# Patient Record
Sex: MALE | Race: WHITE | NOT HISPANIC OR LATINO | Employment: FULL TIME | URBAN - METROPOLITAN AREA
[De-identification: names, ages, dates, MRNs, and addresses within clinical notes are randomized per-mention and may not be internally consistent; named-entity substitution may affect disease eponyms.]

---

## 2022-11-21 ENCOUNTER — APPOINTMENT (EMERGENCY)
Dept: CT IMAGING | Facility: HOSPITAL | Age: 62
End: 2022-11-21

## 2022-11-21 ENCOUNTER — OFFICE VISIT (OUTPATIENT)
Dept: URGENT CARE | Facility: CLINIC | Age: 62
End: 2022-11-21

## 2022-11-21 ENCOUNTER — HOSPITAL ENCOUNTER (INPATIENT)
Facility: HOSPITAL | Age: 62
LOS: 10 days | Discharge: HOME | End: 2022-12-01
Attending: EMERGENCY MEDICINE | Admitting: INTERNAL MEDICINE

## 2022-11-21 VITALS
SYSTOLIC BLOOD PRESSURE: 122 MMHG | OXYGEN SATURATION: 99 % | RESPIRATION RATE: 20 BRPM | WEIGHT: 194 LBS | BODY MASS INDEX: 30.45 KG/M2 | DIASTOLIC BLOOD PRESSURE: 66 MMHG | HEART RATE: 79 BPM | TEMPERATURE: 97.6 F | HEIGHT: 67 IN

## 2022-11-21 DIAGNOSIS — K74.60 CIRRHOSIS OF LIVER WITH ASCITES, UNSPECIFIED HEPATIC CIRRHOSIS TYPE (HCC): ICD-10-CM

## 2022-11-21 DIAGNOSIS — G25.81 RESTLESS LEG SYNDROME: Chronic | ICD-10-CM

## 2022-11-21 DIAGNOSIS — R18.8 CIRRHOSIS OF LIVER WITH ASCITES, UNSPECIFIED HEPATIC CIRRHOSIS TYPE (HCC): ICD-10-CM

## 2022-11-21 DIAGNOSIS — R10.9 ABDOMINAL PAIN, UNSPECIFIED ABDOMINAL LOCATION: Primary | ICD-10-CM

## 2022-11-21 DIAGNOSIS — M79.605 LEFT LEG PAIN: Primary | ICD-10-CM

## 2022-11-21 DIAGNOSIS — F22 PARANOIA (HCC): ICD-10-CM

## 2022-11-21 DIAGNOSIS — K63.89 COLONIC MASS: ICD-10-CM

## 2022-11-21 DIAGNOSIS — R18.8 ASCITES: ICD-10-CM

## 2022-11-21 DIAGNOSIS — K74.60 LIVER CIRRHOSIS (HCC): ICD-10-CM

## 2022-11-21 DIAGNOSIS — R10.11 RUQ ABDOMINAL PAIN: ICD-10-CM

## 2022-11-21 DIAGNOSIS — K52.9 COLITIS: ICD-10-CM

## 2022-11-21 LAB
2HR DELTA HS TROPONIN: 0 NG/L
ABO GROUP BLD: NORMAL
ALBUMIN SERPL BCP-MCNC: 3 G/DL (ref 3.5–5)
ALP SERPL-CCNC: 163 U/L (ref 34–104)
ALT SERPL W P-5'-P-CCNC: 49 U/L (ref 7–52)
ANION GAP SERPL CALCULATED.3IONS-SCNC: 5 MMOL/L (ref 4–13)
AST SERPL W P-5'-P-CCNC: 46 U/L (ref 13–39)
BASOPHILS # BLD AUTO: 0.03 THOUSANDS/ÂΜL (ref 0–0.1)
BASOPHILS NFR BLD AUTO: 0 % (ref 0–1)
BILIRUB SERPL-MCNC: 2.86 MG/DL (ref 0.2–1)
BLD GP AB SCN SERPL QL: NEGATIVE
BUN SERPL-MCNC: 11 MG/DL (ref 5–25)
CALCIUM ALBUM COR SERPL-MCNC: 9.5 MG/DL (ref 8.3–10.1)
CALCIUM SERPL-MCNC: 8.7 MG/DL (ref 8.4–10.2)
CARDIAC TROPONIN I PNL SERPL HS: 7 NG/L
CARDIAC TROPONIN I PNL SERPL HS: 7 NG/L
CHLORIDE SERPL-SCNC: 105 MMOL/L (ref 96–108)
CO2 SERPL-SCNC: 27 MMOL/L (ref 21–32)
CREAT SERPL-MCNC: 0.97 MG/DL (ref 0.6–1.3)
EOSINOPHIL # BLD AUTO: 0.31 THOUSAND/ÂΜL (ref 0–0.61)
EOSINOPHIL NFR BLD AUTO: 5 % (ref 0–6)
ERYTHROCYTE [DISTWIDTH] IN BLOOD BY AUTOMATED COUNT: 16.1 % (ref 11.6–15.1)
GFR SERPL CREATININE-BSD FRML MDRD: 83 ML/MIN/1.73SQ M
GLUCOSE SERPL-MCNC: 184 MG/DL (ref 65–140)
HCT VFR BLD AUTO: 37.6 % (ref 36.5–49.3)
HGB BLD-MCNC: 12.1 G/DL (ref 12–17)
IMM GRANULOCYTES # BLD AUTO: 0.02 THOUSAND/UL (ref 0–0.2)
IMM GRANULOCYTES NFR BLD AUTO: 0 % (ref 0–2)
LACTATE SERPL-SCNC: 1.8 MMOL/L (ref 0.5–2)
LIPASE SERPL-CCNC: 36 U/L (ref 11–82)
LYMPHOCYTES # BLD AUTO: 0.86 THOUSANDS/ÂΜL (ref 0.6–4.47)
LYMPHOCYTES NFR BLD AUTO: 13 % (ref 14–44)
MCH RBC QN AUTO: 33.8 PG (ref 26.8–34.3)
MCHC RBC AUTO-ENTMCNC: 32.2 G/DL (ref 31.4–37.4)
MCV RBC AUTO: 105 FL (ref 82–98)
MONOCYTES # BLD AUTO: 0.34 THOUSAND/ÂΜL (ref 0.17–1.22)
MONOCYTES NFR BLD AUTO: 5 % (ref 4–12)
NEUTROPHILS # BLD AUTO: 5.17 THOUSANDS/ÂΜL (ref 1.85–7.62)
NEUTS SEG NFR BLD AUTO: 77 % (ref 43–75)
NRBC BLD AUTO-RTO: 0 /100 WBCS
PLATELET # BLD AUTO: 59 THOUSANDS/UL (ref 149–390)
PMV BLD AUTO: 12.2 FL (ref 8.9–12.7)
POTASSIUM SERPL-SCNC: 3.3 MMOL/L (ref 3.5–5.3)
PROT SERPL-MCNC: 7.1 G/DL (ref 6.4–8.4)
RBC # BLD AUTO: 3.58 MILLION/UL (ref 3.88–5.62)
RH BLD: NEGATIVE
SODIUM SERPL-SCNC: 137 MMOL/L (ref 135–147)
SPECIMEN EXPIRATION DATE: NORMAL
WBC # BLD AUTO: 6.73 THOUSAND/UL (ref 4.31–10.16)

## 2022-11-21 RX ORDER — FENTANYL CITRATE 50 UG/ML
50 INJECTION, SOLUTION INTRAMUSCULAR; INTRAVENOUS ONCE
Status: COMPLETED | OUTPATIENT
Start: 2022-11-21 | End: 2022-11-21

## 2022-11-21 RX ORDER — FENTANYL CITRATE 50 UG/ML
25 INJECTION, SOLUTION INTRAMUSCULAR; INTRAVENOUS ONCE
Status: COMPLETED | OUTPATIENT
Start: 2022-11-21 | End: 2022-11-21

## 2022-11-21 RX ORDER — ROPINIROLE 0.25 MG/1
4 TABLET, FILM COATED ORAL 4 TIMES DAILY
COMMUNITY

## 2022-11-21 RX ADMIN — FENTANYL CITRATE 50 MCG: 50 INJECTION, SOLUTION INTRAMUSCULAR; INTRAVENOUS at 21:45

## 2022-11-21 RX ADMIN — FENTANYL CITRATE 25 MCG: 50 INJECTION INTRAMUSCULAR; INTRAVENOUS at 21:28

## 2022-11-21 RX ADMIN — IOHEXOL 100 ML: 350 INJECTION, SOLUTION INTRAVENOUS at 22:25

## 2022-11-21 RX ADMIN — FENTANYL CITRATE 50 MCG: 50 INJECTION INTRAMUSCULAR; INTRAVENOUS at 23:04

## 2022-11-22 ENCOUNTER — APPOINTMENT (INPATIENT)
Dept: NON INVASIVE DIAGNOSTICS | Facility: HOSPITAL | Age: 62
End: 2022-11-22

## 2022-11-22 ENCOUNTER — APPOINTMENT (INPATIENT)
Dept: INTERVENTIONAL RADIOLOGY/VASCULAR | Facility: HOSPITAL | Age: 62
End: 2022-11-22

## 2022-11-22 PROBLEM — K74.60 CIRRHOSIS OF LIVER WITH ASCITES (HCC): Status: ACTIVE | Noted: 2022-11-22

## 2022-11-22 PROBLEM — D69.6 THROMBOCYTOPENIA (HCC): Status: ACTIVE | Noted: 2022-11-22

## 2022-11-22 PROBLEM — E66.9 DIABETES MELLITUS TYPE 2 IN OBESE (HCC): Chronic | Status: ACTIVE | Noted: 2022-11-22

## 2022-11-22 PROBLEM — R93.89 ABNORMAL CT OF THE CHEST: Status: ACTIVE | Noted: 2022-11-22

## 2022-11-22 PROBLEM — E11.69 DIABETES MELLITUS TYPE 2 IN OBESE (HCC): Chronic | Status: ACTIVE | Noted: 2022-11-22

## 2022-11-22 PROBLEM — E87.6 HYPOKALEMIA: Status: ACTIVE | Noted: 2022-11-22

## 2022-11-22 PROBLEM — R18.8 CIRRHOSIS OF LIVER WITH ASCITES (HCC): Status: ACTIVE | Noted: 2022-11-22

## 2022-11-22 PROBLEM — K52.9 COLITIS: Status: ACTIVE | Noted: 2022-11-22

## 2022-11-22 PROBLEM — G25.81 RESTLESS LEG SYNDROME: Chronic | Status: ACTIVE | Noted: 2022-11-22

## 2022-11-22 PROBLEM — Z72.0 TOBACCO ABUSE: Chronic | Status: ACTIVE | Noted: 2022-11-22

## 2022-11-22 LAB
ABO GROUP BLD: NORMAL
ALBUMIN FLD-MCNC: <0.6 G/DL
ALBUMIN SERPL BCP-MCNC: 2.4 G/DL (ref 3.5–5)
ALP SERPL-CCNC: 125 U/L (ref 34–104)
ALT SERPL W P-5'-P-CCNC: 39 U/L (ref 7–52)
ANION GAP SERPL CALCULATED.3IONS-SCNC: 2 MMOL/L (ref 4–13)
AORTIC ROOT: 3.3 CM
AORTIC VALVE MEAN VELOCITY: 12.2 M/S
APICAL FOUR CHAMBER EJECTION FRACTION: 66 %
APPEARANCE FLD: ABNORMAL
ASCENDING AORTA: 3.2 CM
AST SERPL W P-5'-P-CCNC: 35 U/L (ref 13–39)
AV AREA BY CONTINUOUS VTI: 1.9 CM2
AV AREA PEAK VELOCITY: 2 CM2
AV LVOT MEAN GRADIENT: 3 MMHG
AV LVOT PEAK GRADIENT: 4 MMHG
AV MEAN GRADIENT: 7 MMHG
AV PEAK GRADIENT: 11 MMHG
AV VALVE AREA: 1.88 CM2
AV VELOCITY RATIO: 0.63
BILIRUB DIRECT SERPL-MCNC: 0.91 MG/DL (ref 0–0.2)
BILIRUB SERPL-MCNC: 1.77 MG/DL (ref 0.2–1)
BILIRUB UR QL STRIP: ABNORMAL
BNP SERPL-MCNC: 178 PG/ML (ref 0–100)
BUN SERPL-MCNC: 13 MG/DL (ref 5–25)
CALCIUM SERPL-MCNC: 8 MG/DL (ref 8.4–10.2)
CEA SERPL-MCNC: 5.7 NG/ML (ref 0–3)
CHLORIDE SERPL-SCNC: 109 MMOL/L (ref 96–108)
CLARITY UR: CLEAR
CO2 SERPL-SCNC: 25 MMOL/L (ref 21–32)
COLOR FLD: ABNORMAL
COLOR UR: ABNORMAL
CREAT SERPL-MCNC: 0.78 MG/DL (ref 0.6–1.3)
DOP CALC AO PEAK VEL: 1.67 M/S
DOP CALC AO VTI: 36.7 CM
DOP CALC LVOT AREA: 3.14 CM2
DOP CALC LVOT DIAMETER: 2 CM
DOP CALC LVOT PEAK VEL VTI: 21.98 CM
DOP CALC LVOT PEAK VEL: 1.06 M/S
DOP CALC LVOT STROKE INDEX: 34 ML/M2
DOP CALC LVOT STROKE VOLUME: 69.02 CM3
E WAVE DECELERATION TIME: 291 MS
ERYTHROCYTE [DISTWIDTH] IN BLOOD BY AUTOMATED COUNT: 16 % (ref 11.6–15.1)
EST. AVERAGE GLUCOSE BLD GHB EST-MCNC: 123 MG/DL
FRACTIONAL SHORTENING: 35 % (ref 28–44)
GFR SERPL CREATININE-BSD FRML MDRD: 96 ML/MIN/1.73SQ M
GLUCOSE FLD-MCNC: 70 MG/DL
GLUCOSE SERPL-MCNC: 151 MG/DL (ref 65–140)
GLUCOSE UR STRIP-MCNC: NEGATIVE MG/DL
HBA1C MFR BLD: 5.9 %
HCT VFR BLD AUTO: 29.4 % (ref 36.5–49.3)
HGB BLD-MCNC: 9.7 G/DL (ref 12–17)
HGB UR QL STRIP.AUTO: NEGATIVE
HISTIOCYTES NFR FLD: 30 %
INR PPP: 1.52 (ref 0.84–1.19)
INTERVENTRICULAR SEPTUM IN DIASTOLE (PARASTERNAL SHORT AXIS VIEW): 1 CM
INTERVENTRICULAR SEPTUM: 1 CM (ref 0.6–1.1)
KETONES UR STRIP-MCNC: NEGATIVE MG/DL
LAAS-AP2: 21.1 CM2
LAAS-AP4: 18.9 CM2
LDH FLD L TO P-CCNC: 49 U/L
LEFT ATRIUM SIZE: 3.7 CM
LEFT INTERNAL DIMENSION IN SYSTOLE: 3.1 CM (ref 2.1–4)
LEFT VENTRICULAR INTERNAL DIMENSION IN DIASTOLE: 4.8 CM (ref 3.5–6)
LEFT VENTRICULAR POSTERIOR WALL IN END DIASTOLE: 1 CM
LEFT VENTRICULAR STROKE VOLUME: 67 ML
LEUKOCYTE ESTERASE UR QL STRIP: NEGATIVE
LVSV (TEICH): 67 ML
LYMPHOCYTES NFR BLD AUTO: 42 %
MCH RBC QN AUTO: 34.6 PG (ref 26.8–34.3)
MCHC RBC AUTO-ENTMCNC: 33 G/DL (ref 31.4–37.4)
MCV RBC AUTO: 105 FL (ref 82–98)
MONOCYTES NFR BLD AUTO: 9 %
MV E'TISSUE VEL-SEP: 10 CM/S
MV PEAK A VEL: 0.65 M/S
MV PEAK E VEL: 115 CM/S
MV STENOSIS PRESSURE HALF TIME: 84 MS
MV VALVE AREA P 1/2 METHOD: 2.62 CM2
NEUTS SEG NFR BLD AUTO: 19 %
NITRITE UR QL STRIP: NEGATIVE
PH UR STRIP.AUTO: 6 [PH]
PLATELET # BLD AUTO: 46 THOUSANDS/UL (ref 149–390)
PMV BLD AUTO: 11.7 FL (ref 8.9–12.7)
POTASSIUM SERPL-SCNC: 3.7 MMOL/L (ref 3.5–5.3)
PROT FLD-MCNC: <2 G/DL
PROT SERPL-MCNC: 5.7 G/DL (ref 6.4–8.4)
PROT UR STRIP-MCNC: NEGATIVE MG/DL
PROTHROMBIN TIME: 18.3 SECONDS (ref 11.6–14.5)
RBC # BLD AUTO: 2.8 MILLION/UL (ref 3.88–5.62)
RH BLD: NEGATIVE
RIGHT ATRIUM AREA SYSTOLE A4C: 14.9 CM2
RIGHT VENTRICLE ID DIMENSION: 3.7 CM
SITE: ABNORMAL
SL CV LEFT ATRIUM LENGTH A2C: 5.7 CM
SL CV LV EF: 60
SL CV PED ECHO LEFT VENTRICLE DIASTOLIC VOLUME (MOD BIPLANE) 2D: 106 ML
SL CV PED ECHO LEFT VENTRICLE SYSTOLIC VOLUME (MOD BIPLANE) 2D: 39 ML
SODIUM SERPL-SCNC: 136 MMOL/L (ref 135–147)
SP GR UR STRIP.AUTO: 1.01
TOTAL CELLS COUNTED SPEC: 100
TR MAX PG: 22 MMHG
TR PEAK VELOCITY: 2.3 M/S
TRICUSPID VALVE PEAK REGURGITATION VELOCITY: 2.34 M/S
UROBILINOGEN UR QL STRIP.AUTO: 0.2 E.U./DL
WBC # BLD AUTO: 5.77 THOUSAND/UL (ref 4.31–10.16)
WBC # FLD MANUAL: 332 /UL

## 2022-11-22 PROCEDURE — 0W9G3ZX DRAINAGE OF PERITONEAL CAVITY, PERCUTANEOUS APPROACH, DIAGNOSTIC: ICD-10-PCS | Performed by: RADIOLOGY

## 2022-11-22 RX ORDER — POTASSIUM CHLORIDE 20 MEQ/1
40 TABLET, EXTENDED RELEASE ORAL ONCE
Status: COMPLETED | OUTPATIENT
Start: 2022-11-22 | End: 2022-11-22

## 2022-11-22 RX ORDER — LIDOCAINE HYDROCHLORIDE 10 MG/ML
INJECTION, SOLUTION EPIDURAL; INFILTRATION; INTRACAUDAL; PERINEURAL AS NEEDED
Status: COMPLETED | OUTPATIENT
Start: 2022-11-22 | End: 2022-11-22

## 2022-11-22 RX ORDER — SODIUM CHLORIDE 9 MG/ML
50 INJECTION, SOLUTION INTRAVENOUS CONTINUOUS
Status: DISCONTINUED | OUTPATIENT
Start: 2022-11-22 | End: 2022-11-22

## 2022-11-22 RX ORDER — HYDROMORPHONE HCL/PF 1 MG/ML
1 SYRINGE (ML) INJECTION
Status: DISCONTINUED | OUTPATIENT
Start: 2022-11-22 | End: 2022-11-23

## 2022-11-22 RX ORDER — ALBUMIN (HUMAN) 12.5 G/50ML
12.5 SOLUTION INTRAVENOUS ONCE
Status: COMPLETED | OUTPATIENT
Start: 2022-11-22 | End: 2022-11-22

## 2022-11-22 RX ORDER — NICOTINE 21 MG/24HR
1 PATCH, TRANSDERMAL 24 HOURS TRANSDERMAL DAILY
Status: DISCONTINUED | OUTPATIENT
Start: 2022-11-22 | End: 2022-11-22

## 2022-11-22 RX ORDER — ONDANSETRON 2 MG/ML
4 INJECTION INTRAMUSCULAR; INTRAVENOUS EVERY 6 HOURS PRN
Status: DISCONTINUED | OUTPATIENT
Start: 2022-11-22 | End: 2022-12-01 | Stop reason: HOSPADM

## 2022-11-22 RX ORDER — ROPINIROLE 2 MG/1
4 TABLET, FILM COATED ORAL 4 TIMES DAILY
Status: DISCONTINUED | OUTPATIENT
Start: 2022-11-22 | End: 2022-12-01 | Stop reason: HOSPADM

## 2022-11-22 RX ORDER — DIPHENHYDRAMINE HCL 25 MG
25 TABLET ORAL EVERY 6 HOURS PRN
Status: DISCONTINUED | OUTPATIENT
Start: 2022-11-22 | End: 2022-12-01 | Stop reason: HOSPADM

## 2022-11-22 RX ADMIN — ROPINIROLE 4 MG: 2 TABLET, FILM COATED ORAL at 09:00

## 2022-11-22 RX ADMIN — DIPHENHYDRAMINE HYDROCHLORIDE 25 MG: 25 TABLET ORAL at 10:56

## 2022-11-22 RX ADMIN — ALBUMIN (HUMAN) 12.5 G: 0.25 INJECTION, SOLUTION INTRAVENOUS at 08:11

## 2022-11-22 RX ADMIN — ONDANSETRON 4 MG: 2 INJECTION INTRAMUSCULAR; INTRAVENOUS at 01:21

## 2022-11-22 RX ADMIN — ROPINIROLE 4 MG: 2 TABLET, FILM COATED ORAL at 17:33

## 2022-11-22 RX ADMIN — HYDROMORPHONE HYDROCHLORIDE 1 MG: 1 INJECTION, SOLUTION INTRAMUSCULAR; INTRAVENOUS; SUBCUTANEOUS at 01:21

## 2022-11-22 RX ADMIN — ROPINIROLE 4 MG: 2 TABLET, FILM COATED ORAL at 13:03

## 2022-11-22 RX ADMIN — SODIUM CHLORIDE 50 ML/HR: 0.9 INJECTION, SOLUTION INTRAVENOUS at 01:26

## 2022-11-22 RX ADMIN — ROPINIROLE 4 MG: 2 TABLET, FILM COATED ORAL at 21:39

## 2022-11-22 RX ADMIN — LIDOCAINE HYDROCHLORIDE 10 ML: 10 INJECTION, SOLUTION EPIDURAL; INFILTRATION; INTRACAUDAL; PERINEURAL at 15:27

## 2022-11-22 RX ADMIN — POTASSIUM CHLORIDE 40 MEQ: 1500 TABLET, EXTENDED RELEASE ORAL at 01:21

## 2022-11-22 RX ADMIN — HYDROMORPHONE HYDROCHLORIDE 1 MG: 1 INJECTION, SOLUTION INTRAMUSCULAR; INTRAVENOUS; SUBCUTANEOUS at 06:22

## 2022-11-22 RX ADMIN — HYDROMORPHONE HYDROCHLORIDE 1 MG: 1 INJECTION, SOLUTION INTRAMUSCULAR; INTRAVENOUS; SUBCUTANEOUS at 09:44

## 2022-11-22 RX ADMIN — NICOTINE 7 MG: 7 PATCH, EXTENDED RELEASE TRANSDERMAL at 09:00

## 2022-11-22 NOTE — UTILIZATION REVIEW
Initial Clinical Review    Admission: Date/Time/Statement:   Admission Orders (From admission, onward)     Ordered        11/21/22 2345  INPATIENT ADMISSION  Once                      Orders Placed This Encounter   Procedures   • INPATIENT ADMISSION     Standing Status:   Standing     Number of Occurrences:   1     Order Specific Question:   Level of Care     Answer:   Med Surg [16]     Order Specific Question:   Estimated length of stay     Answer:   More than 2 Midnights     Order Specific Question:   Certification     Answer:   I certify that inpatient services are medically necessary for this patient for a duration of greater than two midnights  See H&P and MD Progress Notes for additional information about the patient's course of treatment  ED Arrival Information     Expected   11/21/2022     Arrival   11/21/2022 20:25    Acuity   Emergent            Means of arrival   Walk-In    Escorted by   Spouse    Service   Hospitalist    Admission type   Emergency            Arrival complaint   Legs Hurt            Chief Complaint   Patient presents with   • Abdominal Pain     Onset 2 weeks ago Abd pain diarrhea DRBPR leg pain bilat w/ swelling  Dx colon CA 2017 cleared 4 years ago  Left chest port  No fevers reported  No nv       Initial Presentation: 58 y o  male to the ED from urgent care with complaints of  Abdominal pain for 2 weeks prior to arrival  Admitted to inpatient for decompensated liver cirrhosis, colitis, pericardial effusion  H/O  DM2, tobacco abuse, RLS,  cirrhosis, colon cancer with resection   Started with bright red stool about 5 days prior  Arrives with abdominal pain, blood in stool ,diarrhea, wheezing breath sounds, bilateral lower extremity edema  , guarding  CT ab/ shows: Findings suggesting colitis, as described above   Colonic bowel wall thickening is most pronounced at the site of surgical sutures in the region of the mid transverse colon   Local recurrence should be excluded    GI consult  Date: 11/22    Day 2:   GI consult: Keep NPO  Ascites: present on examination and imaging; primary team has ordered diagnostic and therapeutic paracentesis; if large volume para completed replete with albumin per protocol; will calculate SAAG; check albumin, cell count and diff, total protein etc   Appears to be decompensated  Consider comp hepatitis panel  Has been having abdominal pain for months with 6-7 loose stools a day  Check colonoscopy        ED Triage Vitals   Temperature Pulse Respirations Blood Pressure SpO2   11/21/22 2109 11/21/22 2106 11/21/22 2106 11/21/22 2106 11/21/22 2106   98 8 °F (37 1 °C) 85 15 141/69 99 %      Temp Source Heart Rate Source Patient Position - Orthostatic VS BP Location FiO2 (%)   11/21/22 2109 11/21/22 2345 11/21/22 2345 11/21/22 2345 --   Tympanic Monitor Lying Left arm       Pain Score       11/21/22 2106       10 - Worst Possible Pain          Wt Readings from Last 1 Encounters:   11/22/22 88 5 kg (195 lb)     Additional Vital Signs:   /Time Temp Pulse Resp BP MAP (mmHg) SpO2 O2 Device Patient Position - Orthostatic VS   11/22/22 0930 -- 87 20 118/58 83 95 % None (Room air) --   11/22/22 0835 -- 75 -- 113/58 -- -- -- --   11/22/22 0701 -- 72 15 113/58 78 94 % None (Room air) --   11/22/22 0500 -- 63 22 110/52 62 Abnormal  99 % None (Room air) Lying   11/22/22 0415 -- -- -- -- -- -- None (Room air) --   11/22/22 0400 -- 66 21 115/56 81 98 % None (Room air) Lying   11/22/22 0330 -- 63 16 113/56 79 97 % None (Room air) Lying   11/22/22 0300 -- 66 18 102/51 74 98 % None (Room air) Lying   11/22/22 0200 -- 72 18 114/57 79 100 % None (Room air) Lying   11/22/22 0145 -- 70 -- 106/56 73 99 % None (Room air) Lying   11/22/22 0130 -- 67 24 Abnormal  107/56 77 95 % None (Room air) Lying   11/22/22 0000 -- 75 18 118/66 86 100 % None (Room air) Lying   11/21/22 2345 -- 76 24 Abnormal  117/64 83 100 % None (Room air) Lying   11/21/22 2109 98 8 °F (37 1 °C) -- -- -- -- -- -- --   11/21/22 2108 -- -- -- -- -- 98 % -- --   11/21/22 2106 -- 85 15 141/69 90 99 % -- --       Pertinent Labs/Diagnostic Test Results:   11/22 ECHO: Left Ventricle: Left ventricular cavity size is normal  Wall thickness is mildly increased  The left ventricular ejection fraction is 60%  Systolic function is normal  Wall motion is normal   •  Left Atrium: The atrium is mildly dilated  •  Mitral Valve: There is mild regurgitation  •  Tricuspid Valve: There is mild regurgitation  •  Pericardium: There is a trivial pericardial effusion  There is no echocardiographic evidence of tamponade  The evidence against tamponade includes: no right ventricular diastolic collapse and no right ventricular compression        CT Abdomen pelvis with contrast   Final Result by Renard Fox MD (11/21 2343)      Findings suggesting colitis, as described above  Colonic bowel wall thickening is most pronounced at the site of surgical sutures in the region of the mid transverse colon  Local recurrence should be excluded  Follow-up after treatment is strongly    recommended  Gastroenterology consultation and follow-up is recommended  Small bowel wall thickening may be due to enteritis, however, could be related to ascites and edema  There is no bowel obstruction  There is colonic diverticulosis without definite evidence of acute diverticulitis  The liver demonstrates cirrhotic morphology  There are findings of portal hypertension, including splenomegaly, ascites, varices  There is a small right pericardial effusion  There are several right pericardiophrenic nodes, the largest of which measures 2 x 1 6 cm  Follow-up is recommended  Other nonemergent findings, as described above  Please see discussion               I personally discussed this study with Chon Moreno on 11/21/2022 at 11:35 PM                      Workstation performed: [id number]                  Results from last 7 days   Lab Units 11/22/22  0618 11/21/22  2144   WBC Thousand/uL 5 77 6 73   HEMOGLOBIN g/dL 9 7* 12 1   HEMATOCRIT % 29 4* 37 6   PLATELETS Thousands/uL 46* 59*   NEUTROS ABS Thousands/µL  --  5 17         Results from last 7 days   Lab Units 11/22/22  0453 11/21/22  2144   SODIUM mmol/L 136 137   POTASSIUM mmol/L 3 7 3 3*   CHLORIDE mmol/L 109* 105   CO2 mmol/L 25 27   ANION GAP mmol/L 2* 5   BUN mg/dL 13 11   CREATININE mg/dL 0 78 0 97   EGFR ml/min/1 73sq m 96 83   CALCIUM mg/dL 8 0* 8 7     Results from last 7 days   Lab Units 11/22/22  0453 11/21/22  2144   AST U/L 35 46*   ALT U/L 39 49   ALK PHOS U/L 125* 163*   TOTAL PROTEIN g/dL 5 7* 7 1   ALBUMIN g/dL 2 4* 3 0*   TOTAL BILIRUBIN mg/dL 1 77* 2 86*   BILIRUBIN DIRECT mg/dL 0 91*  --          Results from last 7 days   Lab Units 11/22/22  0453 11/21/22  2144   GLUCOSE RANDOM mg/dL 151* 184*         Results from last 7 days   Lab Units 11/21/22  2144   HEMOGLOBIN A1C % 5 9*   EAG mg/dl 123         Results from last 7 days   Lab Units 11/21/22  2312 11/21/22  2144   HS TNI 0HR ng/L  --  7   HS TNI 2HR ng/L 7  --    HSTNI D2 ng/L 0  --        Results from last 7 days   Lab Units 11/21/22  2143   LACTIC ACID mmol/L 1 8       Results from last 7 days   Lab Units 11/21/22  2325   BNP pg/mL 178*     Results from last 7 days   Lab Units 11/21/22  2144   LIPASE u/L 36         Results from last 7 days   Lab Units 11/22/22  0617   CLARITY UA  Clear   COLOR UA  Viky*   SPEC GRAV UA  1 010   PH UA  6 0   GLUCOSE UA mg/dl Negative   KETONES UA mg/dl Negative   BLOOD UA  Negative   PROTEIN UA mg/dl Negative   NITRITE UA  Negative   BILIRUBIN UA  1+*   UROBILINOGEN UA E U /dl 0 2   LEUKOCYTES UA  Negative       ED Treatment:   Medication Administration from 11/21/2022 2025 to 11/22/2022 3409       Date/Time Order Dose Route Action     11/21/2022 2128 EST fentanyl citrate (PF) 100 MCG/2ML 25 mcg 25 mcg Intravenous Given     11/21/2022 2145 EST fentanyl citrate (PF) 100 MCG/2ML 50 mcg 50 mcg Intravenous Given     11/21/2022 2304 EST fentanyl citrate (PF) 100 MCG/2ML 50 mcg 50 mcg Intravenous Given     11/22/2022 0900 EST rOPINIRole (REQUIP) tablet 4 mg 4 mg Oral Given     11/22/2022 0121 EST potassium chloride (K-DUR,KLOR-CON) CR tablet 40 mEq 40 mEq Oral Given     11/22/2022 0121 EST ondansetron (ZOFRAN) injection 4 mg 4 mg Intravenous Given     11/22/2022 0126 EST sodium chloride 0 9 % infusion 50 mL/hr Intravenous New Bag     11/22/2022 0622 EST HYDROmorphone (DILAUDID) injection 1 mg 1 mg Intravenous Given     11/22/2022 0121 EST HYDROmorphone (DILAUDID) injection 1 mg 1 mg Intravenous Given     11/22/2022 0811 EST albumin human (FLEXBUMIN) 25 % injection 12 5 g 12 5 g Intravenous New Bag     11/22/2022 0900 EST nicotine (NICODERM CQ) 7 mg/24hr TD 24 hr patch 7 mg 7 mg Transdermal Medication Applied        Past Medical History:   Diagnosis Date   • Cirrhosis ([facility] )     CHRISTIE   • Colon cancer ([facility] )    • [facility] )    • Neuropathy    • Restless leg syndrome        Admitting Diagnosis: Left leg pain  Age/Sex: 58 y o  male  Admission Orders:  Scheduled Medications:  nicotine, 7 mg, Transdermal, Daily  rOPINIRole, 4 mg, Oral, 4x Daily      Continuous IV Infusions:     PRN Meds:  HYDROmorphone, 1 mg, Intravenous, Q3H PRN x3 11/22  ondansetron, 4 mg, Intravenous, Q6H PRN        IP CONSULT TO ACUTE CARE SURGERY  IP CONSULT TO GASTROENTEROLOGY    Network Utilization Review Department  ATTENTION: Please call with any questions or concerns to [phone] and carefully listen to the prompts so that you are directed to the right person  All voicemails are confidential   Wero Wilkes all requests for admission clinical reviews, approved or denied determinations and any other requests to dedicated fax number below belonging to the campus where the patient is receiving treatment   List of dedicated fax numbers for the Facilities:  FACILITY NAME UR FAX NUMBER   ADMISSION DENIALS (Administrative/Medical Necessity) [address] [facility] (Maternity/NICU/Pediatrics) Sondra Tanner [address] [facility] [address]  [phone]   [address] [place] [address] [facility] [address] [facility] 310 [facility] [address] [phone]

## 2022-11-22 NOTE — CASE MANAGEMENT
Case Management Progress Note    Patient name Noni Bentley  Location [address] 203/-31 MRN [id number]  : 1960 Date 2022       LOS (days): 1  Geometric Mean LOS (GMLOS) (days): 3 30  Days to GMLOS:2 6        OBJECTIVE:        Current admission status: Inpatient  Preferred Pharmacy:   [facility] [address] DANIELS [facility] [address] [address]  Phone: [phone] Fax: [phone]    Primary Care Provider: No primary care provider on file  Primary Insurance:   Secondary Insurance:     PROGRESS NOTE:    Met with patient at bedside however patient not able to stay awake to answer questions at this time  Patient reports he and wife are staying at a campground in [facility] [place] in their RV  Patient and spouse are from [place] and traveled to [place] to take care of mother-in-law who is ill with cancer  Patient has no health insurance just a VISA flex spending for scripts and appointments  Call to spouse Ac Wilder [phone] and no voice mailbox set up    CM will attempt to meet with patient tomorrow,

## 2022-11-22 NOTE — H&P
[address]  H&P- Noel Kiran 1960, 58 y o  male MRN: [id number]  Unit/Bed#: ED 22 Encounter: [id number]  Primary Care Provider: No primary care provider on file  Date and time admitted to hospital: 11/21/2022  9:10 PM    * Colitis  Assessment & Plan  · Patient with report of abdominal pain and swelling  · Surgery consult  · NPO  · Pain control  · CT: Findings suggesting colitis, as described above  Colonic bowel wall thickening is most pronounced at the site of surgical sutures in the region of the mid transverse colon  Local recurrence should be excluded  Follow-up after treatment is strongly recommended  Diabetes mellitus type 2 in obese [facility])  Assessment & Plan  · Patient reports he was on metformin, but his med got switched and notes he has diarrhea with this new med, he does not take meds like he is supposed to  · SSI coverage    Tobacco abuse  Assessment & Plan  · Nicotine patch 7mg, smokes 4-5 cig/day    Restless leg syndrome  Assessment & Plan  · Continue Requip    Thrombocytopenia ([address] )  Assessment & Plan  · Likely related to cirrohosis  · monitor    Hypokalemia  Assessment & Plan  · Replete and monitor    Abnormal CT of the chest  Assessment & Plan  · CT: There is a small right pericardial effusion  There are several right pericardiophrenic nodes, the largest of which measures 2 x 1 6 cm  Follow-up is recommended  Cirrhosis of liver with ascites ([address] )  Assessment & Plan  · CT: The liver demonstrates cirrhotic morphology  There are findings of portal hypertension, including splenomegaly, ascites, varices  There is a moderate amount of ascites  There is mesenteric edema  There is a 2 5 cm zeyad hepatis node  Multiple mildly prominent mesenteric nodes are present  · IR consult for paracentesis  · Patient reports CHRISTIE    VTE Pharmacologic Prophylaxis: VTE Score: 5 High Risk (Score >/= 5) - Pharmacological DVT Prophylaxis Contraindicated   Sequential Compression Devices Ordered  Code Status: Level 1 - Full Code   Discussion with patient and christina updated on the phone    Anticipated Length of Stay: Patient will be admitted on an inpatient basis with an anticipated length of stay of greater than 2 midnights secondary to specialist input, IR paracentesis, pain control  Chief Complaint: abdominal pain    History of Present Illness:  Lorayne Sandhoff is a 58 y o  male with a PMH of DM2, tobacco abuse, RLS,  cirrhosis, colon cancer with resection who presents with abdominal pain and swelling  He moved here about 1 month ago from [place] and has not established care, just started working 2 weeks ago  Patient reports 5 days ago he started with bright red and dark blood from his rectum  That is when he developed severe pain in his abdomen  10/10 pain, currently 8/10  He noted his belly more distended  He also notes increased swelling in his legs  He normally takes Requip and ran out  He also takes something for his diabetes that he does not know right now  He notes that he wants to go back on metformin that he did not have diarrhea when he was on that  His leg pain reports 8/10 currently  He previously had colon resection 2017 and last saw Oncology in [place] 5 years ago  Review of Systems:  Review of Systems   Constitutional: Negative for chills, fatigue and fever  HENT: Negative for rhinorrhea, sore throat and trouble swallowing  Eyes: Negative for discharge and redness  Respiratory: Positive for wheezing  Negative for cough and shortness of breath  Cardiovascular: Positive for leg swelling  Negative for chest pain  Gastrointestinal: Positive for abdominal distention, abdominal pain, blood in stool, diarrhea and vomiting  Negative for nausea  Genitourinary: Negative for dysuria and hematuria  Musculoskeletal: Negative for back pain and neck pain  Skin: Negative for wound          Positive bruising   Neurological: Negative for dizziness, weakness and headaches  Psychiatric/Behavioral: Negative for agitation and confusion  Past Medical and Surgical History:   Past Medical History:   Diagnosis Date   • Cirrhosis ([facility] )     CHRISTIE   • Colon cancer ([facility] )    • Diabetes mellitus ([facility] )    • Neuropathy    • Restless leg syndrome        Past Surgical History:   Procedure Laterality Date   • EGD AND SIGMOIDOSCOPY FLEXIBLE     • LEFT COLON RESECTION     • LITHOTRIPSY         Meds/Allergies:  Prior to Admission medications    Medication Sig Start Date End Date Taking? Authorizing Provider   rOPINIRole (REQUIP) 0 25 mg tablet Take 4 mg by mouth 4 (four) times a day   Yes Historical Provider, MD   metFORMIN (GLUCOPHAGE) 1000 MG tablet Take 1,000 mg by mouth 2 (two) times a day with meals  Patient not taking: Reported on 11/21/2022 11/22/22  Historical Provider, MD     I have reviewed home medications with patient personally  Allergies:    Allergies   Allergen Reactions   • Morphine Anaphylaxis   • Clarithromycin Other (See Comments)     Dizzy, nausea, ulcers in stomach       Social History:  Marital Status: Single   Occupation: heavy /engine repair  Patient Pre-hospital Living Situation: Home  Patient Pre-hospital Level of Mobility: walks  Patient Pre-hospital Diet Restrictions: none  Substance Use History:   Social History     Substance and Sexual Activity   Alcohol Use Not Currently     Social History     Tobacco Use   Smoking Status Every Day   • Packs/day: 0 25   • Types: Cigarettes   Smokeless Tobacco Never     Social History     Substance and Sexual Activity   Drug Use Never       Family History:  Family History   Problem Relation Age of Onset   • Diabetes Mother    • Diabetes Father        Physical Exam:     Vitals:   Blood Pressure: 114/57 (11/22/22 0200)  Pulse: 72 (11/22/22 0200)  Temperature: 98 8 °F (37 1 °C) (11/21/22 2109)  Temp Source: Tympanic (11/21/22 2109)  Respirations: 18 (11/22/22 0200)  Height: 5' 7" (170 2 cm) (11/21/22 2106)  Weight - Scale: 88 5 kg (195 lb) (11/21/22 2106)  SpO2: 100 % (11/22/22 0200)    Physical Exam  Vitals reviewed  Constitutional:       General: He is not in acute distress  Appearance: Normal appearance  He is obese  He is ill-appearing  HENT:      Head: Normocephalic and atraumatic  Right Ear: External ear normal       Left Ear: External ear normal       Nose: Nose normal    Eyes:      General:         Right eye: No discharge  Left eye: No discharge  Conjunctiva/sclera: Conjunctivae normal    Cardiovascular:      Rate and Rhythm: Normal rate and regular rhythm  Heart sounds: Normal heart sounds  No murmur heard  Pulmonary:      Effort: Pulmonary effort is normal  No respiratory distress  Breath sounds: Wheezing present  No rales  Abdominal:      General: Bowel sounds are normal  There is distension  Palpations: Abdomen is soft  Tenderness: There is abdominal tenderness  There is guarding  Musculoskeletal:         General: Normal range of motion  Cervical back: Normal range of motion  Right lower leg: Edema present  Left lower leg: Edema present  Skin:     General: Skin is warm and dry  Comments: +bruising on extremeties   Neurological:      Mental Status: He is alert and oriented to person, place, and time  Mental status is at baseline  Psychiatric:         Mood and Affect: Mood normal          Behavior: Behavior normal          Thought Content:  Thought content normal          Judgment: Judgment normal           Additional Data:     Lab Results:  Results from last 7 days   Lab Units 11/21/22  2144   WBC Thousand/uL 6 73   HEMOGLOBIN g/dL 12 1   HEMATOCRIT % 37 6   PLATELETS Thousands/uL 59*   NEUTROS PCT % 77*   LYMPHS PCT % 13*   MONOS PCT % 5   EOS PCT % 5     Results from last 7 days   Lab Units 11/21/22  2144   SODIUM mmol/L 137   POTASSIUM mmol/L 3 3*   CHLORIDE mmol/L 105   CO2 mmol/L 27 BUN mg/dL 11   CREATININE mg/dL 0 97   ANION GAP mmol/L 5   CALCIUM mg/dL 8 7   ALBUMIN g/dL 3 0*   TOTAL BILIRUBIN mg/dL 2 86*   ALK PHOS U/L 163*   ALT U/L 49   AST U/L 46*   GLUCOSE RANDOM mg/dL 184*                 Results from last 7 days   Lab Units 11/21/22  2143   LACTIC ACID mmol/L 1 8       Lines/Drains:  Invasive Devices     Peripheral Intravenous Line  Duration           Peripheral IV 11/21/22 Proximal;Right;Ventral (anterior) Forearm <1 day              Imaging: Reviewed radiology reports from this admission including: abdominal/pelvic CT  CT Abdomen pelvis with contrast   Final Result by Sarah Thompson MD (11/21 2343)      Findings suggesting colitis, as described above  Colonic bowel wall thickening is most pronounced at the site of surgical sutures in the region of the mid transverse colon  Local recurrence should be excluded  Follow-up after treatment is strongly    recommended  Gastroenterology consultation and follow-up is recommended  Small bowel wall thickening may be due to enteritis, however, could be related to ascites and edema  There is no bowel obstruction  There is colonic diverticulosis without definite evidence of acute diverticulitis  The liver demonstrates cirrhotic morphology  There are findings of portal hypertension, including splenomegaly, ascites, varices  There is a small right pericardial effusion  There are several right pericardiophrenic nodes, the largest of which measures 2 x 1 6 cm  Follow-up is recommended  Other nonemergent findings, as described above  Please see discussion  I personally discussed this study with Gary Carbajal on 11/21/2022 at 11:35 PM                      Workstation performed: [id number]         IR INPATIENT Paracentesis    (Results Pending)     ** Please Note: This note has been constructed using a voice recognition system   **

## 2022-11-22 NOTE — PLAN OF CARE
Problem: Potential for Falls  Goal: Patient will remain free of falls  Description: INTERVENTIONS:  - Educate patient/family on patient safety including physical limitations  - Instruct patient to call for assistance with activity   - Consult OT/PT to assist with strengthening/mobility   - Keep Call bell within reach  - Keep bed low and locked with side rails adjusted as appropriate  - Keep care items and personal belongings within reach  - Initiate and maintain comfort rounds  - Make Fall Risk Sign visible to staff  - Offer Toileting every 2 Hours, in advance of need  - Initiate/Maintain bed alarm  - Obtain necessary fall risk management equipment: non skid socks  - Apply yellow socks and bracelet for high fall risk patients  - Consider moving patient to room near nurses station  Outcome: Not Progressing

## 2022-11-22 NOTE — ED PROVIDER NOTES
History  Chief Complaint   Patient presents with   • Abdominal Pain     Onset 2 weeks ago Abd pain diarrhea DRBPR leg pain bilat w/ swelling  Dx colon CA 2017 cleared 4 years ago  Left chest port  No fevers reported  No nv     This is a 60-year-old male who complains of abdominal pain and bilateral lower extremity swelling and pain  States symptoms been present for the last 3 weeks in a progressively worsened  Patient states he has a history of colon cancer resected in 2017 for which he has not followed up with Oncology in the past 5 years  Patient also notes he is having vomiting and some diarrhea  Patient is a poor historian and his history is limited  No records in the chart as he [place] this state approximately 1 month ago  Prior to Admission Medications   Prescriptions Last Dose Informant Patient Reported? Taking?   metFORMIN (GLUCOPHAGE) 1000 MG tablet Not Taking  Yes No   Sig: Take 1,000 mg by mouth 2 (two) times a day with meals   Patient not taking: Reported on 11/21/2022   rOPINIRole (REQUIP) 0 25 mg tablet Past Week  Yes Yes   Sig: Take 4 mg by mouth 4 (four) times a day      Facility-Administered Medications: None       Past Medical History:   Diagnosis Date   • Cirrhosis ([facility] [address] )    • Colon cancer ([facility] )    • Diabetes mellitus ([facility] )        Past Surgical History:   Procedure Laterality Date   • LEFT COLON RESECTION         No family history on file  I have reviewed and agree with the history as documented  E-Cigarette/Vaping     E-Cigarette/Vaping Substances     Social History     Tobacco Use   • Smoking status: Every Day     Types: Cigarettes   • Smokeless tobacco: Never   Substance Use Topics   • Alcohol use: Not Currently   • Drug use: Never       Review of Systems   Constitutional: Positive for chills  Negative for fever  Eyes: Negative for visual disturbance  Respiratory: Negative for shortness of breath  Cardiovascular: Positive for leg swelling   Negative for chest pain and palpitations  Gastrointestinal: Positive for abdominal distention, abdominal pain, blood in stool, constipation, diarrhea, nausea and vomiting  Endocrine: Negative for polyuria  Genitourinary: Negative for decreased urine volume and dysuria  Neurological: Negative for dizziness, syncope and weakness  Physical Exam  Physical Exam  Constitutional:       General: He is not in acute distress  Appearance: He is well-developed and well-nourished  He is ill-appearing  He is not toxic-appearing or diaphoretic  HENT:      Head: Normocephalic and atraumatic  Eyes:      Extraocular Movements: EOM normal       Conjunctiva/sclera: Conjunctivae normal       Pupils: Pupils are equal, round, and reactive to light  Cardiovascular:      Rate and Rhythm: Normal rate and regular rhythm  Heart sounds: Normal heart sounds  Pulmonary:      Effort: Pulmonary effort is normal  No respiratory distress  Breath sounds: Normal breath sounds  Abdominal:      General: Bowel sounds are normal       Palpations: Abdomen is soft  Tenderness: There is abdominal tenderness  There is guarding  There is no rebound  Musculoskeletal:         General: Normal range of motion  Cervical back: Normal range of motion and neck supple  Skin:     General: Skin is warm and dry  Comments: Plus two pitting edema bilaterally   Neurological:      Mental Status: He is alert and oriented to person, place, and time  Psychiatric:         Mood and Affect: Mood and affect normal          Behavior: Behavior normal          Thought Content:  Thought content normal          Judgment: Judgment normal          Vital Signs  ED Triage Vitals   Temperature Pulse Respirations Blood Pressure SpO2   11/21/22 2109 11/21/22 2106 11/21/22 2106 11/21/22 2106 11/21/22 2106   98 8 °F (37 1 °C) 85 15 141/69 99 %      Temp Source Heart Rate Source Patient Position - Orthostatic VS BP Location FiO2 (%)   11/21/22 2109 11/21/22 2344 11/21/22 2345 11/21/22 2345 --   Tympanic Monitor Lying Left arm       Pain Score       11/21/22 2106       10 - Worst Possible Pain           Vitals:    11/21/22 2106 11/21/22 2345 11/22/22 0000   BP: 141/69 117/64 118/66   Pulse: 85 76 75   Patient Position - Orthostatic VS:  Lying Lying         Visual Acuity      ED Medications  Medications   fentanyl citrate (PF) 100 MCG/2ML 25 mcg (25 mcg Intravenous Given 11/21/22 2128)   fentanyl citrate (PF) 100 MCG/2ML 50 mcg (50 mcg Intravenous Given 11/21/22 2145)   iohexol (OMNIPAQUE) 350 MG/ML injection (SINGLE-DOSE) 100 mL (100 mL Intravenous Given 11/21/22 2225)   fentanyl citrate (PF) 100 MCG/2ML 50 mcg (50 mcg Intravenous Given 11/21/22 2304)       Diagnostic Studies  Results Reviewed     Procedure Component Value Units Date/Time    B-Type Natriuretic Peptide(BNP), AN, CA, [facility] Only [[id number]]  (Abnormal) Collected: 11/21/22 2325    Lab Status: Final result Specimen: Blood from Arm, Left Updated: 11/22/22 0018      pg/mL     HS Troponin I 2hr [[id number]]  (Normal) Collected: 11/21/22 2312    Lab Status: Final result Specimen: Blood from Arm, Right Updated: 11/21/22 2344     hs TnI 2hr 7 ng/L      Delta 2hr hsTnI 0 ng/L     HS Troponin I 4hr [[id number]]     Lab Status: No result Specimen: Blood     HS Troponin 0hr (reflex protocol) [[id number]]  (Normal) Collected: 11/21/22 2144    Lab Status: Final result Specimen: Blood Updated: 11/21/22 2212     hs TnI 0hr 7 ng/L     CBC and differential [[id number]]  (Abnormal) Collected: 11/21/22 2144    Lab Status: Final result Specimen: Blood Updated: 11/21/22 2211     WBC 6 73 Thousand/uL      RBC 3 58 Million/uL      Hemoglobin 12 1 g/dL      Hematocrit 37 6 %       fL      MCH 33 8 pg      MCHC 32 2 g/dL      RDW 16 1 %      MPV 12 2 fL      Platelets 59 Thousands/uL      nRBC 0 /100 WBCs      Neutrophils Relative 77 %      Immat GRANS % 0 %      Lymphocytes Relative 13 %      Monocytes Relative 5 % Eosinophils Relative 5 %      Basophils Relative 0 %      Neutrophils Absolute 5 17 Thousands/µL      Immature Grans Absolute 0 02 Thousand/uL      Lymphocytes Absolute 0 86 Thousands/µL      Monocytes Absolute 0 34 Thousand/µL      Eosinophils Absolute 0 31 Thousand/µL      Basophils Absolute 0 03 Thousands/µL     Lactic acid, plasma [[id number]]  (Normal) Collected: 11/21/22 2143    Lab Status: Final result Specimen: Blood Updated: 11/21/22 2203     LACTIC ACID 1 8 mmol/L     Narrative:      Result may be elevated if tourniquet was used during collection      Lipase [[id number]]  (Normal) Collected: 11/21/22 2144    Lab Status: Final result Specimen: Blood Updated: 11/21/22 2203     Lipase 36 u/L     CMP [[id number]]  (Abnormal) Collected: 11/21/22 2144    Lab Status: Final result Specimen: Blood Updated: 11/21/22 2203     Sodium 137 mmol/L      Potassium 3 3 mmol/L      Chloride 105 mmol/L      CO2 27 mmol/L      ANION GAP 5 mmol/L      BUN 11 mg/dL      Creatinine 0 97 mg/dL      Glucose 184 mg/dL      Calcium 8 7 mg/dL      Corrected Calcium 9 5 mg/dL      AST 46 U/L      ALT 49 U/L      Alkaline Phosphatase 163 U/L      Total Protein 7 1 g/dL      Albumin 3 0 g/dL      Total Bilirubin 2 86 mg/dL      eGFR 83 ml/min/1 73sq m     Narrative:      [place] guidelines for Chronic Kidney Disease (CKD):   •  Stage 1 with normal or high GFR (GFR > 90 mL/min/1 73 square meters)  •  Stage 2 Mild CKD (GFR = 60-89 mL/min/1 73 square meters)  •  Stage 3A Moderate CKD (GFR = 45-59 mL/min/1 73 square meters)  •  Stage 3B Moderate CKD (GFR = 30-44 mL/min/1 73 square meters)  •  Stage 4 Severe CKD (GFR = 15-29 mL/min/1 73 square meters)  •  Stage 5 End Stage CKD (GFR <15 mL/min/1 73 square meters)  Note: GFR calculation is accurate only with a steady state creatinine    UA w Reflex to Microscopic w Reflex to Culture [[id number]]     Lab Status: No result Specimen: Urine                  CT Abdomen pelvis with contrast   Final Result by Mark Galindo MD (11/21 4535)      Findings suggesting colitis, as described above  Colonic bowel wall thickening is most pronounced at the site of surgical sutures in the region of the mid transverse colon  Local recurrence should be excluded  Follow-up after treatment is strongly    recommended  Gastroenterology consultation and follow-up is recommended  Small bowel wall thickening may be due to enteritis, however, could be related to ascites and edema  There is no bowel obstruction  There is colonic diverticulosis without definite evidence of acute diverticulitis  The liver demonstrates cirrhotic morphology  There are findings of portal hypertension, including splenomegaly, ascites, varices  There is a small right pericardial effusion  There are several right pericardiophrenic nodes, the largest of which measures 2 x 1 6 cm  Follow-up is recommended  Other nonemergent findings, as described above  Please see discussion  I personally discussed this study with Diane Harmon on 11/21/2022 at 11:35 PM                      Workstation performed: [id number]                    Procedures  Procedures         ED Course                                             MDM  Number of Diagnoses or Management Options  Abdominal pain, unspecified abdominal location: new and requires workup  Ascites: new and requires workup  Colitis: new and requires workup  Colonic mass: new and requires workup  Liver cirrhosis [facility]): new and requires workup  Diagnosis management comments: This is a 60-year-old male with abdominal pain likely related to his ascites, liver cirrhosis colonic mass or colitis  Patient requiring frequent doses of pain medications  He does not have any GI stab lotion this area  Patient also has suspicious lymph nodes throughout the abdomen and into the pericardiac region    Admitting the patient for pain control, possible scope for his blood per rectum as well as treatment of his colitis  Patient states understanding agreement with the plan  Stable at the time of admission  Amount and/or Complexity of Data Reviewed  Clinical lab tests: ordered and reviewed  Tests in the radiology section of CPT®: ordered and reviewed  Discuss the patient with other providers: yes  Independent visualization of images, tracings, or specimens: yes        Disposition  Final diagnoses:   Abdominal pain, unspecified abdominal location   Ascites   Liver cirrhosis ([facility] )   Colonic mass   Colitis     Time reflects when diagnosis was documented in both MDM as applicable and the Disposition within this note     Time User Action Codes Description Comment    11/21/2022 11:44 PM Marvene Glaze Add [R10 9] Abdominal pain, unspecified abdominal location     11/21/2022 11:44 PM Rolley Feeler [R18 8] Ascites     11/21/2022 11:44 PM Marvene Glaze Add [K74 60] Liver cirrhosis ([facility] )     11/21/2022 11:44 PM Marvene Glaze Add [K63 89] Colonic mass     11/21/2022 11:45 PM Marvene Glaze Add [K52 9] Colitis       ED Disposition     ED Disposition   Admit    Condition   Stable    Date/Time   Mon Nov 21, 2022 11:44 PM    Comment   Case was discussed with mireille and the patient's admission status was agreed to be Admission Status: inpatient status to the service of Dr Joseph Luna   Follow-up Information    None         Patient's Medications   Discharge Prescriptions    No medications on file       No discharge procedures on file      PDMP Review     None          ED Provider  Electronically Signed by           Tong Schmidt MD  11/22/22 9628

## 2022-11-22 NOTE — BRIEF OP NOTE (RAD/CATH)
INTERVENTIONAL RADIOLOGY PROCEDURE NOTE    Date: 11/22/2022    Procedure:   Procedure Summary     Date:  Room / Location:     Anesthesia Start:  Anesthesia Stop:     Procedure:  Diagnosis:     Scheduled Providers:  Responsible Provider:     Anesthesia Type: Not recorded ASA Status: Not recorded          Preoperative diagnosis:   1  Abdominal pain, unspecified abdominal location    2  Ascites    3  Liver cirrhosis ([address] )    4  Colonic mass    5  Colitis         Postoperative diagnosis: Same  Surgeon: Genie Logan MD     Assistant: None  No qualified resident was available  Blood loss: 0    Specimens:  Multiple including cytology     Findings: 2250 cc right lower quadrant paracentesis    Complications: None immediate      Anesthesia: local

## 2022-11-22 NOTE — PROGRESS NOTES
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   Constant and sharp pain when sitting  Of the left leg    Needs a family doctor  PM - DM2 and colon cancer      Review of Systems   Review of Systems      Current Medications     No current outpatient medications on file  Current Allergies     Allergies as of 2022   • (Not on File)            The following portions of the patient's history were reviewed and updated as appropriate: allergies, current medications, past family history, past medical history, past social history, past surgical history and problem list      No past medical history on file  No past surgical history on file  No family history on file  Medications have been verified  Objective   There were no vitals taken for this visit         Physical Exam     Physical Exam

## 2022-11-22 NOTE — ASSESSMENT & PLAN NOTE
· Patient with report of abdominal pain and swelling  · Surgery consult  · NPO  · Pain control  · CT: Findings suggesting colitis, as described above  Colonic bowel wall thickening is most pronounced at the site of surgical sutures in the region of the mid transverse colon  Local recurrence should be excluded  Follow-up after treatment is strongly recommended

## 2022-11-22 NOTE — CONSULTS
Consultation - [facility]  Emilia Rodriguez 58 y o  male MRN: [id number]  Unit/Bed#: ED 22 Encounter: [id number]    Inpatient consult to gastroenterology  Consult performed by: Barrie Mccall PA-C  Consult ordered by: Mike Nolan DO        Reason for Consult / Principal Problem:     "liver cirrhosis, colitis"     ASSESSMENT AND PLAN:      57 y/o M w/ pmhx sig for decompensated cirrhosis (CHRISTIE per chart), hx of CRC s/p resection and adjuvant chemotherapy, DM2, RLS  Unfortunately pt is a poor historian and records are incomplete [place]  It appears he has been dealing with abdominal pain and loose/urgent stools for many months  He does carry a diagnosis of cirrhosis though knowledge regarding this is incomplete  His admission evaluation was notable for a macrocytosis, thrombocytopenia, hypoalbuminemia, elevated transaminases, and CT findings indicative of sequelae of chronic liver disease  He also has a history of colon cancer which unfortunately sounds he was lost to follow up regarding  There are non-specific findings on CT scan which question non-specific colitis  Decompensated cirrhosis complicated by ascites, likely portal hypertension, thrombocytopenia (suspected secondary to CHRISTIE per chart review)    · Details surrounding liver disease incomplete  Unsure as to whether he underwent serologic evaluation for chronic liver disease  Could consider in least comp hepatitis panel  · Based upon today's evaluation and imaging findings, he does appear to be decompensated  He has evidence of cirrhosis on imaging and synthetic dysfunction based upon lab abnormalities  Management of chronic liver disease outlined below       · MELD-Na: CPS: cannot be calculated need INR  · Ascites: present on examination and imaging; primary team has ordered diagnostic and therapeutic paracentesis; if large volume para completed replete with albumin per protocol; will calculate SAAG; check albumin, cell count and diff, total protein etc  · Some question as to whether pt had been on diuretics in the past, though not presently on medication list; given LE edema and ascites, pending response to low sodium diet and para he may benefit from initiation of diuretics, typically for liver cirrhosis would recommend furosemide 40/aldactone 100 ratio  · Hepatic encephalopathy: no evidence on examination, continue to monitor    · Esophageal varices: pt does not recall previous upper endoscopy; will require for screening purposes; as well as given hx of anemia; given no overt bleeding and no melena or nidhi blood on rectal examination, no clear indication for SBP prophylaxis at this time   · [facility] [address]  screening: will need RUQ US and AFP checked Q6 months; no obvious lesions on initial CT scan though not liver protocol   · Vaccination status: check for susceptibility to Hep A/B; vaccinate if appropriate     Abnormal CT scan A/P  Chronic diarrhea  Intermittent hematochezia   History of CRC     · Pt notes months of diarrhea stools, including intermittent hematochezia  · Ddx includes infectious, inflammatory, ischemic colitis, congestive colopathy, recurrence of malignancy, post-operative malabsorptive etc    · Overdue for surveillance for CRC and it appears he may be lost to follow up as well  · Discussed given recent abx usage per his report, will check stool studies  Consider checking inflammatory markers  · He will need eventual colonoscopy, timing to be discussed with attending  · Surgical team checking CEA  GI will continue to follow at this time  Diet recs pending discussion with attending   ______________________________________________________________________    HPI: Patient is a 58 y o  male w/ pmhx sig for cirrhosis (CHRISTIE per chart), history of colon cancer s/p resection and adjuvant chemotherapy, DM2, RLS  Pt is a poor historian      Pt presented to hospital for progressive leg pain, states he ran out of his Requip, and abdominal pain  Upon evaluation in the ER, serologic evaluation noted Na 137, BUN 11, Cr 0 97, AST 46, ALT 49, , albumin 3 0, t bili 2 86, lipase 36, lactic acid 1 8, Hb 12 1, Hct 37 6, , Plt 59  CT W Contrast suggest colitis, most pronounced at site of surgical sutures in region of mid-transvese colon, small bowel wall thickening, cirrhosis, portal hypertension, ascites, varices, small right pericardial effusion  Jamilah Ramos He shares that he has been dealing with abdominal pain for several months  He shares he is also dealing with difficult to control bowels for at least 6-12 months  Notes 6-7 loose/liquid stools daily  Also notes intermittent hematochezia  Thinks he has a hemorrhoid  Does have some doris-anal itching at times  No melenic stools  Unsure as to whether abdominal distention is new  Thinks he has chronic lower extremity swelling, notes he was on water pills at some point in past  Pt denies any significant nausea, emesis, hematemesis  No dysphagia or odynophagia  No unintentional weight loss over past 6 months  No recent illness  No etoh intake  No herbal supplements  Smokes 4-5 cigarettes daily  No NSAID use  He shares he was on antibiotics approx one month ago for renal stones  Pt believes he was diagnosed with colon cancer approx 5 years ago, had hemicolectomy in 2017  He found out he had cirrhosis around that time  Unsure as to origin per his report  He was on chemotherapy for some time after his colectomy  He shares he had a repeat colonoscopy at some point about 3 years ago, during which time he believes there was a finding of lymphadenopathy  Unfortunately he was lost to f/u due to changes in insurance etc  No documentation available at time of dictation  Pertaining to liver disease, pt denies any yellowing of skin or eyes  No pruritis  No acholic stools  No confusion, frequent falls or syncope  Endoscopic history:   EGD: unsure   Colonoscopy: 3 years ago    Review of Systems   Per HPI    Historical Information   Past Medical History:   Diagnosis Date   • Cirrhosis ([facility] [address] )     CHRISTIE   • [facility]    • Diabetes mellitus ([facility] )    • Neuropathy    • Restless leg syndrome      Past Surgical History:   Procedure Laterality Date   • EGD AND SIGMOIDOSCOPY FLEXIBLE     • LEFT COLON RESECTION     • LITHOTRIPSY       Social History   Social History     Substance and Sexual Activity   Alcohol Use Not Currently     Social History     Substance and Sexual Activity   Drug Use Never     Social History     Tobacco Use   Smoking Status Every Day   • Packs/day: 0 25   • Types: Cigarettes   Smokeless Tobacco Never     Family History   Problem Relation Age of Onset   • Diabetes Mother    • Diabetes Father        Meds/Allergies     (Not in a hospital admission)    Current Facility-Administered Medications   Medication Dose Route Frequency   • albumin human (FLEXBUMIN) 25 % injection 12 5 g  12 5 g Intravenous Once   • HYDROmorphone (DILAUDID) injection 1 mg  1 mg Intravenous Q3H PRN   • nicotine (NICODERM CQ) 7 mg/24hr TD 24 hr patch 7 mg  7 mg Transdermal Daily   • ondansetron (ZOFRAN) injection 4 mg  4 mg Intravenous Q6H PRN   • rOPINIRole (REQUIP) tablet 4 mg  4 mg Oral 4x Daily       Allergies   Allergen Reactions   • Morphine Anaphylaxis   • Clarithromycin Other (See Comments)     Dizzy, nausea, ulcers in stomach           Objective     Blood pressure 113/58, pulse 72, temperature 98 8 °F (37 1 °C), temperature source Tympanic, resp  rate 15, height 5' 7" (1 702 m), weight 88 5 kg (195 lb), SpO2 94 %  Body mass index is 30 54 kg/m²  Intake/Output Summary (Last 24 hours) at 11/22/2022 0802  Last data filed at 11/22/2022 0757  Gross per 24 hour   Intake 325 83 ml   Output --   Net 325 83 ml         Physical Exam  Vitals and nursing note reviewed  Constitutional:       General: He is not in acute distress  Appearance: He is not toxic-appearing  HENT:      Head: Normocephalic and atraumatic  Eyes:      General: No scleral icterus  Cardiovascular:      Rate and Rhythm: Normal rate  Pulses: Normal pulses  Pulmonary:      Effort: Pulmonary effort is normal    Abdominal:      General: Bowel sounds are normal  There is distension  Tenderness: There is abdominal tenderness  There is guarding (active)  There is no rebound  Genitourinary:     Comments: Yellow/light brown stool on finger; non-thrombosed external hemorrhoids  Neurological:      General: No focal deficit present  Mental Status: He is alert and oriented to person, place, and time        Comments: No asterixis   Psychiatric:      Comments: Tearful         Lab Results:   Admission on 11/21/2022   Component Date Value   • WBC 11/21/2022 6 73    • RBC 11/21/2022 3 58 (L)    • Hemoglobin 11/21/2022 12 1    • Hematocrit 11/21/2022 37 6    • MCV 11/21/2022 105 (H)    • MCH 11/21/2022 33 8    • MCHC 11/21/2022 32 2    • RDW 11/21/2022 16 1 (H)    • MPV 11/21/2022 12 2    • Platelets 01/79/2504 59 (L)    • nRBC 11/21/2022 0    • Neutrophils Relative 11/21/2022 77 (H)    • Immat GRANS % 11/21/2022 0    • Lymphocytes Relative 11/21/2022 13 (L)    • Monocytes Relative 11/21/2022 5    • Eosinophils Relative 11/21/2022 5    • Basophils Relative 11/21/2022 0    • Neutrophils Absolute 11/21/2022 5 17    • Immature Grans Absolute 11/21/2022 0 02    • Lymphocytes Absolute 11/21/2022 0 86    • Monocytes Absolute 11/21/2022 0 34    • Eosinophils Absolute 11/21/2022 0 31    • Basophils Absolute 11/21/2022 0 03    • Sodium 11/21/2022 137    • Potassium 11/21/2022 3 3 (L)    • Chloride 11/21/2022 105    • CO2 11/21/2022 27    • ANION GAP 11/21/2022 5    • BUN 11/21/2022 11    • Creatinine 11/21/2022 0 97    • Glucose 11/21/2022 184 (H)    • Calcium 11/21/2022 8 7    • Corrected Calcium 11/21/2022 9 5    • AST 11/21/2022 46 (H)    • ALT 11/21/2022 49    • Alkaline Phosphatase 11/21/2022 163 (H)    • Total Protein 11/21/2022 7 1    • Albumin 11/21/2022 3 0 (L)    • Total Bilirubin 11/21/2022 2 86 (H)    • eGFR 11/21/2022 83    • Lipase 11/21/2022 36    • Color, UA 11/22/2022 Viky (A)    • Clarity, UA 11/22/2022 Clear    • Specific Gravity, UA 11/22/2022 1 010    • pH, UA 11/22/2022 6 0    • Leukocytes, UA 11/22/2022 Negative    • Nitrite, UA 11/22/2022 Negative    • Protein, UA 11/22/2022 Negative    • Glucose, UA 11/22/2022 Negative    • Ketones, UA 11/22/2022 Negative    • Urobilinogen, UA 11/22/2022 0 2    • Bilirubin, UA 11/22/2022 1+ (A)    • Occult Blood, UA 11/22/2022 Negative    • BNP 11/21/2022 178 (H)    • hs TnI 0hr 11/21/2022 7    • LACTIC ACID 11/21/2022 1 8    • ABO Grouping 11/21/2022 A    • Rh Factor 11/21/2022 Negative    • Antibody Screen 11/21/2022 Negative    • Specimen Expiration Date 11/21/2022 [id number]    • hs TnI 2hr 11/21/2022 7    • Delta 2hr hsTnI 11/21/2022 0    • ABO Grouping 11/21/2022 A    • Rh Factor 11/21/2022 Negative    • Sodium 11/22/2022 136    • Potassium 11/22/2022 3 7    • Chloride 11/22/2022 109 (H)    • CO2 11/22/2022 25    • ANION GAP 11/22/2022 2 (L)    • BUN 11/22/2022 13    • Creatinine 11/22/2022 0 78    • Glucose 11/22/2022 151 (H)    • Calcium 11/22/2022 8 0 (L)    • eGFR 11/22/2022 96    • WBC 11/22/2022 5 77    • RBC 11/22/2022 2 80 (L)    • Hemoglobin 11/22/2022 9 7 (L)    • Hematocrit 11/22/2022 29 4 (L)    • MCV 11/22/2022 105 (H)    • MCH 11/22/2022 34 6 (H)    • MCHC 11/22/2022 33 0    • RDW 11/22/2022 16 0 (H)    • Platelets 66/34/7690 46 (LL)    • MPV 11/22/2022 11 7        Imaging Studies: I have personally reviewed pertinent imaging studies  Ernst Zuluaga PA-C    **Please note:  Dictation voice to text software may have been used in the creation of this record  Occasional wrong word or “sound alike” substitutions may have occurred due to the inherent limitations of voice recognition software    Read the chart carefully and recognize, using context, where substitutions have occurred  **

## 2022-11-22 NOTE — PROGRESS NOTES
[facility]        NAME: Arjun Mejia is a 58 y o  male  : 1960    MRN: [id number]  DATE: 2022  TIME: 8:05 PM    Assessment and Plan   Left leg pain [M79 [id number]]  1  Left leg pain          Patient has not yet established a PCP in the area  Patient on chronic Requip for restless leg syndrome  Instructed patient to establish a PCP for management of chronic pain  Patient consistently requesting pain control  Patient refused ibuprofen, tylenol, and Toradol in the clinic  Discussed with the patient to proceed to the ER if pain worsens  Patient is requesting to proceed to the ER for pain management  Patient Instructions     Take Ibuprofen/Tylenol for pain management  Follow up with PCP in 3-5 days  Proceed to  ER if symptoms worsen  Chief Complaint     Chief Complaint   Patient presents with   • Leg Pain     Feels like he has restless leg syndrome         History of Present Illness       Patient is a 70-year-old male with significant PMH of DM 2 and restless legs syndrome presenting in the clinic today requesting refill of his Requip  Patient recently moved to the area from [facility]  Patient has not established a primary care doctor here in [place]  Patient states he ran out of his Requip medication yesterday  Patient states left lower leg is in constant pain  Patient mentions the use of Tylenol and ibuprofen without symptom relief  Patient works in construction and states he has unbearable pain throughout the work shift as he bears weight  Review of Systems   Review of Systems   Constitutional: Negative for fever  Respiratory: Negative for cough and shortness of breath  Cardiovascular: Negative for chest pain  Musculoskeletal: Positive for arthralgias and joint swelling  Skin: Negative for color change           Current Medications       Current Outpatient Medications:   •  metFORMIN (GLUCOPHAGE) 1000 MG tablet, Take 1,000 mg by mouth 2 (two) times a day with meals, Disp: , Rfl:   •  rOPINIRole (REQUIP) 0 25 mg tablet, Take 0 25 mg by mouth 3 (three) times a day, Disp: , Rfl:     Current Allergies     Allergies as of 11/21/2022 - Reviewed 11/21/2022   Allergen Reaction Noted   • Morphine Anaphylaxis 11/21/2022            The following portions of the patient's history were reviewed and updated as appropriate: allergies, current medications, past family history, past medical history, past social history, past surgical history and problem list      No past medical history on file  No past surgical history on file  No family history on file  Medications have been verified  Objective   /66   Pulse 79   Temp 97 6 °F (36 4 °C)   Resp 20   Ht 5' 7" (1 702 m)   Wt 88 kg (194 lb)   SpO2 99%   BMI 30 38 kg/m²        Physical Exam     Physical Exam  Vitals reviewed  Constitutional:       General: He is not in acute distress  Appearance: Normal appearance  He is normal weight  He is not ill-appearing  HENT:      Head: Normocephalic and atraumatic  Nose: Nose normal       Mouth/Throat:      Mouth: Mucous membranes are moist    Eyes:      Conjunctiva/sclera: Conjunctivae normal    Cardiovascular:      Rate and Rhythm: Normal rate and regular rhythm  Pulses: Normal pulses  Heart sounds: Normal heart sounds  No murmur heard  No friction rub  No gallop  Pulmonary:      Effort: Pulmonary effort is normal       Breath sounds: Normal breath sounds  No wheezing, rhonchi or rales  Musculoskeletal:         General: Swelling (left lower leg and ankle) present  Normal range of motion  Skin:     General: Skin is warm  Capillary Refill: Capillary refill takes less than 2 seconds  Neurological:      Mental Status: He is alert     Psychiatric:         Mood and Affect: Mood normal          Behavior: Behavior normal

## 2022-11-22 NOTE — ASSESSMENT & PLAN NOTE
· Patient reports he was on metformin, but his med got switched and notes he has diarrhea with this new med, he does not take meds like he is supposed to  · SSI coverage

## 2022-11-22 NOTE — ASSESSMENT & PLAN NOTE
· CT: The liver demonstrates cirrhotic morphology  There are findings of portal hypertension, including splenomegaly, ascites, varices  There is a moderate amount of ascites  There is mesenteric edema  There is a 2 5 cm zeyad hepatis node  Multiple mildly prominent mesenteric nodes are present    · IR consult for paracentesis  · Patient reports CHRISTIE

## 2022-11-22 NOTE — ASSESSMENT & PLAN NOTE
· CT: There is a small right pericardial effusion  There are several right pericardiophrenic nodes, the largest of which measures 2 x 1 6 cm  Follow-up is recommended

## 2022-11-22 NOTE — PATIENT INSTRUCTIONS
Take Ibuprofen/Tylenol for pain management  Follow up with PCP in 3-5 days  Proceed to  ER if symptoms worsen

## 2022-11-22 NOTE — CONSULTS
Consultation - General Surgery   Noni Bentley 58 y o  male MRN: [id number]  Unit/Bed#: ED 22 Encounter: [id number]    Assessment:  58 y o  male presenting with abdominal pain and bloody stools   - 1-2 week history of bloody stools, history of intermittent bloody stools in the past  - afebrile, some episodes of tachypnea, remainder vital signs stable  - no leukocytosis  - HGB 9 7 (12 1 yesterday)  - thrombocytopenia, 46   - creatinine 0 78, T bili 1 77, INR 1 52, sodium 136; MELD score 14  - CT scan with findings suggestive of colitis; "Colonic bowel wall thickening is most pronounced at the site of surgical sutures in the region of the mid transverse colon;" "colonic diverticulosis without definite evidence of acute diverticulitis;" moderate ascites; "The liver demonstrates cirrhotic morphology"  - on abdominal exam, patient with generalized tenderness to percussion palpation; exam limited by ascites as abdomen relatively tense in setting of ascites    PMH - CHRISTIE/cirrhosis, type 2 diabetes mellitus neuropathy, colon cancer s/p right hemicolectomy and chemotherapy, restless leg syndrome    Plan:  - No plans for acute surgical intervention  - patient to undergo paracentesis with IR later today   - recommend patient be advanced to clears after paracentesis   - CEA ordered  - recommend stool studies  - patient may need surgical oncology follow-up in the future; last seen by his surgeon and oncologist in the [place] approximately 3 years ago  - discussed with Dr Dez Avery  - discussed with GI and SLIM  - remainder of care per primary team       History of Present Illness   Chief Complaint: leg pain; abdominal pain     HPI:  Noni Bentley is a 58 y o  male with past medical history significant for colon cancer s/p right hemicolectomy (May 2017) and chemotherapy, CHRISTIE/cirrhosis, restless leg syndrome, type 2 diabetes mellitus with neuropathy who initially presented to ED with complaints of abdominal pain associated with bloody stools for past 1-2 weeks  Patient reports that he has been having bloody bowel movements for the past 1-2 weeks  Reports that he has had been having diarrhea at that time  States that he has blood mixed into brown stool multiple times a day  Patient reports he has intermittent bloody stools  Patient moved [place] the area from [address]  about 1 month ago  Patient reports that approximately 16 months ago he was admitted to a hospital in the [facility] for bloody stools  That was when he found out about this diagnosis of CHRISTIE  Patient also states recent antibiotic use approximately 1 month ago in setting of a kidney stone  Patient reports he has a history of colon cancer  He was treated with right hemicolectomy (May 2017) and chemotherapy  Patient states that he was a following with his surgeon and oncologist for approximately 2 years and was considered to be in remission but was supposed to continue following up  He did not follow up due to lack of insurance coverage  Patient states around that same time approximately 3 years ago was when he had his last colonoscopy  He states at that time there were findings of a enlarged lymph node on colonoscopy  Patient denied any subsequent follow-up  Patient stating his last bowel movement was yesterday with some bloody output  Patient last ate yesterday afternoon  Denies nausea, vomiting, fevers, chills  Past surgical history significant for right hemicolectomy in 2017  Patient denies any other abdominal surgeries but appendix is noted to be surgically absent on CT scan  Patient noted to have ascites on CT scan  Patient with new diagnosis of CHRISTIE as of 6-8 months ago  Patient reports he has not had a paracentesis in the past     Patient was seen earlier yesterday in Urgent Care with complaint of leg pain  Patient noting that he ran out of his Requip that he takes for his restless legs syndrome and neuropathy    States he is having increased leg pain and swelling  Patient notes he has stable swelling in b/l LEs at baseline  Patient states that at home he takes Requip to control his leg pain and denies use of any other medications for pain  Review of Systems   Constitutional: Negative for chills and fever  HENT: Negative for sore throat  Eyes: Negative for visual disturbance  Respiratory: Negative for shortness of breath  Cardiovascular: Negative for chest pain  Gastrointestinal: Positive for abdominal pain, blood in stool and diarrhea  Negative for nausea and vomiting  Endocrine: Negative for polyuria  Genitourinary: Negative for dysuria and frequency  Musculoskeletal:        Swelling and pain in b/l lower extremities    Neurological: Positive for light-headedness (intermittently over last 1-2 weeks)  Hematological: Bruises/bleeds easily         Historical Information   Past Medical History:   Diagnosis Date   • Cirrhosis ([facility] )     CHRISTIE   • Colon cancer ([facility] )    • Diabetes mellitus ([place] [address] )    • Neuropathy    • Restless leg syndrome      Past Surgical History:   Procedure Laterality Date   • EGD AND SIGMOIDOSCOPY FLEXIBLE     • LEFT COLON RESECTION     • LITHOTRIPSY       Social History   Social History     Substance and Sexual Activity   Alcohol Use Not Currently     Social History     Substance and Sexual Activity   Drug Use Never     E-Cigarette/Vaping     E-Cigarette/Vaping Substances     Social History     Tobacco Use   Smoking Status Every Day   • Packs/day: 0 25   • Types: Cigarettes   Smokeless Tobacco Never     Family History: non-contributory    Meds/Allergies     Allergies   Allergen Reactions   • Morphine Anaphylaxis   • Clarithromycin Other (See Comments)     Dizzy, nausea, ulcers in stomach       Objective   First Vitals:   Blood Pressure: 141/69 (11/21/22 2106)  Pulse: 85 (11/21/22 2106)  Temperature: 98 8 °F (37 1 °C) (11/21/22 2109)  Temp Source: Tympanic (11/21/22 2109)  Respirations: 15 (11/21/22 2106)  Height: 5' 7" (170 2 cm) (11/21/22 2106)  Weight - Scale: 88 5 kg (195 lb) (11/21/22 2106)  SpO2: 99 % (11/21/22 2106)    Current Vitals:   Blood Pressure: 123/58 (11/22/22 1110)  Pulse: 80 (11/22/22 1110)  Temperature: 98 8 °F (37 1 °C) (11/21/22 2109)  Temp Source: Tympanic (11/21/22 2109)  Respirations: 14 (11/22/22 1110)  Height: 5' 7" (170 2 cm) (11/22/22 0835)  Weight - Scale: 88 5 kg (195 lb) (11/22/22 0835)  SpO2: 97 % (11/22/22 1110)      Intake/Output Summary (Last 24 hours) at 11/22/2022 1126  Last data filed at 11/22/2022 0757  Gross per 24 hour   Intake 325 83 ml   Output --   Net 325 83 ml       Invasive Devices     Peripheral Intravenous Line  Duration           Peripheral IV 11/21/22 Proximal;Right;Ventral (anterior) Forearm <1 day                Physical Exam  Constitutional:       General: He is not in acute distress  Comments: Patient became tearful during history taking when asked about his previous colonoscopy results and why he has not and follow up  HENT:      Head: Normocephalic and atraumatic  Right Ear: External ear normal       Left Ear: External ear normal       Nose: Nose normal    Eyes:      Extraocular Movements: Extraocular movements intact  Cardiovascular:      Rate and Rhythm: Normal rate and regular rhythm  Comments: Chest - left sided port formally used for chemotherapy in place  Pulmonary:      Effort: Pulmonary effort is normal       Breath sounds: Normal breath sounds  No stridor  No wheezing or rales  Abdominal:      Tenderness: There is abdominal tenderness (generalized, upon percussion and palpation)  There is no guarding or rebound  Comments: Somewhat soft, abdomen tense given ascites fluid present  Bowel sounds present but somewhat decreased given ascites  Tenderness to percussion in all 4 abdominal quadrants  Generalized abdominal tenderness upon palpation  Midline surgical scar below umbilicus      Genitourinary:     Comments: Rectal exam - I did not personally complete  Performed by Daisy Romero PA-C who reported light yellow brown stool and non thrombosed external hemorrhoid  Musculoskeletal:      Right lower leg: Edema (Nonpitting, mildly tender to palpation ) present  Left lower leg: Edema (Nonpitting, mildly tender to palpation) present  Skin:     General: Skin is warm and dry  Neurological:      General: No focal deficit present  Lab Results: I have personally reviewed pertinent lab results  Imaging: I have personally reviewed pertinent reports  Code Status: Level 1 - Full Code  Advance Directive and Living Will:      Power of :    POLST:      Counseling / Coordination of Care  Total floor / unit time spent today 30 minutes  Greater than 50% of total time was spent with the patient and / or family counseling and / or coordination of care  A description of the counseling / coordination of care:  Chart review, history taking, physical examination, discussion with attending physician       Bradley Hendrix

## 2022-11-22 NOTE — ED NOTES
Pt sleepy but  easy to arouse following dose of dilaudid , v/s charted pt has intermittent temp drop of 02 sat to 90-91 but then improves to [address] [place] [address], RN  11/22/22 1111

## 2022-11-23 PROBLEM — I31.39 PERICARDIAL EFFUSION: Status: ACTIVE | Noted: 2022-11-22

## 2022-11-23 PROBLEM — R10.9 ACUTE ABDOMINAL PAIN: Status: ACTIVE | Noted: 2022-11-23

## 2022-11-23 PROBLEM — D64.9 ANEMIA: Status: ACTIVE | Noted: 2022-11-23

## 2022-11-23 LAB
ALBUMIN SERPL BCP-MCNC: 2.4 G/DL (ref 3.5–5)
ALP SERPL-CCNC: 126 U/L (ref 34–104)
ALT SERPL W P-5'-P-CCNC: 39 U/L (ref 7–52)
ANION GAP SERPL CALCULATED.3IONS-SCNC: 5 MMOL/L (ref 4–13)
AST SERPL W P-5'-P-CCNC: 44 U/L (ref 13–39)
BILIRUB SERPL-MCNC: 2.52 MG/DL (ref 0.2–1)
BUN SERPL-MCNC: 13 MG/DL (ref 5–25)
C DIFF TOX A+B STL QL IA: NEGATIVE
C DIFF TOX GENS STL QL NAA+PROBE: POSITIVE
CALCIUM ALBUM COR SERPL-MCNC: 9.3 MG/DL (ref 8.3–10.1)
CALCIUM SERPL-MCNC: 8 MG/DL (ref 8.4–10.2)
CAMPYLOBACTER DNA SPEC NAA+PROBE: NORMAL
CHLORIDE SERPL-SCNC: 106 MMOL/L (ref 96–108)
CO2 SERPL-SCNC: 25 MMOL/L (ref 21–32)
CREAT SERPL-MCNC: 0.85 MG/DL (ref 0.6–1.3)
ERYTHROCYTE [DISTWIDTH] IN BLOOD BY AUTOMATED COUNT: 16.1 % (ref 11.6–15.1)
GFR SERPL CREATININE-BSD FRML MDRD: 93 ML/MIN/1.73SQ M
GLUCOSE SERPL-MCNC: 72 MG/DL (ref 65–140)
HCT VFR BLD AUTO: 28.9 % (ref 36.5–49.3)
HGB BLD-MCNC: 9.4 G/DL (ref 12–17)
INR PPP: 1.53 (ref 0.84–1.19)
MCH RBC QN AUTO: 34.1 PG (ref 26.8–34.3)
MCHC RBC AUTO-ENTMCNC: 32.5 G/DL (ref 31.4–37.4)
MCV RBC AUTO: 105 FL (ref 82–98)
PLATELET # BLD AUTO: 46 THOUSANDS/UL (ref 149–390)
PMV BLD AUTO: 11 FL (ref 8.9–12.7)
POTASSIUM SERPL-SCNC: 3.7 MMOL/L (ref 3.5–5.3)
PROT SERPL-MCNC: 5.5 G/DL (ref 6.4–8.4)
PROTHROMBIN TIME: 18.4 SECONDS (ref 11.6–14.5)
RBC # BLD AUTO: 2.76 MILLION/UL (ref 3.88–5.62)
SALMONELLA DNA SPEC QL NAA+PROBE: NORMAL
SHIGA TOXIN STX GENE SPEC NAA+PROBE: NORMAL
SHIGELLA DNA SPEC QL NAA+PROBE: NORMAL
SODIUM SERPL-SCNC: 136 MMOL/L (ref 135–147)
WBC # BLD AUTO: 5.13 THOUSAND/UL (ref 4.31–10.16)

## 2022-11-23 RX ORDER — ACETAMINOPHEN 325 MG/1
650 TABLET ORAL EVERY 8 HOURS PRN
Status: DISCONTINUED | OUTPATIENT
Start: 2022-11-23 | End: 2022-12-01 | Stop reason: HOSPADM

## 2022-11-23 RX ORDER — HYDROMORPHONE HCL/PF 1 MG/ML
0.5 SYRINGE (ML) INJECTION
Status: DISCONTINUED | OUTPATIENT
Start: 2022-11-23 | End: 2022-11-26

## 2022-11-23 RX ORDER — PANTOPRAZOLE SODIUM 40 MG/1
40 TABLET, DELAYED RELEASE ORAL
Status: DISCONTINUED | OUTPATIENT
Start: 2022-11-24 | End: 2022-11-29

## 2022-11-23 RX ORDER — VANCOMYCIN HYDROCHLORIDE 125 MG/1
125 CAPSULE ORAL EVERY 6 HOURS SCHEDULED
Status: DISCONTINUED | OUTPATIENT
Start: 2022-11-23 | End: 2022-12-01 | Stop reason: HOSPADM

## 2022-11-23 RX ADMIN — ROPINIROLE 2 MG: 2 TABLET, FILM COATED ORAL at 23:26

## 2022-11-23 RX ADMIN — ROPINIROLE 2 MG: 2 TABLET, FILM COATED ORAL at 13:25

## 2022-11-23 RX ADMIN — HYDROMORPHONE HYDROCHLORIDE 0.5 MG: 1 INJECTION, SOLUTION INTRAMUSCULAR; INTRAVENOUS; SUBCUTANEOUS at 21:47

## 2022-11-23 RX ADMIN — HYDROMORPHONE HYDROCHLORIDE 0.5 MG: 1 INJECTION, SOLUTION INTRAMUSCULAR; INTRAVENOUS; SUBCUTANEOUS at 15:53

## 2022-11-23 RX ADMIN — ONDANSETRON 4 MG: 2 INJECTION INTRAMUSCULAR; INTRAVENOUS at 21:45

## 2022-11-23 RX ADMIN — VANCOMYCIN HYDROCHLORIDE 125 MG: 125 CAPSULE ORAL at 23:30

## 2022-11-23 RX ADMIN — VANCOMYCIN HYDROCHLORIDE 125 MG: 125 CAPSULE ORAL at 17:30

## 2022-11-23 RX ADMIN — HYDROMORPHONE HYDROCHLORIDE 1 MG: 1 INJECTION, SOLUTION INTRAMUSCULAR; INTRAVENOUS; SUBCUTANEOUS at 08:34

## 2022-11-23 NOTE — ASSESSMENT & PLAN NOTE
57 yo M with PMH cirrhosis and stage IV colon CA presenting with acute abdominal pain now HD#3 and POD#1 s/p IR paracentesis  Reports pain improved yesterday post-procedure, returning and severe this morning, no N/V, diarrhea x 4 overnight  Tearful from pain, abdomen soft, round, generalized TTP worst on right side with guarding  T 98 6, HR 70, /53, RR 18, SpO2 95% on RA  WBC 5, Hgb 9 4, Plt 46, INR 1 53, AST 44, ALT 39, Alk Phos 126, T bili 2 86 -> 1 77 -> 2 52  CEA 5 7, C  Diff PCR +, EIA - suggesting colonization, fluid gram stain with 2+ poly, fluid , hazy yellow  Assessment:  Acute abdominal pain, ddx including c diff colitis, SBP  Thickening of colon, cannot rule out recurrent CA  CEA 5 7  Plan:  Continue pain control, currently Dilaudid 1 mg IV Q3 PRN  Despite no fever/WBC count, consider empiric treatment/coverage for c  diff colitis and SBP, defer to GI for recs  Will discuss further with surgeon

## 2022-11-23 NOTE — CASE MANAGEMENT
Case Management Assessment & Discharge Planning Note    Patient name Sydney Lemons  Location [address] 203/-[id number] MRN [id number]  : 1960 Date 2022       Current Admission Date: 2022  Current Admission Diagnosis:Colitis   Patient Active Problem List    Diagnosis Date Noted   • Acute abdominal pain 2022   • Colitis 2022   • Cirrhosis of liver with ascites ([facility] ) 2022   • Abnormal CT of the chest 2022   • Hypokalemia 2022   • Thrombocytopenia ([facility] ) 2022   • Restless leg syndrome 2022   • Tobacco abuse 2022   • Diabetes mellitus type 2 in obese ([place] [address] ) 2022      LOS (days): 2  Geometric Mean LOS (GMLOS) (days): 3 30  Days to GMLOS:1 8     OBJECTIVE:    Risk of Unplanned Readmission Score: 10 16         Current admission status: Inpatient  Referral Reason: Other (Discharge planning)    Preferred Pharmacy:   [address], [address] [place] [address]  Phone: [phone] Fax: [phone]    Primary Care Provider: No primary care provider on file  Primary Insurance:   Secondary Insurance:     ASSESSMENT:  Active Health Care Proxies    There are no active Health Care Proxies on file  Advance Directives  Does patient have a [address] [facility] [address]?: No  Was patient offered paperwork?: Yes  Does patient currently have a Health Care decision maker?: No  Does patient have Advance Directives?: No  Was patient offered paperwork?: Yes  Primary Contact: Александр Torrez - spouse         Readmission Root Cause  30 Day Readmission: No    Patient Information  Admitted from[de-identified] Home  Mental Status: Alert  During Assessment patient was accompanied by: Not accompanied during assessment  Assessment information provided by[de-identified] Patient  Primary Caregiver: Self  Support Systems: Spouse/significant other  What city do you live in?: Currently staying at [facility] in [address] [place] [address] with spouse   Pt from [place]  Type of Current Residence:  (Currently staying at [facility] in [address] [place] [address] with spouse)  In the last 12 months, was there a time when you were not able to pay the mortgage or rent on time?: No  In the last 12 months, how many places have you lived?: 1  In the last 12 months, was there a time when you did not have a steady place to sleep or slept in a shelter (including now)?: No  Homeless/housing insecurity resource given?: N/A  Living Arrangements: Lives w/ Spouse/significant other  Is patient a ?: No    Activities of Daily Living Prior to Admission  Functional Status: Independent  Completes ADLs independently?: Yes  Ambulates independently?: Yes  Does patient use assisted devices?: No  Does patient currently own DME?: No  Does patient have a history of Outpatient Therapy (PT/OT)?: No  Does the patient have a history of Short-Term Rehab?: No  Does patient have a history of HHC?: No  Does patient currently have [facility]?: No         Patient Information Continued  Income Source: Employed  Does patient have prescription coverage?: No  Within the past 12 months, you worried that your food would run out before you got the money to buy more : Never true  Within the past 12 months, the food you bought just didn't last and you didn't have money to get more : Never true  Food insecurity resource given?: N/A  Does patient receive dialysis treatments?: No  Does patient have a history of substance abuse?: No  Does patient have a history of Mental Health Diagnosis?: No         Means of Transportation  Means of Transport to [facility][de-identified] Drives Self  In the past 12 months, has lack of transportation kept you from medical appointments or from getting medications?: No  In the past 12 months, has lack of transportation kept you from meetings, work, or from getting things needed for daily living?: No  Was application for public transport provided?: N/A        DISCHARGE DETAILS:    Discharge planning discussed with[de-identified] Patient  Freedom of Choice: Yes  Comments - Freedom of Choice: Pt is currently staying at [facility] in [address] with spouse  Pt is from [place] but came to [place] to help take care of his mother-in-law who is sick  Pt plans to return to  with spouse when stable  Pt has no insurance and was referred to financial counselors for [employer] evaluation  No other CM discharge needs identified at this time  CM to follow                       Requested [address]         Is the patient interested in [place] [address] at discharge?: No    DME Referral Provided  Referral made for DME?: No         Would you like to participate in our [address] service program?  : No - Declined    Treatment Team Recommendation: Home  Discharge Destination Plan[de-identified] Home  Transport at Discharge : Family (spouse will transport)

## 2022-11-23 NOTE — PROGRESS NOTES
[address]  Progress Note [place] 1960, 58 y o  male MRN: [id number]  Unit/Bed#: -01 Encounter: [id number]  Primary Care Provider: No primary care provider on file  Date and time admitted to hospital: 11/21/2022  9:10 PM    Acute abdominal pain  Assessment & Plan  59 yo M with PMH cirrhosis and stage IV colon CA presenting with acute abdominal pain now HD#3 and POD#1 s/p IR paracentesis  Reports pain improved yesterday post-procedure, returning and severe this morning, no N/V, diarrhea x 4 overnight  Tearful from pain, abdomen soft, round, generalized TTP worst on right side with guarding  T 98 6, HR 70, /53, RR 18, SpO2 95% on RA  WBC 5, Hgb 9 4, Plt 46, INR 1 53, AST 44, ALT 39, Alk Phos 126, T bili 2 86 -> 1 77 -> 2 52  CEA 5 7, C  Diff PCR +, EIA - suggesting colonization, fluid gram stain with 2+ poly, fluid , hazy yellow  Assessment:  Acute abdominal pain, ddx including c diff colitis, SBP  Thickening of colon, cannot rule out recurrent CA  CEA 5 7  Plan:  Continue pain control, currently Dilaudid 1 mg IV Q3 PRN  Despite no fever/WBC count, consider empiric treatment/coverage for c  diff colitis and SBP, defer to GI for recs  Will discuss further with surgeon  Addendum:  After rounding with Dr Pancho Kirk  Remains very TTP with guarding to the RUQ  If symptoms persist can consider HIDA to r/o cholecystitis/cholangitis  Subjective/Objective   Chief Complaint: pain    Subjective: reports he was feeling well yesterday after the procedure, pain had improved, this morning pain has recurred and now severe, stabbing in the right abdomen with radiation around flank, associated diarrhea x 4 since yesterday, tolerating small amounts of clear liquids  No N/V, but poor appetite  No chest pain or dyspnea, urine is dark yellow  Denies blood per rectum or with urination      Objective: paracentesis yesterday    Blood pressure 103/53, pulse 70, temperature 98 6 °F (37 °C), resp  rate 18, height 5' 7" (1 702 m), weight 87 1 kg (192 lb 0 3 oz), SpO2 95 %  ,Body mass index is 30 07 kg/m²  Intake/Output Summary (Last 24 hours) at 11/23/2022 0916  Last data filed at 11/22/2022 1551  Gross per 24 hour   Intake --   Output 2250 ml   Net -2250 ml       Invasive Devices     Peripheral Intravenous Line  Duration           Peripheral IV 11/21/22 Proximal;Right;Ventral (anterior) Forearm 1 day                Physical Exam  Vitals and nursing note reviewed  Constitutional:       General: He is in acute distress  Appearance: He is well-developed and well-nourished  He is not diaphoretic  Comments: Appears in distress 2/2 pain, tearful  HENT:      Head: Normocephalic and atraumatic  Eyes:      Extraocular Movements: EOM normal       Conjunctiva/sclera: Conjunctivae normal       Pupils: Pupils are equal, round, and reactive to light  Cardiovascular:      Rate and Rhythm: Normal rate  Pulses: Normal pulses  Pulmonary:      Effort: Pulmonary effort is normal  No respiratory distress  Breath sounds: Normal breath sounds  Abdominal:      Comments: Round, soft, minimally distended, hyperactive bowel sounds  genearlized TTP, severe TTP in right abdomen with mild guarding  Musculoskeletal:         General: Normal range of motion  Cervical back: Normal range of motion  Skin:     General: Skin is warm and dry  Capillary Refill: Capillary refill takes less than 2 seconds  Neurological:      Mental Status: He is alert and oriented to person, place, and time  Psychiatric:         Mood and Affect: Mood and affect normal          Behavior: Behavior normal            Lab, Imaging and other studies:  I have personally reviewed pertinent lab results    , CBC:   Lab Results   Component Value Date    WBC 5 13 11/23/2022    HGB 9 4 (L) 11/23/2022    HCT 28 9 (L) 11/23/2022     (H) 11/23/2022    PLT 46 (LL) 11/23/2022    MCH 34 1 11/23/2022    MCHC 32 5 11/23/2022    RDW 16 1 (H) 11/23/2022    MPV 11 0 11/23/2022   , CMP:   Lab Results   Component Value Date    SODIUM 136 11/23/2022    K 3 7 11/23/2022     11/23/2022    CO2 25 11/23/2022    BUN 13 11/23/2022    CREATININE 0 85 11/23/2022    CALCIUM 8 0 (L) 11/23/2022    AST 44 (H) 11/23/2022    ALT 39 11/23/2022    ALKPHOS 126 (H) 11/23/2022    EGFR 93 11/23/2022     VTE Pharmacologic Prophylaxis: Reason for no pharmacologic prophylaxis per primary  VTE Mechanical Prophylaxis: sequential compression device

## 2022-11-23 NOTE — ASSESSMENT & PLAN NOTE
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  · Presented with abdominal pain, ascites, anemia, and thrombocytopenia  · CT abd/pelvis (11/21): Findings suggesting colitis  Colonic bowel wall thickening is most pronounced at the site of surgical sutures in the region of the mid transverse colon  Local recurrence should be excluded  Small bowel wall thickening may be due to enteritis, however, could be related to ascites and edema  There is no bowel obstruction  There is colonic diverticulosis without definite evidence of acute diverticulitis  The liver demonstrates cirrhotic morphology  There are findings of portal hypertension, including splenomegaly, ascites, varices     · S/p Paracentesis with 2 2L removed; no evidence of SBP  · Will not begin Lasix/Aldactone at this time due to relatively low blood pressure  · GI following; will need further outpatient follow-up with Hepatology

## 2022-11-23 NOTE — PROGRESS NOTES
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  · Presented with abdominal pain, ascites, anemia, and thrombocytopenia  · CT abd/pelvis (11/21): Findings suggesting colitis  Colonic bowel wall thickening is most pronounced at the site of surgical sutures in the region of the mid transverse colon  Local recurrence should be excluded  Small bowel wall thickening may be due to enteritis, however, could be related to ascites and edema  There is no bowel obstruction  There is colonic diverticulosis without definite evidence of acute diverticulitis  The liver demonstrates cirrhotic morphology  There are findings of portal hypertension, including splenomegaly, ascites, varices  · S/p Paracentesis with 2 2L removed; no evidence of SBP  · Will not begin Lasix/Aldactone at this time due to relatively low blood pressure  · GI following; will need further outpatient follow-up with Hepatology     Colitis  Assessment & Plan  · Notes diarrhea with intermittent hematochezia for several months  · Differential includes inflammatory, ischemic, and possible malignant recurrence  · Laboratory studies demonstrated c diff colonization without active infection  · Will need outpatient EGD and Colonoscopy in the very near future  · Will continue to monitor off abx therapy  · Trial advanced diet for tolerance  · GI following      Pericardial effusion  Assessment & Plan  · Noted on CT abd/pelvis (11/21)  · TTE (11/22): LVEF 60%  Systolic function is normal  Wall motion is normal  There is a trivial pericardial effusion  There is no echocardiographic evidence of tamponade     · Continue to monitor      Anemia  Assessment & Plan  · Hgb was 12 at the time of admission and dropped to 9 7 the following AM  · Hgb remains around 9 4 this AM without any signs of active bleeding  · Begin Protonix 40mg daily  · Continue to monitor   · Outpatient bi-directional endoscopies recommended as above    Thrombocytopenia (HCC)  Assessment & Plan  · Likely related to Cirrhosis as above  · Plts: 46  · Continue to monitor       VTE Pharmacologic Prophylaxis: VTE Score: 5 High Risk (Score >/= 5) - Pharmacological DVT Prophylaxis Contraindicated  Sequential Compression Devices Ordered  Patient Centered Rounds: I performed bedside rounds with nursing staff today  Discussions with Specialists or Other Care Team Provider: GI, Nursing, and CM    Education and Discussions with Family / Patient: Patient declined call to   Time Spent for Care: 45 minutes  More than 50% of total time spent on counseling and coordination of care as described above  Current Length of Stay: 2 day(s)  Current Patient Status: Inpatient   Certification Statement: The patient will continue to require additional inpatient hospital stay due to decompensated liver cirrhosis and abdominal pain  Discharge Plan: Anticipate discharge in 24-48 hrs to home  Code Status: Level 1 - Full Code    Subjective:   Patient resting in bed with continued but improved abdominal pain  No significant over night events reported by nursing  Objective:     Vitals:   Temp (24hrs), Av 2 °F (36 8 °C), Min:97 7 °F (36 5 °C), Max:98 6 °F (37 °C)    Temp:  [97 7 °F (36 5 °C)-98 6 °F (37 °C)] 98 6 °F (37 °C)  HR:  [70-87] 70  Resp:  [18-20] 18  BP: (101-123)/(48-60) 103/53  SpO2:  [95 %-96 %] 95 %  Body mass index is 30 07 kg/m²  Input and Output Summary (last 24 hours):      Intake/Output Summary (Last 24 hours) at 2022 1413  Last data filed at 2022 1551  Gross per 24 hour   Intake --   Output 2250 ml   Net -2250 ml       Physical Exam: Physical Exam  Vitals and nursing note reviewed  Constitutional:       General: He is not in acute distress  Appearance: Normal appearance  He is obese  HENT:      Head: Normocephalic and atraumatic  Mouth/Throat:      Mouth: Mucous membranes are moist       Pharynx: Oropharynx is clear  Eyes:      Extraocular Movements: Extraocular movements intact  Conjunctiva/sclera: Conjunctivae normal    Cardiovascular:      Rate and Rhythm: Normal rate and regular rhythm  Pulses: Normal pulses  Heart sounds: Normal heart sounds  Pulmonary:      Effort: Pulmonary effort is normal  No respiratory distress  Breath sounds: Normal breath sounds  No wheezing  Abdominal:      General: Bowel sounds are normal  There is distension  Palpations: Abdomen is soft  Tenderness: There is abdominal tenderness  Musculoskeletal:         General: Normal range of motion  Cervical back: Normal range of motion and neck supple  Right lower leg: Edema present  Left lower leg: Edema present  Skin:     General: Skin is warm and dry  Neurological:      General: No focal deficit present  Mental Status: He is alert and oriented to person, place, and time  Mental status is at baseline  Psychiatric:         Mood and Affect: Mood normal          Behavior: Behavior normal          Judgment: Judgment normal           Labs:  Results from last 7 days   Lab Units 11/23/22  0446 11/22/22  0618 11/21/22  2144   WBC Thousand/uL 5 13   < > 6 73   HEMOGLOBIN g/dL 9 4*   < > 12 1   HEMATOCRIT % 28 9*   < > 37 6   PLATELETS Thousands/uL 46*   < > 59*   NEUTROS PCT %  --   --  77*   LYMPHS PCT %  --   --  13*   MONOS PCT %  --   --  5   EOS PCT %  --   --  5    < > = values in this interval not displayed       Results from last 7 days   Lab Units 11/23/22  0446   SODIUM mmol/L 136   POTASSIUM mmol/L 3 7   CHLORIDE mmol/L 106   CO2 mmol/L 25   BUN mg/dL 13   CREATININE mg/dL 0 85   ANION GAP mmol/L 5   CALCIUM mg/dL 8 0*   ALBUMIN g/dL 2 4*   TOTAL BILIRUBIN mg/dL 2 52*   ALK PHOS U/L 126*   ALT U/L 39   AST U/L 44*   GLUCOSE RANDOM mg/dL 72     Results from last 7 days   Lab Units 11/23/22  0446   INR  1 53*         Results from last 7 days   Lab Units 11/21/22  2144   HEMOGLOBIN A1C % 5 9*     Results from last 7 days   Lab Units 11/21/22  2143   LACTIC ACID mmol/L 1 8       Lines/Drains:  Invasive Devices     Peripheral Intravenous Line  Duration           Peripheral IV 11/21/22 Proximal;Right;Ventral (anterior) Forearm 1 day              Imaging: Reviewed radiology reports from this admission including: abdominal/pelvic CT and ECHO    Recent Cultures (last 7 days):   Results from last 7 days   Lab Units 11/22/22  1757 11/22/22  1545   GRAM STAIN RESULT   --  2+ Polys  No bacteria seen   BODY FLUID CULTURE, STERILE   --  No growth   C DIFF TOXIN B BY PCR  Positive*  --        Last 24 Hours Medication List:   Current Facility-Administered Medications   Medication Dose Route Frequency Provider Last Rate   • acetaminophen  650 mg Oral Q8H PRN [facility], DO     • diphenhydrAMINE  25 mg Oral Q6H PRN [facility], DO     • HYDROmorphone  0 5 mg Intravenous Q3H PRN [facility], DO     • nicotine  7 mg Transdermal Daily Deonte Jalloh PA-C     • ondansetron  4 mg Intravenous Q6H PRN Deonte Jalloh PA-C     • [START ON 11/24/2022] pantoprazole  40 mg Oral Early Morning [facility], DO     • rOPINIRole  4 mg Oral 4x Daily Deonte Jalloh PA-C          Today, Patient Was Seen By: Caity Medina DO    **Please Note: This note may have been constructed using a voice recognition system  **

## 2022-11-23 NOTE — ASSESSMENT & PLAN NOTE
· Hgb was 12 at the time of admission and dropped to 9 7 the following AM  · Hgb remains around 9 4 this AM without any signs of active bleeding  · Begin Protonix 40mg daily  · Continue to monitor   · Outpatient bi-directional endoscopies recommended as above

## 2022-11-23 NOTE — PROGRESS NOTES
Progress Note- Mitch Mathis 58 y o  male MRN: [id number]    Unit/Bed#: -01 Encounter: [id number]      Assessment and Plan:    57 y/o M w/ pmhx sig for decompensated cirrhosis (CHRISTIE per chart), hx of CRC s/p resection and adjuvant chemotherapy, DM2, RLS  Unfortunately pt is a poor historian and records are incomplete [place] past [place]  It appears he has been dealing with abdominal pain and loose/urgent stools for many months  He does carry a diagnosis of cirrhosis though knowledge regarding this is incomplete  His admission evaluation was notable for a macrocytosis, thrombocytopenia, hypoalbuminemia, elevated transaminases, and CT findings indicative of sequelae of chronic liver disease  He also has a history of colon cancer which unfortunately sounds he was lost to follow up regarding  There are non-specific findings on CT scan which question non-specific colitis  Paracentesis completed on 11/22/22 with approx 2 5L removed  C diff stool testing consistent with colonization  Abnormal CT scan A/P  Chronic diarrhea  Intermittent hematochezia   History of CRC   C diff colonization     · Stool testing was suggestive of C diff colonization  Given duration of his diarrhea, this is not consistent with an acute C diff infection  Do not recommend treatment at this time  · The differential diagnosis for his lower GI symptoms also includes tumor recurrence at the anastomosis in the setting of previous CRC  He was lost to follow up regarding regarding colon cancer in the past unfortunately  He will eventually need a colonoscopy once he is medically optimized  CEA is 5 7  · We discussed that colonoscopy can be arranged in short interval in OP setting      Decompensated cirrhosis complicated by ascites, portal hypertension, thrombocytopenia (suspected secondary to CHRISTIE per chart review)    · Details surrounding liver disease are not completely known; is uncertain as to whether he has had complete serologic workup for chronic liver disease, and recommend serologic evaluation to out competing causes of liver disease, though given his chart review it may be secondary to CHRISTIE, and he does have metabolic risk factors  · MELD-Na: 15 CPS: 9/B  • Ascites: present on examination and imaging; patient underwent paracentesis on 11/22/2022 with approximately 2 5 L removed; negative for SBP; SAAG 1 6 and low total protein suggestive of portal hypertension secondary to cirrhosis  ? Patient's blood pressures are a bit softer following the paracentesis  While we initially recommended diuretic for his ascites and lower extremity edema, we will hold off on this at present and continue to follow his blood pressures  Continue with a low-sodium diet at this time; if he is able to tolerate diuretics it may be reasonable to start with low dose and titrate; the typical ratio recommended is furosemide 40/aldactone 100 ratio  • Hepatic encephalopathy: no evidence on examination, continue to monitor    • Esophageal varices: pt does not recall previous upper endoscopy; will require for screening purposes; as well as given hx of anemia; given no overt bleeding and no melena or nidhi blood on rectal examination, no clear indication for SBP prophylaxis at this time   • [facility] [address]  screening: will need RUQ US and AFP checked Q6 months; no obvious lesions on initial CT scan though not liver protocol   • Vaccination status: check for susceptibility to Hep A/B; vaccinate if appropriate     GI will continue to follow at this time  He will need to be set up for an EGD and a colonoscopy in the outpatient setting in a timely manner   ______________________________________________________________________    Subjective:     Patient is a 58 y o  male with past medical history significant for cirrhosis, history of colon cancer s/p resection with adjuvant chemotherapy, DM2, restless leg syndrome    Initially presented to ER for progressive extremity pain and abdominal pain  Had a contrasted CT scan on admission which was suggestive of colitis  CT scan also commented on cirrhosis, small bowel wall thickening, portal hypertension, ascites, varices, and small right pericardial effusion  Patient underwent a diagnostic and therapeutic paracentesis on 11/22/2022, with 2250 cc of ascites removed  Interval events: In no acute GI events overnight  He continues to complain of loose urgent stool  No BRBPR melena  Stool testing is positive for C difficile, though consistent with colonization and not infection  Blood pressure has been slightly soft this morning  He is afebrile  CEA     Medication Administration - last 24 hours from 11/22/2022 1121 to 11/23/2022 1121       Date/Time Order Dose Route Action Action by     11/23/2022 0839 EST rOPINIRole (REQUIP) tablet 4 mg 4 mg Oral Refused Bettye Hope RN     11/22/2022 2139 EST rOPINIRole (REQUIP) tablet 4 mg 4 mg Oral Given Khushi Gonzalez RN     11/22/2022 1733 EST rOPINIRole (REQUIP) tablet 4 mg 4 mg Oral Given Bettye Hope RN     11/22/2022 1303 EST rOPINIRole (REQUIP) tablet 4 mg 4 mg Oral Given Ana Ayala, KRISTINA     11/23/2022 6900 EST HYDROmorphone (DILAUDID) injection 1 mg 1 mg Intravenous Given Bettye Hope RN     11/23/2022 0834 EST nicotine (NICODERM CQ) 7 mg/24hr TD 24 hr patch 7 mg 7 mg Transdermal Patch Removed Bettye Hope RN     11/23/2022 6562 EST nicotine (NICODERM CQ) 7 mg/24hr TD 24 hr patch 7 mg 7 mg Transdermal Not Given Bettye Hope RN     11/22/2022 1527 EST lidocaine (PF) (XYLOCAINE-MPF) 1 % injection 10 mL Infiltration Given Sai Souza MD        Review of Systems   Per HPI    Objective:     Vitals: Blood pressure 103/53, pulse 70, temperature 98 6 °F (37 °C), resp  rate 18, height 5' 7" (1 702 m), weight 87 1 kg (192 lb 0 3 oz), SpO2 95 %  ,Body mass index is 30 07 kg/m²        Intake/Output Summary (Last 24 hours) at 11/23/2022 1121  Last data filed at 11/22/2022 1551  Gross per 24 hour   Intake --   Output 2250 ml   Net -2250 ml     Physical Exam  Vitals and nursing note reviewed  Constitutional:       Appearance: Normal appearance  HENT:      Head: Normocephalic and atraumatic  Eyes:      General: No scleral icterus  Conjunctiva/sclera: Conjunctivae normal    Cardiovascular:      Rate and Rhythm: Normal rate  Pulses: Normal pulses  Pulmonary:      Effort: Pulmonary effort is normal    Abdominal:      General: Bowel sounds are normal  There is distension  Palpations: There is no mass  Tenderness: There is abdominal tenderness  There is guarding  There is no rebound  Musculoskeletal:      Right lower leg: Edema present  Left lower leg: Edema present  Comments: LE edema improved from 11/22/22   Skin:     General: Skin is warm and dry  Coloration: Skin is not jaundiced  Neurological:      General: No focal deficit present  Mental Status: He is alert and oriented to person, place, and time        Comments: No asterixis   Psychiatric:         Mood and Affect: Mood normal          Behavior: Behavior normal        Invasive Devices     Peripheral Intravenous Line  Duration           Peripheral IV 11/21/22 Proximal;Right;Ventral (anterior) Forearm 1 day              Lab Results:  Admission on 11/21/2022   Component Date Value   • WBC 11/21/2022 6 73    • RBC 11/21/2022 3 58 (L)    • Hemoglobin 11/21/2022 12 1    • Hematocrit 11/21/2022 37 6    • MCV 11/21/2022 105 (H)    • MCH 11/21/2022 33 8    • MCHC 11/21/2022 32 2    • RDW 11/21/2022 16 1 (H)    • MPV 11/21/2022 12 2    • Platelets 62/40/1760 59 (L)    • nRBC 11/21/2022 0    • Neutrophils Relative 11/21/2022 77 (H)    • Immat GRANS % 11/21/2022 0    • Lymphocytes Relative 11/21/2022 13 (L)    • Monocytes Relative 11/21/2022 5    • Eosinophils Relative 11/21/2022 5    • Basophils Relative 11/21/2022 0    • Neutrophils Absolute 11/21/2022 5 17    • Immature Grans Absolute 11/21/2022 0 02    • Lymphocytes Absolute 11/21/2022 0 86    • Monocytes Absolute 11/21/2022 0 34    • Eosinophils Absolute 11/21/2022 0 31    • Basophils Absolute 11/21/2022 0 03    • Sodium 11/21/2022 137    • Potassium 11/21/2022 3 3 (L)    • Chloride 11/21/2022 105    • CO2 11/21/2022 27    • ANION GAP 11/21/2022 5    • BUN 11/21/2022 11    • Creatinine 11/21/2022 0 97    • Glucose 11/21/2022 184 (H)    • Calcium 11/21/2022 8 7    • Corrected Calcium 11/21/2022 9 5    • AST 11/21/2022 46 (H)    • ALT 11/21/2022 49    • Alkaline Phosphatase 11/21/2022 163 (H)    • Total Protein 11/21/2022 7 1    • Albumin 11/21/2022 3 0 (L)    • Total Bilirubin 11/21/2022 2 86 (H)    • eGFR 11/21/2022 83    • Lipase 11/21/2022 36    • Color, UA 11/22/2022 Viky (A)    • Clarity, UA 11/22/2022 Clear    • Specific Gravity, UA 11/22/2022 1 010    • pH, UA 11/22/2022 6 0    • Leukocytes, UA 11/22/2022 Negative    • Nitrite, UA 11/22/2022 Negative    • Protein, UA 11/22/2022 Negative    • Glucose, UA 11/22/2022 Negative    • Ketones, UA 11/22/2022 Negative    • Urobilinogen, UA 11/22/2022 0 2    • Bilirubin, UA 11/22/2022 1+ (A)    • Occult Blood, UA 11/22/2022 Negative    • BNP 11/21/2022 178 (H)    • hs TnI 0hr 11/21/2022 7    • LACTIC ACID 11/21/2022 1 8    • ABO Grouping 11/21/2022 A    • Rh Factor 11/21/2022 Negative    • Antibody Screen 11/21/2022 Negative    • Specimen Expiration Date 11/21/2022 06191702    • hs TnI 2hr 11/21/2022 7    • Delta 2hr hsTnI 11/21/2022 0    • ABO Grouping 11/21/2022 A    • Rh Factor 11/21/2022 Negative    • Hemoglobin A1C 11/21/2022 5 9 (H)    • EAG 11/21/2022 123    • Sodium 11/22/2022 136    • Potassium 11/22/2022 3 7    • Chloride 11/22/2022 109 (H)    • CO2 11/22/2022 25    • ANION GAP 11/22/2022 2 (L)    • BUN 11/22/2022 13    • Creatinine 11/22/2022 0 78    • Glucose 11/22/2022 151 (H)    • Calcium 11/22/2022 8 0 (L)    • eGFR 11/22/2022 96    • WBC 11/22/2022 5 77    • RBC 11/22/2022 2 80 (L)    • Hemoglobin 11/22/2022 9 7 (L)    • Hematocrit 11/22/2022 29 4 (L)    • MCV 11/22/2022 105 (H)    • MCH 11/22/2022 34 6 (H)    • MCHC 11/22/2022 33 0    • RDW 11/22/2022 16 0 (H)    • Platelets 67/87/6408 46 (LL)    • MPV 11/22/2022 11 7    • Site 11/22/2022 paracentesis    • Color, Fluid 11/22/2022 Pale Yellow    • Clarity, Fluid 11/22/2022 Hazy (A)    • WBC, Fluid 11/22/2022 332    • Gram Stain Result 11/22/2022 2+ Polys    • Gram Stain Result 11/22/2022 No bacteria seen    • Protein, Fluid 11/22/2022 <2 0    • Glucose, Fluid 11/22/2022 70    • Albumin, Fluid 11/22/2022 <0 6    • LD, Fluid 11/22/2022 49    • AV area peak griffin 11/22/2022 2 0    • LA size 11/22/2022 3 7    • Aortic valve mean veloci* 11/22/2022 12 20    • Triscuspid Valve Regurgi* 11/22/2022 22 0    • Tricuspid valve peak reg* 11/22/2022 2 34    • LVPWd 11/22/2022 1 00    • Left Atrium Area-systoli* 11/22/2022 21 1    • Left Atrium Area-systoli* 11/22/2022 18 9    • MV E' Tissue Velocity Se* 11/22/2022 10    • TR Peak Griffin 11/22/2022 2 3    • IVSd 11/22/2022 7 67    • LV DIASTOLIC VOLUME (MOD* 29/25/9075 106    • LEFT VENTRICLE SYSTOLIC * 97/04/3427 39    • Left ventricular stroke * 11/22/2022 67 00    • A4C EF 11/22/2022 66    • LA length (A2C) 11/22/2022 5 70    • LVIDd 11/22/2022 4 80    • IVS 11/22/2022 1    • LVIDS 11/22/2022 3 10    • FS 11/22/2022 35    • Asc Ao 11/22/2022 3 2    • Ao root 11/22/2022 3 30    • RVID d 11/22/2022 3 7    • LVOT mn grad 11/22/2022 3 0    • AV area by cont VTI 11/22/2022 1 9    • AV mean gradient 11/22/2022 7    • AV LVOT peak gradient 11/22/2022 4    • MV valve area p 1/2 meth* 11/22/2022 2 62    • E wave deceleration time 11/22/2022 291    • LVOT diameter 11/22/2022 2 0    • LVOT peak griffin 11/22/2022 1 06    • LVOT peak VTI 11/22/2022 21 98    • Aortic valve peak veloci* 11/22/2022 1 67    • Ao VTI 11/22/2022 36 7    • LVOT stroke volume 11/22/2022 69 02    • AV peak gradient 11/22/2022 11    • MV Peak E Griffin 11/22/2022 115    • MV Peak A Griffin 11/22/2022 0 65    • RAA A4C 11/22/2022 14 9    • MV stenosis pressure 1/2* 11/22/2022 84    • LVOT stroke volume index 11/22/2022 34 00    • LVSV, 2D 11/22/2022 67    • LVOT area 11/22/2022 3 14    • Dimensionless velociy in* 11/22/2022 0 63    • AV valve area 11/22/2022 1 88    • LV EF 11/22/2022 60    • Total Bilirubin 11/22/2022 1 77 (H)    • Bilirubin, Direct 11/22/2022 0 91 (H)    • Alkaline Phosphatase 11/22/2022 125 (H)    • AST 11/22/2022 35    • ALT 11/22/2022 39    • Total Protein 11/22/2022 5 7 (L)    • Albumin 11/22/2022 2 4 (L)    • Protime 11/22/2022 18 3 (H)    • INR 11/22/2022 1 52 (H)    • CEA 11/22/2022 5 7 (H)    • C difficile toxin by PCR 11/22/2022 Positive (A)    • C difficile Toxins A+B, * 11/22/2022 Negative    • Total Counted 11/22/2022 100    • Neutrophils % (Fluid) 11/22/2022 19    • Lymphs % (Fluid) 11/22/2022 42    • Histiocyte % (Fluid) 11/22/2022 30    • Monocytes % (Fluid) 11/22/2022 9    • WBC 11/23/2022 5 13    • RBC 11/23/2022 2 76 (L)    • Hemoglobin 11/23/2022 9 4 (L)    • Hematocrit 11/23/2022 28 9 (L)    • MCV 11/23/2022 105 (H)    • MCH 11/23/2022 34 1    • MCHC 11/23/2022 32 5    • RDW 11/23/2022 16 1 (H)    • Platelets 67/80/0173 46 (LL)    • MPV 11/23/2022 11 0    • Sodium 11/23/2022 136    • Potassium 11/23/2022 3 7    • Chloride 11/23/2022 106    • CO2 11/23/2022 25    • ANION GAP 11/23/2022 5    • BUN 11/23/2022 13    • Creatinine 11/23/2022 0 85    • Glucose 11/23/2022 72    • Calcium 11/23/2022 8 0 (L)    • Corrected Calcium 11/23/2022 9 3    • AST 11/23/2022 44 (H)    • ALT 11/23/2022 39    • Alkaline Phosphatase 11/23/2022 126 (H)    • Total Protein 11/23/2022 5 5 (L)    • Albumin 11/23/2022 2 4 (L)    • Total Bilirubin 11/23/2022 2 52 (H)    • eGFR 11/23/2022 93    • Protime 11/23/2022 18 4 (H)    • INR 11/23/2022 1 53 (H)      Imaging Studies: I have personally reviewed pertinent imaging studies  Flo Montano PA-C    **Please note:  Dictation voice to text software may have been used in the creation of this record  Occasional wrong word or “sound alike” substitutions may have occurred due to the inherent limitations of voice recognition software  Read the chart carefully and recognize, using context, where substitutions have occurred  **

## 2022-11-23 NOTE — PLAN OF CARE
Problem: Potential for Falls  Goal: Patient will remain free of falls  Description: INTERVENTIONS:  - Educate patient/family on patient safety including physical limitations  - Instruct patient to call for assistance with activity   - Consult OT/PT to assist with strengthening/mobility   - Keep Call bell within reach  - Keep bed low and locked with side rails adjusted as appropriate  - Keep care items and personal belongings within reach  - Initiate and maintain comfort rounds  - Make Fall Risk Sign visible to staff  - Offer Toileting every 2 Hours, in advance of need  - Initiate/Maintain bed alarm  - Obtain necessary fall risk management equipment: walker   - Apply yellow socks and bracelet for high fall risk patients  - Consider moving patient to room near nurses station  Outcome: Progressing     Problem: PAIN - ADULT  Goal: Verbalizes/displays adequate comfort level or baseline comfort level  Description: Interventions:  - Encourage patient to monitor pain and request assistance  - Assess pain using appropriate pain scale  - Administer analgesics based on type and severity of pain and evaluate response  - Implement non-pharmacological measures as appropriate and evaluate response  - Consider cultural and social influences on pain and pain management  - Notify physician/advanced practitioner if interventions unsuccessful or patient reports new pain  Outcome: Progressing     Problem: INFECTION - ADULT  Goal: Absence or prevention of progression during hospitalization  Description: INTERVENTIONS:  - Assess and monitor for signs and symptoms of infection  - Monitor lab/diagnostic results  - Monitor all insertion sites, i e  indwelling lines, tubes, and drains  - Monitor endotracheal if appropriate and nasal secretions for changes in amount and color  - [place] appropriate cooling/warming therapies per order  - Administer medications as ordered  - Instruct and encourage patient and family to use good hand hygiene technique  - Identify and instruct in appropriate isolation precautions for identified infection/condition  Outcome: Progressing  Goal: Absence of fever/infection during neutropenic period  Description: INTERVENTIONS:  - Monitor WBC    Outcome: Progressing     Problem: DISCHARGE PLANNING  Goal: Discharge to home or other facility with appropriate resources  Description: INTERVENTIONS:  - Identify barriers to discharge w/patient and caregiver  - Arrange for needed discharge resources and transportation as appropriate  - Identify discharge learning needs (meds, wound care, etc )  - Arrange for interpretive services to assist at discharge as needed  - Refer to Case Management Department for coordinating discharge planning if the patient needs post-hospital services based on physician/advanced practitioner order or complex needs related to functional status, cognitive ability, or social support system  Outcome: Progressing     Problem: Knowledge Deficit  Goal: Patient/family/caregiver demonstrates understanding of disease process, treatment plan, medications, and discharge instructions  Description: Complete learning assessment and assess knowledge base    Interventions:  - Provide teaching at level of understanding  - Provide teaching via preferred learning methods  Outcome: Progressing

## 2022-11-23 NOTE — ASSESSMENT & PLAN NOTE
· Notes diarrhea with intermittent hematochezia for several months  · Differential includes inflammatory, ischemic, and possible malignant recurrence  · Laboratory studies demonstrated c diff colonization without active infection  · Will need outpatient EGD and Colonoscopy in the very near future  · Will continue to monitor off abx therapy  · Trial advanced diet for tolerance  · GI following

## 2022-11-23 NOTE — ASSESSMENT & PLAN NOTE
· Noted on CT abd/pelvis (11/21)  · TTE (11/22): LVEF 60%  Systolic function is normal  Wall motion is normal  There is a trivial pericardial effusion  There is no echocardiographic evidence of tamponade     · Continue to monitor

## 2022-11-24 LAB
ALBUMIN SERPL BCP-MCNC: 2.7 G/DL (ref 3.5–5)
ALP SERPL-CCNC: 150 U/L (ref 34–104)
ALT SERPL W P-5'-P-CCNC: 45 U/L (ref 7–52)
ANION GAP SERPL CALCULATED.3IONS-SCNC: 3 MMOL/L (ref 4–13)
AST SERPL W P-5'-P-CCNC: 53 U/L (ref 13–39)
BILIRUB SERPL-MCNC: 2.16 MG/DL (ref 0.2–1)
BUN SERPL-MCNC: 11 MG/DL (ref 5–25)
CALCIUM ALBUM COR SERPL-MCNC: 9.2 MG/DL (ref 8.3–10.1)
CALCIUM SERPL-MCNC: 8.2 MG/DL (ref 8.4–10.2)
CHLORIDE SERPL-SCNC: 104 MMOL/L (ref 96–108)
CO2 SERPL-SCNC: 28 MMOL/L (ref 21–32)
CREAT SERPL-MCNC: 0.84 MG/DL (ref 0.6–1.3)
ERYTHROCYTE [DISTWIDTH] IN BLOOD BY AUTOMATED COUNT: 15.8 % (ref 11.6–15.1)
GFR SERPL CREATININE-BSD FRML MDRD: 93 ML/MIN/1.73SQ M
GLUCOSE SERPL-MCNC: 117 MG/DL (ref 65–140)
HCT VFR BLD AUTO: 30.9 % (ref 36.5–49.3)
HGB BLD-MCNC: 10.1 G/DL (ref 12–17)
MCH RBC QN AUTO: 34.1 PG (ref 26.8–34.3)
MCHC RBC AUTO-ENTMCNC: 32.7 G/DL (ref 31.4–37.4)
MCV RBC AUTO: 104 FL (ref 82–98)
PLATELET # BLD AUTO: 53 THOUSANDS/UL (ref 149–390)
PMV BLD AUTO: 11.9 FL (ref 8.9–12.7)
POTASSIUM SERPL-SCNC: 3.3 MMOL/L (ref 3.5–5.3)
PROT SERPL-MCNC: 6.2 G/DL (ref 6.4–8.4)
RBC # BLD AUTO: 2.96 MILLION/UL (ref 3.88–5.62)
SODIUM SERPL-SCNC: 135 MMOL/L (ref 135–147)
WBC # BLD AUTO: 4.47 THOUSAND/UL (ref 4.31–10.16)

## 2022-11-24 RX ORDER — POTASSIUM CHLORIDE 20 MEQ/1
20 TABLET, EXTENDED RELEASE ORAL ONCE
Status: COMPLETED | OUTPATIENT
Start: 2022-11-24 | End: 2022-11-24

## 2022-11-24 RX ORDER — HYDROMORPHONE HCL/PF 1 MG/ML
0.5 SYRINGE (ML) INJECTION ONCE
Status: COMPLETED | OUTPATIENT
Start: 2022-11-24 | End: 2022-11-24

## 2022-11-24 RX ADMIN — HYDROMORPHONE HYDROCHLORIDE 0.5 MG: 1 INJECTION, SOLUTION INTRAMUSCULAR; INTRAVENOUS; SUBCUTANEOUS at 08:34

## 2022-11-24 RX ADMIN — HYDROMORPHONE HYDROCHLORIDE 0.5 MG: 1 INJECTION, SOLUTION INTRAMUSCULAR; INTRAVENOUS; SUBCUTANEOUS at 17:56

## 2022-11-24 RX ADMIN — ONDANSETRON 4 MG: 2 INJECTION INTRAMUSCULAR; INTRAVENOUS at 12:14

## 2022-11-24 RX ADMIN — VANCOMYCIN HYDROCHLORIDE 125 MG: 125 CAPSULE ORAL at 12:07

## 2022-11-24 RX ADMIN — POTASSIUM CHLORIDE 20 MEQ: 1500 TABLET, EXTENDED RELEASE ORAL at 08:34

## 2022-11-24 RX ADMIN — HYDROMORPHONE HYDROCHLORIDE 0.5 MG: 1 INJECTION, SOLUTION INTRAMUSCULAR; INTRAVENOUS; SUBCUTANEOUS at 21:01

## 2022-11-24 RX ADMIN — ROPINIROLE 4 MG: 2 TABLET, FILM COATED ORAL at 08:23

## 2022-11-24 RX ADMIN — ROPINIROLE 4 MG: 2 TABLET, FILM COATED ORAL at 21:01

## 2022-11-24 RX ADMIN — VANCOMYCIN HYDROCHLORIDE 125 MG: 125 CAPSULE ORAL at 05:01

## 2022-11-24 RX ADMIN — VANCOMYCIN HYDROCHLORIDE 125 MG: 125 CAPSULE ORAL at 23:31

## 2022-11-24 RX ADMIN — HYDROMORPHONE HYDROCHLORIDE 0.5 MG: 1 INJECTION, SOLUTION INTRAMUSCULAR; INTRAVENOUS; SUBCUTANEOUS at 12:28

## 2022-11-24 RX ADMIN — PANTOPRAZOLE SODIUM 40 MG: 40 TABLET, DELAYED RELEASE ORAL at 05:01

## 2022-11-24 RX ADMIN — ROPINIROLE 4 MG: 2 TABLET, FILM COATED ORAL at 12:07

## 2022-11-24 RX ADMIN — ROPINIROLE 4 MG: 2 TABLET, FILM COATED ORAL at 17:37

## 2022-11-24 RX ADMIN — HYDROMORPHONE HYDROCHLORIDE 0.5 MG: 1 INJECTION, SOLUTION INTRAMUSCULAR; INTRAVENOUS; SUBCUTANEOUS at 14:51

## 2022-11-24 RX ADMIN — VANCOMYCIN HYDROCHLORIDE 125 MG: 125 CAPSULE ORAL at 17:37

## 2022-11-24 RX ADMIN — HYDROMORPHONE HYDROCHLORIDE 0.5 MG: 1 INJECTION, SOLUTION INTRAMUSCULAR; INTRAVENOUS; SUBCUTANEOUS at 01:24

## 2022-11-24 NOTE — ASSESSMENT & PLAN NOTE
· Hgb was 12 at the time of admission and dropped to 9 7 the following AM  · Hgb improved to 10 1 this AM   · Begin Protonix 40mg daily  · Continue to monitor   · Outpatient bi-directional endoscopies recommended as above

## 2022-11-24 NOTE — ASSESSMENT & PLAN NOTE
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  · Presented with abdominal pain, ascites, anemia, and thrombocytopenia  · CT abd/pelvis (11/21): Findings suggesting colitis  Colonic bowel wall thickening is most pronounced at the site of surgical sutures in the region of the mid transverse colon  Local recurrence should be excluded  Small bowel wall thickening may be due to enteritis, however, could be related to ascites and edema  There is no bowel obstruction  There is colonic diverticulosis without definite evidence of acute diverticulitis  The liver demonstrates cirrhotic morphology  There are findings of portal hypertension, including splenomegaly, ascites, varices     · S/p Paracentesis with 2 2L removed; no evidence of SBP  · Will not begin Lasix/Aldactone at this time due to relatively low blood pressure  · GI following; will need further outpatient follow-up with Hepatology   · General surgery following- RUQ ultrasound tomorrow 11/25

## 2022-11-24 NOTE — NURSING NOTE
Pt requested pain meds at 1445, not due at this time, al salmeron made aware, orders received and 1 time dose of dilaudid given iv, pt slept on/off, in bed at this time watching tv

## 2022-11-24 NOTE — PLAN OF CARE
Problem: Potential for Falls  Goal: Patient will remain free of falls  Description: INTERVENTIONS:  - Educate patient/family on patient safety including physical limitations  - Instruct patient to call for assistance with activity   - Consult OT/PT to assist with strengthening/mobility   - Keep Call bell within reach  - Keep bed low and locked with side rails adjusted as appropriate  - Keep care items and personal belongings within reach  - Initiate and maintain comfort rounds  - Make Fall Risk Sign visible to staff  - Offer Toileting every 1 Hours, in advance of need  - Initiate/Maintain bed alarm  - Obtain necessary fall risk management equipment: bed   - Apply yellow socks and bracelet for high fall risk patients  - Consider moving patient to room near nurses station  Outcome: Progressing     Problem: PAIN - ADULT  Goal: Verbalizes/displays adequate comfort level or baseline comfort level  Description: Interventions:  - Encourage patient to monitor pain and request assistance  - Assess pain using appropriate pain scale  - Administer analgesics based on type and severity of pain and evaluate response  - Implement non-pharmacological measures as appropriate and evaluate response  - Consider cultural and social influences on pain and pain management  - Notify physician/advanced practitioner if interventions unsuccessful or patient reports new pain  Outcome: Progressing     Problem: INFECTION - ADULT  Goal: Absence or prevention of progression during hospitalization  Description: INTERVENTIONS:  - Assess and monitor for signs and symptoms of infection  - Monitor lab/diagnostic results  - Monitor all insertion sites, i e  indwelling lines, tubes, and drains  - Monitor endotracheal if appropriate and nasal secretions for changes in amount and color  - [place] appropriate cooling/warming therapies per order  - Administer medications as ordered  - Instruct and encourage patient and family to use good hand hygiene technique  - Identify and instruct in appropriate isolation precautions for identified infection/condition  Outcome: Progressing  Goal: Absence of fever/infection during neutropenic period  Description: INTERVENTIONS:  - Monitor WBC    Outcome: Progressing     Problem: DISCHARGE PLANNING  Goal: Discharge to home or other facility with appropriate resources  Description: INTERVENTIONS:  - Identify barriers to discharge w/patient and caregiver  - Arrange for needed discharge resources and transportation as appropriate  - Identify discharge learning needs (meds, wound care, etc )  - Arrange for interpretive services to assist at discharge as needed  - Refer to Case Management Department for coordinating discharge planning if the patient needs post-hospital services based on physician/advanced practitioner order or complex needs related to functional status, cognitive ability, or social support system  Outcome: Progressing     Problem: Knowledge Deficit  Goal: Patient/family/caregiver demonstrates understanding of disease process, treatment plan, medications, and discharge instructions  Description: Complete learning assessment and assess knowledge base    Interventions:  - Provide teaching at level of understanding  - Provide teaching via preferred learning methods  Outcome: Progressing

## 2022-11-24 NOTE — ASSESSMENT & PLAN NOTE
57 yo M with PMH cirrhosis and reported stage IV colon CA presenting with acute abdominal pain now HD#4 and POD#2 s/p IR paracentesis  Pain comes and goes, on right side, ongoing diarrhea  Abdomen TTP with guarding in RUQ  BP 81/60 this AM, repeat pending  CBC/CMP stable  Assessment:  Acute abdominal pain, ddx including c diff colitis, SBP, cholecystitis/cholangitis  Thickening of colon, cannot rule out recurrent CA, will need OP colonoscopy, CEA 5 7  Plan:  Remains very TTP with guarding to the RUQ  Will request RUQ US, tomorrow if otherwise stable, NPO midnight for US  Oral vanco per GI, consider IVF crystalloid vs colloid for hypotension and ongoing diarrhea  Added banatrol flakes TID

## 2022-11-24 NOTE — PROGRESS NOTES
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  · Presented with abdominal pain, ascites, anemia, and thrombocytopenia  · CT abd/pelvis (11/21): Findings suggesting colitis  Colonic bowel wall thickening is most pronounced at the site of surgical sutures in the region of the mid transverse colon  Local recurrence should be excluded  Small bowel wall thickening may be due to enteritis, however, could be related to ascites and edema  There is no bowel obstruction  There is colonic diverticulosis without definite evidence of acute diverticulitis  The liver demonstrates cirrhotic morphology  There are findings of portal hypertension, including splenomegaly, ascites, varices  · S/p Paracentesis with 2 2L removed; no evidence of SBP  · Will not begin Lasix/Aldactone at this time due to relatively low blood pressure  · GI following; will need further outpatient follow-up with Hepatology   · General surgery following- RUQ ultrasound tomorrow 11/25    Colitis  Assessment & Plan  · Notes diarrhea with intermittent hematochezia for several months  · Differential includes inflammatory, ischemic, and possible malignant recurrence  · Laboratory studies demonstrated c diff colonization without active infection however given he is immunocompromised secondary to prior cancer diagnosis and underlying cirrhosis GI recommends treatment  · PO vancomycin started 11/23  · Will need outpatient EGD and Colonoscopy in the very near future  · Trial advanced diet for tolerance  · GI following      Pericardial effusion  Assessment & Plan  · Noted on CT abd/pelvis (11/21)  · TTE (11/22):  LVEF 89%  Systolic function is normal  Wall motion is normal  There is a trivial pericardial effusion  There is no echocardiographic evidence of tamponade  · Continue to monitor      Anemia  Assessment & Plan  · Hgb was 12 at the time of admission and dropped to 9 7 the following AM  · Hgb improved to 10 1 this AM   · Begin Protonix 40mg daily  · Continue to monitor   · Outpatient bi-directional endoscopies recommended as above    Thrombocytopenia (HCC)  Assessment & Plan  · Likely related to Cirrhosis as above  · Plts: 46 -> 53  · Continue to monitor         VTE Pharmacologic Prophylaxis: VTE Score: 5 High Risk (Score >/= 5) - Pharmacological DVT Prophylaxis Contraindicated  Sequential Compression Devices Ordered  Patient Centered Rounds: I performed bedside rounds with nursing staff today  Discussions with Specialists or Other Care Team Provider: nursing    Education and Discussions with Family / Patient: Patient declined call to   Time Spent for Care: 20 minutes  More than 50% of total time spent on counseling and coordination of care as described above  Current Length of Stay: 3 day(s)  Current Patient Status: Inpatient   Certification Statement: The patient will continue to require additional inpatient hospital stay due to continued workup of abdominal pain with RUQ US tomorrow  Discharge Plan: pending clinical course    Code Status: Level 1 - Full Code    Subjective: The patient was seen and examined  The patient states he's having RUQ pain  He is complaining about his food  Objective:     Vitals:   Temp (24hrs), Av 3 °F (36 8 °C), Min:97 9 °F (36 6 °C), Max:98 6 °F (37 °C)    Temp:  [97 9 °F (36 6 °C)-98 6 °F (37 °C)] 98 5 °F (36 9 °C)  HR:  [68-86] 68  Resp:  [0-20] 20  BP: ()/(50-62) 105/54  SpO2:  [94 %-97 %] 96 %  Body mass index is 30 07 kg/m²  Input and Output Summary (last 24 hours):      Intake/Output Summary (Last 24 hours) at 2022 1151  Last data filed at 11/24/2022 0900  Gross per 24 hour   Intake 240 ml   Output 550 ml   Net -310 ml       Physical Exam:   Physical Exam  Vitals and nursing note reviewed  Constitutional:       General: He is awake  Appearance: Normal appearance  Cardiovascular:      Rate and Rhythm: Normal rate and regular rhythm  Pulmonary:      Effort: Pulmonary effort is normal       Breath sounds: Normal breath sounds  Abdominal:      Palpations: Abdomen is soft  Tenderness: There is abdominal tenderness in the right upper quadrant and epigastric area  There is guarding  There is no rebound  Skin:     General: Skin is warm and dry  Neurological:      General: No focal deficit present  Mental Status: He is alert and oriented to person, place, and time  Psychiatric:         Attention and Perception: Attention normal          Mood and Affect: Mood normal          Speech: Speech normal          Behavior: Behavior is cooperative  Additional Data:     Labs:  Results from last 7 days   Lab Units 11/24/22 0445 11/22/22 0618 11/21/22 2144   WBC Thousand/uL 4 47   < > 6 73   HEMOGLOBIN g/dL 10 1*   < > 12 1   HEMATOCRIT % 30 9*   < > 37 6   PLATELETS Thousands/uL 53*   < > 59*   NEUTROS PCT %  --   --  77*   LYMPHS PCT %  --   --  13*   MONOS PCT %  --   --  5   EOS PCT %  --   --  5    < > = values in this interval not displayed       Results from last 7 days   Lab Units 11/24/22 0445   SODIUM mmol/L 135   POTASSIUM mmol/L 3 3*   CHLORIDE mmol/L 104   CO2 mmol/L 28   BUN mg/dL 11   CREATININE mg/dL 0 84   ANION GAP mmol/L 3*   CALCIUM mg/dL 8 2*   ALBUMIN g/dL 2 7*   TOTAL BILIRUBIN mg/dL 2 16*   ALK PHOS U/L 150*   ALT U/L 45   AST U/L 53*   GLUCOSE RANDOM mg/dL 117     Results from last 7 days   Lab Units 11/23/22  0446   INR  1 53*         Results from last 7 days   Lab Units 11/21/22  2144   HEMOGLOBIN A1C % 5 9*     Results from last 7 days   Lab Units 11/21/22  2143   LACTIC ACID mmol/L 1 8 Lines/Drains:  Invasive Devices     Peripheral Intravenous Line  Duration           Peripheral IV 11/21/22 Proximal;Right;Ventral (anterior) Forearm 2 days                      Imaging: No pertinent imaging reviewed  Recent Cultures (last 7 days):   Results from last 7 days   Lab Units 11/22/22  1757 11/22/22  1545   GRAM STAIN RESULT   --  2+ Polys  No bacteria seen   BODY FLUID CULTURE, STERILE   --  No growth   C DIFF TOXIN B BY PCR  Positive*  --        Last 24 Hours Medication List:   Current Facility-Administered Medications   Medication Dose Route Frequency Provider Last Rate   • acetaminophen  650 mg Oral Q8H PRN [facility] Chung, DO     • diphenhydrAMINE  25 mg Oral Q6H PRN [facility] Chung, DO     • HYDROmorphone  0 5 mg Intravenous Q3H PRN [facility], DO     • nicotine  7 mg Transdermal Daily Jenny Dunham PA-C     • ondansetron  4 mg Intravenous Q6H PRN Jenny Dunham PA-C     • pantoprazole  40 mg Oral [facility] Chung, DO     • rOPINIRole  4 mg Oral 4x Daily Jenny Dunham PA-C     • vancomycin  125 mg Oral Q6H [address] Vonda Dorsey PA-C          Today, Patient Was Seen By: Kim Sands PA-C    **Please Note: This note may have been constructed using a voice recognition system  **

## 2022-11-24 NOTE — PROGRESS NOTES
Progress Note- Emilia Rodriguez 58 y o  male MRN: [id number]    Unit/Bed#: -01 Encounter: [id number]      Assessment and Plan:  42-year-old male with decompensated CHRISTIE cirrhosis complicated by ascites, history of colon cancer status post right hemicolectomy and chemotherapy, admitted with ascites and abdominal pain and diarrhea  He was C diff positive by PCR only and CT suggested colitis, therefore he is receiving oral vancomycin  He underwent paracentesis and does not have SBP  Differential for his pain and diarrhea include C diff infection for which he started treatment yesterday, ischemia, other infection (e g  CMV), less likely IBD  1  Continue C diff treatment with oral vancomycin  2  If his abdominal pain and diarrhea do not improve over the next 2 days, he may need colonoscopy with biopsies; he also has mild CEA elevation  3  2 g sodium diet  If blood pressure allowing, would start furosemide 20 mg daily and Aldactone 25 mg daily and follow creatinine, potassium, and blood pressure   ______________________________________________________________________    Subjective:  Complaining of ongoing diarrhea with over 15 watery bowel movements per day, abdominal pain, some nausea      Medication Administration - last 24 hours from 11/23/2022 1356 to 11/24/2022 1356       Date/Time Order Dose Route Action Action by     11/24/2022 1207 EST rOPINIRole (REQUIP) tablet 4 mg 4 mg Oral Given Wayne Narvaez RN     11/24/2022 8860 EST rOPINIRole (REQUIP) tablet 4 mg 4 mg Oral Given Wayne Narvaez, KRISTINA     11/23/2022 2326 EST rOPINIRole (REQUIP) tablet 4 mg 2 mg Oral Given Ciro Medrano, KRISTINA     11/23/2022 1730 EST rOPINIRole (REQUIP) tablet 4 mg 4 mg Oral Refused Daren Lipoma, RN     11/24/2022 1214 EST ondansetron (ZOFRAN) injection 4 mg 4 mg Intravenous Given Wayne Narvaez RN     11/23/2022 2145 EST ondansetron (ZOFRAN) injection 4 mg 4 mg Intravenous Given Ciro Medrano RN     11/24/2022 6525 EST nicotine (NICODERM CQ) 7 mg/24hr TD 24 hr patch 7 mg 7 mg Transdermal Not Given Destiny Grey, KRISTINA     11/24/2022 1228 EST HYDROmorphone (DILAUDID) injection 0 5 mg 0 5 mg Intravenous Given Destiny Grey, KRISTINA     11/24/2022 8806 EST HYDROmorphone (DILAUDID) injection 0 5 mg 0 5 mg Intravenous Given Destiny Grey, KRISTINA     11/24/2022 0124 EST HYDROmorphone (DILAUDID) injection 0 5 mg 0 5 mg Intravenous Given Madelin Obrien RN     11/23/2022 2147 EST HYDROmorphone (DILAUDID) injection 0 5 mg 0 5 mg Intravenous Given Dory Wong, KRISTINA     11/23/2022 1553 EST HYDROmorphone (DILAUDID) injection 0 5 mg 0 5 mg Intravenous Given Artem Scott RN     11/24/2022 0501 EST pantoprazole (PROTONIX) EC tablet 40 mg 40 mg Oral Given Dory Wong RN     11/24/2022 1207 EST vancomycin (VANCOCIN) capsule 125 mg 125 mg Oral Given Destiny Grey RN     11/24/2022 0501 EST vancomycin (VANCOCIN) capsule 125 mg 125 mg Oral Given Dory Wong RN     11/23/2022 2330 EST vancomycin (VANCOCIN) capsule 125 mg 125 mg Oral Given Dory Wong RN     11/23/2022 1730 EST vancomycin (VANCOCIN) capsule 125 mg 125 mg Oral Given Artem Scott RN     11/24/2022 2549 EST potassium chloride (K-DUR,KLOR-CON) CR tablet 20 mEq 20 mEq Oral Given Destiny Grey RN          Objective:     Vitals: Blood pressure 105/54, pulse 68, temperature 98 5 °F (36 9 °C), resp  rate 20, height 5' 7" (1 702 m), weight 87 1 kg (192 lb 0 3 oz), SpO2 96 %  ,Body mass index is 30 07 kg/m²  Intake/Output Summary (Last 24 hours) at 11/24/2022 1356  Last data filed at 11/24/2022 0900  Gross per 24 hour   Intake 240 ml   Output 550 ml   Net -310 ml       Physical Exam:   General Appearance: Awake and alert, in no acute distress  Abdomen: Soft, diffusely tender to palpation mostly in the right upper and lower quadrants; nondistended      Invasive Devices     Peripheral Intravenous Line  Duration           Peripheral IV 11/21/22 Proximal;Right;Ventral (anterior) Forearm 2 days                Lab Results:  No results displayed because visit has over 200 results  Imaging Studies: I have personally reviewed pertinent imaging studies

## 2022-11-24 NOTE — PROGRESS NOTES
Antonia 45  Progress Note Sky Pac 1960, 58 y o  male MRN: [id number]  Unit/Bed#: -01 Encounter: [id number]  Primary Care Provider: No primary care provider on file  Date and time admitted to hospital: 11/21/2022  9:10 PM    Acute abdominal pain  Assessment & Plan  59 yo M with PMH cirrhosis and reported stage IV colon CA presenting with acute abdominal pain now HD#4 and POD#2 s/p IR paracentesis  Pain comes and goes, on right side, ongoing diarrhea  Abdomen TTP with guarding in RUQ  BP 81/60 this AM, repeat pending  CBC/CMP stable  Assessment:  Acute abdominal pain, ddx including c diff colitis, SBP, cholecystitis/cholangitis  Thickening of colon, cannot rule out recurrent CA, will need OP colonoscopy, CEA 5 7  Plan:  Remains very TTP with guarding to the RUQ  Will request RUQ US, tomorrow if otherwise stable, NPO midnight for US  Oral vanco per GI, consider IVF crystalloid vs colloid for hypotension and ongoing diarrhea  Added banatrol flakes TID  Subjective/Objective   Chief Complaint: pain    Subjective: ongoing pain, comes and goes, right side, stabbing, ongoing diarrhea, every 10 min, reports BM x 15 overnight, poor appetite, but no vomiting    Objective: diarrhea overnight    Blood pressure 101/57, pulse 74, temperature 98 5 °F (36 9 °C), resp  rate 20, height 5' 7" (1 702 m), weight 87 1 kg (192 lb 0 3 oz), SpO2 97 %  ,Body mass index is 30 07 kg/m²  Intake/Output Summary (Last 24 hours) at 11/24/2022 8815  Last data filed at 11/23/2022 1700  Gross per 24 hour   Intake 240 ml   Output --   Net 240 ml       Invasive Devices     Peripheral Intravenous Line  Duration           Peripheral IV 11/21/22 Proximal;Right;Ventral (anterior) Forearm 2 days                Physical Exam  Vitals and nursing note reviewed  Constitutional:       General: He is in acute distress  Appearance: He is well-developed and well-nourished   He is not diaphoretic  Comments: Appears uncomfortable 2/2 pain, fatigue from poor sleep and frequent diarrhea  HENT:      Head: Normocephalic and atraumatic  Eyes:      Extraocular Movements: EOM normal       Conjunctiva/sclera: Conjunctivae normal       Pupils: Pupils are equal, round, and reactive to light  Pulmonary:      Effort: No respiratory distress  Abdominal:      Comments: Soft, non-distended, severe TTP worst in RUQ with guarding  Musculoskeletal:         General: Normal range of motion  Cervical back: Normal range of motion  Skin:     General: Skin is warm and dry  Capillary Refill: Capillary refill takes less than 2 seconds  Neurological:      Mental Status: He is alert and oriented to person, place, and time  Psychiatric:         Mood and Affect: Mood and affect normal          Behavior: Behavior normal            Lab, Imaging and other studies:  I have personally reviewed pertinent lab results    , CBC:   Lab Results   Component Value Date    WBC 4 47 11/24/2022    HGB 10 1 (L) 11/24/2022    HCT 30 9 (L) 11/24/2022     (H) 11/24/2022    PLT 53 (L) 11/24/2022    MCH 34 1 11/24/2022    MCHC 32 7 11/24/2022    RDW 15 8 (H) 11/24/2022    MPV 11 9 11/24/2022   , CMP:   Lab Results   Component Value Date    SODIUM 135 11/24/2022    K 3 3 (L) 11/24/2022     11/24/2022    CO2 28 11/24/2022    BUN 11 11/24/2022    CREATININE 0 84 11/24/2022    CALCIUM 8 2 (L) 11/24/2022    AST 53 (H) 11/24/2022    ALT 45 11/24/2022    ALKPHOS 150 (H) 11/24/2022    EGFR 93 11/24/2022     VTE Pharmacologic Prophylaxis: Heparin  VTE Mechanical Prophylaxis: sequential compression device

## 2022-11-24 NOTE — ASSESSMENT & PLAN NOTE
· Notes diarrhea with intermittent hematochezia for several months  · Differential includes inflammatory, ischemic, and possible malignant recurrence  · Laboratory studies demonstrated c diff colonization without active infection however given he is immunocompromised secondary to prior cancer diagnosis and underlying cirrhosis GI recommends treatment     · PO vancomycin started 11/23  · Will need outpatient EGD and Colonoscopy in the very near future  · Trial advanced diet for tolerance  · GI following

## 2022-11-24 NOTE — NURSING NOTE
abd pain controlled with the meds ordered, pt presently in bed in no acute distress sleeping, was oob sitting on the cough earlier

## 2022-11-24 NOTE — PLAN OF CARE
Problem: Potential for Falls  Goal: Patient will remain free of falls  Description: INTERVENTIONS:  - Educate patient/family on patient safety including physical limitations  - Instruct patient to call for assistance with activity   - Consult OT/PT to assist with strengthening/mobility   - Keep Call bell within reach  - Keep bed low and locked with side rails adjusted as appropriate  - Keep care items and personal belongings within reach  - Initiate and maintain comfort rounds  - Make Fall Risk Sign visible to staff  - Offer Toileting every 2 Hours, in advance of need  - Initiate/Maintain bed alarm  - Obtain necessary fall risk management equipment: walker   - Apply yellow socks and bracelet for high fall risk patients  - Consider moving patient to room near nurses station  Outcome: Progressing     Problem: PAIN - ADULT  Goal: Verbalizes/displays adequate comfort level or baseline comfort level  Description: Interventions:  - Encourage patient to monitor pain and request assistance  - Assess pain using appropriate pain scale  - Administer analgesics based on type and severity of pain and evaluate response  - Implement non-pharmacological measures as appropriate and evaluate response  - Consider cultural and social influences on pain and pain management  - Notify physician/advanced practitioner if interventions unsuccessful or patient reports new pain  Outcome: Progressing     Problem: INFECTION - ADULT  Goal: Absence or prevention of progression during hospitalization  Description: INTERVENTIONS:  - Assess and monitor for signs and symptoms of infection  - Monitor lab/diagnostic results  - Monitor all insertion sites, i e  indwelling lines, tubes, and drains  - Monitor endotracheal if appropriate and nasal secretions for changes in amount and color  - [place] appropriate cooling/warming therapies per order  - Administer medications as ordered  - Instruct and encourage patient and family to use good hand hygiene technique  - Identify and instruct in appropriate isolation precautions for identified infection/condition  Outcome: Progressing  Goal: Absence of fever/infection during neutropenic period  Description: INTERVENTIONS:  - Monitor WBC    Outcome: Progressing     Problem: DISCHARGE PLANNING  Goal: Discharge to home or other facility with appropriate resources  Description: INTERVENTIONS:  - Identify barriers to discharge w/patient and caregiver  - Arrange for needed discharge resources and transportation as appropriate  - Identify discharge learning needs (meds, wound care, etc )  - Arrange for interpretive services to assist at discharge as needed  - Refer to Case Management Department for coordinating discharge planning if the patient needs post-hospital services based on physician/advanced practitioner order or complex needs related to functional status, cognitive ability, or social support system  Outcome: Progressing     Problem: Knowledge Deficit  Goal: Patient/family/caregiver demonstrates understanding of disease process, treatment plan, medications, and discharge instructions  Description: Complete learning assessment and assess knowledge base    Interventions:  - Provide teaching at level of understanding  - Provide teaching via preferred learning methods  Outcome: Progressing

## 2022-11-25 ENCOUNTER — APPOINTMENT (INPATIENT)
Dept: ULTRASOUND IMAGING | Facility: HOSPITAL | Age: 62
End: 2022-11-25

## 2022-11-25 LAB
ALBUMIN SERPL BCP-MCNC: 2.7 G/DL (ref 3.5–5)
ALP SERPL-CCNC: 147 U/L (ref 34–104)
ALT SERPL W P-5'-P-CCNC: 39 U/L (ref 7–52)
ANION GAP SERPL CALCULATED.3IONS-SCNC: 5 MMOL/L (ref 4–13)
AST SERPL W P-5'-P-CCNC: 43 U/L (ref 13–39)
ATRIAL RATE: 80 BPM
ATRIAL RATE: 83 BPM
BACTERIA SPEC BFLD CULT: NO GROWTH
BILIRUB SERPL-MCNC: 2.17 MG/DL (ref 0.2–1)
BUN SERPL-MCNC: 10 MG/DL (ref 5–25)
CALCIUM ALBUM COR SERPL-MCNC: 9 MG/DL (ref 8.3–10.1)
CALCIUM SERPL-MCNC: 8 MG/DL (ref 8.4–10.2)
CHLORIDE SERPL-SCNC: 104 MMOL/L (ref 96–108)
CO2 SERPL-SCNC: 27 MMOL/L (ref 21–32)
CREAT SERPL-MCNC: 0.86 MG/DL (ref 0.6–1.3)
ERYTHROCYTE [DISTWIDTH] IN BLOOD BY AUTOMATED COUNT: 15.9 % (ref 11.6–15.1)
GFR SERPL CREATININE-BSD FRML MDRD: 92 ML/MIN/1.73SQ M
GLUCOSE SERPL-MCNC: 110 MG/DL (ref 65–140)
GRAM STN SPEC: NORMAL
GRAM STN SPEC: NORMAL
HCT VFR BLD AUTO: 28.9 % (ref 36.5–49.3)
HGB BLD-MCNC: 9.6 G/DL (ref 12–17)
MCH RBC QN AUTO: 34.5 PG (ref 26.8–34.3)
MCHC RBC AUTO-ENTMCNC: 33.2 G/DL (ref 31.4–37.4)
MCV RBC AUTO: 104 FL (ref 82–98)
P AXIS: 53 DEGREES
P AXIS: 82 DEGREES
PLATELET # BLD AUTO: 52 THOUSANDS/UL (ref 149–390)
PMV BLD AUTO: 13.2 FL (ref 8.9–12.7)
POTASSIUM SERPL-SCNC: 3.6 MMOL/L (ref 3.5–5.3)
PR INTERVAL: 120 MS
PR INTERVAL: 136 MS
PROT SERPL-MCNC: 6.1 G/DL (ref 6.4–8.4)
QRS AXIS: 77 DEGREES
QRS AXIS: 82 DEGREES
QRSD INTERVAL: 74 MS
QRSD INTERVAL: 80 MS
QT INTERVAL: 400 MS
QT INTERVAL: 416 MS
QTC INTERVAL: 470 MS
QTC INTERVAL: 479 MS
RBC # BLD AUTO: 2.78 MILLION/UL (ref 3.88–5.62)
SODIUM SERPL-SCNC: 136 MMOL/L (ref 135–147)
T WAVE AXIS: 41 DEGREES
T WAVE AXIS: 42 DEGREES
VENTRICULAR RATE: 80 BPM
VENTRICULAR RATE: 83 BPM
WBC # BLD AUTO: 4.38 THOUSAND/UL (ref 4.31–10.16)

## 2022-11-25 RX ORDER — OXYCODONE HYDROCHLORIDE 5 MG/1
5 TABLET ORAL EVERY 4 HOURS PRN
Status: DISCONTINUED | OUTPATIENT
Start: 2022-11-25 | End: 2022-11-26

## 2022-11-25 RX ORDER — SIMETHICONE 20 MG/.3ML
40 EMULSION ORAL EVERY 6 HOURS PRN
Status: DISCONTINUED | OUTPATIENT
Start: 2022-11-25 | End: 2022-12-01 | Stop reason: HOSPADM

## 2022-11-25 RX ADMIN — ROPINIROLE 4 MG: 2 TABLET, FILM COATED ORAL at 17:24

## 2022-11-25 RX ADMIN — SIMETHICONE 40 MG: 20 EMULSION ORAL at 12:14

## 2022-11-25 RX ADMIN — OXYCODONE HYDROCHLORIDE 5 MG: 5 TABLET ORAL at 14:07

## 2022-11-25 RX ADMIN — HYDROMORPHONE HYDROCHLORIDE 0.5 MG: 1 INJECTION, SOLUTION INTRAMUSCULAR; INTRAVENOUS; SUBCUTANEOUS at 21:34

## 2022-11-25 RX ADMIN — ROPINIROLE 4 MG: 2 TABLET, FILM COATED ORAL at 08:31

## 2022-11-25 RX ADMIN — VANCOMYCIN HYDROCHLORIDE 125 MG: 125 CAPSULE ORAL at 17:24

## 2022-11-25 RX ADMIN — VANCOMYCIN HYDROCHLORIDE 125 MG: 125 CAPSULE ORAL at 12:13

## 2022-11-25 RX ADMIN — HYDROMORPHONE HYDROCHLORIDE 0.5 MG: 1 INJECTION, SOLUTION INTRAMUSCULAR; INTRAVENOUS; SUBCUTANEOUS at 01:56

## 2022-11-25 RX ADMIN — HYDROMORPHONE HYDROCHLORIDE 0.5 MG: 1 INJECTION, SOLUTION INTRAMUSCULAR; INTRAVENOUS; SUBCUTANEOUS at 10:19

## 2022-11-25 RX ADMIN — ROPINIROLE 4 MG: 2 TABLET, FILM COATED ORAL at 21:34

## 2022-11-25 RX ADMIN — ONDANSETRON 4 MG: 2 INJECTION INTRAMUSCULAR; INTRAVENOUS at 14:15

## 2022-11-25 RX ADMIN — ROPINIROLE 4 MG: 2 TABLET, FILM COATED ORAL at 12:13

## 2022-11-25 RX ADMIN — PANTOPRAZOLE SODIUM 40 MG: 40 TABLET, DELAYED RELEASE ORAL at 05:24

## 2022-11-25 RX ADMIN — HYDROMORPHONE HYDROCHLORIDE 0.5 MG: 1 INJECTION, SOLUTION INTRAMUSCULAR; INTRAVENOUS; SUBCUTANEOUS at 16:46

## 2022-11-25 RX ADMIN — VANCOMYCIN HYDROCHLORIDE 125 MG: 125 CAPSULE ORAL at 23:39

## 2022-11-25 RX ADMIN — VANCOMYCIN HYDROCHLORIDE 125 MG: 125 CAPSULE ORAL at 05:24

## 2022-11-25 NOTE — PROGRESS NOTES
Antonia 45  Progress Note Brian Nice 1960, 58 y o  male MRN: [id number]  Unit/Bed#: -01 Encounter: [id number]  Primary Care Provider: No primary care provider on file  Date and time admitted to hospital: 11/21/2022  9:10 PM    Acute abdominal pain  Assessment & Plan  59 yo M with PMH cirrhosis and reported stage IV colon CA presenting with acute abdominal pain now HD#5  Ongoing intermittent pain/nausea/diarrhea, more formed BM this morning  Abdomen soft, TTP in RUQ with guarding  AFVSS on room air  WBC 4, Hgb 9, Plt 52    Assessment:  Acute abdominal pain  Transverse colitis  Plan:  Remains TTP with guarding in RUQ  Will request US RUQ today  NPO and hold pain medication pre-test   Vitals/labs otherwise stable  Management per SLIM/GI  Subjective/Objective   Chief Complaint: pain    Subjective: ongoing pain, nausea, diarrhea, worst overnight and slightly improved this morning, semi formed BM this morning with small amount of blood, no vomiting, pain sharp/stabbing on right flank    Objective:     Blood pressure 103/59, pulse 79, temperature 98 4 °F (36 9 °C), temperature source Oral, resp  rate 16, height 5' 7" (1 702 m), weight 87 1 kg (192 lb 0 3 oz), SpO2 96 %  ,Body mass index is 30 07 kg/m²  Intake/Output Summary (Last 24 hours) at 11/25/2022 0801  Last data filed at 11/24/2022 1700  Gross per 24 hour   Intake 0 ml   Output 1050 ml   Net -1050 ml       Invasive Devices     Peripheral Intravenous Line  Duration           Peripheral IV 11/25/22 Left;Ventral (anterior) Forearm <1 day                Physical Exam  Vitals and nursing note reviewed  Constitutional:       General: He is not in acute distress  Appearance: He is well-developed and well-nourished  He is not diaphoretic  HENT:      Head: Normocephalic and atraumatic     Eyes:      Extraocular Movements: EOM normal       Conjunctiva/sclera: Conjunctivae normal       Pupils: Pupils are equal, round, and reactive to light  Pulmonary:      Effort: No respiratory distress  Abdominal:      Comments: Round, soft, TTP in right abdomen with guarding/rigidity  Musculoskeletal:         General: Normal range of motion  Cervical back: Normal range of motion  Skin:     General: Skin is warm and dry  Capillary Refill: Capillary refill takes less than 2 seconds  Neurological:      Mental Status: He is alert and oriented to person, place, and time  Psychiatric:         Mood and Affect: Mood and affect normal          Behavior: Behavior normal            Lab, Imaging and other studies:  I have personally reviewed pertinent lab results    , CBC:   Lab Results   Component Value Date    WBC 4 38 11/25/2022    HGB 9 6 (L) 11/25/2022    HCT 28 9 (L) 11/25/2022     (H) 11/25/2022    PLT 52 (L) 11/25/2022    MCH 34 5 (H) 11/25/2022    MCHC 33 2 11/25/2022    RDW 15 9 (H) 11/25/2022    MPV 13 2 (H) 11/25/2022   , CMP:   Lab Results   Component Value Date    SODIUM 136 11/25/2022    K 3 6 11/25/2022     11/25/2022    CO2 27 11/25/2022    BUN 10 11/25/2022    CREATININE 0 86 11/25/2022    CALCIUM 8 0 (L) 11/25/2022    AST 43 (H) 11/25/2022    ALT 39 11/25/2022    ALKPHOS 147 (H) 11/25/2022    EGFR 92 11/25/2022     VTE Pharmacologic Prophylaxis: Reason for no pharmacologic prophylaxis per primary  VTE Mechanical Prophylaxis: sequential compression device

## 2022-11-25 NOTE — ASSESSMENT & PLAN NOTE
57 yo M with PMH cirrhosis and reported stage IV colon CA presenting with acute abdominal pain now HD#5  Ongoing intermittent pain/nausea/diarrhea, more formed BM this morning  Abdomen soft, TTP in RUQ with guarding  AFVSS on room air  WBC 4, Hgb 9, Plt 52    Assessment:  Acute abdominal pain  Transverse colitis  Plan:  Remains TTP with guarding in RUQ  Will request US RUQ today  NPO and hold pain medication pre-test   Vitals/labs otherwise stable  Management per SLIM/GI

## 2022-11-25 NOTE — PLAN OF CARE
Problem: Potential for Falls  Goal: Patient will remain free of falls  Description: INTERVENTIONS:  - Educate patient/family on patient safety including physical limitations  - Instruct patient to call for assistance with activity   - Consult OT/PT to assist with strengthening/mobility   - Keep Call bell within reach  - Keep bed low and locked with side rails adjusted as appropriate  - Keep care items and personal belongings within reach  - Initiate and maintain comfort rounds  - Make Fall Risk Sign visible to staff  - Offer Toileting every 1 Hours, in advance of need  - Initiate/Maintain bed bedalarm  - Obtain necessary fall risk management equipment: bed  - Apply yellow socks and bracelet for high fall risk patients  - Consider moving patient to room near nurses station  Outcome: Progressing     Problem: PAIN - ADULT  Goal: Verbalizes/displays adequate comfort level or baseline comfort level  Description: Interventions:  - Encourage patient to monitor pain and request assistance  - Assess pain using appropriate pain scale  - Administer analgesics based on type and severity of pain and evaluate response  - Implement non-pharmacological measures as appropriate and evaluate response  - Consider cultural and social influences on pain and pain management  - Notify physician/advanced practitioner if interventions unsuccessful or patient reports new pain  Outcome: Progressing     Problem: INFECTION - ADULT  Goal: Absence or prevention of progression during hospitalization  Description: INTERVENTIONS:  - Assess and monitor for signs and symptoms of infection  - Monitor lab/diagnostic results  - Monitor all insertion sites, i e  indwelling lines, tubes, and drains  - Monitor endotracheal if appropriate and nasal secretions for changes in amount and color  - [place] appropriate cooling/warming therapies per order  - Administer medications as ordered  - Instruct and encourage patient and family to use good hand hygiene technique  - Identify and instruct in appropriate isolation precautions for identified infection/condition  Outcome: Progressing  Goal: Absence of fever/infection during neutropenic period  Description: INTERVENTIONS:  - Monitor WBC    Outcome: Progressing     Problem: DISCHARGE PLANNING  Goal: Discharge to home or other facility with appropriate resources  Description: INTERVENTIONS:  - Identify barriers to discharge w/patient and caregiver  - Arrange for needed discharge resources and transportation as appropriate  - Identify discharge learning needs (meds, wound care, etc )  - Arrange for interpretive services to assist at discharge as needed  - Refer to Case Management Department for coordinating discharge planning if the patient needs post-hospital services based on physician/advanced practitioner order or complex needs related to functional status, cognitive ability, or social support system  Outcome: Progressing     Problem: Knowledge Deficit  Goal: Patient/family/caregiver demonstrates understanding of disease process, treatment plan, medications, and discharge instructions  Description: Complete learning assessment and assess knowledge base    Interventions:  - Provide teaching at level of understanding  - Provide teaching via preferred learning methods  Outcome: Progressing

## 2022-11-25 NOTE — PROGRESS NOTES
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  · Presented with abdominal pain, ascites, anemia, and thrombocytopenia  · CT abd/pelvis (11/21): Findings suggesting colitis  Colonic bowel wall thickening is most pronounced at the site of surgical sutures in the region of the mid transverse colon  Local recurrence should be excluded  Small bowel wall thickening may be due to enteritis, however, could be related to ascites and edema  There is no bowel obstruction  There is colonic diverticulosis without definite evidence of acute diverticulitis  The liver demonstrates cirrhotic morphology  There are findings of portal hypertension, including splenomegaly, ascites, varices  · S/p Paracentesis with 2 2L removed; no evidence of SBP  · Will not begin Lasix/Aldactone at this time due to relatively low blood pressure  · GI following; will need further outpatient follow-up with Hepatology   · General surgery following- RUQ ultrasound pending read    Colitis  Assessment & Plan  · Notes diarrhea with intermittent hematochezia for several months  · Differential includes inflammatory, ischemic, and possible malignant recurrence  · Laboratory studies demonstrated c diff colonization without active infection however given he is immunocompromised secondary to prior cancer diagnosis and underlying cirrhosis GI recommends treatment     · PO vancomycin started 11/23  · Will need outpatient EGD and Colonoscopy in the very near future  · Patient tolerating regular diet  · Diarrhea improving  · GI following      Pericardial effusion  Assessment & Plan  · Noted on CT abd/pelvis ()  · TTE (): LVEF 60%  Systolic function is normal  Wall motion is normal  There is a trivial pericardial effusion  There is no echocardiographic evidence of tamponade  · Continue to monitor      Anemia  Assessment & Plan  · Hgb was 12 at the time of admission and dropped to 9 7 the following AM  · Hgb stable at 9 6 this AM   · Continue Protonix 40mg daily  · Continue to monitor   · Outpatient bi-directional endoscopies recommended as above    Thrombocytopenia (HCC)  Assessment & Plan  · Likely related to Cirrhosis as above  · Plts: 52  · Continue to monitor         VTE Pharmacologic Prophylaxis: VTE Score: 5 High Risk (Score >/= 5) - Pharmacological DVT Prophylaxis Contraindicated  Sequential Compression Devices Ordered  Patient Centered Rounds: I performed bedside rounds with nursing staff today  Discussions with Specialists or Other Care Team Provider: nursing, CM, GI    Education and Discussions with Family / Patient: patient  Time Spent for Care: 20 minutes  More than 50% of total time spent on counseling and coordination of care as described above  Current Length of Stay: 4 day(s)  Current Patient Status: Inpatient   Certification Statement: The patient will continue to require additional inpatient hospital stay due to continued workup of abd pain  Discharge Plan: Anticipate discharge in 24-48 hrs to home  Code Status: Level 1 - Full Code    Subjective: The patient was seen and examined  The patient states his diarrhea is improving  He continues to have abdominal pain  Objective:     Vitals:   Temp (24hrs), Av 2 °F (36 8 °C), Min:97 8 °F (36 6 °C), Max:98 4 °F (36 9 °C)    Temp:  [97 8 °F (36 6 °C)-98 4 °F (36 9 °C)] 98 4 °F (36 9 °C)  HR:  [76-84] 79  Resp:  [16-20] 16  BP: (103-120)/(58-69) 103/59  SpO2:  [94 %-96 %] 96 %  Body mass index is 30 07 kg/m²  Input and Output Summary (last 24 hours):      Intake/Output Summary (Last 24 hours) at 2022 1204  Last data filed at 11/25/2022 0706  Gross per 24 hour   Intake 0 ml   Output 800 ml   Net -800 ml       Physical Exam:   Physical Exam  Vitals and nursing note reviewed  Constitutional:       General: He is awake  Appearance: Normal appearance  Cardiovascular:      Rate and Rhythm: Normal rate and regular rhythm  Pulmonary:      Effort: Pulmonary effort is normal       Breath sounds: Normal breath sounds  Abdominal:      Palpations: Abdomen is soft  Tenderness: There is abdominal tenderness in the right upper quadrant  There is guarding  There is no rebound  Skin:     General: Skin is warm and dry  Neurological:      General: No focal deficit present  Mental Status: He is alert and oriented to person, place, and time  Psychiatric:         Attention and Perception: Attention normal          Mood and Affect: Mood normal          Speech: Speech normal          Behavior: Behavior is cooperative  Additional Data:     Labs:  Results from last 7 days   Lab Units 11/25/22 0437 11/22/22 0618 11/21/22 2144   WBC Thousand/uL 4 38   < > 6 73   HEMOGLOBIN g/dL 9 6*   < > 12 1   HEMATOCRIT % 28 9*   < > 37 6   PLATELETS Thousands/uL 52*   < > 59*   NEUTROS PCT %  --   --  77*   LYMPHS PCT %  --   --  13*   MONOS PCT %  --   --  5   EOS PCT %  --   --  5    < > = values in this interval not displayed       Results from last 7 days   Lab Units 11/25/22 0437   SODIUM mmol/L 136   POTASSIUM mmol/L 3 6   CHLORIDE mmol/L 104   CO2 mmol/L 27   BUN mg/dL 10   CREATININE mg/dL 0 86   ANION GAP mmol/L 5   CALCIUM mg/dL 8 0*   ALBUMIN g/dL 2 7*   TOTAL BILIRUBIN mg/dL 2 17*   ALK PHOS U/L 147*   ALT U/L 39   AST U/L 43*   GLUCOSE RANDOM mg/dL 110     Results from last 7 days   Lab Units 11/23/22  0446   INR  1 53*         Results from last 7 days   Lab Units 11/21/22  2144   HEMOGLOBIN A1C % 5 9*     Results from last 7 days   Lab Units 11/21/22  2143   LACTIC ACID mmol/L 1 8       Lines/Drains:  Invasive Devices     Peripheral Intravenous Line  Duration           Peripheral IV 11/25/22 Left;Ventral (anterior) Forearm <1 day                      Imaging: No pertinent imaging reviewed  Recent Cultures (last 7 days):   Results from last 7 days   Lab Units 11/22/22  1757 11/22/22  1545   GRAM STAIN RESULT   --  2+ Polys  No bacteria seen   BODY FLUID CULTURE, STERILE   --  No growth   C DIFF TOXIN B BY PCR  Positive*  --        Last 24 Hours Medication List:   Current Facility-Administered Medications   Medication Dose Route Frequency Provider Last Rate   • acetaminophen  650 mg Oral Q8H PRN [facility] Chung, DO     • diphenhydrAMINE  25 mg Oral Q6H PRN [facility] Chung, DO     • HYDROmorphone  0 5 mg Intravenous Q3H PRN [facility], DO     • nicotine  7 mg Transdermal Daily Jeramy Root PA-C     • ondansetron  4 mg Intravenous Q6H PRN Jeramy Root PA-C     • pantoprazole  40 mg Oral Early Morning [facility] Chung, DO     • rOPINIRole  4 mg Oral 4x Daily Jeramy Root PA-C     • simethicone  40 mg Oral Q6H PRN Chelsy Plascencia PA-C     • vancomycin  125 mg Oral Q6H [address] Kristina Miguel PA-C          Today, Patient Was Seen By: Chelsy Plascencia PA-C    **Please Note: This note may have been constructed using a voice recognition system  **

## 2022-11-25 NOTE — PROGRESS NOTES
Progress Note- Amy Hewitt 58 y o  male MRN: [id number]    Unit/Bed#: -01 Encounter: [id number]      Assessment and Plan:    Patient is a 60-year-old male with PMH significant for cirrhosis secondary to CHRISTIE d/b ascites, CRC s/p right hemicolectomy and adjuvant chemotherapy and DM2 admitted on 11/22 for abdominal pain and chronic diarrhea with CT notable for cirrhotic morphology with pHTN and findings suggestive of a nonspecific colitis with possible enteritis  Patient s/p paracentesis with 2 5 L removed and fluid analysis negative for SBP  C diff stool testing consistent with colonization  1  Abnormal findings on imaging  2  History of CRC s/p right hemicolectomy  3  Chronic diarrhea with C diff colonization  Patient with chronic diarrhea x?months and is currently receiving oral vancomycin given symptomatology, immunocompromised state and colitis on imaging in the setting of C diff colonization  Patient with improved stool consistency and decreased frequency  However, differential also includes ischemic colitis, other infectious colitis, less likely inflammatory colitis, and also tumor recurrence at the anastomosis given history of CRC and mildly elevated CEA (5 7)  - Continue oral vancomycin x10 days total  - Monitor fever curve, WBC count, abdominal exams closely  - Monitor stool output (consistency and frequency)  - Will arrange for close outpatient GI and hepatology follow-up, as well as, expedited EGD/colonoscopy given history of CRC s/p right hemicolectomy and elevated CEA (5 7)  4  Cirrhosis secondary to CHRISTIE d/b ascites  5  Portal hypertension  6  Thrombocytopenia  Patient with cirrhosis likely secondary to CHRISTIE d/b ascites  Patient with poor insight regarding his liver disease  Current MELD-Na (15) and [facility] 9B    Ascites - Patient with ascites s/p LVP with fluid analysis negative for SBP and high SAAG/low protein suggestive of ascite from pHTN; Hold off on oral diuresis given soft pressures and questionable history regarding the necessity of routine paracentesis for ascites, will continue to monitor clinically; Low-sodium diet (less than 2 g daily)  Esophageal varices - No previous EGD available for review; Recommended outpatient EGD for variceal screening  Hepatic encephalopathy - No history or evidence of HE on exam   [facility] [address]  screening - Consider RUQ U/S and AFP prior to discharge  GI will continue to follow  ______________________________________________________________________    Subjective:     Patient found sitting comfortably in his bed  No acute overnight events  Patient expresses frustration regarding dietary restrictions  Reports improvement of abdominal pain and frequency/consistency of stools, although continues to have mild right-sided abdominal discomfort for which RUQ U/S was ordered per surgical team  Denies fever/chills, nausea/vomiting, or noticing blood in his stool/black tarry stools  Denies any additional GI related complaints        Medication Administration - last 24 hours from 11/24/2022 0848 to 11/25/2022 0848       Date/Time Order Dose Route Action Action by     11/25/2022 0831 EST rOPINIRole (REQUIP) tablet 4 mg 4 mg Oral Given Wendelin Rings, RN     11/24/2022 2101 EST rOPINIRole (REQUIP) tablet 4 mg 4 mg Oral Given Sammy Molina, KRISTINA     11/24/2022 1737 EST rOPINIRole (REQUIP) tablet 4 mg 4 mg Oral Given Wendelin Rings, RN     11/24/2022 1207 EST rOPINIRole (REQUIP) tablet 4 mg 4 mg Oral Given Wendelin Rings, RN     11/24/2022 1214 EST ondansetron (ZOFRAN) injection 4 mg 4 mg Intravenous Given Wendelin Cullen, RN     11/25/2022 0831 EST nicotine (NICODERM CQ) 7 mg/24hr TD 24 hr patch 7 mg 7 mg Transdermal Not Given Wendelin Rings, RN     11/25/2022 0156 EST HYDROmorphone (DILAUDID) injection 0 5 mg 0 5 mg Intravenous Given Sammy Molina, RN     11/24/2022 2101 EST HYDROmorphone (DILAUDID) injection 0 5 mg 0 5 mg Intravenous Given Sammy Molina RN 11/24/2022 1756 EST HYDROmorphone (DILAUDID) injection 0 5 mg 0 5 mg Intravenous Given Carolyn Concepcion RN     11/24/2022 1228 EST HYDROmorphone (DILAUDID) injection 0 5 mg 0 5 mg Intravenous Given Carolyn Concepcion RN     11/25/2022 0524 EST pantoprazole (PROTONIX) EC tablet 40 mg 40 mg Oral Given Otoniel Leblanc, RN     11/25/2022 0524 EST vancomycin (VANCOCIN) capsule 125 mg 125 mg Oral Given Otoniel Deana, RN     11/24/2022 2331 EST vancomycin (VANCOCIN) capsule 125 mg 125 mg Oral Given Otoniel Deana, RN     11/24/2022 1737 EST vancomycin (VANCOCIN) capsule 125 mg 125 mg Oral Given Carolyn Jamey, KRISTINA     11/24/2022 1207 EST vancomycin (VANCOCIN) capsule 125 mg 125 mg Oral Given Carolny Concepcion, KRISTINA     11/24/2022 1451 EST HYDROmorphone (DILAUDID) injection 0 5 mg 0 5 mg Intravenous Given Carolyn Concepcion RN          Objective:     Vitals: Blood pressure 103/59, pulse 79, temperature 98 4 °F (36 9 °C), temperature source Oral, resp  rate 16, height 5' 7" (1 702 m), weight 87 1 kg (192 lb 0 3 oz), SpO2 96 %  ,Body mass index is 30 07 kg/m²  Intake/Output Summary (Last 24 hours) at 11/25/2022 0848  Last data filed at 11/24/2022 1700  Gross per 24 hour   Intake 0 ml   Output 1050 ml   Net -1050 ml       Physical Exam:   General Appearance: Awake and alert, in no acute distress  Abdomen: Soft with +mild abd distention and generalized TTP without guarding rebound or rigidity; bowel sounds normal; no masses or no organomegaly    Invasive Devices     Peripheral Intravenous Line  Duration           Peripheral IV 11/25/22 Left;Ventral (anterior) Forearm <1 day                Lab Results:  No results displayed because visit has over 200 results  Imaging Studies: I have personally reviewed pertinent imaging studies

## 2022-11-25 NOTE — ASSESSMENT & PLAN NOTE
· Hgb was 12 at the time of admission and dropped to 9 7 the following AM  · Hgb stable at 9 6 this AM   · Continue Protonix 40mg daily  · Continue to monitor   · Outpatient bi-directional endoscopies recommended as above

## 2022-11-25 NOTE — ASSESSMENT & PLAN NOTE
· Notes diarrhea with intermittent hematochezia for several months  · Differential includes inflammatory, ischemic, and possible malignant recurrence  · Laboratory studies demonstrated c diff colonization without active infection however given he is immunocompromised secondary to prior cancer diagnosis and underlying cirrhosis GI recommends treatment     · PO vancomycin started 11/23  · Will need outpatient EGD and Colonoscopy in the very near future  · Patient tolerating regular diet  · Diarrhea improving  · GI following

## 2022-11-25 NOTE — ASSESSMENT & PLAN NOTE
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  · Presented with abdominal pain, ascites, anemia, and thrombocytopenia  · CT abd/pelvis (11/21): Findings suggesting colitis  Colonic bowel wall thickening is most pronounced at the site of surgical sutures in the region of the mid transverse colon  Local recurrence should be excluded  Small bowel wall thickening may be due to enteritis, however, could be related to ascites and edema  There is no bowel obstruction  There is colonic diverticulosis without definite evidence of acute diverticulitis  The liver demonstrates cirrhotic morphology  There are findings of portal hypertension, including splenomegaly, ascites, varices     · S/p Paracentesis with 2 2L removed; no evidence of SBP  · Will not begin Lasix/Aldactone at this time due to relatively low blood pressure  · GI following; will need further outpatient follow-up with Hepatology   · General surgery following- RUQ ultrasound pending read

## 2022-11-25 NOTE — PLAN OF CARE
Problem: PAIN - ADULT  Goal: Verbalizes/displays adequate comfort level or baseline comfort level  Description: Interventions:  - Encourage patient to monitor pain and request assistance  - Assess pain using appropriate pain scale  - Administer analgesics based on type and severity of pain and evaluate response  - Implement non-pharmacological measures as appropriate and evaluate response  - Consider cultural and social influences on pain and pain management  - Notify physician/advanced practitioner if interventions unsuccessful or patient reports new pain  Outcome: Progressing    Problem: INFECTION - ADULT  Goal: Absence or prevention of progression during hospitalization  Description: INTERVENTIONS:  - Assess and monitor for signs and symptoms of infection  - Monitor lab/diagnostic results  - Monitor all insertion sites, i e  indwelling lines, tubes, and drains  - Monitor endotracheal if appropriate and nasal secretions for changes in amount and color  - Williamson appropriate cooling/warming therapies per order  - Administer medications as ordered  - Instruct and encourage patient and family to use good hand hygiene technique  - Identify and instruct in appropriate isolation precautions for identified infection/condition  Outcome: Progressing     Problem: DISCHARGE PLANNING  Goal: Discharge to home or other facility with appropriate resources  Description: INTERVENTIONS:  - Identify barriers to discharge w/patient and caregiver  - Arrange for needed discharge resources and transportation as appropriate  - Identify discharge learning needs (meds, wound care, etc )  - Arrange for interpretive services to assist at discharge as needed  - Refer to Case Management Department for coordinating discharge planning if the patient needs post-hospital services based on physician/advanced practitioner order or complex needs related to functional status, cognitive ability, or social support system  Outcome: Progressing

## 2022-11-26 LAB
ALBUMIN SERPL BCP-MCNC: 2.7 G/DL (ref 3.5–5)
ALP SERPL-CCNC: 144 U/L (ref 34–104)
ALT SERPL W P-5'-P-CCNC: 38 U/L (ref 7–52)
ANION GAP SERPL CALCULATED.3IONS-SCNC: 4 MMOL/L (ref 4–13)
AST SERPL W P-5'-P-CCNC: 40 U/L (ref 13–39)
BILIRUB SERPL-MCNC: 2.31 MG/DL (ref 0.2–1)
BUN SERPL-MCNC: 10 MG/DL (ref 5–25)
CALCIUM ALBUM COR SERPL-MCNC: 9.2 MG/DL (ref 8.3–10.1)
CALCIUM SERPL-MCNC: 8.2 MG/DL (ref 8.4–10.2)
CHLORIDE SERPL-SCNC: 102 MMOL/L (ref 96–108)
CO2 SERPL-SCNC: 30 MMOL/L (ref 21–32)
CREAT SERPL-MCNC: 0.9 MG/DL (ref 0.6–1.3)
ERYTHROCYTE [DISTWIDTH] IN BLOOD BY AUTOMATED COUNT: 16 % (ref 11.6–15.1)
GFR SERPL CREATININE-BSD FRML MDRD: 91 ML/MIN/1.73SQ M
GLUCOSE SERPL-MCNC: 116 MG/DL (ref 65–140)
HCT VFR BLD AUTO: 30.4 % (ref 36.5–49.3)
HGB BLD-MCNC: 10.1 G/DL (ref 12–17)
MCH RBC QN AUTO: 35.1 PG (ref 26.8–34.3)
MCHC RBC AUTO-ENTMCNC: 33.2 G/DL (ref 31.4–37.4)
MCV RBC AUTO: 106 FL (ref 82–98)
PLATELET # BLD AUTO: 52 THOUSANDS/UL (ref 149–390)
PMV BLD AUTO: 11.9 FL (ref 8.9–12.7)
POTASSIUM SERPL-SCNC: 3.6 MMOL/L (ref 3.5–5.3)
PROT SERPL-MCNC: 6.2 G/DL (ref 6.4–8.4)
RBC # BLD AUTO: 2.88 MILLION/UL (ref 3.88–5.62)
SODIUM SERPL-SCNC: 136 MMOL/L (ref 135–147)
WBC # BLD AUTO: 4.07 THOUSAND/UL (ref 4.31–10.16)

## 2022-11-26 RX ORDER — OXYCODONE HYDROCHLORIDE 10 MG/1
10 TABLET ORAL EVERY 6 HOURS PRN
Status: DISCONTINUED | OUTPATIENT
Start: 2022-11-26 | End: 2022-12-01 | Stop reason: HOSPADM

## 2022-11-26 RX ORDER — HYDROMORPHONE HCL IN WATER/PF 6 MG/30 ML
0.2 PATIENT CONTROLLED ANALGESIA SYRINGE INTRAVENOUS
Status: DISCONTINUED | OUTPATIENT
Start: 2022-11-26 | End: 2022-12-01 | Stop reason: HOSPADM

## 2022-11-26 RX ORDER — SPIRONOLACTONE 25 MG/1
25 TABLET ORAL DAILY
Status: DISCONTINUED | OUTPATIENT
Start: 2022-11-26 | End: 2022-12-01 | Stop reason: HOSPADM

## 2022-11-26 RX ORDER — OXYCODONE HYDROCHLORIDE 5 MG/1
5 TABLET ORAL EVERY 6 HOURS PRN
Status: DISCONTINUED | OUTPATIENT
Start: 2022-11-26 | End: 2022-12-01 | Stop reason: HOSPADM

## 2022-11-26 RX ORDER — FUROSEMIDE 20 MG/1
20 TABLET ORAL DAILY
Status: DISCONTINUED | OUTPATIENT
Start: 2022-11-26 | End: 2022-12-01 | Stop reason: HOSPADM

## 2022-11-26 RX ADMIN — VANCOMYCIN HYDROCHLORIDE 125 MG: 125 CAPSULE ORAL at 12:00

## 2022-11-26 RX ADMIN — DIPHENHYDRAMINE HYDROCHLORIDE 25 MG: 25 TABLET ORAL at 03:23

## 2022-11-26 RX ADMIN — VANCOMYCIN HYDROCHLORIDE 125 MG: 125 CAPSULE ORAL at 05:43

## 2022-11-26 RX ADMIN — HYDROMORPHONE HYDROCHLORIDE 0.2 MG: 0.2 INJECTION, SOLUTION INTRAMUSCULAR; INTRAVENOUS; SUBCUTANEOUS at 14:03

## 2022-11-26 RX ADMIN — FUROSEMIDE 20 MG: 20 TABLET ORAL at 14:03

## 2022-11-26 RX ADMIN — PANTOPRAZOLE SODIUM 40 MG: 40 TABLET, DELAYED RELEASE ORAL at 05:43

## 2022-11-26 RX ADMIN — OXYCODONE HYDROCHLORIDE 5 MG: 5 TABLET ORAL at 09:46

## 2022-11-26 RX ADMIN — VANCOMYCIN HYDROCHLORIDE 125 MG: 125 CAPSULE ORAL at 17:12

## 2022-11-26 RX ADMIN — HYDROMORPHONE HYDROCHLORIDE 0.2 MG: 0.2 INJECTION, SOLUTION INTRAMUSCULAR; INTRAVENOUS; SUBCUTANEOUS at 20:05

## 2022-11-26 RX ADMIN — SPIRONOLACTONE 25 MG: 25 TABLET ORAL at 14:03

## 2022-11-26 RX ADMIN — ROPINIROLE 4 MG: 2 TABLET, FILM COATED ORAL at 09:41

## 2022-11-26 RX ADMIN — ROPINIROLE 4 MG: 2 TABLET, FILM COATED ORAL at 21:36

## 2022-11-26 RX ADMIN — ROPINIROLE 4 MG: 2 TABLET, FILM COATED ORAL at 11:59

## 2022-11-26 RX ADMIN — HYDROMORPHONE HYDROCHLORIDE 0.5 MG: 1 INJECTION, SOLUTION INTRAMUSCULAR; INTRAVENOUS; SUBCUTANEOUS at 01:07

## 2022-11-26 RX ADMIN — VANCOMYCIN HYDROCHLORIDE 125 MG: 125 CAPSULE ORAL at 23:05

## 2022-11-26 RX ADMIN — ROPINIROLE 4 MG: 2 TABLET, FILM COATED ORAL at 17:12

## 2022-11-26 RX ADMIN — HYDROMORPHONE HYDROCHLORIDE 0.5 MG: 1 INJECTION, SOLUTION INTRAMUSCULAR; INTRAVENOUS; SUBCUTANEOUS at 05:43

## 2022-11-26 RX ADMIN — OXYCODONE HYDROCHLORIDE 10 MG: 10 TABLET ORAL at 17:12

## 2022-11-26 RX ADMIN — HYDROMORPHONE HYDROCHLORIDE 0.2 MG: 0.2 INJECTION, SOLUTION INTRAMUSCULAR; INTRAVENOUS; SUBCUTANEOUS at 23:05

## 2022-11-26 NOTE — ASSESSMENT & PLAN NOTE
· Continued complaint of abdominal discomfort despite paracentesis and initiation of PO Vancomycin  · RUQ US (11/25): Heterogeneous, nodular liver compatible with known cirrhosis   Small perihepatic ascites  Circumferential wall thickening of the gallbladder up to 5 mm, however no other sonographic findings to suggest acute cholecystitis   No biliary ductal dilatation  Right-sided intrarenal calculi with no hydronephrosis  Small right pleural effusion     · General surgery recommending HIDA

## 2022-11-26 NOTE — NURSING NOTE
Patient verbalized we are poisoning hid food and therefore he is not going to eat  Also, told another physician that we are withholding "meat and pain medication" from him

## 2022-11-26 NOTE — ASSESSMENT & PLAN NOTE
· Notes diarrhea with intermittent hematochezia for several months  · Differential includes inflammatory, ischemic, and possible malignant recurrence  · Laboratory studies demonstrated c diff colonization without active infection however given he is immunocompromised secondary to prior cancer diagnosis and underlying cirrhosis GI recommends treatment     · PO vancomycin started (End date: 12/3)  · Will need outpatient EGD and Colonoscopy in the very near future

## 2022-11-26 NOTE — NURSING NOTE
Pt tearful at this time stating that he is to emotional to eat his dinner  Patient also states that he can see his fiance in the parking lot selling drugs  Patient also asking to put his coat on and go outside  This RN explained to patient that he could not go outside  Dr Adama Mack made aware

## 2022-11-26 NOTE — PROGRESS NOTES
[address]  Progress Note Andria Anafocus 1960, 58 y o  male MRN: [id number]  Unit/Bed#: -01 Encounter: [id number]  Primary Care Provider: No primary care provider on file  Date and time admitted to hospital: 11/21/2022  9:10 PM    Abdominal pain  Assessment & Plan  59 yo M with PMH cirrhosis and reported stage IV colon CA presenting with acute abdominal pain now HD#6  Persistent pain, reports improved since admission, but still severe at times  Abdomen TTP in RUQ with guarding  AFVSS, labs stable  Assessment:  Acute abdominal pain  Transverse colitis  Plan:  Exam stable, but quite tender which likely 2/2 colitis and liver disease  HIDA requested (Monday) to r/o cholecystitis or need for perc ciara  Continue supportive measures, patient encouraged to remain in hospital for care  Subjective/Objective   Chief Complaint: pain    Subjective: pain intermittent, worse overnight, still severe at times, associated nausea/vomiting, BMs frequent but more formed, no fever, in contrast to admission reports "significantly better", but not yet controlled, inquiring about leaving the hospital to [facility] then return here, unclear rationale/reasoning, but I did not advise    Objective:     Blood pressure 117/70, pulse 77, temperature 97 8 °F (36 6 °C), resp  rate 19, height 5' 7" (1 702 m), weight 87 1 kg (192 lb 0 3 oz), SpO2 96 %  ,Body mass index is 30 07 kg/m²  Intake/Output Summary (Last 24 hours) at 11/26/2022 1455  Last data filed at 11/26/2022 5355  Gross per 24 hour   Intake 120 ml   Output 550 ml   Net -430 ml       Invasive Devices     Peripheral Intravenous Line  Duration           Peripheral IV 11/25/22 Left;Ventral (anterior) Forearm 1 day                Physical Exam  Vitals and nursing note reviewed  Constitutional:       General: He is not in acute distress  Appearance: He is well-developed and well-nourished   He is not diaphoretic  HENT:      Head: Normocephalic and atraumatic  Eyes:      Extraocular Movements: EOM normal       Conjunctiva/sclera: Conjunctivae normal       Pupils: Pupils are equal, round, and reactive to light  Pulmonary:      Effort: No respiratory distress  Abdominal:      Comments: Very TTP with guarding in RUQ  Musculoskeletal:         General: Normal range of motion  Cervical back: Normal range of motion  Skin:     General: Skin is warm and dry  Capillary Refill: Capillary refill takes less than 2 seconds  Neurological:      Mental Status: He is alert and oriented to person, place, and time  Psychiatric:         Mood and Affect: Mood and affect normal          Behavior: Behavior normal            Lab, Imaging and other studies:  I have personally reviewed pertinent lab results    , CBC:   Lab Results   Component Value Date    WBC 4 07 (L) 11/26/2022    HGB 10 1 (L) 11/26/2022    HCT 30 4 (L) 11/26/2022     (H) 11/26/2022    PLT 52 (L) 11/26/2022    MCH 35 1 (H) 11/26/2022    MCHC 33 2 11/26/2022    RDW 16 0 (H) 11/26/2022    MPV 11 9 11/26/2022   , CMP:   Lab Results   Component Value Date    SODIUM 136 11/26/2022    K 3 6 11/26/2022     11/26/2022    CO2 30 11/26/2022    BUN 10 11/26/2022    CREATININE 0 90 11/26/2022    CALCIUM 8 2 (L) 11/26/2022    AST 40 (H) 11/26/2022    ALT 38 11/26/2022    ALKPHOS 144 (H) 11/26/2022    EGFR 91 11/26/2022     VTE Pharmacologic Prophylaxis: Reason for no pharmacologic prophylaxis per SLIM  VTE Mechanical Prophylaxis: sequential compression device

## 2022-11-26 NOTE — ASSESSMENT & PLAN NOTE
· Hgb was 12 at the time of admission and dropped to 9 7 the following AM  · Hgb remains stable at this time  · Continue Protonix 40mg daily   · Outpatient bi-directional endoscopies recommended as above

## 2022-11-26 NOTE — ASSESSMENT & PLAN NOTE
57 yo M with PMH cirrhosis and reported stage IV colon CA presenting with acute abdominal pain now HD#6  Persistent pain, reports improved since admission, but still severe at times  Abdomen TTP in RUQ with guarding  AFVSS, labs stable  Assessment:  Acute abdominal pain  Transverse colitis  Plan:  Exam stable, but quite tender which likely 2/2 colitis and liver disease  HIDA requested (Monday) to r/o cholecystitis or need for perc ciara  Continue supportive measures, patient encouraged to remain in hospital for care

## 2022-11-26 NOTE — PROGRESS NOTES
[address]  Progress Note Lanette Gains 1960, 58 y o  male MRN: [id number]  Unit/Bed#: -01 Encounter: [id number]  Primary Care Provider: No primary care provider on file  Date and time admitted to hospital: 11/21/2022  9:10 PM    * Cirrhosis of liver with ascites ([facility] [address] )  Assessment & Plan  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  · CT abd/pelvis (11/21): Findings suggesting colitis  Colonic bowel wall thickening is most pronounced at the site of surgical sutures in the region of the mid transverse colon  Local recurrence should be excluded  Small bowel wall thickening may be due to enteritis, however, could be related to ascites and edema  There is no bowel obstruction  There is colonic diverticulosis without definite evidence of acute diverticulitis  The liver demonstrates cirrhotic morphology  There are findings of portal hypertension, including splenomegaly, ascites, varices  · S/p Paracentesis with 2 2L removed; no evidence of SBP  · Will not begin Lasix/Aldactone at this time due to relatively low blood pressure  · GI following; will need further outpatient follow-up with Hepatology     Colitis  Assessment & Plan  · Notes diarrhea with intermittent hematochezia for several months  · Differential includes inflammatory, ischemic, and possible malignant recurrence  · Laboratory studies demonstrated c diff colonization without active infection however given he is immunocompromised secondary to prior cancer diagnosis and underlying cirrhosis GI recommends treatment  · PO vancomycin started (End date: 12/3)  · Will need outpatient EGD and Colonoscopy in the very near future      Abdominal pain  Assessment & Plan  · Continued complaint of abdominal discomfort despite paracentesis and initiation of PO Vancomycin  · RUQ US (11/25): Heterogeneous, nodular liver compatible with known cirrhosis   Small perihepatic ascites     Circumferential wall thickening of the gallbladder up to 5 mm, however no other sonographic findings to suggest acute cholecystitis   No biliary ductal dilatation  Right-sided intrarenal calculi with no hydronephrosis  Small right pleural effusion  · General surgery recommending HIDA     Pericardial effusion  Assessment & Plan  · Noted on CT abd/pelvis (11/21)  · TTE (11/22): LVEF 60%  Systolic function is normal  Wall motion is normal  There is a trivial pericardial effusion  There is no echocardiographic evidence of tamponade  · Continue to monitor      Anemia  Assessment & Plan  · Hgb was 12 at the time of admission and dropped to 9 7 the following AM  · Hgb remains stable at this time  · Continue Protonix 40mg daily   · Outpatient bi-directional endoscopies recommended as above    Thrombocytopenia (HCC)  Assessment & Plan  · Likely related to Cirrhosis as above  · Plts: 52  · Continue to monitor       VTE Pharmacologic Prophylaxis: VTE Score: 5 High Risk (Score >/= 5) - Pharmacological DVT Prophylaxis Contraindicated  Sequential Compression Devices Ordered  Patient Centered Rounds: I performed bedside rounds with nursing staff today  Discussions with Specialists or Other Care Team Provider: General surgery, Nursing    Education and Discussions with Family / Patient: Patient declined call to   Patient updated on plan of care  Time Spent for Care: 45 minutes  More than 50% of total time spent on counseling and coordination of care as described above  Current Length of Stay: 5 day(s)  Current Patient Status: Inpatient   Certification Statement: The patient will continue to require additional inpatient hospital stay due to abdominal pain; surgery recommending HIDA  Discharge Plan: Anticipate discharge in 48-72 hrs to home  Code Status: Level 1 - Full Code    Subjective:   Patient appears comfortably but continues to complain of abdominal discomfort  HIDA has been recommended by surgery   This test, unfortunately, cannot be performed until Monday  The patient notes he may sign out AMA before that time  Objective:     Vitals:   Temp (24hrs), Av 4 °F (36 9 °C), Min:97 8 °F (36 6 °C), Max:98 8 °F (37 1 °C)    Temp:  [97 8 °F (36 6 °C)-98 8 °F (37 1 °C)] 97 8 °F (36 6 °C)  HR:  [76-77] 77  Resp:  [16-19] 19  BP: ()/(61-70) 117/70  SpO2:  [96 %-97 %] 96 %  Body mass index is 30 07 kg/m²  Input and Output Summary (last 24 hours): Intake/Output Summary (Last 24 hours) at 2022 1138  Last data filed at 2022 9467  Gross per 24 hour   Intake 120 ml   Output 550 ml   Net -430 ml       Physical Exam:   Physical Exam  Vitals and nursing note reviewed  Constitutional:       Appearance: Normal appearance  He is normal weight  HENT:      Head: Normocephalic and atraumatic  Mouth/Throat:      Mouth: Mucous membranes are moist       Pharynx: Oropharynx is clear  Eyes:      Extraocular Movements: Extraocular movements intact  Conjunctiva/sclera: Conjunctivae normal    Cardiovascular:      Rate and Rhythm: Normal rate and regular rhythm  Pulses: Normal pulses  Heart sounds: Normal heart sounds  Pulmonary:      Effort: Pulmonary effort is normal  No respiratory distress  Breath sounds: Normal breath sounds  No wheezing  Abdominal:      General: Bowel sounds are normal  There is no distension  Palpations: Abdomen is soft  Tenderness: There is abdominal tenderness  Musculoskeletal:         General: Normal range of motion  Cervical back: Normal range of motion and neck supple  Skin:     General: Skin is warm and dry  Neurological:      General: No focal deficit present  Mental Status: He is alert and oriented to person, place, and time  Mental status is at baseline     Psychiatric:         Mood and Affect: Mood normal          Behavior: Behavior normal          Judgment: Judgment normal          Labs:  Results from last 7 days   Lab Units 11/26/22  0534 11/22/22  0618 11/21/22  2144   WBC Thousand/uL 4 07*   < > 6 73   HEMOGLOBIN g/dL 10 1*   < > 12 1   HEMATOCRIT % 30 4*   < > 37 6   PLATELETS Thousands/uL 52*   < > 59*   NEUTROS PCT %  --   --  77*   LYMPHS PCT %  --   --  13*   MONOS PCT %  --   --  5   EOS PCT %  --   --  5    < > = values in this interval not displayed       Results from last 7 days   Lab Units 11/26/22  0534   SODIUM mmol/L 136   POTASSIUM mmol/L 3 6   CHLORIDE mmol/L 102   CO2 mmol/L 30   BUN mg/dL 10   CREATININE mg/dL 0 90   ANION GAP mmol/L 4   CALCIUM mg/dL 8 2*   ALBUMIN g/dL 2 7*   TOTAL BILIRUBIN mg/dL 2 31*   ALK PHOS U/L 144*   ALT U/L 38   AST U/L 40*   GLUCOSE RANDOM mg/dL 116     Results from last 7 days   Lab Units 11/23/22  0446   INR  1 53*         Results from last 7 days   Lab Units 11/21/22  2144   HEMOGLOBIN A1C % 5 9*     Results from last 7 days   Lab Units 11/21/22  2143   LACTIC ACID mmol/L 1 8       Lines/Drains:  Invasive Devices     Peripheral Intravenous Line  Duration           Peripheral IV 11/25/22 Left;Ventral (anterior) Forearm 1 day                Imaging: Reviewed radiology reports from this admission including: ultrasound(s)    Recent Cultures (last 7 days):   Results from last 7 days   Lab Units 11/22/22  1757 11/22/22  1545   GRAM STAIN RESULT   --  2+ Polys  No bacteria seen   BODY FLUID CULTURE, STERILE   --  No growth   C DIFF TOXIN B BY PCR  Positive*  --        Last 24 Hours Medication List:   Current Facility-Administered Medications   Medication Dose Route Frequency Provider Last Rate   • acetaminophen  650 mg Oral Q8H PRN [facility] Chung, DO     • diphenhydrAMINE  25 mg Oral Q6H PRN [facility], DO     • HYDROmorphone  0 2 mg Intravenous Q3H PRN Brianazeb Leger PA-C     • nicotine  7 mg Transdermal Daily Cash JERMAINE Hudson     • ondansetron  4 mg Intravenous Q6H PRN Cash JERMAINE Hudson     • oxyCODONE  10 mg Oral Q6H PRN Brian Leger JERMAINE     • oxyCODONE  5 mg Oral Q6H PRN Brian Leger PA-C     • pantoprazole  40 mg Oral Early Morning [facility] Lissy Chung,      • rOPINIRole  4 mg Oral 4x Daily Andrea Huertas PA-C     • simethicone  40 mg Oral Q6H PRN Maco Salinas PA-C     • vancomycin  125 mg Oral Q6H [address] Flo Montano PA-C          Today, Patient Was Seen By: Bon Gama DO    **Please Note: This note may have been constructed using a voice recognition system  **

## 2022-11-26 NOTE — PROGRESS NOTES
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, history of colon cancer status post right hemicolectomy and chemotherapy, admitted with ascites and abdominal pain and diarrhea  He is being treated for C diff infection with improvement of his diarrhea  However, he is continuing to complain of abdominal pain  He does not have SBP and there was no obstruction or other acute findings on CT scan  LFTs and kidney function are stable  No INR today  No evidence of bleeding  He has macrocytic anemia  1  Continue vancomycin 125 mg q 6 hours  2  I have ordered furosemide 20 mg daily and Aldactone 25 mg daily  Monitor creatinine and electrolytes  3  2 g daily sodium diet  4  Diet as tolerated  5  He will need close outpatient follow-up with EGD and colonoscopy and further management of cirrhosis  6  Check folate and vitamin B12   ______________________________________________________________________    Subjective:  He reports that diarrhea has resolved  However, continuing to complain of abdominal pain and frequent (formed) stools  Asking for pain medications   Perseverating on his diet and asking "why aren't they giving me any meat "     Medication Administration - last 24 hours from 11/25/2022 1317 to 11/26/2022 1317       Date/Time Order Dose Route Action Action by     11/26/2022 1159 EST rOPINIRole (REQUIP) tablet 4 mg 4 mg Oral Given Natasha Wade, KRISTINA     11/26/2022 0941 EST rOPINIRole (REQUIP) tablet 4 mg 4 mg Oral Given Natasha Wade RN     11/25/2022 2134 EST rOPINIRole (REQUIP) tablet 4 mg 4 mg Oral Given Jacqueline Coronado RN     11/25/2022 1724 EST rOPINIRole (REQUIP) tablet 4 mg 4 mg Oral Given Yazmin Elizabeth RN     11/25/2022 1415 EST ondansetron (ZOFRAN) injection 4 mg 4 mg Intravenous Given Yazmin Elizabeth RN     11/26/2022 0934 EST nicotine (NICODERM CQ) 7 mg/24hr TD 24 hr patch 7 mg 7 mg Transdermal Not Given Cali Zamarripa RN     11/26/2022 0323 EST diphenhydrAMINE (BENADRYL) tablet 25 mg 25 mg Oral Given Clive Rosales RN     11/26/2022 0543 EST HYDROmorphone (DILAUDID) injection 0 5 mg 0 5 mg Intravenous Given Clive Rosales RN     11/26/2022 0107 EST HYDROmorphone (DILAUDID) injection 0 5 mg 0 5 mg Intravenous Given Clive Rosales RN     11/25/2022 2134 EST HYDROmorphone (DILAUDID) injection 0 5 mg 0 5 mg Intravenous Given Clive Rosales RN     11/25/2022 1646 EST HYDROmorphone (DILAUDID) injection 0 5 mg 0 5 mg Intravenous Given Ciara Holbrook RN     11/26/2022 0543 EST pantoprazole (PROTONIX) EC tablet 40 mg 40 mg Oral Given Clive Rosales RN     11/26/2022 1200 EST vancomycin (VANCOCIN) capsule 125 mg 125 mg Oral Given Cali Zamarripa RN     11/26/2022 0543 EST vancomycin (VANCOCIN) capsule 125 mg 125 mg Oral Given Clive Rosales RN     11/25/2022 2339 EST vancomycin (VANCOCIN) capsule 125 mg 125 mg Oral Given Clive Rosales RN     11/25/2022 1724 EST vancomycin (VANCOCIN) capsule 125 mg 125 mg Oral Given Caira Holbrook RN     11/26/2022 7272 EST oxyCODONE (ROXICODONE) IR tablet 5 mg 5 mg Oral Given Letty Ohara RN     11/25/2022 1407 EST oxyCODONE (ROXICODONE) IR tablet 5 mg 5 mg Oral Given Ciara Holbrook RN          Objective:     Vitals: Blood pressure 117/70, pulse 77, temperature 97 8 °F (36 6 °C), resp  rate 19, height 5' 7" (1 702 m), weight 87 1 kg (192 lb 0 3 oz), SpO2 96 %  ,Body mass index is 30 07 kg/m²        Intake/Output Summary (Last 24 hours) at 11/26/2022 1317  Last data filed at 11/26/2022 8853  Gross per 24 hour   Intake 120 ml   Output 550 ml   Net -430 ml       Physical Exam:   General Appearance: Awake and alert, in no acute distress  Abdomen: Soft, right upper quadrant and right lower quadrant tenderness to palpation, non-distended; bowel sounds normal; no masses or no organomegaly    Invasive Devices Peripheral Intravenous Line  Duration           Peripheral IV 11/25/22 Left;Ventral (anterior) Forearm 1 day                Lab Results:  No results displayed because visit has over 200 results  Imaging Studies: I have personally reviewed pertinent imaging studies

## 2022-11-27 PROBLEM — F22 PARANOIA (HCC): Status: ACTIVE | Noted: 2022-11-27

## 2022-11-27 LAB
ALBUMIN SERPL BCP-MCNC: 2.9 G/DL (ref 3.5–5)
ALP SERPL-CCNC: 157 U/L (ref 34–104)
ALT SERPL W P-5'-P-CCNC: 41 U/L (ref 7–52)
AMMONIA PLAS-SCNC: 39 UMOL/L (ref 18–72)
ANION GAP SERPL CALCULATED.3IONS-SCNC: 5 MMOL/L (ref 4–13)
AST SERPL W P-5'-P-CCNC: 45 U/L (ref 13–39)
BILIRUB SERPL-MCNC: 2.43 MG/DL (ref 0.2–1)
BUN SERPL-MCNC: 10 MG/DL (ref 5–25)
CALCIUM ALBUM COR SERPL-MCNC: 9.7 MG/DL (ref 8.3–10.1)
CALCIUM SERPL-MCNC: 8.8 MG/DL (ref 8.4–10.2)
CHLORIDE SERPL-SCNC: 98 MMOL/L (ref 96–108)
CO2 SERPL-SCNC: 33 MMOL/L (ref 21–32)
CREAT SERPL-MCNC: 0.96 MG/DL (ref 0.6–1.3)
ERYTHROCYTE [DISTWIDTH] IN BLOOD BY AUTOMATED COUNT: 15.7 % (ref 11.6–15.1)
FOLATE SERPL-MCNC: 8.6 NG/ML (ref 3.1–17.5)
GFR SERPL CREATININE-BSD FRML MDRD: 84 ML/MIN/1.73SQ M
GLUCOSE SERPL-MCNC: 143 MG/DL (ref 65–140)
HCT VFR BLD AUTO: 32.9 % (ref 36.5–49.3)
HGB BLD-MCNC: 11.1 G/DL (ref 12–17)
MCH RBC QN AUTO: 34.9 PG (ref 26.8–34.3)
MCHC RBC AUTO-ENTMCNC: 33.7 G/DL (ref 31.4–37.4)
MCV RBC AUTO: 104 FL (ref 82–98)
PLATELET # BLD AUTO: 57 THOUSANDS/UL (ref 149–390)
PMV BLD AUTO: 12.1 FL (ref 8.9–12.7)
POTASSIUM SERPL-SCNC: 3.6 MMOL/L (ref 3.5–5.3)
PROT SERPL-MCNC: 6.9 G/DL (ref 6.4–8.4)
RBC # BLD AUTO: 3.18 MILLION/UL (ref 3.88–5.62)
SODIUM SERPL-SCNC: 136 MMOL/L (ref 135–147)
VIT B12 SERPL-MCNC: 1063 PG/ML (ref 100–900)
WBC # BLD AUTO: 5.02 THOUSAND/UL (ref 4.31–10.16)

## 2022-11-27 RX ORDER — DICYCLOMINE HCL 20 MG
20 TABLET ORAL
Status: DISCONTINUED | OUTPATIENT
Start: 2022-11-27 | End: 2022-12-01 | Stop reason: HOSPADM

## 2022-11-27 RX ORDER — OLANZAPINE 10 MG/1
2.5 INJECTION, POWDER, LYOPHILIZED, FOR SOLUTION INTRAMUSCULAR ONCE AS NEEDED
Status: DISCONTINUED | OUTPATIENT
Start: 2022-11-27 | End: 2022-12-01 | Stop reason: HOSPADM

## 2022-11-27 RX ORDER — OLANZAPINE 5 MG/1
2.5 TABLET, ORALLY DISINTEGRATING ORAL
Status: DISCONTINUED | OUTPATIENT
Start: 2022-11-27 | End: 2022-12-01 | Stop reason: HOSPADM

## 2022-11-27 RX ADMIN — OXYCODONE HYDROCHLORIDE 10 MG: 10 TABLET ORAL at 19:38

## 2022-11-27 RX ADMIN — PANTOPRAZOLE SODIUM 40 MG: 40 TABLET, DELAYED RELEASE ORAL at 05:45

## 2022-11-27 RX ADMIN — FUROSEMIDE 20 MG: 20 TABLET ORAL at 09:08

## 2022-11-27 RX ADMIN — ROPINIROLE 4 MG: 2 TABLET, FILM COATED ORAL at 11:52

## 2022-11-27 RX ADMIN — OLANZAPINE 2.5 MG: 5 TABLET, ORALLY DISINTEGRATING ORAL at 21:17

## 2022-11-27 RX ADMIN — VANCOMYCIN HYDROCHLORIDE 125 MG: 125 CAPSULE ORAL at 11:52

## 2022-11-27 RX ADMIN — OXYCODONE HYDROCHLORIDE 10 MG: 10 TABLET ORAL at 01:14

## 2022-11-27 RX ADMIN — OXYCODONE HYDROCHLORIDE 10 MG: 10 TABLET ORAL at 09:08

## 2022-11-27 RX ADMIN — VANCOMYCIN HYDROCHLORIDE 125 MG: 125 CAPSULE ORAL at 18:14

## 2022-11-27 RX ADMIN — ROPINIROLE 4 MG: 2 TABLET, FILM COATED ORAL at 18:14

## 2022-11-27 RX ADMIN — NICOTINE 7 MG: 7 PATCH, EXTENDED RELEASE TRANSDERMAL at 09:07

## 2022-11-27 RX ADMIN — VANCOMYCIN HYDROCHLORIDE 125 MG: 125 CAPSULE ORAL at 05:45

## 2022-11-27 RX ADMIN — DICYCLOMINE HYDROCHLORIDE 20 MG: 20 TABLET ORAL at 12:23

## 2022-11-27 RX ADMIN — HYDROMORPHONE HYDROCHLORIDE 0.2 MG: 0.2 INJECTION, SOLUTION INTRAMUSCULAR; INTRAVENOUS; SUBCUTANEOUS at 02:43

## 2022-11-27 RX ADMIN — ROPINIROLE 4 MG: 2 TABLET, FILM COATED ORAL at 21:17

## 2022-11-27 RX ADMIN — SPIRONOLACTONE 25 MG: 25 TABLET ORAL at 09:08

## 2022-11-27 RX ADMIN — ROPINIROLE 4 MG: 2 TABLET, FILM COATED ORAL at 09:08

## 2022-11-27 RX ADMIN — VANCOMYCIN HYDROCHLORIDE 125 MG: 125 CAPSULE ORAL at 23:25

## 2022-11-27 NOTE — PROGRESS NOTES
Progress Note- Tabatha Antoine 58 y o  male MRN: [id number]    Unit/Bed#: -01 Encounter: [id number]      Assessment and Plan:  61-year-old male with CHRISTIE cirrhosis complicated by ascites, history of colon cancer status post right hemicolectomy and chemotherapy, admitted with abdominal pain and diarrhea  He tested positive for C diff by PCR only and his diarrhea has resolved with vancomycin  He also had paracentesis which was negative for SBP  However, his continuing to complain of diffuse abdominal pain, mostly on the right side  I have reviewed his CT scan  Mild colonic thickening noted  He also has a rather distended stomach and proximal duodenum which then transitions normal caliber, but I cannot see mass or lesion there  Also, he is not nauseated and does not show any clinical signs of obstruction  His LFTs have remained stable  1  With drop fluid restrictions and low-fat requirement from his diet  Hopefully, this will allow him to have more normal food  Should remain on 2 g daily sodium restriction  2  He is getting HIDA scan tomorrow  Given his ascites and decompensated cirrhosis, I would not rush to surgery unless there is a clear indication  3  Continue vancomycin treatment for now  4  If he he continues to have abdominal pain, we should do colonoscopy and upper endoscopy to evaluate for colitis, enteritis, ischemia  I discussed this with him and he is willing to prep on Monday for possible procedures on Tuesday  I will change his diet to clear liquids starting Monday morning  5  Continue furosemide and spironolactone  6  Continue daily PPI  7  Trial of dicyclomine for abdominal pain and try to minimize opiates  ______________________________________________________________________    Subjective:  Continuing to complain of abdominal pain  Also complaining about his diet  Wants to eat meat and is tired of having turkey  Also wants to be allowed to drink more fluids    Asking for pain medication  He appeared comfortable and was walking in the hallway when I saw him  No diarrhea      Medication Administration - last 24 hours from 11/26/2022 1144 to 11/27/2022 1144       Date/Time Order Dose Route Action Action by     11/27/2022 0908 EST rOPINIRole (REQUIP) tablet 4 mg 4 mg Oral Given Ellie Kemp, RN     11/26/2022 2136 EST rOPINIRole (REQUIP) tablet 4 mg 4 mg Oral Given Zenia Mini, RN     11/26/2022 1712 EST rOPINIRole (REQUIP) tablet 4 mg 4 mg Oral Given Zenia Mini, RN     11/26/2022 1159 EST rOPINIRole (REQUIP) tablet 4 mg 4 mg Oral Given Letty Ohara, RN     11/27/2022 0907 EST nicotine (NICODERM CQ) 7 mg/24hr TD 24 hr patch 7 mg 7 mg Transdermal Medication Applied Ellie Kemp, RN     11/27/2022 0545 EST pantoprazole (PROTONIX) EC tablet 40 mg 40 mg Oral Given [facility], RN     11/27/2022 0545 EST vancomycin (VANCOCIN) capsule 125 mg 125 mg Oral Given [facility], RN     11/26/2022 2305 EST vancomycin (VANCOCIN) capsule 125 mg 125 mg Oral Given Zenia Mini, RN     11/26/2022 1712 EST vancomycin (VANCOCIN) capsule 125 mg 125 mg Oral Given Zenia Mini, RN     11/26/2022 1200 EST vancomycin (VANCOCIN) capsule 125 mg [facility]     11/27/2022 0908 EST oxyCODONE (ROXICODONE) immediate release tablet 10 mg 10 mg [facility]     11/27/2022 0114 EST oxyCODONE (ROXICODONE) immediate release tablet 10 mg 10 mg Oral Given Zenia Mini, RN     11/26/2022 1712 EST oxyCODONE (ROXICODONE) immediate release tablet 10 mg 10 mg Oral Given Zenia Mini, RN     11/27/2022 0243 EST HYDROmorphone HCl (DILAUDID) injection 0 2 mg 0 2 mg Intravenous Given Zenia Mini, RN     11/26/2022 2305 EST HYDROmorphone HCl (DILAUDID) injection 0 2 mg 0 2 mg Intravenous Given Zenia Whelan RN     11/26/2022 2005 EST HYDROmorphone HCl (DILAUDID) injection 0 2 mg 0 2 mg Intravenous Given Zenia Whelan RN     11/26/2022 1403 EST HYDROmorphone HCl (DILAUDID) injection 0 2 mg 0 2 mg Intravenous Given Carlos Steve RN     11/27/2022 0908 EST furosemide (LASIX) tablet 20 mg 20 mg Oral Given Carlos Steve RN     11/26/2022 1403 EST furosemide (LASIX) tablet 20 mg 20 mg Oral Given Carlos Steve RN     11/27/2022 0908 EST spironolactone (ALDACTONE) tablet 25 mg 25 mg Oral Given Letty Ohara RN     11/26/2022 1403 EST spironolactone (ALDACTONE) tablet 25 mg 25 mg Oral Given Letty Ohara RN          Objective:     Vitals: Blood pressure 100/79, pulse 70, temperature 98 3 °F (36 8 °C), resp  rate 20, height 5' 7" (1 702 m), weight 87 1 kg (192 lb 0 3 oz), SpO2 96 %  ,Body mass index is 30 07 kg/m²  Intake/Output Summary (Last 24 hours) at 11/27/2022 1144  Last data filed at 11/27/2022 0715  Gross per 24 hour   Intake 480 ml   Output --   Net 480 ml       Physical Exam:   General Appearance: Awake and alert, in no acute distress  Abdomen: Soft, diffusely tender to palpation, somewhat more so in the right upper quadrant, +guarding, non-distended    Invasive Devices     Peripheral Intravenous Line  Duration           Peripheral IV 11/25/22 Left;Ventral (anterior) Forearm 2 days                Lab Results:  No results displayed because visit has over 200 results  Imaging Studies: I have personally reviewed pertinent imaging studies

## 2022-11-27 NOTE — ASSESSMENT & PLAN NOTE
59 yo M with PMH cirrhosis and reported stage IV colon CA presenting with acute abdominal pain now HD#7  Persistent pain, frustrated with his prolonged hospitalization  Abdomen soft, very TTP with guarding in RUQ  AFVSS, labs stable  Assessment:  Acute abdominal pain  Transverse colitis  Plan:  Tender yet stable  HIDA for tomorrow  GI considering endoscopy  Surgery will follow-up on HIDA results

## 2022-11-27 NOTE — TELEMEDICINE
TeleConsultation - [address] [place] [address] 58 y o  male MRN: [id number]  Unit/Bed#: -01 Encounter: [id number]        REQUIRED DOCUMENTATION:     1  This service was provided via Telemedicine  2  Provider located at remote  3  TeleMed provider: Jack Guan MD   4  Identify all parties in room with patient during tele consult:  Patient, RN  5  Patient was then informed that this was a Telemedicine visit and that the exam was being conducted confidentially over secure lines  My office door was closed  No one else was in the room  Patient acknowledged consent and understanding of privacy and security of the Telemedicine visit, and gave us permission to have the assistant stay in the room in order to assist with the history and to conduct the exam   I informed the patient that I have reviewed their record in Epic and presented the opportunity for them to ask any questions regarding the visit today  The patient agreed to participate  Assessment/Plan     Principal Problem:    Cirrhosis of liver with ascites (HCC)  Active Problems:    Colitis    Pericardial effusion    Thrombocytopenia (HCC)    Abdominal pain    Anemia    Paranoia (HCC)    Assessment:    Delirium    Treatment Plan:    1  # paranoia likely 2/2 delirium due to medical condition of CHRISTIE cirrhosis complicated by ascities  Psychotropic medication with underlying liver cirrhosis can worsen encephalopathy   - Patient refusing psychotropic medications at this time  He appears calm, not overtly distressed or psychotic  He vehemently denied SI/HI with intent, plan or preparation  No known h/o aggressive assaultive behavior    - Can consider abilify 2 mg or zyprexa zydis 2 5 mg PO daily , PRN severe agitation if Qtc < 450 ms in emergency situation  QTc noted as 470 ms (479, trending down)  - Avoid using zyprexa with benzodiazepines   - Monitor for AE / monitor mood  - C/w benadryl 25 mg PO PRN anxiety    2   Patient is not suicidal, homicidal, or grossly psychotic  Patient does not meet criteria for commitment, at this time  a  No psychiatric 1:1 indicated  b  Patient considered psychiatrically cleared    3  Medical management as per primary care team    4  Please re-consult if needed    Thank you for allowing us to take part in this patient’s care  Ross Shields MD [facility]  Adult and Geriatric Psychiatrist Covering    Planned Medication Changes:    None at this time    Current Medications:     Current Facility-Administered Medications   Medication Dose Route Frequency Provider Last Rate   • acetaminophen  650 mg Oral Q8H PRN [facility], DO     • dicyclomine  20 mg Oral BID before breakfast/lunch Royal Justin MD     • diphenhydrAMINE  25 mg Oral Q6H PRN [facility], DO     • furosemide  20 mg Oral Daily Danielle Boyer MD     • HYDROmorphone  0 2 mg Intravenous Q3H PRN Brian Leger PA-C     • nicotine  7 mg Transdermal Daily Malik Reyes PA-C     • ondansetron  4 mg Intravenous Q6H PRN Malik Reyes PA-C     • oxyCODONE  10 mg Oral Q6H PRN Brian Leger PA-C     • oxyCODONE  5 mg Oral Q6H PRN Brian Leger PA-C     • pantoprazole  40 mg Oral Early Morning [facility], DO     • rOPINIRole  4 mg Oral 4x Daily Rice JERMAINE Reyes     • simethicone  40 mg Oral Q6H PRN Angelique Spurling, PA-C     • spironolactone  25 mg Oral Daily Danielle Boyer MD     • vancomycin  125 mg Oral Q6H [address] Lino Gibbs PA-C         Inpatient consult to Psychiatry  Consult performed by:  Ross Shields MD  Consult ordered by: Jerson Horn DO        Physician Requesting Consult: Jerson Horn DO  Principal Problem:Cirrhosis of liver with ascites [facility])    Reason for Consult: paranoia      History of Present Illness      Patient is a 58 y o  male with a history of Delirium who  was admitted to the medical service on 11/21/2022 due to CHRISTIE cirrhosis complicated by ascites  Psychiatric consultation was requested due to paranoid ideation  Patient was seen and evaluated  He was AAOx4  On initial psychiatric consultation Chantale Colvin reported he is dealing with a woman right now "selling drugs in this state  [place] we come from"  Stated "this woman might be cross the street over here"  Patient determined the "narcotics caught her twice"  Patient stated "I don't want to talk about it'  Patient determined to have filed a report on it "in June or July but they did nothing about it"  Patient denied VH  He stated "I seen the [facility]  It have a GPS tracker on it"  Patient appeared suspicious, asked this writer "has she been talking to you  You know her"  Per nursing, patient has been having paranoia "on and off" and has not shown agitation or severe psychomotor agitation  Patient has been eating all his meals at this time  Psychiatric Review Of Systems:    Patient did not depressed mood and denies other symptoms of depression such as poor sleep, poor appetite, guilt, anhedonia, decrease concentration, decrease in energy level and tiredness, hopelessness, helplessness  Patient  denies symptoms of vamsi including but not limited to persistently elevated and/or euphoric mood, grandiosity, decrease need for sleep and increased energy, flight of ideas, increased goal-directed activities such as hypersexual behavior, spending too much money when don't need to, reckless behavior at this time  Patient denied any previous manic episodes in the lifetime  Psychosis as above  Patient denies symptoms of PTSD, OCD, panic disorders at this time  Patient endorsed sx of anxiety with excessive worry and somatic sx       Historical Information     Past Psychiatric History:   Dx: patient denied  Past psych hospitalizations: patient denied  Outpatient: patient denied  Suicide attempts: patient denied    MEDICATIONS: patient denied    SUBSTANCE USE HISTORY: patient denied     MEDICAL HISTORY:  Patient denies Hx of TBI and seizures    FAMILY PSYCHIATRIC HISTORY:   Patient denied     PSYCHOSOCIAL HISTORY:   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  Lives with Laxmi Rivera  Past Medical History:   Diagnosis Date   • Cirrhosis ([facility] )     CHRISTIE   • [facility] ([facility] )    • Diabetes mellitus ([facility] )    • Neuropathy    • Restless leg syndrome        Medical Review Of Systems:    Review of Systems    Meds/Allergies     all current active meds have been reviewed, current meds:   Current Facility-Administered Medications   Medication Dose Route Frequency   • acetaminophen (TYLENOL) tablet 650 mg  650 mg Oral Q8H PRN   • dicyclomine (BENTYL) tablet 20 mg  20 mg Oral BID before breakfast/lunch   • diphenhydrAMINE (BENADRYL) tablet 25 mg  25 mg Oral Q6H PRN   • furosemide (LASIX) tablet 20 mg  20 mg Oral Daily   • HYDROmorphone HCl (DILAUDID) injection 0 2 mg  0 2 mg Intravenous Q3H PRN   • nicotine (NICODERM CQ) 7 mg/24hr TD 24 hr patch 7 mg  7 mg Transdermal Daily   • ondansetron (ZOFRAN) injection 4 mg  4 mg Intravenous Q6H PRN   • oxyCODONE (ROXICODONE) immediate release tablet 10 mg  10 mg Oral Q6H PRN   • oxyCODONE (ROXICODONE) IR tablet 5 mg  5 mg Oral Q6H PRN   • pantoprazole (PROTONIX) EC tablet 40 mg  40 mg Oral Early Morning   • rOPINIRole (REQUIP) tablet 4 mg  4 mg Oral 4x Daily   • simethicone (MYLICON) oral suspension 40 mg  40 mg Oral Q6H PRN   • spironolactone (ALDACTONE) tablet 25 mg  25 mg Oral Daily   • vancomycin (VANCOCIN) capsule 125 mg  125 mg Oral Q6H [facility] & intermediate    and PTA meds:   Prior to Admission Medications   Prescriptions Last Dose Informant Patient Reported? Taking?    rOPINIRole (REQUIP) 0 25 mg tablet Past Week  Yes Yes   Sig: Take 4 mg by mouth 4 (four) times a day      Facility-Administered Medications: None     Allergies   Allergen Reactions   • Morphine Anaphylaxis   • Clarithromycin Other (See Comments)     Dizzy, nausea, ulcers in stomach       Objective     Vital signs in last 24 hours:  Temp:  [97 8 °F (36 6 °C)-98 3 °F (36 8 °C)] 98 3 °F (36 8 °C)  HR:  [70-74] 70  Resp:  [20] 20  BP: (100-108)/(52-79) 100/79      Intake/Output Summary (Last 24 hours) at 11/27/2022 1402  Last data filed at 11/27/2022 0715  Gross per 24 hour   Intake 480 ml   Output --   Net 480 ml       Mental Status Evaluation:  Appearance, appears stated age  Speech fluent  Behavior cooperative  Mood “ I have a right mind”  Affect congruent, appropriate  Thought Process coherent, illogical  Thought Content denied SI/HI w/ i/p  Perceptions no AVH endorsed or reported, appears paranoia  Orientation/Attention AAOx4  Memory intact  Insight fair  Judgment fair    Lab Results: I have personally reviewed all pertinent laboratory/tests results       Most Recent Labs:   Lab Results   Component Value Date    WBC 5 02 11/27/2022    RBC 3 18 (L) 11/27/2022    HGB 11 1 (L) 11/27/2022    HCT 32 9 (L) 11/27/2022    PLT 57 (L) 11/27/2022    RDW 15 7 (H) 11/27/2022    NEUTROABS 5 17 11/21/2022    SODIUM 136 11/27/2022    K 3 6 11/27/2022    CL 98 11/27/2022    CO2 33 (H) 11/27/2022    BUN 10 11/27/2022    CREATININE 0 96 11/27/2022    GLUC 143 (H) 11/27/2022    CALCIUM 8 8 11/27/2022    AST 45 (H) 11/27/2022    ALT 41 11/27/2022    ALKPHOS 157 (H) 11/27/2022    TP 6 9 11/27/2022    ALB 2 9 (L) 11/27/2022    TBILI 2 43 (H) 11/27/2022    AMMONIA 39 11/27/2022    HGBA1C 5 9 (H) 11/21/2022     11/21/2022     Labs in last 72 hours:   Recent Labs     11/27/22  0434 11/27/22  0813   WBC 5 02  --    RBC 3 18*  --    HGB 11 1*  --    HCT 32 9*  --    PLT 57*  --    RDW 15 7*  --    SODIUM 136  --    K 3 6  --    CL 98  --    CO2 33*  --    BUN 10  --    CREATININE 0 96  --    GLUC 143*  --    CALCIUM 8 8  --    AST 45*  --    ALT 41  --    ALKPHOS 157*  --    TP 6 9  --    ALB 2 9*  --    TBILI 2 43*  --    AMMONIA  --  39       Imaging Studies: IR INPATIENT Paracentesis    Result Date: 11/22/2022  Narrative: Ultrasound-guided paracentesis Clinical History: Abdominal pain, mild ascites, cirrhosis Procedure: After explaining the risks and benefits of the procedure to the patient, informed consent was obtained  Ultrasound used to localize the right lower quadrant ascites  The inferior epigastric artery was identified and avoided  The overlying skin was prepped and draped in usual sterile fashion and local anesthesia was obtained with the 1% lidocaine solution  A 5 [facility] needle was advanced until fluid was aspirated under live ultrasound guidance  Approximately 2250 cc' s of clear, yellow  fluid was aspirated  Specimens: Multiple The patient tolerated the procedure well without immediate complication  Impression: Impression: Ultrasound-guided paracentesis  Workstation performed: [id number]     US right upper quadrant    Result Date: 11/25/2022  Narrative: RIGHT UPPER QUADRANT ULTRASOUND INDICATION:     acute RUQ pain, tenderness  Patient with cirrhosis status post image guided paracentesis on 11/22/2022  Patient also reports nausea  COMPARISON:  CT of the abdomen and pelvis from 11/21/2022  TECHNIQUE:   Real-time ultrasound of the right upper quadrant was performed with a curvilinear transducer with both volumetric sweeps and still imaging techniques  FINDINGS: PANCREAS:  Visualized portions of the pancreas are within normal limits  AORTA AND IVC:  Visualized portions are normal for patient age  LIVER: Size:  Within normal range  The liver measures 14 2 cm in the midclavicular line  Contour:  Surface contour is nodular  Parenchyma: The echotexture of the liver parenchyma is somewhat heterogeneous  No definite liver mass identified  Limited imaging of the main portal vein shows it to be patent and hepatopetal   BILIARY: The gallbladder demonstrates circumferential wall thickening up to 5 mm  The sonographic Hetal Sleet sign was negative  No gallstones are seen within the gallbladder  There is no pericholecystic fluid   No intrahepatic biliary dilatation  CBD measures 4 0 mm  No choledocholithiasis  KIDNEY: Right kidney measures 11 1 x 5 2 x 5 4 cm  Volume 164 7 mL Several echogenic foci are seen in the right kidney with technical artifact most likely representing intrarenal calculi  ASCITES:   Small right upper quadrant ascites  MISCELLANEOUS: Small right pleural effusion  Impression: Heterogeneous, nodular liver compatible with known cirrhosis  Small perihepatic ascites  Circumferential wall thickening of the gallbladder up to 5 mm, however no other sonographic findings to suggest acute cholecystitis  This may be reactive to the underlying liver disease  No biliary ductal dilatation  Right-sided intrarenal calculi with no hydronephrosis  Small right pleural effusion  Workstation performed: [id number]     CT Abdomen pelvis with contrast    Result Date: 11/21/2022  Narrative: CT ABDOMEN AND PELVIS WITH IV CONTRAST INDICATION:   Abdominal pain, diarrhea  Dark red blood per rectum  History of colon carcinoma, cirrhosis, diabetes, tobacco use  COMPARISON:  None available  TECHNIQUE:  CT examination of the abdomen and pelvis was performed  Axial, sagittal, and coronal 2D reformatted images were created from the source data and submitted for interpretation  Radiation dose length product (DLP) for this visit:  859 mGy-cm   This examination, like all CT scans performed in the [facility], was performed utilizing techniques to minimize radiation dose exposure, including the use of iterative reconstruction and automated exposure control  IV Contrast:  100 mL of iohexol (OMNIPAQUE) Enteric Contrast:  Enteric contrast was not administered  FINDINGS: ABDOMEN LOWER CHEST:  There are several right pericardiophrenic nodes, the largest of which measures 2 x 1 6 cm  There is a small right pleural effusion  There is coronary artery disease  Paraesophageal varices are present   LIVER/BILIARY TREE:  The liver demonstrates cirrhotic morphology  GALLBLADDER:  No calcified gallstones are demonstrable  The gallbladder is contracted  There is ascites adjacent to the gallbladder  SPLEEN:  The spleen is enlarged, measuring 19 cm craniocaudal dimension by 15 cm AP dimension  PANCREAS:  Unremarkable  ADRENAL GLANDS:  Unremarkable  KIDNEYS/URETERS:  There are several nonobstructing renal calculi bilaterally, the largest of which is located in the lower pole right kidney, measuring 6 mm  There is a 4 cm cyst in the lower pole left kidney  There is no hydronephrosis bilaterally  STOMACH AND BOWEL:  There is no bowel obstruction  There is small bowel wall thickening  There has been prior right hemicolectomy  Surgical sutures are present in the region of the mid transverse colon  There is bowel wall thickening at the site of surgical sutures  There also appears to be some bowel wall thickening and hyperemia involving the left colon  There is colonic diverticulosis without definite evidence of acute diverticulitis  APPENDIX:  Surgically absent  ABDOMINOPELVIC CAVITY:  There is a moderate amount of ascites  There is mesenteric edema  There is a 2 5 cm zeyad hepatis node  Multiple mildly prominent mesenteric nodes are present  VESSELS:  There is recanalization of the paraumbilical vein  Varices are present  There is atherosclerosis  There is no abdominal aortic aneurysm  PELVIS REPRODUCTIVE ORGANS:  Unremarkable for patient's age  URINARY BLADDER:  Unremarkable  ABDOMINAL WALL/INGUINAL REGIONS:  Small fat-containing right inguinal hernia  OSSEOUS STRUCTURES:  No acute fracture or destructive osseous lesion  Impression: Findings suggesting colitis, as described above  Colonic bowel wall thickening is most pronounced at the site of surgical sutures in the region of the mid transverse colon  Local recurrence should be excluded  Follow-up after treatment is strongly recommended    Gastroenterology consultation and follow-up is recommended  Small bowel wall thickening may be due to enteritis, however, could be related to ascites and edema  There is no bowel obstruction  There is colonic diverticulosis without definite evidence of acute diverticulitis  The liver demonstrates cirrhotic morphology  There are findings of portal hypertension, including splenomegaly, ascites, varices  There is a small right pericardial effusion  There are several right pericardiophrenic nodes, the largest of which measures 2 x 1 6 cm  Follow-up is recommended  Other nonemergent findings, as described above  Please see discussion  I personally discussed this study with Jarek Travis on 11/21/2022 at 11:35 PM  Workstation performed: [id number]     Echo complete w/ contrast if indicated    Result Date: 11/22/2022  Narrative: •  Left Ventricle: Left ventricular cavity size is normal  Wall thickness is mildly increased  The left ventricular ejection fraction is 60%  Systolic function is normal  Wall motion is normal  •  Left Atrium: The atrium is mildly dilated  •  Mitral Valve: There is mild regurgitation  •  Tricuspid Valve: There is mild regurgitation  •  Pericardium: There is a trivial pericardial effusion  There is no echocardiographic evidence of tamponade  The evidence against tamponade includes: no right ventricular diastolic collapse and no right ventricular compression  EKG/Pathology/Other Studies:   Lab Results   Component Value Date    VENTRATE 80 11/21/2022    ATRIALRATE 80 11/21/2022    PRINT 136 11/21/2022    QRSDINT 80 11/21/2022    QTINT 416 11/21/2022    QTCINT 479 11/21/2022    PAXIS 53 11/21/2022    QRSAXIS 82 11/21/2022    TWAVEAXIS 42 11/21/2022        Code Status: Level 1 - Full Code  Advance Directive and Living Will:      Power of :    POLST:      Counseling / Coordination of Care: Total floor / unit time spent today 60 minutes   Greater than 50% of total time was spent with the patient and / or family counseling and / or coordination of care   A description of the counseling / coordination of care:

## 2022-11-27 NOTE — PROGRESS NOTES
Antonia 45  Progress Note Honora Head 1960, 58 y o  male MRN: [id number]  Unit/Bed#: -01 Encounter: [id number]  Primary Care Provider: No primary care provider on file  Date and time admitted to hospital: 11/21/2022  9:10 PM    * Cirrhosis of liver with ascites ([facility] [address] )  Assessment & Plan  · History of non-alcoholic cirrhosis who presented with acute decompensation- appears compensated at this time  · CT abd/pelvis (11/21): Findings suggesting colitis  Colonic bowel wall thickening is most pronounced at the site of surgical sutures in the region of the mid transverse colon  Local recurrence should be excluded  Small bowel wall thickening may be due to enteritis, however, could be related to ascites and edema  There is no bowel obstruction  There is colonic diverticulosis without definite evidence of acute diverticulitis  The liver demonstrates cirrhotic morphology  There are findings of portal hypertension, including splenomegaly, ascites, varices  · S/p Paracentesis with 2 2L removed; no evidence of SBP  · Continue Lasix 20mg daily and Aldactone 25mg daily   · GI following; will need further outpatient follow-up with Hepatology     Colitis  Assessment & Plan  · Notes diarrhea with intermittent hematochezia for several months  · Differential includes inflammatory, ischemic, and possible malignant recurrence  · Laboratory studies demonstrated c diff colonization without active infection however given he is immunocompromised secondary to prior cancer diagnosis and underlying cirrhosis GI recommends treatment  · PO vancomycin started (End date: 12/3)  · Will need outpatient EGD and Colonoscopy in the very near future      Abdominal pain  Assessment & Plan  · Continued complaint of abdominal discomfort despite paracentesis and initiation of PO Vancomycin  · RUQ US (11/25): Heterogeneous, nodular liver compatible with known cirrhosis   Small perihepatic ascites  Circumferential wall thickening of the gallbladder up to 5 mm, however no other sonographic findings to suggest acute cholecystitis   No biliary ductal dilatation  Right-sided intrarenal calculi with no hydronephrosis  Small right pleural effusion  · General surgery recommending [facility])  Assessment & Plan  · Nursing reports the patient his intermittently stated that we are poisoning his food or that he is seeing things outside the window (wife selling drugs or large deer)  · He answered all other questions appropriately and is possibly related to pain medications  · Ammonia within normal limits  · Psych consultation pending    Pericardial effusion  Assessment & Plan  · Noted on CT abd/pelvis (11/21)  · TTE (11/22): LVEF 60%  Systolic function is normal  Wall motion is normal  There is a trivial pericardial effusion  There is no echocardiographic evidence of tamponade  · Continue to monitor      Anemia  Assessment & Plan  · Hgb was 12 at the time of admission and dropped to 9 7 the following AM  · Hgb remains stable at this time  · Continue Protonix 40mg daily   · Outpatient bi-directional endoscopies recommended as above    Thrombocytopenia (HCC)  Assessment & Plan  · Likely related to Cirrhosis as above  · Plts: 57  · Continue to monitor       VTE Pharmacologic Prophylaxis: VTE Score: 5 High Risk (Score >/= 5) - Pharmacological DVT Prophylaxis Contraindicated  Sequential Compression Devices Ordered  Patient Centered Rounds: I performed bedside rounds with nursing staff today  Discussions with Specialists or Other Care Team Provider: Nursing    Education and Discussions with Family / Patient: Patient declined call to   Patient updated on plan of care  Time Spent for Care: 30 minutes  More than 50% of total time spent on counseling and coordination of care as described above      Current Length of Stay: 6 day(s)  Current Patient Status: Inpatient   Certification Statement: The patient will continue to require additional inpatient hospital stay due to abdominal pain; general surgery recommending HIDA  Discharge Plan: Anticipate discharge in 24-48 hrs to home  Code Status: Level 1 - Full Code    Subjective:   Patient appears to be resting comfortably though he continues to report abdominal discomfort particularly in the RUQ  Additionally, he reports dis-satisfaction with his diet as he is on a low-Na, low-fat, and fluid restricted diet  No significant over night events reported by nursing  Objective:     Vitals:   Temp (24hrs), Av °F (36 7 °C), Min:97 8 °F (36 6 °C), Max:98 3 °F (36 8 °C)    Temp:  [97 8 °F (36 6 °C)-98 3 °F (36 8 °C)] 98 3 °F (36 8 °C)  HR:  [70-74] 70  Resp:  [20] 20  BP: (100-108)/(52-79) 100/79  SpO2:  [95 %-96 %] 96 %  Body mass index is 30 07 kg/m²  Input and Output Summary (last 24 hours): Intake/Output Summary (Last 24 hours) at 2022 0926  Last data filed at 2022 0715  Gross per 24 hour   Intake 480 ml   Output --   Net 480 ml       Physical Exam:   Physical Exam  Vitals and nursing note reviewed  Constitutional:       General: He is not in acute distress  Appearance: Normal appearance  He is normal weight  HENT:      Head: Normocephalic and atraumatic  Mouth/Throat:      Mouth: Mucous membranes are moist       Pharynx: Oropharynx is clear  Eyes:      Extraocular Movements: Extraocular movements intact  Conjunctiva/sclera: Conjunctivae normal    Cardiovascular:      Rate and Rhythm: Normal rate and regular rhythm  Pulses: Normal pulses  Heart sounds: Normal heart sounds  Pulmonary:      Effort: Pulmonary effort is normal  No respiratory distress  Breath sounds: Normal breath sounds  No wheezing  Abdominal:      General: Bowel sounds are normal  There is distension  Palpations: Abdomen is soft  Tenderness: There is abdominal tenderness     Musculoskeletal:         General: Normal range of motion  Cervical back: Normal range of motion and neck supple  Skin:     General: Skin is warm and dry  Neurological:      General: No focal deficit present  Mental Status: He is alert and oriented to person, place, and time  Psychiatric:         Attention and Perception: He perceives visual hallucinations  Thought Content: Thought content is paranoid  Labs:  Results from last 7 days   Lab Units 11/27/22  0434 11/22/22  0618 11/21/22  2144   WBC Thousand/uL 5 02   < > 6 73   HEMOGLOBIN g/dL 11 1*   < > 12 1   HEMATOCRIT % 32 9*   < > 37 6   PLATELETS Thousands/uL 57*   < > 59*   NEUTROS PCT %  --   --  77*   LYMPHS PCT %  --   --  13*   MONOS PCT %  --   --  5   EOS PCT %  --   --  5    < > = values in this interval not displayed  Results from last 7 days   Lab Units 11/27/22  0434   SODIUM mmol/L 136   POTASSIUM mmol/L 3 6   CHLORIDE mmol/L 98   CO2 mmol/L 33*   BUN mg/dL 10   CREATININE mg/dL 0 96   ANION GAP mmol/L 5   CALCIUM mg/dL 8 8   ALBUMIN g/dL 2 9*   TOTAL BILIRUBIN mg/dL 2 43*   ALK PHOS U/L 157*   ALT U/L 41   AST U/L 45*   GLUCOSE RANDOM mg/dL 143*     Results from last 7 days   Lab Units 11/23/22  0446   INR  1 53*         Results from last 7 days   Lab Units 11/21/22  2144   HEMOGLOBIN A1C % 5 9*     Results from last 7 days   Lab Units 11/21/22  2143   LACTIC ACID mmol/L 1 8       Lines/Drains:  Invasive Devices     Peripheral Intravenous Line  Duration           Peripheral IV 11/25/22 Left;Ventral (anterior) Forearm 2 days              Imaging: No new imaging       Recent Cultures (last 7 days):   Results from last 7 days   Lab Units 11/22/22  1757 11/22/22  1545   GRAM STAIN RESULT   --  2+ Polys  No bacteria seen   BODY FLUID CULTURE, STERILE   --  No growth   C DIFF TOXIN B BY PCR  Positive*  --        Last 24 Hours Medication List:   Current Facility-Administered Medications   Medication Dose Route Frequency Provider Last Rate   • acetaminophen  650 mg Oral Q8H PRN [facility], DO     • diphenhydrAMINE  25 mg Oral Q6H PRN [facility], DO     • furosemide  20 mg Oral Daily Danielle Boyer MD     • HYDROmorphone  0 2 mg Intravenous Q3H PRN Brian Myrandaiamathieu, PA-C     • nicotine  7 mg Transdermal Daily Irena Balling, PA-C     • ondansetron  4 mg Intravenous Q6H PRN Irena Balling, PA-C     • oxyCODONE  10 mg Oral Q6H PRN Brian Dimitriadis, PA-C     • oxyCODONE  5 mg Oral Q6H PRN Brian Dimitriadis, PA-C     • pantoprazole  40 mg Oral Early Morning [facility], DO     • rOPINIRole  4 mg Oral 4x Daily Irena Balling, PAUmaC     • simethicone  40 mg Oral Q6H PRN Kori Pedraza PA-C     • spironolactone  25 mg Oral Daily Danielle Boyer MD     • vancomycin  125 mg Oral Q6H [address] Asia Anglin PA-C          Today, Patient Was Seen By: Eb Dupont DO    **Please Note: This note may have been constructed using a voice recognition system  **

## 2022-11-27 NOTE — ASSESSMENT & PLAN NOTE
· History of non-alcoholic cirrhosis who presented with acute decompensation- appears compensated at this time  · CT abd/pelvis (11/21): Findings suggesting colitis  Colonic bowel wall thickening is most pronounced at the site of surgical sutures in the region of the mid transverse colon  Local recurrence should be excluded  Small bowel wall thickening may be due to enteritis, however, could be related to ascites and edema  There is no bowel obstruction  There is colonic diverticulosis without definite evidence of acute diverticulitis  The liver demonstrates cirrhotic morphology  There are findings of portal hypertension, including splenomegaly, ascites, varices     · S/p Paracentesis with 2 2L removed; no evidence of SBP  · Continue Lasix 20mg daily and Aldactone 25mg daily   · GI following; will need further outpatient follow-up with Hepatology

## 2022-11-27 NOTE — PLAN OF CARE
Problem: PAIN - ADULT  Goal: Verbalizes/displays adequate comfort level or baseline comfort level  Description: Interventions:  - Encourage patient to monitor pain and request assistance  - Assess pain using appropriate pain scale  - Administer analgesics based on type and severity of pain and evaluate response  - Implement non-pharmacological measures as appropriate and evaluate response  - Consider cultural and social influences on pain and pain management  - Notify physician/advanced practitioner if interventions unsuccessful or patient reports new pain  Outcome: Progressing     Problem: INFECTION - ADULT  Goal: Absence or prevention of progression during hospitalization  Description: INTERVENTIONS:  - Assess and monitor for signs and symptoms of infection  - Monitor lab/diagnostic results  - Monitor all insertion sites, i e  indwelling lines, tubes, and drains  - Monitor endotracheal if appropriate and nasal secretions for changes in amount and color  - [place] appropriate cooling/warming therapies per order  - Administer medications as ordered  - Instruct and encourage patient and family to use good hand hygiene technique  - Identify and instruct in appropriate isolation precautions for identified infection/condition  Outcome: Progressing  Goal: Absence of fever/infection during neutropenic period  Description: INTERVENTIONS:  - Monitor WBC    Outcome: Progressing     Problem: DISCHARGE PLANNING  Goal: Discharge to home or other facility with appropriate resources  Description: INTERVENTIONS:  - Identify barriers to discharge w/patient and caregiver  - Arrange for needed discharge resources and transportation as appropriate  - Identify discharge learning needs (meds, wound care, etc )  - Arrange for interpretive services to assist at discharge as needed  - Refer to Case Management Department for coordinating discharge planning if the patient needs post-hospital services based on physician/advanced practitioner order or complex needs related to functional status, cognitive ability, or social support system  Outcome: Progressing

## 2022-11-27 NOTE — NURSING NOTE
Reached out to july via phone and psych medurg for carbon via TT to set up a psych consult  No answer noted on either end  MD and nsg supervisor aware

## 2022-11-27 NOTE — ASSESSMENT & PLAN NOTE
· Nursing reports the patient his intermittently stated that we are poisoning his food or that he is seeing things outside the window (wife selling drugs or large deer)  · He answered all other questions appropriately and is possibly related to pain medications  · Ammonia within normal limits  · Psych consultation pending

## 2022-11-28 ENCOUNTER — APPOINTMENT (OUTPATIENT)
Dept: NUCLEAR MEDICINE | Facility: HOSPITAL | Age: 62
End: 2022-11-28

## 2022-11-28 LAB
ALBUMIN SERPL BCP-MCNC: 3 G/DL (ref 3.5–5)
ALP SERPL-CCNC: 149 U/L (ref 34–104)
ALT SERPL W P-5'-P-CCNC: 45 U/L (ref 7–52)
ANION GAP SERPL CALCULATED.3IONS-SCNC: 7 MMOL/L (ref 4–13)
AST SERPL W P-5'-P-CCNC: 46 U/L (ref 13–39)
BILIRUB SERPL-MCNC: 2.66 MG/DL (ref 0.2–1)
BUN SERPL-MCNC: 10 MG/DL (ref 5–25)
CALCIUM ALBUM COR SERPL-MCNC: 9.8 MG/DL (ref 8.3–10.1)
CALCIUM SERPL-MCNC: 9 MG/DL (ref 8.4–10.2)
CHLORIDE SERPL-SCNC: 94 MMOL/L (ref 96–108)
CO2 SERPL-SCNC: 34 MMOL/L (ref 21–32)
CREAT SERPL-MCNC: 0.94 MG/DL (ref 0.6–1.3)
ERYTHROCYTE [DISTWIDTH] IN BLOOD BY AUTOMATED COUNT: 16 % (ref 11.6–15.1)
GFR SERPL CREATININE-BSD FRML MDRD: 86 ML/MIN/1.73SQ M
GLUCOSE SERPL-MCNC: 158 MG/DL (ref 65–140)
HCT VFR BLD AUTO: 33.3 % (ref 36.5–49.3)
HGB BLD-MCNC: 10.9 G/DL (ref 12–17)
MCH RBC QN AUTO: 34.8 PG (ref 26.8–34.3)
MCHC RBC AUTO-ENTMCNC: 32.7 G/DL (ref 31.4–37.4)
MCV RBC AUTO: 106 FL (ref 82–98)
PLATELET # BLD AUTO: 53 THOUSANDS/UL (ref 149–390)
PMV BLD AUTO: 11.8 FL (ref 8.9–12.7)
POTASSIUM SERPL-SCNC: 3.4 MMOL/L (ref 3.5–5.3)
PROT SERPL-MCNC: 6.9 G/DL (ref 6.4–8.4)
RBC # BLD AUTO: 3.13 MILLION/UL (ref 3.88–5.62)
SODIUM SERPL-SCNC: 135 MMOL/L (ref 135–147)
WBC # BLD AUTO: 4.77 THOUSAND/UL (ref 4.31–10.16)

## 2022-11-28 RX ORDER — SODIUM CHLORIDE 9 MG/ML
75 INJECTION, SOLUTION INTRAVENOUS CONTINUOUS
Status: DISCONTINUED | OUTPATIENT
Start: 2022-11-28 | End: 2022-11-30

## 2022-11-28 RX ADMIN — NICOTINE 7 MG: 7 PATCH, EXTENDED RELEASE TRANSDERMAL at 10:02

## 2022-11-28 RX ADMIN — OXYCODONE HYDROCHLORIDE 10 MG: 10 TABLET ORAL at 01:56

## 2022-11-28 RX ADMIN — ROPINIROLE 4 MG: 2 TABLET, FILM COATED ORAL at 17:16

## 2022-11-28 RX ADMIN — PANTOPRAZOLE SODIUM 40 MG: 40 TABLET, DELAYED RELEASE ORAL at 05:07

## 2022-11-28 RX ADMIN — ROPINIROLE 4 MG: 2 TABLET, FILM COATED ORAL at 10:02

## 2022-11-28 RX ADMIN — DICYCLOMINE HYDROCHLORIDE 20 MG: 20 TABLET ORAL at 11:46

## 2022-11-28 RX ADMIN — OXYCODONE HYDROCHLORIDE 10 MG: 10 TABLET ORAL at 20:14

## 2022-11-28 RX ADMIN — ROPINIROLE 4 MG: 2 TABLET, FILM COATED ORAL at 20:58

## 2022-11-28 RX ADMIN — VANCOMYCIN HYDROCHLORIDE 125 MG: 125 CAPSULE ORAL at 11:47

## 2022-11-28 RX ADMIN — SODIUM CHLORIDE 75 ML/HR: 0.9 INJECTION, SOLUTION INTRAVENOUS at 18:01

## 2022-11-28 RX ADMIN — VANCOMYCIN HYDROCHLORIDE 125 MG: 125 CAPSULE ORAL at 05:07

## 2022-11-28 RX ADMIN — FUROSEMIDE 20 MG: 20 TABLET ORAL at 10:02

## 2022-11-28 RX ADMIN — OLANZAPINE 2.5 MG: 5 TABLET, ORALLY DISINTEGRATING ORAL at 20:57

## 2022-11-28 RX ADMIN — SPIRONOLACTONE 25 MG: 25 TABLET ORAL at 10:02

## 2022-11-28 RX ADMIN — ROPINIROLE 4 MG: 2 TABLET, FILM COATED ORAL at 11:47

## 2022-11-28 RX ADMIN — SINCALIDE 1.7 MCG: 5 INJECTION, POWDER, LYOPHILIZED, FOR SOLUTION INTRAVENOUS at 08:36

## 2022-11-28 RX ADMIN — VANCOMYCIN HYDROCHLORIDE 125 MG: 125 CAPSULE ORAL at 17:16

## 2022-11-28 RX ADMIN — HYDROMORPHONE HYDROCHLORIDE 0.2 MG: 0.2 INJECTION, SOLUTION INTRAMUSCULAR; INTRAVENOUS; SUBCUTANEOUS at 05:07

## 2022-11-28 NOTE — PROGRESS NOTES
Antonia 45  Progress Note Cesilia Thompson 1960, 58 y o  male MRN: [id number]  Unit/Bed#: -01 Encounter: [id number]  Primary Care Provider: No primary care provider on file  Date and time admitted to hospital: 11/21/2022  9:10 PM    * Abdominal pain  Assessment & Plan  · Improved but persists  · Multifactorial:-  · 1  Cirrhosis of the liver with ascites-see below, status post a paracentesis-see below  · 2  Colitis-see below  · 3  Right upper quadrant ultrasound results reviewed-patient to go for HIDA scan today  · Appreciate GI input  · Appreciate general surgical input  · +/- endoscopy/colonoscopy on this admission  · Follow up on HIDA scan results today, after which will start disposition planning  · Continue Tylenol for mild pain, oxycodone for moderate pain, and intravenous Dilaudid for severe pain    Cirrhosis of liver with ascites ([facility] [address] )  Assessment & Plan  · Patient has a history of nonalcoholic cirrhosis who presented with acute decompensation- appears compensated at this time  · CT abd/pelvis (11/21):  Results reviewed  · S/p Paracentesis on 11/22/2022 with 2 2L removed; no evidence of SBP  · Continue Lasix 20mg daily and Aldactone 25mg daily   · GI following; will need further outpatient follow-up with Hepatology     Colitis  Assessment & Plan  · Management as per GI  · Stool culture negative  · C diff positive as per PCR testing, but negative as per EIA testing   · Continue vancomycin 125 mg p o  q 6 hours - day 6 - (End date: 12/3)  · Defer EGD and colonoscopy testing decision in regard to inpatient versus outpatient to GI    Pericardial effusion  Assessment & Plan  · Noted on CT abd/pelvis (11/21)  · TTE (11/22): LVEF 60%  Systolic function is normal  Wall motion is normal  There is a trivial pericardial effusion  There is no echocardiographic evidence of tamponade     · Continue to monitor      Thrombocytopenia [facility])  Assessment & Plan  · Likely related to Cirrhosis as above  · Plts: 53  · Continue to monitor     Anemia  Assessment & Plan  · Hemoglobin is 10 9 today, stable, no evidence of bleeding  · Continue Protonix 40mg daily       Paranoia ([address] )  Assessment & Plan  · Status post a psych evaluation  · Possibly secondary to his underlying liver disease  · Okay for p r n  Zyprexa as needed        VTE Prophylaxis:  Pharmacologic VTE Prophylaxis contraindicated due to Thrombocytopenia, SCDs only    Patient Centered Rounds: I have performed bedside rounds with nursing staff today  Discussions with Specialists or Other Care Team Provider:  GI, General surgery, case management, nursing, pharmacy  Education and Discussions with Family / Patient:  Patient was brought up to par    Current Length of Stay: 7 day(s)    Current Patient Status: Inpatient   Certification Statement: The patient will continue to require additional inpatient hospital stay due to The need for continued management of abdominal pain    Discharge Plan:  Hopeful discharge planning in 24-48 hours once cleared by GI    Code Status: Level 1 - Full Code    Subjective:   Patient seen and examined, resting in bed appears comfortable, has no new complaints  Patient demonstrates some paranoia    Objective:     Vitals:   Temp (24hrs), Av 7 °F (36 5 °C), Min:97 6 °F (36 4 °C), Max:97 7 °F (36 5 °C)    Temp:  [97 6 °F (36 4 °C)-97 7 °F (36 5 °C)] 97 6 °F (36 4 °C)  HR:  [67-85] 85  Resp:  [20] 20  BP: (108-130)/(42-69) 130/69  SpO2:  [97 %] 97 %  Body mass index is 30 07 kg/m²  Input and Output Summary (last 24 hours): Intake/Output Summary (Last 24 hours) at 2022 0831  Last data filed at 2022 0700  Gross per 24 hour   Intake 360 ml   Output 100 ml   Net 260 ml       Physical Exam:   Physical Exam  Vitals and nursing note reviewed  Constitutional:       General: He is not in acute distress  Appearance: Normal appearance  He is not ill-appearing     HENT:      Head: Normocephalic and atraumatic  Nose: Nose normal    Eyes:      Extraocular Movements: Extraocular movements intact  Pupils: Pupils are equal, round, and reactive to light  Cardiovascular:      Rate and Rhythm: Normal rate and regular rhythm  Pulses: Normal pulses  Heart sounds: Normal heart sounds  No murmur heard  No friction rub  No gallop  Pulmonary:      Effort: Pulmonary effort is normal       Breath sounds: Normal breath sounds  Abdominal:      General: There is no distension  Palpations: Abdomen is soft  There is no mass  Tenderness: There is abdominal tenderness  There is no guarding or rebound  Comments: Tender to palpation right upper quadrant, no rebound, no guarding   Musculoskeletal:         General: No swelling or tenderness  Normal range of motion  Cervical back: Normal range of motion and neck supple  No rigidity  No muscular tenderness  Right lower leg: No edema  Left lower leg: No edema  Skin:     General: Skin is warm  Capillary Refill: Capillary refill takes less than 2 seconds  Findings: No erythema or rash  Neurological:      General: No focal deficit present  Mental Status: He is alert and oriented to person, place, and time  Mental status is at baseline  Psychiatric:         Mood and Affect: Mood normal          Behavior: Behavior normal          Additional Data:     Labs:    Results from last 7 days   Lab Units 11/28/22  0502 11/22/22  0618 11/21/22  2144   WBC Thousand/uL 4 77   < > 6 73   HEMOGLOBIN g/dL 10 9*   < > 12 1   HEMATOCRIT % 33 3*   < > 37 6   PLATELETS Thousands/uL 53*   < > 59*   NEUTROS PCT %  --   --  77*   LYMPHS PCT %  --   --  13*   MONOS PCT %  --   --  5   EOS PCT %  --   --  5    < > = values in this interval not displayed       Results from last 7 days   Lab Units 11/28/22  0502   SODIUM mmol/L 135   POTASSIUM mmol/L 3 4*   CHLORIDE mmol/L 94*   CO2 mmol/L 34*   BUN mg/dL 10   CREATININE mg/dL 0 94 CALCIUM mg/dL 9 0   ALK PHOS U/L 149*   ALT U/L 45   AST U/L 46*     Results from last 7 days   Lab Units 11/23/22  0446   INR  1 53*         Results from last 7 days   Lab Units 11/21/22  2144   HEMOGLOBIN A1C % 5 9*       * I Have Reviewed All Lab Data Listed Above  * Additional Pertinent Lab Tests Reviewed:  [address] Admission  Reviewed    Imaging:  Imaging Reports Reviewed Today Include:  None    Recent Cultures (last 7 days):     Results from last 7 days   Lab Units 11/22/22  1757 11/22/22  1545   GRAM STAIN RESULT   --  2+ Polys  No bacteria seen   BODY FLUID CULTURE, STERILE   --  No growth   C DIFF TOXIN B BY PCR  Positive*  --        Last 24 Hours Medication List:   Current Facility-Administered Medications   Medication Dose Route Frequency Provider Last Rate   • acetaminophen  650 mg Oral Q8H PRN [facility], DO     • dicyclomine  20 mg Oral BID before breakfast/lunch Sunday MD John     • diphenhydrAMINE  25 mg Oral Q6H PRN [facility], DO     • furosemide  20 mg Oral Daily Danielle Boyer MD     • HYDROmorphone  0 2 mg Intravenous Q3H PRN Brian Leger PA-C     • nicotine  7 mg Transdermal Daily Deonte Jalloh PA-C     • OLANZapine  2 5 mg Oral HS Sergio Zuluaga PA-C     • OLANZapine  2 5 mg Intramuscular Once PRN Jessika Corea PA-C     • ondansetron  4 mg Intravenous Q6H PRN Deonte Jalloh PA-C     • oxyCODONE  10 mg Oral Q6H PRN Brian Leger PA-C     • oxyCODONE  5 mg Oral Q6H PRN Brian Leger PA-C     • pantoprazole  40 mg Oral Early Morning [facility], DO     • rOPINIRole  4 mg Oral 4x Daily Deonte Jalloh PA-C     • simethicone  40 mg Oral Q6H PRN Jose Eduardo Nice PA-C     • spironolactone  25 mg Oral Daily Danielle Boyer MD     • vancomycin  125 mg Oral Q6H [address] Donny Gaines PA-C          Today, Patient Was Seen By: Norberto Witt MD    ** Please Note: Dictation voice to text software may have been used in the creation of this document   **

## 2022-11-28 NOTE — ASSESSMENT & PLAN NOTE
· Patient has a history of nonalcoholic cirrhosis who presented with acute decompensation- appears compensated at this time  · CT abd/pelvis (11/21):  Results reviewed     · S/p Paracentesis on 11/22/2022 with 2 2L removed; no evidence of SBP  · Continue Lasix 20mg daily and Aldactone 25mg daily   · GI following; will need further outpatient follow-up with Hepatology

## 2022-11-28 NOTE — ASSESSMENT & PLAN NOTE
· Improved but persists  · Multifactorial:-  · 1  Cirrhosis of the liver with ascites-see below, status post a paracentesis-see below  · 2  Colitis-see below  · 3   Right upper quadrant ultrasound results reviewed-patient to go for HIDA scan today  · Appreciate GI input  · Appreciate general surgical input  · +/- endoscopy/colonoscopy on this admission  · Follow up on HIDA scan results today, after which will start disposition planning  · Continue Tylenol for mild pain, oxycodone for moderate pain, and intravenous Dilaudid for severe pain

## 2022-11-28 NOTE — ASSESSMENT & PLAN NOTE
· Status post a psych evaluation  · Possibly secondary to his underlying liver disease  · Okay for p r n   Zyprexa as needed

## 2022-11-28 NOTE — PROGRESS NOTES
Progress Note- Vincent Lopez 58 y o  male MRN: [id number]    Unit/Bed#: -01 Encounter: [id number]      Assessment and Plan:    Patient is a 41-year-old male with PMH significant for cirrhosis secondary to CHRISTIE d/b ascites, CRC s/p right hemicolectomy and adjuvant chemotherapy and DM2 admitted on 11/22 for abdominal pain and chronic diarrhea with CT notable for cirrhotic morphology with pHTN and findings suggestive of a nonspecific colitis with possible enteritis  Patient s/p paracentesis with 2 5 L removed and fluid analysis negative for SBP  C diff stool testing consistent with colonization       1  Abnormal findings on imaging  2  History of CRC s/p right hemicolectomy  3  Chronic diarrhea with C diff colonization  Patient with chronic diarrhea x?months and is currently receiving oral vancomycin given symptomatology, immunocompromised state and colitis on imaging in the setting of C diff colonization  Patient with improved stool consistency and decreased frequency  However, differential also includes other ischemic, infectious, and less likely inflammatory colitis, as well as, tumor recurrence at the anastomosis given history of CRC and mildly elevated CEA (5 7)  - Continue oral vancomycin x10 days total  - Monitor fever curve, WBC count, abdominal exams closely  - Monitor stool output (consistency and frequency)  - Will arrange for close outpatient GI and hepatology follow-up, as well as, expedited colonoscopy given history of CRC s/p right hemicolectomy and elevated CEA (5 7)    4  RUQ abdominal pain  Patient with persistent RUQ abdominal pain, without clinical evidence of obstruction, and s/p RUQ U/S notable for circumferential wall thickening of the gallbladder without evidence of cholelithiasis, acute cholecystitis or CBD dilatation  Patient underwent HIDA w/ CCK notable for normal hepatobiliary study and contractile response   Given patient ate lunch, will plan for EGD tomorrow afternoon (11/29) for further evaluation of RUQ abd pain  - OK diet; NPO at midnight  - Plan for EGD tomorrow afternoon (11/29)  - Continue PPI once daily  - Continue Bentyl twice daily     5  Cirrhosis secondary to CHRISTIE d/b ascites  6  Portal hypertension  7  Thrombocytopenia  Patient with cirrhosis likely secondary to CHRISTIE d/b ascites  Patient with poor insight regarding his liver disease  Current MELD-Na (15) and [employer] 9B  Ascites - Patient with ascites s/p LVP with fluid analysis negative for SBP and high SAAG/low protein suggestive of ascite from pHTN; Continue oral diuretics (Lasix 20/Aldactone 25); Low-sodium diet (less than 2 g daily)  Esophageal varices - No previous EGD available for review; Patient to undergo EGD tomorrow for further evaluation of RUQ abd pain, can also screen for EV at that time  Hepatic encephalopathy - Patient with paranoid thoughts, although no evidence of asterixis on exam and ammonia WNL; Will continue to monitor, appreciate psych recommendations  [facility] [address]  screening - No liver lesion noted on RUQ U/S  Consider AFP prior to discharge      GI will continue to follow  ______________________________________________________________________    Subjective:     Patient found resting comfortably after returning from HIDA scan  No acute overnight events  Patient reports persistent generalized abdominal pain, more-so of the right upper quadrant  Also reports improved consistency and decreased frequency of stool output  Denies any additional GI related complaints        Medication Administration - last 24 hours from 11/27/2022 0922 to 11/28/2022 1537       Date/Time Order Dose Route Action Action by     11/27/2022 2117 EST rOPINIRole (REQUIP) tablet 4 mg 4 mg Oral Given Celeste Fortune RN     11/27/2022 1814 EST rOPINIRole (REQUIP) tablet 4 mg 4 mg Oral Given Junior Alen RN     11/27/2022 1152 EST rOPINIRole (REQUIP) tablet 4 mg 4 mg Oral Given Junior Alen RN     11/28/2022 0507 EST pantoprazole (PROTONIX) EC tablet 40 mg 40 mg Oral Given Kelly Johnson RN     11/28/2022 0507 EST vancomycin (VANCOCIN) capsule 125 mg 125 mg Oral Given Kelly Johnson RN     11/27/2022 2325 EST vancomycin (VANCOCIN) capsule 125 mg 125 mg Oral Given Kelly Johnson RN     11/27/2022 1814 EST vancomycin (VANCOCIN) capsule 125 mg 125 mg Oral Given Robi Nesbitt RN     11/27/2022 1152 EST vancomycin (VANCOCIN) capsule 125 mg 125 mg Oral Given Robi Nesbitt RN     11/28/2022 0156 EST oxyCODONE (ROXICODONE) immediate release tablet 10 mg 10 mg Oral Given Kelly Johnson RN     11/27/2022 1938 EST oxyCODONE (ROXICODONE) immediate release tablet 10 mg 10 mg Oral Given Kelly Johnson RN     11/28/2022 0507 EST HYDROmorphone HCl (DILAUDID) injection 0 2 mg 0 2 mg Intravenous Given Kelly Johnson RN     11/28/2022 0778 EST dicyclomine (BENTYL) tablet 20 mg 0 mg Oral Hold Letty Ohara RN     11/27/2022 1223 EST dicyclomine (BENTYL) tablet 20 mg 20 mg Oral Given Letty Ohara RN     11/27/2022 2117 EST OLANZapine (ZyPREXA ZYDIS) dispersible tablet 2 5 mg 2 5 mg Oral Given Kelly Johnson RN          Objective:     Vitals: Blood pressure 130/69, pulse 85, temperature 97 6 °F (36 4 °C), temperature source Temporal, resp  rate 20, height 5' 7" (1 702 m), weight 87 1 kg (192 lb 0 3 oz), SpO2 97 %  ,Body mass index is 30 07 kg/m²  Intake/Output Summary (Last 24 hours) at 11/28/2022 8478  Last data filed at 11/28/2022 0700  Gross per 24 hour   Intake 360 ml   Output 100 ml   Net 260 ml       Physical Exam:   General Appearance: Awake and alert, in no acute distress  Abdomen: Soft, non-tender, non-distended; bowel sounds normal; no masses or no organomegaly    Invasive Devices     Peripheral Intravenous Line  Duration           Peripheral IV 11/28/22 Left Antecubital <1 day                Lab Results:  No results displayed because visit has over 200 results  Imaging Studies: I have personally reviewed pertinent imaging studies

## 2022-11-28 NOTE — PROGRESS NOTES
Progress Note - General Surgery   Dori Heredia 58 y o  male MRN: [id number]  Unit/Bed#: -01 Encounter: [id number]    Assessment:  58year old male with past medical history significant for reported stage IV colon cancer s/p right hemicolectomy, CHRISTIE presenting with acute, intractable abdominal pain   -AVSS on room air   - cc recorded  -No leukocytosis, WBC 4 77  -Hgb 10 9  -Cr 0 94   -hypokalemia, 3 4  -LFTs with AST 46, ALT 45, Alk-phos 149   -TBili 2 66  -Stool studies with colonization of cdiff but no active infection  -HIDA 11/28/2022 with "Normal hepatobiliary study  Normal contractile response of the gallbladder to cholecystokinin infusion   (53%) "    Plan:  CLD, NPO @ MN for EGD per GI  IVF @ 76, may be discontinued following procedure  Serial abdominal exams   PO vanc   Medical management per SLIM, appreciate recommendations   GI following, appreciate recommendations   Evaluated at bedside with attending     Subjective/Objective   Subjective: No acute overnight events  Patient with reports of ongoing abdominal pain but would like something more to eat  Objective:   Blood pressure 130/69, pulse 85, temperature 97 6 °F (36 4 °C), temperature source Temporal, resp  rate 20, height 5' 7" (1 702 m), weight 87 1 kg (192 lb 0 3 oz), SpO2 97 %  ,Body mass index is 30 07 kg/m²  Intake/Output Summary (Last 24 hours) at 11/28/2022 1136  Last data filed at 11/28/2022 0700  Gross per 24 hour   Intake 360 ml   Output 100 ml   Net 260 ml       Invasive Devices     Peripheral Intravenous Line  Duration           Peripheral IV 11/28/22 Left Antecubital <1 day              Physical Exam  Vitals and nursing note reviewed  Constitutional:       General: He is not in acute distress  Appearance: Normal appearance  He is obese  He is not ill-appearing or toxic-appearing  HENT:      Head: Normocephalic and atraumatic  Cardiovascular:      Rate and Rhythm: Normal rate and regular rhythm  Pulses: Normal pulses  Heart sounds: Normal heart sounds  No murmur heard  No friction rub  No gallop  Pulmonary:      Effort: Pulmonary effort is normal  No respiratory distress  Breath sounds: Normal breath sounds  No wheezing, rhonchi or rales  Abdominal:      General: Bowel sounds are normal  There is no distension  Palpations: Abdomen is soft  Tenderness: There is abdominal tenderness in the right upper quadrant and epigastric area  There is no guarding or rebound  Musculoskeletal:         General: Normal range of motion  Right lower leg: No edema  Left lower leg: No edema  Skin:     General: Skin is warm and dry  Coloration: Skin is not jaundiced  Neurological:      General: No focal deficit present  Mental Status: He is alert and oriented to person, place, and time  Lab, Imaging and other studies:  I have personally reviewed pertinent lab results      CBC:   Lab Results   Component Value Date    WBC 4 77 11/28/2022    HGB 10 9 (L) 11/28/2022    HCT 33 3 (L) 11/28/2022     (H) 11/28/2022    PLT 53 (L) 11/28/2022    MCH 34 8 (H) 11/28/2022    MCHC 32 7 11/28/2022    RDW 16 0 (H) 11/28/2022    MPV 11 8 11/28/2022     CMP:   Lab Results   Component Value Date    SODIUM 135 11/28/2022    K 3 4 (L) 11/28/2022    CL 94 (L) 11/28/2022    CO2 34 (H) 11/28/2022    BUN 10 11/28/2022    CREATININE 0 94 11/28/2022    CALCIUM 9 0 11/28/2022    AST 46 (H) 11/28/2022    ALT 45 11/28/2022    ALKPHOS 149 (H) 11/28/2022    EGFR 86 11/28/2022     VTE Pharmacologic Prophylaxis: Reason for no pharmacologic prophylaxis thrombocytopenia  VTE Mechanical Prophylaxis: sequential compression device    Alejandra Katz PA-C

## 2022-11-28 NOTE — NURSING NOTE
Pt pacing in the room near the window, claiming he's seeing his wife cheating on him with his boss in the parking lot  Patient reoriented but had refused to accept that what he's seeing is not true and wanting to leave  Hospitalist made aware of situation  Pt was not showing aggressive behavior and was redirectable  Will continue to monitor and reassess

## 2022-11-28 NOTE — ASSESSMENT & PLAN NOTE
· Management as per GI  · Stool culture negative  · C diff positive as per PCR testing, but negative as per EIA testing   · Continue vancomycin 125 mg p o  q 6 hours - day 6 - (End date: 12/3)  · Defer EGD and colonoscopy testing decision in regard to inpatient versus outpatient to GI

## 2022-11-29 ENCOUNTER — APPOINTMENT (INPATIENT)
Dept: PERIOP | Facility: HOSPITAL | Age: 62
End: 2022-11-29

## 2022-11-29 ENCOUNTER — ANESTHESIA EVENT (INPATIENT)
Dept: GASTROENTEROLOGY | Facility: HOSPITAL | Age: 62
End: 2022-11-29

## 2022-11-29 ENCOUNTER — ANESTHESIA (INPATIENT)
Dept: GASTROENTEROLOGY | Facility: HOSPITAL | Age: 62
End: 2022-11-29

## 2022-11-29 PROBLEM — F17.200 SMOKING: Status: ACTIVE | Noted: 2022-11-22

## 2022-11-29 LAB
ALBUMIN SERPL BCP-MCNC: 2.5 G/DL (ref 3.5–5)
ALP SERPL-CCNC: 136 U/L (ref 34–104)
ALT SERPL W P-5'-P-CCNC: 35 U/L (ref 7–52)
ANION GAP SERPL CALCULATED.3IONS-SCNC: 5 MMOL/L (ref 4–13)
AST SERPL W P-5'-P-CCNC: 45 U/L (ref 13–39)
ATRIAL RATE: 53 BPM
BASOPHILS # BLD AUTO: 0.02 THOUSANDS/ÂΜL (ref 0–0.1)
BASOPHILS NFR BLD AUTO: 1 % (ref 0–1)
BILIRUB SERPL-MCNC: 2.25 MG/DL (ref 0.2–1)
BUN SERPL-MCNC: 11 MG/DL (ref 5–25)
CALCIUM ALBUM COR SERPL-MCNC: 9.3 MG/DL (ref 8.3–10.1)
CALCIUM SERPL-MCNC: 8.1 MG/DL (ref 8.4–10.2)
CHLORIDE SERPL-SCNC: 99 MMOL/L (ref 96–108)
CO2 SERPL-SCNC: 34 MMOL/L (ref 21–32)
CREAT SERPL-MCNC: 1 MG/DL (ref 0.6–1.3)
EOSINOPHIL # BLD AUTO: 0.2 THOUSAND/ÂΜL (ref 0–0.61)
EOSINOPHIL NFR BLD AUTO: 5 % (ref 0–6)
ERYTHROCYTE [DISTWIDTH] IN BLOOD BY AUTOMATED COUNT: 16 % (ref 11.6–15.1)
GFR SERPL CREATININE-BSD FRML MDRD: 80 ML/MIN/1.73SQ M
GLUCOSE SERPL-MCNC: 74 MG/DL (ref 65–140)
GLUCOSE SERPL-MCNC: 98 MG/DL (ref 65–140)
HCT VFR BLD AUTO: 31.2 % (ref 36.5–49.3)
HGB BLD-MCNC: 10.1 G/DL (ref 12–17)
IMM GRANULOCYTES # BLD AUTO: 0.01 THOUSAND/UL (ref 0–0.2)
IMM GRANULOCYTES NFR BLD AUTO: 0 % (ref 0–2)
LYMPHOCYTES # BLD AUTO: 0.95 THOUSANDS/ÂΜL (ref 0.6–4.47)
LYMPHOCYTES NFR BLD AUTO: 23 % (ref 14–44)
MCH RBC QN AUTO: 34.5 PG (ref 26.8–34.3)
MCHC RBC AUTO-ENTMCNC: 32.4 G/DL (ref 31.4–37.4)
MCV RBC AUTO: 107 FL (ref 82–98)
MONOCYTES # BLD AUTO: 0.28 THOUSAND/ÂΜL (ref 0.17–1.22)
MONOCYTES NFR BLD AUTO: 7 % (ref 4–12)
NEUTROPHILS # BLD AUTO: 2.67 THOUSANDS/ÂΜL (ref 1.85–7.62)
NEUTS SEG NFR BLD AUTO: 64 % (ref 43–75)
NRBC BLD AUTO-RTO: 0 /100 WBCS
P AXIS: 3 DEGREES
PLATELET # BLD AUTO: 53 THOUSANDS/UL (ref 149–390)
PMV BLD AUTO: 12 FL (ref 8.9–12.7)
POTASSIUM SERPL-SCNC: 3.8 MMOL/L (ref 3.5–5.3)
PR INTERVAL: 122 MS
PROT SERPL-MCNC: 6 G/DL (ref 6.4–8.4)
QRS AXIS: 81 DEGREES
QRSD INTERVAL: 76 MS
QT INTERVAL: 530 MS
QTC INTERVAL: 497 MS
RBC # BLD AUTO: 2.93 MILLION/UL (ref 3.88–5.62)
SODIUM SERPL-SCNC: 138 MMOL/L (ref 135–147)
T WAVE AXIS: 65 DEGREES
VENTRICULAR RATE: 53 BPM
WBC # BLD AUTO: 4.13 THOUSAND/UL (ref 4.31–10.16)

## 2022-11-29 PROCEDURE — 0DB68ZX EXCISION OF STOMACH, VIA NATURAL OR ARTIFICIAL OPENING ENDOSCOPIC, DIAGNOSTIC: ICD-10-PCS | Performed by: STUDENT IN AN ORGANIZED HEALTH CARE EDUCATION/TRAINING PROGRAM

## 2022-11-29 PROCEDURE — 0DB38ZX EXCISION OF LOWER ESOPHAGUS, VIA NATURAL OR ARTIFICIAL OPENING ENDOSCOPIC, DIAGNOSTIC: ICD-10-PCS | Performed by: STUDENT IN AN ORGANIZED HEALTH CARE EDUCATION/TRAINING PROGRAM

## 2022-11-29 RX ORDER — EPHEDRINE SULFATE 50 MG/ML
INJECTION INTRAVENOUS AS NEEDED
Status: DISCONTINUED | OUTPATIENT
Start: 2022-11-29 | End: 2022-11-29

## 2022-11-29 RX ORDER — PANTOPRAZOLE SODIUM 40 MG/1
40 TABLET, DELAYED RELEASE ORAL
Status: DISCONTINUED | OUTPATIENT
Start: 2022-11-30 | End: 2022-12-01 | Stop reason: HOSPADM

## 2022-11-29 RX ORDER — PROPOFOL 10 MG/ML
INJECTION, EMULSION INTRAVENOUS AS NEEDED
Status: DISCONTINUED | OUTPATIENT
Start: 2022-11-29 | End: 2022-11-29

## 2022-11-29 RX ORDER — LIDOCAINE HYDROCHLORIDE 10 MG/ML
INJECTION, SOLUTION EPIDURAL; INFILTRATION; INTRACAUDAL; PERINEURAL AS NEEDED
Status: DISCONTINUED | OUTPATIENT
Start: 2022-11-29 | End: 2022-11-29

## 2022-11-29 RX ORDER — ONDANSETRON 2 MG/ML
4 INJECTION INTRAMUSCULAR; INTRAVENOUS ONCE AS NEEDED
Status: DISCONTINUED | OUTPATIENT
Start: 2022-11-29 | End: 2022-11-30

## 2022-11-29 RX ADMIN — PROPOFOL 20 MG: 10 INJECTION, EMULSION INTRAVENOUS at 14:54

## 2022-11-29 RX ADMIN — PROPOFOL 30 MG: 10 INJECTION, EMULSION INTRAVENOUS at 14:51

## 2022-11-29 RX ADMIN — SODIUM CHLORIDE 75 ML/HR: 0.9 INJECTION, SOLUTION INTRAVENOUS at 07:42

## 2022-11-29 RX ADMIN — VANCOMYCIN HYDROCHLORIDE 125 MG: 125 CAPSULE ORAL at 17:23

## 2022-11-29 RX ADMIN — VANCOMYCIN HYDROCHLORIDE 125 MG: 125 CAPSULE ORAL at 23:45

## 2022-11-29 RX ADMIN — ONDANSETRON 4 MG: 2 INJECTION INTRAMUSCULAR; INTRAVENOUS at 23:45

## 2022-11-29 RX ADMIN — NICOTINE 7 MG: 7 PATCH, EXTENDED RELEASE TRANSDERMAL at 09:18

## 2022-11-29 RX ADMIN — OLANZAPINE 2.5 MG: 5 TABLET, ORALLY DISINTEGRATING ORAL at 21:54

## 2022-11-29 RX ADMIN — HYDROMORPHONE HYDROCHLORIDE 0.2 MG: 0.2 INJECTION, SOLUTION INTRAMUSCULAR; INTRAVENOUS; SUBCUTANEOUS at 16:07

## 2022-11-29 RX ADMIN — VANCOMYCIN HYDROCHLORIDE 125 MG: 125 CAPSULE ORAL at 05:08

## 2022-11-29 RX ADMIN — POLYETHYLENE GLYCOL 3350, SODIUM SULFATE ANHYDROUS, SODIUM BICARBONATE, SODIUM CHLORIDE, POTASSIUM CHLORIDE 4000 ML: 236; 22.74; 6.74; 5.86; 2.97 POWDER, FOR SOLUTION ORAL at 17:21

## 2022-11-29 RX ADMIN — PANTOPRAZOLE SODIUM 40 MG: 40 TABLET, DELAYED RELEASE ORAL at 05:09

## 2022-11-29 RX ADMIN — EPHEDRINE SULFATE 10 MG: 50 INJECTION INTRAVENOUS at 14:57

## 2022-11-29 RX ADMIN — HYDROMORPHONE HYDROCHLORIDE 0.2 MG: 0.2 INJECTION, SOLUTION INTRAMUSCULAR; INTRAVENOUS; SUBCUTANEOUS at 07:51

## 2022-11-29 RX ADMIN — OXYCODONE HYDROCHLORIDE 10 MG: 10 TABLET ORAL at 05:08

## 2022-11-29 RX ADMIN — ONDANSETRON 4 MG: 2 INJECTION INTRAMUSCULAR; INTRAVENOUS at 15:28

## 2022-11-29 RX ADMIN — VANCOMYCIN HYDROCHLORIDE 125 MG: 125 CAPSULE ORAL at 00:31

## 2022-11-29 RX ADMIN — DICYCLOMINE HYDROCHLORIDE 20 MG: 20 TABLET ORAL at 15:25

## 2022-11-29 RX ADMIN — OXYCODONE HYDROCHLORIDE 10 MG: 10 TABLET ORAL at 23:54

## 2022-11-29 RX ADMIN — HYDROMORPHONE HYDROCHLORIDE 0.2 MG: 0.2 INJECTION, SOLUTION INTRAMUSCULAR; INTRAVENOUS; SUBCUTANEOUS at 19:55

## 2022-11-29 RX ADMIN — DICYCLOMINE HYDROCHLORIDE 20 MG: 20 TABLET ORAL at 06:25

## 2022-11-29 RX ADMIN — PROPOFOL 50 MG: 10 INJECTION, EMULSION INTRAVENOUS at 14:50

## 2022-11-29 RX ADMIN — LIDOCAINE HYDROCHLORIDE 50 MG: 10 INJECTION, SOLUTION EPIDURAL; INFILTRATION; INTRACAUDAL at 14:50

## 2022-11-29 RX ADMIN — ROPINIROLE 4 MG: 2 TABLET, FILM COATED ORAL at 21:54

## 2022-11-29 RX ADMIN — ROPINIROLE 4 MG: 2 TABLET, FILM COATED ORAL at 17:23

## 2022-11-29 RX ADMIN — PROPOFOL 20 MG: 10 INJECTION, EMULSION INTRAVENOUS at 14:55

## 2022-11-29 NOTE — ASSESSMENT & PLAN NOTE
· Improved but persists  · Multifactorial:-  · 1  Cirrhosis of the liver with ascites-see below, status post a paracentesis-see below  · 2  Colitis-see below  · 3   Right upper quadrant ultrasound results reviewed-HIDA scan is negative  · Appreciate GI input  · Appreciate general surgical input  · Status post an EGD earlier today-patient found to have severe antral gastritis - Protonix increased to b i d  dosing  · Case reviewed with GI - patient scheduled for colonoscopy for tomorrow

## 2022-11-29 NOTE — PROGRESS NOTES
Pt increasingly became anxious/distressed stating he is having his continued abd pain including chest pain  Pt also adamant about changing IV to right arm, because he needed to bend his left arm (IV in [facility])  Pt increasing became distressed about his IV being in the left arm  Oral prn pain meds given as ordered  IV site was changed to right arm, while changing IV site,  pt increasing became agitated, and SOB  Pt vitals were stable with O2 100%  Explained to patient to breathe slow and through his nose, but was not able to follow direction  Pt started jolting his extremities  On call hospitalist made aware of patient's state, was given okay to give oral scheduled zyprexa early, no further new orders  Pt was able to calm down after med was administered  Pt able to sleep through night with no further distress

## 2022-11-29 NOTE — PROGRESS NOTES
Progress Note - General Surgery   Amy Hewitt 58 y o  male MRN: [id number]  Unit/Bed#: -01 Encounter: [id number]    Assessment:  58year old male with past medical history significant for reported stage IV colon cancer s/p right hemicolectomy, CHRISTIE presenting with acute, intractable abdominal pain  HD#8   -Afebrile, hypotensive with SBP 80s to 100s, other vital signs stable on room air   -UO not recorded  -WBC 4 13  -Hgb 10 1  -Plts 53  -Cr 1 00  -LFTs with AST 45, ALT 35, Alk-Phos 136  -TBili 2 25  -HIDA 11/28/2022 with "Normal hepatobiliary study  Normal contractile response of the gallbladder to cholecystokinin infusion  (53%) "    Plan:  EGD per GI  NPO in preparation for EGD, diet may be advanced following procedure   ECG obtained this morning   IVF  Serial abdominal exams  Medical management per SLIM  GI following, appreciate recommendations   To be evaluated at bedside with attending     Subjective/Objective   Subjective: Patient with acute episode of anxiety/chest pain last evening  Overnight hospitalist contacted and instructed to give oral zyprexa before patient due for medication  Upon evaluation this morning patient with continue complaints of abdominal pain and nonradiating chest pain  Objective:   Blood pressure 96/58, pulse 64, temperature 97 8 °F (36 6 °C), temperature source Axillary, resp  rate 16, height 5' 7" (1 702 m), weight 87 1 kg (192 lb 0 3 oz), SpO2 99 %  ,Body mass index is 30 07 kg/m²  Intake/Output Summary (Last 24 hours) at 11/29/2022 0751  Last data filed at 11/28/2022 2015  Gross per 24 hour   Intake 360 ml   Output --   Net 360 ml       Invasive Devices     Peripheral Intravenous Line  Duration           Peripheral IV 11/28/22 Dorsal (posterior); Right Forearm <1 day              Physical Exam  Vitals and nursing note reviewed  Constitutional:       General: He is not in acute distress  Appearance: Normal appearance  He is obese   He is not ill-appearing or toxic-appearing  HENT:      Head: Normocephalic and atraumatic  Cardiovascular:      Rate and Rhythm: Normal rate and regular rhythm  Pulses: Normal pulses  Heart sounds: Normal heart sounds  No murmur heard  No friction rub  No gallop  Pulmonary:      Effort: Pulmonary effort is normal  No respiratory distress  Breath sounds: Normal breath sounds  No wheezing, rhonchi or rales  Abdominal:      General: Bowel sounds are normal  There is no distension  Palpations: Abdomen is soft  Tenderness: There is abdominal tenderness in the right upper quadrant and epigastric area  There is no guarding or rebound  Musculoskeletal:         General: Normal range of motion  Right lower leg: No edema  Left lower leg: No edema  Skin:     General: Skin is warm and dry  Coloration: Skin is not jaundiced  Neurological:      General: No focal deficit present  Mental Status: He is alert and oriented to person, place, and time  Lab, Imaging and other studies:  I have personally reviewed pertinent lab results      CBC:   Lab Results   Component Value Date    WBC 4 13 (L) 11/29/2022    HGB 10 1 (L) 11/29/2022    HCT 31 2 (L) 11/29/2022     (H) 11/29/2022    PLT 53 (L) 11/29/2022    MCH 34 5 (H) 11/29/2022    MCHC 32 4 11/29/2022    RDW 16 0 (H) 11/29/2022    MPV 12 0 11/29/2022    NRBC 0 11/29/2022     CMP:   Lab Results   Component Value Date    SODIUM 138 11/29/2022    K 3 8 11/29/2022    CL 99 11/29/2022    CO2 34 (H) 11/29/2022    BUN 11 11/29/2022    CREATININE 1 00 11/29/2022    CALCIUM 8 1 (L) 11/29/2022    AST 45 (H) 11/29/2022    ALT 35 11/29/2022    ALKPHOS 136 (H) 11/29/2022    EGFR 80 11/29/2022     VTE Pharmacologic Prophylaxis: Reason for no pharmacologic prophylaxis thrombocytopenia  VTE Mechanical Prophylaxis: sequential compression device    Rene Rao PA-C

## 2022-11-29 NOTE — PROGRESS NOTES
[address]  Progress Note Eugene Fontenot 1960, 58 y o  male MRN: [id number]  Unit/Bed#: -01 Encounter: [id number]  Primary Care Provider: No primary care provider on file  Date and time admitted to hospital: 11/21/2022  9:10 PM    * Abdominal pain  Assessment & Plan  · Improved but persists  · Multifactorial:-  · 1  Cirrhosis of the liver with ascites-see below, status post a paracentesis-see below  · 2  Colitis-see below  · 3  Right upper quadrant ultrasound results reviewed-HIDA scan is negative  · Appreciate GI input  · Appreciate general surgical input  · Status post an EGD earlier today-patient found to have severe antral gastritis - Protonix increased to b i d  dosing  · Case reviewed with GI - patient scheduled for colonoscopy for tomorrow    Cirrhosis of liver with ascites [facility])  Assessment & Plan  · Patient has a history of nonalcoholic cirrhosis who presented with acute decompensation- appears compensated at this time  · CT abd/pelvis (11/21):  Results reviewed  · S/p Paracentesis on 11/22/2022 with 2 2L removed; no evidence of SBP  · Continue Lasix 20mg daily and Aldactone 25mg daily   · GI following; will need further outpatient follow-up with Hepatology     Colitis  Assessment & Plan  · Management as per GI  · Stool culture negative  · C diff positive as per PCR testing, but negative as per EIA testing   · Continue vancomycin 125 mg p o  q 6 hours - day 6 - (End date: 12/3)  · Colonoscopy scheduled for tomorrow 11/30/2022    Pericardial effusion  Assessment & Plan  · Noted on CT abd/pelvis (11/21)  · TTE (11/22): LVEF 60%  Systolic function is normal  Wall motion is normal  There is a trivial pericardial effusion  There is no echocardiographic evidence of tamponade     · Continue to monitor      Thrombocytopenia ([address] )  Assessment & Plan  · Likely related to Cirrhosis as above  · Plts: 53  · Continue to monitor     Anemia  Assessment & Plan  · Hemoglobin is 10 1 today, stable, no evidence of bleeding  · Increased Protonix to 40 mg p o  b i d  Paranoia ([facility] [address] )  Assessment & Plan  · Status post a psych evaluation  · Possibly secondary to his underlying liver disease  · Okay for p r n  Zyprexa as needed    Colon cancer [facility])  Assessment & Plan  · For a colonoscopy in the a m  Smoking  Assessment & Plan  · Nicotine patch 7mg, smokes 4-5 cig/day        VTE Prophylaxis:  Pharmacologic VTE Prophylaxis contraindicated due to Thrombocytopenia    Patient Centered Rounds: I have performed bedside rounds with nursing staff today  Discussions with Specialists or Other Care Team Provider:  GI, case management, nursing, pharmacy  Education and Discussions with Family / Patient:  Patient was brought up to par    Current Length of Stay: 8 day(s)    Current Patient Status: Inpatient   Certification Statement: The patient will continue to require additional inpatient hospital stay due to The need for colonoscopy    Discharge Plan:  Potential discharge planning after the colonoscopy tomorrow    Code Status: Level 1 - Full Code    Subjective:   Patient seen and examined, patient is tearful secondary to continued right-sided abdominal pain  Objective:     Vitals:   Temp (24hrs), Av 8 °F (36 6 °C), Min:97 3 °F (36 3 °C), Max:98 2 °F (36 8 °C)    Temp:  [97 3 °F (36 3 °C)-98 2 °F (36 8 °C)] 97 7 °F (36 5 °C)  HR:  [56-86] 81  Resp:  [16-20] 20  BP: ()/(40-85) 105/85  SpO2:  [94 %-100 %] 98 %  Body mass index is 30 07 kg/m²  Input and Output Summary (last 24 hours): Intake/Output Summary (Last 24 hours) at 2022 1640  Last data filed at 2022 1504  Gross per 24 hour   Intake 860 ml   Output 425 ml   Net 435 ml       Physical Exam:   Physical Exam  Vitals and nursing note reviewed  Constitutional:       General: He is not in acute distress  Appearance: Normal appearance  He is not ill-appearing  HENT:      Head: Normocephalic and atraumatic  Nose: Nose normal    Eyes:      Extraocular Movements: Extraocular movements intact  Pupils: Pupils are equal, round, and reactive to light  Cardiovascular:      Rate and Rhythm: Normal rate and regular rhythm  Pulses: Normal pulses  Heart sounds: Normal heart sounds  No murmur heard  No friction rub  No gallop  Pulmonary:      Effort: Pulmonary effort is normal       Breath sounds: Normal breath sounds  Abdominal:      General: There is no distension  Palpations: Abdomen is soft  There is no mass  Tenderness: There is no abdominal tenderness  There is no guarding or rebound  Comments: Right upper quadrant abdominal tenderness persists   Musculoskeletal:         General: No swelling or tenderness  Normal range of motion  Cervical back: Normal range of motion and neck supple  No rigidity  No muscular tenderness  Right lower leg: No edema  Left lower leg: No edema  Skin:     General: Skin is warm  Capillary Refill: Capillary refill takes less than 2 seconds  Findings: No erythema or rash  Neurological:      General: No focal deficit present  Mental Status: He is alert and oriented to person, place, and time  Mental status is at baseline  Psychiatric:         Mood and Affect: Mood normal          Behavior: Behavior normal          Additional Data:     Labs:    Results from last 7 days   Lab Units 11/29/22  0540   WBC Thousand/uL 4 13*   HEMOGLOBIN g/dL 10 1*   HEMATOCRIT % 31 2*   PLATELETS Thousands/uL 53*   NEUTROS PCT % 64   LYMPHS PCT % 23   MONOS PCT % 7   EOS PCT % 5     Results from last 7 days   Lab Units 11/29/22  0540   SODIUM mmol/L 138   POTASSIUM mmol/L 3 8   CHLORIDE mmol/L 99   CO2 mmol/L 34*   BUN mg/dL 11   CREATININE mg/dL 1 00   CALCIUM mg/dL 8 1*   ALK PHOS U/L 136*   ALT U/L 35   AST U/L 45*     Results from last 7 days   Lab Units 11/23/22  0446   INR  1 53*               * I Have Reviewed All Lab Data Listed Above    * Additional Pertinent Lab Tests Reviewed: [address] Admission  Reviewed    Imaging:  Imaging Reports Reviewed Today Include:  EGD report    Recent Cultures (last 7 days):     Results from last 7 days   Lab Units 11/22/22  1757   C DIFF TOXIN B BY PCR  Positive*       Last 24 Hours Medication List:   Current Facility-Administered Medications   Medication Dose Route Frequency Provider Last Rate   • acetaminophen  650 mg Oral Q8H PRN [facility], DO     • dicyclomine  20 mg Oral BID before breakfast/lunch Sierra Baker MD     • diphenhydrAMINE  25 mg Oral Q6H PRN [facility], DO     • furosemide  20 mg Oral Daily Danielle Boyer MD     • HYDROmorphone  0 2 mg Intravenous Q3H PRN Brian Leger PA-C     • nicotine  7 mg Transdermal Daily Wayne Russell PA-C     • OLANZapine  2 5 mg Oral HS Leonila Aguilera PA-C     • OLANZapine  2 5 mg Intramuscular Once PRN Jessika Corea PA-C     • ondansetron  4 mg Intravenous Q6H PRN Wayne Russell PA-C     • ondansetron  4 mg Intravenous Once PRN Gurwinder Beavers CRNA     • oxyCODONE  10 mg Oral Q6H PRN Brian Leger PA-C     • oxyCODONE  5 mg Oral Q6H PRN Brian Leger PA-C     • [START ON 11/30/2022] pantoprazole  40 mg Oral BID AC Brittany Mcnally MD     • polyethylene glycol  4,000 mL Oral Once Brittany Mcnally MD     • rOPINIRole  4 mg Oral 4x Daily Wayne Russell PA-C     • simethicone  40 mg Oral Q6H PRN Leonila Aguilera PA-C     • sodium chloride  75 mL/hr Intravenous Continuous Boni Greco PA-C 75 mL/hr (11/29/22 1444)   • spironolactone  25 mg Oral Daily Danielle Boyer MD     • vancomycin  125 mg Oral Q6H [facility] Kelly Oakley PA-C          Today, Patient Was Seen By: Brittany Mcnally MD    ** Please Note: Dictation voice to text software may have been used in the creation of this document   **

## 2022-11-29 NOTE — ANESTHESIA POSTPROCEDURE EVALUATION
Post-Op Assessment Note    CV Status:  Stable    Pain management: satisfactory to patient     Mental Status:  Alert and awake   Hydration Status:  Euvolemic   PONV Controlled:  Controlled   Airway Patency:  Patent      Post Op Vitals Reviewed: Yes      Staff: CRNA, Anesthesiologist         No notable events documented      /55 (11/29/22 1504)    Temp (!) 97 3 °F (36 3 °C) (11/29/22 1504)    Pulse 61 (11/29/22 1504)   Resp 16 (11/29/22 1504)    SpO2 95 % (11/29/22 1504)

## 2022-11-29 NOTE — ASSESSMENT & PLAN NOTE
· Hemoglobin is 10 1 today, stable, no evidence of bleeding  · Increased Protonix to 40 mg p o  b i d

## 2022-11-29 NOTE — ASSESSMENT & PLAN NOTE
· Management as per GI  · Stool culture negative  · C diff positive as per PCR testing, but negative as per EIA testing   · Continue vancomycin 125 mg p o  q 6 hours - day 6 - (End date: 12/3)  · Colonoscopy scheduled for tomorrow 11/30/2022

## 2022-11-29 NOTE — ANESTHESIA PREPROCEDURE EVALUATION
Procedure:  EGD    Relevant Problems   ANESTHESIA (within normal limits)      ENDO   (+) Diabetes mellitus type 2 in obese (HCC)      GI/HEPATIC   (+) Cirrhosis of liver with ascites (HCC)   (+) Colon cancer (HCC) (s/p chemo and surgery 2017)      HEMATOLOGY   (+) Anemia   (+) Thrombocytopenia (HCC)      PULMONARY   (+) Smoking      Other   (+) Abdominal pain        Physical Exam    Airway    Mallampati score: II  TM Distance: >3 FB  Neck ROM: full     Dental   upper dentures,     Cardiovascular  Rhythm: regular, Rate: normal,     Pulmonary  Breath sounds clear to auscultation,     Other Findings        Anesthesia Plan  ASA Score- 3     Anesthesia Type- IV sedation with anesthesia with ASA Monitors  Additional Monitors:   Airway Plan:           Plan Factors-Exercise tolerance (METS): >4 METS  Chart reviewed  Patient summary reviewed  Patient is a current smoker  Patient instructed to abstain from smoking on day of procedure  Patient did not smoke on day of surgery  Induction-     Postoperative Plan-     Informed Consent- Anesthetic plan and risks discussed with patient  I personally reviewed this patient with the CRNA  Discussed and agreed on the Anesthesia Plan with the CRNA  Joey Bah

## 2022-11-29 NOTE — NURSING NOTE
Returned from PACU at this time in stable condition  Drowsy but easily around  Verbalizing 10/10 pain in abdomen  Will medicate with  PRN pain medication  VSS  Resting in bed at present time with call bell within reach and bed in lowest position  Bed alarm applied

## 2022-11-30 ENCOUNTER — ANESTHESIA (INPATIENT)
Dept: GASTROENTEROLOGY | Facility: HOSPITAL | Age: 62
End: 2022-11-30

## 2022-11-30 ENCOUNTER — APPOINTMENT (INPATIENT)
Dept: GASTROENTEROLOGY | Facility: HOSPITAL | Age: 62
End: 2022-11-30
Attending: STUDENT IN AN ORGANIZED HEALTH CARE EDUCATION/TRAINING PROGRAM

## 2022-11-30 ENCOUNTER — ANESTHESIA EVENT (INPATIENT)
Dept: GASTROENTEROLOGY | Facility: HOSPITAL | Age: 62
End: 2022-11-30

## 2022-11-30 LAB
ANION GAP SERPL CALCULATED.3IONS-SCNC: 7 MMOL/L (ref 4–13)
BASOPHILS # BLD AUTO: 0.02 THOUSANDS/ÂΜL (ref 0–0.1)
BASOPHILS NFR BLD AUTO: 1 % (ref 0–1)
BUN SERPL-MCNC: 13 MG/DL (ref 5–25)
CALCIUM SERPL-MCNC: 8.3 MG/DL (ref 8.4–10.2)
CHLORIDE SERPL-SCNC: 100 MMOL/L (ref 96–108)
CO2 SERPL-SCNC: 31 MMOL/L (ref 21–32)
CREAT SERPL-MCNC: 1.06 MG/DL (ref 0.6–1.3)
EOSINOPHIL # BLD AUTO: 0.19 THOUSAND/ÂΜL (ref 0–0.61)
EOSINOPHIL NFR BLD AUTO: 5 % (ref 0–6)
ERYTHROCYTE [DISTWIDTH] IN BLOOD BY AUTOMATED COUNT: 16.1 % (ref 11.6–15.1)
GFR SERPL CREATININE-BSD FRML MDRD: 74 ML/MIN/1.73SQ M
GLUCOSE SERPL-MCNC: 121 MG/DL (ref 65–140)
GLUCOSE SERPL-MCNC: 158 MG/DL (ref 65–140)
GLUCOSE SERPL-MCNC: 65 MG/DL (ref 65–140)
GLUCOSE SERPL-MCNC: 69 MG/DL (ref 65–140)
GLUCOSE SERPL-MCNC: 70 MG/DL (ref 65–140)
GLUCOSE SERPL-MCNC: 71 MG/DL (ref 65–140)
HCT VFR BLD AUTO: 34.3 % (ref 36.5–49.3)
HGB BLD-MCNC: 10.8 G/DL (ref 12–17)
IMM GRANULOCYTES # BLD AUTO: 0.01 THOUSAND/UL (ref 0–0.2)
IMM GRANULOCYTES NFR BLD AUTO: 0 % (ref 0–2)
LYMPHOCYTES # BLD AUTO: 1.12 THOUSANDS/ÂΜL (ref 0.6–4.47)
LYMPHOCYTES NFR BLD AUTO: 29 % (ref 14–44)
MCH RBC QN AUTO: 34.2 PG (ref 26.8–34.3)
MCHC RBC AUTO-ENTMCNC: 31.5 G/DL (ref 31.4–37.4)
MCV RBC AUTO: 109 FL (ref 82–98)
MONOCYTES # BLD AUTO: 0.29 THOUSAND/ÂΜL (ref 0.17–1.22)
MONOCYTES NFR BLD AUTO: 8 % (ref 4–12)
NEUTROPHILS # BLD AUTO: 2.22 THOUSANDS/ÂΜL (ref 1.85–7.62)
NEUTS SEG NFR BLD AUTO: 57 % (ref 43–75)
NRBC BLD AUTO-RTO: 0 /100 WBCS
PLATELET # BLD AUTO: 51 THOUSANDS/UL (ref 149–390)
PMV BLD AUTO: 12.2 FL (ref 8.9–12.7)
POTASSIUM SERPL-SCNC: 4.3 MMOL/L (ref 3.5–5.3)
RBC # BLD AUTO: 3.16 MILLION/UL (ref 3.88–5.62)
SODIUM SERPL-SCNC: 138 MMOL/L (ref 135–147)
WBC # BLD AUTO: 3.85 THOUSAND/UL (ref 4.31–10.16)

## 2022-11-30 PROCEDURE — 0DBB8ZX EXCISION OF ILEUM, VIA NATURAL OR ARTIFICIAL OPENING ENDOSCOPIC, DIAGNOSTIC: ICD-10-PCS

## 2022-11-30 PROCEDURE — 0DBE8ZX EXCISION OF LARGE INTESTINE, VIA NATURAL OR ARTIFICIAL OPENING ENDOSCOPIC, DIAGNOSTIC: ICD-10-PCS

## 2022-11-30 RX ORDER — SODIUM CHLORIDE 9 MG/ML
75 INJECTION, SOLUTION INTRAVENOUS CONTINUOUS
Status: SHIPPED | OUTPATIENT
Start: 2022-11-30 | End: 2022-11-30

## 2022-11-30 RX ORDER — LIDOCAINE HYDROCHLORIDE 20 MG/ML
INJECTION, SOLUTION EPIDURAL; INFILTRATION; INTRACAUDAL; PERINEURAL AS NEEDED
Status: DISCONTINUED | OUTPATIENT
Start: 2022-11-30 | End: 2022-11-30

## 2022-11-30 RX ORDER — PROPOFOL 10 MG/ML
INJECTION, EMULSION INTRAVENOUS CONTINUOUS PRN
Status: DISCONTINUED | OUTPATIENT
Start: 2022-11-30 | End: 2022-11-30

## 2022-11-30 RX ORDER — DEXTROSE MONOHYDRATE 25 G/50ML
INJECTION, SOLUTION INTRAVENOUS
Status: COMPLETED
Start: 2022-11-30 | End: 2022-11-30

## 2022-11-30 RX ORDER — PROPOFOL 10 MG/ML
INJECTION, EMULSION INTRAVENOUS AS NEEDED
Status: DISCONTINUED | OUTPATIENT
Start: 2022-11-30 | End: 2022-11-30

## 2022-11-30 RX ADMIN — PROPOFOL 130 MCG/KG/MIN: 10 INJECTION, EMULSION INTRAVENOUS at 12:31

## 2022-11-30 RX ADMIN — SODIUM CHLORIDE 75 ML/HR: 0.9 INJECTION, SOLUTION INTRAVENOUS at 06:00

## 2022-11-30 RX ADMIN — FUROSEMIDE 20 MG: 20 TABLET ORAL at 16:29

## 2022-11-30 RX ADMIN — VANCOMYCIN HYDROCHLORIDE 125 MG: 125 CAPSULE ORAL at 23:30

## 2022-11-30 RX ADMIN — ROPINIROLE 4 MG: 2 TABLET, FILM COATED ORAL at 21:30

## 2022-11-30 RX ADMIN — OXYCODONE HYDROCHLORIDE 10 MG: 10 TABLET ORAL at 08:01

## 2022-11-30 RX ADMIN — LIDOCAINE HYDROCHLORIDE 50 MG: 20 INJECTION, SOLUTION EPIDURAL; INFILTRATION; INTRACAUDAL; PERINEURAL at 12:31

## 2022-11-30 RX ADMIN — VANCOMYCIN HYDROCHLORIDE 125 MG: 125 CAPSULE ORAL at 17:29

## 2022-11-30 RX ADMIN — HYDROMORPHONE HYDROCHLORIDE 0.2 MG: 0.2 INJECTION, SOLUTION INTRAMUSCULAR; INTRAVENOUS; SUBCUTANEOUS at 04:42

## 2022-11-30 RX ADMIN — DICYCLOMINE HYDROCHLORIDE 20 MG: 20 TABLET ORAL at 06:02

## 2022-11-30 RX ADMIN — DEXTROSE MONOHYDRATE 50 ML: 25 INJECTION, SOLUTION INTRAVENOUS at 07:47

## 2022-11-30 RX ADMIN — ROPINIROLE 4 MG: 2 TABLET, FILM COATED ORAL at 17:29

## 2022-11-30 RX ADMIN — OXYCODONE HYDROCHLORIDE 10 MG: 10 TABLET ORAL at 22:06

## 2022-11-30 RX ADMIN — ONDANSETRON 4 MG: 2 INJECTION INTRAMUSCULAR; INTRAVENOUS at 11:24

## 2022-11-30 RX ADMIN — SPIRONOLACTONE 25 MG: 25 TABLET ORAL at 16:29

## 2022-11-30 RX ADMIN — VANCOMYCIN HYDROCHLORIDE 125 MG: 125 CAPSULE ORAL at 11:06

## 2022-11-30 RX ADMIN — HYDROMORPHONE HYDROCHLORIDE 0.2 MG: 0.2 INJECTION, SOLUTION INTRAMUSCULAR; INTRAVENOUS; SUBCUTANEOUS at 11:06

## 2022-11-30 RX ADMIN — PROPOFOL 60 MG: 10 INJECTION, EMULSION INTRAVENOUS at 12:31

## 2022-11-30 RX ADMIN — DICYCLOMINE HYDROCHLORIDE 20 MG: 20 TABLET ORAL at 16:29

## 2022-11-30 RX ADMIN — OLANZAPINE 2.5 MG: 5 TABLET, ORALLY DISINTEGRATING ORAL at 21:30

## 2022-11-30 RX ADMIN — PANTOPRAZOLE SODIUM 40 MG: 40 TABLET, DELAYED RELEASE ORAL at 16:28

## 2022-11-30 RX ADMIN — VANCOMYCIN HYDROCHLORIDE 125 MG: 125 CAPSULE ORAL at 06:02

## 2022-11-30 RX ADMIN — NICOTINE 7 MG: 7 PATCH, EXTENDED RELEASE TRANSDERMAL at 08:04

## 2022-11-30 NOTE — PROGRESS NOTES
Progress Note - General Surgery   Elder Cons 58 y o  male MRN: [id number]  Unit/Bed#: -01 Encounter: [id number]    Assessment:  58year old male with past medical history significant for reported stage IV colon cancer s/p right hemicolectomy, CHRISTIE presenting with acute, intractable abdominal pain  S/p EGD by GI  HD#9  -AVSS on room air  - cc  -WBC 3 85  -HGB 10 8  -CR 1 06  -patient continues to report severe right upper quadrant abdominal pain    Plan:  Colonoscopy planned with GI this afternoon  NPO in preparation for colonoscopy, diet may be advanced following procedure  Serial abdominal exam  Medical management per primary  GI following, appreciate recommendations  To be evaluated bedside with attending    Subjective/Objective   Subjective:  No acute overnight events  Patient overall doing well this morning but continues to complain of persistent right upper quadrant abdominal pain  Admits to nausea without episodes of emesis  Objective:   Blood pressure 108/59, pulse 60, temperature 97 8 °F (36 6 °C), temperature source Temporal, resp  rate 18, height 5' 7" (1 702 m), weight 87 1 kg (192 lb), SpO2 96 %  ,Body mass index is 30 07 kg/m²  Intake/Output Summary (Last 24 hours) at 11/30/2022 1241  Last data filed at 11/30/2022 0800  Gross per 24 hour   Intake 510 ml   Output 425 ml   Net 85 ml       Invasive Devices     Peripheral Intravenous Line  Duration           Peripheral IV 11/28/22 Dorsal (posterior); Right Forearm 1 day              Physical Exam  Vitals and nursing note reviewed  Constitutional:       General: He is not in acute distress  Appearance: Normal appearance  He is obese  He is not ill-appearing or toxic-appearing  HENT:      Head: Normocephalic and atraumatic  Cardiovascular:      Rate and Rhythm: Normal rate and regular rhythm  Pulses: Normal pulses  Heart sounds: Normal heart sounds  No murmur heard  No friction rub  No gallop     Pulmonary: Effort: Pulmonary effort is normal  No respiratory distress  Breath sounds: Normal breath sounds  No wheezing, rhonchi or rales  Abdominal:      General: Bowel sounds are normal  There is no distension  Palpations: Abdomen is soft  Tenderness: There is abdominal tenderness in the right upper quadrant, epigastric area, left upper quadrant and left lower quadrant  There is no guarding or rebound  Comments: TTP however distractable to pain   Musculoskeletal:         General: Normal range of motion  Right lower leg: No edema  Left lower leg: No edema  Skin:     General: Skin is warm and dry  Coloration: Skin is not jaundiced  Neurological:      General: No focal deficit present  Mental Status: He is alert and oriented to person, place, and time  Mental status is at baseline  Psychiatric:         Mood and Affect: Mood normal          Behavior: Behavior normal          Thought Content: Thought content normal        Lab, Imaging and other studies:  I have personally reviewed pertinent lab results      CBC:   Lab Results   Component Value Date    WBC 3 85 (L) 11/30/2022    HGB 10 8 (L) 11/30/2022    HCT 34 3 (L) 11/30/2022     (H) 11/30/2022    PLT 51 (L) 11/30/2022    MCH 34 2 11/30/2022    MCHC 31 5 11/30/2022    RDW 16 1 (H) 11/30/2022    MPV 12 2 11/30/2022    NRBC 0 11/30/2022     CMP:   Lab Results   Component Value Date    SODIUM 138 11/30/2022    K 4 3 11/30/2022     11/30/2022    CO2 31 11/30/2022    BUN 13 11/30/2022    CREATININE 1 06 11/30/2022    CALCIUM 8 3 (L) 11/30/2022    EGFR 74 11/30/2022     VTE Pharmacologic Prophylaxis: Reason for no pharmacologic prophylaxis thrombocytopenia  VTE Mechanical Prophylaxis: sequential compression device    Mechelle Bhat PA-C

## 2022-11-30 NOTE — NURSING NOTE
Pt returned from OR, s/p Colonoscopy  Pt awake, alert, oriented x4  Pt has no c/o at this time  Asking to eat  Tray ordered per diet  Assessment as noted in computer charting  Safety in place

## 2022-11-30 NOTE — ASSESSMENT & PLAN NOTE
· Management as per GI  · Stool culture negative  · C diff positive as per PCR testing, but negative as per EIA testing   · Continue vancomycin 125 mg p o  q 6 hours - (End date: 12/3)  · Discharge planning post colonoscopy today

## 2022-11-30 NOTE — PLAN OF CARE
Problem: Potential for Falls  Goal: Patient will remain free of falls  Description: INTERVENTIONS:  - Educate patient/family on patient safety including physical limitations  - Instruct patient to call for assistance with activity   - Consult OT/PT to assist with strengthening/mobility   - Keep Call bell within reach  - Keep bed low and locked with side rails adjusted as appropriate  - Keep care items and personal belongings within reach  - Initiate and maintain comfort rounds  - Make Fall Risk Sign visible to staff  - Offer Toileting every 2 Hours, in advance of need  - Initiate/Maintain bed alarm  - Obtain necessary fall risk management equipment: walker   - Apply yellow socks and bracelet for high fall risk patients  - Consider moving patient to room near nurses station  Outcome: Progressing     Problem: PAIN - ADULT  Goal: Verbalizes/displays adequate comfort level or baseline comfort level  Description: Interventions:  - Encourage patient to monitor pain and request assistance  - Assess pain using appropriate pain scale  - Administer analgesics based on type and severity of pain and evaluate response  - Implement non-pharmacological measures as appropriate and evaluate response  - Consider cultural and social influences on pain and pain management  - Notify physician/advanced practitioner if interventions unsuccessful or patient reports new pain  Outcome: Progressing     Problem: INFECTION - ADULT  Goal: Absence or prevention of progression during hospitalization  Description: INTERVENTIONS:  - Assess and monitor for signs and symptoms of infection  - Monitor lab/diagnostic results  - Monitor all insertion sites, i e  indwelling lines, tubes, and drains  - Monitor endotracheal if appropriate and nasal secretions for changes in amount and color  - [place] appropriate cooling/warming therapies per order  - Administer medications as ordered  - Instruct and encourage patient and family to use good hand hygiene technique  - Identify and instruct in appropriate isolation precautions for identified infection/condition  Outcome: Progressing  Goal: Absence of fever/infection during neutropenic period  Description: INTERVENTIONS:  - Monitor WBC    Outcome: Progressing     Problem: DISCHARGE PLANNING  Goal: Discharge to home or other facility with appropriate resources  Description: INTERVENTIONS:  - Identify barriers to discharge w/patient and caregiver  - Arrange for needed discharge resources and transportation as appropriate  - Identify discharge learning needs (meds, wound care, etc )  - Arrange for interpretive services to assist at discharge as needed  - Refer to Case Management Department for coordinating discharge planning if the patient needs post-hospital services based on physician/advanced practitioner order or complex needs related to functional status, cognitive ability, or social support system  Outcome: Progressing     Problem: Knowledge Deficit  Goal: Patient/family/caregiver demonstrates understanding of disease process, treatment plan, medications, and discharge instructions  Description: Complete learning assessment and assess knowledge base    Interventions:  - Provide teaching at level of understanding  - Provide teaching via preferred learning methods  Outcome: Progressing     Problem: MOBILITY - ADULT  Goal: Maintain or return to baseline ADL function  Description: INTERVENTIONS:  -  Assess patient's ability to carry out ADLs; assess patient's baseline for ADL function and identify physical deficits which impact ability to perform ADLs (bathing, care of mouth/teeth, toileting, grooming, dressing, etc )  - Assess/evaluate cause of self-care deficits   - Assess range of motion  - Assess patient's mobility; develop plan if impaired  - Assess patient's need for assistive devices and provide as appropriate  - Encourage maximum independence but intervene and supervise when necessary  - Involve family in performance of ADLs  - Assess for home care needs following discharge   - Consider OT consult to assist with ADL evaluation and planning for discharge  - Provide patient education as appropriate  Outcome: Progressing  Goal: Maintains/Returns to pre admission functional level  Description: INTERVENTIONS:  - Perform BMAT or MOVE assessment daily    - Set and communicate daily mobility goal to care team and patient/family/caregiver  - Collaborate with rehabilitation services on mobility goals if consulted  - Perform Range of Motion 3 times a day  - Reposition patient every 2 hours    - Dangle patient 3 times a day  - Stand patient 3 times a day  - Ambulate patient 3 times a day  - Out of bed to chair 3 times a day   - Out of bed for meals 3 times a day  - Out of bed for toileting  - Record patient progress and toleration of activity level   Outcome: Progressing

## 2022-11-30 NOTE — PROGRESS NOTES
[address]  Progress Note Dwain Brunner 1960, 58 y o  male MRN: [id number]  Unit/Bed#: -01 Encounter: [id number]  Primary Care Provider: No primary care provider on file  Date and time admitted to hospital: 11/21/2022  9:10 PM    * Abdominal pain  Assessment & Plan  · Improved but persists  · Multifactorial:-  · 1  Cirrhosis of the liver with ascites-see below, status post a paracentesis-see below  · 2  Colitis-see below  · 3  Right upper quadrant ultrasound results reviewed-HIDA scan is negative  · Appreciate GI and general surgical input  · Status post an EGD on 11/29/2022-patient found to have severe antral gastritis - Protonix increased to b i d  dosing  · Patient is scheduled for colonoscopy today, if the colonoscopy is favorable, discharge planning for later this afternoon    Cirrhosis of liver with ascites [facility])  Assessment & Plan  · Patient has a history of nonalcoholic cirrhosis who presented with acute decompensation- appears compensated at this time  · CT abd/pelvis (11/21):  Results reviewed  · S/p Paracentesis on 11/22/2022 with 2 2L removed; no evidence of SBP  · Continue Lasix 20mg daily and Aldactone 25mg daily   · GI following; will need further outpatient follow-up with Hepatology     Colitis  Assessment & Plan  · Management as per GI  · Stool culture negative  · C diff positive as per PCR testing, but negative as per EIA testing   · Continue vancomycin 125 mg p o  q 6 hours - (End date: 12/3)  · Discharge planning post colonoscopy today    Pericardial effusion  Assessment & Plan  · Noted on CT abd/pelvis (11/21)  · TTE (11/22): LVEF 60%  Systolic function is normal  Wall motion is normal  There is a trivial pericardial effusion  There is no echocardiographic evidence of tamponade     · Continue to monitor      Thrombocytopenia ([address] )  Assessment & Plan  · Likely related to Cirrhosis as above  · Plts: 51  · Continue to monitor     Anemia  Assessment & Plan  · Hemoglobin is 10 8 today, stable, no evidence of bleeding  · Continue b i d  Protonix treatment    Barba ([address] )  Assessment & Plan  · Status post a psych evaluation  · Possibly secondary to his underlying liver disease  · Okay for p r n  Zyprexa as needed    Colon cancer [facility])  Assessment & Plan  · Patient has a history of colon cancer  · Patient is scheduled for a colonoscopy for today    Smoking  Assessment & Plan  · Nicotine patch 7mg, smokes 4-5 cig/day        VTE Prophylaxis:  Pharmacologic VTE Prophylaxis contraindicated due to Thrombocytopenia, patient is fully ambulatory and has SCDs also    Patient Centered Rounds: I have performed bedside rounds with nursing staff today  Discussions with Specialists or Other Care Team Provider:  GI, General surgery, case management, nursing  Education and Discussions with Family / Patient:  Patient was brought up to par, he did not ask and or want me to call anyone    Current Length of Stay: 9 day(s)    Current Patient Status: Inpatient   Certification Statement: The patient will continue to require additional inpatient hospital stay due to The need for colonoscopy today    Discharge Plan:  Discharge planning post colonoscopy    Code Status: Level 1 - Full Code    Subjective:   Patient seen and examined, reports feeling a little bit better today    Objective:     Vitals:   Temp (24hrs), Av 8 °F (36 6 °C), Min:97 3 °F (36 3 °C), Max:98 3 °F (36 8 °C)    Temp:  [97 3 °F (36 3 °C)-98 3 °F (36 8 °C)] 97 7 °F (36 5 °C)  HR:  [56-81] 69  Resp:  [16-20] 20  BP: ()/(40-85) 102/51  SpO2:  [95 %-98 %] 96 %  Body mass index is 30 07 kg/m²  Input and Output Summary (last 24 hours): Intake/Output Summary (Last 24 hours) at 2022 1002  Last data filed at 2022 1504  Gross per 24 hour   Intake 500 ml   Output 425 ml   Net 75 ml       Physical Exam:   Physical Exam  Vitals and nursing note reviewed     Constitutional:       General: He is not in acute distress  Appearance: Normal appearance  He is not ill-appearing  HENT:      Head: Normocephalic and atraumatic  Nose: Nose normal    Eyes:      Extraocular Movements: Extraocular movements intact  Pupils: Pupils are equal, round, and reactive to light  Cardiovascular:      Rate and Rhythm: Normal rate and regular rhythm  Pulses: Normal pulses  Heart sounds: Normal heart sounds  No murmur heard  No friction rub  No gallop  Pulmonary:      Effort: Pulmonary effort is normal       Breath sounds: Normal breath sounds  Abdominal:      General: There is no distension  Palpations: Abdomen is soft  There is no mass  Tenderness: There is no abdominal tenderness (Right upper quadrant )  There is no guarding or rebound  Musculoskeletal:         General: No swelling or tenderness  Normal range of motion  Cervical back: Normal range of motion and neck supple  No rigidity  No muscular tenderness  Right lower leg: No edema  Left lower leg: No edema  Skin:     General: Skin is warm  Capillary Refill: Capillary refill takes less than 2 seconds  Findings: No erythema or rash  Neurological:      General: No focal deficit present  Mental Status: He is alert and oriented to person, place, and time  Mental status is at baseline     Psychiatric:         Mood and Affect: Mood normal          Behavior: Behavior normal          Additional Data:     Labs:    Results from last 7 days   Lab Units 11/30/22  0444   WBC Thousand/uL 3 85*   HEMOGLOBIN g/dL 10 8*   HEMATOCRIT % 34 3*   PLATELETS Thousands/uL 51*   NEUTROS PCT % 57   LYMPHS PCT % 29   MONOS PCT % 8   EOS PCT % 5     Results from last 7 days   Lab Units 11/30/22  0444 11/29/22  0540   SODIUM mmol/L 138 138   POTASSIUM mmol/L 4 3 3 8   CHLORIDE mmol/L 100 99   CO2 mmol/L 31 34*   BUN mg/dL 13 11   CREATININE mg/dL 1 06 1 00   CALCIUM mg/dL 8 3* 8 1*   ALK PHOS U/L  --  136*   ALT U/L  --  35   AST U/L  --  45*         Results from last 7 days   Lab Units 11/30/22  0844 11/30/22  0739 11/29/22  2109   POC GLUCOSE mg/dl 121 65 74           * I Have Reviewed All Lab Data Listed Above  * Additional Pertinent Lab Tests Reviewed: Kourtney Nagy Admission  Reviewed    Imaging:  Imaging Reports Reviewed Today Include:  None    Recent Cultures (last 7 days):           Last 24 Hours Medication List:   Current Facility-Administered Medications   Medication Dose Route Frequency Provider Last Rate   • acetaminophen  650 mg Oral Q8H PRN [facility] Chung, DO     • dicyclomine  20 mg Oral BID before breakfast/lunch Royal Justin MD     • diphenhydrAMINE  25 mg Oral Q6H PRN [facility], DO     • furosemide  20 mg Oral Daily Danielle Boyer MD     • HYDROmorphone  0 2 mg Intravenous Q3H PRN Brian Leger PA-C     • nicotine  7 mg Transdermal Daily Malik Reyes PA-C     • OLANZapine  2 5 mg Oral HS Sergio Zuluaga PA-C     • OLANZapine  2 5 mg Intramuscular Once PRN Jessika Corea PA-C     • ondansetron  4 mg Intravenous Q6H PRN Malik Reyes PA-C     • oxyCODONE  10 mg Oral Q6H PRN Brian Leger PA-C     • oxyCODONE  5 mg Oral Q6H PRN Brian Leger PA-C     • pantoprazole  40 mg Oral BID AC Gogo Mendoza MD     • rOPINIRole  4 mg Oral 4x Daily Malik Reyes PA-C     • simethicone  40 mg Oral Q6H PRN Angelique Spurling, PA-C     • sodium chloride  75 mL/hr Intravenous Continuous Cristiana Plata PA-C 75 mL/hr (11/30/22 0600)   • spironolactone  25 mg Oral Daily Danielle Boyer MD     • vancomycin  125 mg Oral Q6H [facility] Lino Gibbs PA-C          Today, Patient Was Seen By: Gogo Mendoza MD    ** Please Note: Dictation voice to text software may have been used in the creation of this document   **

## 2022-11-30 NOTE — ANESTHESIA PREPROCEDURE EVALUATION
Procedure:  COLONOSCOPY    Relevant Problems   ENDO   (+) Diabetes mellitus type 2 in obese (HCC)      GI/HEPATIC   (+) Cirrhosis of liver with ascites ([address] ) (s/p IR paracentesis for 2250ml 11/22/2022  RUQ US 11/25 with trace ascites)   (+) Colon cancer (HCC)      HEMATOLOGY   (+) Anemia (Hb 10 8)   (+) Thrombocytopenia (HCC) (Plts 51)      PULMONARY   (+) Smoking      TTE 11/22/2022  LVEF 60%  RV size and function normal  Mildly dilated LA  Mild MR  Mild TR  Trivial pericardial effusion- no evidence of tamponade      Physical Exam    Airway    Mallampati score: II  TM Distance: >3 FB  Neck ROM: full     Dental   upper dentures,     Cardiovascular      Pulmonary      Other Findings        Anesthesia Plan  ASA Score- 3     Anesthesia Type- IV sedation with anesthesia with ASA Monitors  Additional Monitors:   Airway Plan:     Comment: Back up GA  Plan Factors-Exercise tolerance (METS): >4 METS  Chart reviewed  Imaging results reviewed  Existing labs reviewed  Patient summary reviewed  Patient is a current smoker  Patient did not smoke on day of surgery  Induction-     Postoperative Plan-     Informed Consent- Anesthetic plan and risks discussed with patient  I personally reviewed this patient with the CRNA  Discussed and agreed on the Anesthesia Plan with the CRNA  Joey Bah

## 2022-11-30 NOTE — ANESTHESIA POSTPROCEDURE EVALUATION
Post-Op Assessment Note    CV Status:  Stable  Pain Score: 0    Pain management: adequate     Mental Status:  Arousable   Hydration Status:  Stable   PONV Controlled:  None   Airway Patency:  Patent      Post Op Vitals Reviewed: Yes      Staff: CRNA         No notable events documented      BP   86/48   Temp      Pulse  74   Resp   12   SpO2   98%

## 2022-11-30 NOTE — CASE MANAGEMENT
Case Management Discharge Planning Note    Patient name Meagan Wilhelmudent  Location [address] 203/-01 MRN [id number]  : 1960 Date 2022       Current Admission Date: 2022  Current Admission Diagnosis:Abdominal pain   Patient Active Problem List    Diagnosis Date Noted   • Colon cancer [facility])    • Paranoia ([facility] ) 2022   • Abdominal pain 2022   • Anemia 2022   • Colitis 2022   • Cirrhosis of liver with ascites ([facility] ) 2022   • Pericardial effusion 2022   • Hypokalemia 2022   • Thrombocytopenia ([facility] ) 2022   • Restless leg syndrome 2022   • Smoking 2022   • Diabetes mellitus type 2 in obese ([facility] ) 2022      LOS (days): 9  Geometric Mean LOS (GMLOS) (days): 3 30  Days to GMLOS:-5 4     OBJECTIVE:  Risk of Unplanned Readmission Score: 19 94         Current admission status: Inpatient   [facility] [address] [address]  Phone: [phone] Fax: [phone]    Primary Care Provider: No primary care provider on file  Primary Insurance: HÉCTOR HAWKINS PENDING  Secondary Insurance:     DISCHARGE DETAILS:    Discharge planning discussed with[de-identified] Patient  Freedom of Choice: Yes  Comments - Freedom of Choice: CM met with pt at bedside to discuss discharge planning  Pt reports he is unable to return to the [facility] with his SO as they have broken up  Pt reports his former employer [employer] is going to assist him in returning to [facility] tomorrow by paying for a bus ticket  CM spoke with Kassy Bar at [employer] [phone]  She confirmed they will pay for the bus ticket and will email CM the tickets  Bus ticket booked for 10:10 AM on   They will also pay for a taxi to get pt to to [facility] bus station  Per [facility] taxi will arrive at 8 AM  Dr Melanie Day aware and will d/c pt in AM 12/   Pt nurse Laina Washington aware and will make next shift aware to pass to AM staff

## 2022-11-30 NOTE — ASSESSMENT & PLAN NOTE
· Improved but persists  · Multifactorial:-  · 1  Cirrhosis of the liver with ascites-see below, status post a paracentesis-see below  · 2  Colitis-see below  · 3   Right upper quadrant ultrasound results reviewed-HIDA scan is negative  · Appreciate GI and general surgical input  · Status post an EGD on 11/29/2022-patient found to have severe antral gastritis - Protonix increased to b i d  dosing  · Patient is scheduled for colonoscopy today, if the colonoscopy is favorable, discharge planning for later this afternoon

## 2022-12-01 VITALS
HEIGHT: 67 IN | WEIGHT: 192 LBS | DIASTOLIC BLOOD PRESSURE: 66 MMHG | BODY MASS INDEX: 30.13 KG/M2 | HEART RATE: 76 BPM | RESPIRATION RATE: 20 BRPM | TEMPERATURE: 98.4 F | SYSTOLIC BLOOD PRESSURE: 113 MMHG | OXYGEN SATURATION: 97 %

## 2022-12-01 RX ORDER — PANTOPRAZOLE SODIUM 40 MG/1
40 TABLET, DELAYED RELEASE ORAL
Qty: 60 TABLET | Refills: 0 | Status: SHIPPED | OUTPATIENT
Start: 2022-12-01 | End: 2022-12-31

## 2022-12-01 RX ORDER — DICYCLOMINE HCL 20 MG
20 TABLET ORAL
Qty: 60 TABLET | Refills: 0 | Status: SHIPPED | OUTPATIENT
Start: 2022-12-01 | End: 2022-12-31

## 2022-12-01 RX ORDER — VANCOMYCIN HYDROCHLORIDE 125 MG/1
125 CAPSULE ORAL EVERY 6 HOURS SCHEDULED
Qty: 10 CAPSULE | Refills: 0 | Status: SHIPPED | OUTPATIENT
Start: 2022-12-01 | End: 2022-12-04

## 2022-12-01 RX ORDER — SPIRONOLACTONE 25 MG/1
25 TABLET ORAL DAILY
Qty: 30 TABLET | Refills: 0 | Status: SHIPPED | OUTPATIENT
Start: 2022-12-01 | End: 2022-12-01 | Stop reason: SDUPTHER

## 2022-12-01 RX ORDER — VANCOMYCIN HYDROCHLORIDE 125 MG/1
125 CAPSULE ORAL EVERY 6 HOURS SCHEDULED
Qty: 10 CAPSULE | Refills: 0 | Status: SHIPPED | OUTPATIENT
Start: 2022-12-01 | End: 2022-12-01 | Stop reason: SDUPTHER

## 2022-12-01 RX ORDER — SPIRONOLACTONE 25 MG/1
25 TABLET ORAL DAILY
Qty: 30 TABLET | Refills: 0 | Status: SHIPPED | OUTPATIENT
Start: 2022-12-01 | End: 2022-12-31

## 2022-12-01 RX ORDER — FUROSEMIDE 20 MG/1
20 TABLET ORAL DAILY
Qty: 30 TABLET | Refills: 0 | Status: SHIPPED | OUTPATIENT
Start: 2022-12-01 | End: 2022-12-31

## 2022-12-01 RX ADMIN — HYDROMORPHONE HYDROCHLORIDE 0.2 MG: 0.2 INJECTION, SOLUTION INTRAMUSCULAR; INTRAVENOUS; SUBCUTANEOUS at 03:12

## 2022-12-01 RX ADMIN — DICYCLOMINE HYDROCHLORIDE 20 MG: 20 TABLET ORAL at 06:06

## 2022-12-01 RX ADMIN — PANTOPRAZOLE SODIUM 40 MG: 40 TABLET, DELAYED RELEASE ORAL at 06:06

## 2022-12-01 RX ADMIN — VANCOMYCIN HYDROCHLORIDE 125 MG: 125 CAPSULE ORAL at 06:06

## 2022-12-01 NOTE — ASSESSMENT & PLAN NOTE
· Patient has a history of nonalcoholic cirrhosis who presented with acute decompensation- appears compensated at this time  · CT abd/pelvis (11/21):  Results reviewed     · S/p Paracentesis on 11/22/2022 with 2 2L removed; no evidence of SBP  · New prescriptions were provided for Lasix and Aldactone  · GI following; will need further outpatient follow-up with Hepatology

## 2022-12-01 NOTE — DISCHARGE SUMMARY
[address]  Discharge- Fanta Howard 1960, 58 y o  male MRN: [id number]  Unit/Bed#: -01 Encounter: [id number]  Primary Care Provider: No primary care provider on file  Date and time admitted to hospital: 11/21/2022  9:10 PM    * Abdominal pain  Assessment & Plan  · Finally resolved  · Multifactorial:-  · 1  Cirrhosis of the liver with ascites-see below, status post a paracentesis-see below  · 2  Colitis-see below  · 3  Right upper quadrant ultrasound results reviewed-HIDA scan is negative  · Appreciate GI and general surgical input  · Status post an EGD on 11/29/2022-patient found to have severe antral gastritis - Protonix increased to b i d  dosing  · Status post a colonoscopy on 11/30/2022-no large lesions noted, no clear-cut identifiable cause of his abdominal pain, however in the setting of having a remote history of colon cancer, biopsies were taken  · Patient is medically stable for discharge  · Patient is being discharged home, patient to return to [place] today  · Patient was instructed to follow up with his PCP at home, and to call GI here for biopsy results, patient verbalized understanding    Cirrhosis of liver with ascites [facility])  Assessment & Plan  · Patient has a history of nonalcoholic cirrhosis who presented with acute decompensation- appears compensated at this time  · CT abd/pelvis (11/21):  Results reviewed     · S/p Paracentesis on 11/22/2022 with 2 2L removed; no evidence of SBP  · New prescriptions were provided for Lasix and Aldactone  · GI following; will need further outpatient follow-up with Hepatology     Colitis  Assessment & Plan  · Management as per GI  · Stool culture negative  · C diff positive as per PCR testing, but negative as per EIA testing   · Continue vancomycin 125 mg p o  q 6 hours - (End date: 12/3)  · Prescription provided for vancomycin    Pericardial effusion  Assessment & Plan  · Noted on CT abd/pelvis (11/21)  · TTE (11/22): LVEF 60%  Systolic function is normal  Wall motion is normal  There is a trivial pericardial effusion  There is no echocardiographic evidence of tamponade  · Patient will follow-up with the cardiologist upon return home to [facility])  Assessment & Plan  · Likely related to Cirrhosis as above  · Plts: 51  · Will need continued periodic outpatient CBC monitoring    Anemia  Assessment & Plan  · H&H has remained stable  · Will need periodic outpatient CBC monitoring via his PCP    [facility])  Assessment & Plan  · Status post a psych evaluation  · Okay for discharge    Colon cancer [facility])  Assessment & Plan  · Patient has a history of colon cancer  · No colon cancer found on colonoscopy yesterday  · Outpatient follow-up with PCP    Smoking  Assessment & Plan  · Cessation counseling provided  · Status post treatment with a nicotine patch while here in house        Discharging Physician / Practitioner: Chuck Manjarrez MD  PCP: No primary care provider on file    Admission Date:   Admission Orders (From admission, onward)     Ordered        11/21/22 2345  INPATIENT ADMISSION  Once                      Discharge Date: 12/01/22    Medical Problems     Resolved Problems  Date Reviewed: 11/25/2022   None         Consultations During Hospital Stay:  · Gastroenterology  · General surgery  · Interventional Radiology    Procedures Performed:   · Paracentesis 11/22/2022 via IR-2 2 L of fluid removed secondary to cirrhosis, no SBP  · EGD 11/29/2022-severe antral enteritis  · Colonoscopy 11/30/2022-no gross acute pathology    Significant Findings / Test Results:   · CT abdomen pelvis with contrast-Findings suggesting colitis, as described above   Colonic bowel wall thickening is most pronounced at the site of surgical sutures in the region of the mid transverse colon   Local recurrence should be excluded   Follow-up after treatment is strongly   recommended   Gastroenterology consultation and follow-up is recommended  Small bowel wall thickening may be due to enteritis, however, could be related to ascites and edema  Octavia Dais is no bowel obstruction  Octavia Dais is colonic diverticulosis without definite evidence of acute diverticulitis  The liver demonstrates cirrhotic morphology  Octavia Dais are findings of portal hypertension, including splenomegaly, ascites, varices  There is a small right pericardial effusion  Octavia Dais are several right pericardiophrenic nodes, the largest of which measures 2 x 1 6 cm   Follow-up is recommended  · Ultrasound right upper quadrant - Heterogeneous, nodular liver compatible with known cirrhosis   Small perihepatic ascites  Circumferential wall thickening of the gallbladder up to 5 mm, however no other sonographic findings to suggest acute cholecystitis   This may be reactive to the underlying liver disease  No biliary ductal dilatation  Right-sided intrarenal calculi with no hydronephrosis  Small right pleural effusion  · HIDA scan-1  Normal hepatobiliary study  2  Normal contractile response of the gallbladder to cholecystokinin infusion   (53%)   · 2D echocardiogram EF of 60% - see the full echocardiogram report for additional details    Incidental Findings:   · None     Test Results Pending at Discharge (will require follow up):   · Colonoscopy biopsy results-these will be followed up in the outpatient setting by GI     Outpatient Tests Requested:  · None    Complications:    • None    Reason for Admission:  Abdominal pain, colitis    Hospital Course:     Yandy Stover is a 58 y o  male patient who originally presented to the hospital on 11/21/2022 due to abdominal pain  Please refer to the initial history and physical examination completed by Anny Ramon for the initial presenting features and complaints    In brief, the patient is a 41-year-old male originally from [place], and is a patient that presented to the emergency room with a chief complaint of abdominal pain  In the emergency room, he had a CT scan of the abdomen and pelvis with the results as outlined above  He was admitted to [facility] surge  He was put on antibiotics  He was treated for C diff  the patient had an extensive workup for his abdominal pain  There was concern for the possibility of an acute cholecystitis  General surgery got involved  An ultrasound of the right upper quadrant was performed and then the patient underwent HIDA scan testing also  HIDA scan testing was normal   Patient was seen by GI, the patient had an endoscopy and a colonoscopy with the results as outlined above  Patient was also seen in conjunction with Psychiatry due to volatility in his mood  Ultimately, he was diagnosed with severe antral gastritis  At time of discharge, he opted to return to [place], and will follow up with his PCP upon returning home  He was provided with the follow-up information for GI to call for biopsy results  He was given new prescriptions for Lasix, spironolactone, vancomycin, Protonix, and Bentyl  Additionally he had a paracentesis performed on 11/22/2022 at which time 2 2 L of fluid was removed from his abdominal cavity  Fluid was secondary to cirrhosis  Patient was discharged on 12/01/2022, please refer to the assessment/plan portion of this discharge summary as outlined above for the remainder of the details in regard to his stay  Please see above list of diagnoses and related plan for additional information       Condition at Discharge: good     Discharge Day Visit / Exam:     Subjective:  Patient seen and examined, is already dressed to leave, feels well, has no new complaints  Vitals: Blood Pressure: 113/66 (12/01/22 0605)  Pulse: 76 (12/01/22 0605)  Temperature: 98 4 °F (36 9 °C) (12/01/22 0605)  Temp Source: Oral (11/30/22 1609)  Respirations: 20 (11/30/22 2144)  Height: 5' 7" (170 2 cm) (11/30/22 1146)  Weight - Scale: 87 1 kg (192 lb) (11/30/22 1146)  SpO2: 97 % (12/01/22 9586)  Exam:   Physical Exam  Vitals and nursing note reviewed  Constitutional:       General: He is not in acute distress  Appearance: Normal appearance  He is not ill-appearing  HENT:      Head: Normocephalic and atraumatic  Nose: Nose normal    Eyes:      Extraocular Movements: Extraocular movements intact  Pupils: Pupils are equal, round, and reactive to light  Cardiovascular:      Rate and Rhythm: Normal rate and regular rhythm  Pulses: Normal pulses  Heart sounds: Normal heart sounds  No murmur heard  No friction rub  No gallop  Pulmonary:      Effort: Pulmonary effort is normal       Breath sounds: Normal breath sounds  Abdominal:      General: There is no distension  Palpations: Abdomen is soft  There is no mass  Tenderness: There is no abdominal tenderness  There is no guarding or rebound  Musculoskeletal:         General: No swelling or tenderness  Normal range of motion  Cervical back: Normal range of motion and neck supple  No rigidity  No muscular tenderness  Right lower leg: No edema  Left lower leg: No edema  Skin:     General: Skin is warm  Capillary Refill: Capillary refill takes less than 2 seconds  Findings: No erythema or rash  Neurological:      General: No focal deficit present  Mental Status: He is alert and oriented to person, place, and time  Mental status is at baseline  Psychiatric:         Mood and Affect: Mood normal          Behavior: Behavior normal          Discussion with Family:  No family members were present at the time of my discharge evaluation, nor did the patient ask and or want me to call anyone    Discharge instructions/Information to patient and family:   See after visit summary for information provided to patient and family        Provisions for Follow-Up Care:  See after visit summary for information related to follow-up care and any pertinent home health orders  Disposition:     Home      Planned Readmission:   • None     Discharge Statement:  I spent 40 minutes discharging the patient  This time was spent on the day of discharge  I had direct contact with the patient on the day of discharge  Greater than 50% of the total time was spent examining patient, answering all patient questions, arranging and discussing plan of care with patient as well as directly providing post-discharge instructions  Additional time then spent on discharge activities  Discharge Medications:  See after visit summary for reconciled discharge medications provided to patient and family        ** Please Note: This note has been constructed using a voice recognition system **

## 2022-12-01 NOTE — DISCHARGE INSTR - AVS FIRST PAGE
Dear Bogdan Chu,     It was our pleasure to care for you here at [facility] [place]  It is our hope that we were always able to exceed the expected standards for your care during your stay  You were hospitalized due to abdominal pain  You were cared for on the medical/surgical floor by Abebe Mcmahan MD with the [facility] who covers for your primary care physician (PCP), No primary care provider on file  , while you were hospitalized  You were additionally seen by the [facility] gastroenterology associates, and by the [facility] surgical associates  If you have any questions or concerns related to this hospitalization, you may contact us at [phone]  For follow up as well as any medication refills, we recommend that you follow up with your primary care physician  A registered nurse will reach out to you by phone within a few days after your discharge to answer any additional questions that you may have after going home    However, at this time we provide for you here, the most important instructions / recommendations at discharge:     Notable Medication Adjustments -   New prescription Lasix 20 mg-take 1 tablet daily by mouth  New prescription-Bentyl 20mg take 1 tablet before breakfast and lunch   New prescription-Protonix 40 mg-take 1 tablet twice daily by mouth  New prescription-spironolactone 25 mg-take 1 tablet daily by mouth  New prescription vancomycin 125 mg-take 1 tablet every 6 hours for 3 more days then stop  Okay to resume all other pre-admit meds at pre-admit doses  Testing Required after Discharge -   To be further determined in the outpatient setting by your primary care provider  Important follow up information -   Please follow-up with the primary care provider, and with GI as outlined in this package  Other Instructions -   Please maintain a healthy diet  Please review this entire after visit summary as additional general instructions including medication list, appointments, activity, diet, any pertinent wound care, and other additional recommendations from your care team that may be provided for you        Sincerely,     Guillaume Ambrocio MD

## 2022-12-01 NOTE — ASSESSMENT & PLAN NOTE
· Likely related to Cirrhosis as above  · Plts: 51  · Will need continued periodic outpatient CBC monitoring

## 2022-12-01 NOTE — NURSING NOTE
Pt DC back to [email or web address] via [facility] in stable condition  All belongings packed and sent  AVS reviewed scripts given and sent with pt

## 2022-12-01 NOTE — ASSESSMENT & PLAN NOTE
· Finally resolved  · Multifactorial:-  · 1  Cirrhosis of the liver with ascites-see below, status post a paracentesis-see below  · 2  Colitis-see below  · 3   Right upper quadrant ultrasound results reviewed-HIDA scan is negative  · Appreciate GI and general surgical input  · Status post an EGD on 11/29/2022-patient found to have severe antral gastritis - Protonix increased to b i d  dosing  · Status post a colonoscopy on 11/30/2022-no large lesions noted, no clear-cut identifiable cause of his abdominal pain, however in the setting of having a remote history of colon cancer, biopsies were taken  · Patient is medically stable for discharge  · Patient is being discharged home, patient to return to [place] today  · Patient was instructed to follow up with his PCP at home, and to call GI here for biopsy results, patient verbalized understanding

## 2022-12-01 NOTE — PLAN OF CARE
Problem: Potential for Falls  Goal: Patient will remain free of falls  Description: INTERVENTIONS:  - Educate patient/family on patient safety including physical limitations  - Instruct patient to call for assistance with activity   - Consult OT/PT to assist with strengthening/mobility   - Keep Call bell within reach  - Keep bed low and locked with side rails adjusted as appropriate  - Keep care items and personal belongings within reach  - Initiate and maintain comfort rounds  - Make Fall Risk Sign visible to staff  - Offer Toileting every 2 Hours, in advance of need  - Initiate/Maintain bed alarm  - Obtain necessary fall risk management equipment: walker   - Apply yellow socks and bracelet for high fall risk patients  - Consider moving patient to room near nurses station  12/1/2022 0552 by Anais Yee RN  Outcome: Adequate for Discharge  12/1/2022 0142 by Anais Yee RN  Outcome: Progressing     Problem: PAIN - ADULT  Goal: Verbalizes/displays adequate comfort level or baseline comfort level  Description: Interventions:  - Encourage patient to monitor pain and request assistance  - Assess pain using appropriate pain scale  - Administer analgesics based on type and severity of pain and evaluate response  - Implement non-pharmacological measures as appropriate and evaluate response  - Consider cultural and social influences on pain and pain management  - Notify physician/advanced practitioner if interventions unsuccessful or patient reports new pain  12/1/2022 0552 by Anais Yee RN  Outcome: Adequate for Discharge  12/1/2022 0142 by Anais Yee RN  Outcome: Progressing     Problem: INFECTION - ADULT  Goal: Absence or prevention of progression during hospitalization  Description: INTERVENTIONS:  - Assess and monitor for signs and symptoms of infection  - Monitor lab/diagnostic results  - Monitor all insertion sites, i e  indwelling lines, tubes, and drains  - Monitor endotracheal if appropriate and nasal secretions for changes in amount and color  - Doon appropriate cooling/warming therapies per order  - Administer medications as ordered  - Instruct and encourage patient and family to use good hand hygiene technique  - Identify and instruct in appropriate isolation precautions for identified infection/condition  12/1/2022 0552 by Donny Aguilera RN  Outcome: Adequate for Discharge  12/1/2022 0142 by Donny Aguilera RN  Outcome: Progressing  Goal: Absence of fever/infection during neutropenic period  Description: INTERVENTIONS:  - Monitor WBC    12/1/2022 0552 by Donny Aguilera RN  Outcome: Adequate for Discharge  12/1/2022 0142 by Donny Aguilera RN  Outcome: Progressing     Problem: DISCHARGE PLANNING  Goal: Discharge to home or other facility with appropriate resources  Description: INTERVENTIONS:  - Identify barriers to discharge w/patient and caregiver  - Arrange for needed discharge resources and transportation as appropriate  - Identify discharge learning needs (meds, wound care, etc )  - Arrange for interpretive services to assist at discharge as needed  - Refer to Case Management Department for coordinating discharge planning if the patient needs post-hospital services based on physician/advanced practitioner order or complex needs related to functional status, cognitive ability, or social support system  12/1/2022 0552 by Donny Aguilera RN  Outcome: Adequate for Discharge  12/1/2022 0142 by Donny Aguilera RN  Outcome: Progressing     Problem: Knowledge Deficit  Goal: Patient/family/caregiver demonstrates understanding of disease process, treatment plan, medications, and discharge instructions  Description: Complete learning assessment and assess knowledge base    Interventions:  - Provide teaching at level of understanding  - Provide teaching via preferred learning methods  12/1/2022 0552 by Donny Aguilera RN  Outcome: Adequate for Discharge  12/1/2022 0142 by Alphia Kerbs, RN  Outcome: Progressing     Problem: MOBILITY - ADULT  Goal: Maintain or return to baseline ADL function  Description: INTERVENTIONS:  -  Assess patient's ability to carry out ADLs; assess patient's baseline for ADL function and identify physical deficits which impact ability to perform ADLs (bathing, care of mouth/teeth, toileting, grooming, dressing, etc )  - Assess/evaluate cause of self-care deficits   - Assess range of motion  - Assess patient's mobility; develop plan if impaired  - Assess patient's need for assistive devices and provide as appropriate  - Encourage maximum independence but intervene and supervise when necessary  - Involve family in performance of ADLs  - Assess for home care needs following discharge   - Consider OT consult to assist with ADL evaluation and planning for discharge  - Provide patient education as appropriate  12/1/2022 0552 by Jimmy Bell RN  Outcome: Adequate for Discharge  12/1/2022 0142 by Jimmy Bell RN  Outcome: Progressing  Goal: Maintains/Returns to pre admission functional level  Description: INTERVENTIONS:  - Perform BMAT or MOVE assessment daily    - Set and communicate daily mobility goal to care team and patient/family/caregiver  - Collaborate with rehabilitation services on mobility goals if consulted  - Perform Range of Motion 3 times a day  - Reposition patient every 2 hours    - Dangle patient 3 times a day  - Stand patient 3 times a day  - Ambulate patient 3 times a day  - Out of bed to chair 3 times a day   - Out of bed for meals 3 times a day  - Out of bed for toileting  - Record patient progress and toleration of activity level   12/1/2022 0552 by Jimmy Bell RN  Outcome: Adequate for Discharge  12/1/2022 0142 by Jimmy Bell RN  Outcome: Progressing

## 2022-12-01 NOTE — ASSESSMENT & PLAN NOTE
· Management as per GI  · Stool culture negative  · C diff positive as per PCR testing, but negative as per EIA testing   · Continue vancomycin 125 mg p o  q 6 hours - (End date: 12/3)  · Prescription provided for vancomycin

## 2022-12-01 NOTE — PLAN OF CARE
Problem: Potential for Falls  Goal: Patient will remain free of falls  Description: INTERVENTIONS:  - Educate patient/family on patient safety including physical limitations  - Instruct patient to call for assistance with activity   - Consult OT/PT to assist with strengthening/mobility   - Keep Call bell within reach  - Keep bed low and locked with side rails adjusted as appropriate  - Keep care items and personal belongings within reach  - Initiate and maintain comfort rounds  - Make Fall Risk Sign visible to staff  - Offer Toileting every 2 Hours, in advance of need  - Initiate/Maintain bed alarm  - Obtain necessary fall risk management equipment: walker   - Apply yellow socks and bracelet for high fall risk patients  - Consider moving patient to room near nurses station  Outcome: Progressing     Problem: PAIN - ADULT  Goal: Verbalizes/displays adequate comfort level or baseline comfort level  Description: Interventions:  - Encourage patient to monitor pain and request assistance  - Assess pain using appropriate pain scale  - Administer analgesics based on type and severity of pain and evaluate response  - Implement non-pharmacological measures as appropriate and evaluate response  - Consider cultural and social influences on pain and pain management  - Notify physician/advanced practitioner if interventions unsuccessful or patient reports new pain  Outcome: Progressing     Problem: INFECTION - ADULT  Goal: Absence or prevention of progression during hospitalization  Description: INTERVENTIONS:  - Assess and monitor for signs and symptoms of infection  - Monitor lab/diagnostic results  - Monitor all insertion sites, i e  indwelling lines, tubes, and drains  - Monitor endotracheal if appropriate and nasal secretions for changes in amount and color  - [place] appropriate cooling/warming therapies per order  - Administer medications as ordered  - Instruct and encourage patient and family to use good hand hygiene technique  - Identify and instruct in appropriate isolation precautions for identified infection/condition  Outcome: Progressing  Goal: Absence of fever/infection during neutropenic period  Description: INTERVENTIONS:  - Monitor WBC    Outcome: Progressing     Problem: DISCHARGE PLANNING  Goal: Discharge to home or other facility with appropriate resources  Description: INTERVENTIONS:  - Identify barriers to discharge w/patient and caregiver  - Arrange for needed discharge resources and transportation as appropriate  - Identify discharge learning needs (meds, wound care, etc )  - Arrange for interpretive services to assist at discharge as needed  - Refer to Case Management Department for coordinating discharge planning if the patient needs post-hospital services based on physician/advanced practitioner order or complex needs related to functional status, cognitive ability, or social support system  Outcome: Progressing     Problem: Knowledge Deficit  Goal: Patient/family/caregiver demonstrates understanding of disease process, treatment plan, medications, and discharge instructions  Description: Complete learning assessment and assess knowledge base    Interventions:  - Provide teaching at level of understanding  - Provide teaching via preferred learning methods  Outcome: Progressing     Problem: MOBILITY - ADULT  Goal: Maintain or return to baseline ADL function  Description: INTERVENTIONS:  -  Assess patient's ability to carry out ADLs; assess patient's baseline for ADL function and identify physical deficits which impact ability to perform ADLs (bathing, care of mouth/teeth, toileting, grooming, dressing, etc )  - Assess/evaluate cause of self-care deficits   - Assess range of motion  - Assess patient's mobility; develop plan if impaired  - Assess patient's need for assistive devices and provide as appropriate  - Encourage maximum independence but intervene and supervise when necessary  - Involve family in performance of ADLs  - Assess for home care needs following discharge   - Consider OT consult to assist with ADL evaluation and planning for discharge  - Provide patient education as appropriate  Outcome: Progressing  Goal: Maintains/Returns to pre admission functional level  Description: INTERVENTIONS:  - Perform BMAT or MOVE assessment daily    - Set and communicate daily mobility goal to care team and patient/family/caregiver  - Collaborate with rehabilitation services on mobility goals if consulted  - Perform Range of Motion 3 times a day  - Reposition patient every 2 hours    - Dangle patient 3 times a day  - Stand patient 3 times a day  - Ambulate patient 3 times a day  - Out of bed to chair 3 times a day   - Out of bed for meals 3 times a day  - Out of bed for toileting  - Record patient progress and toleration of activity level   Outcome: Progressing

## 2022-12-01 NOTE — ASSESSMENT & PLAN NOTE
· Patient has a history of colon cancer  · No colon cancer found on colonoscopy yesterday  · Outpatient follow-up with PCP

## 2022-12-01 NOTE — ASSESSMENT & PLAN NOTE
· Noted on CT abd/pelvis (11/21)  · TTE (11/22): LVEF 60%  Systolic function is normal  Wall motion is normal  There is a trivial pericardial effusion  There is no echocardiographic evidence of tamponade     · Patient will follow-up with the cardiologist upon return home to [facility]

## 2022-12-05 RX ORDER — ROPINIROLE 4 MG/1
4 TABLET, FILM COATED ORAL 4 TIMES DAILY
Qty: 120 TABLET | Refills: 0 | Status: SHIPPED | OUTPATIENT
Start: 2022-12-05 | End: 2022-12-05 | Stop reason: SDUPTHER

## 2022-12-05 RX ORDER — ROPINIROLE 4 MG/1
4 TABLET, FILM COATED ORAL 4 TIMES DAILY
Qty: 120 TABLET | Refills: 0 | Status: SHIPPED | OUTPATIENT
Start: 2022-12-05 | End: 2023-01-04

## 2022-12-06 LAB — GLUCOSE SERPL-MCNC: 120 MG/DL (ref 65–140)

## 2023-02-24 ENCOUNTER — APPOINTMENT (EMERGENCY)
Dept: RADIOLOGY | Facility: HOSPITAL | Age: 63
End: 2023-02-24

## 2023-02-24 ENCOUNTER — HOSPITAL ENCOUNTER (INPATIENT)
Facility: HOSPITAL | Age: 63
LOS: 2 days | Discharge: HOME | End: 2023-02-27
Attending: EMERGENCY MEDICINE | Admitting: HOSPITALIST

## 2023-02-24 DIAGNOSIS — K92.2 GI BLEED: ICD-10-CM

## 2023-02-24 DIAGNOSIS — K92.1 BLOOD IN STOOL: ICD-10-CM

## 2023-02-24 DIAGNOSIS — D64.9 ANEMIA: ICD-10-CM

## 2023-02-24 DIAGNOSIS — R18.8 CIRRHOSIS OF LIVER WITH ASCITES, UNSPECIFIED HEPATIC CIRRHOSIS TYPE (HCC): ICD-10-CM

## 2023-02-24 DIAGNOSIS — R10.9 ABDOMINAL PAIN: Primary | ICD-10-CM

## 2023-02-24 DIAGNOSIS — K74.60 CIRRHOSIS OF LIVER WITH ASCITES, UNSPECIFIED HEPATIC CIRRHOSIS TYPE (HCC): ICD-10-CM

## 2023-02-24 DIAGNOSIS — R18.8 ASCITES: ICD-10-CM

## 2023-02-24 LAB
ABO GROUP BLD: NORMAL
ALBUMIN SERPL BCP-MCNC: 2.7 G/DL (ref 3.5–5)
ALP SERPL-CCNC: 142 U/L (ref 46–116)
ALT SERPL W P-5'-P-CCNC: 44 U/L (ref 12–78)
AMMONIA PLAS-SCNC: 44 UMOL/L (ref 11–35)
ANION GAP SERPL CALCULATED.3IONS-SCNC: 7 MMOL/L (ref 4–13)
APTT PPP: 35 SECONDS (ref 23–37)
AST SERPL W P-5'-P-CCNC: 48 U/L (ref 5–45)
BACTERIA UR QL AUTO: ABNORMAL /HPF
BASOPHILS # BLD AUTO: 0.03 THOUSANDS/ÂΜL (ref 0–0.1)
BASOPHILS NFR BLD AUTO: 1 % (ref 0–1)
BILIRUB SERPL-MCNC: 3.27 MG/DL (ref 0.2–1)
BILIRUB UR QL STRIP: NEGATIVE
BLD GP AB SCN SERPL QL: NEGATIVE
BUN SERPL-MCNC: 15 MG/DL (ref 5–25)
CALCIUM ALBUM COR SERPL-MCNC: 9.8 MG/DL (ref 8.3–10.1)
CALCIUM SERPL-MCNC: 8.8 MG/DL (ref 8.3–10.1)
CHLORIDE SERPL-SCNC: 106 MMOL/L (ref 96–108)
CLARITY UR: CLEAR
CO2 SERPL-SCNC: 24 MMOL/L (ref 21–32)
COLOR UR: ABNORMAL
CREAT SERPL-MCNC: 1.04 MG/DL (ref 0.6–1.3)
EOSINOPHIL # BLD AUTO: 0.27 THOUSAND/ÂΜL (ref 0–0.61)
EOSINOPHIL NFR BLD AUTO: 6 % (ref 0–6)
ERYTHROCYTE [DISTWIDTH] IN BLOOD BY AUTOMATED COUNT: 15.8 % (ref 11.6–15.1)
GFR SERPL CREATININE-BSD FRML MDRD: 76 ML/MIN/1.73SQ M
GLUCOSE SERPL-MCNC: 117 MG/DL (ref 65–140)
GLUCOSE UR STRIP-MCNC: NEGATIVE MG/DL
HCT VFR BLD AUTO: 26.2 % (ref 36.5–49.3)
HGB BLD-MCNC: 8.8 G/DL (ref 12–17)
HGB UR QL STRIP.AUTO: ABNORMAL
IMM GRANULOCYTES # BLD AUTO: 0.02 THOUSAND/UL (ref 0–0.2)
IMM GRANULOCYTES NFR BLD AUTO: 0 % (ref 0–2)
INR PPP: 1.65 (ref 0.84–1.19)
KETONES UR STRIP-MCNC: NEGATIVE MG/DL
LEUKOCYTE ESTERASE UR QL STRIP: NEGATIVE
LYMPHOCYTES # BLD AUTO: 0.81 THOUSANDS/ÂΜL (ref 0.6–4.47)
LYMPHOCYTES NFR BLD AUTO: 17 % (ref 14–44)
MCH RBC QN AUTO: 35.6 PG (ref 26.8–34.3)
MCHC RBC AUTO-ENTMCNC: 33.6 G/DL (ref 31.4–37.4)
MCV RBC AUTO: 106 FL (ref 82–98)
MONOCYTES # BLD AUTO: 0.34 THOUSAND/ÂΜL (ref 0.17–1.22)
MONOCYTES NFR BLD AUTO: 7 % (ref 4–12)
NEUTROPHILS # BLD AUTO: 3.33 THOUSANDS/ÂΜL (ref 1.85–7.62)
NEUTS SEG NFR BLD AUTO: 69 % (ref 43–75)
NITRITE UR QL STRIP: NEGATIVE
NON-SQ EPI CELLS URNS QL MICRO: ABNORMAL /HPF
NRBC BLD AUTO-RTO: 0 /100 WBCS
PH UR STRIP.AUTO: 5.5 [PH] (ref 4.5–8)
PLATELET # BLD AUTO: 50 THOUSANDS/UL (ref 149–390)
PMV BLD AUTO: 10.8 FL (ref 8.9–12.7)
POTASSIUM SERPL-SCNC: 3.6 MMOL/L (ref 3.5–5.3)
PROT SERPL-MCNC: 7 G/DL (ref 6.4–8.4)
PROT UR STRIP-MCNC: NEGATIVE MG/DL
PROTHROMBIN TIME: 19.8 SECONDS (ref 11.6–14.5)
RBC # BLD AUTO: 2.47 MILLION/UL (ref 3.88–5.62)
RBC #/AREA URNS AUTO: ABNORMAL /HPF
RH BLD: NEGATIVE
SODIUM SERPL-SCNC: 137 MMOL/L (ref 135–147)
SP GR UR STRIP.AUTO: 1.02 (ref 1–1.03)
SPECIMEN EXPIRATION DATE: NORMAL
UROBILINOGEN UR QL STRIP.AUTO: 1 E.U./DL
WBC # BLD AUTO: 4.8 THOUSAND/UL (ref 4.31–10.16)
WBC #/AREA URNS AUTO: ABNORMAL /HPF

## 2023-02-24 RX ORDER — PANTOPRAZOLE SODIUM 40 MG/1
40 TABLET, DELAYED RELEASE ORAL
Status: DISCONTINUED | OUTPATIENT
Start: 2023-02-25 | End: 2023-02-25

## 2023-02-24 RX ORDER — DICYCLOMINE HCL 20 MG
20 TABLET ORAL
Status: DISCONTINUED | OUTPATIENT
Start: 2023-02-25 | End: 2023-02-27 | Stop reason: HOSPADM

## 2023-02-24 RX ORDER — ROPINIROLE 1 MG/1
4 TABLET, FILM COATED ORAL 4 TIMES DAILY
Status: DISCONTINUED | OUTPATIENT
Start: 2023-02-25 | End: 2023-02-27 | Stop reason: HOSPADM

## 2023-02-24 RX ORDER — FUROSEMIDE 20 MG/1
20 TABLET ORAL DAILY
Status: DISCONTINUED | OUTPATIENT
Start: 2023-02-25 | End: 2023-02-25

## 2023-02-24 RX ORDER — HYDROMORPHONE HCL/PF 1 MG/ML
0.5 SYRINGE (ML) INJECTION ONCE
Status: COMPLETED | OUTPATIENT
Start: 2023-02-24 | End: 2023-02-24

## 2023-02-24 RX ORDER — SPIRONOLACTONE 25 MG/1
25 TABLET ORAL DAILY
Status: DISCONTINUED | OUTPATIENT
Start: 2023-02-25 | End: 2023-02-26

## 2023-02-24 RX ORDER — HYDROMORPHONE HCL/PF 1 MG/ML
0.5 SYRINGE (ML) INJECTION EVERY 2 HOUR PRN
Status: DISCONTINUED | OUTPATIENT
Start: 2023-02-24 | End: 2023-02-26

## 2023-02-24 RX ADMIN — IOHEXOL 90 ML: 350 INJECTION, SOLUTION INTRAVENOUS at 18:46

## 2023-02-24 RX ADMIN — HYDROMORPHONE HYDROCHLORIDE 0.5 MG: 1 INJECTION, SOLUTION INTRAMUSCULAR; INTRAVENOUS; SUBCUTANEOUS at 17:07

## 2023-02-24 RX ADMIN — HYDROMORPHONE HYDROCHLORIDE 0.5 MG: 1 INJECTION, SOLUTION INTRAMUSCULAR; INTRAVENOUS; SUBCUTANEOUS at 18:01

## 2023-02-24 RX ADMIN — HYDROMORPHONE HYDROCHLORIDE 0.5 MG: 1 INJECTION, SOLUTION INTRAMUSCULAR; INTRAVENOUS; SUBCUTANEOUS at 19:43

## 2023-02-24 NOTE — Clinical Note
Case was discussed with SOD and the patient's admission status was agreed to be Admission Status: inpatient status to the service of

## 2023-02-25 PROBLEM — I31.39 PERICARDIAL EFFUSION: Status: RESOLVED | Noted: 2022-11-22 | Resolved: 2023-02-25

## 2023-02-25 PROBLEM — K92.2 GI BLEED: Status: ACTIVE | Noted: 2023-02-25

## 2023-02-25 PROBLEM — K52.9 COLITIS: Status: RESOLVED | Noted: 2022-11-22 | Resolved: 2023-02-25

## 2023-02-25 LAB
AAMA (MAX AMPLITUDE AA): 35.7 MM (ref 51–71)
ACTFMA(MAX AMPLITUDE ACTF): 3.2 MM (ref 2–19)
ADPMA(MAX AMPLITUDE ADP): 31.6 MM (ref 45–69)
ALBUMIN SERPL BCP-MCNC: 2.4 G/DL (ref 3.5–5)
ALP SERPL-CCNC: 128 U/L (ref 46–116)
ALT SERPL W P-5'-P-CCNC: 40 U/L (ref 12–78)
ANION GAP SERPL CALCULATED.3IONS-SCNC: 4 MMOL/L (ref 4–13)
ANISOCYTOSIS BLD QL SMEAR: PRESENT
APTT PPP: 39 SECONDS (ref 23–37)
AST SERPL W P-5'-P-CCNC: 39 U/L (ref 5–45)
BASOPHILS # BLD AUTO: 0.03 THOUSANDS/ÂΜL (ref 0–0.1)
BASOPHILS NFR BLD AUTO: 1 % (ref 0–1)
BILIRUB DIRECT SERPL-MCNC: 1.6 MG/DL (ref 0–0.2)
BILIRUB SERPL-MCNC: 2.55 MG/DL (ref 0.2–1)
BUN SERPL-MCNC: 12 MG/DL (ref 5–25)
CALCIUM SERPL-MCNC: 8.2 MG/DL (ref 8.3–10.1)
CFFMA (FUNCTIONAL FIBRINOGEN MAX AMPLITUDE): 13.7 MM (ref 15–32)
CHLORIDE SERPL-SCNC: 105 MMOL/L (ref 96–108)
CKLY30: 3.2 % (ref 0–2.6)
CKR(REACTION TIME): 6.1 MIN (ref 4.6–9.1)
CO2 SERPL-SCNC: 27 MMOL/L (ref 21–32)
CREAT SERPL-MCNC: 0.9 MG/DL (ref 0.6–1.3)
CRTMA(RAPIDTEG MAX AMPLITUDE): <40 MM (ref 52–70)
D DIMER PPP FEU-MCNC: 6.14 UG/ML FEU
EOSINOPHIL # BLD AUTO: 0.27 THOUSAND/ÂΜL (ref 0–0.61)
EOSINOPHIL NFR BLD AUTO: 7 % (ref 0–6)
ERYTHROCYTE [DISTWIDTH] IN BLOOD BY AUTOMATED COUNT: 15.7 % (ref 11.6–15.1)
FIBRINOGEN PPP-MCNC: 134 MG/DL (ref 227–495)
GFR SERPL CREATININE-BSD FRML MDRD: 91 ML/MIN/1.73SQ M
GLUCOSE SERPL-MCNC: 115 MG/DL (ref 65–140)
GLUCOSE SERPL-MCNC: 154 MG/DL (ref 65–140)
GLUCOSE SERPL-MCNC: 159 MG/DL (ref 65–140)
GLUCOSE SERPL-MCNC: 161 MG/DL (ref 65–140)
GLUCOSE SERPL-MCNC: 294 MG/DL (ref 65–140)
GLUCOSE SERPL-MCNC: 306 MG/DL (ref 65–140)
HCT VFR BLD AUTO: 24.4 % (ref 36.5–49.3)
HCT VFR BLD AUTO: 25.5 % (ref 36.5–49.3)
HCT VFR BLD AUTO: 26.4 % (ref 36.5–49.3)
HGB BLD-MCNC: 8 G/DL (ref 12–17)
HGB BLD-MCNC: 8.4 G/DL (ref 12–17)
HGB BLD-MCNC: 8.6 G/DL (ref 12–17)
HKHMA(MAX AMPLITUDE KAOLIN): <42 MM (ref 53–68)
IMM EOSINOPHIL NFR BLD MANUAL: 2 % (ref 0–6)
IMM GRANULOCYTES # BLD AUTO: 0.01 THOUSAND/UL (ref 0–0.2)
IMM GRANULOCYTES NFR BLD AUTO: 0 % (ref 0–2)
INR PPP: 1.68 (ref 0.84–1.19)
LACTATE SERPL-SCNC: 1.9 MMOL/L (ref 0.5–2)
LYMPHOCYTES # BLD AUTO: 0.89 THOUSANDS/ÂΜL (ref 0.6–4.47)
LYMPHOCYTES NFR BLD AUTO: 23 % (ref 14–44)
LYMPHOCYTES NFR BLD: 17 % (ref 14–44)
MACROCYTES BLD QL AUTO: PRESENT
MCH RBC QN AUTO: 35.2 PG (ref 26.8–34.3)
MCHC RBC AUTO-ENTMCNC: 32.8 G/DL (ref 31.4–37.4)
MCV RBC AUTO: 108 FL (ref 82–98)
MONOCYTES # BLD AUTO: 0.31 THOUSAND/ÂΜL (ref 0.17–1.22)
MONOCYTES NFR BLD AUTO: 8 % (ref 4–12)
NEUTROPHILS # BLD AUTO: 2.41 THOUSANDS/ÂΜL (ref 1.85–7.62)
NEUTS BAND NFR BLD MANUAL: 6 THOUSAND/UL
NEUTS SEG NFR BLD AUTO: 61 % (ref 43–75)
NEUTS SEG NFR BLD AUTO: 75 % (ref 45–77)
NRBC BLD AUTO-RTO: 0 /100 WBCS
PLATELET # BLD AUTO: 44 THOUSANDS/UL (ref 149–390)
PMV BLD AUTO: 11.7 FL (ref 8.9–12.7)
POTASSIUM SERPL-SCNC: 3.7 MMOL/L (ref 3.5–5.3)
PROT SERPL-MCNC: 6.1 G/DL (ref 6.4–8.4)
PROTHROMBIN TIME: 20.1 SECONDS (ref 11.6–14.5)
RBC # BLD AUTO: 2.27 MILLION/UL (ref 3.88–5.62)
SODIUM SERPL-SCNC: 136 MMOL/L (ref 135–147)
TOTAL CELLS COUNTED SPEC: 100
WBC # BLD AUTO: 3.92 THOUSAND/UL (ref 4.31–10.16)

## 2023-02-25 RX ORDER — INSULIN LISPRO 100 [IU]/ML
1-6 INJECTION, SOLUTION INTRAVENOUS; SUBCUTANEOUS EVERY 6 HOURS SCHEDULED
Status: DISCONTINUED | OUTPATIENT
Start: 2023-02-25 | End: 2023-02-25

## 2023-02-25 RX ORDER — PANTOPRAZOLE SODIUM 40 MG/10ML
40 INJECTION, POWDER, LYOPHILIZED, FOR SOLUTION INTRAVENOUS 2 TIMES DAILY
Status: DISCONTINUED | OUTPATIENT
Start: 2023-02-25 | End: 2023-02-27 | Stop reason: HOSPADM

## 2023-02-25 RX ORDER — LACTULOSE 20 G/30ML
20 SOLUTION ORAL DAILY
Status: DISCONTINUED | OUTPATIENT
Start: 2023-02-25 | End: 2023-02-27 | Stop reason: HOSPADM

## 2023-02-25 RX ORDER — DIPHENHYDRAMINE HCL 25 MG
12.5 TABLET ORAL ONCE
Status: COMPLETED | OUTPATIENT
Start: 2023-02-25 | End: 2023-02-25

## 2023-02-25 RX ORDER — INSULIN LISPRO 100 [IU]/ML
1-6 INJECTION, SOLUTION INTRAVENOUS; SUBCUTANEOUS
Status: DISCONTINUED | OUTPATIENT
Start: 2023-02-25 | End: 2023-02-27 | Stop reason: HOSPADM

## 2023-02-25 RX ORDER — FUROSEMIDE 10 MG/ML
20 INJECTION INTRAMUSCULAR; INTRAVENOUS DAILY
Status: DISCONTINUED | OUTPATIENT
Start: 2023-02-25 | End: 2023-02-27 | Stop reason: HOSPADM

## 2023-02-25 RX ORDER — DIPHENHYDRAMINE HCL 25 MG
12.5 TABLET ORAL EVERY 6 HOURS PRN
Status: DISCONTINUED | OUTPATIENT
Start: 2023-02-25 | End: 2023-02-25

## 2023-02-25 RX ORDER — ONDANSETRON 2 MG/ML
4 INJECTION INTRAMUSCULAR; INTRAVENOUS EVERY 6 HOURS PRN
Status: DISCONTINUED | OUTPATIENT
Start: 2023-02-25 | End: 2023-02-27 | Stop reason: HOSPADM

## 2023-02-25 RX ADMIN — LACTULOSE 20 G: 20 SOLUTION ORAL at 12:00

## 2023-02-25 RX ADMIN — DICYCLOMINE HYDROCHLORIDE 20 MG: 20 TABLET ORAL at 12:00

## 2023-02-25 RX ADMIN — PANTOPRAZOLE SODIUM 40 MG: 40 INJECTION, POWDER, FOR SOLUTION INTRAVENOUS at 04:18

## 2023-02-25 RX ADMIN — HYDROMORPHONE HYDROCHLORIDE 0.5 MG: 1 INJECTION, SOLUTION INTRAMUSCULAR; INTRAVENOUS; SUBCUTANEOUS at 15:22

## 2023-02-25 RX ADMIN — ROPINIROLE 4 MG: 2 TABLET, FILM COATED ORAL at 12:01

## 2023-02-25 RX ADMIN — DIPHENHYDRAMINE HCL 12.5 MG: 25 TABLET, COATED ORAL at 06:32

## 2023-02-25 RX ADMIN — INSULIN LISPRO 4 UNITS: 100 INJECTION, SOLUTION INTRAVENOUS; SUBCUTANEOUS at 04:19

## 2023-02-25 RX ADMIN — HYDROMORPHONE HYDROCHLORIDE 0.5 MG: 1 INJECTION, SOLUTION INTRAMUSCULAR; INTRAVENOUS; SUBCUTANEOUS at 00:41

## 2023-02-25 RX ADMIN — PANTOPRAZOLE SODIUM 40 MG: 40 INJECTION, POWDER, FOR SOLUTION INTRAVENOUS at 17:24

## 2023-02-25 RX ADMIN — DICYCLOMINE HYDROCHLORIDE 20 MG: 20 TABLET ORAL at 06:31

## 2023-02-25 RX ADMIN — NICOTINE 7 MG: 7 PATCH, EXTENDED RELEASE TRANSDERMAL at 01:26

## 2023-02-25 RX ADMIN — ROPINIROLE 4 MG: 2 TABLET, FILM COATED ORAL at 04:18

## 2023-02-25 RX ADMIN — RIFAXIMIN 550 MG: 550 TABLET ORAL at 12:00

## 2023-02-25 RX ADMIN — RIFAXIMIN 550 MG: 550 TABLET ORAL at 20:24

## 2023-02-25 RX ADMIN — INSULIN LISPRO 1 UNITS: 100 INJECTION, SOLUTION INTRAVENOUS; SUBCUTANEOUS at 11:59

## 2023-02-25 RX ADMIN — HYDROMORPHONE HYDROCHLORIDE 0.5 MG: 1 INJECTION, SOLUTION INTRAMUSCULAR; INTRAVENOUS; SUBCUTANEOUS at 04:18

## 2023-02-25 RX ADMIN — ROPINIROLE 4 MG: 2 TABLET, FILM COATED ORAL at 17:24

## 2023-02-25 RX ADMIN — HYDROMORPHONE HYDROCHLORIDE 0.5 MG: 1 INJECTION, SOLUTION INTRAMUSCULAR; INTRAVENOUS; SUBCUTANEOUS at 20:24

## 2023-02-25 RX ADMIN — ROPINIROLE 4 MG: 2 TABLET, FILM COATED ORAL at 22:45

## 2023-02-25 NOTE — UTILIZATION REVIEW
NOTIFICATION OF INPATIENT ADMISSION   AUTHORIZATION REQUEST   SERVICING FACILITY:   [facility]  Address: [address] [place] [address], [address] [place] [address]  Tax ID: [id number]  NPI: [id number] ATTENDING PROVIDER:  Attending Name and NPI#: Yazmin Jordan Md [[id number]]  Address: [address] [place] [address]  Phone: [phone]   ADMISSION INFORMATION:  Place of Service: [place] [address]  Place of Service Code: 21  Inpatient Admission Date/Time: 2/25/23 12:15 AM  Discharge Date/Time: No discharge date for patient encounter  Admitting Diagnosis Code/Description:  Abdominal pain [R10 9]     UTILIZATION REVIEW CONTACT:  Chrissy Mcdaniel Utilization   Network Utilization Review Department  Phone: [phone]  Fax: [phone]  Email: [email or web address]  Contact for approvals/pending authorizations, clinical reviews, and discharge  PHYSICIAN ADVISORY SERVICES:  Medical Necessity Denial & [place] Review  Phone: [phone]  Fax: [phone]  Email: [email or web address]

## 2023-02-25 NOTE — CONSULTS
Consultation - [facility]  Haylie Boyle 58 y o  male MRN: [id number]  Unit/Bed#: ED 27 Encounter: [id number]        Inpatient consult to gastroenterology  Consult performed by: Ana Hyman DO  Consult ordered by: Jovanny Chacko MD          Reason for Consult / Principal Problem:     Possible GI bleed, cirrhosis      ASSESSMENT AND PLAN:      22-year-old male with past medical history of Margean Brown cirrhosis meld of 17 decompensated by history of ascites, colon cancer status post right hemicolectomy, prior C  difficile infection who was admitted for abdominal pain, ascites, rectal bleeding  Gastroenterology was consulted for further management  1  Rectal bleeding  2  Chronic anemia  Hemoglobin 8 0, baseline 10-11  Also thrombocytopenic at 44, INR 1 67, decreased fibrinogen  Reports rectal bleeding yesterday but none overnight  Reports very small volume episode of streaks of blood in vomitus yesterday but none currently  Colonoscopy in 2022 showed friable mucosa near anastomotic site     Suspect rectal bleeding is from hemorrhoidal bleeding versus ischemic colitis versus ulceration from anastomosis from prior surgery  CT scan with evidence of small bowel wall thickening, enteritis  • Plan for EGD/colonoscopy on 2/27/2023  • GI low fiber low residue diet for now, clear liquid diet tomorrow followed by preparation and n p o  at midnight on 2/26  • Protonix 40 mg IV twice daily  • Will require antibiotic for prophylaxis once paracentesis is completed  Start ceftriaxone 1 g every 24 hours  • Check iron panel  • Monitor hemoglobin, bowel movements  Transfuse for less than 7     3  Possible hematemesis  Reports very small volume hematemesis for 1 episode yesterday  Described as streaks of blood  Unlikely to be variceal bleeding at this time  Had small varices on EGD in 2022  · Hold on octreotide unless patient diverts overt hematemesis    · Protonix 40 mg IV twice daily   · Antibiotic prophylaxis once paracentesis is completed  · Management of anemia as described above  4  Cirrhosis  Suspected to be CHRISTIE cirrhosis as patient denies alcohol use however he does have a fiancé that uses alcohol heavily  MELD is currently 17  Decompensated by ascites  · Monitor CMP, INR daily  Ascites: Presented with abdominal swelling  Has minor abdominal discomfort but no fever, chills, signs of SBP currently  · Recommend paracentesis with fluid studies, currently ordered     · 2 g sodium diet  · Hold diuretics  · Monitor volume status  Portal hypertension: EGD in 2022 showed small varices  No prior history of bleeding  Low suspicion for active variceal bleeding currently  · Plan for EGD/colonoscopy on 2/27/2023  · Monitor for signs of bleeding  · Depending on endoscopic findings may be candidate for beta-blocker therapy  Encephalopathy: History of encephalopathy but not on exam   He remains oriented  · Monitor mental status  HCC screening: Had CT with contrast no evidence of hepatic mass  · Continue screening every 6 months with right upper quadrant ultrasound/AFP  Transplant candidacy: He would be a transplant candidate given his elevated MELD score  No current urgent indications   · Follow-up with hepatology as an outpatient  Rest of care per primary team   Thank you for this consultation  ______________________________________________________________________    HPI:  70-year-old male with past medical history of Rohan Lois cirrhosis meld of 17 decompensated by history of ascites, colon cancer status post right hemicolectomy, prior C  difficile infection who was admitted for abdominal pain, ascites, rectal bleeding  Gastroenterology was consulted for further management  Patient states that he has had worsening abdominal pain, ascites and rectal bleeding over the past few days    He states he was recently admitted to [facility] however on record review he was admitted to [facility] in [place]  He had similar symptoms at that time  Was plan for colonoscopy which patient stated he completed however documentation states he left AGAINST MEDICAL ADVICE  He states that his last bowel movement was yesterday and did have a significant amount of bright red blood  He denies any melena  Had some nausea and episodes of vomiting, 1 of which had small amount of streaks of blood  Also has abdominal swelling  Denies any alcohol use history  Does admit to tobacco use currently  He had endoscopy in 2022 which showed small varices  Had a colonoscopy completed at that time showed friable mucosa near anastomotic site  REVIEW OF SYSTEMS:    Review of Systems   Constitutional: Negative for chills and fever  HENT: Negative for congestion and sinus pressure  Respiratory: Negative for cough and shortness of breath  Cardiovascular: Negative for chest pain, palpitations and leg swelling  Gastrointestinal: Positive for abdominal distention, anal bleeding, blood in stool and nausea  Negative for abdominal pain, diarrhea and vomiting  Genitourinary: Negative for dysuria and hematuria  Musculoskeletal: Negative for arthralgias and back pain  Skin: Negative for color change and rash  Neurological: Negative for dizziness and headaches  Psychiatric/Behavioral: Negative for agitation and confusion  All other systems reviewed and are negative           Historical Information   Past Medical History:   Diagnosis Date   • Cirrhosis ([facility] [address] )     CHRISTIE   • Colon cancer ([facility] [address] )    • Diabetes mellitus ([facility] [address] )    • Neuropathy    • Restless leg syndrome      Past Surgical History:   Procedure Laterality Date   • EGD AND SIGMOIDOSCOPY FLEXIBLE     • IR PARACENTESIS  11/22/2022   • LEFT COLON RESECTION     • LITHOTRIPSY       Social History   Social History     Substance and Sexual Activity   Alcohol Use Not Currently     Social History     Substance and Sexual Activity Drug Use Never     Social History     Tobacco Use   Smoking Status Every Day   • Packs/day: 0 25   • Types: Cigarettes   Smokeless Tobacco Never     Family History   Problem Relation Age of Onset   • Diabetes Mother    • Diabetes Father        Meds/Allergies     (Not in a hospital admission)    Current Facility-Administered Medications   Medication Dose Route Frequency   • dicyclomine (BENTYL) tablet 20 mg  20 mg Oral BID before breakfast/lunch   • furosemide (LASIX) injection 20 mg  20 mg Intravenous Daily   • HYDROmorphone (DILAUDID) injection 0 5 mg  0 5 mg Intravenous Q2H PRN   • insulin lispro (HumaLOG) 100 units/mL subcutaneous injection 1-6 Units  1-6 Units Subcutaneous Q6H [address]   • nicotine (NICODERM CQ) 7 mg/24hr TD 24 hr patch 7 mg  7 mg Transdermal Daily   • ondansetron (ZOFRAN) injection 4 mg  4 mg Intravenous Q6H PRN   • pantoprazole (PROTONIX) injection 40 mg  40 mg Intravenous BID   • rOPINIRole (REQUIP) tablet 4 mg  4 mg Oral 4x Daily   • spironolactone (ALDACTONE) tablet 25 mg  25 mg Oral Daily       Allergies   Allergen Reactions   • Morphine Anaphylaxis   • Clarithromycin Other (See Comments)     Dizzy, nausea, ulcers in stomach           Objective     Blood pressure 97/51, pulse 68, temperature 97 6 °F (36 4 °C), temperature source Oral, resp  rate 18, SpO2 97 %  There is no height or weight on file to calculate BMI  No intake or output data in the 24 hours ending 02/25/23 1981      PHYSICAL EXAM:      Physical Exam  Vitals and nursing note reviewed  Constitutional:       General: He is not in acute distress  Appearance: Normal appearance  He is well-developed  He is not ill-appearing  HENT:      Head: Normocephalic and atraumatic  Mouth/Throat:      Mouth: Mucous membranes are moist    Eyes:      General: Scleral icterus present  Extraocular Movements: Extraocular movements intact        Conjunctiva/sclera: Conjunctivae normal    Cardiovascular:      Rate and Rhythm: Normal rate       Pulses: Normal pulses  Pulmonary:      Effort: Pulmonary effort is normal    Abdominal:      General: Abdomen is flat  Bowel sounds are normal  There is no distension  Palpations: Abdomen is soft  Tenderness: There is no abdominal tenderness  There is no guarding  Musculoskeletal:      Cervical back: Neck supple  Right lower leg: No edema  Left lower leg: No edema  Skin:     General: Skin is warm and dry  Neurological:      General: No focal deficit present  Mental Status: He is alert and oriented to person, place, and time     Psychiatric:         Mood and Affect: Mood normal          Behavior: Behavior normal           Lab Results:   Admission on 02/24/2023   Component Date Value   • WBC 02/24/2023 4 80    • RBC 02/24/2023 2 47 (L)    • Hemoglobin 02/24/2023 8 8 (L)    • Hematocrit 02/24/2023 26 2 (L)    • MCV 02/24/2023 106 (H)    • MCH 02/24/2023 35 6 (H)    • MCHC 02/24/2023 33 6    • RDW 02/24/2023 15 8 (H)    • MPV 02/24/2023 10 8    • Platelets 13/86/4712 50 (L)    • nRBC 02/24/2023 0    • Neutrophils Relative 02/24/2023 69    • Immat GRANS % 02/24/2023 0    • Lymphocytes Relative 02/24/2023 17    • Monocytes Relative 02/24/2023 7    • Eosinophils Relative 02/24/2023 6    • Basophils Relative 02/24/2023 1    • Neutrophils Absolute 02/24/2023 3 33    • Immature Grans Absolute 02/24/2023 0 02    • Lymphocytes Absolute 02/24/2023 0 81    • Monocytes Absolute 02/24/2023 0 34    • Eosinophils Absolute 02/24/2023 0 27    • Basophils Absolute 02/24/2023 0 03    • Sodium 02/24/2023 137    • Potassium 02/24/2023 3 6    • Chloride 02/24/2023 106    • CO2 02/24/2023 24    • ANION GAP 02/24/2023 7    • BUN 02/24/2023 15    • Creatinine 02/24/2023 1 04    • Glucose 02/24/2023 117    • Calcium 02/24/2023 8 8    • Corrected Calcium 02/24/2023 9 8    • AST 02/24/2023 48 (H)    • ALT 02/24/2023 44    • Alkaline Phosphatase 02/24/2023 142 (H)    • Total Protein 02/24/2023 7 0    • Albumin 02/24/2023 2 7 (L)    • Total Bilirubin 02/24/2023 3 27 (H)    • eGFR 02/24/2023 76    • Protime 02/24/2023 19 8 (H)    • INR 02/24/2023 1 65 (H)    • PTT 02/24/2023 35    • ABO Grouping 02/24/2023 A    • Rh Factor 02/24/2023 Negative    • Antibody Screen 02/24/2023 Negative    • Specimen Expiration Date 02/24/2023 [id number]    • Ammonia 02/24/2023 44 (H)    • Color, UA 02/24/2023 Viky    • Clarity, UA 02/24/2023 Clear    • pH, UA 02/24/2023 5 5    • Leukocytes, UA 02/24/2023 Negative    • Nitrite, UA 02/24/2023 Negative    • Protein, UA 02/24/2023 Negative    • Glucose, UA 02/24/2023 Negative    • Ketones, UA 02/24/2023 Negative    • Urobilinogen, UA 02/24/2023 1 0    • Bilirubin, UA 02/24/2023 Negative    • Occult Blood, UA 02/24/2023 Large (A)    • Specific [place], UA 02/24/2023 1 020    • RBC, UA 02/24/2023 Innumerable (A)    • WBC, UA 02/24/2023 1-2    • Epithelial Cells 02/24/2023 Occasional    • Bacteria, UA 02/24/2023 None Seen    • LACTIC ACID 02/24/2023 1 9    • Fibrinogen 02/25/2023 134 (L)    • D-Dimer, Quant 02/25/2023 6 14 (H)    • HKHMA(MAX AMPLITUDE KAOL* 02/25/2023 <42 (L)    • ACTFMA(MAX AMPLITUDE ACT* 02/25/2023 3 2    • ADPMA(MAX AMPLITUDE ADP) 02/25/2023 31 6 (L)    • AAMA (MAX AMPLITUDE AA) 02/25/2023 35 7 (L)    • ADPADPINHIBITION 02/25/2023     • AAASAINHIBITION 02/25/2023     • ADPADPAGGREGATION 02/25/2023     • AAASAAGGREGATION 02/25/2023     • CKR(REACTION TIME) 02/25/2023 6 1    • CKLY30 02/25/2023 3 2 (H)    • CRTMA(RAPIDTEG MAX AMPLI* 02/25/2023 <40 (L)    • CFFMA (FUNCTIONAL FIBRIN* 02/25/2023 13 7 (L)    • Hemoglobin 02/25/2023 8 6 (L)    • Hematocrit 02/25/2023 26 4 (L)    • POC Glucose 02/25/2023 306 (H)    • WBC 02/25/2023 3 92 (L)    • RBC 02/25/2023 2 27 (L)    • Hemoglobin 02/25/2023 8 0 (L)    • Hematocrit 02/25/2023 24 4 (L)    • MCV 02/25/2023 108 (H)    • MCH 02/25/2023 35 2 (H)    • MCHC 02/25/2023 32 8    • RDW 02/25/2023 15 7 (H)    • MPV 02/25/2023 11 7    • Platelets 67/77/2786 44 (LL)    • nRBC 02/25/2023 0    • Neutrophils Relative 02/25/2023 61    • Immat GRANS % 02/25/2023 0    • Lymphocytes Relative 02/25/2023 23    • Monocytes Relative 02/25/2023 8    • Eosinophils Relative 02/25/2023 7 (H)    • Basophils Relative 02/25/2023 1    • Neutrophils Absolute 02/25/2023 2 41    • Immature Grans Absolute 02/25/2023 0 01    • Lymphocytes Absolute 02/25/2023 0 89    • Monocytes Absolute 02/25/2023 0 31    • Eosinophils Absolute 02/25/2023 0 27    • Basophils Absolute 02/25/2023 0 03    • Sodium 02/25/2023 136    • Potassium 02/25/2023 3 7    • Chloride 02/25/2023 105    • CO2 02/25/2023 27    • ANION GAP 02/25/2023 4    • BUN 02/25/2023 12    • Creatinine 02/25/2023 0 90    • Glucose 02/25/2023 161 (H)    • Calcium 02/25/2023 8 2 (L)    • eGFR 02/25/2023 91    • Total Bilirubin 02/25/2023 2 55 (H)    • Bilirubin, Direct 02/25/2023 1 60 (H)    • Alkaline Phosphatase 02/25/2023 128 (H)    • AST 02/25/2023 39    • ALT 02/25/2023 40    • Total Protein 02/25/2023 6 1 (L)    • Albumin 02/25/2023 2 4 (L)    • Protime 02/25/2023 20 1 (H)    • INR 02/25/2023 1 68 (H)    • PTT 02/25/2023 39 (H)        Imaging Studies: I have personally reviewed pertinent imaging studies  [address] [facility] [place]     Gastroenterology Fellow  PGY-4  Available via [employer]  2/25/2023 6:33 AM

## 2023-02-25 NOTE — ASSESSMENT & PLAN NOTE
Lab Results   Component Value Date    HGBA1C 5 9 (H) 11/21/2022       Recent Labs     02/25/23  0303 02/25/23  0759 02/25/23  1139   POCGLU 306* 115 159*       Blood Sugar Average: Last 72 hrs:  (P) 306     - Continue sliding-scale insulin   -Evaluated blood sugars consider starting Lantus nightly

## 2023-02-25 NOTE — ASSESSMENT & PLAN NOTE
The patient was anemic, to 8 8, on presentation to the Emergency Department at [facility], on 2/24/2023   - Continue to monitor hemoglobin, with a daily CBC

## 2023-02-25 NOTE — ASSESSMENT & PLAN NOTE
The patient has a history of colon-cancer, for which he underwent hemicolectomy in 2002  CT abdomen pelvis on this admission without signs of lymphadenopathy

## 2023-02-25 NOTE — H&P
INTERNAL MEDICINE RESIDENCY ADMISSION H&P     Name: Mehnaz King   Age & Sex: 58 y o  male   MRN: [id number]  Unit/Bed#: ED 32   Encounter: [id number]  Primary Care Provider: No primary care provider on file  Code Status: Level 1 - Full Code  Admission Status: INPATIENT   Disposition: Patient requires Med/Surg    Admit to team: SOD Team A    ASSESSMENT/PLAN     Principal Problem:    Abdominal pain  Active Problems:    Colitis    Cirrhosis of liver with ascites (HCC)    Pericardial effusion    Thrombocytopenia (HCC)    Restless leg syndrome    Smoking    Diabetes mellitus type 2 in obese (HCC)    Anemia    Colon cancer (HCC)    GI bleed    * Abdominal pain  Assessment & Plan  The patient presented with abdominal pain, which spread from the right side of the abdomen to the left side, and which had a severity of 9 out of 10  The patient said that the pain was "steady," was "present all of the time," and was made worse by the consumption of solid food  A CT of the abdomen and pelvis, with contrast, performed on 2/24/2023, showed "Worsening right hemiabdomen diffuse small bowel wall thickening with the small bowel feces sign in the pelvic loop suggesting an obstructive pattern secondary to underlying severe enteritis  Cirrhotic changes with collaterals, splenomegaly and moderate ascites  "  - Continue furosemide 20 mg IV PO qd   - Continue pantoprazole 40 mg IV bid   - Continue spironolactone 25 mg PO qd   - Gastroenterology was consulted  The patient is NPO, pending possible intervention by Gastroenterology  Colon cancer [facility])  Assessment & Plan  The patient has a history of colon-cancer, for which he underwent surgery in 2002  He also described ongoing pain with defecation  Anemia  Assessment & Plan  The patient was anemic, to 8 8, on presentation to the Emergency Department at [facility], on 2/24/2023   - Continue to monitor hemoglobin, with a daily CBC      Diabetes mellitus type 2 in obese [facility])  Assessment & Plan  Lab Results   Component Value Date    HGBA1C 5 9 (H) 11/21/2022       Recent Labs     02/25/23  0303   POCGLU 306*       Blood Sugar Average: Last 72 hrs:  (P) 306     The patient has a history of T2DM  At 03:03 on 2/25/2023, a POC glucose was 303  The patient received 4 units of sliding-scale insulin, shortly after this glucose-measurement  - Continue sliding-scale insulin  Smoking  Assessment & Plan  The patient said that he has been smoking 5-6 cigarettes per day  - Continue nicotine patch 7 mg TD qd  Restless leg syndrome  Assessment & Plan  - Continue ropinirole 4 mg PO four times daily  Thrombocytopenia ([address] )  Assessment & Plan  The patient's platelet-count was 21G, on 2/24/2023  On physical examination, a small amount of petechiae was noted on the bilateral lower extremities  - Continue to monitor the platelet-count, with a daily CBC  VTE Pharmacologic Prophylaxis: Reason for no pharmacologic prophylaxis The patient presented with bright red blood per rectum  VTE Mechanical Prophylaxis: sequential compression device    CHIEF COMPLAINT     Chief Complaint   Patient presents with   • Abdominal Pain     Was the [place] [address] bathroom where he had bright red blood in his BM  Patient also reports abdominal pain starting several days ago and states he has ascites  HISTORY OF PRESENT ILLNESS     Lita Rodriguez is a 71-year-old man, with a history of CHRSITIE complicated by cirrhosis, who presented to the Emergency Department at [facility] on 2/24/2023 with abdominal pain and blood in the stool  The patient said that he had been "hurting," with abdominal pain in the areas of the stomach and the liver, and that he had noticed blood in his stool  The patient said that the pain had been spreading, from the right side of the abdomen, to the left side of the abdomen   He rated the severity of the pain as 9 out of 10, and said that the pain was steady, and was present "all of the time " The patient said that eating solid food made the pain worse  He also described having experienced lightheadedness, and said the he "couldn't keep [his] balance," on the day of presentation  In the Emergency Department, temperature was 97 6 degrees F, pulse was 81, respiratory rate was 17, BP was 131/63, and SpO2 was 100% on room-air  The patient received hydromorphone 0 5 mg IV, three times, at 17:07, 18:01, and 19:43 on 2/24/2023  A CBC showed that the WBC-count was 4 90, hemoglobin was 8 8, hematocrit was 26 2%, MCV was 106, and the platelet-count was 70K  A CMP showed that sodium was 137, potassium was 3 6, creatinine was 1 04 (with a baseline-level of 0 9-1 0), glucose was 117, calcium was 8 8 (correct calcium was 9 8), AST was 48, ALT was 44, alkaline phosphatase was 142, albumin was 2 7, total bilirubin was 3 27, and eGFR was 76  PT was 19 8 seconds, INR was 1 65, PTT was 35 seconds, ammonia was 44, and microscopic analysis of urine showed innumerable RBC's her HPF  A CT of the abdomen and pelvis, with contrast, performed on 2/24/2023, showed the following  "Worsening right hemiabdomen diffuse small bowel wall thickening with the small bowel feces sign in the pelvic loop suggesting an obstructive pattern secondary to underlying severe enteritis  Cirrhotic changes with collaterals, splenomegaly and moderate ascites "    REVIEW OF SYSTEMS     Review of Systems   Constitutional: Positive for chills  Negative for fever  HENT: Negative for rhinorrhea and sore throat  Respiratory: Negative for cough and shortness of breath  Cardiovascular: Negative for chest pain and palpitations  Gastrointestinal: Positive for nausea  Negative for constipation, diarrhea and vomiting  Genitourinary: Negative for difficulty urinating and hematuria  Skin: Negative for rash and wound  Neurological: Positive for light-headedness and headaches  Negative for dizziness     All other systems reviewed and are negative  OBJECTIVE     Vitals:    23 0000 23 0200 23 0430 23 0630   BP: 99/54 110/56 97/51 117/57   BP Location: Right arm Right arm Right arm Right arm   Pulse: 68 62 68 82   Resp: 18 18 18 18   Temp:       TempSrc:       SpO2: 99% 100% 97% 98%      Temperature:   Temp (24hrs), Av 6 °F (36 4 °C), Min:97 6 °F (36 4 °C), Max:97 6 °F (36 4 °C)    Temperature: 97 6 °F (36 4 °C)  Intake & Output:  I/O     None        Weights: There is no height or weight on file to calculate BMI  Weight (last 2 days)     None        Physical Exam  Constitutional:       General: He is not in acute distress  Appearance: He is not diaphoretic  HENT:      Head: Normocephalic and atraumatic  Nose: Nose normal    Eyes:      General: No scleral icterus  Conjunctiva/sclera: Conjunctivae normal    Cardiovascular:      Rate and Rhythm: Regular rhythm  Heart sounds: No murmur heard  No friction rub  No gallop  Pulmonary:      Breath sounds: Normal breath sounds  No wheezing, rhonchi or rales  Abdominal:      General: There is no distension  Palpations: Abdomen is soft  Tenderness: There is abdominal tenderness  Musculoskeletal:      Right lower leg: Edema present  Left lower leg: Edema present  Comments: Trace pitting edema was present, in the bilateral lower extremities  Skin:     General: Skin is warm and dry  Findings: No rash  Comments: A small amount of petechiae was noted, in the bilateral lower extremities  Neurological:      Mental Status: He is oriented to person, place, and time  Motor: No weakness     Psychiatric:         Mood and Affect: Mood normal          Behavior: Behavior normal     PAST MEDICAL HISTORY     Past Medical History:   Diagnosis Date   • [facility] )     CHRISTIE   • Colon cancer ([facility] )    • Diabetes mellitus ([facility] )    • Neuropathy    • Restless leg syndrome      PAST SURGICAL HISTORY     Past Surgical History: Procedure Laterality Date   • EGD AND SIGMOIDOSCOPY FLEXIBLE     • IR PARACENTESIS  11/22/2022   • LEFT COLON RESECTION     • LITHOTRIPSY       SOCIAL & FAMILY HISTORY     Social History     Substance and Sexual Activity   Alcohol Use Not Currently     Substance and Sexual Activity   Alcohol Use Not Currently        Substance and Sexual Activity   Drug Use Never     Social History     Tobacco Use   Smoking Status Every Day   • Packs/day: 0 25   • Types: Cigarettes   Smokeless Tobacco Never     Family History   Problem Relation Age of Onset   • Diabetes Mother    • Diabetes Father      LABORATORY DATA     Labs: I have personally reviewed pertinent reports  Results from last 7 days   Lab Units 02/25/23  0417 02/25/23  0126 02/24/23  1706   WBC Thousand/uL 3 92*  --  4 80   HEMOGLOBIN g/dL 8 0* 8 6* 8 8*   HEMATOCRIT % 24 4* 26 4* 26 2*   PLATELETS Thousands/uL 44*  --  50*   NEUTROS PCT % 61  --  69   MONOS PCT % 8  --  7      Results from last 7 days   Lab Units 02/25/23  0417 02/24/23  1706   POTASSIUM mmol/L 3 7 3 6   CHLORIDE mmol/L 105 106   CO2 mmol/L 27 24   BUN mg/dL 12 15   CREATININE mg/dL 0 90 1 04   CALCIUM mg/dL 8 2* 8 8   ALK PHOS U/L 128* 142*   ALT U/L 40 44   AST U/L 39 48*              Results from last 7 days   Lab Units 02/25/23  0417 02/24/23  1706   INR  1 68* 1 65*   PTT seconds 39* 35     Results from last 7 days   Lab Units 02/24/23  2313   LACTIC ACID mmol/L 1 9         Micro:  No results found for: Lourena Route, WOUNDCULT, SPUTUMCULTUR  IMAGING & DIAGNOSTIC TESTS     Imaging: I have personally reviewed pertinent reports  CT abdomen pelvis with contrast    Result Date: 2/24/2023  Impression: Worsening right hemiabdomen diffuse small bowel wall thickening with the small bowel feces sign in the pelvic loop suggesting an obstructive pattern secondary to underlying severe enteritis    Cirrhotic changes with collaterals, splenomegaly and moderate ascites   Workstation performed: [id number]     EKG, Pathology, and Other Studies: I have personally reviewed pertinent reports  ALLERGIES     Allergies   Allergen Reactions   • Morphine Anaphylaxis   • Clarithromycin Other (See Comments)     Dizzy, nausea, ulcers in stomach     MEDICATIONS PRIOR TO ARRIVAL     Prior to Admission medications    Medication Sig Start Date End Date Taking?  Authorizing Provider   dicyclomine (BENTYL) 20 mg tablet Take 1 tablet (20 mg total) by mouth 2 (two) times a day before breakfast and lunch 12/1/22 12/31/22  Dion Ga MD   furosemide (LASIX) 20 mg tablet Take 1 tablet (20 mg total) by mouth daily 12/1/22 12/31/22  Dion Ga MD   pantoprazole (PROTONIX) 40 mg tablet Take 1 tablet (40 mg total) by mouth 2 (two) times a day before meals 12/1/22 12/31/22  Dion Ga MD   rOPINIRole (REQUIP) 4 mg tablet Take 1 tablet (4 mg total) by mouth 4 (four) times a day 12/5/22 1/4/23  Maria De Jesus Santiago PA-C   spironolactone (ALDACTONE) 25 mg tablet Take 1 tablet (25 mg total) by mouth daily 12/1/22 12/31/22  Dion Ga MD     MEDICATIONS ADMINISTERED IN LAST 24 HOURS     Medication Administration - last 24 hours from 02/24/2023 0723 to 02/25/2023 2481       Date/Time Order Dose Route Action Action by     02/24/2023 1707 EST HYDROmorphone (DILAUDID) injection 0 5 mg 0 5 mg Intravenous Given NUBIA Gore     02/24/2023 1801 EST HYDROmorphone (DILAUDID) injection 0 5 mg 0 5 mg Intravenous Given NUBIA Gore     02/24/2023 1846 EST iohexol (OMNIPAQUE) 350 MG/ML injection (SINGLE-DOSE) 100 mL 90 mL Intravenous Given Wendy Hernandez     02/24/2023 1943 EST HYDROmorphone (DILAUDID) injection 0 5 mg 0 5 mg Intravenous Given Janene Biswas RN     02/25/2023 0418 EST HYDROmorphone (DILAUDID) injection 0 5 mg 0 5 mg Intravenous Given Janene Biswas RN     02/25/2023 0041 EST HYDROmorphone (DILAUDID) injection 0 5 mg 0 5 mg Intravenous Given Janene Biswas RN     02/25/2023 1133 EST rOPINIRole (REQUIP) tablet 4 mg 4 mg Oral Given Mj Sullivan RN     02/25/2023 0631 EST dicyclomine (BENTYL) tablet 20 mg 20 mg Oral Given Mj Sullivan RN     02/25/2023 0126 EST nicotine (NICODERM CQ) 7 mg/24hr TD 24 hr patch 7 mg 7 mg Transdermal Medication Applied Mj Sullivan RN     02/25/2023 0418 EST pantoprazole (PROTONIX) injection 40 mg 40 mg Intravenous Given Mj Sullivan RN     02/25/2023 0513 EST insulin lispro (HumaLOG) 100 units/mL subcutaneous injection 1-6 Units 1 Units Subcutaneous Not Given Mj Sullivan RN     02/25/2023 0419 EST insulin lispro (HumaLOG) 100 units/mL subcutaneous injection 1-6 Units 4 Units Subcutaneous Given Mj Sullivan RN     02/25/2023 7476 EST diphenhydrAMINE (BENADRYL) tablet 12 5 mg 12 5 mg Oral Given Mj Sullivan RN        CURRENT MEDICATIONS     Current Facility-Administered Medications   Medication Dose Route Frequency Provider Last Rate   • dicyclomine  20 mg Oral BID before breakfast/lunch Carmine Du MD     • furosemide  20 mg Intravenous Daily Carmine Du MD     • HYDROmorphone  0 5 mg Intravenous Q2H PRN Carmine Du MD     • insulin lispro  1-6 Units Subcutaneous Q6H [address] Carmine Du MD     • nicotine  7 mg Transdermal Daily Carmine Du MD     • ondansetron  4 mg Intravenous Q6H PRN Carmine Du MD     • pantoprazole  40 mg Intravenous BID Leopold Scott Day, MD     • rOPINIRole  4 mg Oral 4x Daily Carmine Du MD     • spironolactone  25 mg Oral Daily Carmine Du MD          HYDROmorphone, 0 5 mg, Q2H PRN  ondansetron, 4 mg, Q6H PRN        Admission Time  I spent 1 hour admitting the patient  This involved direct patient contact where I performed a full history and physical, reviewing previous records, and reviewing laboratory and other diagnostic studies  Portions of the record may have been created with voice recognition software  Occasional wrong word or "sound a like" substitutions may have occurred due to the inherent limitations of voice recognition software    Read the chart carefully and recognize, using context, where substitutions have occurred     ==  Saint Christelle, MD  [address]  Internal Medicine Residency PGY-1

## 2023-02-25 NOTE — PLAN OF CARE
Problem: MOBILITY - ADULT  Goal: Maintain or return to baseline ADL function  Description: INTERVENTIONS:  -  Assess patient's ability to carry out ADLs; assess patient's baseline for ADL function and identify physical deficits which impact ability to perform ADLs (bathing, care of mouth/teeth, toileting, grooming, dressing, etc )  - Assess/evaluate cause of self-care deficits   - Assess range of motion  - Assess patient's mobility; develop plan if impaired  - Assess patient's need for assistive devices and provide as appropriate  - Encourage maximum independence but intervene and supervise when necessary  - Involve family in performance of ADLs  - Assess for home care needs following discharge   - Consider OT consult to assist with ADL evaluation and planning for discharge  - Provide patient education as appropriate  2/25/2023 1544 by Mo Spivey RN  Outcome: Progressing  2/25/2023 1543 by Mo Spivey RN  Outcome: Progressing  Goal: Maintains/Returns to pre admission functional level  Description: INTERVENTIONS:  - Perform BMAT or MOVE assessment daily    - Set and communicate daily mobility goal to care team and patient/family/caregiver  - Collaborate with rehabilitation services on mobility goals if consulted  - Perform Range of Motion 2 times a day  - Reposition patient every 2 hours    - Dangle patient 2 times a day  - Stand patient 2 times a day  - Ambulate patient 2 times a day  - Out of bed to chair 2 times a day   - Out of bed for meals 2 times a day  - Out of bed for toileting  - Record patient progress and toleration of activity level   2/25/2023 1544 by Mo Spivey RN  Outcome: Progressing  2/25/2023 1543 by Mo Spivey RN  Outcome: Progressing     Problem: PAIN - ADULT  Goal: Verbalizes/displays adequate comfort level or baseline comfort level  Description: Interventions:  - Encourage patient to monitor pain and request assistance  - Assess pain using appropriate pain scale  - Administer analgesics based on type and severity of pain and evaluate response  - Implement non-pharmacological measures as appropriate and evaluate response  - Consider cultural and social influences on pain and pain management  - Notify physician/advanced practitioner if interventions unsuccessful or patient reports new pain  Outcome: Progressing     Problem: INFECTION - ADULT  Goal: Absence or prevention of progression during hospitalization  Description: INTERVENTIONS:  - Assess and monitor for signs and symptoms of infection  - Monitor lab/diagnostic results  - Monitor all insertion sites, i e  indwelling lines, tubes, and drains  - Monitor endotracheal if appropriate and nasal secretions for changes in amount and color  - [place] appropriate cooling/warming therapies per order  - Administer medications as ordered  - Instruct and encourage patient and family to use good hand hygiene technique  - Identify and instruct in appropriate isolation precautions for identified infection/condition  Outcome: Progressing  Goal: Absence of fever/infection during neutropenic period  Description: INTERVENTIONS:  - Monitor WBC    Outcome: Progressing     Problem: SAFETY ADULT  Goal: Maintain or return to baseline ADL function  Description: INTERVENTIONS:  -  Assess patient's ability to carry out ADLs; assess patient's baseline for ADL function and identify physical deficits which impact ability to perform ADLs (bathing, care of mouth/teeth, toileting, grooming, dressing, etc )  - Assess/evaluate cause of self-care deficits   - Assess range of motion  - Assess patient's mobility; develop plan if impaired  - Assess patient's need for assistive devices and provide as appropriate  - Encourage maximum independence but intervene and supervise when necessary  - Involve family in performance of ADLs  - Assess for home care needs following discharge   - Consider OT consult to assist with ADL evaluation and planning for discharge  - Provide patient education as appropriate  2/25/2023 1544 by Diamond Stilse RN  Outcome: Progressing  2/25/2023 1543 by Diamond Stiles RN  Outcome: Progressing  Goal: Maintains/Returns to pre admission functional level  Description: INTERVENTIONS:  - Perform BMAT or MOVE assessment daily    - Set and communicate daily mobility goal to care team and patient/family/caregiver  - Collaborate with rehabilitation services on mobility goals if consulted  - Perform Range of Motion 2 times a day  - Reposition patient every 2 hours    - Dangle patient 2 times a day  - Stand patient 2 times a day  - Ambulate patient 2 times a day  - Out of bed to chair 2 times a day   - Out of bed for meals 2 times a day  - Out of bed for toileting  - Record patient progress and toleration of activity level   2/25/2023 1544 by Diamond Stiles RN  Outcome: Progressing  2/25/2023 1543 by Diamond Stiles RN  Outcome: Progressing  Goal: Patient will remain free of falls  Description: INTERVENTIONS:  - Educate patient/family on patient safety including physical limitations  - Instruct patient to call for assistance with activity   - Consult OT/PT to assist with strengthening/mobility   - Keep Call bell within reach  - Keep bed low and locked with side rails adjusted as appropriate  - Keep care items and personal belongings within reach  - Initiate and maintain comfort rounds  - Make Fall Risk Sign visible to staff  - Offer Toileting every 2 Hours, in advance of need  - Initiate/Maintain alarm  - Obtain necessary fall risk management equipment:  - Apply yellow socks and bracelet for high fall risk patients  - Consider moving patient to room near nurses station  Outcome: Progressing     Problem: DISCHARGE PLANNING  Goal: Discharge to home or other facility with appropriate resources  Description: INTERVENTIONS:  - Identify barriers to discharge w/patient and caregiver  - Arrange for needed discharge resources and transportation as appropriate  - Identify discharge learning needs (meds, wound care, etc )  - Arrange for interpretive services to assist at discharge as needed  - Refer to Case Management Department for coordinating discharge planning if the patient needs post-hospital services based on physician/advanced practitioner order or complex needs related to functional status, cognitive ability, or social support system  Outcome: Progressing     Problem: Knowledge Deficit  Goal: Patient/family/caregiver demonstrates understanding of disease process, treatment plan, medications, and discharge instructions  Description: Complete learning assessment and assess knowledge base    Interventions:  - Provide teaching at level of understanding  - Provide teaching via preferred learning methods  Outcome: Progressing

## 2023-02-25 NOTE — ASSESSMENT & PLAN NOTE
The patient said that he has been smoking 5-6 cigarettes per day  - Continue nicotine patch 7 mg TD qd

## 2023-02-25 NOTE — CONSULTS
Consultation - General Surgery   Rohit Salazar 58 y o  male MRN: [id number]  Unit/Bed#: ED 27 Encounter: [id number]    Assessment/Plan     Assessment:  58 M with extensive PMHx DM, CHRISTIE c/b liver cirrhosis (Curr MELD 17), prior colon cancer s/p resection with LHVN remotely now p/w transient abdominal pain, c/f SBO, and one episode alleged hematochezia  VS: Afebrile, HR 70s, Normotensive (MAP 70s-80s), % on room air    Abdomen: soft, diffuse tenderness with inconsistent voluntary guarding, mild distention, some ascites noted on CTAP    WBC 4 80, Normal differential, no bandemia  Hb 8-8, historic values noted to be 10 8, 12, respectively  CTAP showing some ascites, c/f SBO oin context of one fecalized bowel loop in pelvis, however patient witnessed to be passing flatus on assessment by surgical team        Plan:  -no acute surgical intervention at this juncture  -medical admission  -serial hemoglobin monitoring, Attn to vital signs, Q12h Hb, bowel movement output  -recommend GI consultation for co-management of decompensated cirrhosis, r/o SBP in setting of abdominal pain and recent paracentesis, psb repeat paracentesis,m and for endoscopy if noted re-bleeding occurs  -OK to trial liquid diet unless GI planning acute endoscopy  -would avoid NGT given clinically non-obstructed and esophageal varices present   -General surgery to follow  History of Present Illness   History, ROS and PFSH unobtainable from any source due to n/a  HPI:  Rohit Salazar is a 58 y o  male who presents with abdomina pain and one reported episode of hematochezia earlier today c/o moderate bright red blood in his toilet bowl  He denies syncope, ongoing bleeding, dizziness, rapid or irregular heartbeat   He has a Hx of CHRISTIE c/b cirrhosis, with intermittent f/u and diminished social circumstances, as well as diabetes and a Hx of colonic adenocarcinoma s/p prior resection and surveillance colonoscopy at an OSH (not available in Epic)  He endorses multiple prior paracenteses   most recently at [facility] within the month  He endorsees chills and waning appetite but no fevers, continues to pass flatus since arrival to the hospital, no bowel movements since prior hematochezia  Consult to surgery general  Consult performed by: Pedro Benavidez MD  Consult ordered by: Cheryle Gin, MD          Review of Systems   Constitutional: Positive for activity change  Gastrointestinal: Positive for abdominal distention, abdominal pain and blood in stool  Negative for nausea and vomiting  Musculoskeletal: Positive for arthralgias and back pain  Skin: Positive for color change  Neurological: Negative for dizziness and tremors  All other systems reviewed and are negative  Historical Information   Past Medical History:   Diagnosis Date   • Cirrhosis ([facility] )     CHRISTIE   • Colon cancer ([facility] )    • Diabetes mellitus ([facility] )    • Neuropathy    • Restless leg syndrome      Past Surgical History:   Procedure Laterality Date   • EGD AND SIGMOIDOSCOPY FLEXIBLE     • IR PARACENTESIS  11/22/2022   • LEFT COLON RESECTION     • LITHOTRIPSY       Social History   Social History     Substance and Sexual Activity   Alcohol Use Not Currently     Social History     Substance and Sexual Activity   Drug Use Never     E-Cigarette/Vaping   • E-Cigarette Use Never User      E-Cigarette/Vaping Substances   • Nicotine No    • THC No    • CBD No    • Flavoring No    • Other No    • Unknown No      Social History     Tobacco Use   Smoking Status Every Day   • Packs/day: 0 25   • Types: Cigarettes   Smokeless Tobacco Never     Family History:   Family History   Problem Relation Age of Onset   • Diabetes Mother    • Diabetes Father        Meds/Allergies   current meds:   No current facility-administered medications for this encounter       Allergies   Allergen Reactions   • Morphine Anaphylaxis   • Clarithromycin Other (See Comments)     Dizzy, nausea, ulcers in stomach       Objective   First Vitals:   Blood Pressure: 131/63 (02/24/23 1643)  Pulse: 81 (02/24/23 1643)  Temperature: 97 6 °F (36 4 °C) (02/24/23 1643)  Temp Source: Oral (02/24/23 1643)  Respirations: 17 (02/24/23 1643)  SpO2: 100 % (02/24/23 1643)    Current Vitals:   Blood Pressure: 115/56 (02/24/23 2200)  Pulse: 72 (02/24/23 2200)  Temperature: 97 6 °F (36 4 °C) (02/24/23 1643)  Temp Source: Oral (02/24/23 1643)  Respirations: 17 (02/24/23 2200)  SpO2: 99 % (02/24/23 2200)    No intake or output data in the 24 hours ending 02/24/23 2329    Invasive Devices     Peripheral Intravenous Line  Duration           Peripheral IV 02/24/23 Distal;Left;Upper;Ventral (anterior) Arm <1 day                Physical Exam  Constitutional:       General: He is not in acute distress  Appearance: He is obese  He is ill-appearing  HENT:      Head: Normocephalic and atraumatic  Mouth/Throat:      Mouth: Mucous membranes are moist    Eyes:      General: Scleral icterus present  Pupils: Pupils are equal, round, and reactive to light  Cardiovascular:      Rate and Rhythm: Normal rate and regular rhythm  Pulmonary:      Effort: Pulmonary effort is normal  No respiratory distress  Abdominal:      General: A surgical scar is present  Bowel sounds are decreased  There is distension  Palpations: Abdomen is soft  There is shifting dullness, hepatomegaly and splenomegaly  There is no pulsatile mass  Tenderness: There is abdominal tenderness  There is guarding  There is no rebound  Musculoskeletal:         General: No swelling, tenderness or deformity  Cervical back: No rigidity  Lymphadenopathy:      Cervical: No cervical adenopathy  Skin:     General: Skin is warm and dry  Capillary Refill: Capillary refill takes 2 to 3 seconds  Coloration: Skin is jaundiced  Skin is not pale  Findings: No rash     Neurological:      Mental Status: He is alert and oriented to person, place, and time       Cranial Nerves: No cranial nerve deficit  Motor: No weakness  Psychiatric:         Mood and Affect: Mood normal          Behavior: Behavior normal          Lab Results:   I have personally reviewed pertinent lab results  , CBC:   Lab Results   Component Value Date    WBC 4 80 02/24/2023    HGB 8 8 (L) 02/24/2023    HCT 26 2 (L) 02/24/2023     (H) 02/24/2023    PLT 50 (L) 02/24/2023    MCH 35 6 (H) 02/24/2023    MCHC 33 6 02/24/2023    RDW 15 8 (H) 02/24/2023    MPV 10 8 02/24/2023    NRBC 0 02/24/2023   , CMP:   Lab Results   Component Value Date    SODIUM 137 02/24/2023    K 3 6 02/24/2023     02/24/2023    CO2 24 02/24/2023    BUN 15 02/24/2023    CREATININE 1 04 02/24/2023    CALCIUM 8 8 02/24/2023    AST 48 (H) 02/24/2023    ALT 44 02/24/2023    ALKPHOS 142 (H) 02/24/2023    EGFR 76 02/24/2023   , Coagulation:   Lab Results   Component Value Date    INR 1 65 (H) 02/24/2023   , Urinalysis:   Lab Results   Component Value Date    COLORU Viky 02/24/2023    CLARITYU Clear 02/24/2023    SPECGRAV 1 020 02/24/2023    PHUR 5 5 02/24/2023    LEUKOCYTESUR Negative 02/24/2023    NITRITE Negative 02/24/2023    GLUCOSEU Negative 02/24/2023    KETONESU Negative 02/24/2023    BILIRUBINUR Negative 02/24/2023    BLOODU Large (A) 02/24/2023   , Amylase: No results found for: AMYLASE, Lipase: No results found for: LIPASE  Imaging: I have personally reviewed pertinent reports  and I have personally reviewed pertinent films in PACS  EKG, Pathology, and Other Studies: I have personally reviewed pertinent reports  and I have personally reviewed pertinent films in PACS      CT abdomen pelvis with contrast    Result Date: 2/24/2023  Impression: Worsening right hemiabdomen diffuse small bowel wall thickening with the small bowel feces sign in the pelvic loop suggesting an obstructive pattern secondary to underlying severe enteritis    Cirrhotic changes with collaterals, splenomegaly and moderate ascites   Workstation performed: [id number]

## 2023-02-25 NOTE — ASSESSMENT & PLAN NOTE
Patient complains of ongoing bright red blood per rectum with defecation for the past several months  Is not associated with pain  And only occurs with bowel movements  Patient seen in a hospital down in [place] 3 weeks ago and planned to have a colonoscopy completed however he signed out AMA prior to finishing prep  Colonoscopy completed here in November 2022 without mention of hemorrhoids    Suspect this is hemorrhoidal bleeding with history of cirrhosis and increased portal hypertension  Hemoglobin stable at 8    TEO performed by GI which was negative for any signs of bleeding    Plan:  - GI consulted and recommended EGD/Colon and IR paracentesis  -Patient refusing EGD/Colon stating he would like to continue care at [facility] been HD stable and w/ stable Hgb => Per GI, okay for d/c  -Per patient, follows with hepatology and GI outpatient, however referrals for [facility] and hepatology also sent

## 2023-02-25 NOTE — ED ATTENDING ATTESTATION
2/24/2023  IKathy MD, saw and evaluated the patient  I have discussed the patient with the resident/non-physician practitioner and agree with the resident's/non-physician practitioner's findings, Plan of Care, and MDM as documented in the resident's/non-physician practitioner's note, except where noted  All available labs and Radiology studies were reviewed  I was present for key portions of any procedure(s) performed by the resident/non-physician practitioner and I was immediately available to provide assistance  At this point I agree with the current assessment done in the Emergency Department  I have conducted an independent evaluation of this patient a history and physical is as follows: This is an evaluation of a 19-year-old male with known history of cirrhosis of the liver with ascites, colitis as well as anemia thrombocytopenia and colon cancer who presents to the emergency department with abdominal pain as well as nausea and decreased oral intake  Notes that he had some streaks of blood in his stool as well, denies any overt hematemesis at this time  No lightheadedness/dizziness  Denies overt chest pain or shortness of breath however notes that his symptoms do radiate upwards and pain makes him generally feel unwell  No fevers no chills  No urinary symptoms  On exam patient is uncomfortable appearing  Vital signs currently stable  HEENT is normocephalic and atraumatic with dry mucous membranes  Nonicteric  Neck is supple full range of motion  Heart is regular rate without murmurs  Lungs are decreased but clear  Abdomen is diffusely tender to palpation, enlarged but not tympanic however most significantly in the epigastric and right and left upper quadrants  Guarding no rebound  No flank tenderness  No bruising over abdomen  Trace edema  Intact distal pulses  Capillary for less than 2 seconds  Awake alert oriented and appropriate    Assessment and plan abdominal pain with decreased oral intake and nausea  Eval with labs imaging pain control gentle IV fluid hydration as needed  Additional imaging/diagnositic testing likely  Treat accordingly  Admission  Portions of the record may have been created with voice recognition software  Occasional wrong word or “sound-a-like" substitutions may have occurred due to the inherent limitations of voice recognition software  Review chart carefully and recognize, using context, where substitutions have occurred      ED Course     Surgery to eval      Critical Care Time  Procedures

## 2023-02-25 NOTE — ASSESSMENT & PLAN NOTE
The patient presented with abdominal pain, which spread from the right side of the abdomen to the left side, and which had a severity of 9 out of 10  The patient said that the pain was "steady," was "present all of the time," and was made worse by the consumption of solid food  A CT of the abdomen and pelvis, with contrast, performed on 2/24/2023, showed "Worsening right hemiabdomen diffuse small bowel wall thickening with the small bowel feces sign in the pelvic loop suggesting an obstructive pattern secondary to underlying severe enteritis  Cirrhotic changes with collaterals, splenomegaly and moderate ascites "    Of note patient was admitted in November 2022 for similar abdominal pain where he was treated for C  difficile  There were concerns for acute cholecystitis but HIDA scan was normal   EGD and colonoscopy completed at that time he was found to have antral gastritis and biopsies were negative  Patient presented to another hospital 3 weeks ago for similar abdominal pain and concerns of bright red blood per rectum  There were plans for colonoscopy however patient left AMA prior to completing the prep        - GI consulted and recommended EGD/Colon and IR paracentesis  -IR stated that amount of fluid may not be amenable to paracentesis, but will attempt para  -Patient refusing EGD/Colon/Para stating he would like to continue care at [facility] been HD stable and w/ stable Hgb => Per GI, okay for d/c  -Will d/c on Lactulose 20 g daily, Lasix 20 mg daily, Aldactone 25 mg daily, and Bentyl 20 mg daily

## 2023-02-25 NOTE — ED NOTES
Spoke to Stefany Nguyen in pharmacy and informed Linwood Nichols still not available in ER   Pharmacist stated he will send medication to [facility]  02/25/23 1739

## 2023-02-25 NOTE — ASSESSMENT & PLAN NOTE
Patient with D compensated cirrhosis with ascites and esophageal varices  Current MELD score of 17  Most recent EGD completed in November 2022 a small esophageal varices  Patient has a unreliable historian suggesting mild hepatic encephalopathy        Paracentesis ordered to rule out SBP in the setting of ongoing abdominal pain => Patient refusing paracentesis  Will d/c on Lasix 20 mg, Aldactone 25 mg, and Lactulose 20 g daily  2 g salt diet

## 2023-02-25 NOTE — H&P
INTERNAL MEDICINE RESIDENCY ADMISSION H&P     Name: Flower Law   Age & Sex: 58 y o  male   MRN: [id number]  Unit/Bed#: ED 32   Encounter: [id number]  Primary Care Provider: No primary care provider on file  Code Status: Level 1 - Full Code  Admission Status: INPATIENT   Disposition: Patient requires Med/Surg    Admit to team: SOD Team A    ASSESSMENT/PLAN     Principal Problem:    Abdominal pain  Active Problems:    Colitis    Cirrhosis of liver with ascites (HCC)    Pericardial effusion    Thrombocytopenia (HCC)    Restless leg syndrome    Smoking    Diabetes mellitus type 2 in obese (HCC)    Anemia    Colon cancer (HCC)    GI bleed      Colon cancer [facility])  Assessment & Plan  The patient has a history of colon-cancer, for which he underwent surgery in 2002  He also described ongoing pain with defecation  Anemia  Assessment & Plan  The patient was anemic, to 8 8, on presentation to the Emergency Department at [facility] on 2/24/2023   - Continue to monitor hemoglobin, with a daily CBC  Smoking  Assessment & Plan  The patient said that he has been smoking 5-6 cigarettes per day  - Continue nicotine patch 7 mg TD qd  Restless leg syndrome  Assessment & Plan  - Continue ropinirole 4 mg PO four times daily  * Abdominal pain  Assessment & Plan  The patient presented with abdominal pain, which spread from the right side of the abdomen to the left side, and which had a severity of 9 out of 10  The patient said that the pain was "steady," was "present all of the time," and was made worse by the consumption of solid food  A CT of the abdomen and pelvis, with contrast, showed "Worsening right hemiabdomen diffuse small bowel wall thickening with the small bowel feces sign in the pelvic loop suggesting an obstructive pattern secondary to underlying severe enteritis  Cirrhotic changes with collaterals, splenomegaly and moderate ascites  "  - Continue furosemide 20 mg IV PO qd   - Continue pantoprazole 40 mg IV bid   - Continue spironolactone 25 mg PO qd   - Gastroenterology was consulted  The patient is NPO, pending possible intervention by Gastroenterology  VTE Pharmacologic Prophylaxis: Reason for no pharmacologic prophylaxis The patient presented with bright red blood per rectum  VTE Mechanical Prophylaxis: sequential compression device    CHIEF COMPLAINT     Chief Complaint   Patient presents with   • Abdominal Pain     Was the [place] [address] bathroom where he had bright red blood in his BM  Patient also reports abdominal pain starting several days ago and states he has ascites  HISTORY OF PRESENT ILLNESS     Kev Tolentino is a 55-year-old man, with a history of CHRISTIE complicated by cirrhosis, who presented to the Emergency Department at [facility] on 2/24/2023 with abdominal pain and blood in the stool  The patient said that he had been "hurting," with abdominal pain in the areas of the stomach and the liver, and that he had noticed blood in his stool  The patient said that the pain had been spreading, from the right side of the abdomen, to the left side of the abdomen  He rated the severity of the pain as 9 out of 10, and said that the pain was steady, and was present "all of the time " The patient said that eating solid food made the pain worse  He also described having experienced lightheadedness, and said the he "couldn't keep [his] balance," on the day of presentation  In the Emergency Department, temperature was 97 6 degrees F, pulse was 81, respiratory rate was 17, BP was 131/63, and SpO2 was 100% on room-air  The patient received hydromorphone 0 5 mg IV, three times, at 17:07, 18:01, and 19:43 on 2/24/2023  A CBC showed that the WBC-count was 4 90, hemoglobin was 8 8, hematocrit was 26 2%, MCV was 106, and the platelet-count was 87M   A CMP showed that sodium was 137, potassium was 3 6, creatinine was 1 04 (with a baseline-level of 0 9-1 0), glucose was 117, calcium was 8 8 (correct calcium was 9 8), AST was 48, ALT was 44, alkaline phosphatase was 142, albumin was 2 7, total bilirubin was 3 27, and eGFR was 76  PT was 19 8 seconds, INR was 1 65, PTT was 35 seconds, ammonia was 44, and microscopic analysis of urine showed innumerable RBC's her HPF  REVIEW OF SYSTEMS     Review of Systems   Constitutional: Positive for chills  Negative for fever  HENT: Negative for rhinorrhea and sore throat  Respiratory: Negative for cough and shortness of breath  Cardiovascular: Negative for chest pain and palpitations  Gastrointestinal: Positive for nausea  Negative for constipation, diarrhea and vomiting  Genitourinary: Negative for difficulty urinating and hematuria  Skin: Negative for rash and wound  Neurological: Positive for light-headedness and headaches  Negative for dizziness  All other systems reviewed and are negative  OBJECTIVE     Vitals:    23 2115 23 2200 23 0000 23 0200   BP: 103/59 115/56 99/54 110/56   BP Location: Right arm Right arm Right arm Right arm   Pulse: 70 72 68 62   Resp:  17 18 18   Temp:       TempSrc:       SpO2: 100% 99% 99% 100%      Temperature:   Temp (24hrs), Av 6 °F (36 4 °C), Min:97 6 °F (36 4 °C), Max:97 6 °F (36 4 °C)    Temperature: 97 6 °F (36 4 °C)  Intake & Output:  I/O     None        Weights: There is no height or weight on file to calculate BMI  Weight (last 2 days)     None        Physical Exam  Constitutional:       General: He is not in acute distress  Appearance: He is not diaphoretic  HENT:      Head: Normocephalic and atraumatic  Nose: Nose normal    Eyes:      General: No scleral icterus  Conjunctiva/sclera: Conjunctivae normal    Cardiovascular:      Rate and Rhythm: Regular rhythm  Heart sounds: No murmur heard  No friction rub  No gallop  Pulmonary:      Breath sounds: Normal breath sounds  No wheezing, rhonchi or rales     Abdominal:      General: There is no distension  Palpations: Abdomen is soft  Tenderness: There is abdominal tenderness  Musculoskeletal:      Right lower leg: Edema present  Left lower leg: Edema present  Comments: Trace pitting edema was present, in the bilateral lower extremities  Skin:     General: Skin is warm and dry  Findings: No rash  Comments: A small amount of petechiae was noted, in the bilateral lower extremities  Neurological:      Mental Status: He is oriented to person, place, and time  Motor: No weakness  Psychiatric:         Mood and Affect: Mood normal          Behavior: Behavior normal        PAST MEDICAL HISTORY     Past Medical History:   Diagnosis Date   • Cirrhosis ([facility] )     CHRISTIE   • Colon cancer ([place] [address] )    • Diabetes mellitus ([place] [address] )    • Neuropathy    • Restless leg syndrome      PAST SURGICAL HISTORY     Past Surgical History:   Procedure Laterality Date   • EGD AND SIGMOIDOSCOPY FLEXIBLE     • IR PARACENTESIS  11/22/2022   • LEFT COLON RESECTION     • LITHOTRIPSY       SOCIAL & FAMILY HISTORY     Social History     Substance and Sexual Activity   Alcohol Use Not Currently       Social History     Substance and Sexual Activity   Drug Use Never     Social History     Tobacco Use   Smoking Status Every Day   • Packs/day: 0 25   • Types: Cigarettes   Smokeless Tobacco Never     Family History   Problem Relation Age of Onset   • Diabetes Mother    • Diabetes Father      LABORATORY DATA     Labs: I have personally reviewed pertinent reports      Results from last 7 days   Lab Units 02/25/23  0126 02/24/23  1706   WBC Thousand/uL  --  4 80   HEMOGLOBIN g/dL 8 6* 8 8*   HEMATOCRIT % 26 4* 26 2*   PLATELETS Thousands/uL  --  50*   NEUTROS PCT %  --  69   MONOS PCT %  --  7      Results from last 7 days   Lab Units 02/24/23  1706   POTASSIUM mmol/L 3 6   CHLORIDE mmol/L 106   CO2 mmol/L 24   BUN mg/dL 15   CREATININE mg/dL 1 04   CALCIUM mg/dL 8 8   ALK PHOS U/L 142*   ALT U/L 44   AST U/L 48*              Results from last 7 days   Lab Units 02/24/23  1706   INR  1 65*   PTT seconds 35     Results from last 7 days   Lab Units 02/24/23  2313   LACTIC ACID mmol/L 1 9         Micro:  No results found for: GENIE Hastings  IMAGING & DIAGNOSTIC TESTS     Imaging: I have personally reviewed pertinent reports  CT abdomen pelvis with contrast    Result Date: 2/24/2023  Impression: Worsening right hemiabdomen diffuse small bowel wall thickening with the small bowel feces sign in the pelvic loop suggesting an obstructive pattern secondary to underlying severe enteritis    Cirrhotic changes with collaterals, splenomegaly and moderate ascites  Workstation performed: [id number]     EKG, Pathology, and Other Studies: I have personally reviewed pertinent reports  ALLERGIES     Allergies   Allergen Reactions   • Morphine Anaphylaxis   • Clarithromycin Other (See Comments)     Dizzy, nausea, ulcers in stomach     MEDICATIONS PRIOR TO ARRIVAL     Prior to Admission medications    Medication Sig Start Date End Date Taking?  Authorizing Provider   dicyclomine (BENTYL) 20 mg tablet Take 1 tablet (20 mg total) by mouth 2 (two) times a day before breakfast and lunch 12/1/22 12/31/22  Juventino Blackwood MD   furosemide (LASIX) 20 mg tablet Take 1 tablet (20 mg total) by mouth daily 12/1/22 12/31/22  Juventino Blackwood MD   pantoprazole (PROTONIX) 40 mg tablet Take 1 tablet (40 mg total) by mouth 2 (two) times a day before meals 12/1/22 12/31/22  Juventino Blackwood MD   rOPINIRole (REQUIP) 4 mg tablet Take 1 tablet (4 mg total) by mouth 4 (four) times a day 12/5/22 1/4/23  Corbin Golden PA-C   spironolactone (ALDACTONE) 25 mg tablet Take 1 tablet (25 mg total) by mouth daily 12/1/22 12/31/22  Juventino Blackwood MD     MEDICATIONS ADMINISTERED IN LAST 24 HOURS     Medication Administration - last 24 hours from 02/24/2023 0422 to 02/25/2023 0422       Date/Time Order Dose Route Action Action by     02/24/2023 1707 EST HYDROmorphone (DILAUDID) injection 0 5 mg 0 5 mg Intravenous Given Ciara Silence, CRNP     02/24/2023 1801 EST HYDROmorphone (DILAUDID) injection 0 5 mg 0 5 mg Intravenous Given Ciara Silence, CRNP     02/24/2023 1846 EST iohexol (OMNIPAQUE) 350 MG/ML injection (SINGLE-DOSE) 100 mL 90 mL Intravenous Given Wendy Hernandez     02/24/2023 1943 EST HYDROmorphone (DILAUDID) injection 0 5 mg 0 5 mg Intravenous Given Clarise Lock, RN     02/25/2023 0418 EST HYDROmorphone (DILAUDID) injection 0 5 mg 0 5 mg Intravenous Given Clarise Lock, RN     02/25/2023 0041 EST HYDROmorphone (DILAUDID) injection 0 5 mg 0 5 mg Intravenous Given Clarise Lock, RN     02/25/2023 0418 EST rOPINIRole (REQUIP) tablet 4 mg 4 mg Oral Given Clarise Lock, RN     02/25/2023 0126 EST nicotine (NICODERM CQ) 7 mg/24hr TD 24 hr patch 7 mg 7 mg Transdermal Medication Applied Clarise Lock, RN     02/25/2023 0418 EST pantoprazole (PROTONIX) injection 40 mg 40 mg Intravenous Given Clarise Lock, RN     02/25/2023 0419 EST insulin lispro (HumaLOG) 100 units/mL subcutaneous injection 1-6 Units 4 Units Subcutaneous Given Clarise Lock, RN        CURRENT MEDICATIONS     Current Facility-Administered Medications   Medication Dose Route Frequency Provider Last Rate   • dicyclomine  20 mg Oral BID before breakfast/lunch Marcia Hayes MD     • furosemide  20 mg Intravenous Daily Marcia Hayes MD     • HYDROmorphone  0 5 mg Intravenous Q2H PRN Marcia Hayes MD     • insulin lispro  1-6 Units Subcutaneous Q6H [address] Marcia Hayes MD     • nicotine  7 mg Transdermal Daily Marcia Hayes MD     • ondansetron  4 mg Intravenous Q6H PRN Marcia Hayes MD     • pantoprazole  40 mg Intravenous BID Sheryl Chacko MD     • rOPINIRole  4 mg Oral 4x Daily Marcia Hayes MD     • spironolactone  25 mg Oral Daily Marcia Hayes MD HYDROmorphone, 0 5 mg, Q2H PRN  ondansetron, 4 mg, Q6H PRN        Admission Time  I spent {Time; 15 min - 1 hour:19605} admitting the patient  This involved direct patient contact where I performed a full history and physical, reviewing previous records, and reviewing laboratory and other diagnostic studies  Portions of the record may have been created with voice recognition software  Occasional wrong word or "sound a like" substitutions may have occurred due to the inherent limitations of voice recognition software    Read the chart carefully and recognize, using context, where substitutions have occurred     ==  Karlie Davis MD  [address]  Internal Medicine Residency PGY-1

## 2023-02-25 NOTE — PROGRESS NOTES
INTERNAL MEDICINE RESIDENCY SENIOR ADMISSION NOTE     Name: Vane Jung   Age & Sex: 58 y o  male   MRN: [id number]  Unit/Bed#: ED 32   Encounter: [id number]  Primary Care Provider: No primary care provider on file  Admit to team: SOD Team A    Patient seen and examined  Reviewed H&P per Dr Seb Leyva   Agree with the assessment and plan with any exception/addition as noted below:    Principal Problem:    Abdominal pain  Active Problems:    Colitis    Cirrhosis of liver with ascites (HCC)    Pericardial effusion    Thrombocytopenia (HCC)    Restless leg syndrome    Smoking    Diabetes mellitus type 2 in obese (HCC)    Anemia    Colon cancer (HCC)    GI bleed    19-year-old male past medical history of colon cancer status post right hemicolectomy and 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 presenting to the ED today with concerns of 1 bout of loose bloody stool and abdominal pain  Vitals within normal limits  Labs notable for hemoglobin 8 8, platelets 50, NR 3 69, T  bili 3 27   UA with hematuria  CT abdomen pelvis with worsening right hemiabdomen and diffuse small bowel wall thickening with small bowel feces sign in the pelvic loop suggesting an obstructive pattern secondary to possibly severe enteritis  Moderate ascites  Patient with abdominal tenderness and distention, fluid wave  Plan  · Surgery consult recommends no NGT tube due to esophageal varices as well as patient lacking obstipation    · GI consult for medical management of ascites and possible C-scope  · Paracentesis ordered, SBP labs ordered  · Type and cross ordered, transfuse PRBCs and platelets as necessary  · Dilaudid for pain, patient has been requesting IV Benadryl  · CM consult as patient is homeless    Code Status: Level 1 - Full Code  Admission Status: INPATIENT   Disposition: Patient requires Level 2 Step Down   Expected Length of Stay: >2 midnights

## 2023-02-25 NOTE — ASSESSMENT & PLAN NOTE
The patient's platelet-count was 24M, on 2/24/2023  On physical examination, a small amount of petechiae was noted on the bilateral lower extremities  This is due to his cirrhosis  - Continue to monitor the platelet-count, with a daily CBC

## 2023-02-26 LAB
ALBUMIN SERPL BCP-MCNC: 2.3 G/DL (ref 3.5–5)
ALP SERPL-CCNC: 139 U/L (ref 46–116)
ALT SERPL W P-5'-P-CCNC: 42 U/L (ref 12–78)
ANION GAP SERPL CALCULATED.3IONS-SCNC: 1 MMOL/L (ref 4–13)
AST SERPL W P-5'-P-CCNC: 47 U/L (ref 5–45)
BASOPHILS # BLD AUTO: 0.02 THOUSANDS/ÂΜL (ref 0–0.1)
BASOPHILS NFR BLD AUTO: 1 % (ref 0–1)
BILIRUB SERPL-MCNC: 2.41 MG/DL (ref 0.2–1)
BUN SERPL-MCNC: 12 MG/DL (ref 5–25)
CALCIUM ALBUM COR SERPL-MCNC: 9.6 MG/DL (ref 8.3–10.1)
CALCIUM SERPL-MCNC: 8.2 MG/DL (ref 8.3–10.1)
CHLORIDE SERPL-SCNC: 104 MMOL/L (ref 96–108)
CO2 SERPL-SCNC: 29 MMOL/L (ref 21–32)
CREAT SERPL-MCNC: 1 MG/DL (ref 0.6–1.3)
EOSINOPHIL # BLD AUTO: 0.19 THOUSAND/ÂΜL (ref 0–0.61)
EOSINOPHIL NFR BLD AUTO: 6 % (ref 0–6)
ERYTHROCYTE [DISTWIDTH] IN BLOOD BY AUTOMATED COUNT: 15.5 % (ref 11.6–15.1)
FERRITIN SERPL-MCNC: 75 NG/ML (ref 8–388)
FOLATE SERPL-MCNC: 6.8 NG/ML (ref 3.1–17.5)
GFR SERPL CREATININE-BSD FRML MDRD: 80 ML/MIN/1.73SQ M
GLUCOSE SERPL-MCNC: 143 MG/DL (ref 65–140)
GLUCOSE SERPL-MCNC: 148 MG/DL (ref 65–140)
GLUCOSE SERPL-MCNC: 160 MG/DL (ref 65–140)
GLUCOSE SERPL-MCNC: 161 MG/DL (ref 65–140)
GLUCOSE SERPL-MCNC: 178 MG/DL (ref 65–140)
HCT VFR BLD AUTO: 24.2 % (ref 36.5–49.3)
HGB BLD-MCNC: 8 G/DL (ref 12–17)
IMM GRANULOCYTES # BLD AUTO: 0.01 THOUSAND/UL (ref 0–0.2)
IMM GRANULOCYTES NFR BLD AUTO: 0 % (ref 0–2)
IRON SATN MFR SERPL: 31 % (ref 20–50)
IRON SERPL-MCNC: 63 UG/DL (ref 65–175)
LYMPHOCYTES # BLD AUTO: 0.78 THOUSANDS/ÂΜL (ref 0.6–4.47)
LYMPHOCYTES NFR BLD AUTO: 23 % (ref 14–44)
MCH RBC QN AUTO: 35.2 PG (ref 26.8–34.3)
MCHC RBC AUTO-ENTMCNC: 33.1 G/DL (ref 31.4–37.4)
MCV RBC AUTO: 107 FL (ref 82–98)
MONOCYTES # BLD AUTO: 0.26 THOUSAND/ÂΜL (ref 0.17–1.22)
MONOCYTES NFR BLD AUTO: 8 % (ref 4–12)
NEUTROPHILS # BLD AUTO: 2.13 THOUSANDS/ÂΜL (ref 1.85–7.62)
NEUTS SEG NFR BLD AUTO: 62 % (ref 43–75)
NRBC BLD AUTO-RTO: 0 /100 WBCS
PLATELET # BLD AUTO: 45 THOUSANDS/UL (ref 149–390)
PMV BLD AUTO: 10.9 FL (ref 8.9–12.7)
POTASSIUM SERPL-SCNC: 4 MMOL/L (ref 3.5–5.3)
PROT SERPL-MCNC: 6.2 G/DL (ref 6.4–8.4)
RBC # BLD AUTO: 2.27 MILLION/UL (ref 3.88–5.62)
SODIUM SERPL-SCNC: 134 MMOL/L (ref 135–147)
TIBC SERPL-MCNC: 201 UG/DL (ref 250–450)
TSH SERPL DL<=0.05 MIU/L-ACNC: 2.43 UIU/ML (ref 0.45–4.5)
VIT B12 SERPL-MCNC: 1449 PG/ML (ref 100–900)
WBC # BLD AUTO: 3.39 THOUSAND/UL (ref 4.31–10.16)

## 2023-02-26 RX ORDER — HYDROMORPHONE HYDROCHLORIDE 2 MG/1
2 TABLET ORAL EVERY 4 HOURS PRN
Status: DISCONTINUED | OUTPATIENT
Start: 2023-02-26 | End: 2023-02-27 | Stop reason: HOSPADM

## 2023-02-26 RX ORDER — OXYCODONE HYDROCHLORIDE 5 MG/1
2.5 TABLET ORAL EVERY 4 HOURS PRN
Status: DISCONTINUED | OUTPATIENT
Start: 2023-02-26 | End: 2023-02-26

## 2023-02-26 RX ORDER — OXYCODONE HYDROCHLORIDE 5 MG/1
5 TABLET ORAL EVERY 4 HOURS PRN
Status: DISCONTINUED | OUTPATIENT
Start: 2023-02-26 | End: 2023-02-26

## 2023-02-26 RX ORDER — LORATADINE 10 MG/1
5 TABLET ORAL DAILY
Status: DISCONTINUED | OUTPATIENT
Start: 2023-02-26 | End: 2023-02-27 | Stop reason: HOSPADM

## 2023-02-26 RX ORDER — HYDROMORPHONE HYDROCHLORIDE 2 MG/1
1 TABLET ORAL EVERY 4 HOURS PRN
Status: DISCONTINUED | OUTPATIENT
Start: 2023-02-26 | End: 2023-02-27 | Stop reason: HOSPADM

## 2023-02-26 RX ORDER — HYDROMORPHONE HCL/PF 1 MG/ML
0.5 SYRINGE (ML) INJECTION EVERY 4 HOURS PRN
Status: DISCONTINUED | OUTPATIENT
Start: 2023-02-26 | End: 2023-02-26

## 2023-02-26 RX ORDER — SPIRONOLACTONE 50 MG/1
50 TABLET, FILM COATED ORAL DAILY
Status: DISCONTINUED | OUTPATIENT
Start: 2023-02-27 | End: 2023-02-27 | Stop reason: HOSPADM

## 2023-02-26 RX ORDER — HYDROMORPHONE HCL/PF 1 MG/ML
0.5 SYRINGE (ML) INJECTION EVERY 4 HOURS PRN
Status: DISCONTINUED | OUTPATIENT
Start: 2023-02-26 | End: 2023-02-27 | Stop reason: HOSPADM

## 2023-02-26 RX ADMIN — ROPINIROLE 4 MG: 2 TABLET, FILM COATED ORAL at 21:26

## 2023-02-26 RX ADMIN — ROPINIROLE 4 MG: 2 TABLET, FILM COATED ORAL at 09:34

## 2023-02-26 RX ADMIN — NICOTINE 7 MG: 7 PATCH, EXTENDED RELEASE TRANSDERMAL at 00:55

## 2023-02-26 RX ADMIN — RIFAXIMIN 550 MG: 550 TABLET ORAL at 21:26

## 2023-02-26 RX ADMIN — RIFAXIMIN 550 MG: 550 TABLET ORAL at 09:34

## 2023-02-26 RX ADMIN — INSULIN LISPRO 1 UNITS: 100 INJECTION, SOLUTION INTRAVENOUS; SUBCUTANEOUS at 12:29

## 2023-02-26 RX ADMIN — HYDROMORPHONE HYDROCHLORIDE 0.5 MG: 1 INJECTION, SOLUTION INTRAMUSCULAR; INTRAVENOUS; SUBCUTANEOUS at 06:36

## 2023-02-26 RX ADMIN — DICYCLOMINE HYDROCHLORIDE 20 MG: 20 TABLET ORAL at 06:31

## 2023-02-26 RX ADMIN — ROPINIROLE 4 MG: 2 TABLET, FILM COATED ORAL at 16:42

## 2023-02-26 RX ADMIN — HYDROMORPHONE HYDROCHLORIDE 2 MG: 2 TABLET ORAL at 21:29

## 2023-02-26 RX ADMIN — LORATADINE 5 MG: 10 TABLET ORAL at 12:27

## 2023-02-26 RX ADMIN — HYDROMORPHONE HYDROCHLORIDE 0.5 MG: 1 INJECTION, SOLUTION INTRAMUSCULAR; INTRAVENOUS; SUBCUTANEOUS at 04:13

## 2023-02-26 RX ADMIN — ROPINIROLE 4 MG: 2 TABLET, FILM COATED ORAL at 12:27

## 2023-02-26 RX ADMIN — HYDROMORPHONE HYDROCHLORIDE 2 MG: 2 TABLET ORAL at 17:34

## 2023-02-26 RX ADMIN — INSULIN LISPRO 1 UNITS: 100 INJECTION, SOLUTION INTRAVENOUS; SUBCUTANEOUS at 17:35

## 2023-02-26 RX ADMIN — DICYCLOMINE HYDROCHLORIDE 20 MG: 20 TABLET ORAL at 12:28

## 2023-02-26 RX ADMIN — PANTOPRAZOLE SODIUM 40 MG: 40 INJECTION, POWDER, FOR SOLUTION INTRAVENOUS at 16:42

## 2023-02-26 RX ADMIN — PANTOPRAZOLE SODIUM 40 MG: 40 INJECTION, POWDER, FOR SOLUTION INTRAVENOUS at 06:31

## 2023-02-26 RX ADMIN — HYDROMORPHONE HYDROCHLORIDE 0.5 MG: 1 INJECTION, SOLUTION INTRAMUSCULAR; INTRAVENOUS; SUBCUTANEOUS at 01:06

## 2023-02-26 NOTE — PROGRESS NOTES
Progress Note - General Surgery   Thompson Franco 58 y o  male MRN: [id number]  Unit/Bed#: [facility] 919-01 Encounter: [id number]    Assessment:  Patient is a 58 y o  male who presented with concern for SBO for which general surgery was consulted, now with return of bowel function  Afebrile,VSS    UOP:none recorded  Pt states he is urinating some        Plan:  Fulls  Plan per GI for EGD/c-scope  Encourage out of bed and ambulation  Prn Pain control  No acute surgical intervention    Subjective/Objective     Subjective:   No acute events overnight  Events as noted above  Objective:    Blood pressure 92/51, pulse 59, temperature 97 5 °F (36 4 °C), resp  rate 18, height 5' 7" (1 702 m), weight 79 4 kg (175 lb), SpO2 97 %  ,Body mass index is 27 41 kg/m²  Intake/Output Summary (Last 24 hours) at 2/26/2023 0715  Last data filed at 2/26/2023 0701  Gross per 24 hour   Intake 700 ml   Output --   Net 700 ml       Invasive Devices     Peripheral Intravenous Line  Duration           Peripheral IV 02/25/23 Right Antecubital <1 day                Physical Exam  Vitals reviewed  Constitutional:       General: He is not in acute distress  Appearance: He is not ill-appearing, toxic-appearing or diaphoretic  HENT:      Head: Normocephalic and atraumatic  Eyes:      Extraocular Movements: Extraocular movements intact  Cardiovascular:      Rate and Rhythm: Normal rate  Pulmonary:      Effort: Pulmonary effort is normal  No respiratory distress  Abdominal:      General: There is no distension  Palpations: Abdomen is soft  Tenderness: There is no abdominal tenderness  There is no guarding or rebound  Skin:     General: Skin is warm and dry  Neurological:      Mental Status: He is alert and oriented to person, place, and time     Psychiatric:         Mood and Affect: Mood normal          Behavior: Behavior normal              Results from last 7 days   Lab Units 02/25/23  0734 02/25/23  0417 02/25/23  0126 02/24/23  1706   WBC Thousand/uL  --  3 92*  --  4 80   HEMOGLOBIN g/dL 8 4* 8 0* 8 6* 8 8*   HEMATOCRIT % 25 5* 24 4* 26 4* 26 2*   PLATELETS Thousands/uL  --  44*  --  50*     Results from last 7 days   Lab Units 02/25/23  0417 02/24/23  1706   POTASSIUM mmol/L 3 7 3 6   CHLORIDE mmol/L 105 106   CO2 mmol/L 27 24   BUN mg/dL 12 15   CREATININE mg/dL 0 90 1 04   CALCIUM mg/dL 8 2* 8 8     Results from last 7 days   Lab Units 02/25/23  0417 02/24/23  1706   INR  1 68* 1 65*   PTT seconds 39* 35

## 2023-02-26 NOTE — PROGRESS NOTES
Progress Note -  [facility]  Peg Kramer 58 y o  male MRN: [id number]  Unit/Bed#: [facility] 919-01 Encounter: [id number]      ASSESSMENT AND PLAN:      1  Rectal bleeding  2  Chronic anemia  Hemoglobin 8 0, baseline 10-11  Also thrombocytopenic at 45, INR 1 67, decreased fibrinogen  Reports rectal bleeding yesterday but none overnight  Reports very small volume episode of streaks of blood in vomitus yesterday but none currently  Colonoscopy in 2022 showed friable mucosa near anastomotic site  Suspect rectal bleeding is from hemorrhoidal bleeding versus ischemic colitis versus ulceration from anastomosis from prior surgery  CT scan with evidence of small bowel wall thickening, enteritis  Iron panel suggests anemia of chronic disease  • Patient is now reluctant to have EGD and colonoscopy performed inpatient as he states that he had these procedures done recently and does not want to pay out-of-pocket for them  • Given overall stability of hemoglobin and lack of overt bleeding since yesterday can defer EGD and colonoscopy for now  • GI low fiber low residue diet for now  • Change Protonix to 40 mg twice daily p o  • Will require antibiotic for prophylaxis once paracentesis is completed  Start ceftriaxone 1 g every 24 hours, changed to ciprofloxacin for total of 5 days on discharge  • Monitor hemoglobin, bowel movements  Transfuse for less than 7   • Please notify GI if he develops any signs of overt bleeding      3  Possible hematemesis  Small volume hematemesis reported by patient  Described as streaks of blood  Unlikely to be variceal bleeding at this time  Had small varices on EGD in 2022  • Protonix 40 mg twice daily  • Antibiotic prophylaxis once paracentesis is completed  • Management of anemia as described above       4  Cirrhosis  Suspected to be CHRISTIE cirrhosis as patient denies alcohol use however he does have a fiancé that uses alcohol heavily  MELD is currently 17  Decompensated by ascites  • Monitor CMP, INR daily  Ascites: Presented with abdominal swelling  Has minor abdominal discomfort but no fever, chills, signs of SBP currently  • Recommend paracentesis with fluid studies, currently ordered     • 2 g sodium diet  • Hold diuretics  • Monitor volume status  Portal hypertension: EGD in 2022 showed small varices  No prior history of bleeding  Low suspicion for active variceal bleeding currently  • Monitor for signs of bleeding  • Depending on endoscopic findings may be candidate for beta-blocker therapy  Encephalopathy: History of encephalopathy but not on exam   He remains oriented  • Monitor mental status  HCC screening: Had CT with contrast no evidence of hepatic mass  • Continue screening every 6 months with right upper quadrant ultrasound/AFP  Transplant candidacy: He would be a transplant candidate given his elevated MELD score  No current urgent indications   • Follow-up with hepatology as an outpatient  Rest of care per primary team     ______________________________________________________________________    Subjective: Seen and examined  Had 3 bowel movements yesterday  No blood was noted by patient and nursing staff  He denies any nausea or vomiting  Abdominal pain has slightly improved  He requests full diet  He is reluctant to have endoscopy or colonoscopy performed as he believes he has had this done recently and does not want to pay for procedures as he is currently homeless  He does receive pension and he is worried about that  REVIEW OF SYSTEMS:    Review of Systems   Constitutional: Negative for chills and fever  HENT: Negative for congestion and sinus pressure  Respiratory: Negative for cough and shortness of breath  Cardiovascular: Negative for chest pain, palpitations and leg swelling  Gastrointestinal: Positive for abdominal pain  Negative for diarrhea, nausea and vomiting     Genitourinary: Negative for dysuria and hematuria  Musculoskeletal: Negative for arthralgias and back pain  Skin: Negative for color change and rash  Neurological: Negative for dizziness and headaches  Psychiatric/Behavioral: Negative for agitation and confusion  All other systems reviewed and are negative           Historical Information   Past Medical History:   Diagnosis Date   • Cirrhosis ([facility] )     CHRISTIE   • Colon cancer ([facility] )    • Diabetes mellitus ([facility] )    • Neuropathy    • Restless leg syndrome      Past Surgical History:   Procedure Laterality Date   • EGD AND SIGMOIDOSCOPY FLEXIBLE     • IR PARACENTESIS  11/22/2022   • LEFT COLON RESECTION     • LITHOTRIPSY       Social History   Social History     Substance and Sexual Activity   Alcohol Use Not Currently     Social History     Substance and Sexual Activity   Drug Use Never     Social History     Tobacco Use   Smoking Status Every Day   • Packs/day: 0 25   • Types: Cigarettes   Smokeless Tobacco Never     Family History   Problem Relation Age of Onset   • Diabetes Mother    • Diabetes Father        Meds/Allergies     Medications Prior to Admission   Medication   • metFORMIN (GLUCOPHAGE) 500 mg tablet   • dicyclomine (BENTYL) 20 mg tablet   • furosemide (LASIX) 20 mg tablet   • pantoprazole (PROTONIX) 40 mg tablet   • rOPINIRole (REQUIP) 4 mg tablet   • spironolactone (ALDACTONE) 25 mg tablet     Current Facility-Administered Medications   Medication Dose Route Frequency   • dicyclomine (BENTYL) tablet 20 mg  20 mg Oral BID before breakfast/lunch   • furosemide (LASIX) injection 20 mg  20 mg Intravenous Daily   • HYDROmorphone (DILAUDID) injection 0 5 mg  0 5 mg Intravenous Q2H PRN   • insulin lispro (HumaLOG) 100 units/mL subcutaneous injection 1-6 Units  1-6 Units Subcutaneous 4x Daily (AC & HS)   • lactulose oral solution 20 g  20 g Oral Daily   • nicotine (NICODERM CQ) 7 mg/24hr TD 24 hr patch 7 mg  7 mg Transdermal Daily   • ondansetron (ZOFRAN) injection 4 mg  4 mg Intravenous Q6H PRN   • pantoprazole (PROTONIX) injection 40 mg  40 mg Intravenous BID   • rifaximin (XIFAXAN) tablet 550 mg  550 mg Oral Q12H [address]   • rOPINIRole (REQUIP) tablet 4 mg  4 mg Oral 4x Daily   • spironolactone (ALDACTONE) tablet 25 mg  25 mg Oral Daily       Allergies   Allergen Reactions   • Morphine Anaphylaxis   • Clarithromycin Other (See Comments)     Dizzy, nausea, ulcers in stomach           Objective     Blood pressure 92/51, pulse 59, temperature 97 5 °F (36 4 °C), resp  rate 18, height 5' 7" (1 702 m), weight 79 4 kg (175 lb), SpO2 97 %  Body mass index is 27 41 kg/m²  Intake/Output Summary (Last 24 hours) at 2/26/2023 0708  Last data filed at 2/26/2023 0701  Gross per 24 hour   Intake 700 ml   Output --   Net 700 ml         PHYSICAL EXAM:      Physical Exam  Vitals and nursing note reviewed  Constitutional:       General: He is not in acute distress  Appearance: Normal appearance  He is well-developed  He is not ill-appearing  HENT:      Head: Normocephalic and atraumatic  Mouth/Throat:      Mouth: Mucous membranes are moist    Eyes:      Extraocular Movements: Extraocular movements intact  Conjunctiva/sclera: Conjunctivae normal    Cardiovascular:      Rate and Rhythm: Normal rate  Pulses: Normal pulses  Pulmonary:      Effort: Pulmonary effort is normal    Abdominal:      General: Abdomen is flat  Bowel sounds are normal  There is distension  Palpations: Abdomen is soft  Tenderness: There is generalized abdominal tenderness  There is no guarding  Musculoskeletal:      Cervical back: Neck supple  Right lower leg: No edema  Left lower leg: No edema  Skin:     General: Skin is warm and dry  Neurological:      General: No focal deficit present  Mental Status: He is alert and oriented to person, place, and time     Psychiatric:         Mood and Affect: Mood normal          Behavior: Behavior normal           Lab Results:   Admission on 02/24/2023 Component Date Value   • WBC 02/24/2023 4 80    • RBC 02/24/2023 2 47 (L)    • Hemoglobin 02/24/2023 8 8 (L)    • Hematocrit 02/24/2023 26 2 (L)    • MCV 02/24/2023 106 (H)    • MCH 02/24/2023 35 6 (H)    • MCHC 02/24/2023 33 6    • RDW 02/24/2023 15 8 (H)    • MPV 02/24/2023 10 8    • Platelets 87/51/1242 50 (L)    • nRBC 02/24/2023 0    • Neutrophils Relative 02/24/2023 69    • Immat GRANS % 02/24/2023 0    • Lymphocytes Relative 02/24/2023 17    • Monocytes Relative 02/24/2023 7    • Eosinophils Relative 02/24/2023 6    • Basophils Relative 02/24/2023 1    • Neutrophils Absolute 02/24/2023 3 33    • Immature Grans Absolute 02/24/2023 0 02    • Lymphocytes Absolute 02/24/2023 0 81    • Monocytes Absolute 02/24/2023 0 34    • Eosinophils Absolute 02/24/2023 0 27    • Basophils Absolute 02/24/2023 0 03    • Sodium 02/24/2023 137    • Potassium 02/24/2023 3 6    • Chloride 02/24/2023 106    • CO2 02/24/2023 24    • ANION GAP 02/24/2023 7    • BUN 02/24/2023 15    • Creatinine 02/24/2023 1 04    • Glucose 02/24/2023 117    • Calcium 02/24/2023 8 8    • Corrected Calcium 02/24/2023 9 8    • AST 02/24/2023 48 (H)    • ALT 02/24/2023 44    • Alkaline Phosphatase 02/24/2023 142 (H)    • Total Protein 02/24/2023 7 0    • Albumin 02/24/2023 2 7 (L)    • Total Bilirubin 02/24/2023 3 27 (H)    • eGFR 02/24/2023 76    • Protime 02/24/2023 19 8 (H)    • INR 02/24/2023 1 65 (H)    • PTT 02/24/2023 35    • ABO Grouping 02/24/2023 A    • Rh Factor 02/24/2023 Negative    • Antibody Screen 02/24/2023 Negative    • Specimen Expiration Date 02/24/2023 [id number]    • Ammonia 02/24/2023 44 (H)    • Color, UA 02/24/2023 Viky    • Clarity, UA 02/24/2023 Clear    • pH, UA 02/24/2023 5 5    • Leukocytes, UA 02/24/2023 Negative    • Nitrite, UA 02/24/2023 Negative    • Protein, UA 02/24/2023 Negative    • Glucose, UA 02/24/2023 Negative    • Ketones, UA 02/24/2023 Negative    • Urobilinogen, UA 02/24/2023 1 0    • Bilirubin, UA 02/24/2023 Negative    • Occult Blood, UA 02/24/2023 Large (A)    • Specific [place], UA 02/24/2023 1 020    • RBC, UA 02/24/2023 Innumerable (A)    • WBC, UA 02/24/2023 1-2    • Epithelial Cells 02/24/2023 Occasional    • Bacteria, UA 02/24/2023 None Seen    • LACTIC ACID 02/24/2023 1 9    • Fibrinogen 02/25/2023 134 (L)    • D-Dimer, Quant 02/25/2023 6 14 (H)    • Segmented Neutrophils Ma* 02/24/2023 75    • Bands Manual 02/24/2023 6    • Lymphocytes Manual 02/24/2023 17    • Eosinophils Manual 02/24/2023 2    • Total Counted 02/24/2023 100    • Macrocytes 02/24/2023 Present    • Anisocytosis 02/24/2023 Present    • HKHMA(MAX AMPLITUDE KAOL* 02/25/2023 <42 (L)    • ACTFMA(MAX AMPLITUDE ACT* 02/25/2023 3 2    • ADPMA(MAX AMPLITUDE ADP) 02/25/2023 31 6 (L)    • AAMA (MAX AMPLITUDE AA) 02/25/2023 35 7 (L)    • ADPADPINHIBITION 02/25/2023     • AAASAINHIBITION 02/25/2023     • ADPADPAGGREGATION 02/25/2023     • AAASAAGGREGATION 02/25/2023     • CKR(REACTION TIME) 02/25/2023 6 1    • CKLY30 02/25/2023 3 2 (H)    • CRTMA(RAPIDTEG MAX AMPLI* 02/25/2023 <40 (L)    • CFFMA (FUNCTIONAL FIBRIN* 02/25/2023 13 7 (L)    • Hemoglobin 02/25/2023 8 6 (L)    • Hematocrit 02/25/2023 26 4 (L)    • POC Glucose 02/25/2023 306 (H)    • WBC 02/25/2023 3 92 (L)    • RBC 02/25/2023 2 27 (L)    • Hemoglobin 02/25/2023 8 0 (L)    • Hematocrit 02/25/2023 24 4 (L)    • MCV 02/25/2023 108 (H)    • MCH 02/25/2023 35 2 (H)    • MCHC 02/25/2023 32 8    • RDW 02/25/2023 15 7 (H)    • MPV 02/25/2023 11 7    • Platelets 27/64/0491 44 (LL)    • nRBC 02/25/2023 0    • Neutrophils Relative 02/25/2023 61    • Immat GRANS % 02/25/2023 0    • Lymphocytes Relative 02/25/2023 23    • Monocytes Relative 02/25/2023 8    • Eosinophils Relative 02/25/2023 7 (H)    • Basophils Relative 02/25/2023 1    • Neutrophils Absolute 02/25/2023 2 41    • Immature Grans Absolute 02/25/2023 0 01    • Lymphocytes Absolute 02/25/2023 0 89    • Monocytes Absolute 02/25/2023 0 31    • Eosinophils Absolute 02/25/2023 0 27    • Basophils Absolute 02/25/2023 0 03    • Sodium 02/25/2023 136    • Potassium 02/25/2023 3 7    • Chloride 02/25/2023 105    • CO2 02/25/2023 27    • ANION GAP 02/25/2023 4    • BUN 02/25/2023 12    • Creatinine 02/25/2023 0 90    • Glucose 02/25/2023 161 (H)    • Calcium 02/25/2023 8 2 (L)    • eGFR 02/25/2023 91    • Total Bilirubin 02/25/2023 2 55 (H)    • Bilirubin, Direct 02/25/2023 1 60 (H)    • Alkaline Phosphatase 02/25/2023 128 (H)    • AST 02/25/2023 39    • ALT 02/25/2023 40    • Total Protein 02/25/2023 6 1 (L)    • Albumin 02/25/2023 2 4 (L)    • Protime 02/25/2023 20 1 (H)    • INR 02/25/2023 1 68 (H)    • PTT 02/25/2023 39 (H)    • Hemoglobin 02/25/2023 8 4 (L)    • Hematocrit 02/25/2023 25 5 (L)    • POC Glucose 02/25/2023 115    • POC Glucose 02/25/2023 159 (H)    • POC Glucose 02/25/2023 294 (H)    • POC Glucose 02/25/2023 154 (H)        Imaging Studies: I have personally reviewed pertinent imaging studies  [facility]    Gastroenterology Fellow  PGY-4  Available via [employer]  2/26/2023 7:08 AM

## 2023-02-26 NOTE — QUICK NOTE
Paged by RN who reports that patient states unable to take oxycodone due to hives and throat swelling  Requesting oral dilaudid instead  Pain regimen adjusted

## 2023-02-26 NOTE — PLAN OF CARE
Problem: MOBILITY - ADULT  Goal: Maintain or return to baseline ADL function  Description: INTERVENTIONS:  -  Assess patient's ability to carry out ADLs; assess patient's baseline for ADL function and identify physical deficits which impact ability to perform ADLs (bathing, care of mouth/teeth, toileting, grooming, dressing, etc )  - Assess/evaluate cause of self-care deficits   - Assess range of motion  - Assess patient's mobility; develop plan if impaired  - Assess patient's need for assistive devices and provide as appropriate  - Encourage maximum independence but intervene and supervise when necessary  - Involve family in performance of ADLs  - Assess for home care needs following discharge   - Consider OT consult to assist with ADL evaluation and planning for discharge  - Provide patient education as appropriate  Outcome: Progressing  Goal: Maintains/Returns to pre admission functional level  Description: INTERVENTIONS:  - Perform BMAT or MOVE assessment daily    - Set and communicate daily mobility goal to care team and patient/family/caregiver     - Collaborate with rehabilitation services on mobility goals if consulted  - Ambulate patient 3 times a day  - Out of bed to chair 3 times a day   - Out of bed for meals 3 times a day  - Out of bed for toileting  - Record patient progress and toleration of activity level   Outcome: Progressing     Problem: PAIN - ADULT  Goal: Verbalizes/displays adequate comfort level or baseline comfort level  Description: Interventions:  - Encourage patient to monitor pain and request assistance  - Assess pain using appropriate pain scale  - Administer analgesics based on type and severity of pain and evaluate response  - Implement non-pharmacological measures as appropriate and evaluate response  - Consider cultural and social influences on pain and pain management  - Notify physician/advanced practitioner if interventions unsuccessful or patient reports new pain  Outcome: Progressing     Problem: INFECTION - ADULT  Goal: Absence or prevention of progression during hospitalization  Description: INTERVENTIONS:  - Assess and monitor for signs and symptoms of infection  - Monitor lab/diagnostic results  - Monitor all insertion sites, i e  indwelling lines, tubes, and drains  - Monitor endotracheal if appropriate and nasal secretions for changes in amount and color  - [place] appropriate cooling/warming therapies per order  - Administer medications as ordered  - Instruct and encourage patient and family to use good hand hygiene technique  - Identify and instruct in appropriate isolation precautions for identified infection/condition  Outcome: Progressing  Goal: Absence of fever/infection during neutropenic period  Description: INTERVENTIONS:  - Monitor WBC    Outcome: Progressing     Problem: SAFETY ADULT  Goal: Maintain or return to baseline ADL function  Description: INTERVENTIONS:  -  Assess patient's ability to carry out ADLs; assess patient's baseline for ADL function and identify physical deficits which impact ability to perform ADLs (bathing, care of mouth/teeth, toileting, grooming, dressing, etc )  - Assess/evaluate cause of self-care deficits   - Assess range of motion  - Assess patient's mobility; develop plan if impaired  - Assess patient's need for assistive devices and provide as appropriate  - Encourage maximum independence but intervene and supervise when necessary  - Involve family in performance of ADLs  - Assess for home care needs following discharge   - Consider OT consult to assist with ADL evaluation and planning for discharge  - Provide patient education as appropriate  Outcome: Progressing  Goal: Maintains/Returns to pre admission functional level  Description: INTERVENTIONS:  - Perform BMAT or MOVE assessment daily    - Set and communicate daily mobility goal to care team and patient/family/caregiver     - Collaborate with rehabilitation services on mobility goals if consulted  - Ambulate patient 3 times a day  - Out of bed to chair 3 times a day   - Out of bed for meals 3 times a day  - Out of bed for toileting  - Record patient progress and toleration of activity level   Outcome: Progressing  Goal: Patient will remain free of falls  Description: INTERVENTIONS:  - Educate patient/family on patient safety including physical limitations  - Instruct patient to call for assistance with activity   - Consult OT/PT to assist with strengthening/mobility   - Keep Call bell within reach  - Keep bed low and locked with side rails adjusted as appropriate  - Keep care items and personal belongings within reach  - Initiate and maintain comfort rounds  - Make Fall Risk Sign visible to staff  - Offer Toileting every 2 Hours, in advance of need  - Initiate/Maintain bed alarm  - Obtain necessary fall risk management equipment  - Apply yellow socks and bracelet for high fall risk patients  - Consider moving patient to room near nurses station  Outcome: Progressing     Problem: DISCHARGE PLANNING  Goal: Discharge to home or other facility with appropriate resources  Description: INTERVENTIONS:  - Identify barriers to discharge w/patient and caregiver  - Arrange for needed discharge resources and transportation as appropriate  - Identify discharge learning needs (meds, wound care, etc )  - Arrange for interpretive services to assist at discharge as needed  - Refer to Case Management Department for coordinating discharge planning if the patient needs post-hospital services based on physician/advanced practitioner order or complex needs related to functional status, cognitive ability, or social support system  Outcome: Progressing     Problem: Knowledge Deficit  Goal: Patient/family/caregiver demonstrates understanding of disease process, treatment plan, medications, and discharge instructions  Description: Complete learning assessment and assess knowledge base    Interventions:  - Provide teaching at level of understanding  - Provide teaching via preferred learning methods  Outcome: Progressing

## 2023-02-26 NOTE — PLAN OF CARE
Problem: MOBILITY - ADULT  Goal: Maintain or return to baseline ADL function  Description: INTERVENTIONS:  -  Assess patient's ability to carry out ADLs; assess patient's baseline for ADL function and identify physical deficits which impact ability to perform ADLs (bathing, care of mouth/teeth, toileting, grooming, dressing, etc )  - Assess/evaluate cause of self-care deficits   - Assess range of motion  - Assess patient's mobility; develop plan if impaired  - Assess patient's need for assistive devices and provide as appropriate  - Encourage maximum independence but intervene and supervise when necessary  - Involve family in performance of ADLs  - Assess for home care needs following discharge   - Consider OT consult to assist with ADL evaluation and planning for discharge  - Provide patient education as appropriate  Outcome: Progressing  Goal: Maintains/Returns to pre admission functional level  Description: INTERVENTIONS:  - Perform BMAT or MOVE assessment daily    - Set and communicate daily mobility goal to care team and patient/family/caregiver  - Collaborate with rehabilitation services on mobility goals if consulted  - Perform Range of Motion 2 times a day  - Reposition patient every 2 hours    - Dangle patient 2 times a day  - Stand patient 2 times a day  - Ambulate patient 2 times a day  - Out of bed to chair 2 times a day   - Out of bed for meals 22 times a day  - Out of bed for toileting  - Record patient progress and toleration of activity level   Outcome: Progressing     Problem: PAIN - ADULT  Goal: Verbalizes/displays adequate comfort level or baseline comfort level  Description: Interventions:  - Encourage patient to monitor pain and request assistance  - Assess pain using appropriate pain scale  - Administer analgesics based on type and severity of pain and evaluate response  - Implement non-pharmacological measures as appropriate and evaluate response  - Consider cultural and social influences on pain and pain management  - Notify physician/advanced practitioner if interventions unsuccessful or patient reports new pain  Outcome: Progressing     Problem: INFECTION - ADULT  Goal: Absence or prevention of progression during hospitalization  Description: INTERVENTIONS:  - Assess and monitor for signs and symptoms of infection  - Monitor lab/diagnostic results  - Monitor all insertion sites, i e  indwelling lines, tubes, and drains  - Monitor endotracheal if appropriate and nasal secretions for changes in amount and color  - [place] appropriate cooling/warming therapies per order  - Administer medications as ordered  - Instruct and encourage patient and family to use good hand hygiene technique  - Identify and instruct in appropriate isolation precautions for identified infection/condition  Outcome: Progressing  Goal: Absence of fever/infection during neutropenic period  Description: INTERVENTIONS:  - Monitor WBC    Outcome: Progressing     Problem: SAFETY ADULT  Goal: Maintain or return to baseline ADL function  Description: INTERVENTIONS:  -  Assess patient's ability to carry out ADLs; assess patient's baseline for ADL function and identify physical deficits which impact ability to perform ADLs (bathing, care of mouth/teeth, toileting, grooming, dressing, etc )  - Assess/evaluate cause of self-care deficits   - Assess range of motion  - Assess patient's mobility; develop plan if impaired  - Assess patient's need for assistive devices and provide as appropriate  - Encourage maximum independence but intervene and supervise when necessary  - Involve family in performance of ADLs  - Assess for home care needs following discharge   - Consider OT consult to assist with ADL evaluation and planning for discharge  - Provide patient education as appropriate  Outcome: Progressing  Goal: Maintains/Returns to pre admission functional level  Description: INTERVENTIONS:  - Perform BMAT or MOVE assessment daily    - Set and communicate daily mobility goal to care team and patient/family/caregiver  - Collaborate with rehabilitation services on mobility goals if consulted  - Perform Range of Motion 2 times a day  - Reposition patient every 2 hours    - Dangle patient 2 times a day  - Stand patient 2 times a day  - Ambulate patient 2 times a day  - Out of bed to chair 2 times a day   - Out of bed for meals 2 times a day  - Out of bed for toileting  - Record patient progress and toleration of activity level   Outcome: Progressing  Goal: Patient will remain free of falls  Description: INTERVENTIONS:  - Educate patient/family on patient safety including physical limitations  - Instruct patient to call for assistance with activity   - Consult OT/PT to assist with strengthening/mobility   - Keep Call bell within reach  - Keep bed low and locked with side rails adjusted as appropriate  - Keep care items and personal belongings within reach  - Initiate and maintain comfort rounds  - Make Fall Risk Sign visible to staff  - Offer Toileting every 2 Hours, in advance of need  - Initiate/Maintain alarm  - Obtain necessary fall risk management equipment:   - Apply yellow socks and bracelet for high fall risk patients  - Consider moving patient to room near nurses station  Outcome: Progressing     Problem: DISCHARGE PLANNING  Goal: Discharge to home or other facility with appropriate resources  Description: INTERVENTIONS:  - Identify barriers to discharge w/patient and caregiver  - Arrange for needed discharge resources and transportation as appropriate  - Identify discharge learning needs (meds, wound care, etc )  - Arrange for interpretive services to assist at discharge as needed  - Refer to Case Management Department for coordinating discharge planning if the patient needs post-hospital services based on physician/advanced practitioner order or complex needs related to functional status, cognitive ability, or social support system  Outcome: Progressing     Problem: Knowledge Deficit  Goal: Patient/family/caregiver demonstrates understanding of disease process, treatment plan, medications, and discharge instructions  Description: Complete learning assessment and assess knowledge base    Interventions:  - Provide teaching at level of understanding  - Provide teaching via preferred learning methods  Outcome: Progressing

## 2023-02-26 NOTE — PROGRESS NOTES
INTERNAL MEDICINE RESIDENCY PROGRESS NOTE     Name: Thompson Franco   Age & Sex: 58 y o  male   MRN: [id number]  Unit/Bed#: [facility] 919-01   Encounter: [id number]  Team: SOD Team A    PATIENT INFORMATION     Name: Thompson Franco   Age & Sex: 58 y o  male   MRN: [id number]  Hospital Stay Days: 1    ASSESSMENT/PLAN     Principal Problem:    Abdominal pain  Active Problems:    Cirrhosis of liver with ascites ([facility] [address] )    Thrombocytopenia (HCC)    Restless leg syndrome    Smoking    Diabetes mellitus type 2 in obese ([facility] [address] )    Anemia    Colon cancer ([facility] )    GI bleed      GI bleed  Assessment & Plan  Patient complains of ongoing bright red blood per rectum with defecation for the past several months  Is not associated with pain  And only occurs with bowel movements  Patient seen in a hospital [facility] 3 weeks ago and planned to have a colonoscopy completed however he signed out AMA prior to finishing prep  Colonoscopy completed here in November 2022 without mention of hemorrhoids    Suspect this is hemorrhoidal bleeding with history of cirrhosis and increased portal hypertension  Hemoglobin stable at 8  TEO performed by GI which was negative for any signs of bleeding    Plan:  Colonoscopy Monday  CBC daily    Colon cancer [facility])  Assessment & Plan  The patient has a history of colon-cancer, for which he underwent hemicolectomy in 2002  CT abdomen pelvis on this admission without signs of lymphadenopathy  Plan colonoscopy Monday per GI    Anemia  Assessment & Plan  The patient was anemic, to 8 8, on presentation to the Emergency Department at [facility], on 2/24/2023   - Continue to monitor hemoglobin, with a daily CBC      Diabetes mellitus type 2 in obese [facility])  Assessment & Plan  Lab Results   Component Value Date    HGBA1C 5 9 (H) 11/21/2022       Recent Labs     02/25/23  0303 02/25/23  0759 02/25/23  1139   POCGLU 306* 115 159*       Blood Sugar Average: Last 72 hrs:  (P) 306     - Continue sliding-scale insulin   -Evaluated blood sugars consider starting Lantus nightly    Smoking  Assessment & Plan  The patient said that he has been smoking 5-6 cigarettes per day  - Continue nicotine patch 7 mg TD qd  Restless leg syndrome  Assessment & Plan  - Continue ropinirole 4 mg PO four times daily  Thrombocytopenia ([address] )  Assessment & Plan  The patient's platelet-count was 50A, on 2/24/2023  On physical examination, a small amount of petechiae was noted on the bilateral lower extremities  This is due to his cirrhosis  - Continue to monitor the platelet-count, with a daily CBC  Cirrhosis of liver with ascites [facility])  Assessment & Plan  Patient with D compensated cirrhosis with ascites and esophageal varices  Current MELD score of 17  Most recent EGD completed in November 2022 a small esophageal varices  Patient has a unreliable historian suggesting mild hepatic encephalopathy  Continue furosemide 20 mg daily  Increase Aldactone 50 mg daily  Continue  lactulose and rifaximin  Paracentesis ordered to rule out SBP in the setting of ongoing abdominal pain => Consider SBP ppx with Ceftriaxone 1 g q24 based on Para results   2 g salt diet    * Abdominal pain  Assessment & Plan  The patient presented with abdominal pain, which spread from the right side of the abdomen to the left side, and which had a severity of 9 out of 10  The patient said that the pain was "steady," was "present all of the time," and was made worse by the consumption of solid food  A CT of the abdomen and pelvis, with contrast, performed on 2/24/2023, showed "Worsening right hemiabdomen diffuse small bowel wall thickening with the small bowel feces sign in the pelvic loop suggesting an obstructive pattern secondary to underlying severe enteritis   Cirrhotic changes with collaterals, splenomegaly and moderate ascites "    Of note patient was admitted in November 2022 for similar abdominal pain where he was treated for C  difficile  There were concerns for acute cholecystitis but HIDA scan was normal   EGD and colonoscopy completed at that time he was found to have antral gastritis and biopsies were negative  Patient presented to another hospital 3 weeks ago for similar abdominal pain and concerns of bright red blood per rectum  There were plans for colonoscopy however patient left AMA prior to completing the prep  - Continue furosemide 20 mg IV PO qd   - Continue pantoprazole 40 mg IV bid   - Continue spironolactone 50 mg PO qd   - Gastroenterology was consulted  -GI diet  Plan for colonoscopy on Monday per GI      Disposition: Pending medical stabilization      SUBJECTIVE     Patient seen and examined  No acute events overnight  This morning, patient continues to report generalized abdominal pain  Reports good appetite and has been tolerating PO intake well  Having bowel movements  Denies any N/V, hemetemesis, hematochezia, or melena  OBJECTIVE     Vitals:    23 2232 23 0717 23 0931   BP:  92/51 (!) 102/48 (!) 100/48   BP Location:       Pulse:  59 71 75   Resp:  18 17    Temp:  97 5 °F (36 4 °C) 98 1 °F (36 7 °C)    TempSrc:       SpO2: 100% 97% 95% 95%   Weight:       Height:          Temperature:   Temp (24hrs), Av 7 °F (36 5 °C), Min:97 5 °F (36 4 °C), Max:98 1 °F (36 7 °C)    Temperature: 98 1 °F (36 7 °C)  Intake & Output:  I/O        0701   0700  0701   0700          Unmeasured Urine Occurrence  1 x        Weights:   IBW (Ideal Body Weight): 66 1 kg    Body mass index is 27 41 kg/m²  Weight (last 2 days)     Date/Time Weight    23 14:54:20 79 4 (175)        Physical Exam  Constitutional:       General: He is not in acute distress  Appearance: He is ill-appearing  Comments: Chronically ill-appearing    HENT:      Head: Normocephalic and atraumatic  Eyes:      General: No scleral icterus       Conjunctiva/sclera: Conjunctivae normal  Cardiovascular:      Rate and Rhythm: Normal rate and regular rhythm  Heart sounds: No murmur heard  No friction rub  No gallop  Pulmonary:      Effort: Pulmonary effort is normal  No respiratory distress  Breath sounds: Normal breath sounds  No wheezing, rhonchi or rales  Abdominal:      General: There is distension  Tenderness: There is abdominal tenderness  There is no guarding or rebound  Comments: Bowel sounds present; Abdomen distended and dull to percussion; Diffuse tenderness to palpation w/o any evidence of guarding or rebound    Musculoskeletal:      Right lower leg: No edema  Left lower leg: No edema  Skin:     General: Skin is warm and dry  Neurological:      Mental Status: He is oriented to person, place, and time  Comments: No asterixis present on exam   Psychiatric:         Mood and Affect: Mood normal          Behavior: Behavior normal        LABORATORY DATA     Labs: I have personally reviewed pertinent reports  Results from last 7 days   Lab Units 02/26/23  0721 02/25/23  0734 02/25/23 0417 02/25/23  0126 02/24/23  1706   WBC Thousand/uL 3 39*  --  3 92*  --  4 80   HEMOGLOBIN g/dL 8 0* 8 4* 8 0*   < > 8 8*   HEMATOCRIT % 24 2* 25 5* 24 4*   < > 26 2*   PLATELETS Thousands/uL 45*  --  44*  --  50*   NEUTROS PCT % 62  --  61  --  69   MONOS PCT % 8  --  8  --  7    < > = values in this interval not displayed        Results from last 7 days   Lab Units 02/26/23  0721 02/25/23 0417 02/24/23  1706   POTASSIUM mmol/L 4 0 3 7 3 6   CHLORIDE mmol/L 104 105 106   CO2 mmol/L 29 27 24   BUN mg/dL 12 12 15   CREATININE mg/dL 1 00 0 90 1 04   CALCIUM mg/dL 8 2* 8 2* 8 8   ALK PHOS U/L 139* 128* 142*   ALT U/L 42 40 44   AST U/L 47* 39 48*              Results from last 7 days   Lab Units 02/25/23  0417 02/24/23  1706   INR  1 68* 1 65*   PTT seconds 39* 35     Results from last 7 days   Lab Units 02/24/23  2313   LACTIC ACID mmol/L 1 9           IMAGING & DIAGNOSTIC TESTING     Radiology Results: I have personally reviewed pertinent reports  CT abdomen pelvis with contrast    Result Date: 2/24/2023  Impression: Worsening right hemiabdomen diffuse small bowel wall thickening with the small bowel feces sign in the pelvic loop suggesting an obstructive pattern secondary to underlying severe enteritis    Cirrhotic changes with collaterals, splenomegaly and moderate ascites  Workstation performed: [id number]     Other Diagnostic Testing: I have personally reviewed pertinent reports  ACTIVE MEDICATIONS     Current Facility-Administered Medications   Medication Dose Route Frequency   • dicyclomine (BENTYL) tablet 20 mg  20 mg Oral BID before breakfast/lunch   • furosemide (LASIX) injection 20 mg  20 mg Intravenous Daily   • HYDROmorphone (DILAUDID) injection 0 5 mg  0 5 mg Intravenous Q4H PRN   • insulin lispro (HumaLOG) 100 units/mL subcutaneous injection 1-6 Units  1-6 Units Subcutaneous 4x Daily (AC & HS)   • lactulose oral solution 20 g  20 g Oral Daily   • loratadine (CLARITIN) tablet 5 mg  5 mg Oral Daily   • nicotine (NICODERM CQ) 7 mg/24hr TD 24 hr patch 7 mg  7 mg Transdermal Daily   • ondansetron (ZOFRAN) injection 4 mg  4 mg Intravenous Q6H PRN   • pantoprazole (PROTONIX) injection 40 mg  40 mg Intravenous BID   • rifaximin (XIFAXAN) tablet 550 mg  550 mg Oral Q12H [address]   • rOPINIRole (REQUIP) tablet 4 mg  4 mg Oral 4x Daily   • [START ON 2/27/2023] spironolactone (ALDACTONE) tablet 50 mg  50 mg Oral Daily       VTE Pharmacologic Prophylaxis: Reason for no pharmacologic prophylaxis GI bleed  VTE Mechanical Prophylaxis: sequential compression device    Portions of the record may have been created with voice recognition software  Occasional wrong word or "sound a like" substitutions may have occurred due to the inherent limitations of voice recognition software    Read the chart carefully and recognize, using context, where substitutions have occurred   ==  Telly Barajas, MD  [address]  Internal Medicine Residency PGY-1

## 2023-02-27 VITALS
HEIGHT: 67 IN | TEMPERATURE: 98.1 F | WEIGHT: 175 LBS | DIASTOLIC BLOOD PRESSURE: 54 MMHG | BODY MASS INDEX: 27.47 KG/M2 | OXYGEN SATURATION: 98 % | HEART RATE: 82 BPM | SYSTOLIC BLOOD PRESSURE: 94 MMHG | RESPIRATION RATE: 16 BRPM

## 2023-02-27 LAB
ALBUMIN SERPL BCP-MCNC: 2.4 G/DL (ref 3.5–5)
ALP SERPL-CCNC: 150 U/L (ref 46–116)
ALT SERPL W P-5'-P-CCNC: 42 U/L (ref 12–78)
ANION GAP SERPL CALCULATED.3IONS-SCNC: 3 MMOL/L (ref 4–13)
AST SERPL W P-5'-P-CCNC: 49 U/L (ref 5–45)
BASOPHILS # BLD AUTO: 0.02 THOUSANDS/ÂΜL (ref 0–0.1)
BASOPHILS NFR BLD AUTO: 1 % (ref 0–1)
BILIRUB SERPL-MCNC: 2.03 MG/DL (ref 0.2–1)
BUN SERPL-MCNC: 11 MG/DL (ref 5–25)
CALCIUM ALBUM COR SERPL-MCNC: 9.5 MG/DL (ref 8.3–10.1)
CALCIUM SERPL-MCNC: 8.2 MG/DL (ref 8.3–10.1)
CHLORIDE SERPL-SCNC: 105 MMOL/L (ref 96–108)
CO2 SERPL-SCNC: 27 MMOL/L (ref 21–32)
CREAT SERPL-MCNC: 0.87 MG/DL (ref 0.6–1.3)
EOSINOPHIL # BLD AUTO: 0.2 THOUSAND/ÂΜL (ref 0–0.61)
EOSINOPHIL NFR BLD AUTO: 6 % (ref 0–6)
ERYTHROCYTE [DISTWIDTH] IN BLOOD BY AUTOMATED COUNT: 15.8 % (ref 11.6–15.1)
GFR SERPL CREATININE-BSD FRML MDRD: 92 ML/MIN/1.73SQ M
GLUCOSE SERPL-MCNC: 125 MG/DL (ref 65–140)
GLUCOSE SERPL-MCNC: 162 MG/DL (ref 65–140)
GLUCOSE SERPL-MCNC: 197 MG/DL (ref 65–140)
HCT VFR BLD AUTO: 26 % (ref 36.5–49.3)
HGB BLD-MCNC: 8.5 G/DL (ref 12–17)
IMM GRANULOCYTES # BLD AUTO: 0.01 THOUSAND/UL (ref 0–0.2)
IMM GRANULOCYTES NFR BLD AUTO: 0 % (ref 0–2)
INR PPP: 1.52 (ref 0.84–1.19)
LYMPHOCYTES # BLD AUTO: 0.78 THOUSANDS/ÂΜL (ref 0.6–4.47)
LYMPHOCYTES NFR BLD AUTO: 24 % (ref 14–44)
MCH RBC QN AUTO: 35.1 PG (ref 26.8–34.3)
MCHC RBC AUTO-ENTMCNC: 32.7 G/DL (ref 31.4–37.4)
MCV RBC AUTO: 107 FL (ref 82–98)
MONOCYTES # BLD AUTO: 0.31 THOUSAND/ÂΜL (ref 0.17–1.22)
MONOCYTES NFR BLD AUTO: 9 % (ref 4–12)
NEUTROPHILS # BLD AUTO: 2 THOUSANDS/ÂΜL (ref 1.85–7.62)
NEUTS SEG NFR BLD AUTO: 60 % (ref 43–75)
NRBC BLD AUTO-RTO: 0 /100 WBCS
PLATELET # BLD AUTO: 44 THOUSANDS/UL (ref 149–390)
PMV BLD AUTO: 12 FL (ref 8.9–12.7)
POTASSIUM SERPL-SCNC: 3.8 MMOL/L (ref 3.5–5.3)
PROT SERPL-MCNC: 6.4 G/DL (ref 6.4–8.4)
PROTHROMBIN TIME: 18.5 SECONDS (ref 11.6–14.5)
RBC # BLD AUTO: 2.42 MILLION/UL (ref 3.88–5.62)
SODIUM SERPL-SCNC: 135 MMOL/L (ref 135–147)
WBC # BLD AUTO: 3.32 THOUSAND/UL (ref 4.31–10.16)

## 2023-02-27 RX ORDER — DIPHENHYDRAMINE HYDROCHLORIDE 50 MG/ML
12.5 INJECTION INTRAMUSCULAR; INTRAVENOUS ONCE
Status: COMPLETED | OUTPATIENT
Start: 2023-02-27 | End: 2023-02-27

## 2023-02-27 RX ORDER — LACTULOSE 20 G/30ML
20 SOLUTION ORAL DAILY
Qty: 1800 ML | Refills: 0 | Status: SHIPPED | OUTPATIENT
Start: 2023-02-28

## 2023-02-27 RX ADMIN — ROPINIROLE 4 MG: 2 TABLET, FILM COATED ORAL at 11:20

## 2023-02-27 RX ADMIN — INSULIN LISPRO 1 UNITS: 100 INJECTION, SOLUTION INTRAVENOUS; SUBCUTANEOUS at 11:20

## 2023-02-27 RX ADMIN — PANTOPRAZOLE SODIUM 40 MG: 40 INJECTION, POWDER, FOR SOLUTION INTRAVENOUS at 04:38

## 2023-02-27 RX ADMIN — HYDROMORPHONE HYDROCHLORIDE 2 MG: 2 TABLET ORAL at 04:38

## 2023-02-27 RX ADMIN — DIPHENHYDRAMINE HYDROCHLORIDE 12.5 MG: 50 INJECTION INTRAMUSCULAR; INTRAVENOUS at 04:46

## 2023-02-27 RX ADMIN — DICYCLOMINE HYDROCHLORIDE 20 MG: 20 TABLET ORAL at 11:20

## 2023-02-27 RX ADMIN — NICOTINE 7 MG: 7 PATCH, EXTENDED RELEASE TRANSDERMAL at 04:42

## 2023-02-27 NOTE — PLAN OF CARE
Problem: MOBILITY - ADULT  Goal: Maintain or return to baseline ADL function  Description: INTERVENTIONS:  -  Assess patient's ability to carry out ADLs; assess patient's baseline for ADL function and identify physical deficits which impact ability to perform ADLs (bathing, care of mouth/teeth, toileting, grooming, dressing, etc )  - Assess/evaluate cause of self-care deficits   - Assess range of motion  - Assess patient's mobility; develop plan if impaired  - Assess patient's need for assistive devices and provide as appropriate  - Encourage maximum independence but intervene and supervise when necessary  - Involve family in performance of ADLs  - Assess for home care needs following discharge   - Consider OT consult to assist with ADL evaluation and planning for discharge  - Provide patient education as appropriate  Outcome: Progressing  Goal: Maintains/Returns to pre admission functional level  Description: INTERVENTIONS:  - Perform BMAT or MOVE assessment daily    - Set and communicate daily mobility goal to care team and patient/family/caregiver     - Collaborate with rehabilitation services on mobility goals if consulted  - Out of bed for toileting  - Record patient progress and toleration of activity level   Outcome: Progressing     Problem: PAIN - ADULT  Goal: Verbalizes/displays adequate comfort level or baseline comfort level  Description: Interventions:  - Encourage patient to monitor pain and request assistance  - Assess pain using appropriate pain scale  - Administer analgesics based on type and severity of pain and evaluate response  - Implement non-pharmacological measures as appropriate and evaluate response  - Consider cultural and social influences on pain and pain management  - Notify physician/advanced practitioner if interventions unsuccessful or patient reports new pain  Outcome: Progressing     Problem: INFECTION - ADULT  Goal: Absence or prevention of progression during hospitalization  Description: INTERVENTIONS:  - Assess and monitor for signs and symptoms of infection  - Monitor lab/diagnostic results  - Monitor all insertion sites, i e  indwelling lines, tubes, and drains  - Monitor endotracheal if appropriate and nasal secretions for changes in amount and color  - [place] appropriate cooling/warming therapies per order  - Administer medications as ordered  - Instruct and encourage patient and family to use good hand hygiene technique  - Identify and instruct in appropriate isolation precautions for identified infection/condition  Outcome: Progressing  Goal: Absence of fever/infection during neutropenic period  Description: INTERVENTIONS:  - Monitor WBC    Outcome: Progressing     Problem: SAFETY ADULT  Goal: Maintain or return to baseline ADL function  Description: INTERVENTIONS:  -  Assess patient's ability to carry out ADLs; assess patient's baseline for ADL function and identify physical deficits which impact ability to perform ADLs (bathing, care of mouth/teeth, toileting, grooming, dressing, etc )  - Assess/evaluate cause of self-care deficits   - Assess range of motion  - Assess patient's mobility; develop plan if impaired  - Assess patient's need for assistive devices and provide as appropriate  - Encourage maximum independence but intervene and supervise when necessary  - Involve family in performance of ADLs  - Assess for home care needs following discharge   - Consider OT consult to assist with ADL evaluation and planning for discharge  - Provide patient education as appropriate  Outcome: Progressing  Goal: Maintains/Returns to pre admission functional level  Description: INTERVENTIONS:  - Perform BMAT or MOVE assessment daily    - Set and communicate daily mobility goal to care team and patient/family/caregiver     - Collaborate with rehabilitation services on mobility goals if consulted  - Out of bed for toileting  - Record patient progress and toleration of activity level   Outcome: Progressing  Goal: Patient will remain free of falls  Description: INTERVENTIONS:  - Educate patient/family on patient safety including physical limitations  - Instruct patient to call for assistance with activity   - Consult OT/PT to assist with strengthening/mobility   - Keep Call bell within reach  - Keep bed low and locked with side rails adjusted as appropriate  - Keep care items and personal belongings within reach  - Initiate and maintain comfort rounds  - Make Fall Risk Sign visible to staff  - Apply yellow socks and bracelet for high fall risk patients  - Consider moving patient to room near nurses station  Outcome: Progressing     Problem: DISCHARGE PLANNING  Goal: Discharge to home or other facility with appropriate resources  Description: INTERVENTIONS:  - Identify barriers to discharge w/patient and caregiver  - Arrange for needed discharge resources and transportation as appropriate  - Identify discharge learning needs (meds, wound care, etc )  - Arrange for interpretive services to assist at discharge as needed  - Refer to Case Management Department for coordinating discharge planning if the patient needs post-hospital services based on physician/advanced practitioner order or complex needs related to functional status, cognitive ability, or social support system  Outcome: Progressing     Problem: Knowledge Deficit  Goal: Patient/family/caregiver demonstrates understanding of disease process, treatment plan, medications, and discharge instructions  Description: Complete learning assessment and assess knowledge base    Interventions:  - Provide teaching at level of understanding  - Provide teaching via preferred learning methods  Outcome: Progressing

## 2023-02-27 NOTE — DISCHARGE SUMMARY
INTERNAL MEDICINE RESIDENCY DISCHARGE SUMMARY     Javier Beal   58 y o  male  MRN: [id number]  Room/Bed: [facility] 91/[facility] 919-01     [address] [place] [address]    Encounter: [id number]    Principal Problem:    Abdominal pain  Active Problems:    Cirrhosis of liver with ascites (HCC)    Thrombocytopenia (HCC)    Restless leg syndrome    Smoking    Diabetes mellitus type 2 in obese (HCC)    Anemia    Colon cancer ([address] )    GI bleed      GI bleed  Assessment & Plan  Patient complains of ongoing bright red blood per rectum with defecation for the past several months  Is not associated with pain  And only occurs with bowel movements  Patient seen in a hospital [facility] 3 weeks ago and planned to have a colonoscopy completed however he signed out AMA prior to finishing prep  Colonoscopy completed here in November 2022 without mention of hemorrhoids    Suspect this is hemorrhoidal bleeding with history of cirrhosis and increased portal hypertension  Hemoglobin stable at 8  TEO performed by GI which was negative for any signs of bleeding    Plan:  - GI consulted and recommended EGD/Colon and IR paracentesis  -Patient refusing EGD/Colon stating he would like to continue care at [facility]  -Has been HD stable and w/ stable Hgb => Per GI, okay for d/c  -Per patient, follows with hepatology and GI outpatient, however referrals for [facility] and hepatology also sent      Colon cancer [facility])  Assessment & Plan  The patient has a history of colon-cancer, for which he underwent hemicolectomy in 2002  CT abdomen pelvis on this admission without signs of lymphadenopathy  Anemia  Assessment & Plan  The patient was anemic, to 8 8, on presentation to the Emergency Department at [facility], on 2/24/2023   - Continue to monitor hemoglobin, with a daily CBC      Diabetes mellitus type 2 in obese [facility])  Assessment & Plan  Lab Results   Component Value Date HGBA1C 5 9 (H) 11/21/2022       Recent Labs     02/25/23  0303 02/25/23  0759 02/25/23  1139   POCGLU 306* 115 159*       Blood Sugar Average: Last 72 hrs:  (P) 306     - Continue sliding-scale insulin   -Evaluated blood sugars consider starting Lantus nightly    Smoking  Assessment & Plan  The patient said that he has been smoking 5-6 cigarettes per day  - Continue nicotine patch 7 mg TD qd  Restless leg syndrome  Assessment & Plan  - Continue ropinirole 4 mg PO four times daily  Thrombocytopenia ([address] )  Assessment & Plan  The patient's platelet-count was 70V, on 2/24/2023  On physical examination, a small amount of petechiae was noted on the bilateral lower extremities  This is due to his cirrhosis  - Continue to monitor the platelet-count, with a daily CBC  Cirrhosis of liver with ascites [facility])  Assessment & Plan  Patient with D compensated cirrhosis with ascites and esophageal varices  Current MELD score of 17  Most recent EGD completed in November 2022 a small esophageal varices  Patient has a unreliable historian suggesting mild hepatic encephalopathy  Paracentesis ordered to rule out SBP in the setting of ongoing abdominal pain => Patient refusing paracentesis  Will d/c on Lasix 20 mg, Aldactone 25 mg, and Lactulose 20 g daily  2 g salt diet    * Abdominal pain  Assessment & Plan  The patient presented with abdominal pain, which spread from the right side of the abdomen to the left side, and which had a severity of 9 out of 10  The patient said that the pain was "steady," was "present all of the time," and was made worse by the consumption of solid food  A CT of the abdomen and pelvis, with contrast, performed on 2/24/2023, showed "Worsening right hemiabdomen diffuse small bowel wall thickening with the small bowel feces sign in the pelvic loop suggesting an obstructive pattern secondary to underlying severe enteritis   Cirrhotic changes with collaterals, splenomegaly and moderate ascites "    Of note patient was admitted in November 2022 for similar abdominal pain where he was treated for C  difficile  There were concerns for acute cholecystitis but HIDA scan was normal   EGD and colonoscopy completed at that time he was found to have antral gastritis and biopsies were negative  Patient presented to another hospital 3 weeks ago for similar abdominal pain and concerns of bright red blood per rectum  There were plans for colonoscopy however patient left AMA prior to completing the prep  - GI consulted and recommended EGD/Colon and IR paracentesis  -IR stated that amount of fluid may not be amenable to paracentesis, but will attempt para  -Patient refusing EGD/Colon/Para stating he would like to continue care at [facility] been HD stable and w/ stable Hgb => Per GI, okay for d/c  -Will d/c on Lactulose 20 g daily, Lasix 20 mg daily, Aldactone 25 mg daily, and Bentyl 20 mg daily       [address] COURSE     59 yo M with hx of CHRISTIE cirrhosis with MELD 15 decompensated by ascites and hepatic encephalopathy, complicated by small varices in Nov 2022, prediabetes, colon ca s/p R  hemicolectomy in 2002 and chemotherapy, previous C  Diff infection  Initially presented with abdominal pain and blood stools on 2/24  Also had a possible episode of hematemesis  On presentation VSS  Hemoglobin was 8 8 (baseline 10-11), Plt 50, T bili 3 27, and INR 1 67  CT abdomen showed worsening R  hemiabdomen diffuse small bowel wall thickening with the small bowel feces sign in the pelvic loop suggesting an obstructive pattern secondary to underlying severe enteritis  Cirrhotic changes with collaterals, splenomegaly and moderate ascites  General surgery initially consulted for abdominal pain and recommended medical management  GI consulted and recommended EGD, colonoscopy and IR paracentesis given location of ascites within pelvis   GI did not have strong suspicion for active variceal bleeding  IR stated that there was a limited amount of ascitic fluids that likely would not be amenable to paracentesis, but that they would attempt  Patient refused paracentesis and EGD/Colon stating that he would like to receive care at [facility] because all of his providers, including Hepatologist were there  No records of visits to [facility] were seen in EHR  Patient states that he recently underwent EGD/Colon two weeks prior to admission  Risks of delaying EGD/colon and paracentesis were explained to patient  At the time of conversation, patient was AAOx3 and appeared competent  Patient remained hemodynamically stable w/ stable Hgb and per GI recs was okay for discharge  Patient was discharged home via lyft  On discharge, he was advised to establish care with PCP  He was also provided follow-up with [facility] GI and Hepatology as well  Lactulose 20 g daily added given prior hx of hepatic encephalopathy         DISCHARGE INFORMATION     PCP at Discharge: None; Provided Infolink to establish care with PCP    Admitting Provider: Perla Lerma MD  Admission Date: 2/24/2023    Discharge Provider: Grady Rosa MD  Discharge Date: 2/27/2023    Discharge Disposition: Home/Self Care  Discharge Condition: fair  Discharge with Lines: no    Discharge Diet: 2 g sodium restriction  Activity Restrictions: none  Test Results Pending at Discharge: None    Discharge Diagnoses:  Principal Problem:    Abdominal pain  Active Problems:    Cirrhosis of liver with ascites (HCC)    Thrombocytopenia (HCC)    Restless leg syndrome    Smoking    Diabetes mellitus type 2 in obese ([address] )    Anemia    Colon cancer ([facility] )    GI bleed  Resolved Problems:    Colitis    Pericardial effusion      Consulting Providers:      Diagnostic & Therapeutic Procedures Performed:  CT abdomen pelvis with contrast    Result Date: 2/24/2023  Impression: Worsening right hemiabdomen diffuse small bowel wall thickening with the small bowel feces sign in the pelvic loop suggesting an obstructive pattern secondary to underlying severe enteritis    Cirrhotic changes with collaterals, splenomegaly and moderate ascites  Workstation performed: [id number]       Code Status: Prior  Advance Directive & Living Will: <no information>  Power of :    POLST:      Medications:  Discharge Medication List as of 2/27/2023  1:56 PM        Discharge Medication List as of 2/27/2023  1:56 PM      START taking these medications    Details   lactulose 20 g/30 mL Take 30 mL (20 g total) by mouth daily Do not start before February 28, 2023 , Starting Tue 2/28/2023, Normal           Discharge Medication List as of 2/27/2023  1:56 PM      CONTINUE these medications which have NOT CHANGED    Details   dicyclomine (BENTYL) 20 mg tablet Take 1 tablet (20 mg total) by mouth 2 (two) times a day before breakfast and lunch, Starting Thu 12/1/2022, Until Sat 12/31/2022, Print      furosemide (LASIX) 20 mg tablet Take 1 tablet (20 mg total) by mouth daily, Starting Thu 12/1/2022, Until Sat 12/31/2022, Print      metFORMIN (GLUCOPHAGE) 500 mg tablet Take 500 mg by mouth daily with breakfast, Historical Med      pantoprazole (PROTONIX) 40 mg tablet Take 1 tablet (40 mg total) by mouth 2 (two) times a day before meals, Starting Thu 12/1/2022, Until Sat 12/31/2022, Print      rOPINIRole (REQUIP) 4 mg tablet Take 1 tablet (4 mg total) by mouth 4 (four) times a day, Starting Mon 12/5/2022, Until Wed 1/4/2023, Normal      spironolactone (ALDACTONE) 25 mg tablet Take 1 tablet (25 mg total) by mouth daily, Starting Thu 12/1/2022, Until Sat 12/31/2022, Print             Allergies:   Allergies   Allergen Reactions   • Morphine Anaphylaxis   • Clarithromycin Other (See Comments)     Dizzy, nausea, ulcers in stomach   • Oxycodone Hives and Facial Swelling     "Scratchy throat"         Visit Vitals  BP 94/54   Pulse 82   Temp 98 1 °F (36 7 °C)   Resp 16   Ht 5' 7" (1 702 m)   Wt 79 4 kg (175 lb)   SpO2 98%   BMI 27 41 kg/m²   Smoking Status Every Day   BSA 1 91 m²       Physical Exam  Constitutional:       Comments: Chronically ill-appearing    HENT:      Head: Normocephalic and atraumatic  Mouth/Throat:      Mouth: Mucous membranes are moist       Pharynx: Oropharynx is clear  Eyes:      General: No scleral icterus  Conjunctiva/sclera: Conjunctivae normal    Cardiovascular:      Rate and Rhythm: Normal rate and regular rhythm  Heart sounds: No murmur heard  No friction rub  No gallop  Pulmonary:      Effort: Pulmonary effort is normal  No respiratory distress  Breath sounds: Normal breath sounds  No wheezing or rales  Abdominal:      Comments: Abdomen distended with dullness to percussion; Diffuse abdominal tenderness w/o any guarding or rebound    Musculoskeletal:      Right lower leg: No edema  Left lower leg: No edema  Skin:     General: Skin is warm and dry  Neurological:      Mental Status: He is oriented to person, place, and time  Psychiatric:         Mood and Affect: Mood normal          Behavior: Behavior normal          FOLLOW-UP     PCP Outpatient Follow-up:  Patient advised to establish care with PCP within 1 wk of discharge    Consulting Providers Follow-up:  Gastroenterology     Active Issues Requiring Follow-up:   CHRISTIE cirrhosis; Rectal bleeding; Abdominal ascites     Discharge Statement:   I spent 45 minutes minutes discharging the patient  This time was spent on the day of discharge  I had direct contact with the patient on the day of discharge  Additional documentation is required if more than 30 minutes were spent on discharge  Portions of the record may have been created with voice recognition software  Occasional wrong word or "sound a like" substitutions may have occurred due to the inherent limitations of voice recognition software    Read the chart carefully and recognize, using context, where substitutions have occurred     ==  Selam Dejesus MD  [facility]  Internal Medicine Resident PGY-1

## 2023-02-27 NOTE — PROGRESS NOTES
[facility] - Progress Note  Flower Law 58 y o  male MRN: [id number]  [facility] Encounter: [id number]      ASSESSMENT & PLAN:    59 y/o male with decompensated cirrhosis 2/2 CHRISTIE (db ascites), colon cancer s/p R hemicolectomy presenting with rectal bleeding, abdominal distention, CT scan showing enteritis; also with paracentesis negative for SBP during admission  Rectal Bleeding, Anemia  - Hg stable   - Pt refusing endoscopic interventions given reported recent procedures and concern about cost   - Primary team planning to d/c today; stable from GI perspective at this time, recommend outpt f/u with his [facility] GI team (pt states he will schedule this)  - Protonix 40 mg BID     GI to sign off at this time, please call us back if additional questions/concerns  ______________________________________________________________________    SUBJECTIVE:     Jami Pierre   Hg stable    Scheduled Meds:  Current Facility-Administered Medications   Medication Dose Route Frequency Provider Last Rate   • dicyclomine  20 mg Oral BID before breakfast/lunch Darryl Burns MD     • furosemide  20 mg Intravenous Daily Darryl Burns MD     • HYDROmorphone  0 5 mg Intravenous Q4H PRN Hernando Carbajal MD     • HYDROmorphone  1 mg Oral Q4H PRN Rene Saavedra DO      Or   • HYDROmorphone  2 mg Oral Q4H PRN Rene Saavedra DO     • insulin lispro  1-6 Units Subcutaneous 4x Daily (AC & HS) Eduardo Curry DO     • lactulose  20 g Oral Daily Yesy Baker DO     • loratadine  5 mg Oral Daily Hernando Carbajal MD     • nicotine  7 mg Transdermal Daily Darryl Burns MD     • ondansetron  4 mg Intravenous Q6H PRN Darryl Burns MD     • pantoprazole  40 mg Intravenous BID Ora Chacko MD     • rifaximin  550 mg Oral Q12H [facility] Yesy Stinson DO     • rOPINIRole  4 mg Oral 4x Daily Darryl Burns MD     • spironolactone  50 mg Oral Daily Vincent Mujica MD       Continuous Infusions:   PRN Meds: •  HYDROmorphone  •  HYDROmorphone **OR** HYDROmorphone  •  ondansetron    OBJECTIVE:     Objective   Blood pressure 108/60, pulse 82, temperature 97 9 °F (36 6 °C), resp  rate 18, height 5' 7" (1 702 m), weight 79 4 kg (175 lb), SpO2 98 %  Body mass index is 27 41 kg/m²  Intake/Output Summary (Last 24 hours) at 2/27/2023 1038  Last data filed at 2/26/2023 1847  Gross per 24 hour   Intake 820 ml   Output --   Net 820 ml       PHYSICAL EXAM:   General Appearance: Awake and alert, in no acute distress  Abdomen: Soft, non-tender, distended; bowel sounds normal; no masses or no organomegaly    Invasive Devices     Peripheral Intravenous Line  Duration           Peripheral IV 02/25/23 Right Antecubital 1 day                LAB RESULTS:      Lab Units 02/27/23  0448 02/26/23  0721 02/25/23  0417 02/24/23  1706 11/30/22  0444   SODIUM mmol/L 135 134* 136 137 138   POTASSIUM mmol/L 3 8 4 0 3 7 3 6 4 3   CHLORIDE mmol/L 105 104 105 106 100   CO2 mmol/L 27 29 27 24 31   BUN mg/dL 11 12 12 15 13   CREATININE mg/dL 0 87 1 00 0 90 1 04 1 06   GLUCOSE RANDOM mg/dL 125 161* 161* 117 70   CALCIUM mg/dL 8 2* 8 2* 8 2* 8 8 8 3*            Lab Units 02/27/23  0448 02/26/23  0721 02/25/23  0417 02/24/23  1706 11/29/22  0540 11/23/22  0446 11/22/22  0453   TOTAL PROTEIN g/dL 6 4 6 2* 6 1* 7 0 6 0*   < > 5 7*   ALBUMIN g/dL 2 4* 2 3* 2 4* 2 7* 2 5*   < > 2 4*   TOTAL BILIRUBIN mg/dL 2 03* 2 41* 2 55* 3 27* 2 25*   < > 1 77*   BILIRUBIN DIRECT mg/dL  --   --  1 60*  --   --   --  0 91*   AST U/L 49* 47* 39 48* 45*   < > 35   ALT U/L 42 42 40 44 35   < > 39   ALK PHOS U/L 150* 139* 128* 142* 136*   < > 125*    < > = values in this interval not displayed             Lab Units 02/27/23  0448 02/26/23  0721 02/25/23  0734 02/25/23  0417 02/25/23  0126 02/24/23  1706 11/30/22  0444   WBC Thousand/uL 3 32* 3 39*  --  3 92*  --  4 80 3 85*   HEMOGLOBIN g/dL 8 5* 8 0* 8 4* 8 0* 8  6* 8 8* 10 8*   HEMATOCRIT % 26 0* 24 2* 25 5* 24 4* 26 4* 26 2* 34 3*   PLATELETS Thousands/uL 44* 45*  --  44*  --  50* 51*   MCV fL 107* 107*  --  108*  --  106* 109*       Lab Results   Component Value Date    IRON 63 (L) 02/26/2023    TIBC 201 (L) 02/26/2023    FERRITIN 75 02/26/2023       Lab Results   Component Value Date    INR 1 52 (H) 02/27/2023    INR 1 68 (H) 02/25/2023    INR 1 65 (H) 02/24/2023    PROTIME 18 5 (H) 02/27/2023    PROTIME 20 1 (H) 02/25/2023    PROTIME 19 8 (H) 02/24/2023       RADIOLOGY RESULTS:   Procedure: CT abdomen pelvis with contrast    Result Date: 2/24/2023  Narrative: CT ABDOMEN AND PELVIS WITH IV CONTRAST INDICATION:   Abdominal pain, acute, nonlocalized Diffuse abdominal pain and tenderness  COMPARISON:  Compared 11/21/2022 TECHNIQUE:  CT examination of the abdomen and pelvis was performed  Axial, sagittal, and coronal 2D reformatted images were created from the source data and submitted for interpretation  Radiation dose length product (DLP) for this visit:  382 6 mGy-cm   This examination, like all CT scans performed in the [facility], was performed utilizing techniques to minimize radiation dose exposure, including the use of iterative reconstruction and automated exposure control  IV Contrast:  90 mL of iohexol (OMNIPAQUE) Enteric Contrast:  Enteric contrast was not administered  FINDINGS: ABDOMEN LOWER CHEST:  No clinically significant abnormality identified in the visualized lower chest  LIVER/BILIARY TREE:  The liver demonstrates cirrhotic morphology  Within the limitations of this examination there is no evidence of suspicious hepatic mass  No biliary dilatation  Portal vein is patent  Collaterals in the perihepatic space extending  inferiorly  GALLBLADDER: Contracted gallbladder  No calcified gallstones  Surrounding ascites  SPLEEN:  The spleen is enlarged, measuring  15 cm is unchanged PANCREAS:  Unremarkable  ADRENAL GLANDS:  Unremarkable  KIDNEYS/URETERS:  Nonobstructive renal calculi  Large left renal cyst unchanged    No hydronephrosis  STOMACH AND BOWEL: Distended stomach with gastric contents  Varicosities surrounding the GE junction  Prior right hemicolectomy  Small bowel wall thickening in the right hemiabdomen worsened from prior study  Small bowel stool sign in the pelvic loop suggesting a partial obstructive pattern  APPENDIX:  No findings to suggest appendicitis  ABDOMINOPELVIC CAVITY: Isolated fat density lesion in the right hemiabdomen measuring 4 cm is unchanged  Moderate ascites  No pneumoperitoneum  No lymphadenopathy  VESSELS:  Abdominal aorta demonstrates patency  Patent celiac artery, superior and inferior mesenteric artery  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 measuring 1 1 cm is unchanged  PELVIS REPRODUCTIVE ORGANS:  Unremarkable for patient's age  URINARY BLADDER:  Unremarkable  ABDOMINAL WALL/INGUINAL REGIONS:  Small fat-containing right inguinal hernia  OSSEOUS STRUCTURES:  No acute fracture or destructive osseous lesion  Impression: Worsening right hemiabdomen diffuse small bowel wall thickening with the small bowel feces sign in the pelvic loop suggesting an obstructive pattern secondary to underlying severe enteritis    Cirrhotic changes with collaterals, splenomegaly and moderate ascites  Workstation performed: [id number]     Narrative/Impressions - 3 day look back     FOUZIA Foster   PGY-4 Gastroenterology Fellow  [facility]  Available on [email or web address]

## 2023-02-27 NOTE — CASE MANAGEMENT
Case Management Discharge Planning Note    Patient name Erich Akers [facility] 919/[facility] 945-46 MRN [id number]  : 1960 Date 2023       Current Admission Date: 2023  Current Admission Diagnosis:Abdominal pain   Patient Active Problem List    Diagnosis Date Noted   • GI bleed 2023   • Colon cancer ([facility] [address] )    • Paranoia ([facility] [address] ) 2022   • Abdominal pain 2022   • Anemia 2022   • Cirrhosis of liver with ascites ([facility] ) 2022   • Hypokalemia 2022   • Thrombocytopenia ([facility] ) 2022   • Restless leg syndrome 2022   • Smoking 2022   • Diabetes mellitus type 2 in obese ([facility] ) 2022      LOS (days): 2  Geometric Mean LOS (GMLOS) (days):   Days to GMLOS:     OBJECTIVE:  Risk of Unplanned Readmission Score: 20 21         Current admission status: Inpatient   [facility] Pharm[facility]   [facility] [address] [place] [address] [address] [place] [address]  Phone: [phone] Fax: [address]  Phone: [phone] Fax: [phone]    Primary Care Provider: No primary care provider on file  Primary Insurance: COMMERCIAL MISCELLANEOUS  Secondary Insurance:     DISCHARGE DETAILS:          Additional Comments: CM called [facility] to order pt a Lyft to [facility] notified nursing

## 2023-02-27 NOTE — DISCHARGE INSTR - AVS FIRST PAGE
-Please continue taking Lasix, Aldactone and Lactulose  -Please follow-up with your gastroenterology and hepatology specialists at [address] also follow-up with [facility]  -Please call number below to make an appointment with your PCP
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
## 2023-02-27 NOTE — UTILIZATION REVIEW
Initial Clinical Review    Admission: Date/Time/Statement:   Admission Orders (From admission, onward)     Ordered        02/25/23 0046  INPATIENT ADMISSION  Once                      Orders Placed This Encounter   Procedures   • INPATIENT ADMISSION     Standing Status:   Standing     Number of Occurrences:   1     Order Specific Question:   Level of Care     Answer:   Level 2 Stepdown / HOT [14]     Order Specific Question:   Estimated length of stay     Answer:   More than 2 Midnights     Order Specific Question:   Certification     Answer:   I certify that inpatient services are medically necessary for this patient for a duration of greater than two midnights  See H&P and MD Progress Notes for additional information about the patient's course of treatment  ED Arrival Information     Expected   -    Arrival   2/24/2023 16:28    Acuity   Urgent            Means of arrival   Ambulance    Escorted by   [address] Medicine    Admission type   Emergency            Arrival complaint   rectal bleed           Chief Complaint   Patient presents with   • Abdominal Pain     Was the [place] [address] bathroom where he had bright red blood in his BM  Patient also reports abdominal pain starting several days ago and states he has ascites  Initial Presentation: 58 y o  male with PMHx of CHRISTIE complicated by cirrhosis, T2 DM, anemia, h/o colon cancer s/p surgery in 2002, restless leg syndrome, presents to ED by by ems with abdominal pain and blood in stool  States pain spreads from right to left side of abdomen, rates 9/10 and eating solid food makes pain worse  Also admits to lightheadedness today  In ED VSS, b/l trace pitting LE edema  A small amt of petechiae was noted  IV pain meds given Hgb 8 8->8  6   CT a/p showed the worsening right hemiabdomen diffuse small bowel wall thickening with the small bowel feces sign in the pelvic loop suggesting an obstructive pattern secondary to underlying severe enteritis  Cirrhotic changes with collaterals, splenomegaly and moderate ascites  Admit inpatient to M/S unit with Abdominal pain, Ascites, Anemia -- Continue pantoprazole, spironolactone and ropinirole  Npo, GI consult  Continue to monitor hgb,daily CBC  Accuchecks w/ ssi  Nicotine patch  Surgery consult -- no acute surgical intervention at this juncture  Recommend serial hgb monitoring Q12h, vital signs, bm output  Recommend GI consult for co-management of decompensated cirrhosis, r/o SBP in setting of abdominal pain and recent paracentesis, psb repeat paracentesis, and for endoscopy if noted re-bleeding occurs  OK to trial liquid diet unless GI planning acute endoscopy  Would avoid NGT given clinically non-obstructed and esophageal varices present  GI consult -- CHRISTIE cirrhosis with a MELD of 17 now decompensated with ascites  Also a h/o colon cancer s/p right hemicolectomy  He also has rectal bleeding and abdominal distention  Rectal exam without any significant bleeding  CT with enteritis  EGD for hematemesis and enteritis  Colonoscopy for rectal bleeding and h/o colon cancer  Protonix  Paracentesis  Hold diuresis for now and monitor Cr  Follow labs  Continue regular screening for [address]  and liver decompensation  Date: 2/26   Day 2: Pt is now reluctant to have EGD and colonoscopy performed inpatient as he states that he had these procedures done recently and does not want to pay out-of-pocket for them  Given overall stability of hgb and lack of overt bleeding since yesterday can defer EGD and colonoscopy for now  Currently unable to get any records of results from [facility]  Continue Protonix BID for now  Advance to low fiber/low residue diet  IR consult for possible paracentesis  Will require antibiotic for ppx once paracentesis is completed   Start ceftriaxone 1 g q 24 hrs, changed to ciprofloxacin for total of 5 days on d/c  Encourage OOB/ambulate  Pain control  SCDs         ED Triage Vitals [02/24/23 4543]   Temperature Pulse Respirations Blood Pressure SpO2   97 6 °F (36 4 °C) 81 17 131/63 100 %      Temp Source Heart Rate Source Patient Position - Orthostatic VS BP Location FiO2 (%)   Oral Monitor Lying Right arm --      Pain Score       10 - Worst Possible Pain          Wt Readings from Last 1 Encounters:   02/25/23 79 4 kg (175 lb)     Additional Vital Signs:   Date/Time Temp Pulse Resp BP MAP (mmHg) SpO2 O2 Device Patient Position - Orthostatic VS   02/26/23 22:54:48 96 6 °F (35 9 °C) Abnormal  75 18 98/50 66 96 % -- --   02/26/23 14:41:30 97 9 °F (36 6 °C) 65 16 100/48 Abnormal  65 97 % None (Room air) --   02/26/23 09:31:18 -- 75 -- 100/48 Abnormal  65 95 % -- --   02/26/23 0930 -- -- -- -- -- -- None (Room air) --   02/26/23 07:17:16 98 1 °F (36 7 °C) 71 17 102/48 Abnormal  66 95 % None (Room air) --   02/25/23 22:32:32 97 5 °F (36 4 °C) 59 18 92/51 65 97 % -- --   02/25/23 2014 -- -- -- -- -- 100 % None (Room air) --   02/25/23 1500 -- 88 -- -- -- -- None (Room air) --   02/25/23 14:54:20 97 5 °F (36 4 °C) -- 18 96/56 69 100 % None (Room air) Lying   02/25/23 14:54:02 97 5 °F (36 4 °C) -- -- 96/56 69 -- -- --   02/25/23 1205 -- 72 18 115/57 78 100 % None (Room air) --   02/25/23 1000 -- 82 16 108/59 78 100 % None (Room air) --   02/25/23 0909 -- 76 16 106/55 79 99 % -- --   02/25/23 0830 -- 66 17 100/50 71 96 % None (Room air) --   02/25/23 0745 -- 84 16 102/62 75 98 % None (Room air) --   02/25/23 0630 -- 82 18 117/57 77 98 % None (Room air) Lying   02/25/23 0430 -- 68 18 97/51 68 97 % None (Room air) Lying   02/25/23 0200 -- 62 18 110/56 80 100 % None (Room air) Lying   02/25/23 0000 -- 68 18 99/54 73 99 % None (Room air) Lying       Pertinent Labs/Diagnostic Test Results:   CT abdomen pelvis with contrast   Final Result by Eben Higgins MD (02/24 1937)      Worsening right hemiabdomen diffuse small bowel wall thickening with the small bowel feces sign in the pelvic loop suggesting an obstructive pattern secondary to underlying severe enteritis         Cirrhotic changes with collaterals, splenomegaly and moderate ascites                 Results from last 7 days   Lab Units 02/26/23  0721 02/25/23  0734 02/25/23  0417 02/25/23  0126   WBC Thousand/uL 3 39*  --  3 92*  --    HEMOGLOBIN g/dL 8 0* 8 4* 8 0* 8 6*   HEMATOCRIT % 24 2* 25 5* 24 4* 26 4*   PLATELETS Thousands/uL 45*  --  44*  --    NEUTROS ABS Thousands/µL 2 13  --  2 41  --        Results from last 7 days   Lab Units 02/26/23  0721 02/25/23  0417 02/24/23  1706   SODIUM mmol/L 134* 136 137   POTASSIUM mmol/L 4 0 3 7 3 6   CHLORIDE mmol/L 104 105 106   CO2 mmol/L 29 27 24   ANION GAP mmol/L 1* 4 7   BUN mg/dL 12 12 15   CREATININE mg/dL 1 00 0 90 1 04   EGFR ml/min/1 73sq m 80 91 76   CALCIUM mg/dL 8 2* 8 2* 8 8     Results from last 7 days   Lab Units 02/26/23  0721 02/25/23  0417 02/24/23  1802 02/24/23  1706   AST U/L 47* 39  --  48*   ALT U/L 42 40  --  44   ALK PHOS U/L 139* 128*  --  142*   TOTAL PROTEIN g/dL 6 2* 6 1*  --  7 0   ALBUMIN g/dL 2 3* 2 4*  --  2 7*   TOTAL BILIRUBIN mg/dL 2 41* 2 55*  --  3 27*   BILIRUBIN DIRECT mg/dL  --  1 60*  --   --    AMMONIA umol/L  --   --  44*  --      Results from last 7 days   Lab Units 02/26/23  2106 02/26/23  1619 02/26/23  1126 02/26/23  0743 02/25/23  2221 02/25/23  1607 02/25/23  1139 02/25/23  0759 02/25/23  0303   POC GLUCOSE mg/dl 143* 160* 178* 148* 154* 294* 159* 115 306*     Results from last 7 days   Lab Units 02/26/23  0721 02/25/23  0417 02/24/23  1706   GLUCOSE RANDOM mg/dL 161* 161* 117     Results from last 7 days   Lab Units 02/25/23  0417   D-DIMER QUANTITATIVE ug/ml FEU 6 14*     Results from last 7 days   Lab Units 02/25/23  0417 02/24/23  1706   PROTIME seconds 20 1* 19 8*   INR  1 68* 1 65*   PTT seconds 39* 35     Results from last 7 days   Lab Units 02/26/23  0721   TSH 3RD GENERATON uIU/mL 2 430     Results from last 7 days   Lab Units 02/24/23  2313   LACTIC ACID mmol/L 1 9     Results from last 7 days   Lab Units 02/26/23  0721   FERRITIN ng/mL 75     Results from last 7 days   Lab Units 02/24/23  2114   CLARITY UA  Clear   COLOR UA  Viky   SPEC GRAV UA  1 020   PH UA  5 5   GLUCOSE UA mg/dl Negative   KETONES UA mg/dl Negative   BLOOD UA  Large*   PROTEIN UA mg/dl Negative   NITRITE UA  Negative   BILIRUBIN UA  Negative   UROBILINOGEN UA E U /dl 1 0   LEUKOCYTES UA  Negative   WBC UA /hpf 1-2   RBC UA /hpf Innumerable*   BACTERIA UA /hpf None Seen   EPITHELIAL CELLS WET PREP /hpf Occasional     Results from last 7 days   Lab Units 02/24/23  1706   TOTAL COUNTED  100       ED Treatment:   Medication Administration from 02/24/2023 1628 to 02/25/2023 1430       Date/Time Order Dose Route Action     02/24/2023 1707 EST HYDROmorphone (DILAUDID) injection 0 5 mg 0 5 mg Intravenous Given     02/24/2023 1801 EST HYDROmorphone (DILAUDID) injection 0 5 mg 0 5 mg Intravenous Given     02/24/2023 1943 EST HYDROmorphone (DILAUDID) injection 0 5 mg 0 5 mg Intravenous Given     02/25/2023 0418 EST HYDROmorphone (DILAUDID) injection 0 5 mg 0 5 mg Intravenous Given     02/25/2023 0041 EST HYDROmorphone (DILAUDID) injection 0 5 mg 0 5 mg Intravenous Given     02/25/2023 1201 EST rOPINIRole (REQUIP) tablet 4 mg 4 mg Oral Given     02/25/2023 0418 EST rOPINIRole (REQUIP) tablet 4 mg 4 mg Oral Given     02/25/2023 1200 EST dicyclomine (BENTYL) tablet 20 mg 20 mg Oral Given     02/25/2023 0631 EST dicyclomine (BENTYL) tablet 20 mg 20 mg Oral Given     02/25/2023 0126 EST nicotine (NICODERM CQ) 7 mg/24hr TD 24 hr patch 7 mg 7 mg Transdermal Medication Applied     02/25/2023 0418 EST pantoprazole (PROTONIX) injection 40 mg 40 mg Intravenous Given     02/25/2023 1159 EST insulin lispro (HumaLOG) 100 units/mL subcutaneous injection 1-6 Units 1 Units Subcutaneous Given     02/25/2023 0419 EST insulin lispro (HumaLOG) 100 units/mL subcutaneous injection 1-6 Units 4 Units Subcutaneous Given 02/25/2023 3799 EST diphenhydrAMINE (BENADRYL) tablet 12 5 mg 12 5 mg Oral Given     02/25/2023 1200 EST lactulose oral solution 20 g 20 g Oral Given     02/25/2023 1200 EST rifaximin (XIFAXAN) tablet 550 mg 550 mg Oral Given     Past Medical History:   Diagnosis Date   • Cirrhosis ([facility] )     CHRISTIE   • Colon cancer ([facility] )    • Diabetes mellitus ([facility] )    • Neuropathy    • Restless leg syndrome      Present on Admission:  • Thrombocytopenia (HCC)  • Colon cancer (HCC)  • Anemia  • Abdominal pain  • Cirrhosis of liver with ascites (HCC)  • (Resolved) Colitis  • Diabetes mellitus type 2 in obese (HCC)  • Restless leg syndrome  • Smoking  • (Resolved) Pericardial effusion  • GI bleed      Admitting Diagnosis: Blood in stool [K92 1]  Abdominal pain [R10 9]  Anemia [D64 9]  GI bleed [K92 2]  Ascites [R18 8]  Cirrhosis of liver with ascites, unspecified hepatic cirrhosis type (HCC) [K74 60, R18 8]  Age/Sex: 58 y o  male  Admission Orders:  Scheduled Medications:  dicyclomine, 20 mg, Oral, BID before breakfast/lunch  furosemide, 20 mg, Intravenous, Daily  insulin lispro, 1-6 Units, Subcutaneous, 4x Daily (AC & HS)  lactulose, 20 g, Oral, Daily  loratadine, 5 mg, Oral, Daily  nicotine, 7 mg, Transdermal, Daily  pantoprazole, 40 mg, Intravenous, BID  rifaximin, 550 mg, Oral, Q12H [address]  rOPINIRole, 4 mg, Oral, 4x Daily  spironolactone, 50 mg, Oral, Daily    PRN Meds:  HYDROmorphone, 0 5 mg, Intravenous, Q4H PRN 2/25 x4, 2/26 x3  HYDROmorphone, 1 mg, Oral, Q4H PRN   Or  HYDROmorphone, 2 mg, Oral, Q4H PRN 2/26 x2  ondansetron, 4 mg, Intravenous, Q6H PRN        Network Utilization Review Department  ATTENTION: Please call with any questions or concerns to [phone] and carefully listen to the prompts so that you are directed to the right person   All voicemails are confidential   Ricci Mane all requests for admission clinical reviews, approved or denied determinations and any other requests to dedicated fax number below belonging to the [facility] where the patient is receiving treatment   List of dedicated fax numbers for the Facilities:  [address] [place] [address] DENIALS (Administrative/Medical Necessity) [phone]   [address]  (Maternity/NICU/Pediatrics) [phone]   [address] Joanna Singleton [phone]   [address] [phone]   [address] [place] [address] [facility] [phone]   [facility] 134 815 [facility] [phone]

## 2023-02-28 NOTE — UTILIZATION REVIEW
NOTIFICATION OF ADMISSION DISCHARGE   This is a Notification of Discharge from 600 [facility]  Please be advised that this patient has been discharge from our facility  Below you will find the admission and discharge date and time including the patient’s disposition  UTILIZATION REVIEW CONTACT:  Kassie Davis  Utilization   Network Utilization Review Department  Phone: [phone] x carefully listen to the prompts  All voicemails are confidential   Email: [email or web address]     ADMISSION INFORMATION  PRESENTATION DATE: 2/24/2023  4:29 PM  OBERVATION ADMISSION DATE:  INPATIENT ADMISSION DATE: 2/25/23 12:15 AM   DISCHARGE DATE: 2/27/2023  3:58 PM   DISPOSITION:Home/Self Care    IMPORTANT INFORMATION:  Send all requests for admission clinical reviews, approved or denied determinations and any other requests to dedicated fax number below belonging to the campus where the patient is receiving treatment   List of dedicated fax numbers:  [address] DENIALS (Administrative/Medical Necessity) [phone]   [address] (Maternity/NICU/Pediatrics) [phone]   [facility] [phone]   [facility] 87 [phone]   [address] [phone]   220 [facility] [phone] 90 [facility] [phone]   [address] [place] [address] 119 [phone]   [facility]  [phone]   4053 [facility] [phone]   412 [facility] 850 [address] [facility] [phone]

## 2023-03-31 ENCOUNTER — TELEPHONE (OUTPATIENT)
Dept: GASTROENTEROLOGY | Facility: CLINIC | Age: 63
End: 2023-03-31

## 2023-05-02 ENCOUNTER — HOSPITAL ENCOUNTER (INPATIENT)
Facility: HOSPITAL | Age: 63
LOS: 2 days | Discharge: HOME | End: 2023-05-04
Attending: EMERGENCY MEDICINE | Admitting: FAMILY MEDICINE

## 2023-05-02 ENCOUNTER — APPOINTMENT (EMERGENCY)
Dept: CT IMAGING | Facility: HOSPITAL | Age: 63
End: 2023-05-02

## 2023-05-02 DIAGNOSIS — M54.9 OTHER ACUTE BACK PAIN: ICD-10-CM

## 2023-05-02 DIAGNOSIS — K76.7 HEPATORENAL SYNDROME (HCC): ICD-10-CM

## 2023-05-02 DIAGNOSIS — Z59.00 HOMELESS: ICD-10-CM

## 2023-05-02 DIAGNOSIS — R10.9 ABDOMINAL PAIN, UNSPECIFIED ABDOMINAL LOCATION: ICD-10-CM

## 2023-05-02 DIAGNOSIS — Z87.19 HX OF CIRRHOSIS: ICD-10-CM

## 2023-05-02 DIAGNOSIS — N17.9 AKI (ACUTE KIDNEY INJURY) (HCC): Primary | ICD-10-CM

## 2023-05-02 DIAGNOSIS — K92.2 GASTROINTESTINAL HEMORRHAGE, UNSPECIFIED GASTROINTESTINAL HEMORRHAGE TYPE: ICD-10-CM

## 2023-05-02 DIAGNOSIS — M79.89 LEG SWELLING: ICD-10-CM

## 2023-05-02 PROBLEM — D61.818 PANCYTOPENIA (HCC): Status: ACTIVE | Noted: 2022-11-23

## 2023-05-02 LAB
ALBUMIN SERPL BCP-MCNC: 3.1 G/DL (ref 3.5–5)
ALP SERPL-CCNC: 113 U/L (ref 34–104)
ALT SERPL W P-5'-P-CCNC: 33 U/L (ref 7–52)
AMMONIA PLAS-SCNC: 46 UMOL/L (ref 18–72)
ANION GAP SERPL CALCULATED.3IONS-SCNC: 6 MMOL/L (ref 4–13)
AST SERPL W P-5'-P-CCNC: 41 U/L (ref 13–39)
BASOPHILS # BLD AUTO: 0.03 THOUSANDS/ÂΜL (ref 0–0.1)
BASOPHILS NFR BLD AUTO: 1 % (ref 0–1)
BILIRUB SERPL-MCNC: 1.91 MG/DL (ref 0.2–1)
BILIRUB UR QL STRIP: NEGATIVE
BUN SERPL-MCNC: 16 MG/DL (ref 5–25)
CALCIUM ALBUM COR SERPL-MCNC: 9.6 MG/DL (ref 8.3–10.1)
CALCIUM SERPL-MCNC: 8.9 MG/DL (ref 8.4–10.2)
CHLORIDE SERPL-SCNC: 101 MMOL/L (ref 96–108)
CLARITY UR: CLEAR
CO2 SERPL-SCNC: 29 MMOL/L (ref 21–32)
COLOR UR: YELLOW
CREAT SERPL-MCNC: 1.71 MG/DL (ref 0.6–1.3)
CREAT UR-MCNC: 33.7 MG/DL
EOSINOPHIL # BLD AUTO: 0.27 THOUSAND/ÂΜL (ref 0–0.61)
EOSINOPHIL NFR BLD AUTO: 6 % (ref 0–6)
ERYTHROCYTE [DISTWIDTH] IN BLOOD BY AUTOMATED COUNT: 17.6 % (ref 11.6–15.1)
GFR SERPL CREATININE-BSD FRML MDRD: 41 ML/MIN/1.73SQ M
GLUCOSE SERPL-MCNC: 174 MG/DL (ref 65–140)
GLUCOSE UR STRIP-MCNC: NEGATIVE MG/DL
HCT VFR BLD AUTO: 30.4 % (ref 36.5–49.3)
HGB BLD-MCNC: 9.8 G/DL (ref 12–17)
HGB UR QL STRIP.AUTO: NEGATIVE
IMM GRANULOCYTES # BLD AUTO: 0.02 THOUSAND/UL (ref 0–0.2)
IMM GRANULOCYTES NFR BLD AUTO: 0 % (ref 0–2)
INR PPP: 1.5 (ref 0.84–1.19)
KETONES UR STRIP-MCNC: NEGATIVE MG/DL
LEUKOCYTE ESTERASE UR QL STRIP: NEGATIVE
LIPASE SERPL-CCNC: 48 U/L (ref 11–82)
LYMPHOCYTES # BLD AUTO: 0.77 THOUSANDS/ÂΜL (ref 0.6–4.47)
LYMPHOCYTES NFR BLD AUTO: 17 % (ref 14–44)
MCH RBC QN AUTO: 32.9 PG (ref 26.8–34.3)
MCHC RBC AUTO-ENTMCNC: 32.2 G/DL (ref 31.4–37.4)
MCV RBC AUTO: 102 FL (ref 82–98)
MONOCYTES # BLD AUTO: 0.49 THOUSAND/ÂΜL (ref 0.17–1.22)
MONOCYTES NFR BLD AUTO: 11 % (ref 4–12)
NEUTROPHILS # BLD AUTO: 2.88 THOUSANDS/ÂΜL (ref 1.85–7.62)
NEUTS SEG NFR BLD AUTO: 65 % (ref 43–75)
NITRITE UR QL STRIP: NEGATIVE
NRBC BLD AUTO-RTO: 0 /100 WBCS
PH UR STRIP.AUTO: 6 [PH]
PLATELET # BLD AUTO: 67 THOUSANDS/UL (ref 149–390)
PMV BLD AUTO: 11.7 FL (ref 8.9–12.7)
POTASSIUM SERPL-SCNC: 4.1 MMOL/L (ref 3.5–5.3)
PROT SERPL-MCNC: 7.1 G/DL (ref 6.4–8.4)
PROT UR STRIP-MCNC: NEGATIVE MG/DL
PROTHROMBIN TIME: 18.2 SECONDS (ref 11.6–14.5)
RBC # BLD AUTO: 2.98 MILLION/UL (ref 3.88–5.62)
SODIUM 24H UR-SCNC: 141 MOL/L
SODIUM SERPL-SCNC: 136 MMOL/L (ref 135–147)
SP GR UR STRIP.AUTO: 1.01 (ref 1–1.03)
UROBILINOGEN UR QL STRIP.AUTO: 0.2 E.U./DL
WBC # BLD AUTO: 4.46 THOUSAND/UL (ref 4.31–10.16)

## 2023-05-02 RX ORDER — LACTULOSE 20 G/30ML
20 SOLUTION ORAL DAILY
Status: DISCONTINUED | OUTPATIENT
Start: 2023-05-03 | End: 2023-05-04 | Stop reason: HOSPADM

## 2023-05-02 RX ORDER — DICYCLOMINE HCL 20 MG
20 TABLET ORAL
Status: DISCONTINUED | OUTPATIENT
Start: 2023-05-03 | End: 2023-05-04 | Stop reason: HOSPADM

## 2023-05-02 RX ORDER — MIDODRINE HYDROCHLORIDE 5 MG/1
2.5 TABLET ORAL
Status: DISCONTINUED | OUTPATIENT
Start: 2023-05-02 | End: 2023-05-04

## 2023-05-02 RX ORDER — ACETAMINOPHEN 325 MG/1
650 TABLET ORAL EVERY 6 HOURS PRN
Status: DISCONTINUED | OUTPATIENT
Start: 2023-05-02 | End: 2023-05-03

## 2023-05-02 RX ORDER — HYDROXYZINE HYDROCHLORIDE 25 MG/1
25 TABLET, FILM COATED ORAL EVERY 6 HOURS PRN
Status: DISCONTINUED | OUTPATIENT
Start: 2023-05-02 | End: 2023-05-04 | Stop reason: HOSPADM

## 2023-05-02 RX ORDER — PROPRANOLOL HCL 60 MG
60 CAPSULE, EXTENDED RELEASE 24HR ORAL DAILY
Status: DISCONTINUED | OUTPATIENT
Start: 2023-05-03 | End: 2023-05-02

## 2023-05-02 RX ORDER — CALCIUM CARBONATE 200(500)MG
1000 TABLET,CHEWABLE ORAL DAILY PRN
Status: DISCONTINUED | OUTPATIENT
Start: 2023-05-02 | End: 2023-05-04 | Stop reason: HOSPADM

## 2023-05-02 RX ORDER — ROPINIROLE 1 MG/1
4 TABLET, FILM COATED ORAL 2 TIMES DAILY
Status: DISCONTINUED | OUTPATIENT
Start: 2023-05-02 | End: 2023-05-02

## 2023-05-02 RX ORDER — PROPRANOLOL HYDROCHLORIDE 20 MG/1
20 TABLET ORAL EVERY 12 HOURS SCHEDULED
Status: DISCONTINUED | OUTPATIENT
Start: 2023-05-02 | End: 2023-05-04

## 2023-05-02 RX ORDER — METOCLOPRAMIDE 10 MG/1
10 TABLET ORAL
Status: DISCONTINUED | OUTPATIENT
Start: 2023-05-02 | End: 2023-05-04 | Stop reason: HOSPADM

## 2023-05-02 RX ORDER — PANTOPRAZOLE SODIUM 40 MG/1
40 TABLET, DELAYED RELEASE ORAL
Status: DISCONTINUED | OUTPATIENT
Start: 2023-05-03 | End: 2023-05-04 | Stop reason: HOSPADM

## 2023-05-02 RX ORDER — FUROSEMIDE 10 MG/ML
20 INJECTION INTRAMUSCULAR; INTRAVENOUS
Status: DISCONTINUED | OUTPATIENT
Start: 2023-05-03 | End: 2023-05-04

## 2023-05-02 RX ORDER — MIDODRINE HYDROCHLORIDE 5 MG/1
2.5 TABLET ORAL 3 TIMES DAILY PRN
Status: DISCONTINUED | OUTPATIENT
Start: 2023-05-02 | End: 2023-05-02

## 2023-05-02 RX ORDER — GABAPENTIN 100 MG/1
100 CAPSULE ORAL 3 TIMES DAILY
Status: DISCONTINUED | OUTPATIENT
Start: 2023-05-02 | End: 2023-05-04 | Stop reason: HOSPADM

## 2023-05-02 RX ORDER — TAMSULOSIN HYDROCHLORIDE 0.4 MG/1
0.4 CAPSULE ORAL
Status: DISCONTINUED | OUTPATIENT
Start: 2023-05-02 | End: 2023-05-04 | Stop reason: HOSPADM

## 2023-05-02 RX ORDER — ROPINIROLE 1 MG/1
4 TABLET, FILM COATED ORAL 2 TIMES DAILY
Status: DISCONTINUED | OUTPATIENT
Start: 2023-05-02 | End: 2023-05-03

## 2023-05-02 RX ORDER — NICOTINE 21 MG/24HR
1 PATCH, TRANSDERMAL 24 HOURS TRANSDERMAL DAILY
Status: DISCONTINUED | OUTPATIENT
Start: 2023-05-03 | End: 2023-05-04 | Stop reason: HOSPADM

## 2023-05-02 RX ORDER — METHOCARBAMOL 750 MG/1
750 TABLET, FILM COATED ORAL EVERY 6 HOURS PRN
Status: DISCONTINUED | OUTPATIENT
Start: 2023-05-02 | End: 2023-05-04 | Stop reason: HOSPADM

## 2023-05-02 RX ORDER — FENTANYL CITRATE 50 UG/ML
50 INJECTION, SOLUTION INTRAMUSCULAR; INTRAVENOUS ONCE
Status: COMPLETED | OUTPATIENT
Start: 2023-05-02 | End: 2023-05-02

## 2023-05-02 RX ORDER — FUROSEMIDE 10 MG/ML
20 INJECTION INTRAMUSCULAR; INTRAVENOUS
Status: DISCONTINUED | OUTPATIENT
Start: 2023-05-03 | End: 2023-05-02

## 2023-05-02 RX ORDER — FENTANYL CITRATE 50 UG/ML
25 INJECTION, SOLUTION INTRAMUSCULAR; INTRAVENOUS ONCE
Status: COMPLETED | OUTPATIENT
Start: 2023-05-02 | End: 2023-05-02

## 2023-05-02 RX ADMIN — MIDODRINE HYDROCHLORIDE 2.5 MG: 5 TABLET ORAL at 16:54

## 2023-05-02 RX ADMIN — TAMSULOSIN HYDROCHLORIDE 0.4 MG: 0.4 CAPSULE ORAL at 16:54

## 2023-05-02 RX ADMIN — GABAPENTIN 100 MG: 100 CAPSULE ORAL at 16:54

## 2023-05-02 RX ADMIN — METOCLOPRAMIDE 10 MG: 10 TABLET ORAL at 16:54

## 2023-05-02 RX ADMIN — DICLOFENAC SODIUM 2 G: 10 GEL TOPICAL at 21:20

## 2023-05-02 RX ADMIN — FENTANYL CITRATE 50 MCG: 0.05 INJECTION, SOLUTION INTRAMUSCULAR; INTRAVENOUS at 13:46

## 2023-05-02 RX ADMIN — METHOCARBAMOL TABLETS 750 MG: 750 TABLET, COATED ORAL at 16:54

## 2023-05-02 RX ADMIN — ROPINIROLE 4 MG: 1 TABLET, FILM COATED ORAL at 18:17

## 2023-05-02 RX ADMIN — GABAPENTIN 100 MG: 100 CAPSULE ORAL at 21:22

## 2023-05-02 RX ADMIN — FENTANYL CITRATE 25 MCG: 50 INJECTION INTRAMUSCULAR; INTRAVENOUS at 12:05

## 2023-05-02 RX ADMIN — SODIUM CHLORIDE 1000 ML: 0.9 INJECTION, SOLUTION INTRAVENOUS at 12:06

## 2023-05-02 SDOH — ECONOMIC STABILITY - HOUSING INSECURITY: HOMELESSNESS UNSPECIFIED: Z59.00

## 2023-05-02 NOTE — ASSESSMENT & PLAN NOTE
Patient with chronic pancytopenia likely secondary to liver disease  Seems to be improved from his most recent labs  Continue to monitor  Lab Results   Component Value Date    RBC 2 98 (L) 05/02/2023    RBC 2 42 (L) 02/27/2023    WBC 4 46 05/02/2023    WBC 3 32 (L) 02/27/2023    PLT 67 (L) 05/02/2023    PLT 44 (LL) 02/27/2023

## 2023-05-02 NOTE — ASSESSMENT & PLAN NOTE
Lab Results   Component Value Date    HGBA1C 5 5 02/10/2023       No results for input(s): POCGLU in the last 72 hours      Blood Sugar Average: Last 72 hrs:  Patient previously diagnosed and started on metformin during recent hospitalization  Hold metformin for now  Correction scale -ACHS
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
## 2023-05-02 NOTE — ASSESSMENT & PLAN NOTE
· Presents with left flank/back pain  · States he is a history of kidney stones that he passed to last week  · CT renal stone study without any obstructing stone, punctate stone seen left-sided intrarenal calculi and 3 mm right-sided intrarenal calculus  · Continue pain control  · Flomax  · Monitor for improvement with supportive care

## 2023-05-02 NOTE — ED PROVIDER NOTES
History  Chief Complaint   Patient presents with    Flank Pain     Pt presented to this ED via EMS c/o left flank pain radiating to abdomen and unable to urinate since yesterday morning  Pt seen at other hospital, given morphine, and d/c'd home  Pt continues with worsening pain today  Hx kidney stones  HPI   62M w hx of kidney stones, cirrhosis, colon cancer s/p hemicolectomy (2017), presenting with left flank pain  Patient says for the past 2 days, he has been having left flank pain  It radiates into LLQ  States yesterday, he was at [address] [place] [address] and waited for 7 hours  States he received morphine and was discharged home  States he did not get labwork or imaging while there  Endorses history of kidney stones and says pain feels similar to when he had stones  Also endorses increased leg swelling  Prior to Admission Medications   Prescriptions Last Dose Informant Patient Reported? Taking?   dicyclomine (BENTYL) 20 mg tablet   No No   Sig: Take 1 tablet (20 mg total) by mouth 2 (two) times a day before breakfast and lunch   furosemide (LASIX) 20 mg tablet   No No   Sig: Take 1 tablet (20 mg total) by mouth daily   lactulose 20 g/30 mL Unknown  No No   Sig: Take 30 mL (20 g total) by mouth daily Do not start before February 28, 2023     metFORMIN (GLUCOPHAGE) 500 mg tablet Unknown  Yes No   Sig: Take 500 mg by mouth daily with breakfast   pantoprazole (PROTONIX) 40 mg tablet   No No   Sig: Take 1 tablet (40 mg total) by mouth 2 (two) times a day before meals   rOPINIRole (REQUIP) 4 mg tablet   No No   Sig: Take 1 tablet (4 mg total) by mouth 4 (four) times a day   spironolactone (ALDACTONE) 25 mg tablet   No No   Sig: Take 1 tablet (25 mg total) by mouth daily      Facility-Administered Medications: None       Past Medical History:   Diagnosis Date    [facility]osis [facility]     CHRISTIE    Colon cancer ([facility] )     Diabetes mellitus ([facility] )     Neuropathy     Restless leg syndrome        Past Surgical History:   Procedure Laterality Date    EGD AND SIGMOIDOSCOPY FLEXIBLE      IR PARACENTESIS  11/22/2022    LEFT COLON RESECTION      LITHOTRIPSY         Family History   Problem Relation Age of Onset    Diabetes Mother     Diabetes Father      I have reviewed and agree with the history as documented  E-Cigarette/Vaping    E-Cigarette Use Never User      E-Cigarette/Vaping Substances    Nicotine No     THC No     CBD No     Flavoring No     Other No     Unknown No      Social History     Tobacco Use    Smoking status: Every Day     Packs/day: 0 25     Types: Cigarettes    Smokeless tobacco: Never   Vaping Use    Vaping Use: Never used   Substance Use Topics    Alcohol use: Not Currently    Drug use: Never       Review of Systems   Constitutional: Negative for chills and fever  HENT: Negative for ear pain and sore throat  Eyes: Negative for pain and visual disturbance  Respiratory: Negative for cough and shortness of breath  Cardiovascular: Positive for leg swelling  Negative for chest pain and palpitations  Gastrointestinal: Positive for abdominal pain  Negative for vomiting  Genitourinary: Positive for flank pain  Negative for dysuria and hematuria  Musculoskeletal: Negative for arthralgias and back pain  Skin: Negative for color change and rash  Neurological: Negative for seizures and syncope  All other systems reviewed and are negative  Physical Exam  Physical Exam  Vitals and nursing note reviewed  Constitutional:       Appearance: He is well-developed  He is ill-appearing  Comments: Chronically ill-appearing,  cachectic   HENT:      Head: Normocephalic and atraumatic  Right Ear: External ear normal       Left Ear: External ear normal       Nose: Nose normal    Eyes:      Conjunctiva/sclera: Conjunctivae normal    Cardiovascular:      Rate and Rhythm: Normal rate and regular rhythm        Comments: Port over left upper chest wall  Pulmonary:      Effort: Pulmonary effort is normal  No respiratory distress  Breath sounds: Normal breath sounds  Abdominal:      Palpations: Abdomen is soft  Tenderness: There is abdominal tenderness in the left lower quadrant  There is left CVA tenderness  Musculoskeletal:      Cervical back: Normal range of motion and neck supple  Right lower leg: Edema present  Left lower leg: Edema present  Skin:     General: Skin is warm and dry  Neurological:      General: No focal deficit present  Mental Status: He is alert  Mental status is at baseline           Vital Signs  ED Triage Vitals   Temperature Pulse Respirations Blood Pressure SpO2   05/02/23 1124 05/02/23 1124 05/02/23 1124 05/02/23 1124 05/02/23 1121   (!) 96 8 °F (36 °C) 67 18 119/60 100 %      Temp Source Heart Rate Source Patient Position - Orthostatic VS BP Location FiO2 (%)   05/02/23 1124 05/02/23 1124 05/02/23 1124 05/02/23 1124 --   Temporal Monitor Lying Left arm       Pain Score       05/02/23 1124       10 - Worst Possible Pain           Vitals:    05/02/23 1300 05/02/23 1610 05/02/23 1820 05/02/23 2117   BP: 113/62 (!) 98/49 110/62 106/69   Pulse: 72 64 57 68   Patient Position - Orthostatic VS:             Visual Acuity      ED Medications  Medications   pantoprazole (PROTONIX) EC tablet 40 mg (has no administration in time range)   lactulose oral solution 20 g (has no administration in time range)   dicyclomine (BENTYL) tablet 20 mg (has no administration in time range)   gabapentin (NEURONTIN) capsule 100 mg (100 mg Oral Given 5/2/23 2122)   hydrOXYzine HCL (ATARAX) tablet 25 mg (has no administration in time range)   methocarbamol (ROBAXIN) tablet 750 mg (750 mg Oral Given 5/2/23 1654)   tamsulosin (FLOMAX) capsule 0 4 mg (0 4 mg Oral Given 5/2/23 1654)   calcium carbonate (TUMS) chewable tablet 1,000 mg (has no administration in time range)   nicotine (NICODERM CQ) 14 mg/24hr TD 24 hr patch 1 patch (has no administration in time range)   acetaminophen (TYLENOL) tablet 650 mg (has no administration in time range)   Diclofenac Sodium (VOLTAREN) 1 % topical gel 2 g (2 g Topical Given 5/2/23 2120)   metoclopramide (REGLAN) tablet 10 mg (10 mg Oral Given 5/2/23 1654)   furosemide (LASIX) injection 20 mg (has no administration in time range)   propranolol (INDERAL) tablet 20 mg (20 mg Oral Not Given 5/2/23 2117)   midodrine (PROAMATINE) tablet 2 5 mg (2 5 mg Oral Given 5/2/23 1654)   rOPINIRole (REQUIP) tablet 4 mg (4 mg Oral Given 5/2/23 1817)   sodium chloride 0 9 % bolus 1,000 mL (0 mL Intravenous Stopped 5/2/23 1438)   fentanyl citrate (PF) 100 MCG/2ML 25 mcg (25 mcg Intravenous Given 5/2/23 1205)   fentanyl citrate (PF) 100 MCG/2ML 50 mcg (50 mcg Intravenous Given 5/2/23 1346)       Diagnostic Studies  Results Reviewed     Procedure Component Value Units Date/Time    Ammonia [[id number]]  (Normal) Collected: 05/02/23 2008    Lab Status: Final result Specimen: Blood from Arm, Left Updated: 05/02/23 2035     Ammonia 46 umol/L     CMP [[id number]]  (Abnormal) Collected: 05/02/23 1207    Lab Status: Final result Specimen: Blood Updated: 05/02/23 1227     Sodium 136 mmol/L      Potassium 4 1 mmol/L      Chloride 101 mmol/L      CO2 29 mmol/L      ANION GAP 6 mmol/L      BUN 16 mg/dL      Creatinine 1 71 mg/dL      Glucose 174 mg/dL      Calcium 8 9 mg/dL      Corrected Calcium 9 6 mg/dL      AST 41 U/L      ALT 33 U/L      Alkaline Phosphatase 113 U/L      Total Protein 7 1 g/dL      Albumin 3 1 g/dL      Total Bilirubin 1 91 mg/dL      eGFR 41 ml/min/1 73sq m     Narrative:      [place] guidelines for Chronic Kidney Disease (CKD):     Stage 1 with normal or high GFR (GFR > 90 mL/min/1 73 square meters)    Stage 2 Mild CKD (GFR = 60-89 mL/min/1 73 square meters)    Stage 3A Moderate CKD (GFR = 45-59 mL/min/1 73 square meters)    Stage 3B Moderate CKD (GFR = 30-44 mL/min/1 73 square meters)    Stage 4 Severe CKD (GFR = 15-29 mL/min/1 73 square meters)    Stage 5 End Stage CKD (GFR <15 mL/min/1 73 square meters)  Note: GFR calculation is accurate only with a steady state creatinine    Lipase [[id number]]  (Normal) Collected: 05/02/23 1207    Lab Status: Final result Specimen: Blood Updated: 05/02/23 1227     Lipase 48 u/L     UA w Reflex to Microscopic w Reflex to Culture [[id number]] Collected: 05/02/23 1207    Lab Status: Final result Specimen: Urine Updated: 05/02/23 1215     Color, UA Yellow     Clarity, UA Clear     Specific Gravity, UA 1 015     pH, UA 6 0     Leukocytes, UA Negative     Nitrite, UA Negative     Protein, UA Negative mg/dl      Glucose, UA Negative mg/dl      Ketones, UA Negative mg/dl      Urobilinogen, UA 0 2 E U /dl      Bilirubin, UA Negative     Occult Blood, UA Negative    CBC and differential [[id number]]  (Abnormal) Collected: 05/02/23 1207    Lab Status: Final result Specimen: Blood Updated: 05/02/23 1214     WBC 4 46 Thousand/uL      RBC 2 98 Million/uL      Hemoglobin 9 8 g/dL      Hematocrit 30 4 %       fL      MCH 32 9 pg      MCHC 32 2 g/dL      RDW 17 6 %      MPV 11 7 fL      Platelets 67 Thousands/uL      nRBC 0 /100 WBCs      Neutrophils Relative 65 %      Immat GRANS % 0 %      Lymphocytes Relative 17 %      Monocytes Relative 11 %      Eosinophils Relative 6 %      Basophils Relative 1 %      Neutrophils Absolute 2 88 Thousands/µL      Immature Grans Absolute 0 02 Thousand/uL      Lymphocytes Absolute 0 77 Thousands/µL      Monocytes Absolute 0 49 Thousand/µL      Eosinophils Absolute 0 27 Thousand/µL      Basophils Absolute 0 03 Thousands/µL                Procedures  Procedures       ED Course  ED Course as of 05/02/23 2122   Tue May 02, 2023   1218 Hemoglobin(!): 9 8  Hgb improved compared to previous of 8 5 in our chart  1218 Platelet Count(!): 67  Thrombocytopenia around baseline     1230 Creatinine(!): 1 71  Creatinine 0 83 from 4/2/23    1231 Sodium: 136   1231 Potassium: 4 1   1231 Urinalysis w/o signs of infection  1455 Patient reevaluated  Currently sleeping  1505 CT Renal Stone   IMPRESSION:  1) No ureteral or urinary bladder calculi  No hydronephrosis or significant perinephric stranding  Punctate left-sided intrarenal calculi and 3 mm right-sided intrarenal calculus  2) No acute abdominal pelvic pathology within limitation of a noncontrast low radiation dose study  3) Partially imaged cirrhotic liver and evidence of portal hypertension, better assessed on prior contrast-enhanced studies  4) Mild interval enlargement of an aortocaval lymph node and one of the periportal lymph nodes since February 2023  Other upper abdominal lymphadenopathy is mostly stable  18 I discussed w Dr Josefa Holley  Accepts patient for admission  Medical Decision Making  62M w significant liver disease presenting with left flank pain  Ddxs include kidney stone vs pyelonephritis vs UTI vs bowel obstruction vs infection  Patient given IV analgesics for pain control and IV fluids for rehydration  Labwork significant for anemia and thrombocytopenia which is around patient's baseline  Creatinine significantly elevated at 1 7, which is 2x his baseline creatinine of 0 8  Urinalysis w/o signs of infection  CT renal stone protocol did not show ureteral or urinary bladder calculi to suggest etiology for left flank pain  Given elevation in creatinine and patient's cirrhotic history, I am concerned about hepatorenal syndrome  Therefore, patient was hospitalized for further management  ALEXANDER (acute kidney injury) [facility]): acute illness or injury  Hepatorenal syndrome ([facility] ): acute illness or injury  Hx of cirrhosis: acute illness or injury  Leg swelling: chronic illness or injury  Amount and/or Complexity of Data Reviewed  Labs: ordered  Decision-making details documented in ED Course  Radiology: ordered  Risk  Prescription drug management  Parenteral controlled substances    Decision regarding hospitalization  Diagnosis or treatment significantly limited by social determinants of health  Disposition  Final diagnoses:   ALEXANDER ([facility] kidney [facility]) ([facility] )   Hx of cirrhosis   Leg swelling     Time reflects when diagnosis was documented in both MDM as applicable and the Disposition within this note     Time User Action Codes Description Comment    5/2/2023 12:30 PM Dorethia Mohsen Add [N17 9] ALEXANDER (acute kidney injury) ([facility] )     5/2/2023  3:15 PM Dorethia Mohsen Add [Z87 19] Hx of cirrhosis     5/2/2023  4:26 PM Verlena Lei Add [Z59 00] Homeless     5/2/2023  9:21 PM Dorethia Mohsen Add [M79 89] Leg swelling       ED Disposition     ED Disposition   Admit    Condition   Stable    Date/Time   Tue May 2, 2023  3:18 PM    Comment   Case was discussed with Dr Lane Morris and the patient's admission status was agreed to be Admission Status: inpatient status to the service of Dr Lane Morris  Follow-up Information    None         Current Discharge Medication List      CONTINUE these medications which have NOT CHANGED    Details   dicyclomine (BENTYL) 20 mg tablet Take 1 tablet (20 mg total) by mouth 2 (two) times a day before breakfast and lunch  Qty: 60 tablet, Refills: 0    Associated Diagnoses: Abdominal pain, unspecified abdominal location      furosemide (LASIX) 20 mg tablet Take 1 tablet (20 mg total) by mouth daily  Qty: 30 tablet, Refills: 0    Associated Diagnoses: Abdominal pain, unspecified abdominal location      lactulose 20 g/30 mL Take 30 mL (20 g total) by mouth daily Do not start before February 28, 2023    Qty: 1800 mL, Refills: 0    Associated Diagnoses: Cirrhosis of liver with ascites, unspecified hepatic cirrhosis type (HCC)      metFORMIN (GLUCOPHAGE) 500 mg tablet Take 500 mg by mouth daily with breakfast      pantoprazole (PROTONIX) 40 mg tablet Take 1 tablet (40 mg total) by mouth 2 (two) times a day before meals  Qty: 60 tablet, Refills: 0    Associated Diagnoses: Abdominal pain, unspecified abdominal location      rOPINIRole (REQUIP) 4 mg tablet Take 1 tablet (4 mg total) by mouth 4 (four) times a day  Qty: 120 tablet, Refills: 0    Associated Diagnoses: Restless leg syndrome      spironolactone (ALDACTONE) 25 mg tablet Take 1 tablet (25 mg total) by mouth daily  Qty: 30 tablet, Refills: 0    Associated Diagnoses: Abdominal pain, unspecified abdominal location             No discharge procedures on file      PDMP Review     None          ED Provider  Electronically Signed by           Rhonda Armas MD  05/02/23 0310

## 2023-05-02 NOTE — ASSESSMENT & PLAN NOTE
· Liver cirrhosis secondary to [facility] with recent history of multiple decompensations and hospitalizations  · Appears to be noncompliant with medications as was prescribed lactulose in February and states not taking  · Current regimen is lactulose 20 g daily , Lasix 40 mg daily, spironolactone 100 mg daily  · Also after hospitalizations with GI bleed was started on propranolol 60 mg daily by GI  · PT does not follow-up with outpatient GI  · Check ammonia level and INR  · IV diuresis with Lasix gently due to ALEXANDER and volume overload - if responding well may adjust as necessary, holding spirolactone for now - resume when able

## 2023-05-02 NOTE — ASSESSMENT & PLAN NOTE
 Creatine on admission 1 71, Baseline creatinine 0 8-1    UA negative   CT renal stone study no ureteral or urinary bladder calculi, no hydronephrosis, punctate left-sided intrarenal calculi and 3 mm right-sided intrarenal calculus     Check urine studies   Attempt diuresis as patient examines volume overloaded   Avoid hypotension, nephrotoxins, and NSAIDS if possible     Strict I & Os, daily weights   Urinary retention protocol and bladder scan - continue [address] Nephrology consult appreciated    Lab Results   Component Value Date    CREATININE 1 71 (H) 05/02/2023    CREATININE 0 87 02/27/2023

## 2023-05-02 NOTE — PLAN OF CARE
Problem: MOBILITY - ADULT  Goal: Maintain or return to baseline ADL function  Description: INTERVENTIONS:  -  Assess patient's ability to carry out ADLs; assess patient's baseline for ADL function and identify physical deficits which impact ability to perform ADLs (bathing, care of mouth/teeth, toileting, grooming, dressing, etc )  - Assess/evaluate cause of self-care deficits   - Assess range of motion  - Assess patient's mobility; develop plan if impaired  - Assess patient's need for assistive devices and provide as appropriate  - Encourage maximum independence but intervene and supervise when necessary  - Involve family in performance of ADLs  - Assess for home care needs following discharge   - Consider OT consult to assist with ADL evaluation and planning for discharge  - Provide patient education as appropriate  Outcome: Progressing  Goal: Maintains/Returns to pre admission functional level  Description: INTERVENTIONS:  - Perform BMAT or MOVE assessment daily    - Set and communicate daily mobility goal to care team and patient/family/caregiver  - Collaborate with rehabilitation services on mobility goals if consulted  - Perform Range of Motion    times a day  - Reposition patient every    hours    - Dangle patient    times a day  - Stand patient      times a day  - Ambulate patient  times a day  - Out of bed to chair    times a day   - Out of bed for meals    times a day  - Out of bed for toileting  - Record patient progress and toleration of activity level   Outcome: Progressing

## 2023-05-02 NOTE — ASSESSMENT & PLAN NOTE
· Colon cancer s/p hemicolectomy in 2017, chemotherapy  · CT renal stone study showing mild interval enlargement of aortocaval lymph node and one of the periportal lymph nodes since February 2023 other abdominal lymphadenopathy is mostly stable  · Recommend continued outpatient follow-up  · States he no longer follows with any oncologist

## 2023-05-03 ENCOUNTER — APPOINTMENT (INPATIENT)
Dept: CT IMAGING | Facility: HOSPITAL | Age: 63
End: 2023-05-03

## 2023-05-03 ENCOUNTER — APPOINTMENT (INPATIENT)
Dept: RADIOLOGY | Facility: HOSPITAL | Age: 63
End: 2023-05-03

## 2023-05-03 ENCOUNTER — APPOINTMENT (INPATIENT)
Dept: NON INVASIVE DIAGNOSTICS | Facility: HOSPITAL | Age: 63
End: 2023-05-03

## 2023-05-03 PROBLEM — R93.89 ABNORMAL CT SCAN: Status: ACTIVE | Noted: 2023-05-03

## 2023-05-03 PROBLEM — E83.42 HYPOMAGNESEMIA: Status: ACTIVE | Noted: 2023-05-03

## 2023-05-03 PROBLEM — R41.82 ALTERED MENTAL STATUS: Status: ACTIVE | Noted: 2023-05-03

## 2023-05-03 LAB
ALBUMIN SERPL BCP-MCNC: 2.7 G/DL (ref 3.5–5)
ALBUMIN SERPL BCP-MCNC: 2.7 G/DL (ref 3.5–5)
ALP SERPL-CCNC: 80 U/L (ref 34–104)
ALP SERPL-CCNC: 81 U/L (ref 34–104)
ALT SERPL W P-5'-P-CCNC: 27 U/L (ref 7–52)
ALT SERPL W P-5'-P-CCNC: 29 U/L (ref 7–52)
ANION GAP SERPL CALCULATED.3IONS-SCNC: 4 MMOL/L (ref 4–13)
ANION GAP SERPL CALCULATED.3IONS-SCNC: 5 MMOL/L (ref 4–13)
AST SERPL W P-5'-P-CCNC: 34 U/L (ref 13–39)
AST SERPL W P-5'-P-CCNC: 38 U/L (ref 13–39)
ATRIAL RATE: 55 BPM
ATRIAL RATE: 68 BPM
BILIRUB SERPL-MCNC: 1.68 MG/DL (ref 0.2–1)
BILIRUB SERPL-MCNC: 1.84 MG/DL (ref 0.2–1)
BUN SERPL-MCNC: 16 MG/DL (ref 5–25)
BUN SERPL-MCNC: 17 MG/DL (ref 5–25)
CALCIUM ALBUM COR SERPL-MCNC: 9.4 MG/DL (ref 8.3–10.1)
CALCIUM ALBUM COR SERPL-MCNC: 9.4 MG/DL (ref 8.3–10.1)
CALCIUM SERPL-MCNC: 8.4 MG/DL (ref 8.4–10.2)
CALCIUM SERPL-MCNC: 8.4 MG/DL (ref 8.4–10.2)
CARDIAC TROPONIN I PNL SERPL HS: 5 NG/L (ref 8–18)
CHLORIDE SERPL-SCNC: 101 MMOL/L (ref 96–108)
CHLORIDE SERPL-SCNC: 99 MMOL/L (ref 96–108)
CO2 SERPL-SCNC: 27 MMOL/L (ref 21–32)
CO2 SERPL-SCNC: 28 MMOL/L (ref 21–32)
CREAT SERPL-MCNC: 1.14 MG/DL (ref 0.6–1.3)
CREAT SERPL-MCNC: 1.21 MG/DL (ref 0.6–1.3)
ERYTHROCYTE [DISTWIDTH] IN BLOOD BY AUTOMATED COUNT: 17.6 % (ref 11.6–15.1)
ERYTHROCYTE [DISTWIDTH] IN BLOOD BY AUTOMATED COUNT: 17.8 % (ref 11.6–15.1)
GFR SERPL CREATININE-BSD FRML MDRD: 63 ML/MIN/1.73SQ M
GFR SERPL CREATININE-BSD FRML MDRD: 68 ML/MIN/1.73SQ M
GLUCOSE SERPL-MCNC: 222 MG/DL (ref 65–140)
GLUCOSE SERPL-MCNC: 324 MG/DL (ref 65–140)
GLUCOSE SERPL-MCNC: 329 MG/DL (ref 65–140)
HCT VFR BLD AUTO: 27.2 % (ref 36.5–49.3)
HCT VFR BLD AUTO: 27.4 % (ref 36.5–49.3)
HGB BLD-MCNC: 8.9 G/DL (ref 12–17)
HGB BLD-MCNC: 9 G/DL (ref 12–17)
LACTATE SERPL-SCNC: 1.7 MMOL/L (ref 0.5–2)
MAGNESIUM SERPL-MCNC: 1.6 MG/DL (ref 1.9–2.7)
MCH RBC QN AUTO: 32.8 PG (ref 26.8–34.3)
MCH RBC QN AUTO: 33.5 PG (ref 26.8–34.3)
MCHC RBC AUTO-ENTMCNC: 32.7 G/DL (ref 31.4–37.4)
MCHC RBC AUTO-ENTMCNC: 32.8 G/DL (ref 31.4–37.4)
MCV RBC AUTO: 100 FL (ref 82–98)
MCV RBC AUTO: 102 FL (ref 82–98)
P AXIS: -27 DEGREES
P AXIS: -33 DEGREES
PLATELET # BLD AUTO: 50 THOUSANDS/UL (ref 149–390)
PLATELET # BLD AUTO: 59 THOUSANDS/UL (ref 149–390)
PMV BLD AUTO: 11.6 FL (ref 8.9–12.7)
PMV BLD AUTO: 12.3 FL (ref 8.9–12.7)
POTASSIUM SERPL-SCNC: 4.4 MMOL/L (ref 3.5–5.3)
POTASSIUM SERPL-SCNC: 4.5 MMOL/L (ref 3.5–5.3)
PR INTERVAL: 124 MS
PR INTERVAL: 138 MS
PROCALCITONIN SERPL-MCNC: 0.23 NG/ML
PROT SERPL-MCNC: 6 G/DL (ref 6.4–8.4)
PROT SERPL-MCNC: 6.2 G/DL (ref 6.4–8.4)
QRS AXIS: 68 DEGREES
QRS AXIS: 71 DEGREES
QRSD INTERVAL: 74 MS
QRSD INTERVAL: 78 MS
QT INTERVAL: 440 MS
QT INTERVAL: 476 MS
QTC INTERVAL: 455 MS
QTC INTERVAL: 467 MS
RBC # BLD AUTO: 2.69 MILLION/UL (ref 3.88–5.62)
RBC # BLD AUTO: 2.71 MILLION/UL (ref 3.88–5.62)
SODIUM SERPL-SCNC: 130 MMOL/L (ref 135–147)
SODIUM SERPL-SCNC: 134 MMOL/L (ref 135–147)
T WAVE AXIS: 47 DEGREES
T WAVE AXIS: 50 DEGREES
UUN 24H UR-MCNC: 166 MG/DL
VENTRICULAR RATE: 55 BPM
VENTRICULAR RATE: 68 BPM
WBC # BLD AUTO: 3 THOUSAND/UL (ref 4.31–10.16)
WBC # BLD AUTO: 3.51 THOUSAND/UL (ref 4.31–10.16)

## 2023-05-03 RX ORDER — DIPHENHYDRAMINE HCL 25 MG
25 TABLET ORAL EVERY 6 HOURS PRN
Status: DISCONTINUED | OUTPATIENT
Start: 2023-05-03 | End: 2023-05-03

## 2023-05-03 RX ORDER — ACETAMINOPHEN 325 MG/1
975 TABLET ORAL EVERY 8 HOURS SCHEDULED
Status: DISCONTINUED | OUTPATIENT
Start: 2023-05-03 | End: 2023-05-04 | Stop reason: HOSPADM

## 2023-05-03 RX ORDER — MAGNESIUM SULFATE HEPTAHYDRATE 40 MG/ML
4 INJECTION, SOLUTION INTRAVENOUS ONCE
Status: DISCONTINUED | OUTPATIENT
Start: 2023-05-03 | End: 2023-05-03

## 2023-05-03 RX ORDER — ROPINIROLE 1 MG/1
4 TABLET, FILM COATED ORAL 2 TIMES DAILY
Status: DISCONTINUED | OUTPATIENT
Start: 2023-05-03 | End: 2023-05-04 | Stop reason: HOSPADM

## 2023-05-03 RX ORDER — DIPHENHYDRAMINE HYDROCHLORIDE 50 MG/ML
25 INJECTION INTRAMUSCULAR; INTRAVENOUS EVERY 6 HOURS PRN
Status: DISCONTINUED | OUTPATIENT
Start: 2023-05-03 | End: 2023-05-04 | Stop reason: HOSPADM

## 2023-05-03 RX ORDER — MAGNESIUM SULFATE HEPTAHYDRATE 40 MG/ML
4 INJECTION, SOLUTION INTRAVENOUS ONCE
Status: COMPLETED | OUTPATIENT
Start: 2023-05-03 | End: 2023-05-03

## 2023-05-03 RX ADMIN — SODIUM CHLORIDE 1000 ML: 0.9 INJECTION, SOLUTION INTRAVENOUS at 10:12

## 2023-05-03 RX ADMIN — FUROSEMIDE 20 MG: 10 INJECTION, SOLUTION INTRAVENOUS at 17:19

## 2023-05-03 RX ADMIN — HYDROXYZINE HYDROCHLORIDE 25 MG: 25 TABLET ORAL at 21:01

## 2023-05-03 RX ADMIN — ACETAMINOPHEN 650 MG: 325 TABLET ORAL at 10:20

## 2023-05-03 RX ADMIN — PROPRANOLOL HYDROCHLORIDE 20 MG: 20 TABLET ORAL at 21:01

## 2023-05-03 RX ADMIN — GABAPENTIN 100 MG: 100 CAPSULE ORAL at 08:36

## 2023-05-03 RX ADMIN — DICLOFENAC SODIUM 2 G: 10 GEL TOPICAL at 17:21

## 2023-05-03 RX ADMIN — IOHEXOL 100 ML: 350 INJECTION, SOLUTION INTRAVENOUS at 09:53

## 2023-05-03 RX ADMIN — DICYCLOMINE HYDROCHLORIDE 20 MG: 20 TABLET ORAL at 10:21

## 2023-05-03 RX ADMIN — METHOCARBAMOL TABLETS 750 MG: 750 TABLET, COATED ORAL at 02:06

## 2023-05-03 RX ADMIN — MIDODRINE HYDROCHLORIDE 2.5 MG: 5 TABLET ORAL at 06:03

## 2023-05-03 RX ADMIN — GABAPENTIN 100 MG: 100 CAPSULE ORAL at 17:19

## 2023-05-03 RX ADMIN — DICLOFENAC SODIUM 2 G: 10 GEL TOPICAL at 10:11

## 2023-05-03 RX ADMIN — DICLOFENAC SODIUM 2 G: 10 GEL TOPICAL at 21:01

## 2023-05-03 RX ADMIN — TAMSULOSIN HYDROCHLORIDE 0.4 MG: 0.4 CAPSULE ORAL at 17:18

## 2023-05-03 RX ADMIN — METOCLOPRAMIDE 10 MG: 10 TABLET ORAL at 06:03

## 2023-05-03 RX ADMIN — ROPINIROLE 4 MG: 1 TABLET, FILM COATED ORAL at 04:20

## 2023-05-03 RX ADMIN — ACETAMINOPHEN 975 MG: 325 TABLET ORAL at 21:01

## 2023-05-03 RX ADMIN — DIPHENHYDRAMINE HYDROCHLORIDE 25 MG: 50 INJECTION, SOLUTION INTRAMUSCULAR; INTRAVENOUS at 10:12

## 2023-05-03 RX ADMIN — METHOCARBAMOL TABLETS 750 MG: 750 TABLET, COATED ORAL at 10:21

## 2023-05-03 RX ADMIN — METOCLOPRAMIDE 10 MG: 10 TABLET ORAL at 17:18

## 2023-05-03 RX ADMIN — ROPINIROLE 4 MG: 1 TABLET, FILM COATED ORAL at 21:01

## 2023-05-03 RX ADMIN — GABAPENTIN 100 MG: 100 CAPSULE ORAL at 21:01

## 2023-05-03 RX ADMIN — NICOTINE 1 PATCH: 14 PATCH, EXTENDED RELEASE TRANSDERMAL at 08:37

## 2023-05-03 RX ADMIN — PANTOPRAZOLE SODIUM 40 MG: 40 TABLET, DELAYED RELEASE ORAL at 06:03

## 2023-05-03 RX ADMIN — MIDODRINE HYDROCHLORIDE 2.5 MG: 5 TABLET ORAL at 17:21

## 2023-05-03 RX ADMIN — METOCLOPRAMIDE 10 MG: 10 TABLET ORAL at 10:20

## 2023-05-03 RX ADMIN — DICYCLOMINE HYDROCHLORIDE 20 MG: 20 TABLET ORAL at 06:03

## 2023-05-03 RX ADMIN — MAGNESIUM SULFATE HEPTAHYDRATE 4 G: 40 INJECTION, SOLUTION INTRAVENOUS at 08:37

## 2023-05-03 RX ADMIN — MIDODRINE HYDROCHLORIDE 2.5 MG: 5 TABLET ORAL at 10:20

## 2023-05-03 NOTE — ASSESSMENT & PLAN NOTE
 Creatine on admission 1 71, Baseline creatinine 0 8-1    UA negative   CT renal stone study no ureteral or urinary bladder calculi, no hydronephrosis, punctate left-sided intrarenal calculi and 3 mm right-sided intrarenal calculus     Urine studies: sodium 141, creatinine urine: 33 7   Attempt diuresis as patient examines volume overloaded   Avoid hypotension, nephrotoxins, and NSAIDS if possible     Strict I & Os, daily weights   Urinary retention protocol and bladder scan - continue Flomax  [facility] Nephrology consulted initially, consult cancelled due to improvement of ALEXANDER   RRT called due to complaining of weakness, flushed feeling, nausea and confusion, severe abdominal pain, chest pain, unable to move lower extremities  CT chest abdomen pelvis with IV contrast given  Monitor kidney function in light of recent ALEXANDER   We will continue with gentle IV fluids due to ALEXANDER      Lab Results   Component Value Date    CREATININE 1 14 05/03/2023    CREATININE 1 21 05/03/2023

## 2023-05-03 NOTE — UTILIZATION REVIEW
NOTIFICATION OF INPATIENT ADMISSION   AUTHORIZATION REQUEST   SERVICING FACILITY:   [address] [place], [place] [address]maria de jesus Millan [address] [place] [address], [address]  Tax ID: [id number]  NPI: [id number] ATTENDING PROVIDER:  Attending Name and NPI#: Indira Varghese Md [[id number]]  Address: [facility]  Yajaira Millan [address] [place] [address], [facility] Lali Singleton  Phone: [phone]   ADMISSION INFORMATION:  Place of Service: Inpatient [address] [place] [address]  [address]  Place of Service Code: 21  Inpatient Admission Date/Time: 5/2/23  3:18 PM  Discharge Date/Time: No discharge date for patient encounter  Admitting Diagnosis Code/Description:  Flank pain [R10 9]  ALEXANDER (acute kidney injury) ([facility] ) [N17 9]  Hx of cirrhosis [Z87 19]     UTILIZATION REVIEW CONTACT:  Olga Lidia Conner Utilization   Network Utilization Review Department  Phone: [phone]  Fax [phone]  Email: [email or web address]  Contact for approvals/pending authorizations, clinical reviews, and discharge  PHYSICIAN ADVISORY SERVICES:  Medical Necessity Denial & Scsr-pd-[id number] Review  Phone: [phone]  Fax: [phone]  Email: [email or web address]

## 2023-05-03 NOTE — CASE MANAGEMENT
Case Management Discharge Planning Note    Patient name Anita Rivero  Location [address] [id number] MRN [id number]  : 1960 Date 5/3/2023       Current Admission Date: 2023  Current Admission Diagnosis:ALEXANDER (acute kidney injury) [facility])   Patient Active Problem List    Diagnosis Date Noted    Altered mental status 2023    Hypomagnesemia 2023    Abnormal CT scan 2023    ALEXANDER (acute kidney injury) ([facility] ) 2023    Back pain 2023    GI bleed 2023    History of colon cancer     [facility] 2022    Abdominal pain 2022    Pancytopenia ([facility] 2022    Cirrhosis ([facility] ) 2022    Hypokalemia 2022    Thrombocytopenia ([facility] ) 2022    Restless leg syndrome 2022    Smoking 2022    Diabetes mellitus type 2 in obese ([facility] ) 2022      LOS (days): 1  Geometric Mean LOS (GMLOS) (days):   Days to GMLOS:     OBJECTIVE:  Risk of Unplanned Readmission Score: 16 89         Current admission status: Inpatient   Preferred Pharmacy:   [facility] [address] [place] [address]  [address] [place] [address]  Phone: [phone] Fax: [phone]    [id number] [address]  Phone: [phone] Fax: [phone]    [facility] [address] [address]  Phone: [phone] Fax: [phone]    Primary Care Provider: No primary care provider on file  Primary Insurance: COMMERCIAL MISCELLANEOUS  Secondary Insurance:     DISCHARGE DETAILS:        CM met with patient to review halfway calls patient made  Patient reported [facility] told him $4200/mo for acceptance  Patient indicated he does not have that kind of money  CM encouraged patient to call [facility]

## 2023-05-03 NOTE — PLAN OF CARE
Problem: MOBILITY - ADULT  Goal: Maintain or return to baseline ADL function  Description: INTERVENTIONS:  -  Assess patient's ability to carry out ADLs; assess patient's baseline for ADL function and identify physical deficits which impact ability to perform ADLs (bathing, care of mouth/teeth, toileting, grooming, dressing, etc )  - Assess/evaluate cause of self-care deficits   - Assess range of motion  - Assess patient's mobility; develop plan if impaired  - Assess patient's need for assistive devices and provide as appropriate  - Encourage maximum independence but intervene and supervise when necessary  - Involve family in performance of ADLs  - Assess for home care needs following discharge   - Consider OT consult to assist with ADL evaluation and planning for discharge  - Provide patient education as appropriate  Outcome: Progressing  Goal: Maintains/Returns to pre admission functional level  Description: INTERVENTIONS:  - Perform BMAT or MOVE assessment daily    - Set and communicate daily mobility goal to care team and patient/family/caregiver     - Collaborate with rehabilitation services on mobility goals if consulted    - Out of bed for toileting  - Record patient progress and toleration of activity level   Outcome: Progressing     Problem: PAIN - ADULT  Goal: Verbalizes/displays adequate comfort level or baseline comfort level  Description: Interventions:  - Encourage patient to monitor pain and request assistance  - Assess pain using appropriate pain scale  - Administer analgesics based on type and severity of pain and evaluate response  - Implement non-pharmacological measures as appropriate and evaluate response  - Consider cultural and social influences on pain and pain management  - Notify physician/advanced practitioner if interventions unsuccessful or patient reports new pain  Outcome: Progressing     Problem: INFECTION - ADULT  Goal: Absence or prevention of progression during hospitalization  Description: INTERVENTIONS:  - Assess and monitor for signs and symptoms of infection  - Monitor lab/diagnostic results  - Monitor all insertion sites, i e  indwelling lines, tubes, and drains  - Monitor endotracheal if appropriate and nasal secretions for changes in amount and color  - [place] appropriate cooling/warming therapies per order  - Administer medications as ordered  - Instruct and encourage patient and family to use good hand hygiene technique  - Identify and instruct in appropriate isolation precautions for identified infection/condition  Outcome: Progressing     Problem: SAFETY ADULT  Goal: Maintain or return to baseline ADL function  Description: INTERVENTIONS:  -  Assess patient's ability to carry out ADLs; assess patient's baseline for ADL function and identify physical deficits which impact ability to perform ADLs (bathing, care of mouth/teeth, toileting, grooming, dressing, etc )  - Assess/evaluate cause of self-care deficits   - Assess range of motion  - Assess patient's mobility; develop plan if impaired  - Assess patient's need for assistive devices and provide as appropriate  - Encourage maximum independence but intervene and supervise when necessary  - Involve family in performance of ADLs  - Assess for home care needs following discharge   - Consider OT consult to assist with ADL evaluation and planning for discharge  - Provide patient education as appropriate  Outcome: Progressing  Goal: Maintains/Returns to pre admission functional level  Description: INTERVENTIONS:  - Perform BMAT or MOVE assessment daily    - Set and communicate daily mobility goal to care team and patient/family/caregiver     - Collaborate with rehabilitation services on mobility goals if consulted    - Out of bed for toileting  - Record patient progress and toleration of activity level   Outcome: Progressing  Goal: Patient will remain free of falls  Description: INTERVENTIONS:  - Educate patient/family on patient safety including physical limitations  - Instruct patient to call for assistance with activity   - Consult OT/PT to assist with strengthening/mobility   - Keep Call bell within reach  - Keep bed low and locked with side rails adjusted as appropriate  - Keep care items and personal belongings within reach  - Initiate and maintain comfort rounds  - Make Fall Risk Sign visible to staff    - Apply yellow socks and bracelet for high fall risk patients  - Consider moving patient to room near nurses station  Outcome: Progressing     Problem: DISCHARGE PLANNING  Goal: Discharge to home or other facility with appropriate resources  Description: INTERVENTIONS:  - Identify barriers to discharge w/patient and caregiver  - Arrange for needed discharge resources and transportation as appropriate  - Identify discharge learning needs (meds, wound care, etc )  - Arrange for interpretive services to assist at discharge as needed  - Refer to Case Management Department for coordinating discharge planning if the patient needs post-hospital services based on physician/advanced practitioner order or complex needs related to functional status, cognitive ability, or social support system  Outcome: Progressing     Problem: Knowledge Deficit  Goal: Patient/family/caregiver demonstrates understanding of disease process, treatment plan, medications, and discharge instructions  Description: Complete learning assessment and assess knowledge base    Interventions:  - Provide teaching at level of understanding  - Provide teaching via preferred learning methods  Outcome: Progressing

## 2023-05-03 NOTE — RAPID RESPONSE
"Rapid Response Note  Celeste Slater 58 y o  male MRN: [id number]  Unit/Bed#: -01 Encounter: [id number]    Rapid Response Notification(s):   Response called date/time:  5/3/2023 9:26 AM  Response team arrival date/time:  5/3/2023 9:27 AM  Response end date/time:  5/3/2023 9:45 AM  [facility]  Rapid response location:  [facility] unit  Primary reason for rapid response call:  Acute change in neuro status and chest pain    Rapid Response Intervention(s):   Airway:  None  Breathing:  None  Circulation:  None  Fluids administered:  Normal saline  Medications administered:  None       Assessment:   · Altered mental status  · Abdominal pain    Plan:   · CT head  · Stat LABS  · CT CAP  · 500 CC IVF bolus  · EKG     Rapid Response Outcome:   Transfer:  Remain on floor  Primary service notified of transfer: Yes (JOELLE at  bedside  during event)    Code Status: Level 1 (Full Code)      Family notified of transfer: N/A  Family member contacted: SLIM JOELLE at bedside to update family     Background/Situation:   Celeste Slater is a 58 y o  male who had a RRT called today for weakness (returning to bed from chair), flushed feeling and confusion  During RRT pt also reported chest pain and abdominal pain  SBP 90/ during RRT, 500 cc NS bolus given, stat head CT and CAP  Events occurred right after magnesium infusion initiated  Pt admitted day prior to event due to left flank pain, found to have ALEXANDER  PMH Colon Ca s/p hemicolectomy in 2017, chemo, liver cirrhosis/CHRISTIE, DM, chronic pain    Review of Systems   Cardiovascular: Positive for chest pain  Gastrointestinal: Positive for abdominal pain  Musculoskeletal: Negative  Neurological: Positive for weakness  \"mouth burning\"   All other systems reviewed and are negative        Objective:   Vitals:    05/03/23 0904 05/03/23 0928 05/03/23 0931 05/03/23 1010   BP:  92/58     BP Location:       Pulse: 55 59 55 56   Resp:   16 20   Temp: 97 7 °F (36 5 °C)   " "(!) 97 3 °F (36 3 °C)   TempSrc:       SpO2: 97% 97% 97% 98%   Weight:       Height:         Physical Exam  Constitutional:       Comments: Pt with slightly delayed verbal responses   HENT:      Head: Normocephalic and atraumatic  Mouth/Throat:      Mouth: Mucous membranes are dry  Eyes:      Pupils: Pupils are equal, round, and reactive to light  Cardiovascular:      Rate and Rhythm: Regular rhythm  Bradycardia present  Pulses: Normal pulses  Pulmonary:      Effort: No respiratory distress  Breath sounds: No wheezing  Abdominal:      General: Abdomen is flat  Palpations: Abdomen is soft  Tenderness: There is abdominal tenderness  Skin:     General: Skin is warm  Neurological:      Mental Status: He is alert  Comments: UE strength 5/5  LE strength 2/5         Portions of the record may have been created with voice recognition software  Occasional wrong word or \"sound a like\" substitutions may have occurred due to the inherent limitations of voice recognition software  Read the chart carefully and recognize, using context, where substitutions have occurred      NUBIA Neal    "

## 2023-05-03 NOTE — PLAN OF CARE
Problem: OCCUPATIONAL THERAPY ADULT  Goal: Performs self-care activities at highest level of function for planned discharge setting  See evaluation for individualized goals  Description: Treatment Interventions: ADL retraining, Functional transfer training, UE strengthening/ROM, Endurance training, Patient/family training, Equipment evaluation/education, Cognitive reorientation, Neuromuscular reeducation, Compensatory technique education, Continued evaluation, Energy conservation, Activityengagement          See flowsheet documentation for full assessment, interventions and recommendations  Note: Limitation: Decreased ADL status, Decreased UE strength, Decreased Safe judgement during ADL, Decreased cognition, Decreased endurance, Decreased self-care trans, Decreased high-level ADLs  Prognosis: Good  Assessment: Pt is a 58 y o  male, admitted to [address] [place] [address] 5/2/2023 d/t experiencing L sided flank pain  Dx: ALEXANDER  Pt with PMHx impacting their performance during ADL tasks, including: hx colon CA, liver cirrhosis 2* CHRISTIE with DM, neuropathy, restless leg syndrome  Prior to admission to the hospital Pt was performing ADLs without physical assistance  IADLs without physical assistance  Functional transfers/ambulation without physical assistance  Cognitive status was PTA was intact  OT order placed to assess Pt's ADLs, cognitive status, and performance during functional tasks in order to maximize safety and independence while making most appropriate d/c recommendations   Pt's clinical presentation is currently unstable/unpredictable given new onset deficits that effect Pt's occupational performance and ability to safely return to OF including decrease activity tolerance, decrease standing balance, decrease performance during ADL tasks, decrease cognition, decrease safety awareness , decrease UB MS, decrease generalized strength, decrease activity engagement, decrease performance during functional transfers, steps to enter home, limited family support, high fall risk and limited insight to deficits combined with medical complications of hypotension, pain impacting overall mobility status, abnormal renal lab values, abnormal H&H, abnormal CBC, edema/swelling, decreased skin integrity, multiple readmissions and need for input for mobility technique/safety  Personal factors affecting Pt at time of initial evaluation include: affordability, step(s) to enter environment, limited home support, past experience, inability to perform current job functions, inability to perform IADLs, inability to perform ADLs, new need for AD, inability to navigate community distances, limited insight into impairments, decreased initiation and engagement and questionable non-compliance  Pt will benefit from continued skilled OT services to address deficits as defined above and to maximize level independence/participation during ADLs and functional tasks to facilitate return toward PLOF and improved quality of life  From an occupational therapy standpoint, recommendation at time of d/c would be HHOT       OT Discharge Recommendation: Home with home health rehabilitation

## 2023-05-03 NOTE — PROGRESS NOTES
[address]  Progress Note  Name: Zarina Torres  MRN: [id number]  Unit/Bed#: -01 I Date of Admission: 5/2/2023   Date of Service: 5/3/2023 I Hospital Day: 1    Assessment/Plan   * ALEXANDER (acute kidney injury) [facility])  Assessment & Plan   Creatine on admission 1 71, Baseline creatinine 0 8-1    UA negative   CT renal stone study no ureteral or urinary bladder calculi, no hydronephrosis, punctate left-sided intrarenal calculi and 3 mm right-sided intrarenal calculus     Urine studies: sodium 141, creatinine urine: 33 7   Attempt diuresis as patient examines volume overloaded   Avoid hypotension, nephrotoxins, and NSAIDS if possible     Strict I & Os, daily weights   Urinary retention protocol and bladder scan - continue [facility] Nephrology consulted initially, consult cancelled due to improvement of ALEXANDER   RRT called due to complaining of weakness, flushed feeling, nausea and confusion, severe abdominal pain, chest pain, unable to move lower extremities  CT chest abdomen pelvis with IV contrast given  Monitor kidney function in light of recent ALEXANDER   We will continue with gentle IV fluids due to ALEXANDER  Lab Results   Component Value Date    CREATININE 1 14 05/03/2023    CREATININE 1 21 05/03/2023         Abnormal CT scan  Assessment & Plan  · CT chest abdomen pelvis with results below  · Cirrhotic changes are again noted with splenomegaly  · Status post right hemicolectomy  · Diffuse small bowel wall thickening without dilatation suggest enteritis or possibly related to interstitial edema from chronic cirrhosis and vascular collateralization  · Mediastinal and abdominal adenopathy of uncertain etiology is somewhat prominent although the abdominal nodes are similar to the prior examinations  Potentially these are reactive although a low-grade lymphoproliferative disorder is not excluded  Further hematologic work-up could be considered    · Mesenteric fatty lesion is noted on the right slightly larger than prior exams but with a fairly smooth margin with a second area also seen on the left  Maturing fat necrosis is still possible although usually some calcification will occur  A fatty neoplasm is not entirely excluded but is less likely  Continued surveillance is advised  · Recommending follow-up with hematology oncology outpatient    Hypomagnesemia  Assessment & Plan  · Noted to have magnesium of 1 6, patient started having weakness, flushed feeling, nausea and confusion after infusion of magnesium  · RRT called due to this and critical care suspecting this could be secondary to too fast of infusion of magnesium  Given IV NS and IV benadryl  · Monitor mag level    Altered mental status  Assessment & Plan  · RRT called on 5/3 due to patient having weakness, flushed feeling, nausea, confusion, severe abdominal pain, inability to move legs, chest pain  Critical care came and evaluated patient and recommending CT head, CT chest abdomen pelvis, troponin, ECG, procalcitonin, CBC, CMP, lactic acid, glucose, repeat blood pressure which was noted to be 92/58  500 cc NS given  · Patient's significant other was called after RRT and informed of the events  She verbalized understanding  Call with further results when they become available  · Patient was very tearful on examination and was answering orientation questions correctly  Prior to this, patient was ambulating well with PT OT  When asking patient to do strength testing lower extremities he is unable to stating that he cannot lift his legs or move them at all  · Patient was moved from the chair into his bed and he was able to stand and move his legs while doing this  During RRT, patient was moving his legs in the bed with flexion and extension  · Pro-Osbaldo, troponin, lactic acid normal   Glucose elevated however patient did just eat 2 large trays of breakfast prior to this    · CT head: No acute intracranial abnormalities  · CT chest abdomen pelvis not showing any evidence of free air  CT scan of the abdomen is showing diffuse small bowel wall thickening without dilatation suggest enteritis or possibly related to interstitial edema from chronic cirrhosis and vascular collateralization  Uncertain if this is contributing to patient's abdominal pain or if his abdominal pain is nonspecific and coming from left flank pain that he chronically has  · Avoid narcotic pain medications  Back pain  Assessment & Plan  · Presents with left flank/back pain  · States he is a history of kidney stones that he passed to last week  · CT renal stone study without any obstructing stone, punctate stone seen left-sided intrarenal calculi and 3 mm right-sided intrarenal calculus  · Continue pain control  · Flomax  · [facility] admission seems he is seeking narcotic medications and leaving AMA we will hold off on any narcotic medications as his CT did not reveal any evidence of any cause of his left flank pain   · PT/OT: home health  · Monitor for improvement with supportive care    History of colon cancer  Assessment & Plan  · Colon cancer s/p hemicolectomy in 2017, chemotherapy  · CT renal stone study showing mild interval enlargement of aortocaval lymph node and one of the periportal lymph nodes since February 2023 other abdominal lymphadenopathy is mostly stable  · Recommend continued outpatient follow-up  · States he no longer follows with any oncologist - should continue to follow up with outpatient oncology  · CT chest abdomen pelvis: Mediastinal and abdominal adenopathy of uncertain etiology is somewhat prominent although the abdominal nodes are similar to the prior examinations  Potentially these are reactive although a low-grade lymphoproliferative disorder is not excluded  Further hematologic work-up could be considered      Pancytopenia ([address] )  Assessment & Plan  · Patient with chronic pancytopenia likely secondary to liver disease  · Seems to be improved from his most recent labs  · Continue to monitor  · Patient should continue to follow up with oncology outpatient regarding pancytopenia  Lab Results   Component Value Date    RBC 2 71 (L) 05/03/2023    RBC 2 69 (L) 05/03/2023    WBC 3 51 (L) 05/03/2023    WBC 3 00 (L) 05/03/2023    PLT 59 (L) 05/03/2023    PLT 50 (L) 05/03/2023       Diabetes mellitus type 2 in obese [facility])  Assessment & Plan  Lab Results   Component Value Date    HGBA1C 5 5 02/10/2023       Recent Labs     05/03/23  0927   POCGLU 324*       Blood Sugar Average: Last 72 hrs:  (P) 324   · Patient previously diagnosed and started on metformin during recent hospitalization  · Hold metformin for now  · Correction scale -ACHS    Cirrhosis ([facility] [address] )  Assessment & Plan  · Liver cirrhosis secondary to [place] with recent history of multiple decompensations and hospitalizations  · Appears to be noncompliant with medications as was prescribed lactulose in February and states not taking  · Current regimen is lactulose 20 g daily , Lasix 40 mg daily, spironolactone 100 mg daily  · Also after hospitalizations with GI bleed was started on propranolol 60 mg daily by GI  · PT does not follow-up with outpatient GI  · Ammonia normal, INR: 1 50  · IV diuresis with Lasix gently due to ALEXANDER and volume overload - if responding well may adjust as necessary, holding spirolactone for now - resume when able   · CT chest abdomen pelvis: Mesenteric fatty lesion is noted on the right slightly larger than prior exams but with a fairly smooth margin with a second area also seen on the left  Maturing fat necrosis is still possible although usually some calcification will occur  A fatty neoplasm is not entirely excluded but is less likely  Continued surveillance is advised  Cirrhotic changes are again noted with splenomegaly         VTE Pharmacologic Prophylaxis:   Moderate Risk (Score 3-4) - Pharmacological DVT Prophylaxis Contraindicated   Sequential Compression Devices Ordered  Patient Centered Rounds: I performed bedside rounds with nursing staff today  Discussions with Specialists or Other Care Team Provider: nursing, case management, critical care    Education and Discussions with Family / Patient: Updated  (significant other) via phone  Updated patient's significant other Candace regarding RRT that occurred earlier in the day and further updated with results of CT scans and patient's status  Total Time Spent on Date of Encounter in care of patient: 55 minutes This time was spent on one or more of the following: performing physical exam; counseling and coordination of care; obtaining or reviewing history; documenting in the medical record; reviewing/ordering tests, medications or procedures; communicating with other healthcare professionals and discussing with patient's family/caregivers  Current Length of Stay: 1 day(s)  Current Patient Status: Inpatient   Certification Statement: The patient will continue to require additional inpatient hospital stay due to ALEXANDER  Discharge Plan: Anticipate discharge in 24-48 hrs to home with home services  Code Status: Level 1 - Full Code    Subjective:   Patient seen and examined at bedside this morning  He is in visible distress currently  He states that he started having flushing, weakness, nausea, confusion, severe abdominal pain, chest pain, shortness of breath, inability to move his legs  RRT was called and critical care responded  Patient had been answering orientation questions appropriately  When asking him to lift his legs he states that he cannot and that he was trying  Asking him to move his legs to assess his strength and he is not able to dorsi or plantarflex his feet  He was sitting in the chair when this occurred receiving his IV magnesium  Further started complaining of his mouth burning   Patient was moved from the chair and placed in his bed with assistance and was able "to move his legs  Patient answering orientation questions so appropriately with critical care  Still complaining of abdominal pain diffusely  Complaining of chest pain  He states that he is very anxious  After RRT and scans, patient has been asking RN for more pain medication because he is worried he is \"dying, cannot move, and his legs are paralyzed \"  RN and myself informed patient that he would not be getting any pain medication and that this would cause further confusion and should be avoided  Objective:     Vitals:   Temp (24hrs), Av 6 °F (36 4 °C), Min:97 3 °F (36 3 °C), Max:97 7 °F (36 5 °C)    Temp:  [97 3 °F (36 3 °C)-97 7 °F (36 5 °C)] 97 3 °F (36 3 °C)  HR:  [55-68] 56  Resp:  [16-20] 20  BP: ()/(37-69) 92/58  SpO2:  [91 %-99 %] 98 %  Body mass index is 24 97 kg/m²  Input and Output Summary (last 24 hours): Intake/Output Summary (Last 24 hours) at 5/3/2023 1302  Last data filed at 5/3/2023 1100  Gross per 24 hour   Intake 2140 ml   Output 1275 ml   Net 865 ml       Physical Exam:   Physical Exam  Vitals reviewed  Constitutional:       General: He is in acute distress  Appearance: He is ill-appearing  He is not toxic-appearing  Comments: Patient laying down in bed holding his eyes closed and refusing to talk at times  Needed to be sternal rub to get his attention and he opens his eyes and cries that he is in large amounts of pain  Having slower responses   HENT:      Head: Normocephalic and atraumatic  Mouth/Throat:      Mouth: Mucous membranes are dry  Eyes:      Pupils: Pupils are equal, round, and reactive to light  Cardiovascular:      Rate and Rhythm: Regular rhythm  Bradycardia present  Heart sounds: Normal heart sounds  Pulmonary:      Effort: Pulmonary effort is normal  No respiratory distress  Breath sounds: Normal breath sounds  No stridor  No wheezing        Comments: No driving while on room air with oxygen saturation around " 99%   Abdominal:      General: There is no distension  Palpations: Abdomen is soft  There is no mass  Tenderness: There is abdominal tenderness (Patient complaining of diffuse abdominal tenderness on auscultation and palpation)  Comments: Slightly hyperactive bowel sounds in all 4 quadrants  Musculoskeletal:      Right lower leg: No edema  Left lower leg: No edema  Skin:     General: Skin is warm and dry  Neurological:      Mental Status: He is alert and oriented to person, place, and time  Comments: Slow responses  Upper extremities 5/5 strength, lower extremities with weakness  Not moving when asking him to, noted to be moving his legs when he was in bed   Psychiatric:      Comments: Very tearful and anxious          Additional Data:     Labs:  Results from last 7 days   Lab Units 05/03/23  0935 05/03/23  0527 05/02/23  1207   WBC Thousand/uL 3 51*   < > 4 46   HEMOGLOBIN g/dL 8 9*   < > 9 8*   HEMATOCRIT % 27 2*   < > 30 4*   PLATELETS Thousands/uL 59*   < > 67*   NEUTROS PCT %  --   --  65   LYMPHS PCT %  --   --  17   MONOS PCT %  --   --  11   EOS PCT %  --   --  6    < > = values in this interval not displayed       Results from last 7 days   Lab Units 05/03/23  0935   SODIUM mmol/L 130*   POTASSIUM mmol/L 4 5   CHLORIDE mmol/L 99   CO2 mmol/L 27   BUN mg/dL 17   CREATININE mg/dL 1 14   ANION GAP mmol/L 4   CALCIUM mg/dL 8 4   ALBUMIN g/dL 2 7*   TOTAL BILIRUBIN mg/dL 1 84*   ALK PHOS U/L 81   ALT U/L 29   AST U/L 38   GLUCOSE RANDOM mg/dL 329*     Results from last 7 days   Lab Units 05/02/23  2008   INR  1 50*     Results from last 7 days   Lab Units 05/03/23  0927   POC GLUCOSE mg/dl 324*         Results from last 7 days   Lab Units 05/03/23  0940 05/03/23  0935   LACTIC ACID mmol/L 1 7  --    PROCALCITONIN ng/ml  --  0 23       Lines/Drains:  Invasive Devices     Peripheral Intravenous Line  Duration           Peripheral IV 05/02/23 Left Antecubital 1 day    Peripheral IV 05/03/23 Dorsal (posterior); Right Hand <1 day                Imaging: Reviewed radiology reports from this admission including: chest CT scan, abdominal/pelvic CT and CT head    Recent Cultures (last 7 days):         Last 24 Hours Medication List:   Current Facility-Administered Medications   Medication Dose Route Frequency Provider Last Rate    acetaminophen  975 mg Oral [facility] Lelo Parker PA-C      calcium carbonate  1,000 mg Oral Daily PRN Maksim Alanis PA-C      Diclofenac Sodium  2 g Topical 4x Daily Maksim Alanis PA-C      dicyclomine  20 mg Oral BID before breakfast/lunch Maksim Alanis PA-C      diphenhydrAMINE  25 mg Intravenous Q6H PRN Kirill Solis PA-C      furosemide  20 mg Intravenous BID (diuretic) Cierra Hagan MD      gabapentin  100 mg Oral TID Maksim Alanis PA-C      hydrOXYzine HCL  25 mg Oral Q6H PRN Maksim Alanis PA-C      lactulose  20 g Oral Daily Maksim Alanis PA-C      methocarbamol  750 mg Oral Q6H PRN Maksim Alanis PA-C      metoclopramide  10 mg Oral TID [facility] Maksim Alanis PA-C      midodrine  2 5 mg Oral TID [facility] Cierra Hagan MD      nicotine  1 patch Transdermal Daily Maksim Alanis PA-C      pantoprazole  40 mg Oral Early Morning Maksim Alanis PA-C      propranolol  20 mg Oral Q12H Qamar Yoder MD      rOPINIRole  4 mg Oral BID NUBIA Guardado      tamsulosin  0 4 mg Oral Daily With Ana Grey PA-C          Today, Patient Was Seen By: Kirill Solis PA-C    **Please Note: This note may have been constructed using a voice recognition system  **

## 2023-05-03 NOTE — CASE MANAGEMENT
Case Management Discharge Planning Note    Patient name Bethany Varner  Location [address] [id number] MRN [id number]  : 1960 Date 5/3/2023       Current Admission Date: 2023  Current Admission Diagnosis:ALEXANDER (acute kidney injury) [facility])   Patient Active Problem List    Diagnosis Date Noted    Altered mental status 2023    Hypomagnesemia 2023    Abnormal CT scan 2023    ALEXANDER (acute kidney injury) ([facility] [place] [address] ) 2023    Back pain 2023    GI bleed 2023    History of colon cancer     Paranoia ([facility] ) 2022    Abdominal pain 2022    Pancytopenia ([facility] [place] [address] ) 2022    Cirrhosis ([facility] ) 2022    Hypokalemia 2022    Thrombocytopenia ([facility] ) 2022    Restless leg syndrome 2022    Smoking 2022    Diabetes mellitus type 2 in obese ([facility] [address] ) 2022      LOS (days): 1  Geometric Mean LOS (GMLOS) (days):   Days to GMLOS:     OBJECTIVE:  Risk of Unplanned Readmission Score: 17 43         Current admission status: Inpatient   Preferred Pharmacy:   [facility] [address] [place] [address] [address] [place] [id number]  Phone: [phone] Fax: [phone]    [id number] [place] [address]  Phone: [phone] Fax: [phone]    [facility] [address] [address]  Phone: [phone] Fax: [phone]    Primary Care Provider: No primary care provider on file  Primary Insurance: COMMERCIAL MISCELLANEOUS  Secondary Insurance:     DISCHARGE DETAILS:        CM met with patient to inquire if he contacted any other [facility] facilities  Patient contacted [facility] and requested CM contacted Keisha Carlin in Admissions [phone]  CM contacted Gracy Miles [facility] Admissions, and gave some background information on patient   [facility] would have a room available for patient in 3-4 days if he is serious and meets criteria  CM explained patient may be discharged in next day or two and [facility] could follow up with patient in community if patient is serious about admission  CM met with patient to inquire if H&P and PT/OT notes can be faxed to [facility] for review, patient agreeable      CM faxed Face Sheet, H&P and PT/OT notes to [facility] [phone]

## 2023-05-03 NOTE — PLAN OF CARE
Problem: PHYSICAL THERAPY ADULT  Goal: Performs mobility at highest level of function for planned discharge setting  See evaluation for individualized goals  Description: Treatment/Interventions: ADL retraining, Functional transfer training, LE strengthening/ROM, Elevations, Therapeutic exercise, Endurance training, Patient/family training, Equipment eval/education, Bed mobility, Gait training, Compensatory technique education, Spoke to nursing, Spoke to case management, OT  Equipment Recommended: Miguel Perea (should patient decline, would benefit from spc)       See flowsheet documentation for full assessment, interventions and recommendations  9/0/0264 [id number] by Juventino Montaño PT  Note: Prognosis: Good  Problem List: Decreased strength, Decreased endurance, Impaired balance, Decreased mobility, Decreased coordination, Decreased safety awareness, Pain  Pt tolerated session well  He was able to ambulate with decreasign assistance with use of RW compared to spc and no AD  He requires cues for improved gait pattern and safety with transfers  He requires increased time to complete mobility  He is limited by decreased strength, balance, endurance  he will continue to benefit from PT services to maximize LOF  Barriers to Discharge: Decreased caregiver support     PT Discharge Recommendation: Home with home health rehabilitation    See flowsheet documentation for full assessment

## 2023-05-03 NOTE — PHYSICAL THERAPY NOTE
"   PHYSICAL THERAPY EVALUATION  NAME:  Fox Dagn  DATE: 05/03/23    AGE:   58 y o  Mrn:   [id number]  ADMIT DX:  Flank pain [R10 9]  ALEXANDER (acute kidney injury) ([facility] [address] ) [N17 9]  Hx of cirrhosis [Z87 19]    Past Medical History:   Diagnosis Date    Cirrhosis ([facility] )     CHRISTIE    Colon cancer ([facility] )     Diabetes mellitus ([place] [address] )     Neuropathy     Restless leg syndrome      Length Of Stay: 1  Performed at least 2 patient identifiers during session: Name and Birthday  PHYSICAL THERAPY EVALUATION :    05/03/23 0739   PT Last Visit   PT Visit Date 05/03/23   Note Type   Note type Evaluation   Pain Assessment   Pain Assessment Tool FLACC   Pain Score 10 - Worst Possible Pain   Pain Location/Orientation Orientation: Bilateral;Location: Leg;Orientation: Left  (left flank \"my kidney hurts\")   Pain Rating: FLACC (Rest) - Face 0   Pain Rating: FLACC (Rest) - Legs 0   Pain Rating: FLACC (Rest) - Activity 0   Pain Rating: FLACC (Rest) - Cry 1   Pain Rating: FLACC (Rest) - Consolability 0   Score: FLACC (Rest) 1   Pain Rating: FLACC (Activity) - Face 1   Pain Rating: FLACC (Activity) - Legs 0   Pain Rating: FLACC (Activity) - Activity 0   Pain Rating: FLACC (Activity) - Cry 1   Pain Rating: FLACC (Activity) - Consolability 0   Score: FLACC (Activity) 2   Restrictions/Precautions   Other Precautions Chair Alarm; Bed Alarm; Fall Risk;Pain   Home Living   Type of Home   (4 HERVE left HR to camper)   Home Layout One level;Performs ADLs on one level; Able to live on main level with bedroom/bathroom   Bathroom Shower/Tub Tub/shower unit  (\"I have a whole tub in there\")   Bathroom Toilet Standard   Bathroom Equipment Grab bars in [facility]   (no AD PTA)   Additional Comments Pt reports living in an [place]  Reports having 4 HERVE with L HR and not using an AD for mobility  Prior Function   Level of [place] Independent with ADLs; Independent with functional mobility; Independent with IADLS   Lives With " "Significant other  (\"Fiance, well she was\")   IADLs Independent with driving; Independent with meal prep; Independent with medication management   Falls in the last 6 months 0   Comments Reports being independent with mobility, ADLs and IADLs  Reports \"I cook my meals when I need to when I am hungry  \"   General   Additional Pertinent History inc B LE edema   Cognition   Orientation Level Oriented X4   Following Commands Follows one step commands with increased time or repetition   Comments repeated cues at times, slowed responses  flat affect   Subjective   Subjective \"I have neuropathy\"   RLE Assessment   RLE Assessment WFL  (3+/5 except hip flexion 3-/5)   LLE Assessment   LLE Assessment WFL  (3+/5 except hip and knee flexion and ankle DF 3-/5)   Coordination   Movements are Fluid and Coordinated 0   Coordination and Movement Description left LE with swing through, left foot drag   Rapid Alternating Movements Impaired  (slowed left LE)   Light Touch   RLE Light Touch Impaired   RLE Light Touch Comments diminished distally   LLE Light Touch Impaired   LLE Light Touch Comments diminished distally   Bed Mobility   Supine to Sit 5  Supervision   Additional items Increased time required   Additional Comments HOB flat without bedrail  inc time to complete  Transfers   Sit to Stand   (SBA)   Additional items Increased time required;Verbal cues   Stand to Sit   (SBA)   Additional items Increased time required;Verbal cues   Stand pivot   (steadying assistance)   Additional items Increased time required;Verbal cues   Additional Comments no AD  sit<>stand without AD with SBA with inc time, wide ORACIO  spt without AD with steadying assistance with pt reaching for support, inc time to complete  Ambulation/Elevation   Gait pattern Wide ORACIO;L Foot drag;Decreased foot clearance; Short stride; Excessively slow   Gait Assistance   (minimal to steadying assistance)   Additional items Verbal cues   Assistive Device None   Distance " "ambulated 14'x2 without AD with minimal to steadying assistance with LOB to left 1x requiring minimal assistance to recover  pt reaching for external support  left foot drag with ambulation  cues for inc foot clearance  2nd trial steadying assistance  Balance   Static Sitting Good   Dynamic Sitting Fair +   Static Standing Fair   Dynamic Standing Fair -   Ambulatory Poor +   Activity Tolerance   Activity Tolerance Patient limited by fatigue;Patient limited by pain   Medical Staff Made Aware Johnny HIGHTOWER   Nurse Made Aware RN   Assessment   Prognosis Good   Problem List Decreased strength;Decreased endurance; Impaired balance;Decreased mobility; Decreased coordination;Decreased safety awareness;Pain   Barriers to Discharge Decreased caregiver support   Goals   Patient Goals \"Get a shower\" (will need doctors order)   STG Expiration Date 05/17/23   PT Treatment Day 1   Plan   Treatment/Interventions ADL retraining;Functional transfer training;LE strengthening/ROM; Elevations; Therapeutic exercise; Endurance training;Patient/family training;Equipment eval/education; Bed mobility;Gait training; Compensatory technique education;Spoke to nursing;Spoke to case management;OT   PT Frequency 2-3x/wk   Recommendation   PT Discharge Recommendation Home with home health rehabilitation   Equipment Recommended Rodríguez Elias  (should patient decline, would benefit from spc)   Foster Pappas walker   Change/add to "[employer]?  No   AM-PAC Basic Mobility Inpatient   Turning in Flat Bed Without Bedrails 3   Lying on Back to Sitting on Edge of Flat Bed Without Bedrails 3   Moving Bed to Chair 3   Standing Up From Chair Using Arms 3   Walk in Room 3   Climb 3-5 Stairs With Railing 3   Basic Mobility Inpatient Raw Score 18   Basic Mobility Standardized Score 41 05   Highest Level Of Mobility   JH-HLM Goal 6: Walk 10 steps or more   JH-HLM Achieved 8: Walk 250 feet ot more   Dynamic Gait Index   Gait level surface  2   Change in " gait speed 2   Gait with horizontal head turns  1   Gait with vertical head turns  1   Additional Treatment Session   Start Time 2286   End Time 0818   Treatment Assessment Pt tolerated session well  He was able to ambulate with decreasign assistance with use of RW compared to spc and no AD  He requires cues for improved gait pattern and safety with transfers  He requires increased time to complete mobility  He is limited by decreased strength, balance, endurance  he will continue to benefit from PT services to maximize LOF  Equipment Use initiated use of spc  sit<>stand with spc with supervision with inc time with min cues for technique  spt with spc with SBA with inc time with min cues for sequence with cane  ambulated 50'x1 wiht spc with SBA with slow sravani, dec step lenght and foot clearance left > right, short stride, inc path deviation, inconsistent use of cane  verbal cues for continued use of cane and sequencing for cane  pt guarded  provided RW  ambulated 210'x1 with RW with Supervision with improved sravani, step length and foot clearance  scored 6/12 on 4 item DGI with RW use  encouraged RW for home, however patient questioning if RW would fit in home despite reporting it would at start of session  Should he not be able to use RW, would benefit from at least a cane for balance and stability, although recommendation is RW  completed 5 steps (2 with spc and left HR and 3 with L HR without spc)  ascended reciprocally and descended leading wiht L LE  close supervision to complete with inc time  sit<>stand wiht RW wiht superviison wiht min cues for hand placement for safety  End of Consult   Patient Position at End of Consult Bedside chair;Bed/Chair alarm activated; All needs within reach         (Please find full objective findings from PT assessment regarding body systems outlined above)       Assessment: Pt is a 58 y o  male seen for PT evaluation s/p admission to [facility] on 5/2/2023 with ALEXANDER (acute kidney injury) ([facility] [address] )  Order placed for PT services  Upon evaluation: Pt is presenting with impaired functional mobility due to pain, decreased strength, decreased endurance, impaired balance, impaired coordination, gait deviations, altered sensation, decreased safety awareness, fall risk and LE edema requiring  supervision assistance for bed mobility, stand by to steadying assistance for transfers, and minimal to steadying assistance for ambulation with out AD   Pt's clinical presentation is currently unpredictable given the functional mobility deficits above, especially weakness, edema of extremities, decreased endurance, impaired coordination, gait deviations, pain, decreased activity tolerance, decreased functional mobility tolerance, altered sensation and decreased safety awareness, coupled with fall risks as indicated by AM-PAC 6-Clicks: 02/67 as well as impaired balance, polypharmacy, impaired coordination, decreased safety awareness and limited sensation/neuropathy and combined with medical complications of abnormal H&H, abnormal WBCs, abnormal blood sugars, abnormal sodium values, need for input for mobility technique/safety and abnormal magnesium, abnormal platelets, Partially imaged cirrhotic liver and evidence of portal hypertension and mild interval enlargement of aortocaval lymph node and one of the periportal lymph nodes since February 2023 on imaging, ALEXANDER, liver cirrhosis, left flank pain  Pt's PMHx and comorbidities that may affect physical performance and progress include: DM and chronic pancytopenia, h/o colon CA s/p hemicolectomy and chemotherapy 2017, h/o kidney stones, liver cirrhosis secondary to CHRISTIE with recent h/o multiple decompensations and hospitalizations per H&P, RLS, neuropathy   Personal factors affecting pt at time of IE include: step(s) to enter environment, limited home support, inability to perform IADLs, inability to perform ADLs and inability to navigate level surfaces without external assistance  Pt will benefit from continued skilled PT services to address deficits as defined above and to maximize level of functional mobility to facilitate return toward PLOF and improved QOL  From PT/mobility standpoint, recommendation at time of d/c would be Home PT, home with family support and with walker pending progress in order to reduce fall risk and maximize pt's functional independence and consistency with mobility in order to facilitate return to PLOF  Recommend trial with walker next 1-2 sessions, trial with cane next 1-2 sessions and ther ex next 1-2 sessions, trial without AD as appropriate  The patient's AM-PAC Basic Mobility Inpatient Short Form Raw Score is 18  A Raw score of greater than 16 suggests the patient may benefit from discharge to home  Please also refer to the recommendation of the Physical Therapist for safe discharge planning  Goals: Pt will: Perform bed mobility tasks with modified Independent to reposition in bed and prepare for transfers  Pt will perform transfers with modified Independent to decrease burden of care and decrease risk for falls and prepare for ambulation  Pt will ambulate with LRAD for >/= 300' with  modified Independent  to decrease burden of care, decrease risk for falls and improve activity tolerance and to access home environment  Pt will complete >/= 8 steps with unilateral handrail with modified Independent to return to home with HERVE, decrease risk for falls, improve activity tolerance and improve gait quality  Pt will demonstrate decreased fall risk as indicated by score of >/= 10/12 on 4 item DGI  Pt will participate in SSWS assessment to determine level of mobility  Pt will increase B LE strength >/= 1/2 MMT grade to facilitate functional mobility        Aurther Smoker, PT,DPT

## 2023-05-03 NOTE — NURSING NOTE
HÉCTOR Whitmore called to the room due to pts weakness, flushed feeling, nausea and confusion  RRT Called

## 2023-05-03 NOTE — ASSESSMENT & PLAN NOTE
· Liver cirrhosis secondary to [facility] with recent history of multiple decompensations and hospitalizations  · Appears to be noncompliant with medications as was prescribed lactulose in February and states not taking  · Current regimen is lactulose 20 g daily , Lasix 40 mg daily, spironolactone 100 mg daily  · Also after hospitalizations with GI bleed was started on propranolol 60 mg daily by GI  · PT does not follow-up with outpatient GI  · Ammonia normal, INR: 1 50  · IV diuresis with Lasix gently due to ALEXANDER and volume overload - if responding well may adjust as necessary, holding spirolactone for now - resume when able   · CT chest abdomen pelvis: Mesenteric fatty lesion is noted on the right slightly larger than prior exams but with a fairly smooth margin with a second area also seen on the left  Maturing fat necrosis is still possible although usually some calcification will occur  A fatty neoplasm is not entirely excluded but is less likely  Continued surveillance is advised   Cirrhotic changes are again noted with splenomegaly

## 2023-05-03 NOTE — ASSESSMENT & PLAN NOTE
· Colon cancer s/p hemicolectomy in 2017, chemotherapy  · CT renal stone study showing mild interval enlargement of aortocaval lymph node and one of the periportal lymph nodes since February 2023 other abdominal lymphadenopathy is mostly stable  · Recommend continued outpatient follow-up  · States he no longer follows with any oncologist - should continue to follow up with outpatient oncology  · CT chest abdomen pelvis: Mediastinal and abdominal adenopathy of uncertain etiology is somewhat prominent although the abdominal nodes are similar to the prior examinations  Potentially these are reactive although a low-grade lymphoproliferative disorder is not excluded  Further hematologic work-up could be considered

## 2023-05-03 NOTE — NURSING NOTE
Pt complaining of facial flushing nausea and weakness is extremities  /58 Temp 97 7 HR 53 AOx4  HÉCTOR Chou notified

## 2023-05-03 NOTE — OCCUPATIONAL THERAPY NOTE
"    Occupational Therapy Evaluation     Evaluation: 0740-0800  Treatment: 0800-0810    Patient Name: Davian Owens  LWNNY'M Date: 5/3/2023  Problem List  Principal Problem:    ALEXANDER (acute kidney injury) ([facility] )  Active Problems:    Cirrhosis ([facility] )    Diabetes mellitus type 2 in obese ([facility] )    Pancytopenia ([facility] )    History of colon cancer    Back pain    Past Medical History  Past Medical History:   Diagnosis Date    Cirrhosis ([facility] )     CHRISTIE    Colon cancer ([facility] )     Diabetes mellitus ([facility] )     Neuropathy     Restless leg syndrome      Past Surgical History  Past Surgical History:   Procedure Laterality Date    EGD AND SIGMOIDOSCOPY FLEXIBLE      IR PARACENTESIS  11/22/2022    LEFT COLON RESECTION      LITHOTRIPSY           05/03/23 0740   Note Type   Note type Evaluation   Pain Assessment   Pain Assessment Tool FLACC   Pain Score 10 - Worst Possible Pain   Pain Location/Orientation Orientation: Bilateral;Location: Leg  (L flank \"kidney\")   Pain Rating: FLACC (Rest) - Face 0   Pain Rating: FLACC (Rest) - Legs 0   Pain Rating: FLACC (Rest) - Activity 0   Pain Rating: FLACC (Rest) - Cry 1   Pain Rating: FLACC (Rest) - Consolability 0   Score: FLACC (Rest) 1   Pain Rating: FLACC (Activity) - Face 1   Pain Rating: FLACC (Activity) - Legs 0   Pain Rating: FLACC (Activity) - Activity 0   Pain Rating: FLACC (Activity) - Cry 1   Pain Rating: FLACC (Activity) - Consolability 0   Score: FLACC (Activity) 2   Restrictions/Precautions   Other Precautions Chair Alarm; Bed Alarm; Fall Risk;Pain   Home Living   Type of Home Mobile home  (4 STEL HR)   Home Layout One level;Performs ADLs on one level; Able to live on main level with bedroom/bathroom   Bathroom Shower/Tub Tub/shower unit   Bathroom Toilet Standard   Bathroom Equipment Grab bars in [facility]   (reports no AD)   Additional Comments Pt reports living in a camper  4 HERVE   Pt ambulates with no AD at baseline   Prior Function   Level of [place] Independent " "with ADLs; Independent with functional mobility; Independent with IADLS   Lives With Significant other  (\"Fiance, well she was\")   IADLs Independent with driving; Independent with meal prep; Independent with medication management   Falls in the last 6 months 0   Comments Pt reports completing ADLs, IADLs, functional ambulation and community mobility + driving @ I   ADL   Where Assessed Edge of bed   Grooming Assistance   (SBA - standing at sinkside)   Grooming Deficit Verbal cueing;Supervision/safety; Increased time to complete;Wash/dry hands; Wash/dry face   UB Bathing Assistance   (SBA - standing at sinkside)   UB Bathing Deficit Verbal cueing;Supervision/safety; Increased time to complete   LB Bathing Assistance   (SBA -standing at sinkside)   LB Bathing Deficit Verbal cueing;Supervision/safety; Increased time to complete   UB Dressing Assistance 5  Supervision/Setup   UB Dressing Deficit Setup   LB Dressing Assistance   (SBA)   LB Dressing Deficit Verbal cueing;Supervision/safety; Increased time to complete   Toileting Assistance    (SBA)   Toileting Deficit Verbal cueing;Supervison/safety; Increased time to complete   Additional Comments Pt completing ADL tasks while seated at EOB  UB Dressing @ S after set-up  LB Dressing @ SBA including donning socks/pants around feet and CM aroudn waist with no AD for UB support   Bed Mobility   Supine to Sit 5  Supervision   Additional items Increased time required   Additional Comments HOB flat, no bedrails   Transfers   Sit to Stand   (SBA)   Additional items Increased time required;Verbal cues   Stand to Sit   (SBA)   Additional items Increased time required;Verbal cues   Stand pivot   (Steadying Assist)   Additional items Increased time required;Verbal cues   Toilet transfer   (SBA)   Additional items Increased time required;Verbal cues;Standard toilet   Additional Comments Pt completing functional transfers with use of no AD for UB support   SBA for STS and SPT with increased " time and occasional cues for safety PRN   Balance   Static Sitting Good   Dynamic Sitting Fair +   Static Standing Fair   Dynamic Standing Fair -   Activity Tolerance   Activity Tolerance Patient limited by fatigue;Patient limited by pain   Medical Staff Made Aware Spoke with PTZaina   Nurse Made Aware Spkoe with RN   RUE Assessment   RUE Assessment WFL   LUE Assessment   LUE Assessment WFL   Hand Function   Gross Motor Coordination Functional   Fine Motor Coordination Functional   Sensation   Light Touch No apparent deficits   Cognition   Arousal/Participation Alert; Responsive; Cooperative   Attention Attends with cues to redirect   Orientation Level Oriented X4   Memory Within functional limits   Following Commands Follows one step commands without difficulty   Comments Pt with flat affect  requiring repeated cues occasionally for safety   Assessment   Limitation Decreased ADL status; Decreased UE strength;Decreased Safe judgement during ADL;Decreased cognition;Decreased endurance;Decreased self-care trans;Decreased high-level ADLs   Prognosis Good   Assessment Pt is a 58 y o  male, admitted to [address] [place] [address] 5/2/2023 d/t experiencing L sided flank pain  Dx: ALEXANDER  Pt with PMHx impacting their performance during ADL tasks, including: hx colon CA, liver cirrhosis 2* CHRISTIE with DM, neuropathy, restless leg syndrome  Prior to admission to the hospital Pt was performing ADLs without physical assistance  IADLs without physical assistance  Functional transfers/ambulation without physical assistance  Cognitive status was PTA was intact  OT order placed to assess Pt's ADLs, cognitive status, and performance during functional tasks in order to maximize safety and independence while making most appropriate d/c recommendations   Pt's clinical presentation is currently unstable/unpredictable given new onset deficits that effect Pt's occupational performance and ability to safely return to OF including decrease activity tolerance, decrease standing balance, decrease performance during ADL tasks, decrease cognition, decrease safety awareness , decrease UB MS, decrease generalized strength, decrease activity engagement, decrease performance during functional transfers, steps to enter home, limited family support, high fall risk and limited insight to deficits combined with medical complications of hypotension, pain impacting overall mobility status, abnormal renal lab values, abnormal H&H, abnormal CBC, edema/swelling, decreased skin integrity, multiple readmissions and need for input for mobility technique/safety  Personal factors affecting Pt at time of initial evaluation include: affordability, step(s) to enter environment, limited home support, past experience, inability to perform current job functions, inability to perform IADLs, inability to perform ADLs, new need for AD, inability to navigate community distances, limited insight into impairments, decreased initiation and engagement and questionable non-compliance  Pt will benefit from continued skilled OT services to address deficits as defined above and to maximize level independence/participation during ADLs and functional tasks to facilitate return toward PLOF and improved quality of life  From an occupational therapy standpoint, recommendation at time of d/c would be HHOT  Plan   Treatment Interventions ADL retraining;Functional transfer training;UE strengthening/ROM; Endurance training;Patient/family training;Equipment evaluation/education;Cognitive reorientation; Neuromuscular reeducation; Compensatory technique education;Continued evaluation; Energy conservation; Activityengagement   Goal Expiration Date 05/17/23   OT Treatment Day 1   OT Frequency 2-3x/wk   Recommendation   OT Discharge Recommendation Home with home health rehabilitation   AM-PAC Daily Activity Inpatient   Lower Body Dressing 3   Bathing 3   Toileting 3   Upper Body Dressing 3   Grooming 3   Eating 4   Daily Activity Raw Score 19   Daily Activity Standardized Score (Calc for Raw Score >=11) 40 22   AM-PAC Applied Cognition Inpatient   Following a Speech/Presentation 3   Understanding Ordinary Conversation 4   Taking Medications 3   Remembering Where Things Are Placed or Put Away 4   Remembering List of 4-5 Errands 3   Taking Care of Complicated Tasks 3   Applied Cognition Raw Score 20   Applied Cognition Standardized Score 41 76   Additional Treatment Session   Start Time 0800   End Time 0810   Treatment Assessment Pt seen for treatment session #1 this date  Pt alert and agreeable to participate at this time  Pt completing short distance ambulation into restroom @ SBA with increased time and vc'ing for safety  occasional swaying noted although no LOB occured  Pt then completing toileting tasks @ SBA including STS from toilet with use of grab bar, CM around waist and thorough pericare  Pt then standing at sinkside while completing full bathing ADL  UB bathing and LB bathing completed with use of basin in sink  Pt standing with no LOB noted adn increased time required PRN @ SBA  Pt then completing oral care and grooming tasks @ SBA at sinkside  UB supported PRN  Pt then returning to recliner chair @ SBA  Pt seated OOB at end of session with call bell in reach, chair alarm intact and all needs met at this time   Additional Treatment Day 1     The patient's raw score on the AM-PAC Daily Activity Inpatient Short Form is 19  A raw score of greater than or equal to 19 suggests the patient may benefit from discharge to home  Please refer to the recommendation of the Occupational Therapist for safe discharge planning  Pt goals to be met by 5/17/2023    Pt will demonstrate ability to complete LB dressing @ I in order to increase safety and independence during meaningful tasks  Pt will demonstrate ability to naveen/doff socks/shoes while sitting EOB @ I in order to increase safety and independence during meaningful tasks  Pt will demonstrate ability to complete toileting tasks including CM and pericare @ I in order to increase safety and independence during meaningful tasks  Pt will demonstrate ability to complete EOB, chair, toilet/commode transfers @ I in order to increase performance and participation during functional tasks  Pt will demonstrate ability to stand for 10+ minutes while maintaining G balance with use of least restrictive device for UB support PRN  Pt will demonstrate ability to tolerate 30-35 minute OT session with no vc'ing for deep breathing or use of energy conservation techniques in order to increase activity tolerance during functional tasks  Pt will demonstrate Good carryover of use of energy conservation/compensatory strategies during ADLs and functional tasks in order to increase safety and reduce risk for falls  Pt will demonstrate Good attention and participation in continued evaluation of functional ambulation house hold distances in order to assist with safe d/c planning  Pt will attend to continued cognitive assessments 100% of the time in order to provide most appropriate d/c recommendations  Pt will follow 100% simple 2-step commands and be A&O x4 consistently with environmental cues to increase participation in functional activities  Pt will identify 3 areas of interest/hobbies and 1 intervention on how to incorporate into daily life in order to increase interaction with environment and peers as well as increase participation in meaningful tasks  Pt will demonstrate 100% carryover of BUE HEP in order to increase BUE MS and increase performance during functional tasks upon d/c home      Nusrat Pina OTR/L

## 2023-05-03 NOTE — ASSESSMENT & PLAN NOTE
· Noted to have magnesium of 1 6, patient started having weakness, flushed feeling, nausea and confusion after infusion of magnesium  · RRT called due to this and critical care suspecting this could be secondary to too fast of infusion of magnesium   Given IV NS and IV benadryl  · Monitor mag level

## 2023-05-03 NOTE — ASSESSMENT & PLAN NOTE
Lab Results   Component Value Date    HGBA1C 5 5 02/10/2023       Recent Labs     05/03/23  0927   POCGLU 324*       Blood Sugar Average: Last 72 hrs:  (P) 324   · Patient previously diagnosed and started on metformin during recent hospitalization  · Hold metformin for now  · Correction scale -ACHS

## 2023-05-03 NOTE — ASSESSMENT & PLAN NOTE
· Presents with left flank/back pain  · States he is a history of kidney stones that he passed to last week  · CT renal stone study without any obstructing stone, punctate stone seen left-sided intrarenal calculi and 3 mm right-sided intrarenal calculus  · Continue pain control  · Flomax  · [facility] admission seems he is seeking narcotic medications and leaving AMA we will hold off on any narcotic medications as his CT did not reveal any evidence of any cause of his left flank pain   · PT/OT: home health  · Monitor for improvement with supportive care

## 2023-05-03 NOTE — ASSESSMENT & PLAN NOTE
· RRT called on 5/3 due to patient having weakness, flushed feeling, nausea, confusion, severe abdominal pain, inability to move legs, chest pain  Critical care came and evaluated patient and recommending CT head, CT chest abdomen pelvis, troponin, ECG, procalcitonin, CBC, CMP, lactic acid, glucose, repeat blood pressure which was noted to be 92/58  500 cc NS given  · Patient's significant other was called after RRT and informed of the events  She verbalized understanding  Call with further results when they become available  · Patient was very tearful on examination and was answering orientation questions correctly  Prior to this, patient was ambulating well with PT OT  When asking patient to do strength testing lower extremities he is unable to stating that he cannot lift his legs or move them at all  · Patient was moved from the chair into his bed and he was able to stand and move his legs while doing this  During RRT, patient was moving his legs in the bed with flexion and extension  · Pro-Osbaldo, troponin, lactic acid normal   Glucose elevated however patient did just eat 2 large trays of breakfast prior to this  · CT head: No acute intracranial abnormalities  · CT chest abdomen pelvis not showing any evidence of free air  CT scan of the abdomen is showing diffuse small bowel wall thickening without dilatation suggest enteritis or possibly related to interstitial edema from chronic cirrhosis and vascular collateralization  Uncertain if this is contributing to patient's abdominal pain or if his abdominal pain is nonspecific and coming from left flank pain that he chronically has  · Avoid narcotic pain medications

## 2023-05-03 NOTE — CASE MANAGEMENT
Case Management Assessment & Discharge Planning Note    Patient name Dusty Brunner  Location [id number] MRN [id number]  : 1960 Date 5/3/2023       Current Admission Date: 2023  Current Admission Diagnosis:ALEXANDER (acute kidney injury) [facility])   Patient Active Problem List    Diagnosis Date Noted    Altered mental status 2023    Hypomagnesemia 2023    ALEXANDER (acute kidney injury) ([facility] ) 2023    Back pain 2023    GI bleed 2023    History of colon cancer     Paranoia ([facility] ) 2022    Abdominal pain 2022    Pancytopenia ([facility] ) 2022    [facility] 20[facility]    Hypokalemia 2022    Thrombocytopenia [facility] 2022    Restless leg syndrome 2022    Smoking 2022    Diabetes mellitus type 2 in obese ([facility] ) 2022      LOS (days): 1  Geometric Mean LOS (GMLOS) (days):   Days to GMLOS:     OBJECTIVE:    Risk of Unplanned Readmission Score: 16 89         Current admission status: Inpatient  Referral Reason:  (Social Issues)    Preferred Pharmacy:   [address] [facility] [address] [address]  Phone: [phone] Fax: [phone]    [id number] [address]  Phone: [phone] Fax: [phone]    [facility] [address] [place] [address]  [address] [place] [address]  Phone: [phone] Fax: [phone]    Primary Care Provider: No primary care provider on file  Primary Insurance: COMMERCIAL MISCELLANEOUS  Secondary Insurance:     ASSESSMENT:  Active Health Care Proxies    There are no active Health Care Proxies on file         Advance Directives  Does patient have a [address]?: No  Was patient offered paperwork?: Yes (patient declined)  Does patient currently have a Health Care decision maker?: Yes, please see Health Care Proxy section  Does patient have Advance Directives?: No  Was patient offered paperwork?: Yes (patient declined)  Primary Contact: Paola Byes         Readmission Root Cause  30 Day Readmission: No    Patient Information  Admitted from[de-identified] Home  Mental Status: Alert  During Assessment patient was accompanied by: Not accompanied during assessment  Assessment information provided by[de-identified] Patient  Primary Caregiver: Self  Support Systems: Spouse/significant other  [place] of Residence: [address] [place] [address] do you live in?: [address]  entry access options   Select all that apply : Stairs  Number of steps to enter home : 3  Do the steps have railings?: Yes  Type of Current Residence: Other (Comment) (RV)  In the last 12 months, was there a time when you were not able to pay the mortgage or rent on time?: No  In the last 12 months, how many places have you lived?: 3  In the last 12 months, was there a time when you did not have a steady place to sleep or slept in a shelter (including now)?: No  Homeless/housing insecurity resource given?: N/A  Living Arrangements: Lives w/ Spouse/significant other  Is patient a ?: No    Activities of Daily Living Prior to Admission  Functional Status: Independent  Completes ADLs independently?: Yes  Ambulates independently?: Yes  Does patient use assisted devices?: No  Does patient currently own DME?: No  Does patient have a history of Outpatient Therapy (PT/OT)?: No  Does the patient have a history of Short-Term Rehab?: No  Does patient have a history of HHC?: No  Does patient currently have [facility]?: No         Patient Information Continued  Income Source: SSI/SSD  Does patient have prescription coverage?: Yes  Within the past 12 months, you worried that your food would run out before you got the money to buy more : Never true  Within the past 12 months, the food you bought just didn't last and you didn't have money to get more : Never true  Food insecurity resource given?: N/A  Does patient receive dialysis treatments?: No  Does patient have a history of substance abuse?: No  Does patient have a history of Mental Health Diagnosis?: No         Means of Transportation  Means of Transport to Appts[de-identified] Drives Self  In the past 12 months, has lack of transportation kept you from medical appointments or from getting medications?: No  In the past 12 months, has lack of transportation kept you from meetings, work, or from getting things needed for daily living?: No  Was application for public transport provided?: N/A        DISCHARGE DETAILS:    Discharge planning discussed with[de-identified] patient  Freedom of Choice: Yes     CM contacted family/caregiver?: No- see comments (patient declined)                            Other Referral/Resources/Interventions Provided:  Interventions: Assisted Living  Referral Comments: provided pateint with list of ALFs          CM met with patient and reported he and his SO are residing at [facility] in 32 foot trailer  Patient and SO recently moved to this campground for cheaper rent  Trailer is pulled by spouse's SUV  Patient does not have his own vehicle for transportation  CM inquired where patient is originally from and patient reported [place], however, patient has been with SO for 4 years  Patient reported he and SO argue frequently and patient does not desire to return to [facility] with SO  Patient indicated he receives 2200/mo Social Security and 900/mo pension  Patient reported he does not have his own bank account and his checks are deposited into SOs account; patient would need to get this changed  Patient reported he previously resided at [facility] but had to leave the building during the day  Patient requested [facility] options  CM provided patient with list of [facility] facilities and asked patient to contact them to inquire on pricing  CM to follow up with patient for availability and pricing options

## 2023-05-03 NOTE — PLAN OF CARE
Problem: PHYSICAL THERAPY ADULT  Goal: Performs mobility at highest level of function for planned discharge setting  See evaluation for individualized goals  Description: Treatment/Interventions: ADL retraining, Functional transfer training, LE strengthening/ROM, Elevations, Therapeutic exercise, Endurance training, Patient/family training, Equipment eval/education, Bed mobility, Gait training, Compensatory technique education, Spoke to nursing, Spoke to case management, OT  Equipment Recommended: Minta Kilts (should patient decline, would benefit from spc)       See flowsheet documentation for full assessment, interventions and recommendations  Note: Prognosis: Good  Problem List: Decreased strength, Decreased endurance, Impaired balance, Decreased mobility, Decreased coordination, Decreased safety awareness, Pain  Assessment: Pt is a 58 y o  male seen for PT evaluation s/p admission to [address] [place] [address] on 5/2/2023 with ALEXANDER (acute kidney injury) ([facility] [address] )  Order placed for PT services  Upon evaluation: Pt is presenting with impaired functional mobility due to pain, decreased strength, decreased endurance, impaired balance, impaired coordination, gait deviations, altered sensation, decreased safety awareness, fall risk and LE edema requiring  supervision assistance for bed mobility, stand by to steadying assistance for transfers, and minimal to steadying assistance for ambulation with out AD    Pt's clinical presentation is currently unpredictable given the functional mobility deficits above, especially weakness, edema of extremities, decreased endurance, impaired coordination, gait deviations, pain, decreased activity tolerance, decreased functional mobility tolerance, altered sensation and decreased safety awareness, coupled with fall risks as indicated by AM-PAC 6-Clicks: 80/71 as well as impaired balance, polypharmacy, impaired coordination, decreased safety awareness and limited sensation/neuropathy and combined with medical complications of abnormal H&H, abnormal WBCs, abnormal blood sugars, abnormal sodium values, need for input for mobility technique/safety and abnormal magnesium, abnormal platelets, Partially imaged cirrhotic liver and evidence of portal hypertension and mild interval enlargement of aortocaval lymph node and one of the periportal lymph nodes since February 2023 on imaging, ALEXANDER, liver cirrhosis, left flank pain  Pt's PMHx and comorbidities that may affect physical performance and progress include: DM and chronic pancytopenia, h/o colon CA s/p hemicolectomy and chemotherapy 2017, h/o kidney stones, liver cirrhosis secondary to CHRISTIE with recent h/o multiple decompensations and hospitalizations per H&P, RLS, neuropathy  Personal factors affecting pt at time of IE include: step(s) to enter environment, limited home support, inability to perform IADLs, inability to perform ADLs and inability to navigate level surfaces without external assistance  Pt will benefit from continued skilled PT services to address deficits as defined above and to maximize level of functional mobility to facilitate return toward PLOF and improved QOL  From PT/mobility standpoint, recommendation at time of d/c would be Home PT, home with family support and with walker pending progress in order to reduce fall risk and maximize pt's functional independence and consi  Barriers to Discharge: Decreased caregiver support     PT Discharge Recommendation: Home with home health rehabilitation    See flowsheet documentation for full assessment

## 2023-05-03 NOTE — PLAN OF CARE
Problem: MOBILITY - ADULT  Goal: Maintain or return to baseline ADL function  Description: INTERVENTIONS:  -  Assess patient's ability to carry out ADLs; assess patient's baseline for ADL function and identify physical deficits which impact ability to perform ADLs (bathing, care of mouth/teeth, toileting, grooming, dressing, etc )  - Assess/evaluate cause of self-care deficits   - Assess range of motion  - Assess patient's mobility; develop plan if impaired  - Assess patient's need for assistive devices and provide as appropriate  - Encourage maximum independence but intervene and supervise when necessary  - Involve family in performance of ADLs  - Assess for home care needs following discharge   - Consider OT consult to assist with ADL evaluation and planning for discharge  - Provide patient education as appropriate  Outcome: Progressing  Goal: Maintains/Returns to pre admission functional level  Description: INTERVENTIONS:  - Perform BMAT or MOVE assessment daily    - Set and communicate daily mobility goal to care team and patient/family/caregiver  - Collaborate with rehabilitation services on mobility goals if consulted  - Perform Range of Motion  times a day  - Reposition patient every  hours    - Dangle patient times a day  - Stand patient  times a day  - Ambulate patient  times a day  - Out of bed to chair  times a day   - Out of bed for meals  times a day  - Out of bed for toileting  - Record patient progress and toleration of activity level   Outcome: Progressing     Problem: PAIN - ADULT  Goal: Verbalizes/displays adequate comfort level or baseline comfort level  Description: Interventions:  - Encourage patient to monitor pain and request assistance  - Assess pain using appropriate pain scale  - Administer analgesics based on type and severity of pain and evaluate response  - Implement non-pharmacological measures as appropriate and evaluate response  - Consider cultural and social influences on pain and pain management  - Notify physician/advanced practitioner if interventions unsuccessful or patient reports new pain  Outcome: Progressing     Problem: INFECTION - ADULT  Goal: Absence or prevention of progression during hospitalization  Description: INTERVENTIONS:  - Assess and monitor for signs and symptoms of infection  - Monitor lab/diagnostic results  - Monitor all insertion sites, i e  indwelling lines, tubes, and drains  - Monitor endotracheal if appropriate and nasal secretions for changes in amount and color  - [place] appropriate cooling/warming therapies per order  - Administer medications as ordered  - Instruct and encourage patient and family to use good hand hygiene technique  - Identify and instruct in appropriate isolation precautions for identified infection/condition  Outcome: Progressing     Problem: SAFETY ADULT  Goal: Maintain or return to baseline ADL function  Description: INTERVENTIONS:  -  Assess patient's ability to carry out ADLs; assess patient's baseline for ADL function and identify physical deficits which impact ability to perform ADLs (bathing, care of mouth/teeth, toileting, grooming, dressing, etc )  - Assess/evaluate cause of self-care deficits   - Assess range of motion  - Assess patient's mobility; develop plan if impaired  - Assess patient's need for assistive devices and provide as appropriate  - Encourage maximum independence but intervene and supervise when necessary  - Involve family in performance of ADLs  - Assess for home care needs following discharge   - Consider OT consult to assist with ADL evaluation and planning for discharge  - Provide patient education as appropriate  Outcome: Progressing  Goal: Maintains/Returns to pre admission functional level  Description: INTERVENTIONS:  - Perform BMAT or MOVE assessment daily    - Set and communicate daily mobility goal to care team and patient/family/caregiver     - Collaborate with rehabilitation services on mobility goals if consulted  - Perform Range of Motion times a day  - Reposition patient every  hours    - Dangle patient  times a day  - Stand patient  times a day  - Ambulate patient  times a day  - Out of bed to chair  times a day   - Out of bed for meals times a day  - Out of bed for toileting  - Record patient progress and toleration of activity level   Outcome: Progressing  Goal: Patient will remain free of falls  Description: INTERVENTIONS:  - Educate patient/family on patient safety including physical limitations  - Instruct patient to call for assistance with activity   - Consult OT/PT to assist with strengthening/mobility   - Keep Call bell within reach  - Keep bed low and locked with side rails adjusted as appropriate  - Keep care items and personal belongings within reach  - Initiate and maintain comfort rounds  - Make Fall Risk Sign visible to staff  - Offer Toileting every  Hours, in advance of need  - Initiate/Maintain alarm  - Obtain necessary fall risk management equipment:   - Apply yellow socks and bracelet for high fall risk patients  - Consider moving patient to room near nurses station  Outcome: Progressing     Problem: DISCHARGE PLANNING  Goal: Discharge to home or other facility with appropriate resources  Description: INTERVENTIONS:  - Identify barriers to discharge w/patient and caregiver  - Arrange for needed discharge resources and transportation as appropriate  - Identify discharge learning needs (meds, wound care, etc )  - Arrange for interpretive services to assist at discharge as needed  - Refer to Case Management Department for coordinating discharge planning if the patient needs post-hospital services based on physician/advanced practitioner order or complex needs related to functional status, cognitive ability, or social support system  Outcome: Progressing     Problem: Knowledge Deficit  Goal: Patient/family/caregiver demonstrates understanding of disease process, treatment plan, medications, and discharge instructions  Description: Complete learning assessment and assess knowledge base    Interventions:  - Provide teaching at level of understanding  - Provide teaching via preferred learning methods  Outcome: Progressing

## 2023-05-03 NOTE — ASSESSMENT & PLAN NOTE
· CT chest abdomen pelvis with results below  · Cirrhotic changes are again noted with splenomegaly  · Status post right hemicolectomy  · Diffuse small bowel wall thickening without dilatation suggest enteritis or possibly related to interstitial edema from chronic cirrhosis and vascular collateralization  · Mediastinal and abdominal adenopathy of uncertain etiology is somewhat prominent although the abdominal nodes are similar to the prior examinations  Potentially these are reactive although a low-grade lymphoproliferative disorder is not excluded  Further hematologic work-up could be considered  · Mesenteric fatty lesion is noted on the right slightly larger than prior exams but with a fairly smooth margin with a second area also seen on the left  Maturing fat necrosis is still possible although usually some calcification will occur  A fatty neoplasm is not entirely excluded but is less likely  Continued surveillance is advised    · Recommending follow-up with hematology oncology outpatient

## 2023-05-03 NOTE — ASSESSMENT & PLAN NOTE
· Patient with chronic pancytopenia likely secondary to liver disease  · Seems to be improved from his most recent labs  · Continue to monitor  · Patient should continue to follow up with oncology outpatient regarding pancytopenia       Lab Results   Component Value Date    RBC 2 71 (L) 05/03/2023    RBC 2 69 (L) 05/03/2023    WBC 3 51 (L) 05/03/2023    WBC 3 00 (L) 05/03/2023    PLT 59 (L) 05/03/2023    PLT 50 (L) 05/03/2023

## 2023-05-03 NOTE — UTILIZATION REVIEW
From: May Quevedo  To: INA Correa  Sent: 7/11/2019 9:11 AM CDT  Subject: Non-Urgent Medical Question    I'm not sure who to email, but I've been having pain on the right side of my chest. Hard to breathe in/pain when breathing in. No chest pain on the left side. Should I make an appointment? And if so, who could I get in to see? I'm not sure if I'm having an anxiety attack or if i have lung infection.   Thank you,   Michell   Initial Clinical Review    Admission: Date/Time/Statement:   Admission Orders (From admission, onward)     Ordered        05/02/23 1518  INPATIENT ADMISSION  Once                      Orders Placed This Encounter   Procedures    INPATIENT ADMISSION     Standing Status:   Standing     Number of Occurrences:   1     Order Specific Question:   Level of Care     Answer:   Med Surg [16]     Order Specific Question:   Estimated length of stay     Answer:   More than 2 Midnights     Order Specific Question:   Certification     Answer:   I certify that inpatient services are medically necessary for this patient for a duration of greater than two midnights  See H&P and MD Progress Notes for additional information about the patient's course of treatment  ED Arrival Information     Expected   5/2/2023 11:20    Arrival   5/2/2023 11:20    Acuity   Urgent            Means of arrival   Ambulance    Escorted by   [facility]    Service   Hospitalist    Admission type   Emergency            Arrival complaint   flank pain           Chief Complaint   Patient presents with    Flank Pain     Pt presented to this ED via EMS c/o left flank pain radiating to abdomen and unable to urinate since yesterday morning  Pt seen at other hospital, given morphine, and d/c'd home  Pt continues with worsening pain today  Hx kidney stones  Initial Presentation: 58 y o  male to ED via EMS from home  Present with a PMHX of colon cancer s/p hemicolectomy in 2017, chemotherapy, liver cirrhosis secondary to CHRISTIE with diabetes type 2 who presents with left-sided flank pain  States he has decreased urination has had leg swelling 2 weeks    Admitted to M/S with DX: ALEXANDER  on exam: left-sided flank pain radiating down the groin  Pain 10/10; suprapubic tendernes; Paraspinal pain lower left side; Hypotensive; lethargic; H/H 9 8 / 30 4; Plts 67; Cr 1 71 (baseline 0 8 - 1 0);  Albumin 3 1; T Bili 1 91  Given in ED IVF Bolus 1L; Fentanyl iv x2  PLAN: start lasix iv; monitor labs; f/u ammonia level; PT / OT eval / tx; I/O; Daily wts; pain control; Accuchecks with [facility]        Date: 5/3/23    Day 2  RRT called due to complaining of weakness, flushed feeling, nausea and confusion, severe abdominal pain, chest pain, unable to move lower extremities  92/58  500 cc NS given  abdominal tenderness (Patient complaining of diffuse abdominal tenderness on auscultation and palpation)  Slow responses  Upper extremities 5/5 strength, lower extremities with weakness  Not moving when asking him to, noted to be moving his legs when he was in bed   PT / OT recomm HHC; ammonia level WNL  Gluc 324; Albumin 2 7; Mg 1 6; WBC 3 00; H/H 8 9 / 27 2; Plts 59  Urine studies: sodium 141, creatinine urine: 33 7  Plan: IVF bolus 1L; cont lasix iv; monitor labs; f/u ammonia level; I/O; Daily wts;  Nephrology Consult; rec'd Mg sulf iv; pain control; Accuchecks with [facility]      ED Triage Vitals   Temperature Pulse Respirations Blood Pressure SpO2   05/02/23 1124 05/02/23 1124 05/02/23 1124 05/02/23 1124 05/02/23 1121   (!) 96 8 °F (36 °C) 67 18 119/60 100 %      Temp Source Heart Rate Source Patient Position - Orthostatic VS BP Location FiO2 (%)   05/02/23 1124 05/02/23 1124 05/02/23 1124 05/02/23 1124 --   Temporal Monitor Lying Left arm       Pain Score       05/02/23 1124       10 - Worst Possible Pain          Wt Readings from Last 1 Encounters:   05/03/23 72 3 kg (159 lb 6 4 oz)     Additional Vital Signs:   Date/Time Temp Pulse Resp BP MAP (mmHg) SpO2 O2 Device Patient Position - Orthostatic VS   05/03/23 09:31:50 -- 55 16 -- -- 97 % -- --   05/03/23 09:28:23 -- 59 -- 92/58 -- 97 % -- --   05/03/23 09:04:09 97 7 °F (36 5 °C) 55 -- -- -- 97 % -- --   05/03/23 07:30:28 97 7 °F (36 5 °C) 58 17 99/50 66 95 % -- --   05/02/23 23:08:20 97 7 °F (36 5 °C) 61 -- 101/49 Abnormal  66 94 % -- --   05/02/23 23:05:13 97 7 °F (36 5 °C) 60 20 92/37 Abnormal  55 Abnormal  93 % -- --   05/02/23 2117 -- 68 -- 106/69 -- -- -- --   05/02/23 2100 -- -- -- -- -- 91 % None (Room air) --   05/02/23 18:20:32 -- 57 -- 110/62 78 97 % -- --   05/02/23 16:10:57 -- 64 16 98/49 Abnormal  65 99 % -- --   05/02/23 1300 -- 72 22 113/62 82 100 % -- --   05/02/23 1130 -- 66 13 120/55 83 100 % -- --   05/02/23 1124 96 8 °F (36 °C) Abnormal  67 18 119/60 -- 100 % None (Room air) Lying   05/02/23 1121 -- -- -- -- -- 100 % -- --         EKG: Normal sinus rhythm  Normal ECG  When compared with ECG of 29-NOV-2022 09:06,  No significant change was found    Pertinent Labs/Diagnostic Test Results:   CT renal stone study abdomen pelvis without contrast   Final Result by Malinda Sánchez MD (05/02 1503)      1) No ureteral or urinary bladder calculi  No hydronephrosis or significant perinephric stranding  Punctate left-sided intrarenal calculi and 3 mm right-sided intrarenal calculus  2) No acute abdominal pelvic pathology within limitation of a noncontrast low radiation dose study  3) Partially imaged cirrhotic liver and evidence of portal hypertension, better assessed on prior contrast-enhanced studies  4) Mild interval enlargement of an aortocaval lymph node and one of the periportal lymph nodes since February 2023  Other upper abdominal lymphadenopathy is mostly stable  5) Additional findings as above                 Workstation performed: [id number]         VAS lower limb venous duplex study, complete bilateral    (Results Pending)   CT head wo contrast    (Results Pending)   CT chest abdomen pelvis w contrast    (Results Pending)         Results from last 7 days   Lab Units 05/03/23  0935 05/03/23  0527 05/02/23  1207   WBC Thousand/uL 3 51* 3 00* 4 46   HEMOGLOBIN g/dL 8 9* 9 0* 9 8*   HEMATOCRIT % 27 2* 27 4* 30 4*   PLATELETS Thousands/uL 59* 50* 67*   NEUTROS ABS Thousands/µL  --   --  2 88         Results from last 7 days   Lab Units 05/03/23  0527 05/02/23  1207   SODIUM mmol/L 134* 136   POTASSIUM mmol/L 4 4 4 1   CHLORIDE mmol/L 101 101   CO2 mmol/L 28 29   ANION GAP mmol/L 5 6   BUN mg/dL 16 16   CREATININE mg/dL 1 21 1 71*   EGFR ml/min/1 73sq m 63 41   CALCIUM mg/dL 8 4 8 9   MAGNESIUM mg/dL 1 6*  --      Results from last 7 days   Lab Units 05/03/23 0527 05/02/23 2008 05/02/23  1207   AST U/L 34  --  41*   ALT U/L 27  --  33   ALK PHOS U/L 80  --  113*   TOTAL PROTEIN g/dL 6 0*  --  7 1   ALBUMIN g/dL 2 7*  --  3 1*   TOTAL BILIRUBIN mg/dL 1 68*  --  1 91*   AMMONIA umol/L  --  46  --      Results from last 7 days   Lab Units 05/03/23  0927   POC GLUCOSE mg/dl 324*     Results from last 7 days   Lab Units 05/03/23 0527 05/02/23  1207   GLUCOSE RANDOM mg/dL 222* 174*       Results from last 7 days   Lab Units 05/02/23 2008   PROTIME seconds 18 2*   INR  1 50*       Results from last 7 days   Lab Units 05/02/23  1207   LIPASE u/L 48       Results from last 7 days   Lab Units 05/02/23  1728 05/02/23  1207   CLARITY UA   --  Clear   COLOR UA   --  Yellow   SPEC GRAV UA   --  1 015   PH UA   --  6 0   GLUCOSE UA mg/dl  --  Negative   KETONES UA mg/dl  --  Negative   BLOOD UA   --  Negative   PROTEIN UA mg/dl  --  Negative   NITRITE UA   --  Negative   BILIRUBIN UA   --  Negative   UROBILINOGEN UA E U /dl  --  0 2   LEUKOCYTES UA   --  Negative   SODIUM UR  141  --    CREATININE UR mg/dL 33 7  --        ED Treatment:   Medication Administration from 05/02/2023 1120 to 05/02/2023 1604       Date/Time Order Dose Route Action     05/02/2023 1206 EDT sodium chloride 0 9 % bolus 1,000 mL 1,000 mL Intravenous New Bag     05/02/2023 1205 EDT fentanyl citrate (PF) 100 MCG/2ML 25 mcg 25 mcg Intravenous Given     05/02/2023 1346 EDT fentanyl citrate (PF) 100 MCG/2ML 50 mcg 50 mcg Intravenous Given          Present on Admission:   [facility] )   Diabetes mellitus type 2 in obese ([address] )   History of colon cancer      Admitting Diagnosis: Flank pain [R10 9]  ALEXANDER (acute kidney injury) ([facility] ) [N17 9]  Hx of cirrhosis [Z87 19]     Age/Sex: 58 y o  male     Admission Orders: SCDs; daily wts; I/O; fluid restriction 1800; Consistent Carbohydrate Diet  Accu-checks ACHS  Scheduled Medications:  Diclofenac Sodium, 2 g, Topical, 4x Daily  dicyclomine, 20 mg, Oral, BID before breakfast/lunch  furosemide, 20 mg, Intravenous, BID (diuretic)  gabapentin, 100 mg, Oral, TID  lactulose, 20 g, Oral, Daily  magnesium sulfate, 4 g, Intravenous, Once  metoclopramide, 10 mg, Oral, TID AC  midodrine, 2 5 mg, Oral, TID AC  nicotine, 1 patch, Transdermal, Daily  pantoprazole, 40 mg, Oral, Early Morning  propranolol, 20 mg, Oral, Q12H SANDRA  rOPINIRole, 4 mg, Oral, BID  sodium chloride, 1,000 mL, Intravenous, Once  tamsulosin, 0 4 mg, Oral, Daily With Dinner      Continuous IV Infusions: None     PRN Meds:  acetaminophen, 650 mg, Oral, Q6H PRN  calcium carbonate, 1,000 mg, Oral, Daily PRN  diphenhydrAMINE, 25 mg, Intravenous, Q6H PRN  hydrOXYzine HCL, 25 mg, Oral, Q6H PRN  methocarbamol, 750 mg, Oral, Q6H PRN (5/2 rec'd x1)  (5/3 rec'd x1)        IP CONSULT TO NEPHROLOGY  IP CONSULT TO CASE MANAGEMENT    Network Utilization Review Department  ATTENTION: Please call with any questions or concerns to [phone] and carefully listen to the prompts so that you are directed to the right person  All voicemails are confidential   Boaz Comment all requests for admission clinical reviews, approved or denied determinations and any other requests to dedicated fax number below belonging to the campus where the patient is receiving treatment   List of dedicated fax numbers for the Facilities:  [address] DENIALS (Administrative/Medical Necessity) [phone]   [address] (Maternity/NICU/Pediatrics) [phone]   [address] [place] [address]sorio [place] [address] Saira Yamileth [address] [phone]

## 2023-05-04 VITALS
WEIGHT: 160.72 LBS | TEMPERATURE: 97.4 F | DIASTOLIC BLOOD PRESSURE: 56 MMHG | HEART RATE: 58 BPM | RESPIRATION RATE: 17 BRPM | HEIGHT: 67 IN | SYSTOLIC BLOOD PRESSURE: 98 MMHG | BODY MASS INDEX: 25.22 KG/M2 | OXYGEN SATURATION: 99 %

## 2023-05-04 PROBLEM — E83.42 HYPOMAGNESEMIA: Status: RESOLVED | Noted: 2023-05-03 | Resolved: 2023-05-04

## 2023-05-04 LAB
ALBUMIN SERPL BCP-MCNC: 2.8 G/DL (ref 3.5–5)
ALP SERPL-CCNC: 82 U/L (ref 34–104)
ALT SERPL W P-5'-P-CCNC: 29 U/L (ref 7–52)
ANION GAP SERPL CALCULATED.3IONS-SCNC: 6 MMOL/L (ref 4–13)
AST SERPL W P-5'-P-CCNC: 37 U/L (ref 13–39)
BILIRUB SERPL-MCNC: 1.8 MG/DL (ref 0.2–1)
BUN SERPL-MCNC: 18 MG/DL (ref 5–25)
CALCIUM ALBUM COR SERPL-MCNC: 9.8 MG/DL (ref 8.3–10.1)
CALCIUM SERPL-MCNC: 8.8 MG/DL (ref 8.4–10.2)
CHLORIDE SERPL-SCNC: 102 MMOL/L (ref 96–108)
CO2 SERPL-SCNC: 25 MMOL/L (ref 21–32)
CREAT SERPL-MCNC: 1.02 MG/DL (ref 0.6–1.3)
ERYTHROCYTE [DISTWIDTH] IN BLOOD BY AUTOMATED COUNT: 17.7 % (ref 11.6–15.1)
GFR SERPL CREATININE-BSD FRML MDRD: 78 ML/MIN/1.73SQ M
GLUCOSE SERPL-MCNC: 185 MG/DL (ref 65–140)
HCT VFR BLD AUTO: 29.6 % (ref 36.5–49.3)
HGB BLD-MCNC: 9.7 G/DL (ref 12–17)
MAGNESIUM SERPL-MCNC: 1.9 MG/DL (ref 1.9–2.7)
MCH RBC QN AUTO: 33.2 PG (ref 26.8–34.3)
MCHC RBC AUTO-ENTMCNC: 32.8 G/DL (ref 31.4–37.4)
MCV RBC AUTO: 101 FL (ref 82–98)
MRSA NOSE QL CULT: NORMAL
PLATELET # BLD AUTO: 53 THOUSANDS/UL (ref 149–390)
PMV BLD AUTO: 11.9 FL (ref 8.9–12.7)
POTASSIUM SERPL-SCNC: 4.5 MMOL/L (ref 3.5–5.3)
PROT SERPL-MCNC: 6.5 G/DL (ref 6.4–8.4)
RBC # BLD AUTO: 2.92 MILLION/UL (ref 3.88–5.62)
SODIUM SERPL-SCNC: 133 MMOL/L (ref 135–147)
WBC # BLD AUTO: 3.2 THOUSAND/UL (ref 4.31–10.16)

## 2023-05-04 RX ORDER — MIDODRINE HYDROCHLORIDE 5 MG/1
5 TABLET ORAL
Status: DISCONTINUED | OUTPATIENT
Start: 2023-05-04 | End: 2023-05-04 | Stop reason: HOSPADM

## 2023-05-04 RX ORDER — TAMSULOSIN HYDROCHLORIDE 0.4 MG/1
0.4 CAPSULE ORAL
Qty: 30 CAPSULE | Refills: 0 | Status: SHIPPED | OUTPATIENT
Start: 2023-05-04 | End: 2023-06-03

## 2023-05-04 RX ORDER — FUROSEMIDE 20 MG/1
20 TABLET ORAL DAILY
Qty: 30 TABLET | Refills: 0 | Status: SHIPPED | OUTPATIENT
Start: 2023-05-04 | End: 2023-06-03

## 2023-05-04 RX ORDER — PROPRANOLOL HYDROCHLORIDE 10 MG/1
10 TABLET ORAL EVERY 12 HOURS SCHEDULED
Status: DISCONTINUED | OUTPATIENT
Start: 2023-05-04 | End: 2023-05-04 | Stop reason: HOSPADM

## 2023-05-04 RX ORDER — MIDODRINE HYDROCHLORIDE 5 MG/1
5 TABLET ORAL
Qty: 90 TABLET | Refills: 0 | Status: SHIPPED | OUTPATIENT
Start: 2023-05-04 | End: 2023-06-03

## 2023-05-04 RX ORDER — GABAPENTIN 100 MG/1
100 CAPSULE ORAL 3 TIMES DAILY
Qty: 90 CAPSULE | Refills: 0 | Status: SHIPPED | OUTPATIENT
Start: 2023-05-04 | End: 2023-06-03

## 2023-05-04 RX ORDER — PANTOPRAZOLE SODIUM 40 MG/1
40 TABLET, DELAYED RELEASE ORAL
Qty: 30 TABLET | Refills: 0 | Status: SHIPPED | OUTPATIENT
Start: 2023-05-05 | End: 2023-06-04

## 2023-05-04 RX ORDER — PROPRANOLOL HYDROCHLORIDE 10 MG/1
10 TABLET ORAL EVERY 12 HOURS SCHEDULED
Qty: 60 TABLET | Refills: 0 | Status: SHIPPED | OUTPATIENT
Start: 2023-05-04 | End: 2023-06-03

## 2023-05-04 RX ORDER — ROPINIROLE 4 MG/1
4 TABLET, FILM COATED ORAL 2 TIMES DAILY
Qty: 60 TABLET | Refills: 0 | Status: SHIPPED | OUTPATIENT
Start: 2023-05-04 | End: 2023-06-03

## 2023-05-04 RX ADMIN — ACETAMINOPHEN 975 MG: 325 TABLET ORAL at 06:01

## 2023-05-04 RX ADMIN — METOCLOPRAMIDE 10 MG: 10 TABLET ORAL at 06:01

## 2023-05-04 RX ADMIN — DICYCLOMINE HYDROCHLORIDE 20 MG: 20 TABLET ORAL at 06:01

## 2023-05-04 RX ADMIN — NICOTINE 1 PATCH: 14 PATCH, EXTENDED RELEASE TRANSDERMAL at 08:27

## 2023-05-04 RX ADMIN — ROPINIROLE 4 MG: 1 TABLET, FILM COATED ORAL at 08:26

## 2023-05-04 RX ADMIN — LACTULOSE 20 G: 20 SOLUTION ORAL at 08:26

## 2023-05-04 RX ADMIN — DICLOFENAC SODIUM 2 G: 10 GEL TOPICAL at 08:26

## 2023-05-04 RX ADMIN — MIDODRINE HYDROCHLORIDE 2.5 MG: 5 TABLET ORAL at 06:01

## 2023-05-04 RX ADMIN — GABAPENTIN 100 MG: 100 CAPSULE ORAL at 08:27

## 2023-05-04 RX ADMIN — METOCLOPRAMIDE 10 MG: 10 TABLET ORAL at 10:39

## 2023-05-04 RX ADMIN — MIDODRINE HYDROCHLORIDE 2.5 MG: 5 TABLET ORAL at 10:39

## 2023-05-04 RX ADMIN — PANTOPRAZOLE SODIUM 40 MG: 40 TABLET, DELAYED RELEASE ORAL at 06:01

## 2023-05-04 RX ADMIN — DICYCLOMINE HYDROCHLORIDE 20 MG: 20 TABLET ORAL at 10:39

## 2023-05-04 NOTE — PLAN OF CARE
Problem: PHYSICAL THERAPY ADULT  Goal: Performs mobility at highest level of function for planned discharge setting  See evaluation for individualized goals  Description: Treatment/Interventions: ADL retraining, Functional transfer training, LE strengthening/ROM, Elevations, Therapeutic exercise, Endurance training, Patient/family training, Equipment eval/education, Bed mobility, Gait training, Compensatory technique education, Spoke to nursing, Spoke to case management, OT  Equipment Recommended: Evins Alter (should patient decline, would benefit from spc)       See flowsheet documentation for full assessment, interventions and recommendations  Outcome: Progressing  Note: Prognosis: Good  Problem List: Decreased strength, Decreased endurance, Impaired balance, Decreased mobility, Decreased coordination, Decreased safety awareness, Pain  Assessment: Pt seen for PT treatment session this date with interventions consisting of bed mobility tasks, transfer training, gait training, and education provided as needed for safety and direction to improve functional mobility, safety awareness, and activity tolerance  Pt agreeable to PT treatment session upon arrival, pt found resting in bed  At end of session, pt left in bed per patient request with bed alarm activated and with all needs in reach  In comparison to previous session, pt with improvements in ambulation distance   Continue to recommend Home PT at time of d/c in order to maximize pt's functional independence and safety w/ mobility  Pt continues to be functioning below baseline level  PT will continue to see pt while here in order to address the deficits listed above and provide interventions consistent w/ POC in effort to achieve STGs  Barriers to Discharge: Decreased caregiver support     PT Discharge Recommendation: Home with home health rehabilitation    See flowsheet documentation for full assessment

## 2023-05-04 NOTE — ASSESSMENT & PLAN NOTE
 Creatine on admission 1 71, Baseline creatinine 0 8-1    UA negative   CT renal stone study no ureteral or urinary bladder calculi, no hydronephrosis, punctate left-sided intrarenal calculi and 3 mm right-sided intrarenal calculus   Urine studies: sodium 141, creatinine urine: 33 7   Attempt diuresis as patient examines volume overloaded   Avoid hypotension, nephrotoxins, and NSAIDS if possible     Strict I & Os, daily weights   Urinary retention protocol and bladder scan - continue Flomax  [facility] consulted initially, consult cancelled due to improvement of ALEXANDER   RRT called due to complaining of weakness, flushed feeling, nausea and confusion, severe abdominal pain, chest pain, unable to move lower extremities  CT chest abdomen pelvis with IV contrast given  Monitor kidney function in light of recent ALEXANDER       Lab Results   Component Value Date    CREATININE 1 02 05/04/2023    CREATININE 1 14 05/03/2023

## 2023-05-04 NOTE — PLAN OF CARE
Problem: MOBILITY - ADULT  Goal: Maintain or return to baseline ADL function  Description: INTERVENTIONS:  -  Assess patient's ability to carry out ADLs; assess patient's baseline for ADL function and identify physical deficits which impact ability to perform ADLs (bathing, care of mouth/teeth, toileting, grooming, dressing, etc )  - Assess/evaluate cause of self-care deficits   - Assess range of motion  - Assess patient's mobility; develop plan if impaired  - Assess patient's need for assistive devices and provide as appropriate  - Encourage maximum independence but intervene and supervise when necessary  - Involve family in performance of ADLs  - Assess for home care needs following discharge   - Consider OT consult to assist with ADL evaluation and planning for discharge  - Provide patient education as appropriate  Outcome: Progressing  Goal: Maintains/Returns to pre admission functional level  Description: INTERVENTIONS:  - Perform BMAT or MOVE assessment daily    - Set and communicate daily mobility goal to care team and patient/family/caregiver  - Collaborate with rehabilitation services on mobility goals if consulted  - Perform Range of Motion 3 times a day  - Reposition patient every 2 hours    - Dangle patient 3 times a day  - Stand patient 3 times a day  - Ambulate patient 3 times a day  - Out of bed to chair 3 times a day   - Out of bed for meals 3 times a day  - Out of bed for toileting  - Record patient progress and toleration of activity level   Outcome: Progressing     Problem: PAIN - ADULT  Goal: Verbalizes/displays adequate comfort level or baseline comfort level  Description: Interventions:  - Encourage patient to monitor pain and request assistance  - Assess pain using appropriate pain scale  - Administer analgesics based on type and severity of pain and evaluate response  - Implement non-pharmacological measures as appropriate and evaluate response  - Consider cultural and social influences on pain and pain management  - Notify physician/advanced practitioner if interventions unsuccessful or patient reports new pain  Outcome: Progressing     Problem: INFECTION - ADULT  Goal: Absence or prevention of progression during hospitalization  Description: INTERVENTIONS:  - Assess and monitor for signs and symptoms of infection  - Monitor lab/diagnostic results  - Monitor all insertion sites, i e  indwelling lines, tubes, and drains  - Monitor endotracheal if appropriate and nasal secretions for changes in amount and color  - [place] appropriate cooling/warming therapies per order  - Administer medications as ordered  - Instruct and encourage patient and family to use good hand hygiene technique  - Identify and instruct in appropriate isolation precautions for identified infection/condition  Outcome: Progressing     Problem: SAFETY ADULT  Goal: Maintain or return to baseline ADL function  Description: INTERVENTIONS:  -  Assess patient's ability to carry out ADLs; assess patient's baseline for ADL function and identify physical deficits which impact ability to perform ADLs (bathing, care of mouth/teeth, toileting, grooming, dressing, etc )  - Assess/evaluate cause of self-care deficits   - Assess range of motion  - Assess patient's mobility; develop plan if impaired  - Assess patient's need for assistive devices and provide as appropriate  - Encourage maximum independence but intervene and supervise when necessary  - Involve family in performance of ADLs  - Assess for home care needs following discharge   - Consider OT consult to assist with ADL evaluation and planning for discharge  - Provide patient education as appropriate  Outcome: Progressing  Goal: Maintains/Returns to pre admission functional level  Description: INTERVENTIONS:  - Perform BMAT or MOVE assessment daily    - Set and communicate daily mobility goal to care team and patient/family/caregiver     - Collaborate with rehabilitation services on mobility goals if consulted  - Perform Range of Motion 3 times a day  - Reposition patient every 2 hours    - Dangle patient 3 times a day  - Stand patient 3 times a day  - Ambulate patient 3 times a day  - Out of bed to chair 3 times a day   - Out of bed for meals 3 times a day  - Out of bed for toileting  - Record patient progress and toleration of activity level   Outcome: Progressing  Goal: Patient will remain free of falls  Description: INTERVENTIONS:  - Educate patient/family on patient safety including physical limitations  - Instruct patient to call for assistance with activity   - Consult OT/PT to assist with strengthening/mobility   - Keep Call bell within reach  - Keep bed low and locked with side rails adjusted as appropriate  - Keep care items and personal belongings within reach  - Initiate and maintain comfort rounds  - Make Fall Risk Sign visible to staff  - Offer Toileting every 2 Hours, in advance of need  - Initiate/Maintain bed/chair alarm  - Obtain necessary fall risk management equipment:   - Apply yellow socks and bracelet for high fall risk patients  - Consider moving patient to room near nurses station  Outcome: Progressing     Problem: DISCHARGE PLANNING  Goal: Discharge to home or other facility with appropriate resources  Description: INTERVENTIONS:  - Identify barriers to discharge w/patient and caregiver  - Arrange for needed discharge resources and transportation as appropriate  - Identify discharge learning needs (meds, wound care, etc )  - Arrange for interpretive services to assist at discharge as needed  - Refer to Case Management Department for coordinating discharge planning if the patient needs post-hospital services based on physician/advanced practitioner order or complex needs related to functional status, cognitive ability, or social support system  Outcome: Progressing     Problem: Knowledge Deficit  Goal: Patient/family/caregiver demonstrates understanding of disease process, treatment plan, medications, and discharge instructions  Description: Complete learning assessment and assess knowledge base    Interventions:  - Provide teaching at level of understanding  - Provide teaching via preferred learning methods  Outcome: Progressing

## 2023-05-04 NOTE — ASSESSMENT & PLAN NOTE
· Presents with left flank/back pain  · States he is a history of kidney stones that he passed to last week  · CT renal stone study without any obstructing stone, punctate stone seen left-sided intrarenal calculi and 3 mm right-sided intrarenal calculus  · Continue pain control  · Flomax  · [facility] admission seems he is seeking narcotic medications and leaving AMA we will hold off on any narcotic medications as his CT did not reveal any evidence of any cause of his left flank pain   · PT/OT: home health  · Monitor for improvement with supportive care

## 2023-05-04 NOTE — PLAN OF CARE
Problem: MOBILITY - ADULT  Goal: Maintain or return to baseline ADL function  Description: INTERVENTIONS:  -  Assess patient's ability to carry out ADLs; assess patient's baseline for ADL function and identify physical deficits which impact ability to perform ADLs (bathing, care of mouth/teeth, toileting, grooming, dressing, etc )  - Assess/evaluate cause of self-care deficits   - Assess range of motion  - Assess patient's mobility; develop plan if impaired  - Assess patient's need for assistive devices and provide as appropriate  - Encourage maximum independence but intervene and supervise when necessary  - Involve family in performance of ADLs  - Assess for home care needs following discharge   - Consider OT consult to assist with ADL evaluation and planning for discharge  - Provide patient education as appropriate  Outcome: Progressing     Problem: PAIN - ADULT  Goal: Verbalizes/displays adequate comfort level or baseline comfort level  Description: Interventions:  - Encourage patient to monitor pain and request assistance  - Assess pain using appropriate pain scale  - Administer analgesics based on type and severity of pain and evaluate response  - Implement non-pharmacological measures as appropriate and evaluate response  - Consider cultural and social influences on pain and pain management  - Notify physician/advanced practitioner if interventions unsuccessful or patient reports new pain  Outcome: Progressing     Problem: INFECTION - ADULT  Goal: Absence or prevention of progression during hospitalization  Description: INTERVENTIONS:  - Assess and monitor for signs and symptoms of infection  - Monitor lab/diagnostic results  - Monitor all insertion sites, i e  indwelling lines, tubes, and drains  - Monitor endotracheal if appropriate and nasal secretions for changes in amount and color  - [place] appropriate cooling/warming therapies per order  - Administer medications as ordered  - Instruct and encourage patient and family to use good hand hygiene technique  - Identify and instruct in appropriate isolation precautions for identified infection/condition  Outcome: Progressing     Problem: SAFETY ADULT  Goal: Maintain or return to baseline ADL function  Description: INTERVENTIONS:  -  Assess patient's ability to carry out ADLs; assess patient's baseline for ADL function and identify physical deficits which impact ability to perform ADLs (bathing, care of mouth/teeth, toileting, grooming, dressing, etc )  - Assess/evaluate cause of self-care deficits   - Assess range of motion  - Assess patient's mobility; develop plan if impaired  - Assess patient's need for assistive devices and provide as appropriate  - Encourage maximum independence but intervene and supervise when necessary  - Involve family in performance of ADLs  - Assess for home care needs following discharge   - Consider OT consult to assist with ADL evaluation and planning for discharge  - Provide patient education as appropriate  Outcome: Progressing     Problem: DISCHARGE PLANNING  Goal: Discharge to home or other facility with appropriate resources  Description: INTERVENTIONS:  - Identify barriers to discharge w/patient and caregiver  - Arrange for needed discharge resources and transportation as appropriate  - Identify discharge learning needs (meds, wound care, etc )  - Arrange for interpretive services to assist at discharge as needed  - Refer to Case Management Department for coordinating discharge planning if the patient needs post-hospital services based on physician/advanced practitioner order or complex needs related to functional status, cognitive ability, or social support system  Outcome: Progressing

## 2023-05-04 NOTE — CASE MANAGEMENT
Case Management Discharge Planning Note    Patient name Ghassan Hewitt  Location [id number] MRN [id number]  : 1960 Date 2023       Current Admission Date: 2023  Current Admission Diagnosis:ALEXANDER (acute kidney injury) [facility])   Patient Active Problem List    Diagnosis Date Noted    Altered mental status 2023    Hypomagnesemia 2023    Abnormal CT scan 2023    ALEXANDER (acute kidney injury) ([facility] [address] ) 2023    Back pain 2023    GI bleed 2023    History of colon cancer     Paranoia ([facility] [address] ) 2022    Abdominal pain 2022    Pancytopenia ([facility] [address] ) 2022    Cirrhosis ([facility] [address] ) 2022    Hypokalemia 2022    Thrombocytopenia ([facility] [address] [facility] ) 2022    Restless leg syndrome 2022    Smoking 2022    Diabetes mellitus type 2 in obese ([facility] [address] ) 2022      LOS (days): 2  Geometric Mean LOS (GMLOS) (days):   Days to GMLOS:     OBJECTIVE:  Risk of Unplanned Readmission Score: 17 59         Current admission status: Inpatient   Preferred Pharmacy:   [facility] [address] [place] [address]  [address] [facility] [address]  Phone: [phone] Fax: [phone]    [id number] [address]  Phone: [phone] Fax: [phone]    [facility] [address]  [address] [address]  Phone: [phone] Fax: [phone]    Primary Care Provider: No primary care provider on file      Primary Insurance: COMMERCIAL MISCELLANEOUS  Secondary Insurance:     DISCHARGE DETAILS:    Discharge planning discussed with[de-identified] patient  Freedom of Choice: Yes     CM contacted family/caregiver?: No- see comments (patient declined)  Were Treatment Team discharge recommendations reviewed with patient/caregiver?: Yes  Did patient/caregiver verbalize understanding of patient care needs?: Yes  Were patient/caregiver advised of the risks associated with not following Treatment Team discharge recommendations?: Yes         [address]         Is the patient interested in [facility] at discharge?: No    DME Referral Provided  Referral made for DME?: No         Would you like to participate in our [address] service program?  : No - Declined    Treatment Team Recommendation: Home  Discharge Destination Plan[de-identified] Home  Transport at Discharge : Family            CM received message from Leslie Steiner [facility], indicting after review of patients records sent they do not feel patient appropriate for their [facility]  CM met with patient to let him know [facility] unable to accept him, patient reported Leslie Steiner contacted him  Patient indicated he plans to return to [facility] with [facility] and friends will provide transport  Patient denied [facility] and RW

## 2023-05-04 NOTE — DISCHARGE SUMMARY
[address]  Discharge- Lucille Haas 1960, 58 y o  male MRN: [id number]  Unit/Bed#: -[place] Encounter: [id number]  Primary Care Provider: No primary care provider on file  Date and time admitted to hospital: 5/2/2023 11:20 AM    * ALEXANDER (acute kidney injury) [facility])  Assessment & Plan   Creatine on admission 1 71, Baseline creatinine 0 8-1    UA negative   CT renal stone study no ureteral or urinary bladder calculi, no hydronephrosis, punctate left-sided intrarenal calculi and 3 mm right-sided intrarenal calculus   Urine studies: sodium 141, creatinine urine: 33 7   Attempt diuresis as patient examines volume overloaded   Avoid hypotension, nephrotoxins, and NSAIDS if possible     Strict I & Os, daily weights   Urinary retention protocol and bladder scan - continue Flomax  [facility] consulted initially, consult cancelled due to improvement of ALEXANDER   RRT called due to complaining of weakness, flushed feeling, nausea and confusion, severe abdominal pain, chest pain, unable to move lower extremities  CT chest abdomen pelvis with IV contrast given  Monitor kidney function in light of recent ALEXANDER  Lab Results   Component Value Date    CREATININE 1 02 05/04/2023    CREATININE 1 14 05/03/2023         Abnormal CT scan  Assessment & Plan  · CT chest abdomen pelvis with results below  · Cirrhotic changes are again noted with splenomegaly  · Status post right hemicolectomy  · Diffuse small bowel wall thickening without dilatation suggest enteritis or possibly related to interstitial edema from chronic cirrhosis and vascular collateralization  · Mediastinal and abdominal adenopathy of uncertain etiology is somewhat prominent although the abdominal nodes are similar to the prior examinations  Potentially these are reactive although a low-grade lymphoproliferative disorder is not excluded  Further hematologic work-up could be considered    · Mesenteric fatty lesion is noted on the right slightly larger than prior exams but with a fairly smooth margin with a second area also seen on the left  Maturing fat necrosis is still possible although usually some calcification will occur  A fatty neoplasm is not entirely excluded but is less likely  Continued surveillance is advised  · Recommending follow-up with hematology oncology outpatient    Altered mental status  Assessment & Plan  · RRT called on 5/3 due to patient having weakness, flushed feeling, nausea, confusion, severe abdominal pain, inability to move legs, chest pain  Critical care came and evaluated patient and recommending CT head, CT chest abdomen pelvis, troponin, ECG, procalcitonin, CBC, CMP, lactic acid, glucose, repeat blood pressure which was noted to be 92/58  500 cc NS given  · Patient's significant other was called after RRT and informed of the events  She verbalized understanding  Call with further results when they become available  · Patient was very tearful on examination and was answering orientation questions correctly  Prior to this, patient was ambulating well with PT OT  When asking patient to do strength testing lower extremities he is unable to stating that he cannot lift his legs or move them at all  · Patient was moved from the chair into his bed and he was able to stand and move his legs while doing this  During RRT, patient was moving his legs in the bed with flexion and extension  · Pro-Osbaldo, troponin, lactic acid normal   Glucose elevated however patient did just eat 2 large trays of breakfast prior to this  · CT head: No acute intracranial abnormalities  · CT chest abdomen pelvis not showing any evidence of free air  CT scan of the abdomen is showing diffuse small bowel wall thickening without dilatation suggest enteritis or possibly related to interstitial edema from chronic cirrhosis and vascular collateralization    Uncertain if this is contributing to patient's abdominal pain or if his abdominal pain is nonspecific and coming from left flank pain that he chronically has  · Avoid narcotic pain medications  Back pain  Assessment & Plan  · Presents with left flank/back pain  · States he is a history of kidney stones that he passed to last week  · CT renal stone study without any obstructing stone, punctate stone seen left-sided intrarenal calculi and 3 mm right-sided intrarenal calculus  · Continue pain control  · Flomax  · [facility] admission seems he is seeking narcotic medications and leaving AMA we will hold off on any narcotic medications as his CT did not reveal any evidence of any cause of his left flank pain   · PT/OT: home health  · Monitor for improvement with supportive care    History of colon cancer  Assessment & Plan  · Colon cancer s/p hemicolectomy in 2017, chemotherapy  · CT renal stone study showing mild interval enlargement of aortocaval lymph node and one of the periportal lymph nodes since February 2023 other abdominal lymphadenopathy is mostly stable  · Recommend continued outpatient follow-up  · States he no longer follows with any oncologist - should continue to follow up with outpatient oncology  · CT chest abdomen pelvis: Mediastinal and abdominal adenopathy of uncertain etiology is somewhat prominent although the abdominal nodes are similar to the prior examinations  Potentially these are reactive although a low-grade lymphoproliferative disorder is not excluded  Further hematologic work-up could be considered  Pancytopenia ([address] )  Assessment & Plan  · Patient with chronic pancytopenia likely secondary to liver disease  · Seems to be improved from his most recent labs  · Continue to monitor  · Patient should continue to follow up with oncology outpatient regarding pancytopenia       Lab Results   Component Value Date    RBC 2 92 (L) 05/04/2023    RBC 2 71 (L) 05/03/2023    WBC 3 20 (L) 05/04/2023    WBC 3 51 (L) 05/03/2023    PLT 53 (L) 05/04/2023    PLT 59 (L) 05/03/2023       Diabetes mellitus type 2 in obese [facility])  Assessment & Plan  Lab Results   Component Value Date    HGBA1C 5 5 02/10/2023       Recent Labs     05/03/23  0927   POCGLU 324*       Blood Sugar Average: Last 72 hrs:  (P) 324   · Patient previously diagnosed and started on metformin during recent hospitalization  · Hold metformin for now  · Correction scale -ACHS    Cirrhosis ([facility] [address] )  Assessment & Plan  · Liver cirrhosis secondary to [place] with recent history of multiple decompensations and hospitalizations  · Appears to be noncompliant with medications as was prescribed lactulose in February and states not taking  · Current regimen is lactulose 20 g daily , Lasix 40 mg daily, spironolactone 100 mg daily  · Also after hospitalizations with GI bleed was started on propranolol 60 mg daily by GI  · PT does not follow-up with outpatient GI  · Ammonia normal, INR: 1 50  · IV diuresis with Lasix gently due to ALEXANDER and volume overload -hold spironolactone indefinitely  Continue home Lasix  · CT chest abdomen pelvis: Mesenteric fatty lesion is noted on the right slightly larger than prior exams but with a fairly smooth margin with a second area also seen on the left  Maturing fat necrosis is still possible although usually some calcification will occur  A fatty neoplasm is not entirely excluded but is less likely  Continued surveillance is advised  Cirrhotic changes are again noted with splenomegaly  · Hypotension likely secondary to cirrhosis  Continue midodrine 5mg TID    Hypomagnesemia-resolved as of 5/4/2023  Assessment & Plan  · Noted to have magnesium of 1 6, patient started having weakness, flushed feeling, nausea and confusion after infusion of magnesium  · RRT called due to this and critical care suspecting this could be secondary to too fast of infusion of magnesium   Given IV NS and IV benadryl  · Monitor mag level      Medical Problems     Resolved Problems  Date Reviewed: 5/4/2023          Resolved Hypomagnesemia 5/4/2023     Resolved by  Lisbeth Rasmussen PA-C        Discharging Physician / Practitioner: Lisbeth Rasmussen PA-C  PCP: No primary care provider on file  Admission Date:   Admission Orders (From admission, onward)     Ordered        05/02/23 [place]  Once                      Discharge Date: 05/04/23    Consultations During Hospital Stay:  · PT, OT, case management    Procedures Performed:   · none    Significant Findings / Test Results:   · CT abdomen showing no ureteral or urinary bladder calculi  No hydronephrosis or significant perinephric stranding  Punctate left sided intrarenal calculi 3 mm right-sided intrarenal calculus  No acute abdominal pelvic pathology within limitation of a noncontrast low radiation dose study  Partially imaged cirrhotic liver and evidence of portal hypertension, better assessed on prior contrast-enhanced studies  Mild interval enlargement of an aortocaval lymph node in one of the periportal lymph nodes since February 2023  Other upper abdominal lymphadenopathy is mostly stable  Additional findings as above  · CT head showing no acute intracranial abnormality  · CT chest abdomen pelvis showing cirrhotic changes are again noted with splenomegaly  Status post right hemicolectomy  Diffuse small bowel thickening without dilatation suggests enteritis or possible related to interstitial edema from chronic cirrhosis and vascular collateralization  Mediastinal and abdominal adenopathy of uncertain etiology is somewhat prominent although the abdominal nodes are similar to the prior examinations  Potentially these are reactive although a low-grade lymphoproliferative disorder is not excluded  Further hematologic work-up could be considered  Mesenteric fatty lesion is noted on the right slightly larger than prior exams but with a fairly smooth margin with a secondary area also seen on the left    Maturing fat necrosis is still possible although usually some calcification will occur  I find a neoplasm is not entirely excluded but is less likely  Continued surveillance is advised  · VAS showing no sign of acute or chronic DVT    Incidental Findings:   · None    Test Results Pending at Discharge (will require follow up): · None     Outpatient Tests Requested:  · None    Complications: None    Reason for Admission: ALEXANDER    Hospital Course:   Giorgi Stephens is a 58 y o  male patient who originally presented to the hospital on 5/2/2023 due to left-sided flank pain  Imaging showing cirrhotic changes in liver and a splenomegaly, but no abnormalities in left flank area  Throughout stay, patient was asking for pain medications while not in any obvious pain  Patient advised that we will not be changing his pain medications and that he should follow up with pain management on discharge  On day 2 of hospital stay, patient had a rapid response called because he stated that he was confused, feeling flush and unable to move his legs  All imaging and labs were within normal limits at that time  Patient also had a normal exam at that time and was oriented and able to move all extremities  Patient advised to follow up with pain management and hematology/oncology on discharge  Patient also found to have hypotension throughout stay and was treated with midodrine 3 times daily  Patient is expected to continue this medication on discharge  Patient inquired about [facility], but ultimately decided to go home and will pursue [facility] once he is home if he wants to make that decision  Please see above list of diagnoses and related plan for additional information  Condition at Discharge: good    Discharge Day Visit / Exam:   Subjective: Patient states that he is feeling well today  Denies chest pain or shortness of breath  Explained to patient that we will not be changing his pain regimen, and that he should follow-up with pain management    Vitals: Blood Pressure: 98/56 (05/04/23 "1002)  Pulse: 58 (05/04/23 1002)  Temperature: (!) 97 4 °F (36 3 °C) (05/04/23 1002)  Temp Source: Temporal (05/02/23 1124)  Respirations: 17 (05/04/23 0744)  Height: 5' 7\" (170 2 cm) (05/02/23 1124)  Weight - Scale: 72 9 kg (160 lb 11 5 oz) (05/04/23 0540)  SpO2: 99 % (05/04/23 1002)  Exam:   Physical Exam  Vitals reviewed  Constitutional:       General: He is not in acute distress  Appearance: Normal appearance  He is normal weight  He is not ill-appearing  HENT:      Head: Normocephalic and atraumatic  Nose: Nose normal       Mouth/Throat:      Mouth: Mucous membranes are moist       Pharynx: Oropharynx is clear  Eyes:      Extraocular Movements: Extraocular movements intact  Conjunctiva/sclera: Conjunctivae normal    Cardiovascular:      Rate and Rhythm: Normal rate and regular rhythm  Pulses: Normal pulses  Heart sounds: Normal heart sounds  No murmur heard  Pulmonary:      Effort: No respiratory distress  Breath sounds: Normal breath sounds  No wheezing  Abdominal:      General: Abdomen is flat  Bowel sounds are normal  There is no distension  Palpations: Abdomen is soft  Tenderness: There is no abdominal tenderness  There is no guarding  Musculoskeletal:         General: Normal range of motion  Cervical back: Normal range of motion  Right lower leg: No edema  Left lower leg: No edema  Skin:     General: Skin is warm  Neurological:      General: No focal deficit present  Mental Status: He is alert and oriented to person, place, and time  Mental status is at baseline  Motor: No weakness  Psychiatric:         Mood and Affect: Mood normal          Behavior: Behavior normal          Thought Content: Thought content normal          Judgment: Judgment normal           Discussion with Family: Patient declined call to        Discharge instructions/Information to patient and family:   See after visit summary for information " provided to patient and family  Provisions for Follow-Up Care:  See after visit summary for information related to follow-up care and any pertinent home health orders  Disposition:   Home    Planned Readmission: None     Discharge Statement:  I spent 45 minutes discharging the patient  This time was spent on the day of discharge  I had direct contact with the patient on the day of discharge  Greater than 50% of the total time was spent examining patient, answering all patient questions, arranging and discussing plan of care with patient as well as directly providing post-discharge instructions  Additional time then spent on discharge activities  Discharge Medications:  See after visit summary for reconciled discharge medications provided to patient and/or family        **Please Note: This note may have been constructed using a voice recognition system**

## 2023-05-04 NOTE — PHYSICAL THERAPY NOTE
"   PHYSICAL THERAPY TREATMENT NOTE  NAME:  Neha Villalobos  DATE: 05/04/23    Length Of Stay: 2  Performed at least 2 patient identifiers during session: Name and ID bracelet    TREATMENT FLOWSHEET:    05/04/23 1030   PT Last Visit   PT Visit Date 05/04/23   Note Type   Note Type Treatment   Pain Assessment   Pain Assessment Tool 0-10   Pain Score 7   Pain Location/Orientation Location: Abdomen   Pain Onset/Description Onset: Ongoing   Patient's Stated Pain Goal No pain   Hospital Pain Intervention(s) Repositioned; Ambulation/increased activity   Restrictions/Precautions   Weight Bearing Precautions Per Order No   Other Precautions Chair Alarm; Bed Alarm; Fall Risk;Pain   General   Chart Reviewed Yes   Response to Previous Treatment Patient with no complaints from previous session  Family/Caregiver Present No   Cognition   Arousal/Participation Alert; Responsive   Attention Attends with cues to redirect   Orientation Level Oriented X4   Memory Within functional limits   Following Commands Follows one step commands without difficulty   Subjective   Subjective \"I am going home today no matter what  \"   Bed Mobility   Rolling R 5  Supervision   Additional items Assist x 1; Increased time required   Rolling L 5  Supervision   Additional items Assist x 1; Increased time required   Supine to Sit 5  Supervision   Additional items Assist x 1; Increased time required   Sit to Supine 5  Supervision   Additional items Assist x 1; Increased time required   Transfers   Sit to Stand 5  Supervision   Additional items Assist x 1; Increased time required;Verbal cues   Stand to Sit 5  Supervision   Additional items Assist x 1; Increased time required;Verbal cues   Stand pivot   (steadying assist)   Additional items Assist x 1; Increased time required;Verbal cues   Additional Comments VC's provided for proper hand placement and educated in benefits of RW use  Pt  declined AD stating, I don't need it  \"   Ambulation/Elevation   Gait pattern " Improper Weight shift;Decreased foot clearance; Wide ORACIO;L Foot drag;Short stride; Excessively slow   Gait Assistance   (steadying assist)   Additional items Assist x 1;Verbal cues   Assistive Device None   Distance 20ftx2   Ambulation/Elevation Additional Comments Pt  with unsteady gait but no LOB this session  Balance   Static Sitting Good   Dynamic Sitting Fair +   Static Standing Fair   Dynamic Standing Fair -   Ambulatory Poor +   Activity Tolerance   Activity Tolerance Patient limited by fatigue;Patient limited by pain  (self limiting)   Nurse Made Aware RN aware   Assessment   Prognosis Good   Problem List Decreased strength;Decreased endurance; Impaired balance;Decreased mobility; Decreased coordination;Decreased safety awareness;Pain   Goals   Patient Goals to go home now   PT Treatment Day 2   Plan   Treatment/Interventions Functional transfer training;LE strengthening/ROM; Elevations; Therapeutic exercise; Endurance training;Patient/family training;Equipment eval/education; Bed mobility;Gait training; Compensatory technique education;Spoke to nursing   Progress Slow progress, decreased activity tolerance   PT Frequency 2-3x/wk   Recommendation   PT Discharge Recommendation Home with home health rehabilitation   Additional Comments Pt  declining RW use during treatment   AM-PAC Basic Mobility Inpatient   Turning in Flat Bed Without Bedrails 4   Lying on Back to Sitting on Edge of Flat Bed Without Bedrails 4   Moving Bed to Chair 3   Standing Up From Chair Using Arms 3   Walk in Room 3   Climb 3-5 Stairs With Railing 3   Basic Mobility Inpatient Raw Score 20   Basic Mobility Standardized Score 43 99   Highest Level Of Mobility   JH-HLM Goal 6: Walk 10 steps or more   JH-HLM Achieved 7: Walk 25 feet or more       The patient's AM-PAC Basic Mobility Inpatient Short Form Raw Score is 20  A raw score greater than 16 suggests the patient may benefit from discharge to home   Please also refer to the recommendation of the Physical Therapist for safe discharge planning  Pt seen for PT treatment session this date with interventions consisting of bed mobility tasks, transfer training, gait training, and education provided as needed for safety and direction to improve functional mobility, safety awareness, and activity tolerance  Pt agreeable to PT treatment session upon arrival, pt found resting in bed  At end of session, pt left in bed per patient request with bed alarm activated and with all needs in reach  In comparison to previous session, pt with improvements in ambulation distance   Continue to recommend Home PT at time of d/c in order to maximize pt's functional independence and safety w/ mobility  Pt continues to be functioning below baseline level  PT will continue to see pt while here in order to address the deficits listed above and provide interventions consistent w/ POC in effort to achieve STGs      [place], [place]

## 2023-05-04 NOTE — CASE MANAGEMENT
Case Management Discharge Planning Note    Patient name Rodney Moreno  Location [address] [id number] MRN [id number]  : 1960 Date 2023       Current Admission Date: 2023  Current Admission Diagnosis:ALEXANDER (acute kidney injury) [facility])   Patient Active Problem List    Diagnosis Date Noted    Altered mental status 2023    Abnormal CT scan 2023    ALEXANDER (acute kidney injury) ([facility] [address] ) 2023    Back pain 2023    GI bleed 2023    History of colon cancer     Paranoia ([facility] [address] ) 2022    Abdominal pain 2022    Pancytopenia ([facility] [address] ) 2022    [facility] 2022    Hypokalemia 2022    Thrombocytopenia ([facility] 2022    Restless leg syndrome 2022    Smoking 2022    Diabetes mellitus type 2 in obese ([facility] ) 2022      LOS (days): 2  Geometric Mean LOS (GMLOS) (days):   Days to GMLOS:     OBJECTIVE:  Risk of Unplanned Readmission Score: 18 76         Current admission status: Inpatient   Preferred Pharmacy:   [facility] [address] [place] [address]  [address] [address]  Phone: [phone] Fax: [phone] [facility] [address]  Phone: [phone] Fax: [phone]    [facility] [address] [address]  [address] [facility] [address]  Phone: [phone] Fax: [phone]    [id number] [place] - [address]  [address]  [address] [facility] [address]  Phone: [phone] Fax: [phone]    Primary Care Provider: No primary care provider on file  Primary Insurance: COMMERCIAL MISCELLANEOUS  Secondary Insurance:     DISCHARGE DETAILS:           CM met with patient to provide cane as patient was requesting from bedside nurse  CM again asked patient if he wanted RW and/or HHC and patient refused

## 2023-05-04 NOTE — ASSESSMENT & PLAN NOTE
· Patient with chronic pancytopenia likely secondary to liver disease  · Seems to be improved from his most recent labs  · Continue to monitor  · Patient should continue to follow up with oncology outpatient regarding pancytopenia       Lab Results   Component Value Date    RBC 2 92 (L) 05/04/2023    RBC 2 71 (L) 05/03/2023    WBC 3 20 (L) 05/04/2023    WBC 3 51 (L) 05/03/2023    PLT 53 (L) 05/04/2023    PLT 59 (L) 05/03/2023

## 2023-05-04 NOTE — ASSESSMENT & PLAN NOTE
· Liver cirrhosis secondary to [facility] with recent history of multiple decompensations and hospitalizations  · Appears to be noncompliant with medications as was prescribed lactulose in February and states not taking  · Current regimen is lactulose 20 g daily , Lasix 40 mg daily, spironolactone 100 mg daily  · Also after hospitalizations with GI bleed was started on propranolol 60 mg daily by GI  · PT does not follow-up with outpatient GI  · Ammonia normal, INR: 1 50  · IV diuresis with Lasix gently due to ALEXANDER and volume overload -hold spironolactone indefinitely  Continue home Lasix  · CT chest abdomen pelvis: Mesenteric fatty lesion is noted on the right slightly larger than prior exams but with a fairly smooth margin with a second area also seen on the left  Maturing fat necrosis is still possible although usually some calcification will occur  A fatty neoplasm is not entirely excluded but is less likely  Continued surveillance is advised  Cirrhotic changes are again noted with splenomegaly  · Hypotension likely secondary to cirrhosis   Continue midodrine 5mg TID

## 2023-05-04 NOTE — NURSING NOTE
Reviewed discharge instructions with pateint  Verbalized understanding of same  Patient rushing to be discharged  Prescriptions escribed  Belonging returned to patient  Cane with patient  Referral given to patient for pain management

## 2023-05-04 NOTE — DISCHARGE INSTR - AVS FIRST PAGE
You were admitted to the hospital due to acute kidney injury  It is important to continue taking midodrine 5 mg 3 times a day to increase her blood pressure  Stop taking Aldactone  Propanolol 10 mg 2 times daily  Gabapentin and Tylenol for pain  Continue home Lasix dose    If symptoms worsen or new symptoms arise, come back to the hospital     Prescriptions sent to [facility] pharmacy [address] [place] [address] [place]

## 2023-05-05 NOTE — UTILIZATION REVIEW
NOTIFICATION OF ADMISSION DISCHARGE   This is a Notification of Discharge from [address] [place] [address]  Please be advised that this patient has been discharge from our facility  Below you will find the admission and discharge date and time including the patients disposition  UTILIZATION REVIEW CONTACT:  David Flores  Utilization   Network Utilization Review Department  Phone: [phone] x carefully listen to the prompts  All voicemails are confidential   Email: [email or web address]     ADMISSION INFORMATION  PRESENTATION DATE: 5/2/2023 11:20 AM  OBERVATION ADMISSION DATE:   INPATIENT ADMISSION DATE: 5/2/23  3:18 PM   DISCHARGE DATE: 5/4/2023  1:14 PM   DISPOSITION:Home/Self Care    IMPORTANT INFORMATION:  Send all requests for admission clinical reviews, approved or denied determinations and any other requests to dedicated fax number below belonging to the campus where the patient is receiving treatment   List of dedicated fax numbers:  [address] DENIALS (Administrative/Medical Necessity) [phone]   [address] (Maternity/NICU/Pediatrics) [phone]   [facility] [place] [phone]   [address] [place] [address] [phone]   [address] [place] [address] [phone]   2000 [facility] [address] [place] [address],[address] [place] [address] [phone]   [facility]  [phone]   2208 [facility] [address] [facility]  2401 [facility] 1000 [address] [facility] [phone]

## 2024-06-03 ENCOUNTER — HOSPITAL ENCOUNTER (INPATIENT)
Facility: HOSPITAL | Age: 64
LOS: 13 days | Discharge: HOME | DRG: 433 | End: 2024-06-18
Attending: EMERGENCY MEDICINE | Admitting: INTERNAL MEDICINE
Payer: COMMERCIAL

## 2024-06-03 ENCOUNTER — APPOINTMENT (EMERGENCY)
Dept: CT IMAGING | Facility: HOSPITAL | Age: 64
DRG: 433 | End: 2024-06-03
Payer: COMMERCIAL

## 2024-06-03 DIAGNOSIS — K74.60 HEPATIC CIRRHOSIS, UNSPECIFIED HEPATIC CIRRHOSIS TYPE, UNSPECIFIED WHETHER ASCITES PRESENT (HCC): ICD-10-CM

## 2024-06-03 DIAGNOSIS — F32.A DEPRESSION: ICD-10-CM

## 2024-06-03 DIAGNOSIS — E87.6 HYPOKALEMIA: ICD-10-CM

## 2024-06-03 DIAGNOSIS — R10.9 ABDOMINAL PAIN: ICD-10-CM

## 2024-06-03 DIAGNOSIS — E11.69 TYPE 2 DIABETES MELLITUS WITH OTHER SPECIFIED COMPLICATION, UNSPECIFIED WHETHER LONG TERM INSULIN USE (HCC): ICD-10-CM

## 2024-06-03 DIAGNOSIS — R06.02 SHORTNESS OF BREATH: ICD-10-CM

## 2024-06-03 DIAGNOSIS — J90 PLEURAL EFFUSION ON RIGHT: Primary | ICD-10-CM

## 2024-06-03 DIAGNOSIS — K76.7 HEPATORENAL SYNDROME (HCC): ICD-10-CM

## 2024-06-03 DIAGNOSIS — R56.9 SEIZURE-LIKE ACTIVITY (HCC): ICD-10-CM

## 2024-06-03 DIAGNOSIS — Z87.19 HX OF CIRRHOSIS: ICD-10-CM

## 2024-06-03 DIAGNOSIS — R60.0 LEG EDEMA: ICD-10-CM

## 2024-06-03 DIAGNOSIS — R56.9 SEIZURE (HCC): ICD-10-CM

## 2024-06-03 DIAGNOSIS — Z01.89 ENCOUNTER FOR COMPETENCY EVALUATION: ICD-10-CM

## 2024-06-03 DIAGNOSIS — E27.40 ADRENAL INSUFFICIENCY (HCC): ICD-10-CM

## 2024-06-03 PROBLEM — R41.82 ALTERED MENTAL STATUS: Status: RESOLVED | Noted: 2023-05-03 | Resolved: 2024-06-03

## 2024-06-03 PROBLEM — N17.9 AKI (ACUTE KIDNEY INJURY) (HCC): Status: RESOLVED | Noted: 2023-05-02 | Resolved: 2024-06-03

## 2024-06-03 PROBLEM — M54.9 BACK PAIN: Status: RESOLVED | Noted: 2023-05-02 | Resolved: 2024-06-03

## 2024-06-03 PROBLEM — K92.2 GI BLEED: Status: RESOLVED | Noted: 2023-02-25 | Resolved: 2024-06-03

## 2024-06-03 LAB
2HR DELTA HS TROPONIN: 0 NG/L
ALBUMIN SERPL BCP-MCNC: 2.7 G/DL (ref 3.5–5)
ALP SERPL-CCNC: 135 U/L (ref 34–104)
ALT SERPL W P-5'-P-CCNC: 35 U/L (ref 7–52)
AMMONIA PLAS-SCNC: 37 UMOL/L (ref 18–72)
ANION GAP SERPL CALCULATED.3IONS-SCNC: 10 MMOL/L (ref 4–13)
APTT PPP: 34 SECONDS (ref 23–37)
AST SERPL W P-5'-P-CCNC: 31 U/L (ref 13–39)
BACTERIA UR QL AUTO: NORMAL /HPF
BASOPHILS # BLD AUTO: 0.03 THOUSANDS/ÂΜL (ref 0–0.1)
BASOPHILS NFR BLD AUTO: 1 % (ref 0–1)
BILIRUB SERPL-MCNC: 2.18 MG/DL (ref 0.2–1)
BILIRUB UR QL STRIP: NEGATIVE
BNP SERPL-MCNC: 82 PG/ML (ref 0–100)
BUN SERPL-MCNC: 11 MG/DL (ref 5–25)
CALCIUM ALBUM COR SERPL-MCNC: 9 MG/DL (ref 8.3–10.1)
CALCIUM SERPL-MCNC: 8 MG/DL (ref 8.4–10.2)
CARDIAC TROPONIN I PNL SERPL HS: 6 NG/L
CARDIAC TROPONIN I PNL SERPL HS: 6 NG/L
CHLORIDE SERPL-SCNC: 106 MMOL/L (ref 96–108)
CLARITY UR: CLEAR
CO2 SERPL-SCNC: 19 MMOL/L (ref 21–32)
COLOR UR: YELLOW
CREAT SERPL-MCNC: 0.87 MG/DL (ref 0.6–1.3)
EOSINOPHIL # BLD AUTO: 0.27 THOUSAND/ÂΜL (ref 0–0.61)
EOSINOPHIL NFR BLD AUTO: 5 % (ref 0–6)
ERYTHROCYTE [DISTWIDTH] IN BLOOD BY AUTOMATED COUNT: 16.2 % (ref 11.6–15.1)
GFR SERPL CREATININE-BSD FRML MDRD: 91 ML/MIN/1.73SQ M
GLUCOSE SERPL-MCNC: 161 MG/DL (ref 65–140)
GLUCOSE SERPL-MCNC: 284 MG/DL (ref 65–140)
GLUCOSE UR STRIP-MCNC: ABNORMAL MG/DL
HCT VFR BLD AUTO: 28.7 % (ref 36.5–49.3)
HGB BLD-MCNC: 9.6 G/DL (ref 12–17)
HGB UR QL STRIP.AUTO: NEGATIVE
IMM GRANULOCYTES # BLD AUTO: 0.02 THOUSAND/UL (ref 0–0.2)
IMM GRANULOCYTES NFR BLD AUTO: 0 % (ref 0–2)
INR PPP: 1.49 (ref 0.84–1.19)
KETONES UR STRIP-MCNC: NEGATIVE MG/DL
LEUKOCYTE ESTERASE UR QL STRIP: NEGATIVE
LIPASE SERPL-CCNC: 22 U/L (ref 11–82)
LYMPHOCYTES # BLD AUTO: 0.62 THOUSANDS/ÂΜL (ref 0.6–4.47)
LYMPHOCYTES NFR BLD AUTO: 12 % (ref 14–44)
MCH RBC QN AUTO: 34.3 PG (ref 26.8–34.3)
MCHC RBC AUTO-ENTMCNC: 33.4 G/DL (ref 31.4–37.4)
MCV RBC AUTO: 103 FL (ref 82–98)
MONOCYTES # BLD AUTO: 0.34 THOUSAND/ÂΜL (ref 0.17–1.22)
MONOCYTES NFR BLD AUTO: 7 % (ref 4–12)
NEUTROPHILS # BLD AUTO: 3.74 THOUSANDS/ÂΜL (ref 1.85–7.62)
NEUTS SEG NFR BLD AUTO: 75 % (ref 43–75)
NITRITE UR QL STRIP: NEGATIVE
NON-SQ EPI CELLS URNS QL MICRO: NORMAL /HPF
NRBC BLD AUTO-RTO: 0 /100 WBCS
PH UR STRIP.AUTO: 6 [PH]
PLATELET # BLD AUTO: 42 THOUSANDS/UL (ref 149–390)
PMV BLD AUTO: 12 FL (ref 8.9–12.7)
POTASSIUM SERPL-SCNC: 3 MMOL/L (ref 3.5–5.3)
PROT SERPL-MCNC: 6.3 G/DL (ref 6.4–8.4)
PROT UR STRIP-MCNC: NEGATIVE MG/DL
PROTHROMBIN TIME: 18.3 SECONDS (ref 11.6–14.5)
RBC # BLD AUTO: 2.8 MILLION/UL (ref 3.88–5.62)
RBC #/AREA URNS AUTO: NORMAL /HPF
SODIUM SERPL-SCNC: 135 MMOL/L (ref 135–147)
SP GR UR STRIP.AUTO: 1.01 (ref 1–1.04)
UROBILINOGEN UA: NEGATIVE MG/DL
WBC # BLD AUTO: 5.02 THOUSAND/UL (ref 4.31–10.16)
WBC #/AREA URNS AUTO: NORMAL /HPF

## 2024-06-03 PROCEDURE — 83690 ASSAY OF LIPASE: CPT | Performed by: EMERGENCY MEDICINE

## 2024-06-03 PROCEDURE — 81001 URINALYSIS AUTO W/SCOPE: CPT | Performed by: EMERGENCY MEDICINE

## 2024-06-03 PROCEDURE — 80053 COMPREHEN METABOLIC PANEL: CPT | Performed by: EMERGENCY MEDICINE

## 2024-06-03 PROCEDURE — 74177 CT ABD & PELVIS W/CONTRAST: CPT

## 2024-06-03 PROCEDURE — 96374 THER/PROPH/DIAG INJ IV PUSH: CPT

## 2024-06-03 PROCEDURE — 84484 ASSAY OF TROPONIN QUANT: CPT | Performed by: EMERGENCY MEDICINE

## 2024-06-03 PROCEDURE — 81003 URINALYSIS AUTO W/O SCOPE: CPT | Performed by: EMERGENCY MEDICINE

## 2024-06-03 PROCEDURE — 85610 PROTHROMBIN TIME: CPT | Performed by: EMERGENCY MEDICINE

## 2024-06-03 PROCEDURE — 93005 ELECTROCARDIOGRAM TRACING: CPT

## 2024-06-03 PROCEDURE — 36415 COLL VENOUS BLD VENIPUNCTURE: CPT | Performed by: EMERGENCY MEDICINE

## 2024-06-03 PROCEDURE — 82140 ASSAY OF AMMONIA: CPT | Performed by: EMERGENCY MEDICINE

## 2024-06-03 PROCEDURE — 96375 TX/PRO/DX INJ NEW DRUG ADDON: CPT

## 2024-06-03 PROCEDURE — 83880 ASSAY OF NATRIURETIC PEPTIDE: CPT | Performed by: EMERGENCY MEDICINE

## 2024-06-03 PROCEDURE — 85025 COMPLETE CBC W/AUTO DIFF WBC: CPT | Performed by: EMERGENCY MEDICINE

## 2024-06-03 PROCEDURE — 99285 EMERGENCY DEPT VISIT HI MDM: CPT | Performed by: EMERGENCY MEDICINE

## 2024-06-03 PROCEDURE — 99284 EMERGENCY DEPT VISIT MOD MDM: CPT

## 2024-06-03 PROCEDURE — 71260 CT THORAX DX C+: CPT

## 2024-06-03 PROCEDURE — 82948 REAGENT STRIP/BLOOD GLUCOSE: CPT

## 2024-06-03 PROCEDURE — 85730 THROMBOPLASTIN TIME PARTIAL: CPT | Performed by: EMERGENCY MEDICINE

## 2024-06-03 RX ORDER — GABAPENTIN 300 MG/1
300 CAPSULE ORAL 3 TIMES DAILY
Status: DISCONTINUED | OUTPATIENT
Start: 2024-06-03 | End: 2024-06-18 | Stop reason: HOSPADM

## 2024-06-03 RX ORDER — AMOXICILLIN 250 MG
1 CAPSULE ORAL EVERY EVENING
Status: DISCONTINUED | OUTPATIENT
Start: 2024-06-03 | End: 2024-06-18 | Stop reason: HOSPADM

## 2024-06-03 RX ORDER — NICOTINE 21 MG/24HR
1 PATCH, TRANSDERMAL 24 HOURS TRANSDERMAL DAILY
Status: DISCONTINUED | OUTPATIENT
Start: 2024-06-03 | End: 2024-06-18 | Stop reason: HOSPADM

## 2024-06-03 RX ORDER — TAMSULOSIN HYDROCHLORIDE 0.4 MG/1
0.4 CAPSULE ORAL
Status: DISCONTINUED | OUTPATIENT
Start: 2024-06-04 | End: 2024-06-18 | Stop reason: HOSPADM

## 2024-06-03 RX ORDER — HYDROXYZINE HYDROCHLORIDE 25 MG/1
25 TABLET, FILM COATED ORAL 3 TIMES DAILY PRN
Status: DISCONTINUED | OUTPATIENT
Start: 2024-06-03 | End: 2024-06-18 | Stop reason: HOSPADM

## 2024-06-03 RX ORDER — FUROSEMIDE 10 MG/ML
40 INJECTION INTRAMUSCULAR; INTRAVENOUS ONCE
Status: COMPLETED | OUTPATIENT
Start: 2024-06-03 | End: 2024-06-03

## 2024-06-03 RX ORDER — HYDROMORPHONE HCL/PF 1 MG/ML
1 SYRINGE (ML) INJECTION ONCE
Status: COMPLETED | OUTPATIENT
Start: 2024-06-03 | End: 2024-06-03

## 2024-06-03 RX ORDER — SPIRONOLACTONE 100 MG/1
100 TABLET, FILM COATED ORAL DAILY
Status: DISCONTINUED | OUTPATIENT
Start: 2024-06-04 | End: 2024-06-18 | Stop reason: HOSPADM

## 2024-06-03 RX ORDER — PROPRANOLOL HYDROCHLORIDE 10 MG/1
10 TABLET ORAL EVERY 12 HOURS SCHEDULED
Status: DISCONTINUED | OUTPATIENT
Start: 2024-06-03 | End: 2024-06-18 | Stop reason: HOSPADM

## 2024-06-03 RX ORDER — PANTOPRAZOLE SODIUM 40 MG/1
40 TABLET, DELAYED RELEASE ORAL
Status: DISCONTINUED | OUTPATIENT
Start: 2024-06-04 | End: 2024-06-18 | Stop reason: HOSPADM

## 2024-06-03 RX ORDER — POTASSIUM CHLORIDE 20 MEQ/1
40 TABLET, EXTENDED RELEASE ORAL ONCE
Status: COMPLETED | OUTPATIENT
Start: 2024-06-03 | End: 2024-06-03

## 2024-06-03 RX ORDER — DICYCLOMINE HCL 20 MG
20 TABLET ORAL
Status: DISCONTINUED | OUTPATIENT
Start: 2024-06-04 | End: 2024-06-18 | Stop reason: HOSPADM

## 2024-06-03 RX ORDER — INSULIN LISPRO 100 [IU]/ML
1-5 INJECTION, SOLUTION INTRAVENOUS; SUBCUTANEOUS
Status: DISCONTINUED | OUTPATIENT
Start: 2024-06-04 | End: 2024-06-18 | Stop reason: HOSPADM

## 2024-06-03 RX ORDER — POTASSIUM CHLORIDE 14.9 MG/ML
20 INJECTION INTRAVENOUS ONCE
Status: COMPLETED | OUTPATIENT
Start: 2024-06-03 | End: 2024-06-04

## 2024-06-03 RX ORDER — FERROUS SULFATE 325(65) MG
325 TABLET ORAL
Status: DISCONTINUED | OUTPATIENT
Start: 2024-06-04 | End: 2024-06-18 | Stop reason: HOSPADM

## 2024-06-03 RX ORDER — ROPINIROLE 1 MG/1
4 TABLET, FILM COATED ORAL 2 TIMES DAILY
Status: DISCONTINUED | OUTPATIENT
Start: 2024-06-03 | End: 2024-06-18 | Stop reason: HOSPADM

## 2024-06-03 RX ORDER — INSULIN LISPRO 100 [IU]/ML
1-5 INJECTION, SOLUTION INTRAVENOUS; SUBCUTANEOUS
Status: DISCONTINUED | OUTPATIENT
Start: 2024-06-03 | End: 2024-06-18 | Stop reason: HOSPADM

## 2024-06-03 RX ORDER — LACTULOSE 10 G/15ML
20 SOLUTION ORAL DAILY
Status: DISCONTINUED | OUTPATIENT
Start: 2024-06-04 | End: 2024-06-06

## 2024-06-03 RX ORDER — ACETAMINOPHEN 325 MG/1
650 TABLET ORAL EVERY 6 HOURS PRN
Status: DISCONTINUED | OUTPATIENT
Start: 2024-06-03 | End: 2024-06-05

## 2024-06-03 RX ORDER — ONDANSETRON 2 MG/ML
4 INJECTION INTRAMUSCULAR; INTRAVENOUS EVERY 6 HOURS PRN
Status: DISCONTINUED | OUTPATIENT
Start: 2024-06-03 | End: 2024-06-18 | Stop reason: HOSPADM

## 2024-06-03 RX ORDER — MIDODRINE HYDROCHLORIDE 5 MG/1
5 TABLET ORAL
Status: DISCONTINUED | OUTPATIENT
Start: 2024-06-04 | End: 2024-06-05

## 2024-06-03 RX ADMIN — POTASSIUM CHLORIDE 20 MEQ: 14.9 INJECTION, SOLUTION INTRAVENOUS at 23:57

## 2024-06-03 RX ADMIN — ACETAMINOPHEN 650 MG: 325 TABLET ORAL at 23:57

## 2024-06-03 RX ADMIN — INSULIN LISPRO 1 UNITS: 100 INJECTION, SOLUTION INTRAVENOUS; SUBCUTANEOUS at 23:57

## 2024-06-03 RX ADMIN — ROPINIROLE HYDROCHLORIDE 4 MG: 1 TABLET, FILM COATED ORAL at 23:56

## 2024-06-03 RX ADMIN — SENNOSIDES AND DOCUSATE SODIUM 1 TABLET: 8.6; 5 TABLET ORAL at 23:56

## 2024-06-03 RX ADMIN — IOHEXOL 100 ML: 350 INJECTION, SOLUTION INTRAVENOUS at 19:25

## 2024-06-03 RX ADMIN — FUROSEMIDE 40 MG: 10 INJECTION, SOLUTION INTRAVENOUS at 19:35

## 2024-06-03 RX ADMIN — HYDROMORPHONE HYDROCHLORIDE 1 MG: 1 INJECTION, SOLUTION INTRAMUSCULAR; INTRAVENOUS; SUBCUTANEOUS at 20:27

## 2024-06-03 RX ADMIN — GABAPENTIN 300 MG: 300 CAPSULE ORAL at 23:56

## 2024-06-03 RX ADMIN — POTASSIUM CHLORIDE 40 MEQ: 1500 TABLET, EXTENDED RELEASE ORAL at 22:51

## 2024-06-03 NOTE — ED PROVIDER NOTES
History  Chief Complaint   Patient presents with    Leg Pain     Pt [place] via [facility] [place] bus stop; pt c/o b/l leg pain & increased swelling; pt notes he was headed to [facility]  Patient is a 63-year-old male with history of insulin-dependent diabetes, colon cancer status post resection of the colon, cirrhosis due to CHRISTIE on lactulose and also ascites, esophageal varices, splenomegaly, seizure on Keppra, adrenal insufficiency with chronic steroids (hydrocortisone), restless leg syndrome, CHF presenting with bilateral leg swelling, shortness of breath.  Patient states that he has been using his Lasix as prescribed however has been having diminished urine output.  Reports no chest pain.  Also endorses severe abdominal pain.  Has been ongoing for the past couple days.  Patient otherwise denies any other complaints.    Prior to Admission Medications   Prescriptions Last Dose Informant Patient Reported? Taking?   dicyclomine (BENTYL) 20 mg tablet   No No   Sig: Take 1 tablet (20 mg total) by mouth 2 (two) times a day before breakfast and lunch   ferrous sulfate 325 (65 Fe) mg tablet   Yes No   furosemide (LASIX) 20 mg tablet   No No   Sig: Take 1 tablet (20 mg total) by mouth daily   gabapentin (NEURONTIN) 300 mg capsule   Yes No   Sig: Take 300 mg by mouth Three times a day   hydrOXYzine HCL (ATARAX) 25 mg tablet   Yes No   Sig: TAKE 1 TABLET BY MOUTH 3 TIMES A DAY AS NEEDED FOR ITCHING   lactulose 20 g/30 mL   No No   Sig: Take 30 mL (20 g total) by mouth daily Do not start before February 28, 2023.   midodrine (PROAMATINE) 5 mg tablet   No No   Sig: Take 1 tablet (5 mg total) by mouth 3 (three) times a day before meals   pantoprazole (PROTONIX) 40 mg tablet   No No   Sig: Take 1 tablet (40 mg total) by mouth daily in the early morning Do not start before May 5, 2023.   propranolol (INDERAL) 10 mg tablet   No No   Sig: Take 1 tablet (10 mg total) by mouth every 12 (twelve) hours   rOPINIRole (REQUIP) 4 mg tablet    No No   Sig: Take 1 tablet (4 mg total) by mouth 2 (two) times a day   senna-docusate sodium (SENOKOT S) 8.6-50 mg per tablet   Yes No   Sig: Take 1 tablet by mouth every evening   spironolactone (ALDACTONE) 100 mg tablet   Yes No   tamsulosin (FLOMAX) 0.4 mg   No No   Sig: Take 1 capsule (0.4 mg total) by mouth daily with dinner      Facility-Administered Medications: None       Past Medical History:   Diagnosis Date    CHF (congestive heart failure) (HCC)     Cirrhosis (HCC)     CHRISTIE    Colon cancer (HCC)     Diabetes mellitus (HCC)     Neuropathy     Restless leg syndrome        Past Surgical History:   Procedure Laterality Date    EGD AND SIGMOIDOSCOPY FLEXIBLE      IR PARACENTESIS  11/22/2022    IR PARACENTESIS  2/2/2023    LEFT COLON RESECTION      LITHOTRIPSY         Family History   Problem Relation Age of Onset    Diabetes Mother     Diabetes Father      I have reviewed and agree with the history as documented.    E-Cigarette/Vaping    E-Cigarette Use Never User      E-Cigarette/Vaping Substances    Nicotine No     THC No     CBD No     Flavoring No     Other No     Unknown No      Social History     Tobacco Use    Smoking status: Every Day     Current packs/day: 0.25     Types: Cigarettes    Smokeless tobacco: Never   Vaping Use    Vaping status: Never Used   Substance Use Topics    Alcohol use: Not Currently    Drug use: Never       Review of Systems   Constitutional:  Negative for chills, diaphoresis, fever and unexpected weight change.   HENT:  Negative for ear pain and sore throat.    Eyes:  Negative for visual disturbance.   Respiratory:  Positive for shortness of breath. Negative for cough and chest tightness.    Cardiovascular:  Positive for leg swelling. Negative for chest pain.   Gastrointestinal:  Positive for abdominal distention and abdominal pain. Negative for constipation, diarrhea, nausea and vomiting.   Endocrine: Negative.    Genitourinary:  Negative for difficulty urinating and dysuria.    Musculoskeletal: Negative.    Skin: Negative.    Allergic/Immunologic: Negative.    Neurological: Negative.    Hematological: Negative.    Psychiatric/Behavioral: Negative.     All other systems reviewed and are negative.      Physical Exam  Physical Exam  Vitals and nursing note reviewed.   Constitutional:       General: He is not in acute distress.     Appearance: Normal appearance. He is not ill-appearing.   HENT:      Head: Normocephalic and atraumatic.      Right Ear: External ear normal.      Left Ear: External ear normal.      Nose: Nose normal.      Mouth/Throat:      Mouth: Mucous membranes are moist.      Pharynx: Oropharynx is clear.   Eyes:      General: No scleral icterus.        Right eye: No discharge.         Left eye: No discharge.      Extraocular Movements: Extraocular movements intact.      Conjunctiva/sclera: Conjunctivae normal.      Pupils: Pupils are equal, round, and reactive to light.   Cardiovascular:      Rate and Rhythm: Normal rate and regular rhythm.      Pulses: Normal pulses.      Heart sounds: Normal heart sounds.   Pulmonary:      Effort: Pulmonary effort is normal.      Breath sounds: Rales present.   Abdominal:      General: Abdomen is flat. Bowel sounds are normal. There is distension.      Palpations: Abdomen is soft.      Tenderness: There is abdominal tenderness. There is guarding. There is no rebound.   Musculoskeletal:         General: Normal range of motion.      Cervical back: Normal range of motion and neck supple.   Skin:     General: Skin is warm and dry.      Capillary Refill: Capillary refill takes less than 2 seconds.   Neurological:      General: No focal deficit present.      Mental Status: He is alert and oriented to person, place, and time. Mental status is at baseline.   Psychiatric:         Mood and Affect: Mood normal.         Behavior: Behavior normal.         Thought Content: Thought content normal.         Judgment: Judgment normal.         Vital  Signs  ED Triage Vitals   Temperature Pulse Respirations Blood Pressure SpO2   06/03/24 1808 06/03/24 1808 06/03/24 1808 06/03/24 1808 06/03/24 1808   98.1 °F (36.7 °C) 85 19 123/65 96 %      Temp Source Heart Rate Source Patient Position - Orthostatic VS BP Location FiO2 (%)   06/03/24 1808 06/03/24 2100 -- 06/03/24 1808 --   Oral Monitor  Left arm       Pain Score       06/03/24 1808       10 - Worst Possible Pain           Vitals:    06/03/24 1808 06/03/24 2100   BP: 123/65 138/73   Pulse: 85 78         Visual Acuity      ED Medications  Medications   potassium chloride 20 mEq IVPB (premix) (has no administration in time range)   acetaminophen (TYLENOL) tablet 650 mg (has no administration in time range)   ondansetron (ZOFRAN) injection 4 mg (has no administration in time range)   nicotine (NICODERM CQ) 14 mg/24hr TD 24 hr patch 1 patch (has no administration in time range)   insulin lispro (HumALOG/ADMELOG) 100 units/mL subcutaneous injection 1-5 Units (has no administration in time range)   insulin lispro (HumALOG/ADMELOG) 100 units/mL subcutaneous injection 1-5 Units (has no administration in time range)   furosemide (LASIX) injection 40 mg (40 mg Intravenous Given 6/3/24 1935)   iohexol (OMNIPAQUE) 350 MG/ML injection (MULTI-DOSE) 100 mL (100 mL Intravenous Given 6/3/24 1925)   HYDROmorphone (DILAUDID) injection 1 mg (1 mg Intravenous Given 6/3/24 2027)   potassium chloride (Klor-Con M20) CR tablet 40 mEq (40 mEq Oral Given 6/3/24 2251)       Diagnostic Studies  Results Reviewed       Procedure Component Value Units Date/Time    HS Troponin I 2hr [[id number]]  (Normal) Collected: 06/03/24 2056    Lab Status: Final result Specimen: Blood from Arm, Right Updated: 06/03/24 2130     hs TnI 2hr 6 ng/L      Delta 2hr hsTnI 0 ng/L     Urine Microscopic [[id number]]  (Normal) Collected: 06/03/24 2026    Lab Status: Final result Specimen: Urine, Clean Catch Updated: 06/03/24 2047     RBC, UA None Seen /hpf      WBC, UA  None Seen /hpf      Epithelial Cells Occasional /hpf      Bacteria, UA None Seen /hpf     UA w Reflex to Microscopic w Reflex to Culture [[id number]]  (Abnormal) Collected: 06/03/24 2026    Lab Status: Final result Specimen: Urine, Clean Catch Updated: 06/03/24 2040     Color, UA Yellow     Clarity, UA Clear     Specific Gravity, UA 1.010     pH, UA 6.0     Leukocytes, UA Negative     Nitrite, UA Negative     Protein, UA Negative mg/dl      Glucose, UA >=1000 (1%) mg/dl      Ketones, UA Negative mg/dl      Bilirubin, UA Negative     Occult Blood, UA Negative     UROBILINOGEN UA Negative mg/dL     HS Troponin I 4hr [[id number]]     Lab Status: No result Specimen: Blood     Ammonia [[id number]]  (Normal) Collected: 06/03/24 1900    Lab Status: Final result Specimen: Blood from Arm, Right Updated: 06/03/24 1921     Ammonia 37 umol/L     HS Troponin 0hr (reflex protocol) [[id number]]  (Normal) Collected: 06/03/24 1847    Lab Status: Final result Specimen: Blood from Arm, Left Updated: 06/03/24 1918     hs TnI 0hr 6 ng/L     B-Type Natriuretic Peptide(BNP) [[id number]]  (Normal) Collected: 06/03/24 1847    Lab Status: Final result Specimen: Blood from Arm, Left Updated: 06/03/24 1916     BNP 82 pg/mL     Comprehensive metabolic panel [[id number]]  (Abnormal) Collected: 06/03/24 1847    Lab Status: Final result Specimen: Blood from Arm, Left Updated: 06/03/24 1913     Sodium 135 mmol/L      Potassium 3.0 mmol/L      Chloride 106 mmol/L      CO2 19 mmol/L      ANION GAP 10 mmol/L      BUN 11 mg/dL      Creatinine 0.87 mg/dL      Glucose 284 mg/dL      Calcium 8.0 mg/dL      Corrected Calcium 9.0 mg/dL      AST 31 U/L      ALT 35 U/L      Alkaline Phosphatase 135 U/L      Total Protein 6.3 g/dL      Albumin 2.7 g/dL      Total Bilirubin 2.18 mg/dL      eGFR 91 ml/min/1.73sq m     Narrative:      National Kidney Disease Foundation guidelines for Chronic Kidney Disease (CKD):     Stage 1 with normal or high GFR (GFR > 90  mL/min/1.73 square meters)    Stage 2 Mild CKD (GFR = 60-89 mL/min/1.73 square meters)    Stage 3A Moderate CKD (GFR = 45-59 mL/min/1.73 square meters)    Stage 3B Moderate CKD (GFR = 30-44 mL/min/1.73 square meters)    Stage 4 Severe CKD (GFR = 15-29 mL/min/1.73 square meters)    Stage 5 End Stage CKD (GFR <15 mL/min/1.73 square meters)  Note: GFR calculation is accurate only with a steady state creatinine    Lipase [[id number]]  (Normal) Collected: 06/03/24 1847    Lab Status: Final result Specimen: Blood from Arm, Left Updated: 06/03/24 1913     Lipase 22 u/L     Protime-INR [[id number]]  (Abnormal) Collected: 06/03/24 1847    Lab Status: Final result Specimen: Blood from Arm, Left Updated: 06/03/24 1908     Protime 18.3 seconds      INR 1.49    APTT [[id number]]  (Normal) Collected: 06/03/24 1847    Lab Status: Final result Specimen: Blood from Arm, Left Updated: 06/03/24 1908     PTT 34 seconds     CBC and differential [[id number]]  (Abnormal) Collected: 06/03/24 1847    Lab Status: Final result Specimen: Blood from Arm, Left Updated: 06/03/24 1903     WBC 5.02 Thousand/uL      RBC 2.80 Million/uL      Hemoglobin 9.6 g/dL      Hematocrit 28.7 %       fL      MCH 34.3 pg      MCHC 33.4 g/dL      RDW 16.2 %      MPV 12.0 fL      Platelets 42 Thousands/uL      nRBC 0 /100 WBCs      Segmented % 75 %      Immature Grans % 0 %      Lymphocytes % 12 %      Monocytes % 7 %      Eosinophils Relative 5 %      Basophils Relative 1 %      Absolute Neutrophils 3.74 Thousands/µL      Absolute Immature Grans 0.02 Thousand/uL      Absolute Lymphocytes 0.62 Thousands/µL      Absolute Monocytes 0.34 Thousand/µL      Eosinophils Absolute 0.27 Thousand/µL      Basophils Absolute 0.03 Thousands/µL                    CT chest abdomen pelvis w contrast   Final Result by Nemo Damon MD (06/03 2100)      Moderate to large sized right pleural effusion      Cirrhosis with signs of portal hypertension as evidenced by gastroesophageal  varices.      Stable upper abdominal lymphadenopathy presumed secondary to underlying liver pathology although low-grade lymphoproliferative disorder is not fully excluded      Stable diffuse mesenteric edema         Workstation performed: [id number]         IR IN-Patient Thoracentesis    (Results Pending)              Procedures  Procedures         ED Course  ED Course as of 06/03/24 2300   Mon Jun 03, 2024 1926 Procedure Note: EKG  Date/Time: 06/03/24 7:26 PM   Interpreted by: MELISSA ROCK  Indications / Diagnosis: shortness of breath  ECG reviewed by me, the ED Provider: yes   The EKG demonstrates:  Rhythm: normal sinus  Intervals: normal intervals  Axis: normal axis  QRS/Blocks: normal QRS  ST Changes: No acute ST Changes, no STD/HERVE.                                                 Medical Decision Making  63-year-old male presents with shortness of breath, bilateral leg swelling  Concern for possible CHF in the setting of patient with history of CHF  With bilateral leg swelling not concerning for DVT and also exam finding was also not concerning for DVT  Will assess for possible ACS, CHF  Significant abdominal pain on examination  Will assess for possible cholecystitis, hepatitis, pancreatitis, appendicitis, diverticulitis, cystitis  CT chest abdomen pelvis obtained  Hypokalemia noted on lab  CT chest revealed right-sided large pleural effusion  Due to patient complaining of shortness of breath and new finding of pleural effusion we will plan to admit patient for IR intervention and also to address the hypokalemia      Problems Addressed:  Abdominal pain: acute illness or injury  Hypokalemia: acute illness or injury  Leg edema: acute illness or injury  Pleural effusion on right: acute illness or injury  Shortness of breath: acute illness or injury    Amount and/or Complexity of Data Reviewed  Labs: ordered.  Radiology: ordered.    Risk  Prescription drug management.  Decision regarding hospitalization.              Disposition  Final diagnoses:   Leg edema   Pleural effusion on right   Shortness of breath   Abdominal pain   Hypokalemia     Time reflects when diagnosis was documented in both MDM as applicable and the Disposition within this note       Time User Action Codes Description Comment    6/3/2024 10:21 PM Juancho Henriquez Add [R60.0] Leg edema     6/3/2024 10:21 PM Juancho Henriquez Add [J90] Pleural effusion on right     6/3/2024 10:21 PM Juancho Henriquez Add [R06.02] Shortness of breath     6/3/2024 10:21 PM Juancho Henriquez Add [R10.9] Abdominal pain     6/3/2024 10:21 PM Juancho Henriquez Add [E87.6] Hypokalemia     6/3/2024 10:22 PM Juancho Henriquez Modify [R60.0] Leg edema     6/3/2024 10:22 PM Juancho Henriquez Modify [J90] Pleural effusion on right           ED Disposition       ED Disposition   Admit    Condition   Stable    Date/Time   Mon Gerard 3, 2024 10:22 PM    Comment   Case was discussed with TONG and the patient's admission status was agreed to be Admission Status: observation status to the service of Dr. Whitfield .               Follow-up Information    None         Patient's Medications   Discharge Prescriptions    No medications on file       No discharge procedures on file.    PDMP Review         Value Time User    PDMP Reviewed  Yes 5/4/2023 11:47 AM Margaret Shell PA-C            ED Provider  Electronically Signed by             Juancho Henriquez MD  06/03/24 7175

## 2024-06-03 NOTE — Clinical Note
Case was discussed with TONG and the patient's admission status was agreed to be Admission Status: inpatient status to the service of Dr. Whitfield .

## 2024-06-04 ENCOUNTER — APPOINTMENT (OUTPATIENT)
Dept: INTERVENTIONAL RADIOLOGY/VASCULAR | Facility: HOSPITAL | Age: 64
DRG: 433 | End: 2024-06-04
Payer: COMMERCIAL

## 2024-06-04 PROBLEM — J90 PLEURAL EFFUSION ON RIGHT: Status: ACTIVE | Noted: 2024-06-04

## 2024-06-04 PROBLEM — E87.70 VOLUME OVERLOAD: Status: ACTIVE | Noted: 2024-06-04

## 2024-06-04 PROBLEM — E27.40 ADRENAL INSUFFICIENCY (HCC): Status: ACTIVE | Noted: 2024-06-04

## 2024-06-04 LAB
ANION GAP SERPL CALCULATED.3IONS-SCNC: 8 MMOL/L (ref 4–13)
APPEARANCE FLD: ABNORMAL
ATRIAL RATE: 81 BPM
BUN SERPL-MCNC: 11 MG/DL (ref 5–25)
CALCIUM SERPL-MCNC: 7.8 MG/DL (ref 8.4–10.2)
CHLORIDE SERPL-SCNC: 103 MMOL/L (ref 96–108)
CO2 SERPL-SCNC: 25 MMOL/L (ref 21–32)
COLOR FLD: YELLOW
CREAT SERPL-MCNC: 0.74 MG/DL (ref 0.6–1.3)
ERYTHROCYTE [DISTWIDTH] IN BLOOD BY AUTOMATED COUNT: 16.4 % (ref 11.6–15.1)
GFR SERPL CREATININE-BSD FRML MDRD: 98 ML/MIN/1.73SQ M
GLUCOSE FLD-MCNC: 205 MG/DL
GLUCOSE SERPL-MCNC: 143 MG/DL (ref 65–140)
GLUCOSE SERPL-MCNC: 152 MG/DL (ref 65–140)
GLUCOSE SERPL-MCNC: 172 MG/DL (ref 65–140)
GLUCOSE SERPL-MCNC: 172 MG/DL (ref 65–140)
GLUCOSE SERPL-MCNC: 213 MG/DL (ref 65–140)
HCT VFR BLD AUTO: 26.4 % (ref 36.5–49.3)
HGB BLD-MCNC: 8.8 G/DL (ref 12–17)
LDH FLD L TO P-CCNC: 62 U/L
MCH RBC QN AUTO: 34.5 PG (ref 26.8–34.3)
MCHC RBC AUTO-ENTMCNC: 33.3 G/DL (ref 31.4–37.4)
MCV RBC AUTO: 104 FL (ref 82–98)
P AXIS: 42 DEGREES
PH BODY FLUID: 7.6
PLATELET # BLD AUTO: 37 THOUSANDS/UL (ref 149–390)
PMV BLD AUTO: 11.7 FL (ref 8.9–12.7)
POTASSIUM SERPL-SCNC: 3.1 MMOL/L (ref 3.5–5.3)
PR INTERVAL: 140 MS
PROT FLD-MCNC: <3 G/DL
QRS AXIS: 65 DEGREES
QRSD INTERVAL: 84 MS
QT INTERVAL: 410 MS
QTC INTERVAL: 476 MS
RBC # BLD AUTO: 2.55 MILLION/UL (ref 3.88–5.62)
SITE: ABNORMAL
SODIUM SERPL-SCNC: 136 MMOL/L (ref 135–147)
T WAVE AXIS: 23 DEGREES
TOTAL PROTEIN FLUID: 1.5 G/DL
VENTRICULAR RATE: 81 BPM
WBC # BLD AUTO: 3.25 THOUSAND/UL (ref 4.31–10.16)
WBC # FLD MANUAL: 444 /UL

## 2024-06-04 PROCEDURE — 85027 COMPLETE CBC AUTOMATED: CPT | Performed by: INTERNAL MEDICINE

## 2024-06-04 PROCEDURE — 87205 SMEAR GRAM STAIN: CPT | Performed by: PHYSICIAN ASSISTANT

## 2024-06-04 PROCEDURE — 83615 LACTATE (LD) (LDH) ENZYME: CPT | Performed by: PHYSICIAN ASSISTANT

## 2024-06-04 PROCEDURE — 89051 BODY FLUID CELL COUNT: CPT | Performed by: PHYSICIAN ASSISTANT

## 2024-06-04 PROCEDURE — 84157 ASSAY OF PROTEIN OTHER: CPT | Performed by: PHYSICIAN ASSISTANT

## 2024-06-04 PROCEDURE — 88305 TISSUE EXAM BY PATHOLOGIST: CPT | Performed by: STUDENT IN AN ORGANIZED HEALTH CARE EDUCATION/TRAINING PROGRAM

## 2024-06-04 PROCEDURE — 82945 GLUCOSE OTHER FLUID: CPT | Performed by: PHYSICIAN ASSISTANT

## 2024-06-04 PROCEDURE — 82948 REAGENT STRIP/BLOOD GLUCOSE: CPT

## 2024-06-04 PROCEDURE — 87070 CULTURE OTHR SPECIMN AEROBIC: CPT | Performed by: PHYSICIAN ASSISTANT

## 2024-06-04 PROCEDURE — 32555 ASPIRATE PLEURA W/ IMAGING: CPT | Performed by: STUDENT IN AN ORGANIZED HEALTH CARE EDUCATION/TRAINING PROGRAM

## 2024-06-04 PROCEDURE — 0W993ZZ DRAINAGE OF RIGHT PLEURAL CAVITY, PERCUTANEOUS APPROACH: ICD-10-PCS | Performed by: STUDENT IN AN ORGANIZED HEALTH CARE EDUCATION/TRAINING PROGRAM

## 2024-06-04 PROCEDURE — 99223 1ST HOSP IP/OBS HIGH 75: CPT | Performed by: INTERNAL MEDICINE

## 2024-06-04 PROCEDURE — 93010 ELECTROCARDIOGRAM REPORT: CPT | Performed by: INTERNAL MEDICINE

## 2024-06-04 PROCEDURE — 32555 ASPIRATE PLEURA W/ IMAGING: CPT

## 2024-06-04 PROCEDURE — 83986 ASSAY PH BODY FLUID NOS: CPT | Performed by: PHYSICIAN ASSISTANT

## 2024-06-04 PROCEDURE — 88112 CYTOPATH CELL ENHANCE TECH: CPT | Performed by: STUDENT IN AN ORGANIZED HEALTH CARE EDUCATION/TRAINING PROGRAM

## 2024-06-04 PROCEDURE — 80048 BASIC METABOLIC PNL TOTAL CA: CPT | Performed by: INTERNAL MEDICINE

## 2024-06-04 RX ORDER — POTASSIUM CHLORIDE 20 MEQ/1
40 TABLET, EXTENDED RELEASE ORAL ONCE
Status: COMPLETED | OUTPATIENT
Start: 2024-06-04 | End: 2024-06-04

## 2024-06-04 RX ORDER — HYDROCORTISONE 10 MG/1
10 TABLET ORAL DAILY
Status: DISCONTINUED | OUTPATIENT
Start: 2024-06-04 | End: 2024-06-05

## 2024-06-04 RX ORDER — ALBUMIN (HUMAN) 12.5 G/50ML
12.5 SOLUTION INTRAVENOUS ONCE
Status: COMPLETED | OUTPATIENT
Start: 2024-06-04 | End: 2024-06-04

## 2024-06-04 RX ORDER — POTASSIUM CHLORIDE 20 MEQ/1
20 TABLET, EXTENDED RELEASE ORAL DAILY
Status: DISCONTINUED | OUTPATIENT
Start: 2024-06-04 | End: 2024-06-06

## 2024-06-04 RX ORDER — HYDROCORTISONE 10 MG/1
5 TABLET ORAL
Status: DISCONTINUED | OUTPATIENT
Start: 2024-06-04 | End: 2024-06-05

## 2024-06-04 RX ORDER — LIDOCAINE WITH 8.4% SOD BICARB 0.9%(10ML)
SYRINGE (ML) INJECTION AS NEEDED
Status: COMPLETED | OUTPATIENT
Start: 2024-06-04 | End: 2024-06-04

## 2024-06-04 RX ORDER — FUROSEMIDE 10 MG/ML
40 INJECTION INTRAMUSCULAR; INTRAVENOUS
Status: DISCONTINUED | OUTPATIENT
Start: 2024-06-04 | End: 2024-06-05

## 2024-06-04 RX ADMIN — ROPINIROLE HYDROCHLORIDE 4 MG: 1 TABLET, FILM COATED ORAL at 21:14

## 2024-06-04 RX ADMIN — MIDODRINE HYDROCHLORIDE 5 MG: 5 TABLET ORAL at 10:31

## 2024-06-04 RX ADMIN — LACTULOSE 20 G: 20 SOLUTION ORAL at 08:02

## 2024-06-04 RX ADMIN — DICYCLOMINE HYDROCHLORIDE 20 MG: 20 TABLET ORAL at 10:30

## 2024-06-04 RX ADMIN — FERROUS SULFATE TAB 325 MG (65 MG ELEMENTAL FE) 325 MG: 325 (65 FE) TAB at 08:01

## 2024-06-04 RX ADMIN — POTASSIUM CHLORIDE 40 MEQ: 1500 TABLET, EXTENDED RELEASE ORAL at 10:30

## 2024-06-04 RX ADMIN — HYDROCORTISONE 5 MG: 10 TABLET ORAL at 21:14

## 2024-06-04 RX ADMIN — GABAPENTIN 300 MG: 300 CAPSULE ORAL at 08:02

## 2024-06-04 RX ADMIN — INSULIN LISPRO 1 UNITS: 100 INJECTION, SOLUTION INTRAVENOUS; SUBCUTANEOUS at 22:38

## 2024-06-04 RX ADMIN — INSULIN LISPRO 1 UNITS: 100 INJECTION, SOLUTION INTRAVENOUS; SUBCUTANEOUS at 16:08

## 2024-06-04 RX ADMIN — PANTOPRAZOLE SODIUM 40 MG: 40 TABLET, DELAYED RELEASE ORAL at 06:06

## 2024-06-04 RX ADMIN — DICYCLOMINE HYDROCHLORIDE 20 MG: 20 TABLET ORAL at 06:06

## 2024-06-04 RX ADMIN — GABAPENTIN 300 MG: 300 CAPSULE ORAL at 21:14

## 2024-06-04 RX ADMIN — NICOTINE 1 PATCH: 14 PATCH, EXTENDED RELEASE TRANSDERMAL at 00:12

## 2024-06-04 RX ADMIN — HYDROCORTISONE 10 MG: 10 TABLET ORAL at 10:42

## 2024-06-04 RX ADMIN — Medication 10 ML: at 12:07

## 2024-06-04 RX ADMIN — ROPINIROLE HYDROCHLORIDE 4 MG: 1 TABLET, FILM COATED ORAL at 08:02

## 2024-06-04 RX ADMIN — ALBUMIN (HUMAN) 12.5 G: 0.25 INJECTION, SOLUTION INTRAVENOUS at 11:12

## 2024-06-04 RX ADMIN — NICOTINE 1 PATCH: 14 PATCH, EXTENDED RELEASE TRANSDERMAL at 08:02

## 2024-06-04 RX ADMIN — TAMSULOSIN HYDROCHLORIDE 0.4 MG: 0.4 CAPSULE ORAL at 16:06

## 2024-06-04 RX ADMIN — ACETAMINOPHEN 650 MG: 325 TABLET ORAL at 16:52

## 2024-06-04 RX ADMIN — INSULIN LISPRO 2 UNITS: 100 INJECTION, SOLUTION INTRAVENOUS; SUBCUTANEOUS at 11:11

## 2024-06-04 RX ADMIN — MIDODRINE HYDROCHLORIDE 5 MG: 5 TABLET ORAL at 16:07

## 2024-06-04 RX ADMIN — FUROSEMIDE 40 MG: 10 INJECTION, SOLUTION INTRAVENOUS at 16:10

## 2024-06-04 RX ADMIN — Medication 10 ML: at 14:37

## 2024-06-04 RX ADMIN — POTASSIUM CHLORIDE 20 MEQ: 1500 TABLET, EXTENDED RELEASE ORAL at 08:02

## 2024-06-04 RX ADMIN — GABAPENTIN 300 MG: 300 CAPSULE ORAL at 16:06

## 2024-06-04 RX ADMIN — MIDODRINE HYDROCHLORIDE 5 MG: 5 TABLET ORAL at 06:06

## 2024-06-04 NOTE — PLAN OF CARE
Problem: Potential for Falls  Goal: Patient will remain free of falls  Description: INTERVENTIONS:  - Educate patient/family on patient safety including physical limitations  - Instruct patient to call for assistance with activity   - Consult OT/PT to assist with strengthening/mobility   - Keep Call bell within reach  - Keep bed low and locked with side rails adjusted as appropriate  - Keep care items and personal belongings within reach  - Initiate and maintain comfort rounds  - Make Fall Risk Sign visible to staff  Problem: Prexisting or High Potential for Compromised Skin Integrity  Goal: Skin integrity is maintained or improved  Description: INTERVENTIONS:  - Identify patients at risk for skin breakdown  - Assess and monitor skin integrity  - Assess and monitor nutrition and hydration status  - Monitor labs   - Assess for incontinence   - Turn and reposition patient  - Assist with mobility/ambulation  - Relieve pressure over bony prominences  - Avoid friction and shearing  - Provide appropriate hygiene as needed including keeping skin clean and dry  - Evaluate need for skin moisturizer/barrier cream  - Collaborate with interdisciplinary team   - Patient/family teaching  - Consider wound care consult   Outcome: Progressing     Problem: PAIN - ADULT  Goal: Verbalizes/displays adequate comfort level or baseline comfort level  Description: Interventions:  - Encourage patient to monitor pain and request assistance  - Assess pain using appropriate pain scale  - Administer analgesics based on type and severity of pain and evaluate response  - Implement non-pharmacological measures as appropriate and evaluate response  - Consider cultural and social influences on pain and pain management  - Notify physician/advanced practitioner if interventions unsuccessful or patient reports new pain  Outcome: Progressing     Problem: INFECTION - ADULT  Goal: Absence or prevention of progression during hospitalization  Description:  INTERVENTIONS:  - Assess and monitor for signs and symptoms of infection  - Monitor lab/diagnostic results  - Monitor all insertion sites, i.e. indwelling lines, tubes, and drains  - Monitor endotracheal if appropriate and nasal secretions for changes in amount and color  - Butler appropriate cooling/warming therapies per order  - Administer medications as ordered  - Instruct and encourage patient and family to use good hand hygiene technique  - Identify and instruct in appropriate isolation precautions for identified infection/condition  Outcome: Progressing  Goal: Absence of fever/infection during neutropenic period  Description: INTERVENTIONS:  - Monitor WBC    Outcome: Progressing     Problem: SAFETY ADULT  Goal: Patient will remain free of falls  Description: INTERVENTIONS:  - Educate patient/family on patient safety including physical limitations  - Instruct patient to call for assistance with activity   - Consult OT/PT to assist with strengthening/mobility   - Keep Call bell within reach  - Keep bed low and locked with side rails adjusted as appropriate  - Keep care items and personal belongings within reach  - Initiate and maintain comfort rounds  - Make Fall Risk Sign visible to staff  - Apply yellow socks and bracelet for high fall risk patients  - Consider moving patient to room near nurses station  Outcome: Progressing  Goal: Maintain or return to baseline ADL function  Description: INTERVENTIONS:  -  Assess patient's ability to carry out ADLs; assess patient's baseline for ADL function and identify physical deficits which impact ability to perform ADLs (bathing, care of mouth/teeth, toileting, grooming, dressing, etc.)  - Assess/evaluate cause of self-care deficits   - Assess range of motion  - Assess patient's mobility; develop plan if impaired  - Assess patient's need for assistive devices and provide as appropriate  - Encourage maximum independence but intervene and supervise when necessary  -  Involve family in performance of ADLs  - Assess for home care needs following discharge   - Consider OT consult to assist with ADL evaluation and planning for discharge  - Provide patient education as appropriate  Outcome: Progressing  Goal: Maintains/Returns to pre admission functional level  Description: INTERVENTIONS:  - Perform AM-PAC 6 Click Basic Mobility/ Daily Activity assessment daily.  - Set and communicate daily mobility goal to care team and patient/family/caregiver.   - Collaborate with rehabilitation services on mobility goals if consulted    Problem: DISCHARGE PLANNING  Goal: Discharge to home or other facility with appropriate resources  Description: INTERVENTIONS:  - Identify barriers to discharge w/patient and caregiver  - Arrange for needed discharge resources and transportation as appropriate  - Identify discharge learning needs (meds, wound care, etc.)  - Arrange for interpretive services to assist at discharge as needed  - Refer to Case Management Department for coordinating discharge planning if the patient needs post-hospital services based on physician/advanced practitioner order or complex needs related to functional status, cognitive ability, or social support system  Outcome: Progressing     Problem: Knowledge Deficit  Goal: Patient/family/caregiver demonstrates understanding of disease process, treatment plan, medications, and discharge instructions  Description: Complete learning assessment and assess knowledge base.  Interventions:  - Provide teaching at level of understanding  - Provide teaching via preferred learning methods  Outcome: Progressing   - Out of bed for toileting  - Record patient progress and toleration of activity level   Outcome: Progressing     - Apply yellow socks and bracelet for high fall risk patients  - Consider moving patient to room near nurses station  Outcome: Progressing

## 2024-06-04 NOTE — ASSESSMENT & PLAN NOTE
Will give Lasix 40 mg IV twice daily and continue previously prescribed spironolactone 100 mg p.o. daily

## 2024-06-04 NOTE — PLAN OF CARE
Problem: Potential for Falls  Goal: Patient will remain free of falls  Description: INTERVENTIONS:  - Educate patient/family on patient safety including physical limitations  - Instruct patient to call for assistance with activity   - Consult OT/PT to assist with strengthening/mobility   - Keep Call bell within reach  - Keep bed low and locked with side rails adjusted as appropriate  - Keep care items and personal belongings within reach  - Initiate and maintain comfort rounds  - Make Fall Risk Sign visible to staff  - Offer Toileting every 2 Hours, in advance of need  - Initiate/Maintain bed alarm  - Obtain necessary fall risk management equipment: Q shift  - Apply yellow socks and bracelet for high fall risk patients  - Consider moving patient to room near nurses station  Outcome: Progressing     Problem: PAIN - ADULT  Goal: Verbalizes/displays adequate comfort level or baseline comfort level  Description: Interventions:  - Encourage patient to monitor pain and request assistance  - Assess pain using appropriate pain scale  - Administer analgesics based on type and severity of pain and evaluate response  - Implement non-pharmacological measures as appropriate and evaluate response  - Consider cultural and social influences on pain and pain management  - Notify physician/advanced practitioner if interventions unsuccessful or patient reports new pain  Outcome: Progressing     Problem: SAFETY ADULT  Goal: Patient will remain free of falls  Description: INTERVENTIONS:  - Educate patient/family on patient safety including physical limitations  - Instruct patient to call for assistance with activity   - Consult OT/PT to assist with strengthening/mobility   - Keep Call bell within reach  - Keep bed low and locked with side rails adjusted as appropriate  - Keep care items and personal belongings within reach  - Initiate and maintain comfort rounds  - Make Fall Risk Sign visible to staff  - Offer Toileting every 2 Hours,  in advance of need  - Initiate/Maintain bed alarm  - Obtain necessary fall risk management equipment: Q shift  - Apply yellow socks and bracelet for high fall risk patients  - Consider moving patient to room near nurses station  Outcome: Progressing     Problem: DISCHARGE PLANNING  Goal: Discharge to home or other facility with appropriate resources  Description: INTERVENTIONS:  - Identify barriers to discharge w/patient and caregiver  - Arrange for needed discharge resources and transportation as appropriate  - Identify discharge learning needs (meds, wound care, etc.)  - Arrange for interpretive services to assist at discharge as needed  - Refer to Case Management Department for coordinating discharge planning if the patient needs post-hospital services based on physician/advanced practitioner order or complex needs related to functional status, cognitive ability, or social support system  Outcome: Progressing

## 2024-06-04 NOTE — ASSESSMENT & PLAN NOTE
Initial potassium 3.0   Patient is status post p.o. as well as IV repletion in ER  We will give K-Dur 20 mill equivalents p.o. daily  Continue to monitor with repeat labs in a.m.

## 2024-06-04 NOTE — UTILIZATION REVIEW
Initial Clinical Review  OBSERVATION ADMISSION6/3/2024 2223  CONVERTED TO   INPATIENT ADMISSION 6/5/2024 1007  AFTER 2 MN OBS MANAGEMENT   DUE TO MANAGEMENT OF VOLUME OVERLOAD, HYPOTENSION W ADRENAL INSUFFICIENCY, CIRRHOSIS OF LIVER   Admission: Date/Time/Statement:   Admission Orders (From admission, onward)       Ordered        06/05/24 1007  INPATIENT ADMISSION  Once            06/03/24 2223  Place in Observation  Once                          Orders Placed This Encounter   Procedures    INPATIENT ADMISSION     Standing Status:   Standing     Number of Occurrences:   1     Order Specific Question:   Level of Care     Answer:   Med Surg [16]     Order Specific Question:   Estimated length of stay     Answer:   More than 2 Midnights     Order Specific Question:   Certification     Answer:   I certify that inpatient services are medically necessary for this patient for a duration of greater than two midnights. See H&P and MD Progress Notes for additional information about the patient's course of treatment.     ED Arrival Information       Expected   -    Arrival   6/3/2024 18:08    Acuity   Urgent              Means of arrival   Walk-In    Escorted by   [facility] ([facility])    Service   Hospitalist    Admission type   Emergency              Arrival complaint   leg pain             Chief Complaint   Patient presents with    Leg Pain     [place] [facility] [place] bus [place]; pt c/o b/l leg pain & increased swelling; pt notes he was headed to [place]       Initial Presentation: 63 y.o. male pmh  insulin-dependent diabetes, colon cancer status post resection of the colon, cirrhosis due to CHRISTIE on lactulose w/ ascites, esophageal varices, splenomegaly, seizure on Keppra, adrenal insufficiency with chronic steroids (hydrocortisone), restless leg syndrome, CHF presenting with bilateral leg swelling, shortness of breath. Reports using his Lasix as prescribed however has been having diminished urine output.   Reports no chest pain.  Endorses severe abdominal pain ongoing for the past couple days     EXAM   soft BP 90/60, ABD tenderness, guarding; CT a/p Moderate to large sized right pleural effusion  cirrhosis w portal HTN. Labs hypokalemia, elevated BS, hyponatremia. Given IV Lasix 40 mg once   Observation admission due to R sided pleural effusion, volume overload, Hypokalemia, hypotension w/ adrenal insufficiency, general anasarca.  Date: 6/4   Day 2:    IR consult, follow POX, BP.   Cont PTA spironolactone, Midodrine TID, give IV albumin 25% once.   Replete PO potasssium;   OP GI & cont prehospital lactulose 20mg daily   INTERVENTIONAL RADIOLOGY PROCEDURE NOTE  Date: 6/4/2024  Procedure:   Right thoracentesis   Specimens: sent to lab   Findings:   Right thoracentesis.   Anesthesia: local  Date  6/5/2024 day 3 CHANGED TO Inpatient  Requires ongoing management of cirrhosis of liver / w gen swelling & volume overload  could not be given Lasix 40 mg IV twice daily and spironolactone 100 mg p.o. daily due to soft BP  Change IV Lasix to p.o. Lasix 40 mg daily  S/P IV albumin wo improvement in BP, increase Midodrine to 10mg TID,   Recs per GI cont pre hospital spirolactone 100mg daily/ lactulose 20 mg PO daily  ENDO  Consult for adrenal insufficiency/ hypotension  RECOMMENDATIONS:  Increase cortef to 50mg TID for 2 days   - taper down to 50mg BID for 3 days after, then 25mg BID for 3 days after  - down to home dose 10/mg after   - agree with increasing midodrine to 10mg TID for now    ISS  DATE 6/6/2024 DAY 4  Rapid response event due to new onset SZ  SZ like activity witnessed for > 5 MIN, 1 MG Ativan STAT; CBC, CMP, KEPPRA level; stat CTH w Neuro consult   Neuro  currently at his baseline  patient reported hx of seizure disorder and on keppra, unable to verify with care everywhere.  -Recommend whether patient was supposed to be on keppra or not.   If patient was supposed to be on keppra, please continue home dose. If not,  recommend to obtain routine EEG and mRI brain wwo for seizure workup  ED Triage Vitals   Temperature Pulse Respirations Blood Pressure SpO2   06/03/24 1808 06/03/24 1808 06/03/24 1808 06/03/24 1808 06/03/24 1808   98.1 °F (36.7 °C) 85 19 123/65 96 %      Temp Source Heart Rate Source Patient Position - Orthostatic VS BP Location FiO2 (%)   06/03/24 1808 06/03/24 2100 06/03/24 2325 06/03/24 1808 --   Oral Monitor Lying Left arm       Pain Score       06/03/24 1808       10 - Worst Possible Pain          Wt Readings from Last 1 Encounters:   06/03/24 71.2 kg (156 lb 15.5 oz)     Additional Vital Signs:   Date/Time Temp Pulse Resp BP MAP (mmHg) SpO2 O2 Device Patient Position - Orthostatic VS   06/06/24 1519 97.6 °F (36.4 °C) 63 16 100/54 66 97 % None (Room air) Sitting   06/06/24 0909 -- 65 -- 108/68 75 -- -- Lying   06/06/24 0730 97.7 °F (36.5 °C) 65 16 101/64 73 96 % None (Room air) Lying   06/06/24 0543 97.8 °F (36.6 °C) 88 20 112/59 -- 96 % None (Room air) Lying   06/06/24 0540 97.9 °F (36.6 °C) 104 20 138/65 -- 96 % None (Room air) --   06/06/24 0535 98.6 °F (37 °C) 120 Abnormal  22 135/102 Abnormal  -- 94 % None (Room air) Lying   06/06/24 0520 98.6 °F (37 °C) 113 Abnormal  22 141/80 104 94 % None (Room air) Lying   06/05/24 2134 97.4 °F (36.3 °C) Abnormal  63 18 102/64 71 93 % None (Room air) Lying   06/05/24 1531 97.1 °F (36.2 °C) Abnormal  -- 18 107/62 75 98 % None (Room air) Lying   06/05/24 1118 -- 64 18 107/61 74 97 % None (Room air) Sitting     Date/Time Temp Pulse Resp BP MAP (mmHg) SpO2 O2 Device Patient Position - Orthostatic VS   06/05/24 1001 -- 63 -- 100/55 -- -- -- Lying   06/05/24 0841 -- 66 -- 105/59 -- -- -- --   06/05/24 0731 97 °F (36.1 °C) Abnormal  63 18 97/60 -- 97 % None (Room air) Lying   06/04/24 2233 97.9 °F (36.6 °C) 55 17 95/64 70 98 % None (Room air) Lying   06/04/24 2100 97.7 °F (36.5 °C) 65 17 100/59 65 99 % None (Room air) Lyin     Date/Time Temp Pulse Resp BP MAP (mmHg) SpO2 O2  "Device Patient Position - Orthostatic VS   06/04/24 1511 97.5 °F (36.4 °C) 68 18 110/62 68 99 % None (Room air) Lying   06/04/24 12:30:35 -- -- 18 108/55 -- 100 % -- --   06/04/24 1035 -- 64 20 96/60 -- -- -- Sitting   06/04/24 0709 98 °F (36.7 °C) 72 18 98/60 66 97 % None (Room air) Lying   06/04/24 0600 -- -- -- 91/62 67 -- -- --   06/03/24 2325 97.5 °F (36.4 °C) 67 19 108/68 74 99 % None (Room air) Lying   06/03/24 2100 -- 78 16 138/73 -- 100 % None (Room air) --   06/03/24 1808 98.1 °F (36.7 °C) 85 19 123/65 -- 96 % None (Room air) --     Weights (last 14 days)    Date/Time Weight Weight Method Height Drug Calculation Weight   06/03/24 2325 71.2 kg (156 lb 15.5 oz) Stretcher scale 5' 7.01\" (1.702 m) 71.2 kg (156 lb 15.5 oz)     Pertinent Labs/Diagnostic Test Results:   6/3 EKG demonstrates:  Rhythm: normal sinus  Intervals: normal intervals  Axis: normal axis  QRS/Blocks: normal QRS  ST Changes: No acute ST Changes, no STD/HERVE.     CT head wo contrast   Final Result by Allen Rodriguez MD (06/06 0811)      No acute intracranial abnormality.                  Workstation performed: [id number]          VAS VENOUS DUPLEX - LOWER LIMB BILATERAL   Final Result by Danisha Osullivan DO (06/05 1609)      IR IN-Patient Thoracentesis   Final Result by Nate Luevano MD (06/05 1258)   Diagnostic and therapeutic right thoracentesis.         Workstation performed: [id number]         CT chest abdomen pelvis w contrast   Final Result by Nemo Damon MD (06/03 2100)      Moderate to large sized right pleural effusion      Cirrhosis with signs of portal hypertension as evidenced by gastroesophageal varices.      Stable upper abdominal lymphadenopathy presumed secondary to underlying liver pathology although low-grade lymphoproliferative disorder is not fully excluded      Stable diffuse mesenteric edema         Workstation performed: [id number]               Results from last 7 days   Lab Units 06/06/24  0558 " "06/05/24  0609 06/04/24  0759 06/03/24  1847   WBC Thousand/uL 5.27 3.14* 3.25* 5.02   HEMOGLOBIN g/dL 9.3* 9.1* 8.8* 9.6*   HEMATOCRIT % 26.8* 25.9* 26.4* 28.7*   PLATELETS Thousands/uL 33* 32* 37* 42*   TOTAL NEUT ABS Thousands/µL 4.62 2.09  --  3.74         Results from last 7 days   Lab Units 06/06/24  0558 06/05/24  0609 06/04/24  0759 06/03/24  1847   SODIUM mmol/L 134* 139 136 135   POTASSIUM mmol/L 4.5 3.6 3.1* 3.0*   CHLORIDE mmol/L 103 107 103 106   CO2 mmol/L 20* 26 25 19*   ANION GAP mmol/L 11 6 8 10   BUN mg/dL 13 13 11 11   CREATININE mg/dL 0.88 0.81 0.74 0.87   EGFR ml/min/1.73sq m 91 94 98 91   CALCIUM mg/dL 8.8 8.1* 7.8* 8.0*   MAGNESIUM mg/dL  --  1.9  --   --      Results from last 7 days   Lab Units 06/06/24  0558 06/03/24  1900 06/03/24  1847   AST U/L 34  --  31   ALT U/L 41  --  35   ALK PHOS U/L 153*  --  135*   TOTAL PROTEIN g/dL 7.1  --  6.3*   ALBUMIN g/dL 3.1*  --  2.7*   TOTAL BILIRUBIN mg/dL 2.28*  --  2.18*   AMMONIA umol/L  --  37  --      Results from last 7 days   Lab Units 06/06/24  1524 06/06/24  1101 06/06/24  0527 06/05/24  2110 06/05/24  1535 06/05/24  1104 06/05/24  0556 06/04/24  2220 06/04/24  1534 06/04/24  1056 06/04/24  0559 06/03/24  2339   POC GLUCOSE mg/dl 299* 350* 237* 311* 206* 183* 136 172* 172* 213* 152* 161*     Results from last 7 days   Lab Units 06/06/24  0558 06/05/24  0609 06/04/24  0759 06/03/24  1847   GLUCOSE RANDOM mg/dL 271* 106 143* 284*             No results found for: \"BETA-HYDROXYBUTYRATE\"                   Results from last 7 days   Lab Units 06/03/24 2056 06/03/24  1847   HS TNI 0HR ng/L  --  6   HS TNI 2HR ng/L 6  --    HSTNI D2 ng/L 0  --          Results from last 7 days   Lab Units 06/03/24  1847   PROTIME seconds 18.3*   INR  1.49*   PTT seconds 34                         Results from last 7 days   Lab Units 06/03/24  1847   BNP pg/mL 82                     Results from last 7 days   Lab Units 06/03/24  1847   LIPASE u/L 22               "   Results from last 7 days   Lab Units 06/03/24 2026   CLARITY UA  Clear   COLOR UA  Yellow   SPEC GRAV UA  1.010   PH UA  6.0   GLUCOSE UA mg/dl >=1000 (1%)*   KETONES UA mg/dl Negative   BLOOD UA  Negative   PROTEIN UA mg/dl Negative   NITRITE UA  Negative   BILIRUBIN UA  Negative   UROBILINOGEN UA mg/dL Negative   LEUKOCYTES UA  Negative   WBC UA /hpf None Seen   RBC UA /hpf None Seen   BACTERIA UA /hpf None Seen   EPITHELIAL CELLS WET PREP /hpf Occasional                                 Results from last 7 days   Lab Units 06/04/24  1244   GRAM STAIN RESULT  Rare Polys  No bacteria seen   BODY FLUID CULTURE, STERILE  No growth     Results from last 7 days   Lab Units 06/04/24  1229   WBC FLUID /ul 444               ED Treatment:   Medication Administration from 06/03/2024 1808 to 06/03/2024 2317         Date/Time Order Dose Route Action Action by Comments     06/03/2024 1935 EDT furosemide (LASIX) injection 40 mg 40 mg Intravenous Given Doris Mccauley RN --     06/03/2024 1925 EDT iohexol (OMNIPAQUE) 350 MG/ML injection (MULTI-DOSE) 100 mL 100 mL Intravenous Given Watson Dawkins --     06/03/2024 2027 EDT HYDROmorphone (DILAUDID) injection 1 mg 1 mg Intravenous Given Doris Mccauley RN --     06/03/2024 2251 EDT potassium chloride (Klor-Con M20) CR tablet 40 mEq 40 mEq Oral Given Doris Mccauley RN --          Past Medical History:   Diagnosis Date    CHF (congestive heart failure) (HCC)     Cirrhosis (HCC)     CHRISTIE    Colon cancer (HCC)     Diabetes mellitus (HCC)     Neuropathy     Restless leg syndrome      Present on Admission:   Cirrhosis (HCC)   (Resolved) Hypokalemia   Smoking   Type 2 diabetes mellitus with obesity  (HCC)   Thrombocytopenia (HCC)      Admitting Diagnosis: Shortness of breath [R06.02]  Hypokalemia [E87.6]  Leg pain [M79.606]  Leg edema [R60.0]  Abdominal pain [R10.9]  Pleural effusion on right [J90]  Age/Sex: 63 y.o. male  Admission Orders:  VAS Venous  duplex rocky LE  I/O  Diet cho/ carine control     Scheduled Medications:  dicyclomine, 20 mg, Oral, BID before breakfast/lunch  [START ON 6/7/2024] enoxaparin, 40 mg, Subcutaneous, Q24H SANDRA  ferrous sulfate, 325 mg, Oral, Daily With Breakfast  furosemide, 40 mg, Oral, Daily  gabapentin, 300 mg, Oral, TID  [START ON 6/11/2024] hydrocortisone, 10 mg, Oral, TID  [START ON 6/9/2024] hydrocortisone, 25 mg, Oral, TID  hydrocortisone, 50 mg, Oral, TID  [START ON 6/7/2024] hydrocortisone, 50 mg, Oral, BID  insulin glargine, 10 Units, Subcutaneous, HS  insulin lispro, 1-5 Units, Subcutaneous, TID AC  insulin lispro, 1-5 Units, Subcutaneous, HS  midodrine, 10 mg, Oral, TID AC  nicotine, 1 patch, Transdermal, Daily  pantoprazole, 40 mg, Oral, Early Morning  propranolol, 10 mg, Oral, Q12H SANDRA  rifaximin, 550 mg, Oral, Q12H SANDRA  rOPINIRole, 4 mg, Oral, BID  senna-docusate sodium, 1 tablet, Oral, QPM  spironolactone, 100 mg, Oral, Daily  tamsulosin, 0.4 mg, Oral, Daily With Dinner    Continuous IV Infusions:     PRN Meds:  hydrOXYzine HCL, 25 mg, Oral, TID PRN  LORazepam, 1 mg, Intravenous, Q6H PRN  ondansetron, 4 mg, Intravenous, Q6H PRN        IP CONSULT TO GASTROENTEROLOGY  IP CONSULT TO ENDOCRINOLOGY  IP CONSULT TO NEUROLOGY    Network Utilization Review Department  ATTENTION: Please call with any questions or concerns to [phone] and carefully listen to the prompts so that you are directed to the right person. All voicemails are confidential.   For Discharge needs, contact Care Management DC Support Team at [phone] opt. 2  Send all requests for admission clinical reviews, approved or denied determinations and any other requests to dedicated fax number below belonging to the campus where the patient is receiving treatment. List of dedicated fax numbers for the Facilities:  FACILITY NAME UR FAX NUMBER   ADMISSION DENIALS (Administrative/Medical Necessity) [phone]   DISCHARGE SUPPORT TEAM (NETWORK) [phone]    PARENT CHILD HEALTH (Maternity/NICU/Pediatrics) [phone]   [facility] [phone]   [facility] [phone]   [facility] [phone]   [facility] [phone]   [facility] [phone]   [facility] [phone]   [facility] [phone]   [facility] [phone]   [facility] [phone]   [facility] [phone]   [facility] [phone]   [facility] [phone]

## 2024-06-04 NOTE — DISCHARGE INSTRUCTIONS
Thoracentesis   WHAT YOU NEED TO KNOW:   A thoracentesis is a procedure to remove extra fluid or air from between your lungs and your inner chest wall. Air or fluid buildup may make it hard for you to breathe. A thoracentesis allows your lungs to expand fully so you can breathe more easily.  DISCHARGE INSTRUCTIONS:   Medicines:   Pain medicine:  You may be given a prescription medicine to decrease pain. Do not wait until the pain is severe before you take your medicine.    Antibiotics:  This medicine helps fight or prevent an infection.    Take your medicine as directed.  Call your healthcare provider if you think your medicine is not helping or if you have side effects. Tell him if you are allergic to any medicine. Keep a list of the medicines, vitamins, and herbs you take. Include the amounts, and when and why you take them. Bring the list or the pill bottles to follow-up visits. Carry your medicine list with you in case of an emergency.  Follow up with your healthcare provider as directed:  Write down your questions so you remember to ask them during your visits.   Rest:  Rest when you feel it is needed. Slowly start to do more each day. Return to your daily activities as directed.   Do not smoke:  If you smoke, it is never too late to quit. Ask for information about how to stop smoking if you need help.  Contact your healthcare provider if:   You have a fever.    Your puncture site is red, warm, swollen, or draining pus.    You have questions or concerns about your procedure, medicine, or care.    If you have any questions regarding, call the IR department @ [phone].     Seek care immediately or call 911 if:   Blood soaks through your bandage.    There is blood in your spit.Thoracentesis   WHAT YOU NEED TO KNOW:   A thoracentesis is a procedure to remove extra fluid or air from between your lungs and your inner chest wall. Air or fluid buildup may make it hard for you to breathe. A thoracentesis allows your  lungs to expand fully so you can breathe more easily.  DISCHARGE INSTRUCTIONS:   Medicines:   Pain medicine:  You may be given a prescription medicine to decrease pain. Do not wait until the pain is severe before you take your medicine.    Antibiotics:  This medicine helps fight or prevent an infection.    Take your medicine as directed.  Call your healthcare provider if you think your medicine is not helping or if you have side effects. Tell him if you are allergic to any medicine. Keep a list of the medicines, vitamins, and herbs you take. Include the amounts, and when and why you take them. Bring the list or the pill bottles to follow-up visits. Carry your medicine list with you in case of an emergency.  Follow up with your healthcare provider as directed:  Write down your questions so you remember to ask them during your visits.   Rest:  Rest when you feel it is needed. Slowly start to do more each day. Return to your daily activities as directed.   Do not smoke:  If you smoke, it is never too late to quit. Ask for information about how to stop smoking if you need help.  Contact your healthcare provider if:   You have a fever.    Your puncture site is red, warm, swollen, or draining pus.    You have questions or concerns about your procedure, medicine, or care.    If you have any questions regarding, call the IR department @ [phone].     Seek care immediately or call 911 if:   Blood soaks through your bandage.    There is blood in your spit.

## 2024-06-04 NOTE — CASE MANAGEMENT
Case Management Assessment & Discharge Planning Note    Patient name Foreign Armando  Location 7T [facility] 705/7T [facility] 705-01 MRN [id number]  : 1960 Date 2024       Current Admission Date: 6/3/2024  Current Admission Diagnosis:Pleural effusion on right   Patient Active Problem List    Diagnosis Date Noted Date Diagnosed    Adrenal insufficiency (HCC) 2024     Volume overload 2024     Pleural effusion on right 2024     Abnormal CT scan 2023     History of colon cancer      Paranoia (HCC) 2022     Abdominal pain 2022     Pancytopenia (HCC) 2022     Cirrhosis (HCC) 2022     Hypokalemia 2022     Thrombocytopenia (HCC) 2022     Restless leg syndrome 2022     Smoking 2022     Type 2 diabetes mellitus with obesity  (HCC) 2022       LOS (days): 0  Geometric Mean LOS (GMLOS) (days):   Days to GMLOS:     OBJECTIVE:              Current admission status: Observation       Preferred Pharmacy:   [facility] [place] - [address]  [address] [place] [address]  Phone: [phone] Fax: [phone]    [facility]/pharmacy #[id number] [facility] [address] [address]  Phone: [phone] Fax: [phone]    [facility]/pharmacy #[id number]  [place] - [address] [facility]  [address] [facility] [place] [address]  Phone: [phone] Fax: [phone]    [facility]/pharmacy #[id number] - [place] - [address] [facility]  [address]  [facility] [address]  Phone: [phone] Fax: [phone]    Primary Care Provider: No primary care provider on file.    Primary Insurance: BLUE CROSS  Secondary Insurance:     ASSESSMENT:  Active Health Care Proxies       kimani wolfe [facility] Care Representative - Significant Other   Primary Phone: [phone] (Mobile)                 Advance Directives  Does patient have a Health Care POA?: No  Was patient offered paperwork?: Yes  (declined)  Does patient currently have a Health Care decision maker?: Yes, please see Health Care Proxy section  Does patient have Advance Directives?: No  Was patient offered paperwork?: Yes (declined)  Primary Contact: Candace Eaton         Readmission Root Cause  30 Day Readmission: No    Patient Information  Admitted from:: Home  Mental Status: Alert  During Assessment patient was accompanied by: Not accompanied during assessment  Assessment information provided by:: Patient  Primary Caregiver: Self  Support Systems: Spouse/significant other, Children  County of Residence: [facility]  What city do you live in?: [place]  Home entry access options. Select all that apply.: Stairs  Number of steps to enter home.: 3  Do the steps have railings?: No  Type of Current Residence: Other (Comment) (RV)  Living Arrangements: Lives w/ Spouse/significant other, Lives w/ Son, Lives w/ Daughter (Children are pt's tori's who are 14/12 and live with the 2)  Is patient a ?: No    Activities of Daily Living Prior to Admission  Functional Status: Independent  Completes ADLs independently?: Yes  Ambulates independently?: Yes  Does patient use assisted devices?: No  Does patient currently own DME?: No  Does patient have a history of Outpatient Therapy (PT/OT)?: No  Does the patient have a history of Short-Term Rehab?: No  Does patient have a history of HHC?: No  Does patient currently have HHC?: No         Patient Information Continued  Income Source: Pension/MCFP (SSI and Pension totalling $3300/mo)  Does patient have prescription coverage?: Yes (Uses [facility] in [place])  Does patient have a history of substance abuse?: No  Does patient have a history of Mental Health Diagnosis?: No         Means of Transportation  Means of Transport to [facility] [place]:: Drives Self (currently only able to drive tori's truck as no other vehicle accessbile)      Social Determinants of Health (SDOH)      Flowsheet Row Most Recent Value    Housing Stability    In the last 12 months, was there a time when you were not able to pay the mortgage or rent on time? N   In the past 12 months, how many times have you moved where you were living? 3   At any time in the past 12 months, were you homeless or living in a shelter (including now)? N   Transportation Needs    In the past 12 months, has lack of transportation kept you from medical appointments or from getting medications? no   In the past 12 months, has lack of transportation kept you from meetings, work, or from getting things needed for daily living? No   Food Insecurity    Within the past 12 months, you worried that your food would run out before you got the money to buy more. Sometimes  [declines need for resources at present as hasn't been an issue as of yet]   Within the past 12 months, the food you bought just didn't last and you didn't have money to get more. Never true   Utilities    In the past 12 months has the electric, gas, oil, or water company threatened to shut off services in your home? Shut off            DISCHARGE DETAILS:    Discharge planning discussed with:: pt        CM contacted family/caregiver?: No- see comments (pt declined the need)  Were Treatment Team discharge recommendations reviewed with patient/caregiver?: Yes  Did patient/caregiver verbalize understanding of patient care needs?: Yes       Contacts  Patient Contacts: Candace Reyes  Relationship to Patient:: Family  Contact Method: Phone  Phone Number: [phone]  Reason/Outcome: Emergency Contact    Requested Home Health Care         Is the patient interested in HHC at discharge?: No    DME Referral Provided  Referral made for DME?: No    Other Referral/Resources/Interventions Provided:  Referral Comments: Per recent care cooridination notes, pt had voiced will to find a place on his own due to constant arguing with he and his fiance'.  When questioned re: this he states this is an ongoing issue and they  work it out.  Offered information for lower income housing and/or a place for Mom (eluded previously to wanting an [facility]) however he declined all information.    Would you like to participate in our [facility] service program?  : No - Declined    Treatment Team Recommendation: Home  Discharge Destination Plan:: Home  Transport at Discharge : Auto with designated , Family        Transported by (Company and Unit #): Candace Reyes

## 2024-06-04 NOTE — ASSESSMENT & PLAN NOTE
Lab Results   Component Value Date    HGBA1C 6.5 (H) 05/05/2024       Recent Labs     06/03/24  2339   POCGLU 161*       Blood Sugar Average: Last 72 hrs:  (P) 161    Placed on [facility] type II diet  Obtain Accu-Cheks before meals and at bedtime with Humalog correction dose before meals and at bedtime

## 2024-06-04 NOTE — ASSESSMENT & PLAN NOTE
Placed in observation medicine  Will give Lasix 40 mg IV twice daily and spironolactone 100 mg p.o. daily  Patient reports history of previous paracentesis but no denies previous thoracentesis  Placed on O2 protocol  Consult IR in a.m.

## 2024-06-04 NOTE — H&P
[facility]  H&P  Name: Foreign Armando 63 y.o. male I MRN: [id number]  Unit/Bed#: 7T [facility] 705-01 I Date of Admission: 6/3/2024   Date of Service: 6/4/2024 I Hospital Day: 0      Assessment & Plan   * Pleural effusion on right  Assessment & Plan  Placed in observation medicine  Will give Lasix 40 mg IV twice daily and spironolactone 100 mg p.o. daily  Patient reports history of previous paracentesis but no denies previous thoracentesis  Placed on O2 protocol  Consult IR in a.m.    Hypokalemia  Assessment & Plan  Initial potassium 3.0   Patient is status post p.o. as well as IV repletion in ER  We will give K-Dur 20 mill equivalents p.o. daily  Continue to monitor with repeat labs in a.m.    Volume overload  Assessment & Plan  Will give Lasix 40 mg IV twice daily and continue previously prescribed spironolactone 100 mg p.o. daily    Adrenal insufficiency (HCC)  Assessment & Plan  Continue prehospital Cortef 10 mg p.o. every morning and 5 mg p.o. nightly    Type 2 diabetes mellitus with obesity  (HCC)  Assessment & Plan  Lab Results   Component Value Date    HGBA1C 6.5 (H) 05/05/2024       Recent Labs     06/03/24  2339   POCGLU 161*       Blood Sugar Average: Last 72 hrs:  (P) 161    Placed on [facility] type II diet  Obtain Accu-Cheks before meals and at bedtime with Humalog correction dose before meals and at bedtime    Smoking  Assessment & Plan  Will give nicotine 14 mg transdermal daily    Cirrhosis (HCC)  Assessment & Plan  Continue prehospital lactulose 20 g p.o. daily         TeleMedicine H&P - [facility] Internal [facility]    Patient Information: Foreign Armando 63 y.o. male MRN: [id number]  Unit/Bed#: 7T [facility] 705-01 Encounter: [id number]  Admitting Physician: Natan Miguel PA-C  PCP: No primary care provider on file.  Date of Admission:  06/04/24    REQUIRED DOCUMENTATION:     1. This service was provided via Telemedicine.  2. Provider located at [facility].  3. TeleMed provider:  Natan Miguel PA-C.  4. Identify all parties in room with patient during tele consult:  Patient and patient's nurse  5. After connecting through [employer], patient was identified by name and date of birth and assistant checked wristband.  Patient was then informed that this was a Telemedicine visit and that the exam was being conducted confidentially over secure lines. My office door was closed. No one else was in the room.  Patient acknowledged consent and understanding of privacy and security of the Telemedicine visit, and gave us permission to have the assistant stay in the room in order to assist with the history and to conduct the exam.  I informed the patient that I have reviewed their record in Epic and presented the opportunity for them to ask any questions regarding the visit today.  The patient agreed to participate.     VTE Prophylaxis: Heparin and Reason for no pharmacologic prophylaxis SCDs only until cleared by IR   / sequential compression device   Code Status: Level 1  Discussion with family: None present at bedside at time of exam    Anticipated Length of Stay:  Patient will be admitted on an Observation basis with an anticipated length of stay of less than 2 midnights.   Justification for Hospital Stay: Right-sided pleural effusion requiring IR evaluation, hypokalemia requiring repletion    Total Time for Visit, including Counseling / Coordination of Care: 1 hour.  Greater than 50% of this total time spent on direct patient counseling and coordination of care.    Chief Complaint:   Abdominal pain, leg pain, leg swelling and diarrhea x 1 week    History of Present Illness:    Foreign Armando is a 63 y.o. male who presents with abdominal pain, leg pain, leg swelling and diarrhea x 1 week.  Patient with a known history of CHRISTIE and cirrhosis who was required paracentesis in the past [place]  Patient additionally has a history of noncompliance  though he does state that he has been taking his medications regularly.    Patient complains of bilateral lower extremity pain and swelling to the point where it is difficult for him to put on his pants.  Additionally he states that he has been having chronic diarrhea this has been worse up to 15 bowel movements a day so therefore he has not been taking his lactulose.  Patient is not currently following with GI and apparently is in the process of moving back to [place].    While in ER patient was noted to be hypokalemic with a moderate to large right-sided pleural effusion though he was able to maintain his O2 saturation on room air he does complain of dyspnea with minimal activity.    Review of Systems:    Review of Systems   Constitutional:  Negative for chills and fever.   Respiratory:  Negative for cough, shortness of breath and wheezing.    Cardiovascular:  Positive for leg swelling. Negative for chest pain and palpitations.   Gastrointestinal:  Positive for abdominal pain and diarrhea. Negative for nausea and vomiting.   Genitourinary:  Negative for dysuria, frequency, hematuria and urgency.   Musculoskeletal:  Positive for arthralgias.   Neurological:  Negative for weakness, light-headedness and headaches.   All other systems reviewed and are negative.      Past Medical and Surgical History:     Past Medical History:   Diagnosis Date    CHF (congestive heart failure) (HCC)     Cirrhosis (HCC)     CHRISTIE    Colon cancer (HCC)     Diabetes mellitus (HCC)     Neuropathy     Restless leg syndrome        Past Surgical History:   Procedure Laterality Date    EGD AND SIGMOIDOSCOPY FLEXIBLE      IR PARACENTESIS  11/22/2022    IR PARACENTESIS  2/2/2023    LEFT COLON RESECTION      LITHOTRIPSY         Meds/Allergies:    Prior to Admission medications    Medication Sig Start Date End Date Taking? Authorizing Provider   dicyclomine (BENTYL) 20 mg tablet Take 1 tablet (20 mg total) by mouth 2 (two) times a day before  "breakfast and lunch 12/1/22 12/31/22  Robb Lazaro MD   ferrous sulfate 325 (65 Fe) mg tablet  3/28/23   Historical Provider, MD   furosemide (LASIX) 20 mg tablet Take 1 tablet (20 mg total) by mouth daily 5/4/23 6/3/23  Margaret Shell PA-C   gabapentin (NEURONTIN) 300 mg capsule Take 300 mg by mouth Three times a day 3/15/23   Historical Provider, MD   hydrOXYzine HCL (ATARAX) 25 mg tablet TAKE 1 TABLET BY MOUTH 3 TIMES A DAY AS NEEDED FOR ITCHING 2/20/23   Historical Provider, MD   lactulose 20 g/30 mL Take 30 mL (20 g total) by mouth daily Do not start before February 28, 2023. 2/28/23   Telly Barajas MD   midodrine (PROAMATINE) 5 mg tablet Take 1 tablet (5 mg total) by mouth 3 (three) times a day before meals 5/4/23 6/3/23  Margaret Shell PA-C   pantoprazole (PROTONIX) 40 mg tablet Take 1 tablet (40 mg total) by mouth daily in the early morning Do not start before May 5, 2023. 5/5/23 6/4/23  Margaret Shell PA-C   propranolol (INDERAL) 10 mg tablet Take 1 tablet (10 mg total) by mouth every 12 (twelve) hours 5/4/23 6/3/23  Margaret Shell PA-C   rOPINIRole (REQUIP) 4 mg tablet Take 1 tablet (4 mg total) by mouth 2 (two) times a day 5/4/23 6/3/23  Margaret Shell PA-C   senna-docusate sodium (SENOKOT S) 8.6-50 mg per tablet Take 1 tablet by mouth every evening 2/18/23   Historical Provider, MD   spironolactone (ALDACTONE) 100 mg tablet  3/28/23   Historical Provider, MD   tamsulosin (FLOMAX) 0.4 mg Take 1 capsule (0.4 mg total) by mouth daily with dinner 5/4/23 6/3/23  Margaret Shell PA-C     all medications and allergies reviewed    Allergies:   Allergies   Allergen Reactions    Morphine Anaphylaxis    Morpholine Salicylate Other (See Comments)     \"I go unconscious\"    Clarithromycin Other (See Comments)     Dizzy, nausea, ulcers in stomach    Oxycodone Hives and Facial Swelling     \"Scratchy throat\"      Sulfamethoxazole-Trimethoprim Hives    Nsaids Rash       Social History:     Marital Status: Single " "  Occupation: Unemployed  Patient Pre-hospital Living Situation: Lives in an  with his dominick  Patient Pre-hospital Level of Mobility: Full without assist  Patient Pre-hospital Diet Restrictions: Diabetic    Social History     Substance and Sexual Activity   Alcohol Use Not Currently     Social History     Tobacco Use   Smoking Status Every Day    Current packs/day: 0.25    Types: Cigarettes   Smokeless Tobacco Never     Social History     Substance and Sexual Activity   Drug Use Never       Family History:  I have reviewed the patients family history     Physical Exam:     Vitals:   Blood Pressure: 108/68 (06/03/24 2325)  Pulse: 67 (06/03/24 2325)  Temperature: 97.5 °F (36.4 °C) (06/03/24 2325)  Temp Source: Temporal (06/03/24 2325)  Respirations: 19 (06/03/24 2325)  Height: 5' 7.01\" (170.2 cm) (06/03/24 2325)  Weight - Scale: 71.2 kg (156 lb 15.5 oz) (06/03/24 2325)  SpO2: 99 % (06/03/24 2325)    Physical Exam  Vitals and nursing note reviewed.   Constitutional:       Appearance: He is well-developed.   HENT:      Head: Normocephalic and atraumatic.      Nose: Nose normal.      Comments: As per nursing exam given remote location     Mouth/Throat:      Pharynx: No oropharyngeal exudate.      Comments: As per nursing exam given remote location  Eyes:      General: No scleral icterus.     Pupils: Pupils are equal, round, and reactive to light.      Comments: As per nursing exam given remote location   Neck:      Vascular: No JVD.      Comments: As per nursing exam given remote location  Cardiovascular:      Rate and Rhythm: Normal rate and regular rhythm.      Heart sounds: Normal heart sounds. No murmur heard.     Comments: As per nursing exam given remote location  Pulmonary:      Effort: Pulmonary effort is normal. No respiratory distress.      Breath sounds: Normal breath sounds. No wheezing or rales.      Comments: As per nursing exam given remote location  Abdominal:      General: Bowel sounds are normal.    "   Palpations: Abdomen is soft.      Tenderness: There is no abdominal tenderness. There is no guarding or rebound.      Comments: As per nursing exam given remote location   Musculoskeletal:         General: Normal range of motion.      Cervical back: Normal range of motion and neck supple.      Right lower leg: Edema present.      Left lower leg: Edema present.      Comments: As per nursing exam given remote location   Lymphadenopathy:      Cervical: No cervical adenopathy.   Skin:     General: Skin is warm and dry.      Findings: No erythema or rash.      Comments: As per nursing exam given remote location   Neurological:      Mental Status: He is alert and oriented to person, place, and time.           Additional Data:     Lab Results: I have personally reviewed pertinent reports.        Results from last 7 days   Lab Units 06/03/24  1847   WBC Thousand/uL 5.02   RBC Million/uL 2.80*   HEMOGLOBIN g/dL 9.6*   HEMATOCRIT % 28.7*   PLATELETS Thousands/uL 42*   SEGS PCT % 75   LYMPHO PCT % 12*   MONO PCT % 7   EOS PCT % 5     Results from last 7 days   Lab Units 06/03/24  1847   SODIUM mmol/L 135   POTASSIUM mmol/L 3.0*   CHLORIDE mmol/L 106   CO2 mmol/L 19*   BUN mg/dL 11   CREATININE mg/dL 0.87   CALCIUM mg/dL 8.0*   ALK PHOS U/L 135*   ALT U/L 35   AST U/L 31     Results from last 7 days   Lab Units 06/03/24  1847   INR  1.49*     Results from last 7 days   Lab Units 06/03/24  2339   POC GLUCOSE mg/dl 161*             Imaging: I have personally reviewed pertinent reports.    CT chest abdomen pelvis w contrast   Final Result by Nemo Damon MD (06/03 2100)      Moderate to large sized right pleural effusion      Cirrhosis with signs of portal hypertension as evidenced by gastroesophageal varices.      Stable upper abdominal lymphadenopathy presumed secondary to underlying liver pathology although low-grade lymphoproliferative disorder is not fully excluded      Stable diffuse mesenteric edema         Workstation  performed: [id number]         IR IN-Patient Thoracentesis    (Results Pending)       EKG, Pathology, and Other Studies Reviewed on Admission:   EKG: N/A    Epic Records Reviewed: Yes     ** Please Note: This note has been constructed using a voice recognition system. **

## 2024-06-04 NOTE — BRIEF OP NOTE (RAD/CATH)
INTERVENTIONAL RADIOLOGY PROCEDURE NOTE    Date: 6/4/2024    Procedure:   Right thoracentesis      Preoperative diagnosis:   1. Pleural effusion on right    2. Leg edema    3. Shortness of breath    4. Abdominal pain    5. Hypokalemia         Postoperative diagnosis: Same.    Surgeon: Nate Luevano MD     Assistant: None. No qualified resident was available.    Blood loss: 0 ml    Specimens: sent to lab     Findings:   Right thoracentesis.    Complications: None immediate.    Anesthesia: local

## 2024-06-05 ENCOUNTER — APPOINTMENT (INPATIENT)
Dept: NON INVASIVE DIAGNOSTICS | Facility: HOSPITAL | Age: 64
DRG: 433 | End: 2024-06-05
Payer: COMMERCIAL

## 2024-06-05 PROBLEM — E87.6 HYPOKALEMIA: Status: RESOLVED | Noted: 2022-11-22 | Resolved: 2024-06-05

## 2024-06-05 PROBLEM — I95.9 HYPOTENSION: Status: ACTIVE | Noted: 2024-06-05

## 2024-06-05 PROBLEM — M79.89 SWELLING OF LOWER EXTREMITY: Status: ACTIVE | Noted: 2024-06-05

## 2024-06-05 PROBLEM — N20.0 NEPHROLITHIASIS: Status: ACTIVE | Noted: 2024-06-05

## 2024-06-05 LAB
ANION GAP SERPL CALCULATED.3IONS-SCNC: 6 MMOL/L (ref 4–13)
BASOPHILS # BLD AUTO: 0.01 THOUSANDS/ÂΜL (ref 0–0.1)
BASOPHILS NFR BLD AUTO: 0 % (ref 0–1)
BUN SERPL-MCNC: 13 MG/DL (ref 5–25)
CALCIUM SERPL-MCNC: 8.1 MG/DL (ref 8.4–10.2)
CHLORIDE SERPL-SCNC: 107 MMOL/L (ref 96–108)
CO2 SERPL-SCNC: 26 MMOL/L (ref 21–32)
CREAT SERPL-MCNC: 0.81 MG/DL (ref 0.6–1.3)
EOSINOPHIL # BLD AUTO: 0.22 THOUSAND/ÂΜL (ref 0–0.61)
EOSINOPHIL NFR BLD AUTO: 7 % (ref 0–6)
ERYTHROCYTE [DISTWIDTH] IN BLOOD BY AUTOMATED COUNT: 16.6 % (ref 11.6–15.1)
GFR SERPL CREATININE-BSD FRML MDRD: 94 ML/MIN/1.73SQ M
GLUCOSE SERPL-MCNC: 106 MG/DL (ref 65–140)
GLUCOSE SERPL-MCNC: 136 MG/DL (ref 65–140)
GLUCOSE SERPL-MCNC: 183 MG/DL (ref 65–140)
GLUCOSE SERPL-MCNC: 206 MG/DL (ref 65–140)
GLUCOSE SERPL-MCNC: 311 MG/DL (ref 65–140)
HCT VFR BLD AUTO: 25.9 % (ref 36.5–49.3)
HGB BLD-MCNC: 9.1 G/DL (ref 12–17)
IMM GRANULOCYTES # BLD AUTO: 0.01 THOUSAND/UL (ref 0–0.2)
IMM GRANULOCYTES NFR BLD AUTO: 0 % (ref 0–2)
LYMPHOCYTES # BLD AUTO: 0.59 THOUSANDS/ÂΜL (ref 0.6–4.47)
LYMPHOCYTES NFR BLD AUTO: 19 % (ref 14–44)
MAGNESIUM SERPL-MCNC: 1.9 MG/DL (ref 1.9–2.7)
MCH RBC QN AUTO: 35.3 PG (ref 26.8–34.3)
MCHC RBC AUTO-ENTMCNC: 35.1 G/DL (ref 31.4–37.4)
MCV RBC AUTO: 100 FL (ref 82–98)
MONOCYTES # BLD AUTO: 0.22 THOUSAND/ÂΜL (ref 0.17–1.22)
MONOCYTES NFR BLD AUTO: 7 % (ref 4–12)
NEUTROPHILS # BLD AUTO: 2.09 THOUSANDS/ÂΜL (ref 1.85–7.62)
NEUTS SEG NFR BLD AUTO: 67 % (ref 43–75)
NRBC BLD AUTO-RTO: 0 /100 WBCS
PLATELET # BLD AUTO: 32 THOUSANDS/UL (ref 149–390)
PMV BLD AUTO: 12.6 FL (ref 8.9–12.7)
POTASSIUM SERPL-SCNC: 3.6 MMOL/L (ref 3.5–5.3)
RBC # BLD AUTO: 2.58 MILLION/UL (ref 3.88–5.62)
SODIUM SERPL-SCNC: 139 MMOL/L (ref 135–147)
WBC # BLD AUTO: 3.14 THOUSAND/UL (ref 4.31–10.16)

## 2024-06-05 PROCEDURE — 80048 BASIC METABOLIC PNL TOTAL CA: CPT | Performed by: INTERNAL MEDICINE

## 2024-06-05 PROCEDURE — 97162 PT EVAL MOD COMPLEX 30 MIN: CPT

## 2024-06-05 PROCEDURE — 83735 ASSAY OF MAGNESIUM: CPT | Performed by: INTERNAL MEDICINE

## 2024-06-05 PROCEDURE — 85025 COMPLETE CBC W/AUTO DIFF WBC: CPT | Performed by: INTERNAL MEDICINE

## 2024-06-05 PROCEDURE — 99449 NTRPROF PH1/NTRNET/EHR 31/>: CPT | Performed by: STUDENT IN AN ORGANIZED HEALTH CARE EDUCATION/TRAINING PROGRAM

## 2024-06-05 PROCEDURE — 99232 SBSQ HOSP IP/OBS MODERATE 35: CPT | Performed by: INTERNAL MEDICINE

## 2024-06-05 PROCEDURE — 93970 EXTREMITY STUDY: CPT | Performed by: SURGERY

## 2024-06-05 PROCEDURE — 93970 EXTREMITY STUDY: CPT

## 2024-06-05 PROCEDURE — 82948 REAGENT STRIP/BLOOD GLUCOSE: CPT

## 2024-06-05 RX ORDER — MIDODRINE HYDROCHLORIDE 5 MG/1
10 TABLET ORAL
Status: DISCONTINUED | OUTPATIENT
Start: 2024-06-05 | End: 2024-06-18 | Stop reason: HOSPADM

## 2024-06-05 RX ORDER — HYDROCORTISONE 10 MG/1
50 TABLET ORAL 2 TIMES DAILY
Status: DISCONTINUED | OUTPATIENT
Start: 2024-06-05 | End: 2024-06-05

## 2024-06-05 RX ORDER — TRAMADOL HYDROCHLORIDE 50 MG/1
50 TABLET ORAL 2 TIMES DAILY
Status: DISCONTINUED | OUTPATIENT
Start: 2024-06-05 | End: 2024-06-06

## 2024-06-05 RX ORDER — HYDROCORTISONE 10 MG/1
50 TABLET ORAL 3 TIMES DAILY
Status: COMPLETED | OUTPATIENT
Start: 2024-06-05 | End: 2024-06-07

## 2024-06-05 RX ORDER — ENOXAPARIN SODIUM 100 MG/ML
40 INJECTION SUBCUTANEOUS
Status: DISCONTINUED | OUTPATIENT
Start: 2024-06-05 | End: 2024-06-06

## 2024-06-05 RX ORDER — FUROSEMIDE 40 MG/1
40 TABLET ORAL DAILY
Status: DISCONTINUED | OUTPATIENT
Start: 2024-06-05 | End: 2024-06-18 | Stop reason: HOSPADM

## 2024-06-05 RX ADMIN — MIDODRINE HYDROCHLORIDE 5 MG: 5 TABLET ORAL at 06:04

## 2024-06-05 RX ADMIN — INSULIN LISPRO 1 UNITS: 100 INJECTION, SOLUTION INTRAVENOUS; SUBCUTANEOUS at 11:18

## 2024-06-05 RX ADMIN — HYDROCORTISONE 50 MG: 10 TABLET ORAL at 21:34

## 2024-06-05 RX ADMIN — GABAPENTIN 300 MG: 300 CAPSULE ORAL at 15:38

## 2024-06-05 RX ADMIN — POTASSIUM CHLORIDE 20 MEQ: 1500 TABLET, EXTENDED RELEASE ORAL at 08:35

## 2024-06-05 RX ADMIN — TRAMADOL HYDROCHLORIDE 50 MG: 50 TABLET, COATED ORAL at 11:18

## 2024-06-05 RX ADMIN — GABAPENTIN 300 MG: 300 CAPSULE ORAL at 21:34

## 2024-06-05 RX ADMIN — TRAMADOL HYDROCHLORIDE 50 MG: 50 TABLET, COATED ORAL at 21:34

## 2024-06-05 RX ADMIN — DICYCLOMINE HYDROCHLORIDE 20 MG: 20 TABLET ORAL at 06:04

## 2024-06-05 RX ADMIN — TAMSULOSIN HYDROCHLORIDE 0.4 MG: 0.4 CAPSULE ORAL at 15:38

## 2024-06-05 RX ADMIN — PANTOPRAZOLE SODIUM 40 MG: 40 TABLET, DELAYED RELEASE ORAL at 06:04

## 2024-06-05 RX ADMIN — ROPINIROLE HYDROCHLORIDE 4 MG: 1 TABLET, FILM COATED ORAL at 21:34

## 2024-06-05 RX ADMIN — DICYCLOMINE HYDROCHLORIDE 20 MG: 20 TABLET ORAL at 11:17

## 2024-06-05 RX ADMIN — HYDROCORTISONE 50 MG: 10 TABLET ORAL at 15:39

## 2024-06-05 RX ADMIN — ROPINIROLE HYDROCHLORIDE 4 MG: 1 TABLET, FILM COATED ORAL at 08:34

## 2024-06-05 RX ADMIN — GABAPENTIN 300 MG: 300 CAPSULE ORAL at 08:34

## 2024-06-05 RX ADMIN — ENOXAPARIN SODIUM 40 MG: 40 INJECTION SUBCUTANEOUS at 11:25

## 2024-06-05 RX ADMIN — NICOTINE 1 PATCH: 14 PATCH, EXTENDED RELEASE TRANSDERMAL at 08:37

## 2024-06-05 RX ADMIN — INSULIN LISPRO 1 UNITS: 100 INJECTION, SOLUTION INTRAVENOUS; SUBCUTANEOUS at 15:42

## 2024-06-05 RX ADMIN — MIDODRINE HYDROCHLORIDE 10 MG: 5 TABLET ORAL at 15:38

## 2024-06-05 RX ADMIN — HYDROCORTISONE 10 MG: 10 TABLET ORAL at 08:34

## 2024-06-05 RX ADMIN — SENNOSIDES AND DOCUSATE SODIUM 1 TABLET: 8.6; 5 TABLET ORAL at 17:04

## 2024-06-05 RX ADMIN — MIDODRINE HYDROCHLORIDE 10 MG: 5 TABLET ORAL at 11:17

## 2024-06-05 RX ADMIN — INSULIN LISPRO 3 UNITS: 100 INJECTION, SOLUTION INTRAVENOUS; SUBCUTANEOUS at 21:34

## 2024-06-05 RX ADMIN — ACETAMINOPHEN 650 MG: 325 TABLET ORAL at 08:35

## 2024-06-05 RX ADMIN — FERROUS SULFATE TAB 325 MG (65 MG ELEMENTAL FE) 325 MG: 325 (65 FE) TAB at 08:39

## 2024-06-05 RX ADMIN — LACTULOSE 20 G: 20 SOLUTION ORAL at 08:36

## 2024-06-05 NOTE — ASSESSMENT & PLAN NOTE
Due to cirrhosis, no evidence of bleeding  Patient is currently on Lovenox for DVT prophylaxis  Monitor clinically and follow-up CBC.

## 2024-06-05 NOTE — ASSESSMENT & PLAN NOTE
Continue prehospital Cortef 10 mg p.o. every morning and 5 mg p.o. nightly  Endocrinology consult placed -appreciate recommendations.

## 2024-06-05 NOTE — ASSESSMENT & PLAN NOTE
Initial potassium 3.0 -> 3.1> 3.6  Repleted with p.o. and IV potassium  Continue p.o. K-Dur 20 mill equivalents p.o. daily  Continue to monitor with repeat labs in a.m.

## 2024-06-05 NOTE — ASSESSMENT & PLAN NOTE
Patient stated he might have passed a stone when he urinates and might be causing painful urination  Per patient, he had history of kidney stone   UA showed no evidence of UTI  CT A/P showed simple renal cyst(s). Nonobstructing left renal nephrolithiasis. No hydronephrosis.   BMP showed normal renal function  Continue Flomax 0.4 mg daily

## 2024-06-05 NOTE — ASSESSMENT & PLAN NOTE
Patient with history of hypotension and he was on midodrine 5 mg 3 times daily at home  Patient BP has been soft and he was not able to receive Lasix and spironolactone due to holding parameters  Patient received IV albumin once without significant improvement in BP  Increased midodrine to 10 mg 3 times daily.

## 2024-06-05 NOTE — ASSESSMENT & PLAN NOTE
Patient presented with shortness of breath   BNP WNL, CT chest A/P show right pleural effusion, no evidence of pulmonary congestion   s/p thoracentesis on 6/4/2024 with 600 cc clear yellow fluid  Pleural fluid analysis show LDH 62, total protein 7.6  Plasma total protein is 7.1  Awaiting placement LDH to calculate lights criteria

## 2024-06-05 NOTE — ASSESSMENT & PLAN NOTE
Patient did not follow-up with GI for years   CT chest A/P with contrast showed cirrhosis with signs of portal hypertension as evidenced by gastroesophageal varices. Stable upper abdominal lymphadenopathy presumed secondary to underlying liver pathology although low-grade lymphoproliferative disorder is not fully excluded.Stable diffuse mesenteric edema   Gastroenterology consult appreciate recommendations.    Continue prehospital spironolactone 100 mg daily, lactulose 20 g p.o. daily

## 2024-06-05 NOTE — ASSESSMENT & PLAN NOTE
Most likely due to liver cirrhosis   BNP WNL, CT chest showed no evidence of pulmonary congestion but pleural effusion which was tapped yesterday  Recent stress test on 7/13/2023 showed LVEF 70%, and attenuation artifact   Patient received IV Lasix 40 mg once in ED  Patient was not able to receive IV Lasix 40 mg twice daily and continue previously prescribed spironolactone 100 mg p.o. daily yesterday  After thoracentesis, IV Lasix was switched to p.o. Lasix 40 mg daily  Continue prehospital spironolactone 100 mg daily

## 2024-06-05 NOTE — ASSESSMENT & PLAN NOTE
Lab Results   Component Value Date    HGBA1C 6.5 (H) 05/05/2024       Recent Labs     06/04/24  1056 06/04/24  1534 06/04/24  2220 06/05/24  0556   POCGLU 213* 172* 172* 136         Blood Sugar Average: Last 72 hrs:  (P) 167.0828983208796160    Placed on CCH type II diet  Obtain Accu-Cheks before meals and at bedtime with Humalog correction dose before meals and at bedtime

## 2024-06-05 NOTE — PLAN OF CARE
Problem: Potential for Falls  Goal: Patient will remain free of falls  Description: INTERVENTIONS:  - Educate patient/family on patient safety including physical limitations  - Instruct patient to call for assistance with activity   - Consult OT/PT to assist with strengthening/mobility   - Keep Call bell within reach  - Keep bed low and locked with side rails adjusted as appropriate  - Keep care items and personal belongings within reach  - Initiate and maintain comfort rounds  - Make Fall Risk Sign visible to staff  - Offer Toileting every 2 Hours, in advance of need  - Initiate/Maintain bed alarm  - Obtain necessary fall risk management equipment: Q shift  - Apply yellow socks and bracelet for high fall risk patients  - Consider moving patient to room near nurses station  Outcome: Progressing     Problem: PAIN - ADULT  Goal: Verbalizes/displays adequate comfort level or baseline comfort level  Description: Interventions:  - Encourage patient to monitor pain and request assistance  - Assess pain using appropriate pain scale  - Administer analgesics based on type and severity of pain and evaluate response  - Implement non-pharmacological measures as appropriate and evaluate response  - Consider cultural and social influences on pain and pain management  - Notify physician/advanced practitioner if interventions unsuccessful or patient reports new pain  Outcome: Progressing     Problem: SAFETY ADULT  Goal: Patient will remain free of falls  Description: INTERVENTIONS:  - Educate patient/family on patient safety including physical limitations  - Instruct patient to call for assistance with activity   - Consult OT/PT to assist with strengthening/mobility   - Keep Call bell within reach  - Keep bed low and locked with side rails adjusted as appropriate  - Keep care items and personal belongings within reach  - Initiate and maintain comfort rounds  - Make Fall Risk Sign visible to staff  - Offer Toileting every 2 Hours,  in advance of need  - Initiate/Maintain bed alarm  - Obtain necessary fall risk management equipment: Q shift  - Apply yellow socks and bracelet for high fall risk patients  - Consider moving patient to room near nurses station  Outcome: Progressing

## 2024-06-05 NOTE — ASSESSMENT & PLAN NOTE
Patient presented with shortness of breath   BNP WNL, CT chest A/P show right pleural effusion, no evidence of pulmonary congestion   s/p thoracentesis on 6/4/2024 with 700 cc clear yellow fluid  Patient received IV Lasix 40 mg once in ED  Patient could not be given Lasix 40 mg IV twice daily and spironolactone 100 mg p.o. daily due to soft BP  Change IV Lasix to p.o. Lasix 40 mg daily  Patient reports history of previous paracentesis but no denies previous thoracentesis  CT A/P showed mild ascites.

## 2024-06-05 NOTE — PROGRESS NOTES
[facility]  Progress Note  Name: Foreign Armando I  MRN: [id number]  Unit/Bed#: 7T [facility] 705-01 I Date of Admission: 6/3/2024   Date of Service: 6/5/2024 I Hospital Day: 0    Assessment & Plan   * Pleural effusion on right  Assessment & Plan  Patient presented with shortness of breath   BNP WNL, CT chest A/P show right pleural effusion, no evidence of pulmonary congestion   s/p thoracentesis on 6/4/2024 with 600 cc clear yellow fluid  Patient received IV Lasix 40 mg once in ED  Patient could not be given Lasix 40 mg IV twice daily and spironolactone 100 mg p.o. daily due to soft BP  Change IV Lasix to p.o. Lasix 40 mg daily  Patient reports history of previous paracentesis but no denies previous thoracentesis  CT A/P showed mild ascites.      Nephrolithiasis  Assessment & Plan  Patient stated he might have passed a stone when he urinates and might be causing painful urination  Per patient, he had history of kidney stone   UA showed no evidence of UTI  CT A/P showed simple renal cyst(s). Nonobstructing left renal nephrolithiasis. No hydronephrosis.   BMP showed normal renal function  Continue Flomax 0.4 mg daily    Hypotension  Assessment & Plan  Patient with history of hypotension and he was on midodrine 5 mg 3 times daily at home  Patient BP has been soft and he was not able to receive Lasix and spironolactone due to holding parameters  Patient received IV albumin once without significant improvement in BP  Increased midodrine to 10 mg 3 times daily.    Swelling of lower extremity  Assessment & Plan  Patient complains of left lower extremity swelling and tenderness  Venous duplex of bilateral lower extremities ordered  Patient had history of neuropathy and currently on gabapentin 300 mg 3 times daily  For restless leg syndrome, patient is on Requip 4 mg daily.    Volume overload  Assessment & Plan  Most likely due to liver cirrhosis   BNP WNL, CT chest showed no evidence of pulmonary  congestion but pleural effusion which was tapped yesterday  Recent stress test on 7/13/2023 showed LVEF 70%, and attenuation artifact   Patient received IV Lasix 40 mg once in ED  Patient was not able to receive IV Lasix 40 mg twice daily and continue previously prescribed spironolactone 100 mg p.o. daily yesterday  After thoracentesis, IV Lasix was switched to p.o. Lasix 40 mg daily  Continue prehospital spironolactone 100 mg daily    Adrenal insufficiency (HCC)  Assessment & Plan  Continue prehospital Cortef 10 mg p.o. every morning and 5 mg p.o. nightly  Endocrinology consult placed -appreciate recommendations.    Type 2 diabetes mellitus with obesity  (HCC)  Assessment & Plan  Lab Results   Component Value Date    HGBA1C 6.5 (H) 05/05/2024       Recent Labs     06/04/24  1056 06/04/24  1534 06/04/24  2220 06/05/24  0556   POCGLU 213* 172* 172* 136         Blood Sugar Average: Last 72 hrs:  (P) [id number]    Placed on [facility] type II diet  Obtain Accu-Cheks before meals and at bedtime with Humalog correction dose before meals and at bedtime    Smoking  Assessment & Plan  Will give nicotine 14 mg transdermal daily    Thrombocytopenia (HCC)  Assessment & Plan  Due to cirrhosis, no evidence of bleeding  Patient is currently on Lovenox for DVT prophylaxis  Monitor clinically and follow-up CBC.    Cirrhosis (HCC)  Assessment & Plan  Patient did not follow-up with GI for years   CT chest A/P with contrast showed cirrhosis with signs of portal hypertension as evidenced by gastroesophageal varices. Stable upper abdominal lymphadenopathy presumed secondary to underlying liver pathology although low-grade lymphoproliferative disorder is not fully excluded.Stable diffuse mesenteric edema   Gastroenterology consult appreciate recommendations.    Continue prehospital spironolactone 100 mg daily, lactulose 20 g p.o. daily    Hypokalemia-resolved as of 6/5/2024  Assessment & Plan  Initial potassium 3.0 -> 3.1>  3.6  Repleted with p.o. and IV potassium  Continue p.o. K-Dur 20 mill equivalents p.o. daily  Continue to monitor with repeat labs in a.m.           VTE Pharmacologic Prophylaxis:   Pharmacologic: Enoxaparin (Lovenox)  Mechanical VTE Prophylaxis in Place: Yes    Patient Centered Rounds: I have performed bedside rounds with nursing staff today.    Discussions with Specialists or Other Care Team Provider: Discussed with , RN    Education and Discussions with Family / Patient: Discussed with patient.    Time Spent for Care:  45 minutes .  This time was spent on one or more of the following: performing physical exam; counseling and coordination of care; obtaining or reviewing history; documenting in the medical record; reviewing/ordering tests, medications or procedures; communicating with other healthcare professionals and discussing with patient's family/caregivers.    Current Length of Stay: 0 day(s)    Current Patient Status: Inpatient   Certification Statement: The patient will continue to require additional inpatient hospital stay due to cirrhosis of liver, generalized swelling    Discharge Plan / Estimated Discharge Date: Pending clinical course      Code Status: Level 1 - Full Code      Subjective:   Patient was seen and examined at bedside. The patient complains of generalized abdominal pain, bilateral lower extremity pain.  Patient seems to be comfortable on room air, no respiratory distress.  Per patient, he usually takes tramadol twice daily for his chronic pain.  He also mentioned he was given IV Dilaudid in [facility].  I explained that he will be resumed on tramadol but there is no indication for IV Dilaudid at this time.  Patient said he might have seizure last night? Patient recalled that he was shaking.  No bowel bladder loss control.  No tongue biting.  He also mentioned that he has abdominal pain due to inguinal hernia.      Objective:     Vitals:   Temp (24hrs), Av.5 °F (36.4 °C),  Min:97 °F (36.1 °C), Max:97.9 °F (36.6 °C)    Temp:  [97 °F (36.1 °C)-97.9 °F (36.6 °C)] 97 °F (36.1 °C)  HR:  [55-68] 63  Resp:  [17-18] 18  BP: ()/(55-64) 100/55  SpO2:  [97 %-100 %] 97 %  Body mass index is 24.58 kg/m².     Input and Output Summary (last 24 hours):       Intake/Output Summary (Last 24 hours) at 6/5/2024 1123  Last data filed at 6/5/2024 0856  Gross per 24 hour   Intake 1720 ml   Output 2150 ml   Net -430 ml       Physical Exam:     Physical Exam  General: no acute distress  HEENT: NC/AT, PERRL, EOM - normal  Neck: Supple  Pulm/Chest: Normal chest wall expansion, clear breath sounds on both side  CVS: normal S1&S2, capillary refill <2s  Abd: soft, generalized tenderness, voluntary guarding, bowel sounds +, no visible hernia  MSK: move all 4 extremities spontaneously  Skin: warm  CNS: no acute focal neuro deficit      Additional Data:     Labs:    Results from last 7 days   Lab Units 06/05/24  0609   WBC Thousand/uL 3.14*   HEMOGLOBIN g/dL 9.1*   HEMATOCRIT % 25.9*   PLATELETS Thousands/uL 32*   SEGS PCT % 67   LYMPHO PCT % 19   MONO PCT % 7   EOS PCT % 7*     Results from last 7 days   Lab Units 06/05/24  0609 06/04/24  0759 06/03/24  1847   POTASSIUM mmol/L 3.6   < > 3.0*   CHLORIDE mmol/L 107   < > 106   CO2 mmol/L 26   < > 19*   BUN mg/dL 13   < > 11   CREATININE mg/dL 0.81   < > 0.87   CALCIUM mg/dL 8.1*   < > 8.0*   ALK PHOS U/L  --   --  135*   ALT U/L  --   --  35   AST U/L  --   --  31    < > = values in this interval not displayed.     Results from last 7 days   Lab Units 06/03/24  1847   INR  1.49*       * I Have Reviewed All Lab Data Listed Above.  * Additional Pertinent Lab Tests Reviewed: All Labs For Current Hospital Admission Reviewed    Imaging:      I have reviewed pertinent imaging.      Recent Cultures (last 7 days):     Results from last 7 days   Lab Units 06/04/24  1244   GRAM STAIN RESULT  Rare Polys  No bacteria seen   BODY FLUID CULTURE, STERILE  No growth       Last  24 Hours Medication List:   Current Facility-Administered Medications   Medication Dose Route Frequency Provider Last Rate    dicyclomine  20 mg Oral BID before breakfast/lunch Natan Miguel PA-C      enoxaparin  40 mg Subcutaneous Q24H [facility] Rosario Whitfield MD      ferrous sulfate  325 mg Oral Daily With Breakfast Natan Miguel PA-C      furosemide  40 mg Oral Daily Rosario Whtifield MD      gabapentin  300 mg Oral TID Natan Miguel PA-C      hydrocortisone  10 mg Oral Daily Natan Miguel PA-C      hydrocortisone  5 mg Oral HS Natan Miguel PA-C      hydrOXYzine HCL  25 mg Oral TID PRN Natan Miguel PA-C      insulin lispro  1-5 Units Subcutaneous TID AC Natan Miguel PA-C      insulin lispro  1-5 Units Subcutaneous HS Natan Miguel PA-C      lactulose  20 g Oral Daily Natan Miguel PA-C      midodrine  10 mg Oral TID AC Rosario Whitfield MD      nicotine  1 patch Transdermal Daily Natan Miguel PA-C      ondansetron  4 mg Intravenous Q6H PRN Natan Miguel PA-C      pantoprazole  40 mg Oral Early Morning Natan Miguel PA-C      potassium chloride  20 mEq Oral Daily Natan Miguel PA-C      propranolol  10 mg Oral Q12H SANDRA Natan Miguel PA-C      rOPINIRole  4 mg Oral BID Natan Miguel PA-C      senna-docusate sodium  1 tablet Oral QPM Natan Miguel PA-C      spironolactone  100 mg Oral Daily Natan Miguel PA-C      tamsulosin  0.4 mg Oral Daily With Dinner Natan Miguel PA-C      traMADol  50 mg Oral BID Rosario Whitfield MD          Today, Patient Was Seen By: Rosario Whitfield MD    ** Please Note: Dragon 360 Dictation voice to text software may have been used in the creation of this document. **

## 2024-06-05 NOTE — CONSULTS
e-Consult (IPC)   Foreign Armando 63 y.o. male MRN: [id number]  Unit/Bed#: 7T [facility] 705-01 Encounter: [id number]      Reason for Consult / Principal Problem: Hypotension     Inpatient consult to Endocrinology  Consult performed by: Bettye Shultz MD  Consult ordered by: Rosario Whitfield MD        06/05/24  HPI:-   Patient is a 63yM with history of adrenal insufficiency on cortef 10/5mg- unsure primary or secondary, also on midodrine for hypotension, cirrhosis- decompensated, diabetes who presented to ED with swelling and diarrhea admitted for right sided pleural effusion. Endocrine consulted d/t inability to diurese patient d/t hypotension.     Labs on admission shows  mild hyponatremia, hypokalemia with vitals  SBP 90/60.     Home dose of cortef 10/5mg daily and midodrine 5mg TID. Patient with history of non compliance with medications at home.     Physical Exam- Not performed since this was a IPC    ASSESSMENT:  Patient is a 63yM with AI possibly secondary rather than primary since on midodrine rather than florinef with other PMHx of cirhossis decompensated, T2DM who was admitted for right sided pleural effusion with hypotension. Patient does not appear to be in adrenal crisis clinically and low BP could be d/t his decompensated cirhossis specially given his hypokalemia rather than hyper. However given his illness, ok to do stress dose steroids as this will also help with hypotension and prevent any risk of AI while ill. Taper down on steroids as tolerated and listed below.     Diabetes- last A1C 6.5% which is stable. However given use of stress dose steroids may cause raise in BG. For now c/w ISS, if needing >10u per day on scale can start low dose basal.     RECOMMENDATIONS:  - Please increase cortef to 50mg TID for 2 days   - taper down to 50mg BID for 3 days after, then 25mg BID for 3 days after  - down to home dose 10/mg after   - agree with increasing midodrine to 10mg TID for now   - c/w ISS      31 + minutes, >50%  of the total time devoted to medical consultative verbal/EMR discussion between providers. Written report will be generated in the EMR.    Bettye Shultz MD

## 2024-06-05 NOTE — ASSESSMENT & PLAN NOTE
Patient complains of left lower extremity swelling and tenderness  Venous duplex of bilateral lower extremities ordered  Patient had history of neuropathy and currently on gabapentin 300 mg 3 times daily  For restless leg syndrome, patient is on Requip 4 mg daily.

## 2024-06-05 NOTE — PHYSICAL THERAPY NOTE
Physical Therapy Evaluation    Patient's Name: Foreign Armando    Admitting Diagnosis  Shortness of breath [R06.02]  Hypokalemia [E87.6]  Leg pain [M79.606]  Leg edema [R60.0]  Abdominal pain [R10.9]  Pleural effusion on right [J90]    Problem List  Patient Active Problem List   Diagnosis    Cirrhosis (HCC)    Thrombocytopenia (HCC)    Restless leg syndrome    Smoking    Type 2 diabetes mellitus with obesity  (HCC)    Abdominal pain    Pancytopenia (HCC)    Paranoia (HCC)    History of colon cancer    Abnormal CT scan    Adrenal insufficiency (HCC)    Volume overload    Pleural effusion on right    Swelling of lower extremity    Hypotension    Nephrolithiasis       Past Medical History  Past Medical History:   Diagnosis Date    CHF (congestive heart failure) (HCC)     Cirrhosis (HCC)     CHRISTIE    Colon cancer (HCC)     Diabetes mellitus (HCC)     Neuropathy     Restless leg syndrome        Past Surgical History  Past Surgical History:   Procedure Laterality Date    EGD AND SIGMOIDOSCOPY FLEXIBLE      IR PARACENTESIS  11/22/2022    IR PARACENTESIS  2/2/2023    IR THORACENTESIS  6/4/2024    LEFT COLON RESECTION      LITHOTRIPSY          06/05/24 1345   PT Last Visit   PT Visit Date 06/05/24   Note Type   Note type Evaluation   Pain Assessment   Pain Assessment Tool 0-10   Pain Score 9   Pain Location/Orientation Orientation: Left;Location: Leg   Hospital Pain Intervention(s)   (offered RW but pt declined)   Restrictions/Precautions   Weight Bearing Precautions Per Order No   Other Precautions Fall Risk;Pain   Home Living   Type of Home Other (Comment)  (RV)   Home Layout One level;Stairs to enter with rails  (3-4 HERVE w/ B HR)   Bathroom Shower/Tub Tub/shower unit   Bathroom Toilet Standard   Bathroom Equipment Grab bars in shower   Prior Function   Level of [place] Independent with ADLs;Independent with functional mobility;Independent with IADLS  (I ambulation w/o AD)   Lives With Significant other  (+  christina's son + dtr)   Receives Help From Family   IADLs Independent with driving   Falls in the last 6 months 0   General   Family/Caregiver Present No   Cognition   Arousal/Participation Alert   Orientation Level Oriented X4   Subjective   Subjective speaks very softly and difficult to understand at times, agreeable to mobilize, teary when stating his father has bone cancer (recommended pastoral care consult but pt declined)   RLE Assessment   RLE Assessment   (grossly 3+/5)   LLE Assessment   LLE Assessment   (grossly 3+/5)   Bed Mobility   Supine to Sit 6  Modified independent   Sit to Supine 6  Modified independent   Additional Comments Pt greeted in supine.   Transfers   Sit to Stand 7  Independent   Stand to Sit 7  Independent   Toilet transfer 7  Independent   Additional Comments no AD   Ambulation/Elevation   Gait pattern Excessively slow;Short stride;Antalgic   Gait Assistance 5  Supervision   Assistive Device None   Distance 250'   Stair Management Assistance 5  Supervision   Additional items Verbal cues   Stair Management Technique Two rails;Nonreciprocal   Number of Stairs 4  (up/down scale 4x w/ cues for sequencing)   Balance   Static Sitting Good   Dynamic Sitting Fair +   Static Standing Fair   Dynamic Standing Fair -   Ambulatory Fair -   Endurance Deficit   Endurance Deficit Yes   Activity Tolerance   Activity Tolerance Patient tolerated treatment well   Medical Staff Made Aware CM [facility]   Assessment   Assessment Pt is 63 y.o. male seen for a PT evaluation s/p admit to  [facility]  on 6/3/2024. Pt presenting w/ pleural effusion on right, now s/p thoracentesis 6/4. Please see above for other active problem list / PMH.     PT now consulted to assess functional mobility and needs for safe d/c planning. Prior to admission, pt was I w/ ambulation w/o AD, lives w/ s/o + her children in an RV w/ 4 HERVE.     Currently pt is Taty for bed skills; I for functional transfers; S for ambulation w/o AD; S  for stair negotiation. Pt presents near functional baseline. No further skilled acute PT needs. Will d/c from caseload.   Goals   Patient Goals see his father   PT Treatment Day 0   Plan   PT Frequency   (d/c IPPT)   Discharge Recommendation   Rehab Resource Intensity Level, PT No post-acute rehabilitation needs   AM-PAC Basic Mobility Inpatient   Turning in Flat Bed Without Bedrails 4   Lying on Back to Sitting on Edge of Flat Bed Without Bedrails 4   Moving Bed to Chair 4   Standing Up From Chair Using Arms 4   Walk in Room 3   Climb 3-5 Stairs With Railing 3   Basic Mobility Inpatient Raw Score 22   Basic Mobility Standardized Score 47.4   [facility] Highest Level Of Mobility   -HL Goal 7: Walk 25 feet or more   -HLM Achieved 8: Walk 250 feet ot more   End of Consult   Patient Position at End of Consult Supine;All needs within reach     Brie Murry, PT, DPT

## 2024-06-05 NOTE — PLAN OF CARE
Problem: Potential for Falls  Goal: Patient will remain free of falls  Description: INTERVENTIONS:  - Educate patient/family on patient safety including physical limitations  - Instruct patient to call for assistance with activity   - Consult OT/PT to assist with strengthening/mobility   - Keep Call bell within reach  - Keep bed low and locked with side rails adjusted as appropriate  - Keep care items and personal belongings within reach  - Initiate and maintain comfort rounds  - Make Fall Risk Sign visible to staff  - Offer Toileting every 2 Hours, in advance of need  - Initiate/Maintain bed alarm  - Obtain necessary fall risk management equipment: bed alarm   - Apply yellow socks and bracelet for high fall risk patients  - Consider moving patient to room near nurses station  Outcome: Progressing     Problem: Prexisting or High Potential for Compromised Skin Integrity  Goal: Skin integrity is maintained or improved  Description: INTERVENTIONS:  - Identify patients at risk for skin breakdown  - Assess and monitor skin integrity  - Assess and monitor nutrition and hydration status  - Monitor labs   - Assess for incontinence   - Turn and reposition patient  - Assist with mobility/ambulation  - Relieve pressure over bony prominences  - Avoid friction and shearing  - Provide appropriate hygiene as needed including keeping skin clean and dry  - Evaluate need for skin moisturizer/barrier cream  - Collaborate with interdisciplinary team   - Patient/family teaching  - Consider wound care consult   Outcome: Progressing     Problem: PAIN - ADULT  Goal: Verbalizes/displays adequate comfort level or baseline comfort level  Description: Interventions:  - Encourage patient to monitor pain and request assistance  - Assess pain using appropriate pain scale  - Administer analgesics based on type and severity of pain and evaluate response  - Implement non-pharmacological measures as appropriate and evaluate response  - Consider  cultural and social influences on pain and pain management  - Notify physician/advanced practitioner if interventions unsuccessful or patient reports new pain  Outcome: Progressing     Problem: INFECTION - ADULT  Goal: Absence or prevention of progression during hospitalization  Description: INTERVENTIONS:  - Assess and monitor for signs and symptoms of infection  - Monitor lab/diagnostic results  - Monitor all insertion sites, i.e. indwelling lines, tubes, and drains  - Monitor endotracheal if appropriate and nasal secretions for changes in amount and color  - [place] appropriate cooling/warming therapies per order  - Administer medications as ordered  - Instruct and encourage patient and family to use good hand hygiene technique  - Identify and instruct in appropriate isolation precautions for identified infection/condition  Outcome: Progressing  Goal: Absence of fever/infection during neutropenic period  Description: INTERVENTIONS:  - Monitor WBC    Outcome: Progressing     Problem: SAFETY ADULT  Goal: Patient will remain free of falls  Description: INTERVENTIONS:  - Educate patient/family on patient safety including physical limitations  - Instruct patient to call for assistance with activity   - Consult OT/PT to assist with strengthening/mobility   - Keep Call bell within reach  - Keep bed low and locked with side rails adjusted as appropriate  - Keep care items and personal belongings within reach  - Initiate and maintain comfort rounds  - Make Fall Risk Sign visible to staff  - Offer Toileting every 2 Hours, in advance of need  - Initiate/Maintain bed alarm  - Obtain necessary fall risk management equipment: bed alarm  - Apply yellow socks and bracelet for high fall risk patients  - Consider moving patient to room near nurses station  Outcome: Progressing  Goal: Maintain or return to baseline ADL function  Description: INTERVENTIONS:  -  Assess patient's ability to carry out ADLs; assess patient's baseline  for ADL function and identify physical deficits which impact ability to perform ADLs (bathing, care of mouth/teeth, toileting, grooming, dressing, etc.)  - Assess/evaluate cause of self-care deficits   - Assess range of motion  - Assess patient's mobility; develop plan if impaired  - Assess patient's need for assistive devices and provide as appropriate  - Encourage maximum independence but intervene and supervise when necessary  - Involve family in performance of ADLs  - Assess for home care needs following discharge   - Consider OT consult to assist with ADL evaluation and planning for discharge  - Provide patient education as appropriate  Outcome: Progressing  Goal: Maintains/Returns to pre admission functional level  Description: INTERVENTIONS:  - Perform AM-PAC 6 Click Basic Mobility/ Daily Activity assessment daily.  - Set and communicate daily mobility goal to care team and patient/family/caregiver.   - Collaborate with rehabilitation services on mobility goals if consulted  - Perform Range of Motion 2 times a day.  - Reposition patient every 2 hours.  - Dangle patient 2 times a day  - Stand patient 2 times a day  - Ambulate patient 2 times a day  - Out of bed to chair 2 times a day   - Out of bed for meals 2 times a day  - Out of bed for toileting  - Record patient progress and toleration of activity level   Outcome: Progressing     Problem: DISCHARGE PLANNING  Goal: Discharge to home or other facility with appropriate resources  Description: INTERVENTIONS:  - Identify barriers to discharge w/patient and caregiver  - Arrange for needed discharge resources and transportation as appropriate  - Identify discharge learning needs (meds, wound care, etc.)  - Arrange for interpretive services to assist at discharge as needed  - Refer to Case Management Department for coordinating discharge planning if the patient needs post-hospital services based on physician/advanced practitioner order or complex needs related to  functional status, cognitive ability, or social support system  Outcome: Progressing     Problem: Knowledge Deficit  Goal: Patient/family/caregiver demonstrates understanding of disease process, treatment plan, medications, and discharge instructions  Description: Complete learning assessment and assess knowledge base.  Interventions:  - Provide teaching at level of understanding  - Provide teaching via preferred learning methods  Outcome: Progressing

## 2024-06-06 ENCOUNTER — APPOINTMENT (INPATIENT)
Dept: CT IMAGING | Facility: HOSPITAL | Age: 64
DRG: 433 | End: 2024-06-06
Payer: COMMERCIAL

## 2024-06-06 PROBLEM — R56.9 SEIZURE-LIKE ACTIVITY (HCC): Status: ACTIVE | Noted: 2024-06-06

## 2024-06-06 LAB
ALBUMIN SERPL BCP-MCNC: 3.1 G/DL (ref 3.5–5)
ALP SERPL-CCNC: 153 U/L (ref 34–104)
ALT SERPL W P-5'-P-CCNC: 41 U/L (ref 7–52)
ANION GAP SERPL CALCULATED.3IONS-SCNC: 11 MMOL/L (ref 4–13)
AST SERPL W P-5'-P-CCNC: 34 U/L (ref 13–39)
ATRIAL RATE: 102 BPM
ATRIAL RATE: 121 BPM
ATRIAL RATE: 94 BPM
BASOPHILS # BLD AUTO: 0.01 THOUSANDS/ÂΜL (ref 0–0.1)
BASOPHILS NFR BLD AUTO: 0 % (ref 0–1)
BILIRUB SERPL-MCNC: 2.28 MG/DL (ref 0.2–1)
BUN SERPL-MCNC: 13 MG/DL (ref 5–25)
CALCIUM ALBUM COR SERPL-MCNC: 9.5 MG/DL (ref 8.3–10.1)
CALCIUM SERPL-MCNC: 8.8 MG/DL (ref 8.4–10.2)
CHLORIDE SERPL-SCNC: 103 MMOL/L (ref 96–108)
CO2 SERPL-SCNC: 20 MMOL/L (ref 21–32)
CREAT SERPL-MCNC: 0.88 MG/DL (ref 0.6–1.3)
EOSINOPHIL # BLD AUTO: 0.01 THOUSAND/ÂΜL (ref 0–0.61)
EOSINOPHIL NFR BLD AUTO: 0 % (ref 0–6)
ERYTHROCYTE [DISTWIDTH] IN BLOOD BY AUTOMATED COUNT: 15.9 % (ref 11.6–15.1)
GFR SERPL CREATININE-BSD FRML MDRD: 91 ML/MIN/1.73SQ M
GLUCOSE SERPL-MCNC: 237 MG/DL (ref 65–140)
GLUCOSE SERPL-MCNC: 271 MG/DL (ref 65–140)
GLUCOSE SERPL-MCNC: 279 MG/DL (ref 65–140)
GLUCOSE SERPL-MCNC: 299 MG/DL (ref 65–140)
GLUCOSE SERPL-MCNC: 350 MG/DL (ref 65–140)
HCT VFR BLD AUTO: 26.8 % (ref 36.5–49.3)
HGB BLD-MCNC: 9.3 G/DL (ref 12–17)
IMM GRANULOCYTES # BLD AUTO: 0.03 THOUSAND/UL (ref 0–0.2)
IMM GRANULOCYTES NFR BLD AUTO: 1 % (ref 0–2)
LDH SERPL-CCNC: 185 U/L (ref 140–271)
LEVETIRACETAM SERPL-MCNC: <2 UG/ML (ref 12–46)
LYMPHOCYTES # BLD AUTO: 0.48 THOUSANDS/ÂΜL (ref 0.6–4.47)
LYMPHOCYTES NFR BLD AUTO: 9 % (ref 14–44)
MCH RBC QN AUTO: 35.8 PG (ref 26.8–34.3)
MCHC RBC AUTO-ENTMCNC: 34.7 G/DL (ref 31.4–37.4)
MCV RBC AUTO: 103 FL (ref 82–98)
MONOCYTES # BLD AUTO: 0.12 THOUSAND/ÂΜL (ref 0.17–1.22)
MONOCYTES NFR BLD AUTO: 2 % (ref 4–12)
NEUTROPHILS # BLD AUTO: 4.62 THOUSANDS/ÂΜL (ref 1.85–7.62)
NEUTS SEG NFR BLD AUTO: 88 % (ref 43–75)
NRBC BLD AUTO-RTO: 0 /100 WBCS
P AXIS: 42 DEGREES
P AXIS: 62 DEGREES
P AXIS: 67 DEGREES
PLATELET # BLD AUTO: 33 THOUSANDS/UL (ref 149–390)
PMV BLD AUTO: 11.7 FL (ref 8.9–12.7)
POTASSIUM SERPL-SCNC: 4.5 MMOL/L (ref 3.5–5.3)
PR INTERVAL: 138 MS
PR INTERVAL: 138 MS
PR INTERVAL: 144 MS
PROT SERPL-MCNC: 7.1 G/DL (ref 6.4–8.4)
QRS AXIS: 71 DEGREES
QRS AXIS: 73 DEGREES
QRS AXIS: 82 DEGREES
QRSD INTERVAL: 70 MS
QRSD INTERVAL: 74 MS
QRSD INTERVAL: 78 MS
QT INTERVAL: 332 MS
QT INTERVAL: 354 MS
QT INTERVAL: 368 MS
QTC INTERVAL: 460 MS
QTC INTERVAL: 461 MS
QTC INTERVAL: 471 MS
RBC # BLD AUTO: 2.6 MILLION/UL (ref 3.88–5.62)
SODIUM SERPL-SCNC: 134 MMOL/L (ref 135–147)
T WAVE AXIS: 59 DEGREES
T WAVE AXIS: 65 DEGREES
T WAVE AXIS: 76 DEGREES
VENTRICULAR RATE: 102 BPM
VENTRICULAR RATE: 121 BPM
VENTRICULAR RATE: 94 BPM
WBC # BLD AUTO: 5.27 THOUSAND/UL (ref 4.31–10.16)

## 2024-06-06 PROCEDURE — 70450 CT HEAD/BRAIN W/O DYE: CPT

## 2024-06-06 PROCEDURE — 80053 COMPREHEN METABOLIC PANEL: CPT

## 2024-06-06 PROCEDURE — 93010 ELECTROCARDIOGRAM REPORT: CPT | Performed by: INTERNAL MEDICINE

## 2024-06-06 PROCEDURE — 82948 REAGENT STRIP/BLOOD GLUCOSE: CPT

## 2024-06-06 PROCEDURE — 82653 EL-1 FECAL QUANTITATIVE: CPT | Performed by: PHYSICIAN ASSISTANT

## 2024-06-06 PROCEDURE — 85025 COMPLETE CBC W/AUTO DIFF WBC: CPT | Performed by: INTERNAL MEDICINE

## 2024-06-06 PROCEDURE — 93005 ELECTROCARDIOGRAM TRACING: CPT

## 2024-06-06 PROCEDURE — NC001 PR NO CHARGE: Performed by: STUDENT IN AN ORGANIZED HEALTH CARE EDUCATION/TRAINING PROGRAM

## 2024-06-06 PROCEDURE — 88305 TISSUE EXAM BY PATHOLOGIST: CPT | Performed by: STUDENT IN AN ORGANIZED HEALTH CARE EDUCATION/TRAINING PROGRAM

## 2024-06-06 PROCEDURE — 88112 CYTOPATH CELL ENHANCE TECH: CPT | Performed by: STUDENT IN AN ORGANIZED HEALTH CARE EDUCATION/TRAINING PROGRAM

## 2024-06-06 PROCEDURE — G0426 INPT/ED TELECONSULT50: HCPCS | Performed by: PSYCHIATRY & NEUROLOGY

## 2024-06-06 PROCEDURE — 83993 ASSAY FOR CALPROTECTIN FECAL: CPT | Performed by: PHYSICIAN ASSISTANT

## 2024-06-06 PROCEDURE — 99232 SBSQ HOSP IP/OBS MODERATE 35: CPT | Performed by: INTERNAL MEDICINE

## 2024-06-06 PROCEDURE — 83520 IMMUNOASSAY QUANT NOS NONAB: CPT

## 2024-06-06 PROCEDURE — 83615 LACTATE (LD) (LDH) ENZYME: CPT | Performed by: INTERNAL MEDICINE

## 2024-06-06 RX ORDER — INSULIN GLARGINE 100 [IU]/ML
10 INJECTION, SOLUTION SUBCUTANEOUS
Status: DISCONTINUED | OUTPATIENT
Start: 2024-06-06 | End: 2024-06-07

## 2024-06-06 RX ORDER — LORAZEPAM 2 MG/ML
INJECTION INTRAMUSCULAR
Status: COMPLETED
Start: 2024-06-06 | End: 2024-06-06

## 2024-06-06 RX ORDER — HYDROCORTISONE 10 MG/1
10 TABLET ORAL 3 TIMES DAILY
Status: DISCONTINUED | OUTPATIENT
Start: 2024-06-11 | End: 2024-06-07

## 2024-06-06 RX ORDER — LEVETIRACETAM 500 MG/1
500 TABLET ORAL EVERY 12 HOURS SCHEDULED
Status: DISCONTINUED | OUTPATIENT
Start: 2024-06-06 | End: 2024-06-06

## 2024-06-06 RX ORDER — LORAZEPAM 2 MG/ML
1 INJECTION INTRAMUSCULAR EVERY 6 HOURS PRN
Status: DISCONTINUED | OUTPATIENT
Start: 2024-06-06 | End: 2024-06-18 | Stop reason: HOSPADM

## 2024-06-06 RX ORDER — LEVETIRACETAM 500 MG/5ML
1000 INJECTION, SOLUTION, CONCENTRATE INTRAVENOUS ONCE
Status: COMPLETED | OUTPATIENT
Start: 2024-06-06 | End: 2024-06-06

## 2024-06-06 RX ORDER — ENOXAPARIN SODIUM 100 MG/ML
40 INJECTION SUBCUTANEOUS
Status: DISCONTINUED | OUTPATIENT
Start: 2024-06-07 | End: 2024-06-08

## 2024-06-06 RX ORDER — LORAZEPAM 2 MG/ML
1 INJECTION INTRAMUSCULAR ONCE
Status: COMPLETED | OUTPATIENT
Start: 2024-06-06 | End: 2024-06-06

## 2024-06-06 RX ORDER — ACETAMINOPHEN 325 MG/1
650 TABLET ORAL EVERY 6 HOURS PRN
Status: DISCONTINUED | OUTPATIENT
Start: 2024-06-06 | End: 2024-06-07

## 2024-06-06 RX ORDER — LEVETIRACETAM 500 MG/1
500 TABLET ORAL EVERY 12 HOURS SCHEDULED
Status: DISCONTINUED | OUTPATIENT
Start: 2024-06-07 | End: 2024-06-18 | Stop reason: HOSPADM

## 2024-06-06 RX ORDER — HYDROCORTISONE 10 MG/1
50 TABLET ORAL 2 TIMES DAILY
Status: DISCONTINUED | OUTPATIENT
Start: 2024-06-07 | End: 2024-06-10

## 2024-06-06 RX ORDER — LORAZEPAM 2 MG/ML
0.5 INJECTION INTRAMUSCULAR EVERY 6 HOURS PRN
Status: DISCONTINUED | OUTPATIENT
Start: 2024-06-06 | End: 2024-06-06

## 2024-06-06 RX ORDER — HYDROCORTISONE 10 MG/1
25 TABLET ORAL 3 TIMES DAILY
Status: DISCONTINUED | OUTPATIENT
Start: 2024-06-09 | End: 2024-06-07

## 2024-06-06 RX ADMIN — HYDROCORTISONE 50 MG: 10 TABLET ORAL at 22:00

## 2024-06-06 RX ADMIN — PANTOPRAZOLE SODIUM 40 MG: 40 TABLET, DELAYED RELEASE ORAL at 09:09

## 2024-06-06 RX ADMIN — GABAPENTIN 300 MG: 300 CAPSULE ORAL at 09:05

## 2024-06-06 RX ADMIN — INSULIN LISPRO 3 UNITS: 100 INJECTION, SOLUTION INTRAVENOUS; SUBCUTANEOUS at 16:21

## 2024-06-06 RX ADMIN — MIDODRINE HYDROCHLORIDE 10 MG: 5 TABLET ORAL at 11:28

## 2024-06-06 RX ADMIN — MIDODRINE HYDROCHLORIDE 10 MG: 5 TABLET ORAL at 09:05

## 2024-06-06 RX ADMIN — ACETAMINOPHEN 650 MG: 325 TABLET ORAL at 22:48

## 2024-06-06 RX ADMIN — TAMSULOSIN HYDROCHLORIDE 0.4 MG: 0.4 CAPSULE ORAL at 16:20

## 2024-06-06 RX ADMIN — ENOXAPARIN SODIUM 40 MG: 40 INJECTION SUBCUTANEOUS at 09:03

## 2024-06-06 RX ADMIN — RIFAXIMIN 550 MG: 550 TABLET ORAL at 13:05

## 2024-06-06 RX ADMIN — INSULIN GLARGINE 10 UNITS: 100 INJECTION, SOLUTION SUBCUTANEOUS at 22:00

## 2024-06-06 RX ADMIN — ROPINIROLE HYDROCHLORIDE 4 MG: 1 TABLET, FILM COATED ORAL at 22:17

## 2024-06-06 RX ADMIN — LEVETIRACETAM 1000 MG: 100 INJECTION, SOLUTION INTRAVENOUS at 22:16

## 2024-06-06 RX ADMIN — INSULIN LISPRO 3 UNITS: 100 INJECTION, SOLUTION INTRAVENOUS; SUBCUTANEOUS at 09:20

## 2024-06-06 RX ADMIN — INSULIN LISPRO 4 UNITS: 100 INJECTION, SOLUTION INTRAVENOUS; SUBCUTANEOUS at 11:26

## 2024-06-06 RX ADMIN — DICYCLOMINE HYDROCHLORIDE 20 MG: 20 TABLET ORAL at 11:27

## 2024-06-06 RX ADMIN — GABAPENTIN 300 MG: 300 CAPSULE ORAL at 21:59

## 2024-06-06 RX ADMIN — MIDODRINE HYDROCHLORIDE 10 MG: 5 TABLET ORAL at 16:18

## 2024-06-06 RX ADMIN — HYDROCORTISONE 50 MG: 10 TABLET ORAL at 09:02

## 2024-06-06 RX ADMIN — NICOTINE 1 PATCH: 14 PATCH, EXTENDED RELEASE TRANSDERMAL at 09:03

## 2024-06-06 RX ADMIN — GABAPENTIN 300 MG: 300 CAPSULE ORAL at 16:19

## 2024-06-06 RX ADMIN — DICYCLOMINE HYDROCHLORIDE 20 MG: 20 TABLET ORAL at 09:05

## 2024-06-06 RX ADMIN — INSULIN LISPRO 3 UNITS: 100 INJECTION, SOLUTION INTRAVENOUS; SUBCUTANEOUS at 22:01

## 2024-06-06 RX ADMIN — LORAZEPAM 1 MG: 2 INJECTION INTRAMUSCULAR at 05:39

## 2024-06-06 RX ADMIN — ROPINIROLE HYDROCHLORIDE 4 MG: 1 TABLET, FILM COATED ORAL at 09:02

## 2024-06-06 RX ADMIN — HYDROCORTISONE 50 MG: 10 TABLET ORAL at 16:19

## 2024-06-06 RX ADMIN — SENNOSIDES AND DOCUSATE SODIUM 1 TABLET: 8.6; 5 TABLET ORAL at 17:04

## 2024-06-06 RX ADMIN — RIFAXIMIN 550 MG: 550 TABLET ORAL at 22:18

## 2024-06-06 RX ADMIN — LACTULOSE 20 G: 20 SOLUTION ORAL at 09:03

## 2024-06-06 RX ADMIN — FERROUS SULFATE TAB 325 MG (65 MG ELEMENTAL FE) 325 MG: 325 (65 FE) TAB at 09:05

## 2024-06-06 RX ADMIN — LORAZEPAM 1 MG: 2 INJECTION INTRAMUSCULAR; INTRAVENOUS at 05:39

## 2024-06-06 NOTE — OCCUPATIONAL THERAPY NOTE
Occupational Therapy         Patient Name: Foreign Armando  Today's Date: 6/6/2024                OT orders received. Pt received ambulating in room independently.  Pt reports no issues with completing toileting or other mobility related tasks.  Pt appears to be at functional baseline. No further acute OT needs.

## 2024-06-06 NOTE — ASSESSMENT & PLAN NOTE
Patient had RRT for seizure-like activity this morning  Patient had similar episode when he was in [facility] in 10/6/23 -CT brain, CTA head and neck, MRI brain and EEG was negative at that time  Discontinue tramadol due to the risk of lowered seizure threshold  Neurology consult placed, EEG ordered

## 2024-06-06 NOTE — CONSULTS
Patient MRN: [id number]  Date of Service: 6/6/2024  Referring Provider: Rosario Whitfield MD   Provider Creating Note: Sarah Barnett PA-C  PCP: No primary care provider on file.    Reason for Consult:   Hepatic cirrhosis, unspecified hepatic cirrhosis type, unspecified whether ascites present (HCC) [K74.60]     HISTORY OF PRESENT ILLNESS:  Foreign Armando is a 63 y.o. 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 Home meds include lactulose 30ml daily, Xifaxan 550mg BID (per pt), lasix 20mg daily, propranolol 10mg twice daily, spironolactone 100mg daily. Meds also include senokot and bentyl. No hepatoma on current CT imaging; last AFP <2.00 8/2023. He has been followed by [facility] in the past and was seen by the hospital service last month at [facility] while admitted for colitis, also noted to have R pleural effusion but too small to drain. He was treated with a course of antibiotics. Outpatient endoscopy was recommended.      Last colonoscopy 2/15/23 (inadequate prep) normal ileum and normal ileocolic anastomosis, friable mucosa throughout colon, internal hemorrhoids. Last EGD 2/15/23 esophagitis dissecans superficialis, mild PHG with GAVE-like appearance of antrum without active bleeding, portal duodenopathy. No varices.     He was admitted 6/3/24 via EMS complaining of b/l lower leg swelling/pain and dyspnea. Imaging notable for R-sided pleural effusion. IR thoracentesis performed 6/4 with 600cc drained; fluid studies pending. Fluid overload treated with lasix and spironolactone but limited due to soft BP. Has history of hypotension and was on midodrine 5mg TID at home - this was increased to 10mg TID yesterday by Endocrinology.  "He did not respond to IV albumin OTD.     This morning a rapid response was called for seizure-like activity. He was treated with Ativan, STAT CT head with consideration for neuro consultation.      Patient states he has been having copious diarrhea for weeks and does occasionally see red blood mixed with stool. This has not been documented in EMR per [facility] staff.  He complains of groin pain associated with known inguinal hernia. Occasional nausea without vomiting or hematemesis. He also states he sees blood in his urine. He states he is compliant with home meds. He was actually planning to temporarily move to [place] to be with his ailing parents; was at the bus stop waiting to leave when EMS was called to bring him to \[facility]\"".     Review of Systems:    General:   No fever or chills; No significant weight loss or gain.   EENT:   No ear pain, facial swelling; No sneezing, sore throat.   Skin:   No rashes, color changes.   Respiratory:     +shortness of breath No cough, wheezing, stridor.   Cardiovascular:     No chest pain, palpitations.   Gastrointestinal:    As per HPI.   Musculoskeletal:     No arthralgias, myalgias + swelling.   Neurologic:   No dizziness, numbness +seizure +weakness. No speech difficulties.   Psych:   No agitation, suicidal ideations    Otherwise, All other twelve-point review of systems normal.     Past Medical History:   Diagnosis Date    CHF (congestive heart failure) (HCC)     Cirrhosis (HCC)     CHRISTIE    Colon cancer (HCC)     Diabetes mellitus (HCC)     Neuropathy     Restless leg syndrome      Past Surgical History:   Procedure Laterality Date    EGD AND SIGMOIDOSCOPY FLEXIBLE      IR PARACENTESIS  11/22/2022    IR PARACENTESIS  2/2/2023    IR THORACENTESIS  6/4/2024    LEFT COLON RESECTION      LITHOTRIPSY       Allergies   Allergen Reactions    Morphine Anaphylaxis    Morpholine Salicylate Other (See Comments)     \"I go unconscious\"    Clarithromycin Other (See Comments)     Dizzy, nausea, " "ulcers in stomach    Oxycodone Hives and Facial Swelling     \"Scratchy throat\"      Sulfamethoxazole-Trimethoprim Hives    Nsaids Rash       Medications:  Home Medications  Prior to Admission medications    Medication Sig Start Date End Date Taking? Authorizing Provider   dicyclomine (BENTYL) 20 mg tablet Take 1 tablet (20 mg total) by mouth 2 (two) times a day before breakfast and lunch 12/1/22 12/31/22  Robb Lazaro MD   ferrous sulfate 325 (65 Fe) mg tablet  3/28/23   Historical Provider, MD   furosemide (LASIX) 20 mg tablet Take 1 tablet (20 mg total) by mouth daily 5/4/23 6/3/23  Margaret Shell PA-C   gabapentin (NEURONTIN) 300 mg capsule Take 300 mg by mouth Three times a day 3/15/23   Historical Provider, MD   hydrOXYzine HCL (ATARAX) 25 mg tablet TAKE 1 TABLET BY MOUTH 3 TIMES A DAY AS NEEDED FOR ITCHING 2/20/23   Historical Provider, MD   lactulose 20 g/30 mL Take 30 mL (20 g total) by mouth daily Do not start before February 28, 2023. 2/28/23   Telly Barajas MD   midodrine (PROAMATINE) 5 mg tablet Take 1 tablet (5 mg total) by mouth 3 (three) times a day before meals 5/4/23 6/3/23  Margaret Shell PA-C   pantoprazole (PROTONIX) 40 mg tablet Take 1 tablet (40 mg total) by mouth daily in the early morning Do not start before May 5, 2023. 5/5/23 6/4/23  Margaret Shell PA-C   propranolol (INDERAL) 10 mg tablet Take 1 tablet (10 mg total) by mouth every 12 (twelve) hours 5/4/23 6/3/23  Margaret Shell PA-C   rOPINIRole (REQUIP) 4 mg tablet Take 1 tablet (4 mg total) by mouth 2 (two) times a day 5/4/23 6/3/23  Margaret Shell PA-C   senna-docusate sodium (SENOKOT S) 8.6-50 mg per tablet Take 1 tablet by mouth every evening 2/18/23   Historical Provider, MD   spironolactone (ALDACTONE) 100 mg tablet  3/28/23   Historical Provider, MD   tamsulosin (FLOMAX) 0.4 mg Take 1 capsule (0.4 mg total) by mouth daily with dinner 5/4/23 6/3/23  Margaret Shell PA-C       Inhouse Medications    Current Facility-Administered " Medications:     dicyclomine (BENTYL) tablet 20 mg, 20 mg, Oral, BID before breakfast/lunch, 20 mg at 06/05/24 1117    enoxaparin (LOVENOX) subcutaneous injection 40 mg, 40 mg, Subcutaneous, Q24H SANDRA, 40 mg at 06/05/24 1125    ferrous sulfate tablet 325 mg, 325 mg, Oral, Daily With Breakfast, 325 mg at 06/05/24 0839    furosemide (LASIX) tablet 40 mg, 40 mg, Oral, Daily    gabapentin (NEURONTIN) capsule 300 mg, 300 mg, Oral, TID, 300 mg at 06/05/24 2134    hydrocortisone (CORTEF) tablet 50 mg, 50 mg, Oral, TID, 50 mg at 06/05/24 2134    hydrOXYzine HCL (ATARAX) tablet 25 mg, 25 mg, Oral, TID PRN    insulin lispro (HumALOG/ADMELOG) 100 units/mL subcutaneous injection 1-5 Units, 1-5 Units, Subcutaneous, TID AC, 1 Units at 06/05/24 1542 **AND** Fingerstick Glucose (POCT), , , TID AC    insulin lispro (HumALOG/ADMELOG) 100 units/mL subcutaneous injection 1-5 Units, 1-5 Units, Subcutaneous, HS, 3 Units at 06/05/24 2134    lactulose (CHRONULAC) oral solution 20 g, 20 g, Oral, Daily, 20 g at 06/05/24 0836    LORazepam (ATIVAN) injection 1 mg, 1 mg, Intravenous, Q6H PRN    midodrine (PROAMATINE) tablet 10 mg, 10 mg, Oral, TID AC, 10 mg at 06/05/24 1538    nicotine (NICODERM CQ) 14 mg/24hr TD 24 hr patch 1 patch, 1 patch, Transdermal, Daily, 1 patch at 06/05/24 0837    ondansetron (ZOFRAN) injection 4 mg, 4 mg, Intravenous, Q6H PRN    pantoprazole (PROTONIX) EC tablet 40 mg, 40 mg, Oral, Early Morning, 40 mg at 06/05/24 0604    propranolol (INDERAL) tablet 10 mg, 10 mg, Oral, Q12H SANDRA    rOPINIRole (REQUIP) tablet 4 mg, 4 mg, Oral, BID, 4 mg at 06/05/24 2134    senna-docusate sodium (SENOKOT S) 8.6-50 mg per tablet 1 tablet, 1 tablet, Oral, QPM, 1 tablet at 06/05/24 1704    spironolactone (ALDACTONE) tablet 100 mg, 100 mg, Oral, Daily    tamsulosin (FLOMAX) capsule 0.4 mg, 0.4 mg, Oral, Daily With Dinner, 0.4 mg at 06/05/24 1538      Social History   reports that he has been smoking cigarettes. He has never used smokeless  "tobacco. He reports that he does not currently use alcohol. He reports that he does not use drugs.    Family History  Family History   Problem Relation Age of Onset    Diabetes Mother     Liver disease Mother     Diabetes Father     Bone cancer Father          OBJECTIVE:    /64 (BP Location: Left arm)   Pulse 65   Temp 97.7 °F (36.5 °C) (Tympanic)   Resp 16   Ht 5' 7.01\" (1.702 m)   Wt 71.2 kg (156 lb 15.5 oz)   SpO2 96%   BMI 24.58 kg/m²   Physical Exam:     General Appearance:    Awake, alert, oriented x3, no distress, well developed, well    nourished   Head:    Normocephalic without obvious abnormality   Eyes:    PERRL, conjunctiva/corneas clear, EOM's intact        Neck:   Supple, no adenopathy   Throat:   Mucous membranes moist   Lungs:     Clear to auscultation bilaterally, no wheezing or rhonchi   Heart:    Regular rate and rhythm, S1 and S2 normal, no murmur   Abdomen:     Soft, non-tender, distended but no fluid wave. bowel sounds active. No masses, rebound or guarding.    Extremities:   Extremities without edema   Psych  Derm:   Normal affect    No jaundice +ecchymoses. No palmar erytherma   Neurologic:   CNII-XII grossly intact. Speech intact. No asterixis         Laboratory Studies:  Results from last 7 days   Lab Units 06/06/24 0558 06/05/24  0609 06/04/24 0759 06/03/24  1847   WBC Thousand/uL 5.27 3.14* 3.25* 5.02   HEMOGLOBIN g/dL 9.3* 9.1* 8.8* 9.6*   HEMATOCRIT % 26.8* 25.9* 26.4* 28.7*   MCV fL 103* 100* 104* 103*   PLATELETS Thousands/uL 33* 32* 37* 42*   INR   --   --   --  1.49*     Results from last 7 days   Lab Units 06/06/24  0558 06/05/24  0609 06/04/24 0759 06/03/24  1847   SODIUM mmol/L 134* 139 136 135   POTASSIUM mmol/L 4.5 3.6 3.1* 3.0*   CHLORIDE mmol/L 103 107 103 106   CO2 mmol/L 20* 26 25 19*   BUN mg/dL 13 13 11 11   CREATININE mg/dL 0.88 0.81 0.74 0.87   CALCIUM mg/dL 8.8 8.1* 7.8* 8.0*   ALBUMIN g/dL 3.1*  --   --  2.7*   TOTAL BILIRUBIN mg/dL 2.28*  --   --  " 2.18*   ALK PHOS U/L 153*  --   --  135*   ALT U/L 41  --   --  35   AST U/L 34  --   --  31           Imaging and Other Studies:   VAS VENOUS DUPLEX - LOWER LIMB BILATERAL    Result Date: 6/5/2024  Narrative:  [facility] REPORT CLINICAL: Indications: Patient presents with chronic bilateral lower extremity swelling and tenderness for several years. Operative History: No prior cardiovascular surgeries Risk Factors The patient has history of Diabetes (NIDDM (Diet)) and smoking (current) 0.25 ppd.   CONCLUSION:  Impression: RIGHT LOWER LIMB: No evidence of acute or chronic deep vein thrombosis. No evidence of superficial thrombophlebitis noted. Doppler evaluation shows a normal response to augmentation maneuvers.. Popliteal, posterior tibial and anterior tibial arterial Doppler waveform's are triphasic.  LEFT LOWER LIMB: No evidence of acute or chronic deep vein thrombosis. No evidence of superficial thrombophlebitis noted. Doppler evaluation shows a normal response to augmentation maneuvers. Popliteal, posterior tibial and anterior tibial arterial Doppler waveform's are triphasic.  Technical findings were given to Rosario Whitfield via secure chat at 1:30 PM.  SIGNATURE: Electronically Signed by: LYNDSAY NGUYEN DO, RPVI on 2024-06-05 04:09:22 PM    IR IN-Patient Thoracentesis    Result Date: 6/5/2024  Narrative: PROCEDURE: Diagnostic and therapeutic right thoracentesis STAFF: Nate Luevano M.D. NUMBER OF IMAGES: Multiple. COMPLICATIONS: None. INDICATION: Symptomatic right pleural effusion. PROCEDURE: Using ultrasound guidance, the right pleural cavity was punctured and a catheter placed into the pleural cavity. A total of 600 milliliters of fluid was removed. The catheter was then removed. FINDINGS: Moderate right pleural effusion. Straw colored pleural fluid was removed.     Impression: Diagnostic and therapeutic right thoracentesis. Workstation performed: [id number]     CT chest abdomen pelvis w  contrast    Result Date: 6/3/2024  Narrative: CT CHEST, ABDOMEN AND PELVIS WITH IV CONTRAST INDICATION: shortness of breath with hx of pulmonary edema in the setting of cirrhosis, Abdominal pain. COMPARISON: None. TECHNIQUE: CT examination of the chest, abdomen and pelvis was performed. Multiplanar 2D reformatted images were created from the source data. This examination, like all CT scans performed in the [facility], was performed utilizing techniques to minimize radiation dose exposure, including the use of iterative reconstruction and automated exposure control. Radiation dose length product (DLP) for this visit: 484 mGy-cm IV Contrast: 100 mL of iohexol (OMNIPAQUE) Enteric Contrast: Not administered. FINDINGS: CHEST LUNGS: Lungs are clear. No tracheal or endobronchial lesion. PLEURA: Moderate to large right pleural effusion. HEART/GREAT VESSELS: Heart is unremarkable for patient's age. No thoracic aortic aneurysm. MEDIASTINUM AND VICTOR HUGO: Unremarkable. CHEST WALL AND LOWER NECK: Unremarkable. ABDOMEN LIVER/BILIARY TREE: Cirrhotic morphology. No suspicious mass within study limitations. No biliary dilation. Portal vein is patent. GALLBLADDER: No calcified gallstones. No pericholecystic inflammatory change. SPLEEN: Enlarged spleen measuring 18.5 cm PANCREAS: 1.3 cm hypodensity at the pancreatic neck (2/162) ADRENAL GLANDS: Unremarkable. KIDNEYS/URETERS: Simple renal cyst(s). Nonobstructing left renal nephrolithiasis. No hydronephrosis. STOMACH AND BOWEL: Status post right hemicolectomy. APPENDIX: No findings to suggest appendicitis. ABDOMINOPELVIC CAVITY: Mild ascites. No pneumoperitoneum. Mild upper abdominal lymphadenopathy, for instance a 1.8 cm periportal lymph node and a 1.6 cm portacaval lymph node. Diffuse mesenteric edema. Stable 4 cm rim-enhancing focus of mesenteric fat in  the right abdomen compatible with fat necrosis VESSELS: Extensive gastro esophageal varices. PELVIS REPRODUCTIVE  ORGANS: Unremarkable for patient's age. URINARY BLADDER: Unremarkable. ABDOMINAL WALL/INGUINAL REGIONS: Unremarkable. BONES: No acute fracture or suspicious osseous lesion.     Impression: Moderate to large sized right pleural effusion Cirrhosis with signs of portal hypertension as evidenced by gastroesophageal varices. Stable upper abdominal lymphadenopathy presumed secondary to underlying liver pathology although low-grade lymphoproliferative disorder is not fully excluded Stable diffuse mesenteric edema Workstation performed: [id number]     US CHEST RIGHT    Result Date: 5/9/2024  Narrative: Indication: Pleural effusion. The patient was placed in a seated position, and the right hemithorax was scanned with ultrasound in anticipation of performing an ultrasound-guided thoracentesis. However, there is only minimal pleural fluid currently seen, not sufficient for safe thoracentesis at this time.    Impression: Impression: Only minimal right pleural effusion currently visualized with ultrasound, no thoracentesis was performed. Workstation:[id number]        ASSESSMENT AND PLAN:  Decompensated cirrhosis complicated by ascites, hepatic encephalopathy, varices noted on imaging only, pancytopenia, coagulopathy.    MELD-Na = 17 (using INR from 6/3)  MELD = 14  Admitted with volume overload, suspect hepatic hydrothorax. CT imaging suggests mild ascites, stable diffuse mesenteric edema, moderate to large sized R pleural effusion s/p 600cc tap. Currently on PO lasix 40mg daily, spironolactone 100mg daily as BP allows. Add 2g Na diet. Paracentesis as needed, no need at this time.   Hepatic encephalopathy: currently controlled. Ammonia 37; no clinical HE. Continue lactulose 20g daily - titrate to produce 2-3 soft stools daily. Was on Xifaxan last month while admitted and at home per patient. Monitor clinically. Patient describes diarrhea >10/day with visible blood - not documented since admission.   Esophageal varices: Last EGD  2/2023 no varices seen; CT imaging shows extensive gastroesophageal varices. Will need surveillance EGD. Currently on propranolol 10mg daily. Will discuss inpatient EGD with GI attending given suspected seizure this morning.   Labs shows chronic pancytopenia. WBC up this morning. Transfuse for hgb <7 and plt <10 or <50 w/bleeding.   Hepatoma screening: No masses seen on imaging this admission. Check AFP.   Colon Cancer screening: Due for repeat colonoscopy with 2 day prep given history of colon cancer and inadequate prep in 2/2023. Patient does describe seeing blood in loose stool - will discuss inpatient endoscopy with GI attending in setting of suspected seizure this morning. Doubt infectious etiology of loose stool given no leukocytosis, afebrile but could consider C diff given recent antibiotics.   1.3 cm pancreatic neck hypodensity on CT imaging, appears to be noted on previous imaging. Past imaging also suggesting chronic pancreatitis. Suggest further imaging with pancreatic protocol as outpatient. MRI Abd not available at [facility]. Check pancreatic elastase given reports of copious diarrhea as outpatient rule out EPI.   Chronic stable abdominal lymphadenopathy presumed related to liver disease but low-grade lymphoproliferative disorder suggested by Radiologist.         Sarah Barnett PA-C

## 2024-06-06 NOTE — NURSING NOTE
At around 5:20, pca entered the room to perform blood work and noticed that the patient appeared to be having questionable seizures and was unresponsive. The pca proceed to call the nurse, open entering the room observed the patient experiencing uncontrollable shaking with closed eyes and clenched hands, unable to respond. RRT was called and the team arrived to assess the situation. Order ativan 1mg which was administered. Blood work and ct scan ordered. After receiving ativan the patient responded well and within minutes opened their eyes, and the seizure stopped. The patient was alert and oriented x3 able to state name , , and location.

## 2024-06-06 NOTE — ASSESSMENT & PLAN NOTE
BP currently stable   patient with history of hypotension and he was on midodrine 5 mg 3 times daily at home  Patient BP has been soft and he was not able to receive Lasix and spironolactone due to holding parameters  Patient received IV albumin once without significant improvement in BP  Increased midodrine to 10 mg 3 times daily.  Endocrinologist on board - Cortef was also increased to stress dose

## 2024-06-06 NOTE — ASSESSMENT & PLAN NOTE
Most likely due to liver cirrhosis   BNP WNL, CT chest showed no evidence of pulmonary congestion but pleural effusion which was tapped yesterday  Recent stress test on 7/13/2023 showed LVEF 70%, and attenuation artifact   Patient received IV Lasix 40 mg once in ED  Currently on p.o. Lasix 40 mg daily and spironolactone 100 mg daily as BP allows.

## 2024-06-06 NOTE — CONSULTS
TeleConsultation - Neurology   Foreign Armando 63 y.o. male MRN: [id number]  Unit/Bed#: 7T [facility] 705-01 Encounter: [id number]      VIRTUAL CARE DOCUMENTATION:     1. This service was provided via Telemedicine using [employer]     2. Parties in the room with patient during teleconsult Patient only    3. Confidentiality My office door was closed     4. Participants No one else was in the room    5. Patient acknowledged consent and understanding of privacy and security of the  Telemedicine consult. I informed the patient that I have reviewed their record in Epic and presented the opportunity for them to ask any questions regarding the visit today.  The patient agreed to participate.    6. Time spent 51       Assessment & Plan     Spell  -RR this morning for reported generalized shaking for over 15 minutes with eye closed. Reported spell aborted with ativan  -Patient currently at his baseline  -although patient reported hx of seizure disorder and on keppra, I was not able to verify with care everywhere.  -Recommend the primary team to confirm whether patient was supposed to be on keppra or not. If patient was supposed to be on keppra, please continue home dose. If not, recommend to obtain routine EEG and mRI brain wwo for seizure workup    Other comorbidities, will defer to the primary team.         Foreign Armando will need follow up in in 6 weeks with epilepsy attending. He will not require outpatient neurological testing.    History of Present Illness     Reason for Consult / Principal Problem: seizure like activity  Hx and PE limited by: telemedicine,   HPI: Foreign Armando is a 63 y.o.  male with complicated pmh including DM, ?seizure on keppra, cirrhosis,  who was admitted on 6/4 for multiple conditions. Neuro was consulted after RR called this morning after patient had a seizure like spell.     Patient was seen and examined. His neuro exam is difficult through televisit. Patient appears to have R  facial asymmetry and severe slurred speech, but reported this being chronic. He reports he feels tired, but he was able to answer orientation question.  Per bedside nurse, patient was noticed to shaking all over with eyes closed around 0530AM today. Denied urinary incontinence or tongue biting. Reported patient had whole body shaking for 15 minutes which was aborted after ativan.     Of note, patient is a poor historian. He reports that he has seizure disorder and was on keppra. However, he was not able to confirm whether he takes Keppra or not. I was not able to locate related medical records regarding patient's seizure hx from chart everywhere except for one hospitalization in 2023 when patient was documented to have had a seizure episode under the setting of Pneumonia and keppra was started. Of note, per documentation, EEG and MRI brain wo during that hospitalization were unremarkable.    Inpatient consult to Neurology  Consult performed by: Mitch Spears MD  Consult ordered by: Rosario Whitfield MD           Review of Systems  10 system reviewed, unremarkable other than above    Historical Information   Past Medical History:   Diagnosis Date    CHF (congestive heart failure) (HCC)     Cirrhosis (HCC)     CHRISTIE    Colon cancer (HCC)     Diabetes mellitus (HCC)     Neuropathy     Restless leg syndrome      Past Surgical History:   Procedure Laterality Date    EGD AND SIGMOIDOSCOPY FLEXIBLE      IR PARACENTESIS  11/22/2022    IR PARACENTESIS  2/2/2023    IR THORACENTESIS  6/4/2024    LEFT COLON RESECTION      LITHOTRIPSY       Social History   Social History     Substance and Sexual Activity   Alcohol Use Not Currently     Social History     Substance and Sexual Activity   Drug Use Never     E-Cigarette/Vaping    E-Cigarette Use Never User      E-Cigarette/Vaping Substances    Nicotine No     THC No     CBD No     Flavoring No     Other No     Unknown No      Social History     Tobacco Use   Smoking Status Every Day    Current  "packs/day: 0.25    Types: Cigarettes   Smokeless Tobacco Never     Family History: non-contributory    Review of previous medical records was  completed.     Meds/Allergies   all current active meds have been reviewed    Allergies   Allergen Reactions    Morphine Anaphylaxis    Morpholine Salicylate Other (See Comments)     \"I go unconscious\"    Clarithromycin Other (See Comments)     Dizzy, nausea, ulcers in stomach    Oxycodone Hives and Facial Swelling     \"Scratchy throat\"      Sulfamethoxazole-Trimethoprim Hives    Nsaids Rash       Objective   Vitals:Blood pressure 108/68, pulse 65, temperature 97.7 °F (36.5 °C), temperature source Tympanic, resp. rate 16, height 5' 7.01\" (1.702 m), weight 71.2 kg (156 lb 15.5 oz), SpO2 96%.,Body mass index is 24.58 kg/m².    Intake/Output Summary (Last 24 hours) at 6/6/2024 0967  Last data filed at 6/5/2024 1707  Gross per 24 hour   Intake 560 ml   Output --   Net 560 ml       Invasive Devices:   Invasive Devices       Peripheral Intravenous Line  Duration             Peripheral IV 06/06/24 Distal;Dorsal (posterior);Left Forearm <1 day    Peripheral IV 06/06/24 Dorsal (posterior);Right Hand <1 day                    Physical Exam  Neurologic Exam  GEN: in no acute distress, well-developed, well nourished  HEENT: normocephalic,  Nose and ears grossly normal in appearance.  CV:  no pedal edema.  Normotensive  PULM: airways patent, non-labored breathing   ABD:  Nondistended  EXT: no   edema or erythema.  No joint swelling  SKIN: no rashes or lesions.     NEURO:        Mental Status: Alert and oriented to person, place, and year. Interactive, able to follow commands.  Answers questions appropriately       Speech: very slurred         CN 2-12: due to tele camera resolution, was not able to examine, but appear to have R facial asymetry which was reported as chronic       Motor: can move all extremities symmetrically      Sensory:  Reported intact light touch  throughout        " Reflexes:  Not able to assess during tele visit       Coordination: no ataxia with finger-to-nose testing             Gait/Station: Deferred        Cortical: No Extinction   Lab Results: CBC:   Results from last 7 days   Lab Units 06/06/24 0558 06/05/24 0609 06/04/24  0759   WBC Thousand/uL 5.27 3.14* 3.25*   RBC Million/uL 2.60* 2.58* 2.55*   HEMOGLOBIN g/dL 9.3* 9.1* 8.8*   HEMATOCRIT % 26.8* 25.9* 26.4*   MCV fL 103* 100* 104*   PLATELETS Thousands/uL 33* 32* 37*   , BMP/CMP:   Results from last 7 days   Lab Units 06/06/24 0558 06/05/24 0609 06/04/24 0759 06/03/24  1847   SODIUM mmol/L 134* 139 136 135   POTASSIUM mmol/L 4.5 3.6 3.1* 3.0*   CHLORIDE mmol/L 103 107 103 106   CO2 mmol/L 20* 26 25 19*   BUN mg/dL 13 13 11 11   CREATININE mg/dL 0.88 0.81 0.74 0.87   CALCIUM mg/dL 8.8 8.1* 7.8* 8.0*   AST U/L 34  --   --  31   ALT U/L 41  --   --  35   ALK PHOS U/L 153*  --   --  135*   EGFR ml/min/1.73sq m 91 94 98 91   , Lipid Profile:     Imaging Studies: I have personally reviewed pertinent films in PACS  EKG, Pathology, and Other Studies: I have personally reviewed pertinent reports.    VTE Prophylaxis: Heparin    Counseling / Coordination of Care  Total time spent today 51 minutes. Greater than 50% of total time was spent with the patient and / or family counseling and / or coordination of care. A description of the counseling / coordination of care: impression, further studies, review OSH records, follow up

## 2024-06-06 NOTE — ASSESSMENT & PLAN NOTE
CT chest A/P with contrast showed cirrhosis with signs of portal hypertension as evidenced by gastroesophageal varices. Stable upper abdominal lymphadenopathy presumed secondary to underlying liver pathology although low-grade lymphoproliferative disorder is not fully excluded.Stable diffuse mesenteric edema   Gastroenterology consult appreciate recommendations.    Continue prehospital spironolactone 100 mg daily, lactulose 20 g p.o. daily  Added 2 g sodium diet

## 2024-06-06 NOTE — ASSESSMENT & PLAN NOTE
Initial potassium 3.0 -> 3.1> 3.6->4.5  Repleted with p.o. and IV potassium  Discontinue daily potassium supplement  Continue to monitor with repeat labs in a.m.

## 2024-06-06 NOTE — ASSESSMENT & PLAN NOTE
Lab Results   Component Value Date    HGBA1C 6.5 (H) 05/05/2024       Recent Labs     06/05/24  1104 06/05/24  1535 06/05/24 2110 06/06/24  0527   POCGLU 183* 206* 311* 237*         Blood Sugar Average: Last 72 hrs:  (P) 194.3    Placed on CCH type II diet  Added Lantus 10 units at bedtime.  Obtain Accu-Cheks before meals and at bedtime with Humalog correction dose before meals and at bedtime

## 2024-06-06 NOTE — CASE MANAGEMENT
Case Management Progress Note    Patient name Foreign Armando  Location 7T /7T -01 MRN [id number]  : 1960 Date 2024       LOS (days): 1  Geometric Mean LOS (GMLOS) (days): 3.3  Days to GMLOS:2.2        OBJECTIVE:        Current admission status: Inpatient  Preferred Pharmacy:   [id number] - [place] - [address]  [address] [address]  Phone: [phone] Fax: [phone]    [id number]  [place] - [address]  [address] [facility] [address]  Phone: [phone] Fax: [phone]    [id number] - [place] - [address]  [address] [facility] [place] [address]  Phone: [phone] Fax: [phone]    [id number] - [place] - [address]  [address] [place] [address]  Phone: [phone] Fax: [phone]    Primary Care Provider: No primary care provider on file.    Primary Insurance: BLUE CROSS  Secondary Insurance:     PROGRESS NOTE:    Patient will require additional 48-72 hrs had a seizure.  CM department following thru discharge

## 2024-06-06 NOTE — RAPID RESPONSE
Rapid Response Note  Foreign Armando 63 y.o. male MRN: [id number]  Unit/Bed#: 7T [facility] 705-01 Encounter: [id number]    Rapid Response Notification(s):   Response called date/time:  6/6/2024 5:28 AM  Response team arrival date/time:  6/6/2024 5:30 AM  Response end date/time:  6/6/2024 6:10 AM  [facility]  Rapid response location:  [facility] unit  Primary reason for rapid response call:  New onset of seizures    Rapid Response Intervention(s):   Airway:  None  Breathing:  None  Circulation:  None  Fluids administered:  None  Medications administered:  None       Assessment:   62 yo pmh seizure disorder (unknown last seizure activity). Rapid response was called due to seizure like activity for more than 5 minutes.    Plan:   1 mg Ativan STAT  CBC, CMP, Keppra level  CT head STAT, consider neurology evaluation per primary team     Rapid Response Outcome:   Transfer:  Remain on floor  Code Status: Level 1 (Full Code)      Family notified: Primary team to notify Family Member       Background/Situation:   Foreign Armando is a 63 y.o. male pmh seizure disorder and pleural effusion with recent thoracentesis on 6/5/24. Rapid response was called at 0528 for seizure like activity. Per nursing, seizure like activity was occurring for 5 minutes prior to the rapid response call. Patient was stable throughout the night. Patient did not receive medications this morning. . Family medicine and ED services responded promptly. Tongue biting, urinary incontinence were not seen. Patient continued to exhibit seizure like activity (tonic jerking movements of bilateral upper and lower extremities) for 3 minutes. 1 mg Ativan was administered STAT with prompt resolution of convulsions.    Review of Systems   Unable to perform ROS: Acuity of condition       Objective:   Vitals:    06/05/24 1001 06/05/24 1118 06/05/24 1531 06/05/24 2134   BP: 100/55 107/61 107/62 102/64   BP Location: Left arm Left arm Left arm Left arm    Pulse: 63 64  63   Resp:  18 18 18   Temp:   (!) 97.1 °F (36.2 °C) (!) 97.4 °F (36.3 °C)   TempSrc:   Temporal Temporal   SpO2:  97% 98% 93%   Weight:       Height:         Physical Exam  Constitutional:       General: He is not in acute distress.  Cardiovascular:      Rate and Rhythm: Normal rate and regular rhythm.      Pulses: Normal pulses.      Heart sounds: Normal heart sounds.   Pulmonary:      Effort: Pulmonary effort is normal. No respiratory distress.      Breath sounds: Normal breath sounds.   Musculoskeletal:      Right lower leg: No edema.      Left lower leg: No edema.   Neurological:      Mental Status: He is alert.      Comments: Oriented to person, place, and time but unaware of the seizure prior

## 2024-06-06 NOTE — ASSESSMENT & PLAN NOTE
Due to cirrhosis, no evidence of bleeding  Patient is currently on Lovenox for DVT prophylaxis  Transfuse for plt <10 or <50 w/bleeding.   Monitor clinically and follow-up CBC.

## 2024-06-06 NOTE — PROGRESS NOTES
[facility]  Progress Note  Name: Foreign Armando I  MRN: [id number]  Unit/Bed#: 7T [facility] 705-01 I Date of Admission: 6/3/2024   Date of Service: 6/6/2024 I Hospital Day: 1    Assessment & Plan   * Pleural effusion on right  Assessment & Plan  Patient presented with shortness of breath   BNP WNL, CT chest A/P show right pleural effusion, no evidence of pulmonary congestion   s/p thoracentesis on 6/4/2024 with 600 cc clear yellow fluid  Pleural fluid analysis show LDH 62, total protein 7.6  Plasma total protein is 7.1  Awaiting placement LDH to calculate lights criteria      Seizure-like activity (HCC)  Assessment & Plan  Patient had RRT for seizure-like activity this morning  Patient had similar episode when he was in [facility] in 10/6/23 -CT brain, CTA head and neck, MRI brain and EEG was negative at that time  Discontinue tramadol due to the risk of lowered seizure threshold  Neurology consult placed, EEG ordered    Nephrolithiasis  Assessment & Plan  Patient stated he might have passed a stone when he urinates and might be causing painful urination  Per patient, he had history of kidney stone   UA showed no evidence of UTI  CT A/P showed simple renal cyst(s). Nonobstructing left renal nephrolithiasis. No hydronephrosis.   BMP showed normal renal function  Continue Flomax 0.4 mg daily    Hypotension  Assessment & Plan  BP currently stable   patient with history of hypotension and he was on midodrine 5 mg 3 times daily at home  Patient BP has been soft and he was not able to receive Lasix and spironolactone due to holding parameters  Patient received IV albumin once without significant improvement in BP  Increased midodrine to 10 mg 3 times daily.  Endocrinologist on board - Cortef was also increased to stress dose    Swelling of lower extremity  Assessment & Plan  Patient complains of left lower extremity swelling and tenderness  Venous duplex of bilateral lower extremities  negative for DVT  Patient had history of neuropathy and currently on gabapentin 300 mg 3 times daily  For restless leg syndrome, patient is on Requip 4 mg daily.    Volume overload  Assessment & Plan  Most likely due to liver cirrhosis   BNP WNL, CT chest showed no evidence of pulmonary congestion but pleural effusion which was tapped yesterday  Recent stress test on 7/13/2023 showed LVEF 70%, and attenuation artifact   Patient received IV Lasix 40 mg once in ED  Currently on p.o. Lasix 40 mg daily and spironolactone 100 mg daily as BP allows.      Adrenal insufficiency (HCC)  Assessment & Plan  Patient was on Cortef 10 mg p.o. every morning and 5 mg p.o. nightly  Endocrinology consult placed -appreciate recommendations. - Please increase cortef to 50mg TID for 2 days. Taper down to 50mg BID for 3 days after, then 25mg BID for 3 days after, down to home dose 10/mg after   Agreed with increasing midodrine dose.    Type 2 diabetes mellitus with obesity  (HCC)  Assessment & Plan  Lab Results   Component Value Date    HGBA1C 6.5 (H) 05/05/2024       Recent Labs     06/05/24  1104 06/05/24  1535 06/05/24  2110 06/06/24  0527   POCGLU 183* 206* 311* 237*         Blood Sugar Average: Last 72 hrs:  (P) 194.3    Placed on CCH type II diet  Added Lantus 10 units at bedtime.  Obtain Accu-Cheks before meals and at bedtime with Humalog correction dose before meals and at bedtime    Smoking  Assessment & Plan  Will give nicotine 14 mg transdermal daily    Thrombocytopenia (HCC)  Assessment & Plan  Due to cirrhosis, no evidence of bleeding  Patient is currently on Lovenox for DVT prophylaxis  Transfuse for plt <10 or <50 w/bleeding.   Monitor clinically and follow-up CBC.    Cirrhosis (HCC)  Assessment & Plan  CT chest A/P with contrast showed cirrhosis with signs of portal hypertension as evidenced by gastroesophageal varices. Stable upper abdominal lymphadenopathy presumed secondary to underlying liver pathology although  low-grade lymphoproliferative disorder is not fully excluded.Stable diffuse mesenteric edema   Gastroenterology consult appreciate recommendations.    Continue prehospital spironolactone 100 mg daily, lactulose 20 g p.o. daily  Added 2 g sodium diet             VTE Pharmacologic Prophylaxis:   Pharmacologic: Enoxaparin (Lovenox)  Mechanical VTE Prophylaxis in Place: Yes    Patient Centered Rounds: I have performed bedside rounds with nursing staff today.    Discussions with Specialists or Other Care Team Provider: Discussed with gastroenterologist    Education and Discussions with Family / Patient: Discussed with patient    Time Spent for Care: 45 minutes.  This time was spent on one or more of the following: performing physical exam; counseling and coordination of care; obtaining or reviewing history; documenting in the medical record; reviewing/ordering tests, medications or procedures; communicating with other healthcare professionals and discussing with patient's family/caregivers.    Current Length of Stay: 1 day(s)    Current Patient Status: Inpatient   Certification Statement: The patient will continue to require additional inpatient hospital stay due to seizure-like cavity, decompensated cirrhosis    Discharge Plan / Estimated Discharge Date: Pending clinical course      Code Status: Level 1 - Full Code      Subjective:   Patient was seen and examined at bedside. The patient had RRT this morning due to seizure-like cavity.  I educated the patient that tramadol needs to be discontinued due to risk of seizure.  Patient mentioned bloody stools> 10 times per day, but no witness of diarrhea while the medical assistant helps him to go to the bathroom.    Objective:     Vitals:   Temp (24hrs), Av.9 °F (36.6 °C), Min:97.1 °F (36.2 °C), Max:98.6 °F (37 °C)    Temp:  [97.1 °F (36.2 °C)-98.6 °F (37 °C)] 97.7 °F (36.5 °C)  HR:  [] 65  Resp:  [16-22] 16  BP: (101-141)/() 108/68  SpO2:  [93 %-98 %] 96  %  Body mass index is 24.58 kg/m².     Input and Output Summary (last 24 hours):       Intake/Output Summary (Last 24 hours) at 6/6/2024 1037  Last data filed at 6/5/2024 1707  Gross per 24 hour   Intake 560 ml   Output --   Net 560 ml       Physical Exam:     Physical Exam  General: no acute distress  HEENT: NC/AT, PERRL, EOM - normal  Neck: Supple  Pulm/Chest: Normal chest wall expansion, clear breath sounds on both side  CVS: normal S1&S2, capillary refill <2s  Abd: soft, non tender, non distended, bowel sounds +  MSK: move all 4 extremities spontaneously  Skin: warm, ecchymosis noted  CNS: no acute focal neuro deficit      Additional Data:     Labs:    Results from last 7 days   Lab Units 06/06/24  0558   WBC Thousand/uL 5.27   HEMOGLOBIN g/dL 9.3*   HEMATOCRIT % 26.8*   PLATELETS Thousands/uL 33*   SEGS PCT % 88*   LYMPHO PCT % 9*   MONO PCT % 2*   EOS PCT % 0     Results from last 7 days   Lab Units 06/06/24  0558   POTASSIUM mmol/L 4.5   CHLORIDE mmol/L 103   CO2 mmol/L 20*   BUN mg/dL 13   CREATININE mg/dL 0.88   CALCIUM mg/dL 8.8   ALK PHOS U/L 153*   ALT U/L 41   AST U/L 34     Results from last 7 days   Lab Units 06/03/24  1847   INR  1.49*       * I Have Reviewed All Lab Data Listed Above.  * Additional Pertinent Lab Tests Reviewed: All Labs For Current Hospital Admission Reviewed    Imaging:      I have reviewed pertinent imaging.      Recent Cultures (last 7 days):     Results from last 7 days   Lab Units 06/04/24  1244   GRAM STAIN RESULT  Rare Polys  No bacteria seen   BODY FLUID CULTURE, STERILE  No growth       Last 24 Hours Medication List:   Current Facility-Administered Medications   Medication Dose Route Frequency Provider Last Rate    dicyclomine  20 mg Oral BID before breakfast/lunch Natan Miguel PA-C      [START ON 6/7/2024] enoxaparin  40 mg Subcutaneous Q24H [facility] Rosario Whitfield MD      ferrous sulfate  325 mg Oral Daily With Breakfast Natan Miguel PA-C      furosemide  40 mg Oral Daily Rosario  MD Roseann      gabapentin  300 mg Oral TID Natan Miguel PA-C      [START ON 6/11/2024] hydrocortisone  10 mg Oral TID Rosario Whitfield MD      [START ON 6/9/2024] hydrocortisone  25 mg Oral TID Rosario Whitfield MD      hydrocortisone  50 mg Oral TID Rosario Whitfield MD      [START ON 6/7/2024] hydrocortisone  50 mg Oral BID Rosario Whitfield MD      hydrOXYzine HCL  25 mg Oral TID PRN Natan iMguel PA-C      insulin glargine  10 Units Subcutaneous HS Rosario Whitfield MD      insulin lispro  1-5 Units Subcutaneous TID AC Natan Miguel PA-C      insulin lispro  1-5 Units Subcutaneous HS Natan Miguel PA-C      lactulose  20 g Oral Daily Natan Miguel PA-C      LORazepam  1 mg Intravenous Q6H PRN Rosario Whitfield MD      midodrine  10 mg Oral TID AC Rosario Whitfield MD      nicotine  1 patch Transdermal Daily Natan Miguel PA-C      ondansetron  4 mg Intravenous Q6H PRN Natan Miguel PA-C      pantoprazole  40 mg Oral Early Morning Natan Miguel PA-C      propranolol  10 mg Oral Q12H SANDRA Natan Miguel PA-C      rOPINIRole  4 mg Oral BID Natan Miguel PA-C      senna-docusate sodium  1 tablet Oral QPM Natan Miguel PA-C      spironolactone  100 mg Oral Daily aNtan Miguel PA-C      tamsulosin  0.4 mg Oral Daily With Dinner Natan Miguel PA-C          Today, Patient Was Seen By: Rosario Whitfield MD    ** Please Note: Dragon 360 Dictation voice to text software may have been used in the creation of this document. **

## 2024-06-06 NOTE — ASSESSMENT & PLAN NOTE
Patient complains of left lower extremity swelling and tenderness  Venous duplex of bilateral lower extremities negative for DVT  Patient had history of neuropathy and currently on gabapentin 300 mg 3 times daily  For restless leg syndrome, patient is on Requip 4 mg daily.

## 2024-06-06 NOTE — ASSESSMENT & PLAN NOTE
Patient was on Cortef 10 mg p.o. every morning and 5 mg p.o. nightly  Endocrinology consult placed -appreciate recommendations. - Please increase cortef to 50mg TID for 2 days. Taper down to 50mg BID for 3 days after, then 25mg BID for 3 days after, down to home dose 10/mg after   Agreed with increasing midodrine dose.

## 2024-06-07 ENCOUNTER — APPOINTMENT (INPATIENT)
Dept: RADIOLOGY | Facility: HOSPITAL | Age: 64
DRG: 433 | End: 2024-06-07
Payer: COMMERCIAL

## 2024-06-07 PROBLEM — R07.9 CHEST PAIN: Status: ACTIVE | Noted: 2024-06-07

## 2024-06-07 LAB
2HR DELTA HS TROPONIN: 0 NG/L
AFP-TM SERPL-MCNC: 2.17 NG/ML (ref 0–9)
ANION GAP SERPL CALCULATED.3IONS-SCNC: 6 MMOL/L (ref 4–13)
ANISOCYTOSIS BLD QL SMEAR: PRESENT
ATRIAL RATE: 50 BPM
ATRIAL RATE: 51 BPM
BACTERIA SPEC BFLD CULT: NO GROWTH
BASOPHILS # BLD MANUAL: 0 THOUSAND/UL (ref 0–0.1)
BASOPHILS NFR MAR MANUAL: 0 % (ref 0–1)
BUN SERPL-MCNC: 16 MG/DL (ref 5–25)
CALCIUM SERPL-MCNC: 8.1 MG/DL (ref 8.4–10.2)
CARDIAC TROPONIN I PNL SERPL HS: 3 NG/L
CARDIAC TROPONIN I PNL SERPL HS: 3 NG/L
CHLORIDE SERPL-SCNC: 103 MMOL/L (ref 96–108)
CO2 SERPL-SCNC: 23 MMOL/L (ref 21–32)
CREAT SERPL-MCNC: 0.83 MG/DL (ref 0.6–1.3)
EOSINOPHIL # BLD MANUAL: 0 THOUSAND/UL (ref 0–0.4)
EOSINOPHIL NFR BLD MANUAL: 0 % (ref 0–6)
ERYTHROCYTE [DISTWIDTH] IN BLOOD BY AUTOMATED COUNT: 16.3 % (ref 11.6–15.1)
GFR SERPL CREATININE-BSD FRML MDRD: 93 ML/MIN/1.73SQ M
GLUCOSE SERPL-MCNC: 281 MG/DL (ref 65–140)
GLUCOSE SERPL-MCNC: 289 MG/DL (ref 65–140)
GLUCOSE SERPL-MCNC: 300 MG/DL (ref 65–140)
GLUCOSE SERPL-MCNC: 318 MG/DL (ref 65–140)
GLUCOSE SERPL-MCNC: 338 MG/DL (ref 65–140)
GRAM STN SPEC: NORMAL
GRAM STN SPEC: NORMAL
HCT VFR BLD AUTO: 29 % (ref 36.5–49.3)
HGB BLD-MCNC: 9.6 G/DL (ref 12–17)
LYMPHOCYTES # BLD AUTO: 0.36 THOUSAND/UL (ref 0.6–4.47)
LYMPHOCYTES # BLD AUTO: 7 % (ref 14–44)
MACROCYTES BLD QL AUTO: PRESENT
MCH RBC QN AUTO: 34.4 PG (ref 26.8–34.3)
MCHC RBC AUTO-ENTMCNC: 33.1 G/DL (ref 31.4–37.4)
MCV RBC AUTO: 104 FL (ref 82–98)
MONOCYTES # BLD AUTO: 0.21 THOUSAND/UL (ref 0–1.22)
MONOCYTES NFR BLD: 4 % (ref 4–12)
NEUTROPHILS # BLD MANUAL: 4.57 THOUSAND/UL (ref 1.85–7.62)
NEUTS BAND NFR BLD MANUAL: 3 % (ref 0–8)
NEUTS SEG NFR BLD AUTO: 86 % (ref 43–75)
P AXIS: -21 DEGREES
PLATELET # BLD AUTO: 30 THOUSANDS/UL (ref 149–390)
PLATELET BLD QL SMEAR: ABNORMAL
PMV BLD AUTO: 13.1 FL (ref 8.9–12.7)
POTASSIUM SERPL-SCNC: 4.4 MMOL/L (ref 3.5–5.3)
PR INTERVAL: 114 MS
PR INTERVAL: 114 MS
QRS AXIS: 62 DEGREES
QRS AXIS: 64 DEGREES
QRSD INTERVAL: 66 MS
QRSD INTERVAL: 70 MS
QT INTERVAL: 470 MS
QT INTERVAL: 476 MS
QTC INTERVAL: 433 MS
QTC INTERVAL: 433 MS
RBC # BLD AUTO: 2.79 MILLION/UL (ref 3.88–5.62)
RBC MORPH BLD: PRESENT
SODIUM SERPL-SCNC: 132 MMOL/L (ref 135–147)
T WAVE AXIS: 56 DEGREES
T WAVE AXIS: 60 DEGREES
VENTRICULAR RATE: 50 BPM
VENTRICULAR RATE: 51 BPM
WBC # BLD AUTO: 5.14 THOUSAND/UL (ref 4.31–10.16)

## 2024-06-07 PROCEDURE — 82948 REAGENT STRIP/BLOOD GLUCOSE: CPT

## 2024-06-07 PROCEDURE — 85027 COMPLETE CBC AUTOMATED: CPT | Performed by: INTERNAL MEDICINE

## 2024-06-07 PROCEDURE — 71045 X-RAY EXAM CHEST 1 VIEW: CPT

## 2024-06-07 PROCEDURE — 93005 ELECTROCARDIOGRAM TRACING: CPT

## 2024-06-07 PROCEDURE — 99232 SBSQ HOSP IP/OBS MODERATE 35: CPT | Performed by: INTERNAL MEDICINE

## 2024-06-07 PROCEDURE — 93010 ELECTROCARDIOGRAM REPORT: CPT | Performed by: INTERNAL MEDICINE

## 2024-06-07 PROCEDURE — 85007 BL SMEAR W/DIFF WBC COUNT: CPT | Performed by: INTERNAL MEDICINE

## 2024-06-07 PROCEDURE — 84484 ASSAY OF TROPONIN QUANT: CPT | Performed by: INTERNAL MEDICINE

## 2024-06-07 PROCEDURE — 82105 ALPHA-FETOPROTEIN SERUM: CPT | Performed by: PHYSICIAN ASSISTANT

## 2024-06-07 PROCEDURE — 80048 BASIC METABOLIC PNL TOTAL CA: CPT | Performed by: INTERNAL MEDICINE

## 2024-06-07 RX ORDER — HYDROCORTISONE 10 MG/1
10 TABLET ORAL 3 TIMES DAILY
Status: DISCONTINUED | OUTPATIENT
Start: 2024-06-13 | End: 2024-06-18 | Stop reason: HOSPADM

## 2024-06-07 RX ORDER — HYDROCODONE BITARTRATE AND ACETAMINOPHEN 5; 325 MG/1; MG/1
1 TABLET ORAL EVERY 6 HOURS PRN
Qty: 5 TABLET | Refills: 0 | Status: CANCELLED | OUTPATIENT
Start: 2024-06-07 | End: 2024-06-17

## 2024-06-07 RX ORDER — INSULIN GLARGINE 100 [IU]/ML
20 INJECTION, SOLUTION SUBCUTANEOUS
Status: DISCONTINUED | OUTPATIENT
Start: 2024-06-07 | End: 2024-06-08

## 2024-06-07 RX ORDER — HYDROCODONE BITARTRATE AND ACETAMINOPHEN 5; 325 MG/1; MG/1
1 TABLET ORAL EVERY 6 HOURS PRN
Status: DISCONTINUED | OUTPATIENT
Start: 2024-06-07 | End: 2024-06-13

## 2024-06-07 RX ORDER — INSULIN LISPRO 100 [IU]/ML
3 INJECTION, SOLUTION INTRAVENOUS; SUBCUTANEOUS
Status: DISCONTINUED | OUTPATIENT
Start: 2024-06-07 | End: 2024-06-08

## 2024-06-07 RX ORDER — HYDROCORTISONE 10 MG/1
25 TABLET ORAL 3 TIMES DAILY
Status: DISCONTINUED | OUTPATIENT
Start: 2024-06-10 | End: 2024-06-07

## 2024-06-07 RX ORDER — HYDROCORTISONE 10 MG/1
25 TABLET ORAL 2 TIMES DAILY
Status: DISCONTINUED | OUTPATIENT
Start: 2024-06-10 | End: 2024-06-13

## 2024-06-07 RX ADMIN — HYDROCORTISONE 50 MG: 10 TABLET ORAL at 08:15

## 2024-06-07 RX ADMIN — NICOTINE 1 PATCH: 14 PATCH, EXTENDED RELEASE TRANSDERMAL at 08:09

## 2024-06-07 RX ADMIN — LEVETIRACETAM 500 MG: 500 TABLET, FILM COATED ORAL at 08:08

## 2024-06-07 RX ADMIN — HYDROXYZINE HYDROCHLORIDE 25 MG: 25 TABLET, FILM COATED ORAL at 08:08

## 2024-06-07 RX ADMIN — MIDODRINE HYDROCHLORIDE 10 MG: 5 TABLET ORAL at 15:10

## 2024-06-07 RX ADMIN — RIFAXIMIN 550 MG: 550 TABLET ORAL at 21:46

## 2024-06-07 RX ADMIN — INSULIN LISPRO 3 UNITS: 100 INJECTION, SOLUTION INTRAVENOUS; SUBCUTANEOUS at 15:53

## 2024-06-07 RX ADMIN — TAMSULOSIN HYDROCHLORIDE 0.4 MG: 0.4 CAPSULE ORAL at 15:54

## 2024-06-07 RX ADMIN — INSULIN LISPRO 3 UNITS: 100 INJECTION, SOLUTION INTRAVENOUS; SUBCUTANEOUS at 06:05

## 2024-06-07 RX ADMIN — SENNOSIDES AND DOCUSATE SODIUM 1 TABLET: 8.6; 5 TABLET ORAL at 17:03

## 2024-06-07 RX ADMIN — LEVETIRACETAM 500 MG: 500 TABLET, FILM COATED ORAL at 21:46

## 2024-06-07 RX ADMIN — FERROUS SULFATE TAB 325 MG (65 MG ELEMENTAL FE) 325 MG: 325 (65 FE) TAB at 08:07

## 2024-06-07 RX ADMIN — ROPINIROLE HYDROCHLORIDE 4 MG: 1 TABLET, FILM COATED ORAL at 21:46

## 2024-06-07 RX ADMIN — GABAPENTIN 300 MG: 300 CAPSULE ORAL at 08:08

## 2024-06-07 RX ADMIN — HYDROCORTISONE 50 MG: 10 TABLET ORAL at 17:02

## 2024-06-07 RX ADMIN — ENOXAPARIN SODIUM 40 MG: 40 INJECTION SUBCUTANEOUS at 08:06

## 2024-06-07 RX ADMIN — ACETAMINOPHEN 650 MG: 325 TABLET ORAL at 08:08

## 2024-06-07 RX ADMIN — DICYCLOMINE HYDROCHLORIDE 20 MG: 20 TABLET ORAL at 08:07

## 2024-06-07 RX ADMIN — RIFAXIMIN 550 MG: 550 TABLET ORAL at 08:07

## 2024-06-07 RX ADMIN — INSULIN GLARGINE 20 UNITS: 100 INJECTION, SOLUTION SUBCUTANEOUS at 21:47

## 2024-06-07 RX ADMIN — MIDODRINE HYDROCHLORIDE 10 MG: 5 TABLET ORAL at 10:47

## 2024-06-07 RX ADMIN — ROPINIROLE HYDROCHLORIDE 4 MG: 1 TABLET, FILM COATED ORAL at 08:07

## 2024-06-07 RX ADMIN — INSULIN LISPRO 3 UNITS: 100 INJECTION, SOLUTION INTRAVENOUS; SUBCUTANEOUS at 10:49

## 2024-06-07 RX ADMIN — GABAPENTIN 300 MG: 300 CAPSULE ORAL at 15:10

## 2024-06-07 RX ADMIN — INSULIN LISPRO 4 UNITS: 100 INJECTION, SOLUTION INTRAVENOUS; SUBCUTANEOUS at 21:47

## 2024-06-07 RX ADMIN — MIDODRINE HYDROCHLORIDE 10 MG: 5 TABLET ORAL at 08:07

## 2024-06-07 RX ADMIN — INSULIN LISPRO 3 UNITS: 100 INJECTION, SOLUTION INTRAVENOUS; SUBCUTANEOUS at 11:43

## 2024-06-07 RX ADMIN — GABAPENTIN 300 MG: 300 CAPSULE ORAL at 21:46

## 2024-06-07 RX ADMIN — DICYCLOMINE HYDROCHLORIDE 20 MG: 20 TABLET ORAL at 10:47

## 2024-06-07 RX ADMIN — PANTOPRAZOLE SODIUM 40 MG: 40 TABLET, DELAYED RELEASE ORAL at 06:01

## 2024-06-07 RX ADMIN — INSULIN LISPRO 3 UNITS: 100 INJECTION, SOLUTION INTRAVENOUS; SUBCUTANEOUS at 08:17

## 2024-06-07 NOTE — PLAN OF CARE

## 2024-06-07 NOTE — CONSULTS
Consultation - Neuropsychology/Psychology Department  Foreign Armando 63 y.o. male MRN: [id number]  Unit/Bed#: 7T [facility] 705-01 Encounter: [id number]        Reason for Consultation:  Foreign Armando is a 63 y.o. year old male who was referred for a Neuropsychological Exam to assess cognitive functioning and comment on capacity to make informed medical decisions.      History of Present Illness  Abdominal pain, leg pain, leg swelling  Physician Requesting Consult: Rosario Whitfield MD    PROBLEM LIST:  Patient Active Problem List   Diagnosis    Cirrhosis (HCC)    Thrombocytopenia (HCC)    Restless leg syndrome    Smoking    Type 2 diabetes mellitus with obesity  (HCC)    Abdominal pain    Pancytopenia (HCC)    Paranoia (HCC)    History of colon cancer    Abnormal CT scan    Adrenal insufficiency (HCC)    Volume overload    Pleural effusion on right    Swelling of lower extremity    Hypotension    Nephrolithiasis    Seizure-like activity (HCC)    Chest pain         Historical Information   Past Medical History:   Diagnosis Date    CHF (congestive heart failure) (HCC)     Cirrhosis (HCC)     CHRISTIE    Colon cancer (HCC)     Diabetes mellitus (HCC)     Neuropathy     Restless leg syndrome      Past Surgical History:   Procedure Laterality Date    EGD AND SIGMOIDOSCOPY FLEXIBLE      IR PARACENTESIS  11/22/2022    IR PARACENTESIS  2/2/2023    IR THORACENTESIS  6/4/2024    LEFT COLON RESECTION      LITHOTRIPSY       Social History   Social History     Substance and Sexual Activity   Alcohol Use Not Currently     Social History     Substance and Sexual Activity   Drug Use Never     Social History     Tobacco Use   Smoking Status Every Day    Current packs/day: 0.25    Types: Cigarettes   Smokeless Tobacco Never     Family History:   Family History   Problem Relation Age of Onset    Diabetes Mother     Liver disease Mother     Diabetes Father     Bone cancer Father        Meds/Allergies   current meds:   Current Facility-Administered  Medications   Medication Dose Route Frequency    dicyclomine (BENTYL) tablet 20 mg  20 mg Oral BID before breakfast/lunch    enoxaparin (LOVENOX) subcutaneous injection 40 mg  40 mg Subcutaneous Q24H SANDRA    ferrous sulfate tablet 325 mg  325 mg Oral Daily With Breakfast    furosemide (LASIX) tablet 40 mg  40 mg Oral Daily    gabapentin (NEURONTIN) capsule 300 mg  300 mg Oral TID    HYDROcodone-acetaminophen (NORCO) 5-325 mg per tablet 1 tablet  1 tablet Oral Q6H PRN    [START ON 6/13/2024] hydrocortisone (CORTEF) tablet 10 mg  10 mg Oral TID    [START ON 6/10/2024] hydrocortisone (CORTEF) tablet 25 mg  25 mg Oral BID    hydrocortisone (CORTEF) tablet 50 mg  50 mg Oral BID    hydrOXYzine HCL (ATARAX) tablet 25 mg  25 mg Oral TID PRN    insulin glargine (LANTUS) subcutaneous injection 20 Units 0.2 mL  20 Units Subcutaneous HS    insulin lispro (HumALOG/ADMELOG) 100 units/mL subcutaneous injection 1-5 Units  1-5 Units Subcutaneous TID AC    insulin lispro (HumALOG/ADMELOG) 100 units/mL subcutaneous injection 1-5 Units  1-5 Units Subcutaneous HS    insulin lispro (HumALOG/ADMELOG) 100 units/mL subcutaneous injection 3 Units  3 Units Subcutaneous TID With Meals    levETIRAcetam (KEPPRA) tablet 500 mg  500 mg Oral Q12H SANDRA    LORazepam (ATIVAN) injection 1 mg  1 mg Intravenous Q6H PRN    midodrine (PROAMATINE) tablet 10 mg  10 mg Oral TID AC    nicotine (NICODERM CQ) 14 mg/24hr TD 24 hr patch 1 patch  1 patch Transdermal Daily    ondansetron (ZOFRAN) injection 4 mg  4 mg Intravenous Q6H PRN    pantoprazole (PROTONIX) EC tablet 40 mg  40 mg Oral Early Morning    propranolol (INDERAL) tablet 10 mg  10 mg Oral Q12H SANDRA    rifaximin (XIFAXAN) tablet 550 mg  550 mg Oral Q12H SANDRA    rOPINIRole (REQUIP) tablet 4 mg  4 mg Oral BID    senna-docusate sodium (SENOKOT S) 8.6-50 mg per tablet 1 tablet  1 tablet Oral QPM    spironolactone (ALDACTONE) tablet 100 mg  100 mg Oral Daily    tamsulosin (FLOMAX) capsule 0.4 mg  0.4 mg Oral  "Daily With Dinner       Allergies   Allergen Reactions    Morphine Anaphylaxis    Morpholine Salicylate Other (See Comments)     \"I go unconscious\"    Clarithromycin Other (See Comments)     Dizzy, nausea, ulcers in stomach    Oxycodone Hives and Facial Swelling     \"Scratchy throat\"      Sulfamethoxazole-Trimethoprim Hives    Nsaids Rash         Family and Social Support:   No data recorded    Behavioral Observations: Patient was alert, oriented except for day/week and day/month; patient admitted to depressed mood and anxiety; appeared aware of reason for hospitalization and provided some medical history; staff reports episodes of confused thought process; questions had to be frequently repeated    Cognitive Examination    General Cognitive Functioning MMSE =Deficits in recall and working memory    Attention/Concentration Auditory Selective Attention = Impaired; Auditory Vigilance = Within Normal Limits; Information Processing Speed = Impaired    Frontal Systems/Executive Functioning Mental Flexibility/Cognitive Control = Impaired; Working Memory = Impaired Abstract Reasoning = Impaired;  Generative Ability = Impaired,     Language Functioning , Phonemic Fluency = Impaired; Semantic Retrieval = Impaired; Comprehension of Complex Ideational Material = Impaired;     Memory Functioning Narrative Recall - Short Delay = Impaired; Long Delay Narrative Recall = Impaired; Three word recall = Impaired    Visuo-Spatial Abilities Not Assessed    Functional Knowledge  Health & Safety Knowledge = Impaired;     Summary/Impression:  Results of Neuropsychological Exam revealed diffuse cognitive dysfunction and on a measure assessing awareness of personal health status and ability to evaluate health problems, handle medical emergencies and take safety precautions, patient performed in the IMPAIRED range of functioning. Staff reports episodes of confused thought process. Patient admitted to depressed mood and anxiety. During this " encounter, patient does not appear to have capacity to make fully informed medical decisions.

## 2024-06-07 NOTE — CASE MANAGEMENT
Case Management Progress Note    Patient name Foreign Armando  Location 7T U 705/7T U 705-01 MRN [id number]  : 1960 Date 2024       LOS (days): 2  Geometric Mean LOS (GMLOS) (days): 3.3  Days to GMLOS:1.1        OBJECTIVE:        Current admission status: Inpatient  Preferred Pharmacy:   [id number] - [place] - [address]  [address] [place] [address]  Phone: [phone] Fax: [phone]    [facility]/pharmacy #[id number] - [place], [place] - [address]  [address] [address]  Phone: [phone] Fax: [phone]    [facility]/pharmacy #[id number] - [place] - [address]  [address]  [place] [address]  Phone: [phone] Fax: [phone]    [id number] - [place] - [email or web address]  [email or web address] [place] [address]  Phone: [phone] Fax: [phone]    Primary Care Provider: No primary care provider on file.    Primary Insurance: BLUE CROSS  Secondary Insurance:     PROGRESS NOTE:    Met with patient at bedside to discuss discharge and disposition.  Unable to understand speech at times.  Patient upset that medical team needs to report seizure to DM.  Patient has only an ID in chart from [place] and it is .   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  Asked for Sanna's phone number and it is [phone] [phone].  Asked patient home address he states [employer].  Explained that CM needed physical address for transport home and he said where RV is at [employer].  Per chart review from previous admission RV [facility].    Call To Sanna [phone] x [phone].  Sanna is from [facility].  Patient well known to her.  Patient can not return to shelter.  Patient had been in shelter he actually had a room and has left facility 5 times after being in a housing program.  Sanna has taken patient and got him a new cell phone and  help patient open a bank account at [facility] in [place].  All patients banking and paerwork is at [facility] patient just left facility 14 days ago.  Sanna reports so called friends have taken all patient's monies and also have exhausted all his savings.  Patient receives about $3000.00 per month.    Numerous calls placed to emergency contact kimani wolfe (Significant Other)  [phone] (Mobile)  and left  requesting CB.  Had patient attempt to call on his cell and no answer.    Per chart review and info from Sanna patient reports he stays in  and is homeless.  Known to [facility].    Patient reports he has 2 sons in [place] but has had no contact.  Found emergency contact Lois More [phone].  Talked to Lois she is patients friend and will try to contact patients SO Kimani.    CM following thru discharge.  Discussed with TONG Whitfield and updated on inability to tell CM address and lack of housing and in-sight on diseases.

## 2024-06-07 NOTE — ASSESSMENT & PLAN NOTE
Lab Results   Component Value Date    HGBA1C 6.5 (H) 05/05/2024       Recent Labs     06/06/24  1524 06/06/24  2019 06/07/24  0542 06/07/24  1042   POCGLU 299* 279* 300* 318*         Blood Sugar Average: Last 72 hrs:  (P) [id number]    Placed on CCH type II diet  Added Lantus 10 units at bedtime.  Obtain Accu-Cheks before meals and at bedtime with Humalog correction dose before meals and at bedtime  Increase Lantus to 20 units at bedtime, added lispro 3 unit 3 times daily AC  If blood sugars more than 200 tomorrow, patient will need to be discharged on Lantus 5 to 10 units depends on his sugar level.

## 2024-06-07 NOTE — DISCHARGE INSTR - AVS FIRST PAGE
Discharge instructions from hospitalist  1. Follow-up with your primary care physician in 1 week in regards to recent hospitalization.    Recommended to get bone density scan as an outpatient.    Follow-up with outpatient gastroenterologist in 1 to 2 weeks.    2. Take medications regularly    3. Come back to the ER if symptoms recur or worsen  4. Activity as tolerated  5. Diet :  Heart healthy diet

## 2024-06-07 NOTE — PROGRESS NOTES
"Patient Name: Foreign Armando  Patient MRN: [id number]  Date: 06/07/24  Service: [facility]    Subjective   No events overnight. Vitals stable. States diarrhea has improved. Denied having abdominal pain but was tender on exam. States pain is worse with breathing. No bleeding reported.     Vitals  Blood pressure 106/60, pulse 61, temperature (!) 97.1 °F (36.2 °C), temperature source Temporal, resp. rate 16, height 5' 7.01\" (1.702 m), weight 71.2 kg (156 lb 15.5 oz), SpO2 94%.  Physical Exam:     General Appearance:    Awake, alert, oriented x3, no distress, well developed, well    nourished   Head:    Normocephalic without obvious abnormality   Eyes:    PERRL, conjunctiva/corneas clear, EOM's intact        Neck:   Supple, no adenopathy   Throat:   Mucous membranes moist   Lungs:     Clear to auscultation bilaterally, no wheezing or rhonchi   Heart:    Regular rate and rhythm, S1 and S2 normal, no murmur   Abdomen:     Soft, diffusely ttp (> in RUQ), non-distended. bowel sounds active. No masses, rebound +guarding.    Extremities:   Extremities without edema   Psych  Derm:   Normal affect    No jaundice   Neurologic:   CNII-XII grossly intact. Speech intact. No asterixis         Laboratory Studies  Results from last 7 days   Lab Units 06/07/24  0445 06/06/24  0558 06/05/24  0609 06/04/24  0759 06/03/24  1847   WBC Thousand/uL 5.14 5.27 3.14* 3.25* 5.02   HEMOGLOBIN g/dL 9.6* 9.3* 9.1* 8.8* 9.6*   HEMATOCRIT % 29.0* 26.8* 25.9* 26.4* 28.7*   PLATELETS Thousands/uL 30* 33* 32* 37* 42*   INR   --   --   --   --  1.49*     Results from last 7 days   Lab Units 06/07/24  0445 06/06/24  0558 06/05/24  0609 06/04/24  0759 06/03/24  1847   SODIUM mmol/L 132* 134* 139 136 135   POTASSIUM mmol/L 4.4 4.5 3.6 3.1* 3.0*   CHLORIDE mmol/L 103 103 107 103 106   CO2 mmol/L 23 20* 26 25 19*   BUN mg/dL 16 13 13 11 11   CREATININE mg/dL 0.83 0.88 0.81 0.74 0.87   CALCIUM mg/dL 8.1* 8.8 8.1* 7.8* 8.0*   ALBUMIN g/dL  " --  3.1*  --   --  2.7*   TOTAL BILIRUBIN mg/dL  --  2.28*  --   --  2.18*   ALK PHOS U/L  --  153*  --   --  135*   ALT U/L  --  41  --   --  35   AST U/L  --  34  --   --  31         Imaging and Other Studies      Inhouse Medications     Current Facility-Administered Medications:     acetaminophen (TYLENOL) tablet 650 mg, 650 mg, Oral, Q6H PRN, 650 mg at 06/07/24 0808    dicyclomine (BENTYL) tablet 20 mg, 20 mg, Oral, BID before breakfast/lunch, 20 mg at 06/07/24 0807    enoxaparin (LOVENOX) subcutaneous injection 40 mg, 40 mg, Subcutaneous, Q24H SANDRA, 40 mg at 06/07/24 0806    ferrous sulfate tablet 325 mg, 325 mg, Oral, Daily With Breakfast, 325 mg at 06/07/24 0807    furosemide (LASIX) tablet 40 mg, 40 mg, Oral, Daily    gabapentin (NEURONTIN) capsule 300 mg, 300 mg, Oral, TID, 300 mg at 06/07/24 0808    [START ON 6/11/2024] hydrocortisone (CORTEF) tablet 10 mg, 10 mg, Oral, TID    [START ON 6/9/2024] hydrocortisone (CORTEF) tablet 25 mg, 25 mg, Oral, TID    hydrocortisone (CORTEF) tablet 50 mg, 50 mg, Oral, BID    hydrOXYzine HCL (ATARAX) tablet 25 mg, 25 mg, Oral, TID PRN, 25 mg at 06/07/24 0808    insulin glargine (LANTUS) subcutaneous injection 20 Units 0.2 mL, 20 Units, Subcutaneous, HS    insulin lispro (HumALOG/ADMELOG) 100 units/mL subcutaneous injection 1-5 Units, 1-5 Units, Subcutaneous, TID AC, 3 Units at 06/07/24 0605 **AND** Fingerstick Glucose (POCT), , , TID AC    insulin lispro (HumALOG/ADMELOG) 100 units/mL subcutaneous injection 1-5 Units, 1-5 Units, Subcutaneous, HS, 3 Units at 06/06/24 2201    insulin lispro (HumALOG/ADMELOG) 100 units/mL subcutaneous injection 3 Units, 3 Units, Subcutaneous, TID With Meals, 3 Units at 06/07/24 0817    levETIRAcetam (KEPPRA) tablet 500 mg, 500 mg, Oral, Q12H SANDRA, 500 mg at 06/07/24 0808    LORazepam (ATIVAN) injection 1 mg, 1 mg, Intravenous, Q6H PRN    midodrine (PROAMATINE) tablet 10 mg, 10 mg, Oral, TID AC, 10 mg at 06/07/24 0807    nicotine (NICODERM CQ)  14 mg/24hr TD 24 hr patch 1 patch, 1 patch, Transdermal, Daily, 1 patch at 06/07/24 0809    ondansetron (ZOFRAN) injection 4 mg, 4 mg, Intravenous, Q6H PRN    pantoprazole (PROTONIX) EC tablet 40 mg, 40 mg, Oral, Early Morning, 40 mg at 06/07/24 0601    propranolol (INDERAL) tablet 10 mg, 10 mg, Oral, Q12H SANDRA    rifaximin (XIFAXAN) tablet 550 mg, 550 mg, Oral, Q12H SANDRA, 550 mg at 06/07/24 0807    rOPINIRole (REQUIP) tablet 4 mg, 4 mg, Oral, BID, 4 mg at 06/07/24 0807    senna-docusate sodium (SENOKOT S) 8.6-50 mg per tablet 1 tablet, 1 tablet, Oral, QPM, 1 tablet at 06/06/24 1704    spironolactone (ALDACTONE) tablet 100 mg, 100 mg, Oral, Daily    tamsulosin (FLOMAX) capsule 0.4 mg, 0.4 mg, Oral, Daily With Dinner, 0.4 mg at 06/06/24 1620      Assessment/Plan:    Decompensated cirrhosis complicated by ascites, presumed hepatic hydrothorax, h/o hepatic encephalopathy, varices noted on imaging only, pancytopenia, coagulopathy.    MELD-Na = 17 (using INR from 6/3)  MELD = 14  Admitted with volume overload, suspected hepatic hydrothorax. CT imaging suggested mild ascites, stable diffuse mesenteric edema, moderate to large sized R pleural effusion s/p 600cc tap. Currently on PO lasix 40mg daily, spironolactone 100mg daily as BP allows. Continue 2g Na diet. Paracentesis as needed, no need at this time. Increased R-sided pain this morning, consider repeat imaging- will d/w SLIM.  Hepatic encephalopathy: currently controlled. Ammonia 37; no clinical HE. Holding lactulose to see if stools improve. Xifaxan in place of lactulose to prevent HE. Monitor clinically.   Esophageal varices: Last EGD 2/2023 no varices seen; CT imaging shows extensive gastroesophageal varices. Will need surveillance EGD. Currently on propranolol 10mg daily. Defer EGD at this time given possible seizure 6/6, await Neuro clearance.   Labs shows chronic pancytopenia. Transfuse for hgb <7 and plt <10 or <50 w/bleeding.   Hepatoma screening: No masses seen  on imaging this admission. Check AFP.   Colon Cancer screening: Due for repeat colonoscopy with 2 day prep given history of colon cancer and inadequate prep in 2/2023. Patient has described seeing blood in loose stool; suspect outlet bleed. No endoscopy at this time 2/2 suspected seizure 6/6, await Neuro clearance. Doubt infectious etiology of loose stool given no leukocytosis, afebrile but could consider C diff given recent antibiotics. Fecal calprotectin pending (doubt IBD).   May benefit from bone density study in setting of cirrhosis and chronic steroid use.   1.3 cm pancreatic neck hypodensity on CT imaging, appears to be noted on previous imaging. Past imaging also suggesting chronic pancreatitis. Suggest further imaging with pancreatic protocol as outpatient. MRI Abd not available at [facility]. Check pancreatic elastase given reports of copious diarrhea as outpatient rule out EPI.   Chronic stable abdominal lymphadenopathy presumed related to liver disease but low-grade lymphoproliferative disorder suggested by Radiologist.           Sarah Barnett PA-C

## 2024-06-07 NOTE — ASSESSMENT & PLAN NOTE
Patient complains of chest pain this morning  He said it was started during his seizure  EKG showed no acute ST-T changes  Follow troponins

## 2024-06-07 NOTE — ASSESSMENT & PLAN NOTE
Patient presented with shortness of breath   BNP WNL, CT chest A/P show right pleural effusion, no evidence of pulmonary congestion   s/p thoracentesis on 6/4/2024 with 600 cc clear yellow fluid  Pleural fluid analysis show LDH 62, total protein 7.6  Plasma total protein is 7.1  Per light's criteria, pleural fluid is transudative  Cytology was negative for malignancy

## 2024-06-07 NOTE — ASSESSMENT & PLAN NOTE
Patient had RRT for seizure-like activity this morning  Patient had similar episode when he was in [facility] in 10/6/23 -CT brain, CTA head and neck, MRI brain and EEG was negative at that time  Discontinue tramadol due to the risk of lowered seizure threshold  Neurology consult -since patient was previously on Keppra 500 mg twice daily continue home dose.  Reported to DMV.

## 2024-06-07 NOTE — PLAN OF CARE
Problem: Potential for Falls  Goal: Patient will remain free of falls  Description: INTERVENTIONS:  - Educate patient/family on patient safety including physical limitations  - Instruct patient to call for assistance with activity   - Consult OT/PT to assist with strengthening/mobility   - Keep Call bell within reach  - Keep bed low and locked with side rails adjusted as appropriate  - Keep care items and personal belongings within reach  - Initiate and maintain comfort rounds  - Make Fall Risk Sign visible to staff  - Offer Toileting every 2 Hours, in advance of need  - Initiate/Maintain bed alarm  - Obtain necessary fall risk management equipment: bed alarm  - Apply yellow socks and bracelet for high fall risk patients  - Consider moving patient to room near nurses station  Outcome: Progressing     Problem: Prexisting or High Potential for Compromised Skin Integrity  Goal: Skin integrity is maintained or improved  Description: INTERVENTIONS:  - Identify patients at risk for skin breakdown  - Assess and monitor skin integrity  - Assess and monitor nutrition and hydration status  - Monitor labs   - Assess for incontinence   - Turn and reposition patient  - Assist with mobility/ambulation  - Relieve pressure over bony prominences  - Avoid friction and shearing  - Provide appropriate hygiene as needed including keeping skin clean and dry  - Evaluate need for skin moisturizer/barrier cream  - Collaborate with interdisciplinary team   - Patient/family teaching  - Consider wound care consult   Outcome: Progressing     Problem: PAIN - ADULT  Goal: Verbalizes/displays adequate comfort level or baseline comfort level  Description: Interventions:  - Encourage patient to monitor pain and request assistance  - Assess pain using appropriate pain scale  - Administer analgesics based on type and severity of pain and evaluate response  - Implement non-pharmacological measures as appropriate and evaluate response  - Consider  cultural and social influences on pain and pain management  - Notify physician/advanced practitioner if interventions unsuccessful or patient reports new pain  Outcome: Progressing     Problem: INFECTION - ADULT  Goal: Absence or prevention of progression during hospitalization  Description: INTERVENTIONS:  - Assess and monitor for signs and symptoms of infection  - Monitor lab/diagnostic results  - Monitor all insertion sites, i.e. indwelling lines, tubes, and drains  - Monitor endotracheal if appropriate and nasal secretions for changes in amount and color  - [place] appropriate cooling/warming therapies per order  - Administer medications as ordered  - Instruct and encourage patient and family to use good hand hygiene technique  - Identify and instruct in appropriate isolation precautions for identified infection/condition  Outcome: Progressing  Goal: Absence of fever/infection during neutropenic period  Description: INTERVENTIONS:  - Monitor WBC    Outcome: Progressing     Problem: SAFETY ADULT  Goal: Patient will remain free of falls  Description: INTERVENTIONS:  - Educate patient/family on patient safety including physical limitations  - Instruct patient to call for assistance with activity   - Consult OT/PT to assist with strengthening/mobility   - Keep Call bell within reach  - Keep bed low and locked with side rails adjusted as appropriate  - Keep care items and personal belongings within reach  - Initiate and maintain comfort rounds  - Make Fall Risk Sign visible to staff  - Offer Toileting every 2 Hours, in advance of need  - Initiate/Maintain bed alarm  - Obtain necessary fall risk management equipment: bed alarm  - Apply yellow socks and bracelet for high fall risk patients  - Consider moving patient to room near nurses station  Outcome: Progressing  Goal: Maintain or return to baseline ADL function  Description: INTERVENTIONS:  -  Assess patient's ability to carry out ADLs; assess patient's baseline  for ADL function and identify physical deficits which impact ability to perform ADLs (bathing, care of mouth/teeth, toileting, grooming, dressing, etc.)  - Assess/evaluate cause of self-care deficits   - Assess range of motion  - Assess patient's mobility; develop plan if impaired  - Assess patient's need for assistive devices and provide as appropriate  - Encourage maximum independence but intervene and supervise when necessary  - Involve family in performance of ADLs  - Assess for home care needs following discharge   - Consider OT consult to assist with ADL evaluation and planning for discharge  - Provide patient education as appropriate  Outcome: Progressing  Goal: Maintains/Returns to pre admission functional level  Description: INTERVENTIONS:  - Perform AM-PAC 6 Click Basic Mobility/ Daily Activity assessment daily.  - Set and communicate daily mobility goal to care team and patient/family/caregiver.   - Collaborate with rehabilitation services on mobility goals if consulted  - Perform Range of Motion 2 times a day.  - Reposition patient every 2 hours.  - Dangle patient 2 times a day  - Stand patient 2 times a day  - Ambulate patient 2 times a day  - Out of bed to chair 2 times a day   - Out of bed for meals 2 times a day  - Out of bed for toileting  - Record patient progress and toleration of activity level   Outcome: Progressing     Problem: DISCHARGE PLANNING  Goal: Discharge to home or other facility with appropriate resources  Description: INTERVENTIONS:  - Identify barriers to discharge w/patient and caregiver  - Arrange for needed discharge resources and transportation as appropriate  - Identify discharge learning needs (meds, wound care, etc.)  - Arrange for interpretive services to assist at discharge as needed  - Refer to Case Management Department for coordinating discharge planning if the patient needs post-hospital services based on physician/advanced practitioner order or complex needs related to  functional status, cognitive ability, or social support system  Outcome: Progressing     Problem: Knowledge Deficit  Goal: Patient/family/caregiver demonstrates understanding of disease process, treatment plan, medications, and discharge instructions  Description: Complete learning assessment and assess knowledge base.  Interventions:  - Provide teaching at level of understanding  - Provide teaching via preferred learning methods  Outcome: Progressing

## 2024-06-08 LAB
ANION GAP SERPL CALCULATED.3IONS-SCNC: 7 MMOL/L (ref 4–13)
BASOPHILS # BLD AUTO: 0 THOUSANDS/ÂΜL (ref 0–0.1)
BASOPHILS NFR BLD AUTO: 0 % (ref 0–1)
BUN SERPL-MCNC: 22 MG/DL (ref 5–25)
CALCIUM SERPL-MCNC: 8.2 MG/DL (ref 8.4–10.2)
CHLORIDE SERPL-SCNC: 105 MMOL/L (ref 96–108)
CO2 SERPL-SCNC: 23 MMOL/L (ref 21–32)
CREAT SERPL-MCNC: 0.91 MG/DL (ref 0.6–1.3)
EOSINOPHIL # BLD AUTO: 0.01 THOUSAND/ÂΜL (ref 0–0.61)
EOSINOPHIL NFR BLD AUTO: 0 % (ref 0–6)
ERYTHROCYTE [DISTWIDTH] IN BLOOD BY AUTOMATED COUNT: 16.7 % (ref 11.6–15.1)
GFR SERPL CREATININE-BSD FRML MDRD: 89 ML/MIN/1.73SQ M
GLUCOSE SERPL-MCNC: 252 MG/DL (ref 65–140)
GLUCOSE SERPL-MCNC: 265 MG/DL (ref 65–140)
GLUCOSE SERPL-MCNC: 272 MG/DL (ref 65–140)
GLUCOSE SERPL-MCNC: 304 MG/DL (ref 65–140)
GLUCOSE SERPL-MCNC: 332 MG/DL (ref 65–140)
HCT VFR BLD AUTO: 27.3 % (ref 36.5–49.3)
HGB BLD-MCNC: 9.4 G/DL (ref 12–17)
IMM GRANULOCYTES # BLD AUTO: 0.02 THOUSAND/UL (ref 0–0.2)
IMM GRANULOCYTES NFR BLD AUTO: 0 % (ref 0–2)
LYMPHOCYTES # BLD AUTO: 0.36 THOUSANDS/ÂΜL (ref 0.6–4.47)
LYMPHOCYTES NFR BLD AUTO: 8 % (ref 14–44)
MCH RBC QN AUTO: 34.9 PG (ref 26.8–34.3)
MCHC RBC AUTO-ENTMCNC: 34.4 G/DL (ref 31.4–37.4)
MCV RBC AUTO: 102 FL (ref 82–98)
MONOCYTES # BLD AUTO: 0.2 THOUSAND/ÂΜL (ref 0.17–1.22)
MONOCYTES NFR BLD AUTO: 4 % (ref 4–12)
NEUTROPHILS # BLD AUTO: 4.04 THOUSANDS/ÂΜL (ref 1.85–7.62)
NEUTS SEG NFR BLD AUTO: 88 % (ref 43–75)
NRBC BLD AUTO-RTO: 0 /100 WBCS
PLATELET # BLD AUTO: 27 THOUSANDS/UL (ref 149–390)
PMV BLD AUTO: 12.6 FL (ref 8.9–12.7)
POTASSIUM SERPL-SCNC: 4.2 MMOL/L (ref 3.5–5.3)
RBC # BLD AUTO: 2.69 MILLION/UL (ref 3.88–5.62)
SODIUM SERPL-SCNC: 135 MMOL/L (ref 135–147)
WBC # BLD AUTO: 4.63 THOUSAND/UL (ref 4.31–10.16)

## 2024-06-08 PROCEDURE — 80048 BASIC METABOLIC PNL TOTAL CA: CPT | Performed by: INTERNAL MEDICINE

## 2024-06-08 PROCEDURE — 99232 SBSQ HOSP IP/OBS MODERATE 35: CPT | Performed by: INTERNAL MEDICINE

## 2024-06-08 PROCEDURE — 82948 REAGENT STRIP/BLOOD GLUCOSE: CPT

## 2024-06-08 PROCEDURE — 85025 COMPLETE CBC W/AUTO DIFF WBC: CPT | Performed by: INTERNAL MEDICINE

## 2024-06-08 RX ORDER — INSULIN LISPRO 100 [IU]/ML
7 INJECTION, SOLUTION INTRAVENOUS; SUBCUTANEOUS
Status: DISCONTINUED | OUTPATIENT
Start: 2024-06-08 | End: 2024-06-09

## 2024-06-08 RX ORDER — INSULIN GLARGINE 100 [IU]/ML
25 INJECTION, SOLUTION SUBCUTANEOUS
Status: DISCONTINUED | OUTPATIENT
Start: 2024-06-08 | End: 2024-06-09

## 2024-06-08 RX ORDER — ACETAMINOPHEN 10 MG/ML
1000 INJECTION, SOLUTION INTRAVENOUS ONCE
Status: COMPLETED | OUTPATIENT
Start: 2024-06-08 | End: 2024-06-08

## 2024-06-08 RX ADMIN — GABAPENTIN 300 MG: 300 CAPSULE ORAL at 21:11

## 2024-06-08 RX ADMIN — HYDROCORTISONE 50 MG: 10 TABLET ORAL at 18:06

## 2024-06-08 RX ADMIN — INSULIN GLARGINE 25 UNITS: 100 INJECTION, SOLUTION SUBCUTANEOUS at 21:11

## 2024-06-08 RX ADMIN — ROPINIROLE HYDROCHLORIDE 4 MG: 1 TABLET, FILM COATED ORAL at 21:11

## 2024-06-08 RX ADMIN — GABAPENTIN 300 MG: 300 CAPSULE ORAL at 09:14

## 2024-06-08 RX ADMIN — RIFAXIMIN 550 MG: 550 TABLET ORAL at 21:12

## 2024-06-08 RX ADMIN — HYDROCODONE BITARTRATE AND ACETAMINOPHEN 1 TABLET: 5; 325 TABLET ORAL at 20:34

## 2024-06-08 RX ADMIN — NICOTINE 1 PATCH: 14 PATCH, EXTENDED RELEASE TRANSDERMAL at 09:13

## 2024-06-08 RX ADMIN — LEVETIRACETAM 500 MG: 500 TABLET, FILM COATED ORAL at 09:14

## 2024-06-08 RX ADMIN — INSULIN LISPRO 7 UNITS: 100 INJECTION, SOLUTION INTRAVENOUS; SUBCUTANEOUS at 11:30

## 2024-06-08 RX ADMIN — TAMSULOSIN HYDROCHLORIDE 0.4 MG: 0.4 CAPSULE ORAL at 16:01

## 2024-06-08 RX ADMIN — ENOXAPARIN SODIUM 40 MG: 40 INJECTION SUBCUTANEOUS at 09:16

## 2024-06-08 RX ADMIN — PANTOPRAZOLE SODIUM 40 MG: 40 TABLET, DELAYED RELEASE ORAL at 06:14

## 2024-06-08 RX ADMIN — INSULIN LISPRO 4 UNITS: 100 INJECTION, SOLUTION INTRAVENOUS; SUBCUTANEOUS at 16:01

## 2024-06-08 RX ADMIN — INSULIN LISPRO 7 UNITS: 100 INJECTION, SOLUTION INTRAVENOUS; SUBCUTANEOUS at 16:01

## 2024-06-08 RX ADMIN — MIDODRINE HYDROCHLORIDE 10 MG: 5 TABLET ORAL at 16:01

## 2024-06-08 RX ADMIN — FUROSEMIDE 40 MG: 40 TABLET ORAL at 09:12

## 2024-06-08 RX ADMIN — ROPINIROLE HYDROCHLORIDE 4 MG: 1 TABLET, FILM COATED ORAL at 09:12

## 2024-06-08 RX ADMIN — RIFAXIMIN 550 MG: 550 TABLET ORAL at 09:13

## 2024-06-08 RX ADMIN — MIDODRINE HYDROCHLORIDE 10 MG: 5 TABLET ORAL at 11:31

## 2024-06-08 RX ADMIN — DICYCLOMINE HYDROCHLORIDE 20 MG: 20 TABLET ORAL at 11:31

## 2024-06-08 RX ADMIN — FERROUS SULFATE TAB 325 MG (65 MG ELEMENTAL FE) 325 MG: 325 (65 FE) TAB at 09:14

## 2024-06-08 RX ADMIN — SENNOSIDES AND DOCUSATE SODIUM 1 TABLET: 8.6; 5 TABLET ORAL at 18:06

## 2024-06-08 RX ADMIN — INSULIN LISPRO 3 UNITS: 100 INJECTION, SOLUTION INTRAVENOUS; SUBCUTANEOUS at 11:29

## 2024-06-08 RX ADMIN — INSULIN LISPRO 3 UNITS: 100 INJECTION, SOLUTION INTRAVENOUS; SUBCUTANEOUS at 09:16

## 2024-06-08 RX ADMIN — MIDODRINE HYDROCHLORIDE 10 MG: 5 TABLET ORAL at 06:14

## 2024-06-08 RX ADMIN — LEVETIRACETAM 500 MG: 500 TABLET, FILM COATED ORAL at 21:11

## 2024-06-08 RX ADMIN — HYDROCORTISONE 50 MG: 10 TABLET ORAL at 09:11

## 2024-06-08 RX ADMIN — INSULIN LISPRO 1 UNITS: 100 INJECTION, SOLUTION INTRAVENOUS; SUBCUTANEOUS at 21:12

## 2024-06-08 RX ADMIN — DICYCLOMINE HYDROCHLORIDE 20 MG: 20 TABLET ORAL at 06:14

## 2024-06-08 RX ADMIN — INSULIN LISPRO 3 UNITS: 100 INJECTION, SOLUTION INTRAVENOUS; SUBCUTANEOUS at 09:15

## 2024-06-08 RX ADMIN — ACETAMINOPHEN 1000 MG: 10 INJECTION INTRAVENOUS at 01:40

## 2024-06-08 RX ADMIN — GABAPENTIN 300 MG: 300 CAPSULE ORAL at 16:01

## 2024-06-08 RX ADMIN — HYDROCODONE BITARTRATE AND ACETAMINOPHEN 1 TABLET: 5; 325 TABLET ORAL at 09:14

## 2024-06-08 NOTE — PROGRESS NOTES
[facility]  Progress Note  Name: Foreign Armando I  MRN: [id number]  Unit/Bed#: 7T [facility] 705-01 I Date of Admission: 6/3/2024   Date of Service: 6/8/2024 I Hospital Day: 3    Assessment & Plan   * Pleural effusion on right  Assessment & Plan  Patient presented with shortness of breath   BNP WNL, CT chest A/P show right pleural effusion, no evidence of pulmonary congestion   s/p thoracentesis on 6/4/2024 with 600 cc clear yellow fluid  Pleural fluid analysis show LDH 62, total protein 7.6  Plasma total protein is 7.1  Per light's criteria, pleural fluid is transudative  Cytology was negative for malignancy  Patient is medically clear  Neuropsych on board patient is not competent to make his own decision    on board for safe disposition planning        Chest pain  Assessment & Plan  Patient complains of chest pain which was started during his seizure  EKG showed no acute ST-T changes  Troponin negative for 2 times    Seizure-like activity (HCC)  Assessment & Plan  Patient had RRT for seizure-like activity this morning  Patient had similar episode when he was in [facility] in 10/6/23 -CT brain, CTA head and neck, MRI brain and EEG was negative at that time  Discontinue tramadol due to the risk of lowered seizure threshold  Neurology consult -since patient was previously on Keppra 500 mg twice daily continue home dose.  Reported to DMV.    Nephrolithiasis  Assessment & Plan  Patient stated he might have passed a stone when he urinates and might be causing painful urination  Per patient, he had history of kidney stone   UA showed no evidence of UTI  CT A/P showed simple renal cyst(s). Nonobstructing left renal nephrolithiasis. No hydronephrosis.   BMP showed normal renal function  Continue Flomax 0.4 mg daily    Hypotension  Assessment & Plan  BP currently stable   patient with history of hypotension and he was on midodrine 5 mg 3 times daily at home  Patient BP has been soft  and he was not able to receive Lasix and spironolactone due to holding parameters  Patient received IV albumin once without significant improvement in BP  Increased midodrine to 10 mg 3 times daily.  Endocrinologist on board - Cortef was also increased to stress dose    Swelling of lower extremity  Assessment & Plan  Patient complains of left lower extremity swelling and tenderness  Venous duplex of bilateral lower extremities negative for DVT  Patient had history of neuropathy and currently on gabapentin 300 mg 3 times daily  For restless leg syndrome, patient is on Requip 4 mg daily.    Volume overload  Assessment & Plan  Most likely due to liver cirrhosis   BNP WNL, CT chest showed no evidence of pulmonary congestion but pleural effusion which was tapped yesterday  Recent stress test on 7/13/2023 showed LVEF 70%, and attenuation artifact   Patient received IV Lasix 40 mg once in ED  Currently on p.o. Lasix 40 mg daily and spironolactone 100 mg daily as BP allows.      Adrenal insufficiency (HCC)  Assessment & Plan  Patient was on Cortef 10 mg p.o. every morning and 5 mg p.o. nightly  Endocrinology consult placed -appreciate recommendations. - Please increase cortef to 50mg TID for 2 days. Taper down to 50mg BID for 3 days after, then 25mg BID for 3 days after, down to home dose 10/mg after   Agreed with increasing midodrine dose.    Type 2 diabetes mellitus with obesity  (HCC)  Assessment & Plan  Lab Results   Component Value Date    HGBA1C 6.5 (H) 05/05/2024       Recent Labs     06/07/24  1042 06/07/24  1548 06/07/24  2125 06/08/24  0544   POCGLU 318* 289* 338* 272*         Blood Sugar Average: Last 72 hrs:  (P) 270.7456521914898027    Placed on [facility] type II diet  Obtain Accu-Cheks before meals and at bedtime with Humalog correction dose before meals and at bedtime  Increase Lantus to 25 units at bedtime,  lispro to 7 unit 3 times daily AC  His recent hemoglobin A1c was decent.  Currently patient high blood  sugars most likely triggered by stress dose steroid for his adrenal insufficiency.  Hopefully blood sugar to get improved after tapering down steroid.    Smoking  Assessment & Plan  Will give nicotine 14 mg transdermal daily    Thrombocytopenia (HCC)  Assessment & Plan  Due to cirrhosis, no evidence of bleeding  Platelet is downtrending, hold off Lovenox for DVT prophylaxis   Patient has no issue with ambulation -encourage ambulation   Transfuse for plt <10 or <50 w/bleeding.   Monitor clinically and follow-up CBC.    Cirrhosis (HCC)  Assessment & Plan  CT chest A/P with contrast showed cirrhosis with signs of portal hypertension as evidenced by gastroesophageal varices. Stable upper abdominal lymphadenopathy presumed secondary to underlying liver pathology although low-grade lymphoproliferative disorder is not fully excluded.Stable diffuse mesenteric edema   Gastroenterology consult appreciate recommendations.    Continue prehospital spironolactone 100 mg daily, lactulose 20 g p.o. daily  Added 2 g sodium diet             VTE Pharmacologic Prophylaxis:   Pharmacologic: Pharmacologic VTE Prophylaxis contraindicated due to thrombocytopenia  Mechanical VTE Prophylaxis in Place: Yes    Patient Centered Rounds: I have performed bedside rounds with nursing staff today.    Discussions with Specialists or Other Care Team Provider: Discussed with RN    Education and Discussions with Family / Patient: Discussed with patient    Time Spent for Care:  35 minutes .  This time was spent on one or more of the following: performing physical exam; counseling and coordination of care; obtaining or reviewing history; documenting in the medical record; reviewing/ordering tests, medications or procedures; communicating with other healthcare professionals and discussing with patient's family/caregivers.    Current Length of Stay: 3 day(s)    Current Patient Status: Inpatient   Certification Statement: The patient will continue to require  additional inpatient hospital stay due to patient is medically clear, pending safe disposition plan    Discharge Plan / Estimated Discharge Date: Pending safe disposition plan      Code Status: Level 1 - Full Code      Subjective:   Patient was seen and examined at bedside.  Patient has been eating ice cream, cake etc.  Patient was educated to be compliant with diabetic diet and 2 g sodium diet.  Patient was not very happy about diet restrictions.      Objective:     Vitals:   Temp (24hrs), Av.2 °F (36.8 °C), Min:97.8 °F (36.6 °C), Max:99 °F (37.2 °C)    Temp:  [97.8 °F (36.6 °C)-99 °F (37.2 °C)] 99 °F (37.2 °C)  HR:  [50-57] 50  Resp:  [16-17] 17  BP: (100-108)/(59-67) 105/67  SpO2:  [96 %-97 %] 96 %  Body mass index is 24.58 kg/m².     Input and Output Summary (last 24 hours):       Intake/Output Summary (Last 24 hours) at 2024 0937  Last data filed at 2024 1821  Gross per 24 hour   Intake 240 ml   Output --   Net 240 ml       Physical Exam:     Physical Exam  General: no acute distress  HEENT: NC/AT, PERRL, EOM - normal  Neck: Supple  Pulm/Chest: Normal chest wall expansion, clear breath sounds on both side  CVS: normal S1&S2, capillary refill <2s  Abd: soft, voluntary guarding, tenderness, non distended, bowel sounds +  MSK: move all 4 extremities spontaneously  Skin: warm  CNS: no acute focal neuro deficit      Additional Data:     Labs:    Results from last 7 days   Lab Units 24  0450   WBC Thousand/uL 4.63   HEMOGLOBIN g/dL 9.4*   HEMATOCRIT % 27.3*   PLATELETS Thousands/uL 27*   SEGS PCT % 88*   LYMPHO PCT % 8*   MONO PCT % 4   EOS PCT % 0     Results from last 7 days   Lab Units 24  0450 24  0445 24  0558   POTASSIUM mmol/L 4.2   < > 4.5   CHLORIDE mmol/L 105   < > 103   CO2 mmol/L 23   < > 20*   BUN mg/dL 22   < > 13   CREATININE mg/dL 0.91   < > 0.88   CALCIUM mg/dL 8.2*   < > 8.8   ALK PHOS U/L  --   --  153*   ALT U/L  --   --  41   AST U/L  --   --  34    < > =  values in this interval not displayed.     Results from last 7 days   Lab Units 06/03/24  1847   INR  1.49*       * I Have Reviewed All Lab Data Listed Above.  * Additional Pertinent Lab Tests Reviewed: All Labs For Current Hospital Admission Reviewed    Imaging:      I have reviewed pertinent imaging.      Recent Cultures (last 7 days):     Results from last 7 days   Lab Units 06/04/24  1244   GRAM STAIN RESULT  Rare Polys  No bacteria seen   BODY FLUID CULTURE, STERILE  No growth       Last 24 Hours Medication List:   Current Facility-Administered Medications   Medication Dose Route Frequency Provider Last Rate    dicyclomine  20 mg Oral BID before breakfast/lunch Natan Miguel PA-C      ferrous sulfate  325 mg Oral Daily With Breakfast Natan Miguel PA-C      furosemide  40 mg Oral Daily Rosario Whitfield MD      gabapentin  300 mg Oral TID Natan Miguel PA-C      HYDROcodone-acetaminophen  1 tablet Oral Q6H PRN Rosario Whitfield MD      [START ON 6/13/2024] hydrocortisone  10 mg Oral TID Rosario Whitfield MD      [START ON 6/10/2024] hydrocortisone  25 mg Oral BID Rosario Whitfield MD      hydrocortisone  50 mg Oral BID Rosario Whitfield MD      hydrOXYzine HCL  25 mg Oral TID PRN Natan Miguel PA-C      insulin glargine  25 Units Subcutaneous HS Rosario Whitfield MD      insulin lispro  1-5 Units Subcutaneous TID AC Natan Miguel PA-C      insulin lispro  1-5 Units Subcutaneous HS Natan Miguel PA-C      insulin lispro  7 Units Subcutaneous TID With Meals Rosario Whitfield MD      levETIRAcetam  500 mg Oral Q12H [facility] Rosario Whitfield MD      LORazepam  1 mg Intravenous Q6H PRN Rosario Whitfield MD      midodrine  10 mg Oral TID  Rosario Whitfield MD      nicotine  1 patch Transdermal Daily Natan Miguel PA-C      ondansetron  4 mg Intravenous Q6H PRN Natan Miguel PA-C      pantoprazole  40 mg Oral Early Morning Natan Miguel PA-C      propranolol  10 mg Oral Q12H [facility] Natan Miguel PA-C      rifaximin  550 mg Oral Q12H [facility] Jerson Gibson MD      rOPINIRole  4 mg Oral BID Natan  JERMAINE Miguel      senna-docusate sodium  1 tablet Oral QPM Natan Miguel PA-C      spironolactone  100 mg Oral Daily Natan Miguel PA-C      tamsulosin  0.4 mg Oral Daily With Dinner Natan Miguel PA-C          Today, Patient Was Seen By: Rosario Whitfield MD    ** Please Note: Dragon 360 Dictation voice to text software may have been used in the creation of this document. **

## 2024-06-08 NOTE — ASSESSMENT & PLAN NOTE
Lab Results   Component Value Date    HGBA1C 6.5 (H) 05/05/2024       Recent Labs     06/07/24  1042 06/07/24  1548 06/07/24  2125 06/08/24  0544   POCGLU 318* 289* 338* 272*         Blood Sugar Average: Last 72 hrs:  (P) 270.2229951597636510    Placed on [facility] type II diet  Obtain Accu-Cheks before meals and at bedtime with Humalog correction dose before meals and at bedtime  Increase Lantus to 25 units at bedtime,  lispro to 7 unit 3 times daily AC  His recent hemoglobin A1c was decent.  Currently patient high blood sugars most likely triggered by stress dose steroid for his adrenal insufficiency.  Hopefully blood sugar to get improved after tapering down steroid.

## 2024-06-08 NOTE — ASSESSMENT & PLAN NOTE
Patient presented with shortness of breath   BNP WNL, CT chest A/P show right pleural effusion, no evidence of pulmonary congestion   s/p thoracentesis on 6/4/2024 with 600 cc clear yellow fluid  Pleural fluid analysis show LDH 62, total protein 7.6  Plasma total protein is 7.1  Per light's criteria, pleural fluid is transudative  Cytology was negative for malignancy  Patient is medically clear  Neuropsych on board patient is not competent to make his own decision    on board for safe disposition planning

## 2024-06-08 NOTE — PLAN OF CARE
Problem: METABOLIC, FLUID AND ELECTROLYTES - ADULT  Goal: Glucose maintained within target range  Description: INTERVENTIONS:  - Monitor Blood Glucose as ordered  - Assess for signs and symptoms of hyperglycemia and hypoglycemia  - Administer ordered medications to maintain glucose within target range  - Assess nutritional intake and initiate nutrition service referral as needed  Outcome: Progressing     Problem: RESPIRATORY - ADULT  Goal: Achieves optimal ventilation and oxygenation  Description: INTERVENTIONS:  - Assess for changes in respiratory status  - Assess for changes in mentation and behavior  - Position to facilitate oxygenation and minimize respiratory effort  - Oxygen administered by appropriate delivery if ordered  - Initiate smoking cessation education as indicated  - Encourage broncho-pulmonary hygiene including cough, deep breathe, Incentive Spirometry  - Assess the need for suctioning and aspirate as needed  - Assess and instruct to report SOB or any respiratory difficulty  - Respiratory Therapy support as indicated  Outcome: Progressing     Problem: NEUROSENSORY - ADULT  Goal: Remains free of injury related to seizures activity  Description: INTERVENTIONS  - Maintain airway, patient safety  and administer oxygen as ordered  - Monitor patient for seizure activity, document and report duration and description of seizure to physician/advanced practitioner  - If seizure occurs,  ensure patient safety during seizure  - Reorient patient post seizure  - Seizure pads on all 4 side rails  - Instruct patient/family to notify RN of any seizure activity including if an aura is experienced  - Instruct patient/family to call for assistance with activity based on nursing assessment  - Administer anti-seizure medications if ordered    Outcome: Progressing

## 2024-06-08 NOTE — ASSESSMENT & PLAN NOTE
Due to cirrhosis, no evidence of bleeding  Platelet is downtrending, hold off Lovenox for DVT prophylaxis   Patient has no issue with ambulation -encourage ambulation   Transfuse for plt <10 or <50 w/bleeding.   Monitor clinically and follow-up CBC.

## 2024-06-08 NOTE — ASSESSMENT & PLAN NOTE
Patient complains of chest pain which was started during his seizure  EKG showed no acute ST-T changes  Troponin negative for 2 times

## 2024-06-08 NOTE — ASSESSMENT & PLAN NOTE
Patient had RRT for seizure-like activity this morning  Patient had similar episode when he was in [facility] in 10/6/23 -CT brain, CTA head and neck, MRI brain and EEG was negative at that time  Discontinue tramadol due to the risk of lowered seizure threshold  Neurology consult -since patient was previously on Keppra 500 mg twice daily continue home dose.  Reported to DMV.

## 2024-06-09 PROBLEM — I95.9 HYPOTENSION: Status: RESOLVED | Noted: 2024-06-05 | Resolved: 2024-06-09

## 2024-06-09 LAB
ANION GAP SERPL CALCULATED.3IONS-SCNC: 8 MMOL/L (ref 4–13)
BASOPHILS # BLD AUTO: 0 THOUSANDS/ÂΜL (ref 0–0.1)
BASOPHILS NFR BLD AUTO: 0 % (ref 0–1)
BUN SERPL-MCNC: 23 MG/DL (ref 5–25)
CALCIUM SERPL-MCNC: 8.1 MG/DL (ref 8.4–10.2)
CHLORIDE SERPL-SCNC: 102 MMOL/L (ref 96–108)
CO2 SERPL-SCNC: 25 MMOL/L (ref 21–32)
CREAT SERPL-MCNC: 0.89 MG/DL (ref 0.6–1.3)
EOSINOPHIL # BLD AUTO: 0.01 THOUSAND/ÂΜL (ref 0–0.61)
EOSINOPHIL NFR BLD AUTO: 0 % (ref 0–6)
ERYTHROCYTE [DISTWIDTH] IN BLOOD BY AUTOMATED COUNT: 16.4 % (ref 11.6–15.1)
GFR SERPL CREATININE-BSD FRML MDRD: 90 ML/MIN/1.73SQ M
GLUCOSE SERPL-MCNC: 291 MG/DL (ref 65–140)
GLUCOSE SERPL-MCNC: 314 MG/DL (ref 65–140)
GLUCOSE SERPL-MCNC: 325 MG/DL (ref 65–140)
GLUCOSE SERPL-MCNC: 384 MG/DL (ref 65–140)
GLUCOSE SERPL-MCNC: 493 MG/DL (ref 65–140)
HCT VFR BLD AUTO: 29 % (ref 36.5–49.3)
HGB BLD-MCNC: 9.8 G/DL (ref 12–17)
IMM GRANULOCYTES # BLD AUTO: 0.02 THOUSAND/UL (ref 0–0.2)
IMM GRANULOCYTES NFR BLD AUTO: 1 % (ref 0–2)
LYMPHOCYTES # BLD AUTO: 0.37 THOUSANDS/ÂΜL (ref 0.6–4.47)
LYMPHOCYTES NFR BLD AUTO: 9 % (ref 14–44)
MCH RBC QN AUTO: 35.1 PG (ref 26.8–34.3)
MCHC RBC AUTO-ENTMCNC: 33.8 G/DL (ref 31.4–37.4)
MCV RBC AUTO: 104 FL (ref 82–98)
MONOCYTES # BLD AUTO: 0.2 THOUSAND/ÂΜL (ref 0.17–1.22)
MONOCYTES NFR BLD AUTO: 5 % (ref 4–12)
NEUTROPHILS # BLD AUTO: 3.32 THOUSANDS/ÂΜL (ref 1.85–7.62)
NEUTS SEG NFR BLD AUTO: 85 % (ref 43–75)
NRBC BLD AUTO-RTO: 0 /100 WBCS
PLATELET # BLD AUTO: 25 THOUSANDS/UL (ref 149–390)
PMV BLD AUTO: 12.2 FL (ref 8.9–12.7)
POTASSIUM SERPL-SCNC: 3.4 MMOL/L (ref 3.5–5.3)
RBC # BLD AUTO: 2.79 MILLION/UL (ref 3.88–5.62)
SODIUM SERPL-SCNC: 135 MMOL/L (ref 135–147)
WBC # BLD AUTO: 3.92 THOUSAND/UL (ref 4.31–10.16)

## 2024-06-09 PROCEDURE — 85025 COMPLETE CBC W/AUTO DIFF WBC: CPT | Performed by: INTERNAL MEDICINE

## 2024-06-09 PROCEDURE — 99232 SBSQ HOSP IP/OBS MODERATE 35: CPT | Performed by: INTERNAL MEDICINE

## 2024-06-09 PROCEDURE — 80048 BASIC METABOLIC PNL TOTAL CA: CPT | Performed by: INTERNAL MEDICINE

## 2024-06-09 PROCEDURE — 82948 REAGENT STRIP/BLOOD GLUCOSE: CPT

## 2024-06-09 RX ORDER — INSULIN GLARGINE 100 [IU]/ML
35 INJECTION, SOLUTION SUBCUTANEOUS
Status: DISCONTINUED | OUTPATIENT
Start: 2024-06-09 | End: 2024-06-10

## 2024-06-09 RX ORDER — POTASSIUM CHLORIDE 20 MEQ/1
20 TABLET, EXTENDED RELEASE ORAL ONCE
Status: COMPLETED | OUTPATIENT
Start: 2024-06-09 | End: 2024-06-09

## 2024-06-09 RX ORDER — POTASSIUM CHLORIDE 20 MEQ/1
40 TABLET, EXTENDED RELEASE ORAL ONCE
Status: DISCONTINUED | OUTPATIENT
Start: 2024-06-09 | End: 2024-06-09

## 2024-06-09 RX ORDER — INSULIN GLARGINE 100 [IU]/ML
30 INJECTION, SOLUTION SUBCUTANEOUS
Status: DISCONTINUED | OUTPATIENT
Start: 2024-06-09 | End: 2024-06-09

## 2024-06-09 RX ORDER — INSULIN LISPRO 100 [IU]/ML
10 INJECTION, SOLUTION INTRAVENOUS; SUBCUTANEOUS
Status: DISCONTINUED | OUTPATIENT
Start: 2024-06-09 | End: 2024-06-10

## 2024-06-09 RX ADMIN — GABAPENTIN 300 MG: 300 CAPSULE ORAL at 21:28

## 2024-06-09 RX ADMIN — MIDODRINE HYDROCHLORIDE 10 MG: 5 TABLET ORAL at 15:12

## 2024-06-09 RX ADMIN — INSULIN GLARGINE 35 UNITS: 100 INJECTION, SOLUTION SUBCUTANEOUS at 21:27

## 2024-06-09 RX ADMIN — GABAPENTIN 300 MG: 300 CAPSULE ORAL at 08:10

## 2024-06-09 RX ADMIN — INSULIN LISPRO 5 UNITS: 100 INJECTION, SOLUTION INTRAVENOUS; SUBCUTANEOUS at 21:27

## 2024-06-09 RX ADMIN — ROPINIROLE HYDROCHLORIDE 4 MG: 1 TABLET, FILM COATED ORAL at 21:28

## 2024-06-09 RX ADMIN — HYDROCORTISONE 50 MG: 10 TABLET ORAL at 08:12

## 2024-06-09 RX ADMIN — HYDROXYZINE HYDROCHLORIDE 25 MG: 25 TABLET, FILM COATED ORAL at 14:28

## 2024-06-09 RX ADMIN — DICYCLOMINE HYDROCHLORIDE 20 MG: 20 TABLET ORAL at 11:14

## 2024-06-09 RX ADMIN — LEVETIRACETAM 500 MG: 500 TABLET, FILM COATED ORAL at 08:11

## 2024-06-09 RX ADMIN — RIFAXIMIN 550 MG: 550 TABLET ORAL at 21:28

## 2024-06-09 RX ADMIN — HYDROCORTISONE 50 MG: 10 TABLET ORAL at 17:00

## 2024-06-09 RX ADMIN — SPIRONOLACTONE 100 MG: 100 TABLET, FILM COATED ORAL at 08:12

## 2024-06-09 RX ADMIN — FUROSEMIDE 40 MG: 40 TABLET ORAL at 08:11

## 2024-06-09 RX ADMIN — TAMSULOSIN HYDROCHLORIDE 0.4 MG: 0.4 CAPSULE ORAL at 15:56

## 2024-06-09 RX ADMIN — INSULIN LISPRO 10 UNITS: 100 INJECTION, SOLUTION INTRAVENOUS; SUBCUTANEOUS at 11:16

## 2024-06-09 RX ADMIN — RIFAXIMIN 550 MG: 550 TABLET ORAL at 08:11

## 2024-06-09 RX ADMIN — FERROUS SULFATE TAB 325 MG (65 MG ELEMENTAL FE) 325 MG: 325 (65 FE) TAB at 08:11

## 2024-06-09 RX ADMIN — MIDODRINE HYDROCHLORIDE 10 MG: 5 TABLET ORAL at 11:14

## 2024-06-09 RX ADMIN — PANTOPRAZOLE SODIUM 40 MG: 40 TABLET, DELAYED RELEASE ORAL at 05:44

## 2024-06-09 RX ADMIN — PROPRANOLOL HYDROCHLORIDE 10 MG: 10 TABLET ORAL at 08:11

## 2024-06-09 RX ADMIN — SENNOSIDES AND DOCUSATE SODIUM 1 TABLET: 8.6; 5 TABLET ORAL at 17:00

## 2024-06-09 RX ADMIN — NICOTINE 1 PATCH: 14 PATCH, EXTENDED RELEASE TRANSDERMAL at 08:13

## 2024-06-09 RX ADMIN — MIDODRINE HYDROCHLORIDE 10 MG: 5 TABLET ORAL at 06:24

## 2024-06-09 RX ADMIN — HYDROCODONE BITARTRATE AND ACETAMINOPHEN 1 TABLET: 5; 325 TABLET ORAL at 14:28

## 2024-06-09 RX ADMIN — INSULIN LISPRO 10 UNITS: 100 INJECTION, SOLUTION INTRAVENOUS; SUBCUTANEOUS at 15:55

## 2024-06-09 RX ADMIN — ROPINIROLE HYDROCHLORIDE 4 MG: 1 TABLET, FILM COATED ORAL at 08:11

## 2024-06-09 RX ADMIN — GABAPENTIN 300 MG: 300 CAPSULE ORAL at 15:01

## 2024-06-09 RX ADMIN — DICYCLOMINE HYDROCHLORIDE 20 MG: 20 TABLET ORAL at 06:24

## 2024-06-09 RX ADMIN — INSULIN LISPRO 3 UNITS: 100 INJECTION, SOLUTION INTRAVENOUS; SUBCUTANEOUS at 08:26

## 2024-06-09 RX ADMIN — LEVETIRACETAM 500 MG: 500 TABLET, FILM COATED ORAL at 21:29

## 2024-06-09 RX ADMIN — INSULIN LISPRO 3 UNITS: 100 INJECTION, SOLUTION INTRAVENOUS; SUBCUTANEOUS at 11:16

## 2024-06-09 RX ADMIN — INSULIN LISPRO 7 UNITS: 100 INJECTION, SOLUTION INTRAVENOUS; SUBCUTANEOUS at 08:27

## 2024-06-09 RX ADMIN — POTASSIUM CHLORIDE 20 MEQ: 1500 TABLET, EXTENDED RELEASE ORAL at 08:11

## 2024-06-09 RX ADMIN — INSULIN LISPRO 4 UNITS: 100 INJECTION, SOLUTION INTRAVENOUS; SUBCUTANEOUS at 15:55

## 2024-06-09 NOTE — ASSESSMENT & PLAN NOTE
Lab Results   Component Value Date    HGBA1C 6.5 (H) 05/05/2024       Recent Labs     06/08/24  1111 06/08/24  1531 06/08/24  2105 06/09/24  0555   POCGLU 304* 332* 252* 325*         Blood Sugar Average: Last 72 hrs:  (P) [id number]    Placed on [facility] type II diet  Obtain Accu-Cheks before meals and at bedtime with Humalog correction dose before meals and at bedtime  Increase Lantus to 35 units at bedtime,  lispro to 10 unit 3 times daily AC  His recent hemoglobin A1c was decent.  Currently patient high blood sugars most likely triggered by stress dose steroid for his adrenal insufficiency.  Hopefully blood sugar to get improved after tapering down steroid.

## 2024-06-09 NOTE — PROGRESS NOTES
Progress Note- Foreign Armando 63 y.o. male MRN: [id number]    Unit/Bed#: 7T [facility] 705-01 Encounter: [id number]      Assessment and Plan:    63-year-old with seizure disorder on Keppra, CHRISTIE cirrhosis complicated by ascites, hepatic encephalopathy, history of colon cancer status post right hemicolectomy and chemotherapy, adrenal insufficiency currently on steroids.  Patient being followed by GI team due to history of decompensated cirrhosis.      1.  Chronic diarrhea  2.  Hematochezia  3.  Pleural effusion  4.  Ascites  5.  Encephalopathy  6.  Cirrhosis  7.  Pancreatic lesion      He has had increased frequency of bowel movement so lactulose has been stopped.  Bowel movement consistency has improved.  Diarrhea workup has been ordered which is pending.  Currently asymptomatic from liver standpoint.  Hemoglobin stable.  No further blood in stools.  Had hemorrhoids seen on last colonoscopy which is likely the source of the intermittent blood in stool.  Needs repeat colonoscopy which can be done on outpatient basis.  Pleural effusion transudative likely hepatic hydrothorax based on labs done on thoracentesis this admission.  Low-sodium diet and diuretics.  Mild ascites on imaging likely not amenable to tap.  Mental status is baseline.  Hold off on lactulose given increased frequency of bowel movements.  Continue rifaximin.  No asterixis on exam today.  MELD stable.  He is due for repeat EGD soon.  This can be done on outpatient basis.   EGD 2022 showed mild portal hypertensive gastropathy and suspicion for GAVE.  Hemoglobin currently stable.  Up-to-date for HCC screening.  MRI performed on outpatient basis to investigate pancreatic lesion.  Tolerating diet.  Continue current medications.    Dany Torres MD  Gastroenterology  [facility]  Date: June 9, 2024      ______________________________________________________________________    Subjective:     Patient reports 3 bowel movements overnight.   No abdominal pain.    Medication Administration - last 24 hours from 06/08/2024 1237 to 06/09/2024 1237         Date/Time Order Dose Route Action Action by     06/09/2024 1114 EDT dicyclomine (BENTYL) tablet 20 mg 20 mg Oral Given [facility], RN     06/09/2024 0624 EDT dicyclomine (BENTYL) tablet 20 mg 20 mg Oral Given Geraldine KRISTINA Reddy     06/09/2024 0811 EDT ferrous sulfate tablet 325 mg 325 mg Oral Given [facility], RN     06/09/2024 0810 EDT gabapentin (NEURONTIN) capsule 300 mg 300 mg Oral Given [facility], RN     06/08/2024 2111 EDT gabapentin (NEURONTIN) capsule 300 mg 300 [facility]     06/08/2024 1601 EDT gabapentin (NEURONTIN) capsule 300 mg 300 mg Oral Given Juhi Navarro, KRISTINA     06/09/2024 0544 EDT pantoprazole (PROTONIX) EC tablet 40 mg 40 mg Oral Given [facility], KRISTINA     06/09/2024 0811 EDT propranolol (INDERAL) tablet 10 mg 10 mg Oral Given [facility], RN     06/08/2024 2113 EDT propranolol (INDERAL) tablet 10 mg 10 mg Oral Not Given Geraldine TimurarKRISTINA     06/09/2024 0811 EDT rOPINIRole (REQUIP) tablet 4 mg 4 mg Oral Given [facility], RN     06/08/2024 2111 EDT rOPINIRole (REQUIP) tablet 4 mg 4 mg [facility]     06/08/2024 1806 EDT senna-docusate sodium (SENOKOT S) 8.6-50 mg per tablet 1 tablet 1 tablet Oral Given Juhi Navarro, KRISTINA     06/09/2024 0812 EDT spironolactone (ALDACTONE) tablet 100 mg 100 mg Oral Given [facility], RN     06/08/2024 1601 EDT tamsulosin (FLOMAX) capsule 0.4 mg 0.4 mg Oral Given Juhi Navarro RN     06/09/2024 0813 EDT nicotine (NICODERM CQ) 14 mg/24hr TD 24 hr patch 1 patch 1 patch Transdermal Medication Applied Mayelin Sadler RN     06/09/2024 0812 EDT nicotine (NICODERM CQ) 14 mg/24hr TD 24 hr patch 1 patch 1 patch Transdermal Patch Removed Mayelin Sadler, KRISTINA     06/09/2024 1116 EDT insulin lispro (HumALOG/ADMELOG) 100 units/mL subcutaneous injection 1-5 Units 3 Units Subcutaneous Given Mayelin  Doroteo, RN     06/09/2024 0826 EDT insulin lispro (HumALOG/ADMELOG) 100 units/mL subcutaneous injection 1-5 Units 3 Units Subcutaneous Given [facility], RN     06/08/2024 1601 EDT insulin lispro (HumALOG/ADMELOG) 100 units/mL subcutaneous injection 1-5 Units 4 Units Subcutaneous Given Juhi Navarro, KRISTINA     06/08/2024 2112 EDT insulin lispro (HumALOG/ADMELOG) 100 units/mL subcutaneous injection 1-5 Units 1 Units Subcutaneous Given Geraldine Reddy, KRISTINA     06/09/2024 0811 EDT furosemide (LASIX) tablet 40 mg 40 mg Oral Given [facility], RN     06/09/2024 1114 EDT midodrine (PROAMATINE) tablet 10 mg 10 mg Oral Given [facility], RN     06/09/2024 0624 EDT midodrine (PROAMATINE) tablet 10 mg 10 mg Oral Given Geraldine Reddy RN     06/08/2024 1601 EDT midodrine (PROAMATINE) tablet 10 mg 10 mg Oral Given Juhi Navarro, KRISTINA     06/09/2024 0812 EDT hydrocortisone (CORTEF) tablet 50 mg 50 mg Oral Given [facility], RN     06/08/2024 1806 EDT hydrocortisone (CORTEF) tablet 50 mg 50 mg Oral Given Juhi Navarro, KRISTINA     06/09/2024 0811 EDT rifaximin (XIFAXAN) tablet 550 mg 550 mg [facility]     06/08/2024 2112 EDT rifaximin (XIFAXAN) tablet 550 mg 550 mg Oral Given Geraldine Reddy RN     06/09/2024 0811 EDT levETIRAcetam (KEPPRA) tablet 500 mg 500 mg Oral Given [facility]     06/08/2024 2111 EDT levETIRAcetam (KEPPRA) tablet 500 mg 500 mg Oral Given Geraldine Reddy RN     06/08/2024 2034 EDT HYDROcodone-acetaminophen (NORCO) 5-325 mg per tablet 1 tablet 1 tablet Oral Given Geraldine Reddy, KRISTINA     06/08/2024 2111 EDT insulin glargine (LANTUS) subcutaneous injection 25 Units 0.25 mL 25 Units Subcutaneous Given Geraldine Reddy RN     06/09/2024 0827 EDT insulin lispro (HumALOG/ADMELOG) 100 units/mL subcutaneous injection 7 Units 7 Units Subcutaneous Given Mayelin Sadler RN     06/08/2024 1601 EDT insulin lispro (HumALOG/ADMELOG) 100 units/mL subcutaneous injection 7 Units 7 Units  "Subcutaneous Given Juhi Navarro, KRISTINA     06/09/2024 0811 EDT potassium chloride (Klor-Con M20) CR tablet 20 mEq 20 mEq Oral Given Mayelin Sadler RN     06/09/2024 1116 EDT insulin lispro (HumALOG/ADMELOG) 100 units/mL subcutaneous injection 10 Units 10 Units Subcutaneous Given Mayelin Sadler RN            Objective:     Vitals: Blood pressure 114/66, pulse (!) 52, temperature (!) 96.9 °F (36.1 °C), temperature source Temporal, resp. rate 18, height 5' 7.01\" (1.702 m), weight 71.2 kg (156 lb 15.5 oz), SpO2 94%.,Body mass index is 24.58 kg/m².      Intake/Output Summary (Last 24 hours) at 6/9/2024 1237  Last data filed at 6/8/2024 1348  Gross per 24 hour   Intake --   Output 650 ml   Net -650 ml       Physical Exam:   General Appearance:   Alert, cooperative, no distress   HEENT:   Normocephalic, atraumatic, anicteric.     Neck:  Supple, symmetrical, trachea midline   Lungs:   Clear to auscultation bilaterally; no rales, rhonchi or wheezing; respirations unlabored    Heart::   Regular rate and rhythm; no murmur, rub, or gallop.   Abdomen:   Soft, non-tender, non-distended; normal bowel sounds; no masses, no organomegaly    Genitalia:   Deferred    Rectal:   Deferred    Extremities:  No cyanosis, clubbing or edema    Pulses:  2+ and symmetric all extremities    Skin:  No jaundice, rashes, or lesions    Lymph nodes:  No palpable cervical lymphadenopathy          Invasive Devices       Peripheral Intravenous Line  Duration             Peripheral IV 06/06/24 Distal;Dorsal (posterior);Left Forearm 3 days    Peripheral IV 06/06/24 Dorsal (posterior);Right Hand 3 days                    Lab Results:  No results displayed because visit has over 200 results.          Imaging Studies: I have personally reviewed pertinent imaging studies.     "

## 2024-06-09 NOTE — ASSESSMENT & PLAN NOTE
Patient had RRT for seizure-like activity this morning  Patient had similar episode when he was in [facility] in 10/6/23 -CT brain, CTA head and neck, MRI brain and EEG was negative at that time  Discontinue tramadol due to the risk of lowered seizure threshold  Neurology consult -since patient was previously on Keppra 500 mg twice daily continue home dose.  Reported to DMV.

## 2024-06-09 NOTE — PLAN OF CARE
Problem: PAIN - ADULT  Goal: Verbalizes/displays adequate comfort level or baseline comfort level  Description: Interventions:  - Encourage patient to monitor pain and request assistance  - Assess pain using appropriate pain scale  - Administer analgesics based on type and severity of pain and evaluate response  - Implement non-pharmacological measures as appropriate and evaluate response  - Consider cultural and social influences on pain and pain management  - Notify physician/advanced practitioner if interventions unsuccessful or patient reports new pain  Outcome: Progressing     Problem: Knowledge Deficit  Goal: Patient/family/caregiver demonstrates understanding of disease process, treatment plan, medications, and discharge instructions  Description: Complete learning assessment and assess knowledge base.  Interventions:  - Provide teaching at level of understanding  - Provide teaching via preferred learning methods  Outcome: Progressing     Problem: NEUROSENSORY - ADULT  Goal: Remains free of injury related to seizures activity  Description: INTERVENTIONS  - Maintain airway, patient safety  and administer oxygen as ordered  - Monitor patient for seizure activity, document and report duration and description of seizure to physician/advanced practitioner  - If seizure occurs,  ensure patient safety during seizure  - Reorient patient post seizure  - Seizure pads on all 4 side rails  - Instruct patient/family to notify RN of any seizure activity including if an aura is experienced  - Instruct patient/family to call for assistance with activity based on nursing assessment  - Administer anti-seizure medications if ordered    Outcome: Progressing

## 2024-06-09 NOTE — PROGRESS NOTES
[facility]  Progress Note  Name: Foreign Armando I  MRN: [id number]  Unit/Bed#: 7T [facility] 705-01 I Date of Admission: 6/3/2024   Date of Service: 6/9/2024 I Hospital Day: 4    Assessment & Plan   * Pleural effusion on right  Assessment & Plan  Patient presented with shortness of breath   BNP WNL, CT chest A/P show right pleural effusion, no evidence of pulmonary congestion   s/p thoracentesis on 6/4/2024 with 600 cc clear yellow fluid  Pleural fluid analysis show LDH 62, total protein 7.6  Plasma total protein is 7.1  Per light's criteria, pleural fluid is transudative  Cytology was negative for malignancy  Patient is medically clear  Neuropsych on board patient is not competent to make his own decision    on board for safe disposition planning        Chest pain  Assessment & Plan  Patient complains of chest pain which was started during his seizure  EKG showed no acute ST-T changes  Troponin negative for 2 times    Seizure-like activity (HCC)  Assessment & Plan  Patient had RRT for seizure-like activity this morning  Patient had similar episode when he was in [facility] in 10/6/23 -CT brain, CTA head and neck, MRI brain and EEG was negative at that time  Discontinue tramadol due to the risk of lowered seizure threshold  Neurology consult -since patient was previously on Keppra 500 mg twice daily continue home dose.  Reported to DMV.    Nephrolithiasis  Assessment & Plan  Patient stated he might have passed a stone when he urinates and might be causing painful urination  Per patient, he had history of kidney stone   UA showed no evidence of UTI  CT A/P showed simple renal cyst(s). Nonobstructing left renal nephrolithiasis. No hydronephrosis.   BMP showed normal renal function  Continue Flomax 0.4 mg daily    Swelling of lower extremity  Assessment & Plan  Patient complains of left lower extremity swelling and tenderness  Venous duplex of bilateral lower extremities  negative for DVT  Patient had history of neuropathy and currently on gabapentin 300 mg 3 times daily  For restless leg syndrome, patient is on Requip 4 mg daily.    Volume overload  Assessment & Plan  Most likely due to liver cirrhosis   BNP WNL, CT chest showed no evidence of pulmonary congestion but pleural effusion which was tapped yesterday  Recent stress test on 7/13/2023 showed LVEF 70%, and attenuation artifact   Patient received IV Lasix 40 mg once in ED  Currently on p.o. Lasix 40 mg daily and spironolactone 100 mg daily as BP allows.      Adrenal insufficiency (HCC)  Assessment & Plan  Patient was on Cortef 10 mg p.o. every morning and 5 mg p.o. nightly  Endocrinology consult placed -appreciate recommendations. - Please increase cortef to 50mg TID for 2 days. Taper down to 50mg BID for 3 days after, then 25mg BID for 3 days after, down to home dose 10/mg after   Agreed with increasing midodrine dose.    Type 2 diabetes mellitus with obesity  ([facility])  Assessment & Plan  Lab Results   Component Value Date    HGBA1C 6.5 (H) 05/05/2024       Recent Labs     06/08/24  1111 06/08/24  1531 06/08/24  2105 06/09/24  0555   POCGLU 304* 332* 252* 325*         Blood Sugar Average: Last 72 hrs:  (P) [id number]    Placed on [facility] type II diet  Obtain Accu-Cheks before meals and at bedtime with Humalog correction dose before meals and at bedtime  Increase Lantus to 35 units at bedtime,  lispro to 10 unit 3 times daily AC  His recent hemoglobin A1c was decent.  Currently patient high blood sugars most likely triggered by stress dose steroid for his adrenal insufficiency.  Hopefully blood sugar to get improved after tapering down steroid.    Smoking  Assessment & Plan  Will give nicotine 14 mg transdermal daily    Thrombocytopenia (HCC)  Assessment & Plan  Due to cirrhosis, no evidence of bleeding  Platelet is downtrending, hold off Lovenox for DVT prophylaxis   Patient has no issue with ambulation -encourage  ambulation   Transfuse for plt <10 or <50 w/bleeding.   Monitor clinically and follow-up CBC.    Cirrhosis (HCC)  Assessment & Plan  CT chest A/P with contrast showed cirrhosis with signs of portal hypertension as evidenced by gastroesophageal varices. Stable upper abdominal lymphadenopathy presumed secondary to underlying liver pathology although low-grade lymphoproliferative disorder is not fully excluded.Stable diffuse mesenteric edema   Gastroenterology consult appreciate recommendations.    Continue prehospital spironolactone 100 mg daily, lactulose 20 g p.o. daily  Added 2 g sodium diet    Hypotension-resolved as of 6/9/2024  Assessment & Plan  BP currently stable   patient with history of hypotension and he was on midodrine 5 mg 3 times daily at home  Patient BP has been soft and he was not able to receive Lasix and spironolactone due to holding parameters  Patient received IV albumin once without significant improvement in BP  Increased midodrine to 10 mg 3 times daily.  Endocrinologist on board - Cortef was also increased to stress dose    Hypokalemia-resolved as of 6/5/2024  Assessment & Plan  Initial potassium 3.0 -> 3.1> 3.6->4.5->3.4  Repleted with p.o. KCl 20 mEq  Continue to monitor with repeat labs in a.m.             VTE Pharmacologic Prophylaxis:   Pharmacologic: Pharmacologic VTE Prophylaxis contraindicated due to thrombocytopenia  Mechanical VTE Prophylaxis in Place: Yes    Patient Centered Rounds: I have performed bedside rounds with nursing staff today.    Discussions with Specialists or Other Care Team Provider: Discussed with RN    Education and Discussions with Family / Patient: Discussed with patient    Time Spent for Care:  35 minutes .  This time was spent on one or more of the following: performing physical exam; counseling and coordination of care; obtaining or reviewing history; documenting in the medical record; reviewing/ordering tests, medications or procedures; communicating with  other healthcare professionals and discussing with patient's family/caregivers.    Current Length of Stay: 4 day(s)    Current Patient Status: Inpatient   Certification Statement: The patient will continue to require additional inpatient hospital stay due to patient is medically clear, pending safe disposition plan    Discharge Plan / Estimated Discharge Date: Pending safe disposition plan      Code Status: Level 1 - Full Code      Subjective:   Patient was seen and examined at bedside.  No acute overnight changes.      Objective:     Vitals:   Temp (24hrs), Av.4 °F (36.3 °C), Min:96.9 °F (36.1 °C), Max:97.8 °F (36.6 °C)    Temp:  [96.9 °F (36.1 °C)-97.8 °F (36.6 °C)] 96.9 °F (36.1 °C)  HR:  [48-52] 52  Resp:  [16-18] 18  BP: (106-114)/(60-66) 114/66  SpO2:  [94 %-98 %] 94 %  Body mass index is 24.58 kg/m².     Input and Output Summary (last 24 hours):       Intake/Output Summary (Last 24 hours) at 2024 0930  Last data filed at 2024 1348  Gross per 24 hour   Intake --   Output 650 ml   Net -650 ml       Physical Exam:     Physical Exam  General: no acute distress  HEENT: NC/AT, PERRL, EOM - normal  Neck: Supple  Pulm/Chest: Normal chest wall expansion, clear breath sounds on both side  CVS: normal S1&S2, capillary refill <2s  Abd: soft, non tender, non distended, bowel sounds +  MSK: move all 4 extremities spontaneously, bilateral pedal edema noted  Skin: warm  CNS: no acute focal neuro deficit      Additional Data:     Labs:    Results from last 7 days   Lab Units 24  0542   WBC Thousand/uL 3.92*   HEMOGLOBIN g/dL 9.8*   HEMATOCRIT % 29.0*   PLATELETS Thousands/uL 25*   SEGS PCT % 85*   LYMPHO PCT % 9*   MONO PCT % 5   EOS PCT % 0     Results from last 7 days   Lab Units 24  0542 24  0445 24  0558   POTASSIUM mmol/L 3.4*   < > 4.5   CHLORIDE mmol/L 102   < > 103   CO2 mmol/L 25   < > 20*   BUN mg/dL 23   < > 13   CREATININE mg/dL 0.89   < > 0.88   CALCIUM mg/dL 8.1*   < > 8.8    ALK PHOS U/L  --   --  153*   ALT U/L  --   --  41   AST U/L  --   --  34    < > = values in this interval not displayed.     Results from last 7 days   Lab Units 06/03/24  1847   INR  1.49*       * I Have Reviewed All Lab Data Listed Above.  * Additional Pertinent Lab Tests Reviewed: All Labs For Current Hospital Admission Reviewed    Imaging:      I have reviewed pertinent imaging.      Recent Cultures (last 7 days):     Results from last 7 days   Lab Units 06/04/24  1244   GRAM STAIN RESULT  Rare Polys  No bacteria seen   BODY FLUID CULTURE, STERILE  No growth       Last 24 Hours Medication List:   Current Facility-Administered Medications   Medication Dose Route Frequency Provider Last Rate    dicyclomine  20 mg Oral BID before breakfast/lunch Natan Miguel PA-C      ferrous sulfate  325 mg Oral Daily With Breakfast Natan Miguel PA-C      furosemide  40 mg Oral Daily Rosario Whitfield MD      gabapentin  300 mg Oral TID Natan Miguel PA-C      HYDROcodone-acetaminophen  1 tablet Oral Q6H PRN Rosario Whitfield MD      [START ON 6/13/2024] hydrocortisone  10 mg Oral TID Rosario Whitfield MD      [START ON 6/10/2024] hydrocortisone  25 mg Oral BID Rosario Whitfield MD      hydrocortisone  50 mg Oral BID Rosario Whitfield MD      hydrOXYzine HCL  25 mg Oral TID PRN Natan Miguel PA-C      insulin glargine  35 Units Subcutaneous HS Rosario Whitfield MD      insulin lispro  1-5 Units Subcutaneous TID AC Natan Miguel PA-C      insulin lispro  1-5 Units Subcutaneous HS Natan Miguel PA-C      insulin lispro  10 Units Subcutaneous TID With Meals Rosario Whitfield MD      levETIRAcetam  500 mg Oral Q12H [facility] Rosario Whitfield MD      LORazepam  1 mg Intravenous Q6H PRN Rosario Whitfield MD      midodrine  10 mg Oral TID AC Rosario Whitfield MD      nicotine  1 patch Transdermal Daily Natan Miguel PA-C      ondansetron  4 mg Intravenous Q6H PRN Natan Miguel PA-C      pantoprazole  40 mg Oral Early Morning Natan Miguel PA-C      propranolol  10 mg Oral Q12H [facility] Natan Miguel PA-C       rifaximin  550 mg Oral Q12H [facility] Jerson Gibson MD      rOPINIRole  4 mg Oral BID Natan Miguel PA-C      senna-docusate sodium  1 tablet Oral QPM Natan Miguel PA-C      spironolactone  100 mg Oral Daily Natan Miguel PA-C      tamsulosin  0.4 mg Oral Daily With Dinner Natan Miguel PA-C          Today, Patient Was Seen By: Rosario Whitfield MD    ** Please Note: Dragon 360 Dictation voice to text software may have been used in the creation of this document. **

## 2024-06-09 NOTE — ASSESSMENT & PLAN NOTE
Initial potassium 3.0 -> 3.1> 3.6->4.5->3.4  Repleted with p.o. KCl 20 mEq  Continue to monitor with repeat labs in a.m.

## 2024-06-09 NOTE — PLAN OF CARE
Problem: Potential for Falls  Goal: Patient will remain free of falls  Description: INTERVENTIONS:  - Educate patient/family on patient safety including physical limitations  - Instruct patient to call for assistance with activity   - Consult OT/PT to assist with strengthening/mobility   - Keep Call bell within reach  - Keep bed low and locked with side rails adjusted as appropriate  - Keep care items and personal belongings within reach  - Initiate and maintain comfort rounds  - Make Fall Risk Sign visible to staff  - Offer Toileting every 2 Hours, in advance of need  - Initiate/Maintain bed alarm  - Obtain necessary fall risk management equipment: bed alarm  - Apply yellow socks and bracelet for high fall risk patients  - Consider moving patient to room near nurses station  Outcome: Progressing     Problem: Prexisting or High Potential for Compromised Skin Integrity  Goal: Skin integrity is maintained or improved  Description: INTERVENTIONS:  - Identify patients at risk for skin breakdown  - Assess and monitor skin integrity  - Assess and monitor nutrition and hydration status  - Monitor labs   - Assess for incontinence   - Turn and reposition patient  - Assist with mobility/ambulation  - Relieve pressure over bony prominences  - Avoid friction and shearing  - Provide appropriate hygiene as needed including keeping skin clean and dry  - Evaluate need for skin moisturizer/barrier cream  - Collaborate with interdisciplinary team   - Patient/family teaching  - Consider wound care consult   Outcome: Progressing     Problem: PAIN - ADULT  Goal: Verbalizes/displays adequate comfort level or baseline comfort level  Description: Interventions:  - Encourage patient to monitor pain and request assistance  - Assess pain using appropriate pain scale  - Administer analgesics based on type and severity of pain and evaluate response  - Implement non-pharmacological measures as appropriate and evaluate response  - Consider  cultural and social influences on pain and pain management  - Notify physician/advanced practitioner if interventions unsuccessful or patient reports new pain  Outcome: Progressing     Problem: INFECTION - ADULT  Goal: Absence or prevention of progression during hospitalization  Description: INTERVENTIONS:  - Assess and monitor for signs and symptoms of infection  - Monitor lab/diagnostic results  - Monitor all insertion sites, i.e. indwelling lines, tubes, and drains  - Monitor endotracheal if appropriate and nasal secretions for changes in amount and color  - [place] appropriate cooling/warming therapies per order  - Administer medications as ordered  - Instruct and encourage patient and family to use good hand hygiene technique  - Identify and instruct in appropriate isolation precautions for identified infection/condition  Outcome: Progressing  Goal: Absence of fever/infection during neutropenic period  Description: INTERVENTIONS:  - Monitor WBC    Outcome: Progressing     Problem: SAFETY ADULT  Goal: Patient will remain free of falls  Description: INTERVENTIONS:  - Educate patient/family on patient safety including physical limitations  - Instruct patient to call for assistance with activity   - Consult OT/PT to assist with strengthening/mobility   - Keep Call bell within reach  - Keep bed low and locked with side rails adjusted as appropriate  - Keep care items and personal belongings within reach  - Initiate and maintain comfort rounds  - Make Fall Risk Sign visible to staff  - Offer Toileting every 2 Hours, in advance of need  - Initiate/Maintain bed alarm  - Obtain necessary fall risk management equipment: bed alarm  - Apply yellow socks and bracelet for high fall risk patients  - Consider moving patient to room near nurses station  Outcome: Progressing  Goal: Maintain or return to baseline ADL function  Description: INTERVENTIONS:  -  Assess patient's ability to carry out ADLs; assess patient's baseline  for ADL function and identify physical deficits which impact ability to perform ADLs (bathing, care of mouth/teeth, toileting, grooming, dressing, etc.)  - Assess/evaluate cause of self-care deficits   - Assess range of motion  - Assess patient's mobility; develop plan if impaired  - Assess patient's need for assistive devices and provide as appropriate  - Encourage maximum independence but intervene and supervise when necessary  - Involve family in performance of ADLs  - Assess for home care needs following discharge   - Consider OT consult to assist with ADL evaluation and planning for discharge  - Provide patient education as appropriate  Outcome: Progressing  Goal: Maintains/Returns to pre admission functional level  Description: INTERVENTIONS:  - Perform AM-PAC 6 Click Basic Mobility/ Daily Activity assessment daily.  - Set and communicate daily mobility goal to care team and patient/family/caregiver.   - Collaborate with rehabilitation services on mobility goals if consulted  - Perform Range of Motion 2 times a day.  - Reposition patient every 2 hours.  - Dangle patient 2 times a day  - Stand patient 2 times a day  - Ambulate patient 2 times a day  - Out of bed to chair 2 times a day   - Out of bed for meals 2 times a day  - Out of bed for toileting  - Record patient progress and toleration of activity level   Outcome: Progressing     Problem: DISCHARGE PLANNING  Goal: Discharge to home or other facility with appropriate resources  Description: INTERVENTIONS:  - Identify barriers to discharge w/patient and caregiver  - Arrange for needed discharge resources and transportation as appropriate  - Identify discharge learning needs (meds, wound care, etc.)  - Arrange for interpretive services to assist at discharge as needed  - Refer to Case Management Department for coordinating discharge planning if the patient needs post-hospital services based on physician/advanced practitioner order or complex needs related to  functional status, cognitive ability, or social support system  Outcome: Progressing     Problem: Knowledge Deficit  Goal: Patient/family/caregiver demonstrates understanding of disease process, treatment plan, medications, and discharge instructions  Description: Complete learning assessment and assess knowledge base.  Interventions:  - Provide teaching at level of understanding  - Provide teaching via preferred learning methods  Outcome: Progressing     Problem: NEUROSENSORY - ADULT  Goal: Remains free of injury related to seizures activity  Description: INTERVENTIONS  - Maintain airway, patient safety  and administer oxygen as ordered  - Monitor patient for seizure activity, document and report duration and description of seizure to physician/advanced practitioner  - If seizure occurs,  ensure patient safety during seizure  - Reorient patient post seizure  - Seizure pads on all 4 side rails  - Instruct patient/family to notify RN of any seizure activity including if an aura is experienced  - Instruct patient/family to call for assistance with activity based on nursing assessment  - Administer anti-seizure medications if ordered    Outcome: Progressing     Problem: RESPIRATORY - ADULT  Goal: Achieves optimal ventilation and oxygenation  Description: INTERVENTIONS:  - Assess for changes in respiratory status  - Assess for changes in mentation and behavior  - Position to facilitate oxygenation and minimize respiratory effort  - Oxygen administered by appropriate delivery if ordered  - Initiate smoking cessation education as indicated  - Encourage broncho-pulmonary hygiene including cough, deep breathe, Incentive Spirometry  - Assess the need for suctioning and aspirate as needed  - Assess and instruct to report SOB or any respiratory difficulty  - Respiratory Therapy support as indicated  Outcome: Progressing     Problem: METABOLIC, FLUID AND ELECTROLYTES - ADULT  Goal: Glucose maintained within target  range  Description: INTERVENTIONS:  - Monitor Blood Glucose as ordered  - Assess for signs and symptoms of hyperglycemia and hypoglycemia  - Administer ordered medications to maintain glucose within target range  - Assess nutritional intake and initiate nutrition service referral as needed  Outcome: Progressing

## 2024-06-10 PROBLEM — F32.A DEPRESSION: Status: ACTIVE | Noted: 2024-06-10

## 2024-06-10 LAB
ANION GAP SERPL CALCULATED.3IONS-SCNC: 8 MMOL/L (ref 4–13)
BASOPHILS # BLD AUTO: 0 THOUSANDS/ÂΜL (ref 0–0.1)
BASOPHILS NFR BLD AUTO: 0 % (ref 0–1)
BUN SERPL-MCNC: 22 MG/DL (ref 5–25)
CALCIUM SERPL-MCNC: 8.3 MG/DL (ref 8.4–10.2)
CHLORIDE SERPL-SCNC: 101 MMOL/L (ref 96–108)
CO2 SERPL-SCNC: 29 MMOL/L (ref 21–32)
CREAT SERPL-MCNC: 0.89 MG/DL (ref 0.6–1.3)
EOSINOPHIL # BLD AUTO: 0.08 THOUSAND/ÂΜL (ref 0–0.61)
EOSINOPHIL NFR BLD AUTO: 2 % (ref 0–6)
ERYTHROCYTE [DISTWIDTH] IN BLOOD BY AUTOMATED COUNT: 16.3 % (ref 11.6–15.1)
GFR SERPL CREATININE-BSD FRML MDRD: 90 ML/MIN/1.73SQ M
GLUCOSE SERPL-MCNC: 263 MG/DL (ref 65–140)
GLUCOSE SERPL-MCNC: 273 MG/DL (ref 65–140)
GLUCOSE SERPL-MCNC: 276 MG/DL (ref 65–140)
GLUCOSE SERPL-MCNC: 285 MG/DL (ref 65–140)
GLUCOSE SERPL-MCNC: 388 MG/DL (ref 65–140)
GLUCOSE SERPL-MCNC: 431 MG/DL (ref 65–140)
HCT VFR BLD AUTO: 32.8 % (ref 36.5–49.3)
HGB BLD-MCNC: 11.7 G/DL (ref 12–17)
HISTIOCYTES NFR FLD: 47 %
IMM GRANULOCYTES # BLD AUTO: 0.02 THOUSAND/UL (ref 0–0.2)
IMM GRANULOCYTES NFR BLD AUTO: 0 % (ref 0–2)
LYMPHOCYTES # BLD AUTO: 0.7 THOUSANDS/ÂΜL (ref 0.6–4.47)
LYMPHOCYTES NFR BLD AUTO: 16 % (ref 14–44)
LYMPHOCYTES NFR BLD AUTO: 21 %
MCH RBC QN AUTO: 35.1 PG (ref 26.8–34.3)
MCHC RBC AUTO-ENTMCNC: 35.7 G/DL (ref 31.4–37.4)
MCV RBC AUTO: 99 FL (ref 82–98)
MONO+MESO NFR FLD MANUAL: 8 %
MONOCYTES # BLD AUTO: 0.28 THOUSAND/ÂΜL (ref 0.17–1.22)
MONOCYTES NFR BLD AUTO: 6 % (ref 4–12)
MONOCYTES NFR BLD AUTO: 9 %
NEUTROPHILS # BLD AUTO: 3.44 THOUSANDS/ÂΜL (ref 1.85–7.62)
NEUTS SEG NFR BLD AUTO: 15 %
NEUTS SEG NFR BLD AUTO: 76 % (ref 43–75)
NRBC BLD AUTO-RTO: 0 /100 WBCS
PATH REV: YES
PLATELET # BLD AUTO: 33 THOUSANDS/UL (ref 149–390)
PMV BLD AUTO: 12.2 FL (ref 8.9–12.7)
POTASSIUM SERPL-SCNC: 3.1 MMOL/L (ref 3.5–5.3)
RBC # BLD AUTO: 3.33 MILLION/UL (ref 3.88–5.62)
SODIUM SERPL-SCNC: 138 MMOL/L (ref 135–147)
TOTAL CELLS COUNTED SPEC: 100
WBC # BLD AUTO: 4.52 THOUSAND/UL (ref 4.31–10.16)

## 2024-06-10 PROCEDURE — 99254 IP/OBS CNSLTJ NEW/EST MOD 60: CPT | Performed by: PSYCHIATRY & NEUROLOGY

## 2024-06-10 PROCEDURE — 99232 SBSQ HOSP IP/OBS MODERATE 35: CPT | Performed by: FAMILY MEDICINE

## 2024-06-10 PROCEDURE — 80048 BASIC METABOLIC PNL TOTAL CA: CPT | Performed by: INTERNAL MEDICINE

## 2024-06-10 PROCEDURE — 82948 REAGENT STRIP/BLOOD GLUCOSE: CPT

## 2024-06-10 PROCEDURE — 85025 COMPLETE CBC W/AUTO DIFF WBC: CPT | Performed by: INTERNAL MEDICINE

## 2024-06-10 RX ORDER — POTASSIUM CHLORIDE 20 MEQ/1
40 TABLET, EXTENDED RELEASE ORAL ONCE
Status: COMPLETED | OUTPATIENT
Start: 2024-06-10 | End: 2024-06-10

## 2024-06-10 RX ORDER — INSULIN LISPRO 100 [IU]/ML
12 INJECTION, SOLUTION INTRAVENOUS; SUBCUTANEOUS
Status: DISCONTINUED | OUTPATIENT
Start: 2024-06-10 | End: 2024-06-11

## 2024-06-10 RX ORDER — INSULIN GLARGINE 100 [IU]/ML
38 INJECTION, SOLUTION SUBCUTANEOUS
Status: DISCONTINUED | OUTPATIENT
Start: 2024-06-10 | End: 2024-06-11

## 2024-06-10 RX ADMIN — GABAPENTIN 300 MG: 300 CAPSULE ORAL at 16:08

## 2024-06-10 RX ADMIN — GABAPENTIN 300 MG: 300 CAPSULE ORAL at 21:19

## 2024-06-10 RX ADMIN — INSULIN LISPRO 3 UNITS: 100 INJECTION, SOLUTION INTRAVENOUS; SUBCUTANEOUS at 08:04

## 2024-06-10 RX ADMIN — INSULIN LISPRO 3 UNITS: 100 INJECTION, SOLUTION INTRAVENOUS; SUBCUTANEOUS at 11:48

## 2024-06-10 RX ADMIN — SPIRONOLACTONE 100 MG: 100 TABLET, FILM COATED ORAL at 08:03

## 2024-06-10 RX ADMIN — MIDODRINE HYDROCHLORIDE 10 MG: 5 TABLET ORAL at 06:27

## 2024-06-10 RX ADMIN — MIDODRINE HYDROCHLORIDE 10 MG: 5 TABLET ORAL at 16:08

## 2024-06-10 RX ADMIN — FUROSEMIDE 40 MG: 40 TABLET ORAL at 08:03

## 2024-06-10 RX ADMIN — FERROUS SULFATE TAB 325 MG (65 MG ELEMENTAL FE) 325 MG: 325 (65 FE) TAB at 08:03

## 2024-06-10 RX ADMIN — INSULIN LISPRO 10 UNITS: 100 INJECTION, SOLUTION INTRAVENOUS; SUBCUTANEOUS at 08:05

## 2024-06-10 RX ADMIN — LEVETIRACETAM 500 MG: 500 TABLET, FILM COATED ORAL at 21:19

## 2024-06-10 RX ADMIN — HYDROCODONE BITARTRATE AND ACETAMINOPHEN 1 TABLET: 5; 325 TABLET ORAL at 16:08

## 2024-06-10 RX ADMIN — MIDODRINE HYDROCHLORIDE 10 MG: 5 TABLET ORAL at 11:50

## 2024-06-10 RX ADMIN — RIFAXIMIN 550 MG: 550 TABLET ORAL at 08:02

## 2024-06-10 RX ADMIN — GABAPENTIN 300 MG: 300 CAPSULE ORAL at 08:03

## 2024-06-10 RX ADMIN — LEVETIRACETAM 500 MG: 500 TABLET, FILM COATED ORAL at 08:03

## 2024-06-10 RX ADMIN — ROPINIROLE HYDROCHLORIDE 4 MG: 1 TABLET, FILM COATED ORAL at 21:18

## 2024-06-10 RX ADMIN — POTASSIUM CHLORIDE 40 MEQ: 1500 TABLET, EXTENDED RELEASE ORAL at 11:53

## 2024-06-10 RX ADMIN — ROPINIROLE HYDROCHLORIDE 4 MG: 1 TABLET, FILM COATED ORAL at 08:03

## 2024-06-10 RX ADMIN — INSULIN GLARGINE 38 UNITS: 100 INJECTION, SOLUTION SUBCUTANEOUS at 21:19

## 2024-06-10 RX ADMIN — SENNOSIDES AND DOCUSATE SODIUM 1 TABLET: 8.6; 5 TABLET ORAL at 17:15

## 2024-06-10 RX ADMIN — HYDROCORTISONE 25 MG: 10 TABLET ORAL at 17:14

## 2024-06-10 RX ADMIN — PANTOPRAZOLE SODIUM 40 MG: 40 TABLET, DELAYED RELEASE ORAL at 06:27

## 2024-06-10 RX ADMIN — INSULIN LISPRO 5 UNITS: 100 INJECTION, SOLUTION INTRAVENOUS; SUBCUTANEOUS at 21:19

## 2024-06-10 RX ADMIN — INSULIN LISPRO 12 UNITS: 100 INJECTION, SOLUTION INTRAVENOUS; SUBCUTANEOUS at 16:10

## 2024-06-10 RX ADMIN — INSULIN LISPRO 12 UNITS: 100 INJECTION, SOLUTION INTRAVENOUS; SUBCUTANEOUS at 11:49

## 2024-06-10 RX ADMIN — HYDROCORTISONE 25 MG: 10 TABLET ORAL at 08:03

## 2024-06-10 RX ADMIN — DICYCLOMINE HYDROCHLORIDE 20 MG: 20 TABLET ORAL at 11:50

## 2024-06-10 RX ADMIN — DICYCLOMINE HYDROCHLORIDE 20 MG: 20 TABLET ORAL at 06:28

## 2024-06-10 RX ADMIN — HYDROXYZINE HYDROCHLORIDE 25 MG: 25 TABLET, FILM COATED ORAL at 08:07

## 2024-06-10 RX ADMIN — HYDROCODONE BITARTRATE AND ACETAMINOPHEN 1 TABLET: 5; 325 TABLET ORAL at 08:06

## 2024-06-10 RX ADMIN — HYDROXYZINE HYDROCHLORIDE 25 MG: 25 TABLET, FILM COATED ORAL at 16:08

## 2024-06-10 RX ADMIN — PROPRANOLOL HYDROCHLORIDE 10 MG: 10 TABLET ORAL at 08:03

## 2024-06-10 RX ADMIN — RIFAXIMIN 550 MG: 550 TABLET ORAL at 21:19

## 2024-06-10 RX ADMIN — TAMSULOSIN HYDROCHLORIDE 0.4 MG: 0.4 CAPSULE ORAL at 16:09

## 2024-06-10 RX ADMIN — NICOTINE 1 PATCH: 14 PATCH, EXTENDED RELEASE TRANSDERMAL at 08:05

## 2024-06-10 RX ADMIN — INSULIN LISPRO 4 UNITS: 100 INJECTION, SOLUTION INTRAVENOUS; SUBCUTANEOUS at 16:10

## 2024-06-10 NOTE — CASE MANAGEMENT
Case Management Progress Note    Patient name Foreign Armando  Location 7T /7T -01 MRN [id number]  : 1960 Date 6/10/2024       LOS (days): 5  Geometric Mean LOS (GMLOS) (days): 3.3  Days to GMLOS:-1.9        OBJECTIVE:        Current admission status: Inpatient  Preferred Pharmacy:   [id number]  [place] - [address]  [address] [place] [address]  Phone: [phone] Fax: [phone]    [id number] [place] - [address] [facility] [address]  [address] [facility] [address]  Phone: [phone] Fax: [phone]    [id number] [place] [facility] [address]  [address] [facility] [address] [facility] [place] [address]  Phone: [phone] Fax: [phone]    [id number] [address] [facility]  [address]  [facility] [address]  Phone: [phone] Fax: [phone]    Primary Care Provider: No primary care provider on file.    Primary Insurance: BLUE CROSS  Secondary Insurance:     PROGRESS NOTE:    Met with patient at bedside.  Patient tearful due to mother's recent death.  Father and mother are in [place].    Discussed with patient discharge planning and need to talk to SO for plan.  Patient called Candace Kim [phone] from his cell phone and this cM was able to talk to her.  Per Candace she is no longer with patient and he can not return to live with her.  Candace reports Preet always doesn't tell the truth.  Confirmed that Candace will not  patient for discharge.    Other names and numbers found in EPIC:    Parents are Vidal ELICIALorieAbraham [phone] called and left VM mother was Heather ELICIALorieAbraham [phone].    Found number for Nicole Armando [phone].  Nicole reports she is patient's ex-wife and has had no contact in past 5 years.  They were marries 32 years prior to divorce and had 2 sons and Nicole had a 5 year old daughter when she marries Preet from a previous relationship.  Nicole confirmed  that patient's mom has recently .  Sons have no contact with patient since parents divorce.    Call to Sanna at [facility] left  requesting CB.      Call to [facility] [phone] left  requesting CB.    Met with patient again in afternoon to ask  for additional contacts.  Patient called woman on his phone listed as Andree Brown [phone].  Spoke with Andree and she reports she is patient's SO and lives in [place].  Andree reports she can not  patient.  Andree very short with CM and states we can get him to her.    CM department following thru discharge

## 2024-06-10 NOTE — PLAN OF CARE
Problem: PAIN - ADULT  Goal: Verbalizes/displays adequate comfort level or baseline comfort level  Description: Interventions:  - Encourage patient to monitor pain and request assistance  - Assess pain using appropriate pain scale  - Administer analgesics based on type and severity of pain and evaluate response  - Implement non-pharmacological measures as appropriate and evaluate response  - Consider cultural and social influences on pain and pain management  - Notify physician/advanced practitioner if interventions unsuccessful or patient reports new pain  Outcome: Progressing     Problem: INFECTION - ADULT  Goal: Absence or prevention of progression during hospitalization  Description: INTERVENTIONS:  - Assess and monitor for signs and symptoms of infection  - Monitor lab/diagnostic results  - Monitor all insertion sites, i.e. indwelling lines, tubes, and drains  - Monitor endotracheal if appropriate and nasal secretions for changes in amount and color  - [place] appropriate cooling/warming therapies per order  - Administer medications as ordered  - Instruct and encourage patient and family to use good hand hygiene technique  - Identify and instruct in appropriate isolation precautions for identified infection/condition  Outcome: Progressing  Goal: Absence of fever/infection during neutropenic period  Description: INTERVENTIONS:  - Monitor WBC    Outcome: Progressing     Problem: SAFETY ADULT  Goal: Patient will remain free of falls  Description: INTERVENTIONS:  - Educate patient/family on patient safety including physical limitations  - Instruct patient to call for assistance with activity   - Consult OT/PT to assist with strengthening/mobility   - Keep Call bell within reach  - Keep bed low and locked with side rails adjusted as appropriate  - Keep care items and personal belongings within reach  - Initiate and maintain comfort rounds  - Make Fall Risk Sign visible to staff  - Apply yellow socks and bracelet  for high fall risk patients  - Consider moving patient to room near nurses station  Outcome: Progressing  Goal: Maintain or return to baseline ADL function  Description: INTERVENTIONS:  -  Assess patient's ability to carry out ADLs; assess patient's baseline for ADL function and identify physical deficits which impact ability to perform ADLs (bathing, care of mouth/teeth, toileting, grooming, dressing, etc.)  - Assess/evaluate cause of self-care deficits   - Assess range of motion  - Assess patient's mobility; develop plan if impaired  - Assess patient's need for assistive devices and provide as appropriate  - Encourage maximum independence but intervene and supervise when necessary  - Involve family in performance of ADLs  - Assess for home care needs following discharge   - Consider OT consult to assist with ADL evaluation and planning for discharge  - Provide patient education as appropriate  Outcome: Progressing  Goal: Maintains/Returns to pre admission functional level  Description: INTERVENTIONS:  - Perform AM-PAC 6 Click Basic Mobility/ Daily Activity assessment daily.  - Set and communicate daily mobility goal to care team and patient/family/caregiver.   - Collaborate with rehabilitation services on mobility goals if consulted  - Out of bed for toileting  - Record patient progress and toleration of activity level   Outcome: Progressing     Problem: DISCHARGE PLANNING  Goal: Discharge to home or other facility with appropriate resources  Description: INTERVENTIONS:  - Identify barriers to discharge w/patient and caregiver  - Arrange for needed discharge resources and transportation as appropriate  - Identify discharge learning needs (meds, wound care, etc.)  - Arrange for interpretive services to assist at discharge as needed  - Refer to Case Management Department for coordinating discharge planning if the patient needs post-hospital services based on physician/advanced practitioner order or complex needs  related to functional status, cognitive ability, or social support system  Outcome: Progressing     Problem: Knowledge Deficit  Goal: Patient/family/caregiver demonstrates understanding of disease process, treatment plan, medications, and discharge instructions  Description: Complete learning assessment and assess knowledge base.  Interventions:  - Provide teaching at level of understanding  - Provide teaching via preferred learning methods  Outcome: Progressing     Problem: NEUROSENSORY - ADULT  Goal: Remains free of injury related to seizures activity  Description: INTERVENTIONS  - Maintain airway, patient safety  and administer oxygen as ordered  - Monitor patient for seizure activity, document and report duration and description of seizure to physician/advanced practitioner  - If seizure occurs,  ensure patient safety during seizure  - Reorient patient post seizure  - Seizure pads on all 4 side rails  - Instruct patient/family to notify RN of any seizure activity including if an aura is experienced  - Instruct patient/family to call for assistance with activity based on nursing assessment  - Administer anti-seizure medications if ordered    Outcome: Progressing     Problem: RESPIRATORY - ADULT  Goal: Achieves optimal ventilation and oxygenation  Description: INTERVENTIONS:  - Assess for changes in respiratory status  - Assess for changes in mentation and behavior  - Position to facilitate oxygenation and minimize respiratory effort  - Oxygen administered by appropriate delivery if ordered  - Initiate smoking cessation education as indicated  - Encourage broncho-pulmonary hygiene including cough, deep breathe, Incentive Spirometry  - Assess the need for suctioning and aspirate as needed  - Assess and instruct to report SOB or any respiratory difficulty  - Respiratory Therapy support as indicated  Outcome: Progressing     Problem: METABOLIC, FLUID AND ELECTROLYTES - ADULT  Goal: Glucose maintained within target  range  Description: INTERVENTIONS:  - Monitor Blood Glucose as ordered  - Assess for signs and symptoms of hyperglycemia and hypoglycemia  - Administer ordered medications to maintain glucose within target range  - Assess nutritional intake and initiate nutrition service referral as needed  Outcome: Progressing

## 2024-06-10 NOTE — ASSESSMENT & PLAN NOTE
Patient appears to be very upset and tearful.  Reported that his mother passed away last night.  Unsure if this is true or not.  Patient is very tearful.  Will ask psych to evaluate the patient.

## 2024-06-10 NOTE — PROGRESS NOTES
[facility]  Progress Note  Name: Foreign Armando I  MRN: [id number]  Unit/Bed#: 7T [facility] 705-01 I Date of Admission: 6/3/2024   Date of Service: 6/10/2024 I Hospital Day: 5    Assessment & Plan   * Pleural effusion on right  Assessment & Plan  Patient presented with shortness of breath   BNP WNL, CT chest A/P show right pleural effusion, no evidence of pulmonary congestion   s/p thoracentesis on 6/4/2024 with 600 cc clear yellow fluid  Pleural fluid analysis show LDH 62, total protein 7.6  Plasma total protein is 7.1  Per light's criteria, pleural fluid is transudative  Cytology was negative for malignancy  Patient is medically clear  Neuropsych on board patient is not competent to make his own decision    on board for safe disposition planning        Depression  Assessment & Plan  Patient appears to be very upset and tearful.  Reported that his mother passed away last night.  Unsure if this is true or not.  Patient is very tearful.  Will ask psych to evaluate the patient.    Chest pain  Assessment & Plan  Patient complains of chest pain which was started during his seizure  EKG showed no acute ST-T changes  Troponin negative for 2 times    Seizure-like activity (HCC)  Assessment & Plan  Patient had RRT for seizure-like activity this morning  Patient had similar episode when he was in [facility] in 10/6/23 -CT brain, CTA head and neck, MRI brain and EEG was negative at that time  Discontinue tramadol due to the risk of lowered seizure threshold  Neurology consult -since patient was previously on Keppra 500 mg twice daily continue home dose.  Reported to DMV.    Nephrolithiasis  Assessment & Plan  Patient stated he might have passed a stone when he urinates and might be causing painful urination  Per patient, he had history of kidney stone   UA showed no evidence of UTI  CT A/P showed simple renal cyst(s). Nonobstructing left renal nephrolithiasis. No hydronephrosis.    BMP showed normal renal function  Continue Flomax 0.4 mg daily    Swelling of lower extremity  Assessment & Plan  Patient complains of left lower extremity swelling and tenderness  Venous duplex of bilateral lower extremities negative for DVT  Patient had history of neuropathy and currently on gabapentin 300 mg 3 times daily  For restless leg syndrome, patient is on Requip 4 mg daily.    Volume overload  Assessment & Plan  Most likely due to liver cirrhosis   BNP WNL, CT chest showed no evidence of pulmonary congestion but pleural effusion which was tapped yesterday  Recent stress test on 7/13/2023 showed LVEF 70%, and attenuation artifact   Patient received IV Lasix 40 mg once in ED  Currently on p.o. Lasix 40 mg daily and spironolactone 100 mg daily as BP allows.  Monitor volume status      Adrenal insufficiency (HCC)  Assessment & Plan  Patient was on Cortef 10 mg p.o. every morning and 5 mg p.o. nightly  Endocrinology consult placed -appreciate recommendations. - Please increase cortef to 50mg TID for 2 days. Taper down to 50mg BID for 3 days after, then 25mg BID for 3 days after, down to home dose 10/mg after   Agreed with increasing midodrine dose.  Currently on on 25 twice daily starting today    Type 2 diabetes mellitus with obesity  (HCC)  Assessment & Plan  Lab Results   Component Value Date    HGBA1C 6.5 (H) 05/05/2024       Recent Labs     06/09/24  2113 06/10/24  0351 06/10/24  0546 06/10/24  1056   POCGLU 493* 263* 276* 285*         Blood Sugar Average: Last 72 hrs:  (P) 314.8    Placed on [facility] type II diet  Obtain Accu-Cheks before meals and at bedtime with Humalog correction dose before meals and at bedtime  Increase Lantus to 35 units at bedtime,  lispro to 10 unit 3 times daily AC  His recent hemoglobin A1c was decent.  Currently patient high blood sugars most likely triggered by stress dose steroid for his adrenal insufficiency.  Hopefully blood sugar to get improved after tapering down  steroid.  Uptitrating the dose of steroids.   Monitor blood sugar closely since the steroids is tapered down    Smoking  Assessment & Plan  Will give nicotine 14 mg transdermal daily    Thrombocytopenia (HCC)  Assessment & Plan  Due to cirrhosis, no evidence of bleeding  Platelet is downtrending, hold off Lovenox for DVT prophylaxis   Patient has no issue with ambulation -encourage ambulation   Transfuse for plt <10 or <50 w/bleeding.   Monitor clinically and follow-up CBC.  Platelets today 33    Cirrhosis (HCC)  Assessment & Plan  CT chest A/P with contrast showed cirrhosis with signs of portal hypertension as evidenced by gastroesophageal varices. Stable upper abdominal lymphadenopathy presumed secondary to underlying liver pathology although low-grade lymphoproliferative disorder is not fully excluded.Stable diffuse mesenteric edema   Gastroenterology consult appreciate recommendations.    Continue prehospital spironolactone 100 mg daily, lactulose 20 g p.o. daily-lactulose was discontinued previously by GI due to diarrhea.  Currently off of lactulose and no signs of hepatic encephalopathy  Patient is very upset that he is not able to get the meat with the dietary restriction.  Will change to level 3.  Discontinue the salt restriction and monitor volume status               VTE Pharmacologic Prophylaxis: VTE Score: 5 contraindicated due to thrombocytopenia  Mobility:   Basic Mobility Inpatient Raw Score: 18  JH-HLM Goal: 6: Walk 10 steps or more  JH-HLM Achieved: 7: Walk 25 feet or more  JH-HLM Goal NOT achieved. Continue with multidisciplinary rounding and encourage appropriate mobility to improve upon JH-HLM goals.    Patient Centered Rounds: I performed bedside rounds with nursing staff today.   Discussions with Specialists or Other Care Team Provider:     Education and Discussions with Family / Patient: Updated patient    Total Time Spent on Date of Encounter in care of patient: 35 mins. This time was spent  on one or more of the following: performing physical exam; counseling and coordination of care; obtaining or reviewing history; documenting in the medical record; reviewing/ordering tests, medications or procedures; communicating with other healthcare professionals and discussing with patient's family/caregivers.    Current Length of Stay: 5 day(s)  Current Patient Status: Inpatient   Certification Statement: The patient will continue to require additional inpatient hospital stay due to pending safe discharge planning  Discharge Plan:     Code Status: Level 1 - Full Code    Subjective:   Patient seen and examined.  Patient is very tearful.  Patient reported that his mom passed away yesterday.  Discussed with nursing.  Unsure if this is a true statement or not but patient is very tearful and he is on call with his father and siblings.  Patient did not have capacity currently he is waiting for safe placement  Patient is also very upset that he is not getting what he wants from the cafeteria.  He reported that he lost weight.  He is asking for more meat.  Discussed with nutritional service.  Will change the diet to level 3 and take the 2 g sodium restriction    Objective:     Vitals:   Temp (24hrs), Av.7 °F (36.5 °C), Min:96.9 °F (36.1 °C), Max:98.5 °F (36.9 °C)    Temp:  [96.9 °F (36.1 °C)-98.5 °F (36.9 °C)] 97.6 °F (36.4 °C)  HR:  [48-58] 48  Resp:  [16-20] 20  BP: (114-118)/(65-73) 114/66  SpO2:  [94 %-96 %] 94 %  Body mass index is 24.58 kg/m².     Input and Output Summary (last 24 hours):     Intake/Output Summary (Last 24 hours) at 6/10/2024 1149  Last data filed at 6/10/2024 0929  Gross per 24 hour   Intake 960 ml   Output --   Net 960 ml       Physical Exam:   Physical Exam  Constitutional:       Comments: Hard of hearing   HENT:      Head: Normocephalic.      Nose: Nose normal.   Eyes:      General: No scleral icterus.  Cardiovascular:      Rate and Rhythm: Normal rate.   Pulmonary:      Effort: Pulmonary  effort is normal.      Comments: Decreased breath sounds bilateral  Abdominal:      General: There is distension.   Musculoskeletal:         General: Normal range of motion.   Skin:     General: Skin is warm.      Coloration: Skin is not jaundiced.      Findings: No bruising.   Neurological:      Mental Status: He is alert and oriented to person, place, and time. Mental status is at baseline.      Comments: No asterixis   Psychiatric:      Comments: Tearful          Additional Data:     Labs:  Results from last 7 days   Lab Units 06/10/24  0559 06/08/24  0450 06/07/24  0445   WBC Thousand/uL 4.52   < > 5.14   HEMOGLOBIN g/dL 11.7*   < > 9.6*   HEMATOCRIT % 32.8*   < > 29.0*   PLATELETS Thousands/uL 33*   < > 30*   BANDS PCT %  --   --  3   SEGS PCT % 76*   < >  --    LYMPHO PCT % 16   < > 7*   MONO PCT % 6   < > 4   EOS PCT % 2   < > 0    < > = values in this interval not displayed.     Results from last 7 days   Lab Units 06/10/24  0559 06/07/24  0445 06/06/24  0558   SODIUM mmol/L 138   < > 134*   POTASSIUM mmol/L 3.1*   < > 4.5   CHLORIDE mmol/L 101   < > 103   CO2 mmol/L 29   < > 20*   BUN mg/dL 22   < > 13   CREATININE mg/dL 0.89   < > 0.88   ANION GAP mmol/L 8   < > 11   CALCIUM mg/dL 8.3*   < > 8.8   ALBUMIN g/dL  --   --  3.1*   TOTAL BILIRUBIN mg/dL  --   --  2.28*   ALK PHOS U/L  --   --  153*   ALT U/L  --   --  41   AST U/L  --   --  34   GLUCOSE RANDOM mg/dL 273*   < > 271*    < > = values in this interval not displayed.     Results from last 7 days   Lab Units 06/03/24  1847   INR  1.49*     Results from last 7 days   Lab Units 06/10/24  1056 06/10/24  0546 06/10/24  0351 06/09/24  2113 06/09/24  1548 06/09/24  1113 06/09/24  0555 06/08/24  2105 06/08/24  1531 06/08/24  1111 06/08/24  0544 06/07/24  2125   POC GLUCOSE mg/dl 285* 276* 263* 493* 384* 291* 325* 252* 332* 304* 272* 338*               Lines/Drains:  Invasive Devices       Peripheral Intravenous Line  Duration             Peripheral IV  06/06/24 Distal;Dorsal (posterior);Left Forearm 4 days    Peripheral IV 06/06/24 Dorsal (posterior);Right Hand 4 days                          Imaging: Reviewed radiology reports from this admission including: chest xray    Recent Cultures (last 7 days):   Results from last 7 days   Lab Units 06/04/24  1244   GRAM STAIN RESULT  Rare Polys  No bacteria seen   BODY FLUID CULTURE, STERILE  No growth       Last 24 Hours Medication List:   Current Facility-Administered Medications   Medication Dose Route Frequency Provider Last Rate    dicyclomine  20 mg Oral BID before breakfast/lunch Natan Miguel PA-C      ferrous sulfate  325 mg Oral Daily With Breakfast Natan Miguel PA-C      furosemide  40 mg Oral Daily Rosario Whitfield MD      gabapentin  300 mg Oral TID Natan Miguel PA-C      HYDROcodone-acetaminophen  1 tablet Oral Q6H PRN Rosario Whitfield MD      [START ON 6/13/2024] hydrocortisone  10 mg Oral TID Rosario Whitfield MD      hydrocortisone  25 mg Oral BID Rosario Whitfield MD      hydrOXYzine HCL  25 mg Oral TID PRN Natan Miguel PA-C      insulin glargine  38 Units Subcutaneous HS Lili Morfin MD      insulin lispro  1-5 Units Subcutaneous TID AC Natan Miguel PA-C      insulin lispro  1-5 Units Subcutaneous HS Natan Miguel PA-C      insulin lispro  12 Units Subcutaneous TID With Meals Lili Morfin MD      levETIRAcetam  500 mg Oral Q12H [facility] Rosario Whitfield MD      LORazepam  1 mg Intravenous Q6H PRN Rosario Whitfield MD      midodrine  10 mg Oral TID AC Rosario Whitfield MD      nicotine  1 patch Transdermal Daily Natan Miguel PA-C      ondansetron  4 mg Intravenous Q6H PRN Natan Miguel PA-C      pantoprazole  40 mg Oral Early Morning Natan Miguel PA-C      propranolol  10 mg Oral Q12H SANDRA Natan Miguel PA-C      rifaximin  550 mg Oral Q12H [facility] Jerson Gibson MD      rOPINIRole  4 mg Oral BID Natan Miguel PA-C      senna-docusate sodium  1 tablet Oral QPM Natan Miguel PA-C      spironolactone  100 mg Oral Daily Natan Miguel PA-C      tamsulosin   0.4 mg Oral Daily With Dinner Natan Miguel PA-C          Today, Patient Was Seen By: Lili Morfin MD    **Please Note: This note may have been constructed using a voice recognition system.**

## 2024-06-10 NOTE — PROGRESS NOTES
"Patient Name: Foreign Armando  Patient MRN: [id number]  Date: 06/10/24  Service: [facility]    Subjective   Labs, notes reviewed. No events over the weekend. HR in low 50s. POC glucose elevated in setting of stress dosed steroids. Steroids being tapered. Offers no new complaints. He is unhappy that he is still admitted. States his BMs have slowed down since stopping lactulose, having 3-4/day. Denies abdominal pain but is tender on exam (unchanged from last week). No SOB, [place].   CM having difficulty with placement, also may need guardianship appointed.     Vitals  Blood pressure 114/66, pulse (!) 48, temperature 97.6 °F (36.4 °C), temperature source Temporal, resp. rate 20, height 5' 7.01\" (1.702 m), weight 71.2 kg (156 lb 15.5 oz), SpO2 94%.  Physical Exam:     General Appearance:    Awake, alert, oriented x3, no distress, well developed, well    nourished   Head:    Normocephalic without obvious abnormality   Eyes:    PERRL, conjunctiva/corneas clear, EOM's intact        Neck:   Supple, no adenopathy   Throat:   Mucous membranes moist   Lungs:     Clear to auscultation bilaterally, no wheezing or rhonchi   Heart:    Regular rate and rhythm, S1 and S2 normal, no murmur   Abdomen:     Soft, diffuse ttp(unchanged), non-distended. bowel sounds active. No    masses, rebound +guarding.    Extremities:   Extremities without edema   Psych  Derm:   Normal affect    No jaundice   Neurologic:   CNII-XII grossly intact. Speech intact. No asterixis +tremor noted in L hand         Laboratory Studies  Results from last 7 days   Lab Units 06/10/24  0559 06/09/24  0542 06/08/24  0450 06/07/24  0445 06/06/24  0558 06/05/24  0609 06/04/24  0759 06/03/24  1847   WBC Thousand/uL 4.52 3.92* 4.63 5.14 5.27 3.14* 3.25* 5.02   HEMOGLOBIN g/dL 11.7* 9.8* 9.4* 9.6* 9.3* 9.1* 8.8* 9.6*   HEMATOCRIT % 32.8* 29.0* 27.3* 29.0* 26.8* 25.9* 26.4* 28.7*   PLATELETS Thousands/uL 33* 25* 27* 30* 33* 32* 37* 42*   INR   --   --   -- "   --   --   --   --  1.49*     Results from last 7 days   Lab Units 06/09/24  0542 06/08/24  0450 06/07/24  0445 06/06/24  0558 06/05/24  0609 06/04/24  0759 06/03/24  1847   SODIUM mmol/L 135 135 132* 134* 139   < > 135   POTASSIUM mmol/L 3.4* 4.2 4.4 4.5 3.6   < > 3.0*   CHLORIDE mmol/L 102 105 103 103 107   < > 106   CO2 mmol/L 25 23 23 20* 26   < > 19*   BUN mg/dL 23 22 16 13 13   < > 11   CREATININE mg/dL 0.89 0.91 0.83 0.88 0.81   < > 0.87   CALCIUM mg/dL 8.1* 8.2* 8.1* 8.8 8.1*   < > 8.0*   ALBUMIN g/dL  --   --   --  3.1*  --   --  2.7*   TOTAL BILIRUBIN mg/dL  --   --   --  2.28*  --   --  2.18*   ALK PHOS U/L  --   --   --  153*  --   --  135*   ALT U/L  --   --   --  41  --   --  35   AST U/L  --   --   --  34  --   --  31    < > = values in this interval not displayed.         Imaging and Other Studies      Inhouse Medications     Current Facility-Administered Medications:     dicyclomine (BENTYL) tablet 20 mg, 20 mg, Oral, BID before breakfast/lunch, 20 mg at 06/10/24 0628    ferrous sulfate tablet 325 mg, 325 mg, Oral, Daily With Breakfast, 325 mg at 06/09/24 0811    furosemide (LASIX) tablet 40 mg, 40 mg, Oral, Daily, 40 mg at 06/09/24 0811    gabapentin (NEURONTIN) capsule 300 mg, 300 mg, Oral, TID, 300 mg at 06/09/24 2128    HYDROcodone-acetaminophen (NORCO) 5-325 mg per tablet 1 tablet, 1 tablet, Oral, Q6H PRN, 1 tablet at 06/09/24 1428    [START ON 6/13/2024] hydrocortisone (CORTEF) tablet 10 mg, 10 mg, Oral, TID    hydrocortisone (CORTEF) tablet 25 mg, 25 mg, Oral, BID    hydrocortisone (CORTEF) tablet 50 mg, 50 mg, Oral, BID, 50 mg at 06/09/24 1700    hydrOXYzine HCL (ATARAX) tablet 25 mg, 25 mg, Oral, TID PRN, 25 mg at 06/09/24 1428    insulin glargine (LANTUS) subcutaneous injection 35 Units 0.35 mL, 35 Units, Subcutaneous, HS, 35 Units at 06/09/24 5897    insulin lispro (HumALOG/ADMELOG) 100 units/mL subcutaneous injection 1-5 Units, 1-5 Units, Subcutaneous, TID AC, 4 Units at 06/09/24 0923  **AND** Fingerstick Glucose (POCT), , , TID AC    insulin lispro (HumALOG/ADMELOG) 100 units/mL subcutaneous injection 1-5 Units, 1-5 Units, Subcutaneous, HS, 5 Units at 06/09/24 2127    insulin lispro (HumALOG/ADMELOG) 100 units/mL subcutaneous injection 10 Units, 10 Units, Subcutaneous, TID With Meals, 10 Units at 06/09/24 1555    levETIRAcetam (KEPPRA) tablet 500 mg, 500 mg, Oral, Q12H SANDRA, 500 mg at 06/09/24 2129    LORazepam (ATIVAN) injection 1 mg, 1 mg, Intravenous, Q6H PRN    midodrine (PROAMATINE) tablet 10 mg, 10 mg, Oral, TID AC, 10 mg at 06/10/24 0627    nicotine (NICODERM CQ) 14 mg/24hr TD 24 hr patch 1 patch, 1 patch, Transdermal, Daily, 1 patch at 06/09/24 0813    ondansetron (ZOFRAN) injection 4 mg, 4 mg, Intravenous, Q6H PRN    pantoprazole (PROTONIX) EC tablet 40 mg, 40 mg, Oral, Early Morning, 40 mg at 06/10/24 0627    propranolol (INDERAL) tablet 10 mg, 10 mg, Oral, Q12H SANDRA, 10 mg at 06/09/24 0811    rifaximin (XIFAXAN) tablet 550 mg, 550 mg, Oral, Q12H SANDRA, 550 mg at 06/09/24 2128    rOPINIRole (REQUIP) tablet 4 mg, 4 mg, Oral, BID, 4 mg at 06/09/24 2128    senna-docusate sodium (SENOKOT S) 8.6-50 mg per tablet 1 tablet, 1 tablet, Oral, QPM, 1 tablet at 06/09/24 1700    spironolactone (ALDACTONE) tablet 100 mg, 100 mg, Oral, Daily, 100 mg at 06/09/24 0812    tamsulosin (FLOMAX) capsule 0.4 mg, 0.4 mg, Oral, Daily With Dinner, 0.4 mg at 06/09/24 1556      Assessment/Plan:    Decompensated cirrhosis complicated by ascites, hepatic hydrothorax, h/o hepatic encephalopathy, varices noted on imaging only, pancytopenia, coagulopathy.    MELD-Na = 17 (using INR from 6/3)  MELD = 14  Admitted with volume overload and hepatic hydrothorax. CT imaging suggested mild ascites, stable diffuse mesenteric edema, moderate to large sized R pleural effusion s/p 600cc tap. Currently on PO lasix 40mg daily, spironolactone 100mg daily as BP allows. Continue 2g Na diet. Paracentesis as needed, no need at this time.    History of Hepatic encephalopathy: currently controlled. Stools more formed off lactulose. Xifaxan in place of lactulose to prevent HE. Monitor clinically.   Esophageal varices: Last EGD 2/2023 no varices seen; CT imaging shows extensive gastroesophageal varices. Will need outpatient surveillance EGD. Currently on propranolol 10mg daily.    Labs shows chronic pancytopenia. Transfuse for hgb <7 and plt <10 or <50 w/bleeding.   Hepatoma screening UTD: No masses seen on imaging this admission. AFP 2.17.   Colon Cancer screening: Due for repeat colonoscopy with 2 day prep given history of colon cancer and inadequate prep in 2/2023. Patient has described seeing blood in loose stool; suspect outlet bleed. Can plan outpatient colonoscopy after discharge. Fecal calprotectin pending (doubt IBD).   May benefit from bone density study in setting of cirrhosis and chronic steroid use.   1.3 cm pancreatic neck hypodensity on CT imaging, appears to be noted on previous imaging. Past imaging also suggesting chronic pancreatitis. Suggest further imaging with pancreatic protocol as outpatient. MRI Abd not available at [facility]. Pancreatic elastase pending to rule out EPI.         Sarah Barnett PA-C

## 2024-06-10 NOTE — ASSESSMENT & PLAN NOTE
Patient had RRT for seizure-like activity this morning  Patient had similar episode when he was in [facility] in 10/6/23 -CT brain, CTA head and neck, MRI brain and EEG was negative at that time  Discontinue tramadol due to the risk of lowered seizure threshold  Neurology consult -since patient was previously on Keppra 500 mg twice daily continue home dose.  Reported to DMV.

## 2024-06-10 NOTE — ASSESSMENT & PLAN NOTE
Lab Results   Component Value Date    HGBA1C 6.5 (H) 05/05/2024       Recent Labs     06/09/24  2113 06/10/24  0351 06/10/24  0546 06/10/24  1056   POCGLU 493* 263* 276* 285*         Blood Sugar Average: Last 72 hrs:  (P) 314.8    Placed on [facility] type II diet  Obtain Accu-Cheks before meals and at bedtime with Humalog correction dose before meals and at bedtime  Increase Lantus to 35 units at bedtime,  lispro to 10 unit 3 times daily AC  His recent hemoglobin A1c was decent.  Currently patient high blood sugars most likely triggered by stress dose steroid for his adrenal insufficiency.  Hopefully blood sugar to get improved after tapering down steroid.  Uptitrating the dose of steroids.   Monitor blood sugar closely since the steroids is tapered down

## 2024-06-10 NOTE — PLAN OF CARE
Problem: Potential for Falls  Goal: Patient will remain free of falls  Description: INTERVENTIONS:  - Educate patient/family on patient safety including physical limitations  - Instruct patient to call for assistance with activity   - Consult OT/PT to assist with strengthening/mobility   - Keep Call bell within reach  - Keep bed low and locked with side rails adjusted as appropriate  - Keep care items and personal belongings within reach  - Initiate and maintain comfort rounds  - Make Fall Risk Sign visible to staff  - Offer Toileting every 2 Hours, in advance of need  - Initiate/Maintain bed alarm  - Obtain necessary fall risk management equipment: bed alarm  - Apply yellow socks and bracelet for high fall risk patients  - Consider moving patient to room near nurses station  Outcome: Progressing     Problem: Prexisting or High Potential for Compromised Skin Integrity  Goal: Skin integrity is maintained or improved  Description: INTERVENTIONS:  - Identify patients at risk for skin breakdown  - Assess and monitor skin integrity  - Assess and monitor nutrition and hydration status  - Monitor labs   - Assess for incontinence   - Turn and reposition patient  - Assist with mobility/ambulation  - Relieve pressure over bony prominences  - Avoid friction and shearing  - Provide appropriate hygiene as needed including keeping skin clean and dry  - Evaluate need for skin moisturizer/barrier cream  - Collaborate with interdisciplinary team   - Patient/family teaching  - Consider wound care consult   Outcome: Progressing     Problem: PAIN - ADULT  Goal: Verbalizes/displays adequate comfort level or baseline comfort level  Description: Interventions:  - Encourage patient to monitor pain and request assistance  - Assess pain using appropriate pain scale  - Administer analgesics based on type and severity of pain and evaluate response  - Implement non-pharmacological measures as appropriate and evaluate response  - Consider  cultural and social influences on pain and pain management  - Notify physician/advanced practitioner if interventions unsuccessful or patient reports new pain  Outcome: Progressing     Problem: INFECTION - ADULT  Goal: Absence or prevention of progression during hospitalization  Description: INTERVENTIONS:  - Assess and monitor for signs and symptoms of infection  - Monitor lab/diagnostic results  - Monitor all insertion sites, i.e. indwelling lines, tubes, and drains  - Monitor endotracheal if appropriate and nasal secretions for changes in amount and color  - [place] appropriate cooling/warming therapies per order  - Administer medications as ordered  - Instruct and encourage patient and family to use good hand hygiene technique  - Identify and instruct in appropriate isolation precautions for identified infection/condition  Outcome: Progressing  Goal: Absence of fever/infection during neutropenic period  Description: INTERVENTIONS:  - Monitor WBC    Outcome: Progressing     Problem: SAFETY ADULT  Goal: Patient will remain free of falls  Description: INTERVENTIONS:  - Educate patient/family on patient safety including physical limitations  - Instruct patient to call for assistance with activity   - Consult OT/PT to assist with strengthening/mobility   - Keep Call bell within reach  - Keep bed low and locked with side rails adjusted as appropriate  - Keep care items and personal belongings within reach  - Initiate and maintain comfort rounds  - Make Fall Risk Sign visible to staff  - Offer Toileting every 2 Hours, in advance of need  - Initiate/Maintain bed alarm  - Obtain necessary fall risk management equipment: bed alarm  - Apply yellow socks and bracelet for high fall risk patients  - Consider moving patient to room near nurses station  Outcome: Progressing  Goal: Maintain or return to baseline ADL function  Description: INTERVENTIONS:  -  Assess patient's ability to carry out ADLs; assess patient's baseline  for ADL function and identify physical deficits which impact ability to perform ADLs (bathing, care of mouth/teeth, toileting, grooming, dressing, etc.)  - Assess/evaluate cause of self-care deficits   - Assess range of motion  - Assess patient's mobility; develop plan if impaired  - Assess patient's need for assistive devices and provide as appropriate  - Encourage maximum independence but intervene and supervise when necessary  - Involve family in performance of ADLs  - Assess for home care needs following discharge   - Consider OT consult to assist with ADL evaluation and planning for discharge  - Provide patient education as appropriate  Outcome: Progressing  Goal: Maintains/Returns to pre admission functional level  Description: INTERVENTIONS:  - Perform AM-PAC 6 Click Basic Mobility/ Daily Activity assessment daily.  - Set and communicate daily mobility goal to care team and patient/family/caregiver.   - Collaborate with rehabilitation services on mobility goals if consulted  - Perform Range of Motion 2 times a day.  - Reposition patient every 2 hours.  - Dangle patient 2 times a day  - Stand patient 2 times a day  - Ambulate patient 2 times a day  - Out of bed to chair 2 times a day   - Out of bed for meals 2 times a day  - Out of bed for toileting  - Record patient progress and toleration of activity level   Outcome: Progressing     Problem: DISCHARGE PLANNING  Goal: Discharge to home or other facility with appropriate resources  Description: INTERVENTIONS:  - Identify barriers to discharge w/patient and caregiver  - Arrange for needed discharge resources and transportation as appropriate  - Identify discharge learning needs (meds, wound care, etc.)  - Arrange for interpretive services to assist at discharge as needed  - Refer to Case Management Department for coordinating discharge planning if the patient needs post-hospital services based on physician/advanced practitioner order or complex needs related to  functional status, cognitive ability, or social support system  Outcome: Progressing     Problem: Knowledge Deficit  Goal: Patient/family/caregiver demonstrates understanding of disease process, treatment plan, medications, and discharge instructions  Description: Complete learning assessment and assess knowledge base.  Interventions:  - Provide teaching at level of understanding  - Provide teaching via preferred learning methods  Outcome: Progressing     Problem: NEUROSENSORY - ADULT  Goal: Remains free of injury related to seizures activity  Description: INTERVENTIONS  - Maintain airway, patient safety  and administer oxygen as ordered  - Monitor patient for seizure activity, document and report duration and description of seizure to physician/advanced practitioner  - If seizure occurs,  ensure patient safety during seizure  - Reorient patient post seizure  - Seizure pads on all 4 side rails  - Instruct patient/family to notify RN of any seizure activity including if an aura is experienced  - Instruct patient/family to call for assistance with activity based on nursing assessment  - Administer anti-seizure medications if ordered    Outcome: Progressing     Problem: RESPIRATORY - ADULT  Goal: Achieves optimal ventilation and oxygenation  Description: INTERVENTIONS:  - Assess for changes in respiratory status  - Assess for changes in mentation and behavior  - Position to facilitate oxygenation and minimize respiratory effort  - Oxygen administered by appropriate delivery if ordered  - Initiate smoking cessation education as indicated  - Encourage broncho-pulmonary hygiene including cough, deep breathe, Incentive Spirometry  - Assess the need for suctioning and aspirate as needed  - Assess and instruct to report SOB or any respiratory difficulty  - Respiratory Therapy support as indicated  Outcome: Progressing     Problem: METABOLIC, FLUID AND ELECTROLYTES - ADULT  Goal: Glucose maintained within target  range  Description: INTERVENTIONS:  - Monitor Blood Glucose as ordered  - Assess for signs and symptoms of hyperglycemia and hypoglycemia  - Administer ordered medications to maintain glucose within target range  - Assess nutritional intake and initiate nutrition service referral as needed  Outcome: Progressing

## 2024-06-10 NOTE — QUICK NOTE
Chart reviewed, Vitals improving on taper dose steroids. Continue plan as previous. BG have been elevated and thus started on basal/bolus managed by primary team. Dose adjusted today by team. BG management per primary team, endocrine will follow along from periphery.

## 2024-06-10 NOTE — ASSESSMENT & PLAN NOTE
Patient was on Cortef 10 mg p.o. every morning and 5 mg p.o. nightly  Endocrinology consult placed -appreciate recommendations. - Please increase cortef to 50mg TID for 2 days. Taper down to 50mg BID for 3 days after, then 25mg BID for 3 days after, down to home dose 10/mg after   Agreed with increasing midodrine dose.  Currently on on 25 twice daily starting today

## 2024-06-10 NOTE — ASSESSMENT & PLAN NOTE
Due to cirrhosis, no evidence of bleeding  Platelet is downtrending, hold off Lovenox for DVT prophylaxis   Patient has no issue with ambulation -encourage ambulation   Transfuse for plt <10 or <50 w/bleeding.   Monitor clinically and follow-up CBC.  Platelets today 33

## 2024-06-10 NOTE — CONSULTS
"Consultation - Behavioral Health   Foreign Armando 63 y.o. male MRN: [id number]  Unit/Bed#: 7T [facility] 705-01 Encounter: [id number]    Assessment & Plan   Assessment:  Foreign Armando is a 63 y.o. male, currently unemployed on disability, most recently living in an RV with his girlfriend, with a past medical history of nonalcoholic liver cirrhosis, adrenal insufficiency, type 2 diabetes, and no prior psychiatric history, who is currently admitted to the medical floor secondary to pleural effusion.  Psychiatry was consulted following the death of his mother yesterday, and the patient subsequently appearing tearful and distressed.  Currently, he is denying suicidal and homicidal ideations.  He reports having a supportive family and a supportive girlfriend.  He is goal and future oriented, and would like to go to [place] to spend time with his father.    Diagnosis: Normal grief    Plan:   Medical management per primary team.  Medication recommendations:  None.  After discussion, the patient is not interested in beginning any psychotropic medications.  The patient is not interested at this time to become established with outpatient therapy or psychiatry.  Foreign does not meet criteria for inpatient psychiatric admission.  Discharge per primary team.  Consultation appreciated. Psychiatry to follow as necessary. Please do not hesitate to call/contact our service with any additional comments/concerns.Thank you!     Chief Complaint: \"My mother passed away.\"    History of Present Illness   Physician Requesting Consult: Lili Morfin MD  Reason for Consult / Principal Problem: Dx 1.  Depression    Foreign Armando is a 63 y.o. male, currently unemployed on disability, most recently living in an RV with his girlfriend, with past medical history of nonalcoholic liver cirrhosis, adrenal insufficiency, type 2 diabetes, and no prior psychiatric history, who presents to the hospital with shortness of breath secondary to " "pleural effusion due to cirrhosis.  Per last hospital note today, Foreign is medically cleared and pending placement.  Neuropsychology deemed the patient to not have capacity to make decisions.  Psychiatry was consulted due to the patient's mother passing away yesterday, and the patient appearing upset and tearful.    On interview today, Foreign says his mother passed away suddenly from a large stroke.  This was very surprising to him and his family as she was previously healthy.  He reports he is worried about his father who has bone cancer, and who has been  to her for 58 years.  His father lives in [place], and he feels distressed about not being present when she passed.  His mother and father are not his biological parents, however, he has been living with them since the age of 5 and reports being very close to them.  He currently lives in [place] in an  with his girlfriend.    He denies any prior psychiatric history, including inpatient psychiatric hospital stays, following with an outpatient psychiatrist and therapist, or trialing any psychiatric medications.  After some discussion, he states that he is not interested in starting any medications or following with outpatient psychiatry or therapy.  He adamantly denies suicidal ideations and homicidal ideations.  He frequently makes statements regarding \"never giving up\", and eventually making it through this hard time. He would like to go to [place] following discharge from this hospitalization to spend time with his father.    Psychiatric Review Of Systems:  sleep: no  appetite changes: no  weight changes: yes, lost weight when he was diagnosed with colon cancer from [id number].  No recent weight changes.  energy/anergy: no  interest/pleasure/anhedonia: no  somatic symptoms: no  anxiety/panic: reports having an \"anxiety attack\" 2 days age that included generalized shaking.  This was triggered by thinking about his father who was " recently diagnosed with bone cancer 3 weeks ago.  vamsi: no  guilty/hopeless: no  self injurious behavior/risky behavior: no    Historical Information   Past Psychiatric History:   Patient denies prior inpatient psychiatric stays and prior outpatient psychiatry and therapy.  Past Suicide attempts: Denies  Past Violent behavior: Denies  Past Psychiatric medication trial: None    Substance Abuse History:  Cigarettes: Smokes 3 to 4 cigarettes/day for 4 years.  Alcohol: Denies  Marijuana: Denies  Denies other substances such as cocaine, methamphetamine, LSD, PCP, K2, heroin, recreational benzodiazepines    I have assessed this patient for substance use within the past 12 months      Family Psychiatric History:   Patient was adopted at the age of 5 years and does not communicate with his biological family.  Psychiatric diagnoses and biological family: Unknown  Substance use disorders: Patient does not believe so  Attempted/committed suicides: Patient denies    Social History  Education:  Unknown  Learning Disabilities:  Unknown  Marital history: Currently has a girlfriend  Living arrangement, social support:  Has most recently been living in an  with his girlfriend.  Occupational History: Unemployed on disability.  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  Other Pertinent History:  : Denies, legal: Denies    Traumatic History:   Abuse:  Patient states he does not wish to discuss this.  Other Traumatic Events:  Unknown    Past Medical History:   Diagnosis Date    CHF (congestive heart failure) (HCC)     Cirrhosis (HCC)     CHRISTIE    Colon cancer (HCC)     Diabetes mellitus (HCC)     Neuropathy     Restless leg syndrome        Medical Review Of Systems:  Review of Systems - Negative except as noted above    Meds/Allergies   all current active meds have been reviewed and current meds:   Current Facility-Administered Medications  "  Medication Dose Route Frequency    dicyclomine (BENTYL) tablet 20 mg  20 mg Oral BID before breakfast/lunch    ferrous sulfate tablet 325 mg  325 mg Oral Daily With Breakfast    furosemide (LASIX) tablet 40 mg  40 mg Oral Daily    gabapentin (NEURONTIN) capsule 300 mg  300 mg Oral TID    HYDROcodone-acetaminophen (NORCO) 5-325 mg per tablet 1 tablet  1 tablet Oral Q6H PRN    [START ON 6/13/2024] hydrocortisone (CORTEF) tablet 10 mg  10 mg Oral TID    hydrocortisone (CORTEF) tablet 25 mg  25 mg Oral BID    hydrOXYzine HCL (ATARAX) tablet 25 mg  25 mg Oral TID PRN    insulin glargine (LANTUS) subcutaneous injection 38 Units 0.38 mL  38 Units Subcutaneous HS    insulin lispro (HumALOG/ADMELOG) 100 units/mL subcutaneous injection 1-5 Units  1-5 Units Subcutaneous TID AC    insulin lispro (HumALOG/ADMELOG) 100 units/mL subcutaneous injection 1-5 Units  1-5 Units Subcutaneous HS    insulin lispro (HumALOG/ADMELOG) 100 units/mL subcutaneous injection 12 Units  12 Units Subcutaneous TID With Meals    levETIRAcetam (KEPPRA) tablet 500 mg  500 mg Oral Q12H SANDRA    LORazepam (ATIVAN) injection 1 mg  1 mg Intravenous Q6H PRN    midodrine (PROAMATINE) tablet 10 mg  10 mg Oral TID AC    nicotine (NICODERM CQ) 14 mg/24hr TD 24 hr patch 1 patch  1 patch Transdermal Daily    ondansetron (ZOFRAN) injection 4 mg  4 mg Intravenous Q6H PRN    pantoprazole (PROTONIX) EC tablet 40 mg  40 mg Oral Early Morning    propranolol (INDERAL) tablet 10 mg  10 mg Oral Q12H SANDRA    rifaximin (XIFAXAN) tablet 550 mg  550 mg Oral Q12H SANDRA    rOPINIRole (REQUIP) tablet 4 mg  4 mg Oral BID    senna-docusate sodium (SENOKOT S) 8.6-50 mg per tablet 1 tablet  1 tablet Oral QPM    spironolactone (ALDACTONE) tablet 100 mg  100 mg Oral Daily    tamsulosin (FLOMAX) capsule 0.4 mg  0.4 mg Oral Daily With Dinner     Allergies   Allergen Reactions    Morphine Anaphylaxis    Morpholine Salicylate Other (See Comments)     \"I go unconscious\"    Clarithromycin Other " "(See Comments)     Dizzy, nausea, ulcers in stomach    Oxycodone Hives and Facial Swelling     \"Scratchy throat\"      Sulfamethoxazole-Trimethoprim Hives    Nsaids Rash       Objective   Vital signs in last 24 hours:  Temp:  [96.9 °F (36.1 °C)-98.5 °F (36.9 °C)] 97.6 °F (36.4 °C)  HR:  [48-58] 48  Resp:  [16-20] 20  BP: (114-118)/(65-73) 114/66      Intake/Output Summary (Last 24 hours) at 6/10/2024 1246  Last data filed at 6/10/2024 0929  Gross per 24 hour   Intake 960 ml   Output --   Net 960 ml       Mental Status Evaluation:  Appearance:  Overtly appearing  male, appears stated age, wearing hospital scrubs, appropriate grooming and hygiene, intermittent eye contact   Behavior:  calm, cooperative, and sitting comfortably in bed   Speech:  Soft, coherent, occasional increased latency of response   Mood:  \"Sad\"   Affect:  Appropriately reactive and dysphoric, tearful at times   Language: Within normal limits   Thought Process:  goal directed and circumstantial at times   Associations: intact associations   Thought Content:  No delusions or overt paranoia   Perceptual Disturbances: Denies auditory or visual hallucinations   Risk Potential: Suicidal Ideations none, Homicidal Ideations none, and Potential for Aggression No   Sensorium:  person, place, and situation   Memory:  recent and remote memory grossly intact   Cognition:  Within normal limits   Consciousness:  alert and awake    Attention: attention span and concentration were age appropriate   Intellect: within normal limits   Fund of Knowledge: Within normal limits   Insight:  fair   Judgment: fair   Muscle Strength and Tone: Not assessed   Gait/Station: not assessed, patient in bed   Motor Activity: no abnormal movements     Suicide/Homicide Risk Assessment:    Risk of Harm to Self:   The following ratings are based on assessment at the time of the interview and review of records  Nursing Suicide Risk Assessment Last 24 hours:    Demographic risk " factors include: , lowest socioeconomic class, male, age: over 50 or older  Historical Risk Factors include: none  Current Specific Risk Factors include: recent losses (mother)  Protective Factors: no current suicidal ideation, cultural beliefs discouraging suicide, having a desire to be alive, personal beliefs about the meaning and value of life, Samaritan beliefs discouraging suicide, strong relationships, supportive family, supportive friends  Weapons/Firearms: none. The following steps have been taken to ensure weapons are properly secured: not applicable  Based on today's assessment, Foreign presents the following risk of harm to self: minimal    Risk of Harm to Others:  The following ratings are based on assessment at the time of the interview and review of records  Nursing Homicide Risk Assessment:    Demographic Risk Factors include: male, unemployed.  Historical Risk Factors include: none.  Current Specific Risk Factors include: multiple stressors, social difficulties  Protective Factors: no current homicidal ideation, no current substance use problems, no prior history of violence, supportive family, supportive friends, strong relationships, responsibilities and duties to others, cultural beliefs, Samaritan beliefs  Based on today's assessment, Foreign presents the following risk of harm to others: minimal    -----------------------------------    Lab Results:  I have personally reviewed all pertinent laboratory/tests results.  Most Recent Labs:   Lab Results   Component Value Date    WBC 4.52 06/10/2024    RBC 3.33 (L) 06/10/2024    HGB 11.7 (L) 06/10/2024    HCT 32.8 (L) 06/10/2024    PLT 33 (L) 06/10/2024    RDW 16.3 (H) 06/10/2024    TOTANEUTABS 4.57 06/07/2024    NEUTROABS 3.44 06/10/2024    SODIUM 138 06/10/2024    K 3.1 (L) 06/10/2024     06/10/2024    CO2 29 06/10/2024    BUN 22 06/10/2024    CREATININE 0.89 06/10/2024    GLUC 273 (H) 06/10/2024    CALCIUM 8.3 (L) 06/10/2024    AST 34  06/06/2024    ALT 41 06/06/2024    ALKPHOS 153 (H) 06/06/2024    TP 7.1 06/06/2024    ALB 3.1 (L) 06/06/2024    TBILI 2.28 (H) 06/06/2024    AMMONIA 37 06/03/2024    EPZ4DPAXUVFP 2.430 02/26/2023    HGBA1C 6.5 (H) 05/05/2024     05/05/2024       Code Status: )Level 1 - Full Code    Nikki Damon MD  PGY-1

## 2024-06-10 NOTE — ASSESSMENT & PLAN NOTE
CT chest A/P with contrast showed cirrhosis with signs of portal hypertension as evidenced by gastroesophageal varices. Stable upper abdominal lymphadenopathy presumed secondary to underlying liver pathology although low-grade lymphoproliferative disorder is not fully excluded.Stable diffuse mesenteric edema   Gastroenterology consult appreciate recommendations.    Continue prehospital spironolactone 100 mg daily, lactulose 20 g p.o. daily-lactulose was discontinued previously by GI due to diarrhea.  Currently off of lactulose and no signs of hepatic encephalopathy  Patient is very upset that he is not able to get the meat with the dietary restriction.  Will change to level 3.  Discontinue the salt restriction and monitor volume status

## 2024-06-10 NOTE — ASSESSMENT & PLAN NOTE
Most likely due to liver cirrhosis   BNP WNL, CT chest showed no evidence of pulmonary congestion but pleural effusion which was tapped yesterday  Recent stress test on 7/13/2023 showed LVEF 70%, and attenuation artifact   Patient received IV Lasix 40 mg once in ED  Currently on p.o. Lasix 40 mg daily and spironolactone 100 mg daily as BP allows.  Monitor volume status

## 2024-06-11 LAB
ANION GAP SERPL CALCULATED.3IONS-SCNC: 6 MMOL/L (ref 4–13)
BUN SERPL-MCNC: 23 MG/DL (ref 5–25)
CALCIUM SERPL-MCNC: 8.2 MG/DL (ref 8.4–10.2)
CHLORIDE SERPL-SCNC: 102 MMOL/L (ref 96–108)
CO2 SERPL-SCNC: 31 MMOL/L (ref 21–32)
CREAT SERPL-MCNC: 0.86 MG/DL (ref 0.6–1.3)
ELASTASE PANC STL-MCNT: 94 UG ELAST./G
ERYTHROCYTE [DISTWIDTH] IN BLOOD BY AUTOMATED COUNT: 16.2 % (ref 11.6–15.1)
GFR SERPL CREATININE-BSD FRML MDRD: 92 ML/MIN/1.73SQ M
GLUCOSE SERPL-MCNC: 190 MG/DL (ref 65–140)
GLUCOSE SERPL-MCNC: 207 MG/DL (ref 65–140)
GLUCOSE SERPL-MCNC: 360 MG/DL (ref 65–140)
GLUCOSE SERPL-MCNC: 389 MG/DL (ref 65–140)
HCT VFR BLD AUTO: 33.3 % (ref 36.5–49.3)
HGB BLD-MCNC: 11.3 G/DL (ref 12–17)
MAGNESIUM SERPL-MCNC: 2.1 MG/DL (ref 1.9–2.7)
MCH RBC QN AUTO: 34.1 PG (ref 26.8–34.3)
MCHC RBC AUTO-ENTMCNC: 33.9 G/DL (ref 31.4–37.4)
MCV RBC AUTO: 101 FL (ref 82–98)
PLATELET # BLD AUTO: 32 THOUSANDS/UL (ref 149–390)
PMV BLD AUTO: 11.3 FL (ref 8.9–12.7)
POTASSIUM SERPL-SCNC: 3.4 MMOL/L (ref 3.5–5.3)
RBC # BLD AUTO: 3.31 MILLION/UL (ref 3.88–5.62)
SODIUM SERPL-SCNC: 139 MMOL/L (ref 135–147)
WBC # BLD AUTO: 4.96 THOUSAND/UL (ref 4.31–10.16)

## 2024-06-11 PROCEDURE — 85027 COMPLETE CBC AUTOMATED: CPT | Performed by: FAMILY MEDICINE

## 2024-06-11 PROCEDURE — 82948 REAGENT STRIP/BLOOD GLUCOSE: CPT

## 2024-06-11 PROCEDURE — 99232 SBSQ HOSP IP/OBS MODERATE 35: CPT | Performed by: INTERNAL MEDICINE

## 2024-06-11 PROCEDURE — 80048 BASIC METABOLIC PNL TOTAL CA: CPT | Performed by: FAMILY MEDICINE

## 2024-06-11 PROCEDURE — 83735 ASSAY OF MAGNESIUM: CPT | Performed by: FAMILY MEDICINE

## 2024-06-11 RX ORDER — INSULIN GLARGINE 100 [IU]/ML
40 INJECTION, SOLUTION SUBCUTANEOUS
Status: DISCONTINUED | OUTPATIENT
Start: 2024-06-11 | End: 2024-06-12

## 2024-06-11 RX ORDER — INSULIN LISPRO 100 [IU]/ML
15 INJECTION, SOLUTION INTRAVENOUS; SUBCUTANEOUS
Status: DISCONTINUED | OUTPATIENT
Start: 2024-06-11 | End: 2024-06-12

## 2024-06-11 RX ORDER — POTASSIUM CHLORIDE 20 MEQ/1
40 TABLET, EXTENDED RELEASE ORAL ONCE
Status: COMPLETED | OUTPATIENT
Start: 2024-06-11 | End: 2024-06-11

## 2024-06-11 RX ADMIN — HYDROCODONE BITARTRATE AND ACETAMINOPHEN 1 TABLET: 5; 325 TABLET ORAL at 08:18

## 2024-06-11 RX ADMIN — RIFAXIMIN 550 MG: 550 TABLET ORAL at 21:32

## 2024-06-11 RX ADMIN — FUROSEMIDE 40 MG: 40 TABLET ORAL at 08:14

## 2024-06-11 RX ADMIN — RIFAXIMIN 550 MG: 550 TABLET ORAL at 08:14

## 2024-06-11 RX ADMIN — ROPINIROLE HYDROCHLORIDE 4 MG: 1 TABLET, FILM COATED ORAL at 08:14

## 2024-06-11 RX ADMIN — HYDROCORTISONE 25 MG: 10 TABLET ORAL at 08:14

## 2024-06-11 RX ADMIN — POTASSIUM CHLORIDE 40 MEQ: 1500 TABLET, EXTENDED RELEASE ORAL at 10:44

## 2024-06-11 RX ADMIN — INSULIN GLARGINE 40 UNITS: 100 INJECTION, SOLUTION SUBCUTANEOUS at 21:33

## 2024-06-11 RX ADMIN — GABAPENTIN 300 MG: 300 CAPSULE ORAL at 21:32

## 2024-06-11 RX ADMIN — NICOTINE 1 PATCH: 14 PATCH, EXTENDED RELEASE TRANSDERMAL at 08:15

## 2024-06-11 RX ADMIN — INSULIN LISPRO 12 UNITS: 100 INJECTION, SOLUTION INTRAVENOUS; SUBCUTANEOUS at 08:15

## 2024-06-11 RX ADMIN — PANCRELIPASE 24000 UNITS: 30000; 6000; 19000 CAPSULE, DELAYED RELEASE PELLETS ORAL at 18:13

## 2024-06-11 RX ADMIN — INSULIN LISPRO 3 UNITS: 100 INJECTION, SOLUTION INTRAVENOUS; SUBCUTANEOUS at 11:58

## 2024-06-11 RX ADMIN — PROPRANOLOL HYDROCHLORIDE 10 MG: 10 TABLET ORAL at 08:14

## 2024-06-11 RX ADMIN — MIDODRINE HYDROCHLORIDE 10 MG: 5 TABLET ORAL at 06:02

## 2024-06-11 RX ADMIN — PANTOPRAZOLE SODIUM 40 MG: 40 TABLET, DELAYED RELEASE ORAL at 06:02

## 2024-06-11 RX ADMIN — FERROUS SULFATE TAB 325 MG (65 MG ELEMENTAL FE) 325 MG: 325 (65 FE) TAB at 08:14

## 2024-06-11 RX ADMIN — HYDROXYZINE HYDROCHLORIDE 25 MG: 25 TABLET, FILM COATED ORAL at 08:19

## 2024-06-11 RX ADMIN — INSULIN LISPRO 4 UNITS: 100 INJECTION, SOLUTION INTRAVENOUS; SUBCUTANEOUS at 21:33

## 2024-06-11 RX ADMIN — SPIRONOLACTONE 100 MG: 100 TABLET, FILM COATED ORAL at 08:14

## 2024-06-11 RX ADMIN — DICYCLOMINE HYDROCHLORIDE 20 MG: 20 TABLET ORAL at 06:02

## 2024-06-11 RX ADMIN — INSULIN LISPRO 15 UNITS: 100 INJECTION, SOLUTION INTRAVENOUS; SUBCUTANEOUS at 16:21

## 2024-06-11 RX ADMIN — GABAPENTIN 300 MG: 300 CAPSULE ORAL at 08:14

## 2024-06-11 RX ADMIN — ROPINIROLE HYDROCHLORIDE 4 MG: 1 TABLET, FILM COATED ORAL at 21:31

## 2024-06-11 RX ADMIN — INSULIN LISPRO 12 UNITS: 100 INJECTION, SOLUTION INTRAVENOUS; SUBCUTANEOUS at 11:59

## 2024-06-11 RX ADMIN — LEVETIRACETAM 500 MG: 500 TABLET, FILM COATED ORAL at 08:14

## 2024-06-11 RX ADMIN — INSULIN LISPRO 1 UNITS: 100 INJECTION, SOLUTION INTRAVENOUS; SUBCUTANEOUS at 08:15

## 2024-06-11 RX ADMIN — PROPRANOLOL HYDROCHLORIDE 10 MG: 10 TABLET ORAL at 21:32

## 2024-06-11 RX ADMIN — INSULIN LISPRO 4 UNITS: 100 INJECTION, SOLUTION INTRAVENOUS; SUBCUTANEOUS at 16:21

## 2024-06-11 RX ADMIN — LEVETIRACETAM 500 MG: 500 TABLET, FILM COATED ORAL at 21:32

## 2024-06-11 NOTE — PROGRESS NOTES
[facility]  Progress Note  Name: Foreign Armando I  MRN: [id number]  Unit/Bed#: 7T [facility] 705-01 I Date of Admission: 6/3/2024   Date of Service: 6/11/2024  Hospital Day: 6      Assessment & Plan:    * Pleural effusion on right  Assessment & Plan  Patient presented with shortness of breath   BNP WNL, CT chest A/P show right pleural effusion, no evidence of pulmonary congestion   s/p thoracentesis on 6/4/2024 with 600 cc clear yellow fluid  Pleural fluid analysis show LDH 62, total protein 7.6 with a plasma total protein of 7.1  Per Light's criteria, pleural fluid is transudative  Cytology was negative for malignancy  Consider hepatic hydrothorax in the setting of decompensated cirrhosis (see below)  Medically stable, however, per neuropsychology, patient not competent to make own medical decisions ->  on board for safe disposition plan     Depression  Assessment & Plan  Patient appears to be very upset and tearful.  Reported that his mother passed away last night.  Unsure if this is true or not.  Patient is very tearful.  Appreciate psychiatry evaluation     Chest pain  Assessment & Plan  Previously complained of chest pain which was started during his seizure  EKG showed no acute ST-T changes  Serial troponins negative  Resolved     Seizure-like activity (HCC)  Assessment & Plan  Patient had RRT for seizure-like activity previously  Patient had similar episode when he was in [facility] in 10/6/23 -CT brain, CTA head and neck, MRI brain and EEG was negative at that time  Discontinued Tramadol due to the risk of lowered seizure threshold  Neurology consult -> since patient was previously on Keppra 500 mg twice daily continue home dose  Reported to [facility]     Nephrolithiasis  Assessment & Plan  Stated he might have passed a stone when he urinates and might be causing painful urination  Per patient, he had history of kidney stone   UA showed no evidence of UTI  CT A/P showed  simple renal cyst(s). Nonobstructing left renal nephrolithiasis. No hydronephrosis.   BMP showed normal renal function  Continue Flomax      Swelling of lower extremity  Assessment & Plan  Patient complains of left lower extremity swelling and tenderness  Venous duplex of bilateral lower extremities negative for DVT  Patient had history of neuropathy and currently on Gabapentin 300 mg 3 times daily  For restless leg syndrome, patient is on Requip 4 mg daily.     Volume overload  Assessment & Plan  Most likely due to liver cirrhosis   BNP WNL, CT chest showed no evidence of pulmonary congestion but pleural effusion which was tapped on 6/4  Recent stress test on 7/13/2023 showed LVEF 70%, and attenuation artifact   Patient received IV Lasix 40 mg once in ED - currently on an oral Lasix/Aldactone regimen  Monitor volume status     Adrenal insufficiency (HCC)  Assessment & Plan  Patient was on Cortef 10 mg p.o. every morning and 5 mg p.o. nightly  Endocrinology consult noted - appreciate recommendations regarding Cortef tapering instructions  Continue Midodrine     Type 2 diabetes mellitus (HCC)  Assessment & Plan        Lab Results   Component Value Date     HGBA1C 6.5 (H) 05/05/2024      Carbohydrate restriction  Continue basal/prandial insulin with additional SSI coverage prior checks -> titrate dosing daily tangents on blood sugars  Anticipate fluctuation of blood sugars in the setting of Cortef tapering     Smoking  Assessment & Plan  Cessation counseling  Transdermal nicotine patch on board     Thrombocytopenia (HCC)  Assessment & Plan  Likely sequela of cirrhosis  Monitor platelet count - monitor for bleeding     Cirrhosis (HCC)  Assessment & Plan  CT chest A/P with contrast showed cirrhosis with signs of portal hypertension as evidenced by gastroesophageal varices. Stable upper abdominal lymphadenopathy presumed secondary to underlying liver pathology although low-grade lymphoproliferative disorder is not fully  "excluded. Stable diffuse mesenteric edema   Gastroenterology following  Continue Inderal for gastroesophageal varices -> plan for outpatient surveillance EGD  Continue diuresis with Lasix/Aldactone and hepatic encephalopathy prophylaxis with Rifaximin (deviously on Lactulose, now discontinued, due to complaints of intolerable diarrhea)  Patient demanded liberation of salt restriction off diet    Anemia of chronic disease  Assessment & Plan  Continue iron supplementation  H/H stable    Hypokalemia  Assessment & Plan  Monitor/replete serum potassium and magnesium as necessary  Chronic diuretic use noted      DVT Prophylaxis: SCDs - Ambulatory    AM-PAC Basic Mobility:  Basic Mobility Inpatient Raw Score: 18  JH-HLM Achieved: 7: Walk 25 feet or more  JH-HLM Goal: 6: Walk 10 steps or more    Patient Centered Rounds:  I have performed bedside rounds and discussed plan of care with nursing today.  Discussions with Specialists or Other Care Team Provider:  see above assessments if applicable    Education and Discussions with Family / Patient:  Patient at bedside    Time Spent for Care:  35 minutes. More than 50% of total time spent on counseling and coordination of care, on one or more of the following: performing physical exam; counseling and coordination of care, obtaining or reviewing history, documenting in the medical record, reviewing/ordering tests/medications/procedures, and communicating with other healthcare professionals.    Current Length of Stay: 6 day(s)  Current Patient Status: Inpatient   Certification Statement:  Patient will continue to require additional hospital stay due to assessments as noted above.    Code Status: Level 1 - Full Code        Subjective:     Encountered earlier today.  Sitting upright in a chair stating that he is \"leaving\" and that he cannot be forced to stay just as well.  Denies worsening shortness of breath currently.        Objective:     Vitals:   Temp (24hrs), Av.5 °F (36.4 " °C), Min:97.4 °F (36.3 °C), Max:97.6 °F (36.4 °C)    Temp:  [97.4 °F (36.3 °C)-97.6 °F (36.4 °C)] 97.4 °F (36.3 °C)  HR:  [49-54] 51  Resp:  [16-18] 16  BP: (113-120)/(68-71) 113/69  SpO2:  [93 %-97 %] 97 %  Body mass index is 24.58 kg/m².     Input and Output Summary (last 24 hours):       Intake/Output Summary (Last 24 hours) at 6/11/2024 1534  Last data filed at 6/10/2024 2000  Gross per 24 hour   Intake 720 ml   Output --   Net 720 ml       Physical Exam:     GENERAL No immediate distress at rest   HEAD   Normocephalic - atraumatic   EYES   PERRL - EOMI    MOUTH   Mucosa moist   NECK   Supple - full range of motion   CARDIAC Rate controlled   PULMONARY Mildly diminished at the right base but fairly clear otherwise   ABDOMEN Mildly distended but nontender   MUSCULOSKELETAL   Motor strength/range of motion intact   NEUROLOGIC Mild cognitive impairment present   SKIN   Chronic wrinkles/blemishes    PSYCHIATRIC   Mood/affect somewhat flat         Additional Data:     Labs & Recent Cultures:    Results from last 7 days   Lab Units 06/11/24  0446 06/10/24  0559 06/08/24 0450 06/07/24 0445   WBC Thousand/uL 4.96 4.52   < > 5.14   HEMOGLOBIN g/dL 11.3* 11.7*   < > 9.6*   HEMATOCRIT % 33.3* 32.8*   < > 29.0*   PLATELETS Thousands/uL 32* 33*   < > 30*   BANDS PCT %  --   --   --  3   SEGS PCT %  --  76*   < >  --    LYMPHO PCT %  --  16   < > 7*   MONO PCT %  --  6   < > 4   EOS PCT %  --  2   < > 0    < > = values in this interval not displayed.     Results from last 7 days   Lab Units 06/11/24 0446 06/07/24 0445 06/06/24  0558   POTASSIUM mmol/L 3.4*   < > 4.5   CHLORIDE mmol/L 102   < > 103   CO2 mmol/L 31   < > 20*   BUN mg/dL 23   < > 13   CREATININE mg/dL 0.86   < > 0.88   CALCIUM mg/dL 8.2*   < > 8.8   ALK PHOS U/L  --   --  153*   ALT U/L  --   --  41   AST U/L  --   --  34    < > = values in this interval not displayed.         Results from last 7 days   Lab Units 06/11/24  1525 06/11/24  0553 06/10/24  1871  06/10/24  1533 06/10/24  1056 06/10/24  0546 06/10/24  0351 06/09/24  2113 06/09/24  1548 06/09/24  1113 06/09/24  0555 06/08/24  2105   POC GLUCOSE mg/dl 389* 190* 431* 388* 285* 276* 263* 493* 384* 291* 325* 252*                         Lines/Drains:  Invasive Devices       None                     Last 24 Hours Medication List:   Current Facility-Administered Medications   Medication Dose Route Frequency Provider Last Rate    dicyclomine  20 mg Oral BID before breakfast/lunch Natan Miguel PA-C      ferrous sulfate  325 mg Oral Daily With Breakfast Natan Miguel PA-C      furosemide  40 mg Oral Daily Rosario Whitfiedl MD      gabapentin  300 mg Oral TID Natan Miguel PA-C      HYDROcodone-acetaminophen  1 tablet Oral Q6H PRN Rosario Whitfield MD      [START ON 6/13/2024] hydrocortisone  10 mg Oral TID Rosario Whitfield MD      hydrocortisone  25 mg Oral BID Rosario Whitfield MD      hydrOXYzine HCL  25 mg Oral TID PRN Natan Miguel PA-C      insulin glargine  40 Units Subcutaneous HS Kori Dias MD      insulin lispro  1-5 Units Subcutaneous TID AC Natan Miguel PA-C      insulin lispro  1-5 Units Subcutaneous HS Natan Miguel PA-C      insulin lispro  15 Units Subcutaneous TID With Meals Kori Dias MD      levETIRAcetam  500 mg Oral Q12H [facility] Rosario Whitfield MD      LORazepam  1 mg Intravenous Q6H PRN Rosario Whitfield MD      midodrine  10 mg Oral TID  Rosario Whitfield MD      nicotine  1 patch Transdermal Daily Natan Miguel PA-C      ondansetron  4 mg Intravenous Q6H PRN Natan Miguel PA-C      pantoprazole  40 mg Oral Early Morning Natan Miguel PA-C      propranolol  10 mg Oral Q12H [facility] Natan Miguel PA-C      rifaximin  550 mg Oral Q12H [facility] Jerson Gibson MD      rOPINIRole  4 mg Oral BID Natan Miguel PA-C      senna-docusate sodium  1 tablet Oral QPM Natan Miguel PA-C      spironolactone  100 mg Oral Daily Natan Miguel PA-C      tamsulosin  0.4 mg Oral Daily With Dinner JERMAINE Birch MD   Hospitalist - [facility]  [facility]        ** Please Note: This note is constructed using a voice recognition dictation system.  An occasional wrong word/phrase or “sound-a-like” substitution may have been picked up by dictation device due to the inherent limitations of voice recognition software.  Read the chart carefully and recognize, using reasonable context, where substitutions may have occurred.**

## 2024-06-11 NOTE — CASE MANAGEMENT
Case Management Progress Note    Patient name Foreign Armando  Location 7T U 705/7T U 705-01 MRN [id number]  : 1960 Date 2024       LOS (days): 6  Geometric Mean LOS (GMLOS) (days): 3.3  Days to GMLOS:-2.9        OBJECTIVE:        Current admission status: Inpatient  Preferred Pharmacy:   [id number]  [place] - [address]  [address] [place] [address]  Phone: [phone] Fax: [phone]    [facility]/pharmacy #[id number] [place], [place] - [address]  [address] [facility] [address]  Phone: [phone] Fax: [phone]    [id number]  [place] - [address]  [address] [address]  Phone: [phone] Fax: [phone]    [id number] - [place] - [address]  [address] [facility] [facility] [address]  Phone: [phone] Fax: [phone]    Primary Care Provider: No primary care provider on file.    Primary Insurance: BLUE CROSS  Secondary Insurance:     PROGRESS NOTE:    Call to Father Vidal Billingsley [phone] father.  Per Vidal his spouse of 65 years passed away and services are this Friday in [place].  Vidal reports some contact with patient and he was aware patient was in hospital.  Vidal reports patient is not his son and was not adopted by him.  Per Vidal patient was friends with Vidal's son and they let him live with him at age 14 when he left his home.  Vidal reports patient lived with his family on and off for years thru teen years.  Vidal will not be able to assist patient in any way for his children will not permit it.   Vidal reports patient has taken advantage of his family and friends.  Vidal reports patient has 2 biological sons.  Vidal states patient is dishonest and steals from people.    Call to Ex-wife Nicole Armando [phone] and asked for sons names and contact info.  Nicole will not give CM info she did take CM name and number and will provide  to sons.    Call received from Stone Armando [phone] and discussed patient and need for Guardian.  Per Stone patient has burned all his bridges with the family and he wants noting to do with him.  Stone reports his brother Kuldip Armando feels the same and he doesn't want CM to contact him his mother or grandfather again.  Brain reports patient probably has out standing warrants in [place] and has stolen and lied to many people.    Met with patient at bedside and his wallet is missing.  Patient reports his wallet had his SS cared his PA just ID and his birth certificate and 2 credit cards.  CM and nurse went thru all belongings in room and wallet not found.    Call to Sanna [phone] at [facility] and left  requesting CB.    CM department following and working on safe discharge disposition

## 2024-06-11 NOTE — PLAN OF CARE
Problem: Potential for Falls  Goal: Patient will remain free of falls  Description: INTERVENTIONS:  - Educate patient/family on patient safety including physical limitations  - Instruct patient to call for assistance with activity   - Consult OT/PT to assist with strengthening/mobility   - Keep Call bell within reach  - Keep bed low and locked with side rails adjusted as appropriate  - Keep care items and personal belongings within reach  - Initiate and maintain comfort rounds  - Make Fall Risk Sign visible to staff  - Offer Toileting every 2 Hours, in advance of need  - Initiate/Maintain bed alarm  - Obtain necessary fall risk management equipment: bed alarm  - Apply yellow socks and bracelet for high fall risk patients  - Consider moving patient to room near nurses station  Outcome: Progressing     Problem: Prexisting or High Potential for Compromised Skin Integrity  Goal: Skin integrity is maintained or improved  Description: INTERVENTIONS:  - Identify patients at risk for skin breakdown  - Assess and monitor skin integrity  - Assess and monitor nutrition and hydration status  - Monitor labs   - Assess for incontinence   - Turn and reposition patient  - Assist with mobility/ambulation  - Relieve pressure over bony prominences  - Avoid friction and shearing  - Provide appropriate hygiene as needed including keeping skin clean and dry  - Evaluate need for skin moisturizer/barrier cream  - Collaborate with interdisciplinary team   - Patient/family teaching  - Consider wound care consult   Outcome: Progressing     Problem: PAIN - ADULT  Goal: Verbalizes/displays adequate comfort level or baseline comfort level  Description: Interventions:  - Encourage patient to monitor pain and request assistance  - Assess pain using appropriate pain scale  - Administer analgesics based on type and severity of pain and evaluate response  - Implement non-pharmacological measures as appropriate and evaluate response  - Consider  cultural and social influences on pain and pain management  - Notify physician/advanced practitioner if interventions unsuccessful or patient reports new pain  Outcome: Progressing     Problem: INFECTION - ADULT  Goal: Absence or prevention of progression during hospitalization  Description: INTERVENTIONS:  - Assess and monitor for signs and symptoms of infection  - Monitor lab/diagnostic results  - Monitor all insertion sites, i.e. indwelling lines, tubes, and drains  - Monitor endotracheal if appropriate and nasal secretions for changes in amount and color  - [place] appropriate cooling/warming therapies per order  - Administer medications as ordered  - Instruct and encourage patient and family to use good hand hygiene technique  - Identify and instruct in appropriate isolation precautions for identified infection/condition  Outcome: Progressing  Goal: Absence of fever/infection during neutropenic period  Description: INTERVENTIONS:  - Monitor WBC    Outcome: Progressing     Problem: SAFETY ADULT  Goal: Patient will remain free of falls  Description: INTERVENTIONS:  - Educate patient/family on patient safety including physical limitations  - Instruct patient to call for assistance with activity   - Consult OT/PT to assist with strengthening/mobility   - Keep Call bell within reach  - Keep bed low and locked with side rails adjusted as appropriate  - Keep care items and personal belongings within reach  - Initiate and maintain comfort rounds  - Make Fall Risk Sign visible to staff  - Offer Toileting every 2 Hours, in advance of need  - Initiate/Maintain bed alarm  - Obtain necessary fall risk management equipment: bed alarm  - Apply yellow socks and bracelet for high fall risk patients  - Consider moving patient to room near nurses station  Outcome: Progressing  Goal: Maintain or return to baseline ADL function  Description: INTERVENTIONS:  -  Assess patient's ability to carry out ADLs; assess patient's baseline  for ADL function and identify physical deficits which impact ability to perform ADLs (bathing, care of mouth/teeth, toileting, grooming, dressing, etc.)  - Assess/evaluate cause of self-care deficits   - Assess range of motion  - Assess patient's mobility; develop plan if impaired  - Assess patient's need for assistive devices and provide as appropriate  - Encourage maximum independence but intervene and supervise when necessary  - Involve family in performance of ADLs  - Assess for home care needs following discharge   - Consider OT consult to assist with ADL evaluation and planning for discharge  - Provide patient education as appropriate  Outcome: Progressing  Goal: Maintains/Returns to pre admission functional level  Description: INTERVENTIONS:  - Perform AM-PAC 6 Click Basic Mobility/ Daily Activity assessment daily.  - Set and communicate daily mobility goal to care team and patient/family/caregiver.   - Collaborate with rehabilitation services on mobility goals if consulted  - Perform Range of Motion 2 times a day.  - Reposition patient every 2 hours.  - Dangle patient 2 times a day  - Stand patient 2 times a day  - Ambulate patient 2 times a day  - Out of bed to chair 2 times a day   - Out of bed for meals 2 times a day  - Out of bed for toileting  - Record patient progress and toleration of activity level   Outcome: Progressing     Problem: DISCHARGE PLANNING  Goal: Discharge to home or other facility with appropriate resources  Description: INTERVENTIONS:  - Identify barriers to discharge w/patient and caregiver  - Arrange for needed discharge resources and transportation as appropriate  - Identify discharge learning needs (meds, wound care, etc.)  - Arrange for interpretive services to assist at discharge as needed  - Refer to Case Management Department for coordinating discharge planning if the patient needs post-hospital services based on physician/advanced practitioner order or complex needs related to  functional status, cognitive ability, or social support system  Outcome: Progressing     Problem: Knowledge Deficit  Goal: Patient/family/caregiver demonstrates understanding of disease process, treatment plan, medications, and discharge instructions  Description: Complete learning assessment and assess knowledge base.  Interventions:  - Provide teaching at level of understanding  - Provide teaching via preferred learning methods  Outcome: Progressing     Problem: NEUROSENSORY - ADULT  Goal: Remains free of injury related to seizures activity  Description: INTERVENTIONS  - Maintain airway, patient safety  and administer oxygen as ordered  - Monitor patient for seizure activity, document and report duration and description of seizure to physician/advanced practitioner  - If seizure occurs,  ensure patient safety during seizure  - Reorient patient post seizure  - Seizure pads on all 4 side rails  - Instruct patient/family to notify RN of any seizure activity including if an aura is experienced  - Instruct patient/family to call for assistance with activity based on nursing assessment  - Administer anti-seizure medications if ordered    Outcome: Progressing     Problem: RESPIRATORY - ADULT  Goal: Achieves optimal ventilation and oxygenation  Description: INTERVENTIONS:  - Assess for changes in respiratory status  - Assess for changes in mentation and behavior  - Position to facilitate oxygenation and minimize respiratory effort  - Oxygen administered by appropriate delivery if ordered  - Initiate smoking cessation education as indicated  - Encourage broncho-pulmonary hygiene including cough, deep breathe, Incentive Spirometry  - Assess the need for suctioning and aspirate as needed  - Assess and instruct to report SOB or any respiratory difficulty  - Respiratory Therapy support as indicated  Outcome: Progressing     Problem: METABOLIC, FLUID AND ELECTROLYTES - ADULT  Goal: Glucose maintained within target  range  Description: INTERVENTIONS:  - Monitor Blood Glucose as ordered  - Assess for signs and symptoms of hyperglycemia and hypoglycemia  - Administer ordered medications to maintain glucose within target range  - Assess nutritional intake and initiate nutrition service referral as needed  Outcome: Progressing

## 2024-06-11 NOTE — PLAN OF CARE
Problem: METABOLIC, FLUID AND ELECTROLYTES - ADULT  Goal: Glucose maintained within target range  Description: INTERVENTIONS:  - Monitor Blood Glucose as ordered  - Assess for signs and symptoms of hyperglycemia and hypoglycemia  - Administer ordered medications to maintain glucose within target range  - Assess nutritional intake and initiate nutrition service referral as needed  Outcome: Progressing     Problem: NEUROSENSORY - ADULT  Goal: Remains free of injury related to seizures activity  Description: INTERVENTIONS  - Maintain airway, patient safety  and administer oxygen as ordered  - Monitor patient for seizure activity, document and report duration and description of seizure to physician/advanced practitioner  - If seizure occurs,  ensure patient safety during seizure  - Reorient patient post seizure  - Seizure pads on all 4 side rails  - Instruct patient/family to notify RN of any seizure activity including if an aura is experienced  - Instruct patient/family to call for assistance with activity based on nursing assessment  - Administer anti-seizure medications if ordered    Outcome: Progressing     Problem: PAIN - ADULT  Goal: Verbalizes/displays adequate comfort level or baseline comfort level  Description: Interventions:  - Encourage patient to monitor pain and request assistance  - Assess pain using appropriate pain scale  - Administer analgesics based on type and severity of pain and evaluate response  - Implement non-pharmacological measures as appropriate and evaluate response  - Consider cultural and social influences on pain and pain management  - Notify physician/advanced practitioner if interventions unsuccessful or patient reports new pain  Outcome: Progressing

## 2024-06-11 NOTE — PROGRESS NOTES
"Patient Name: Foreign Armando  Patient MRN: [id number]  Date: 06/11/24  Service: [facility]    Subjective   Offers no new GI complaints. HR continues in upper 40s-50s. Tolerating diet, Na restriction canceled by SLIM. Pt reports 4 soft stools yesterday.     Vitals  Blood pressure 113/68, pulse (!) 49, temperature 97.6 °F (36.4 °C), temperature source Temporal, resp. rate 16, height 5' 7.01\" (1.702 m), weight 71.2 kg (156 lb 15.5 oz), SpO2 93%.  Physical Exam:     General Appearance:    Awake, alert, oriented x3, no distress, well developed, well    nourished   Head:    Normocephalic without obvious abnormality   Eyes:    PERRL, conjunctiva/corneas clear, EOM's intact        Neck:   Supple, no adenopathy   Throat:   Mucous membranes moist   Lungs:     Clear to auscultation bilaterally, no wheezing or rhonchi   Heart:    Regular rate and rhythm, S1 and S2 normal, no murmur   Abdomen:     Soft, non-tender, non-distended. bowel sounds active. No    masses, rebound or guarding.    Extremities:   Extremities without edema   Psych  Derm:   Normal affect    No jaundice   Neurologic:   CNII-XII grossly intact. Speech intact         Laboratory Studies  Results from last 7 days   Lab Units 06/11/24  0446 06/10/24  0559 06/09/24  0542 06/08/24  0450 06/07/24  0445 06/06/24  0558 06/05/24  0609   WBC Thousand/uL 4.96 4.52 3.92* 4.63 5.14 5.27 3.14*   HEMOGLOBIN g/dL 11.3* 11.7* 9.8* 9.4* 9.6* 9.3* 9.1*   HEMATOCRIT % 33.3* 32.8* 29.0* 27.3* 29.0* 26.8* 25.9*   PLATELETS Thousands/uL 32* 33* 25* 27* 30* 33* 32*     Results from last 7 days   Lab Units 06/10/24  0559 06/09/24  0542 06/08/24  0450 06/07/24  0445 06/06/24  0558   SODIUM mmol/L 138 135 135 132* 134*   POTASSIUM mmol/L 3.1* 3.4* 4.2 4.4 4.5   CHLORIDE mmol/L 101 102 105 103 103   CO2 mmol/L 29 25 23 23 20*   BUN mg/dL 22 23 22 16 13   CREATININE mg/dL 0.89 0.89 0.91 0.83 0.88   CALCIUM mg/dL 8.3* 8.1* 8.2* 8.1* 8.8   ALBUMIN g/dL  --   --   --   --  " 3.1*   TOTAL BILIRUBIN mg/dL  --   --   --   --  2.28*   ALK PHOS U/L  --   --   --   --  153*   ALT U/L  --   --   --   --  41   AST U/L  --   --   --   --  34         Imaging and Other Studies      Inhouse Medications     Current Facility-Administered Medications:     dicyclomine (BENTYL) tablet 20 mg, 20 mg, Oral, BID before breakfast/lunch, 20 mg at 06/11/24 0602    ferrous sulfate tablet 325 mg, 325 mg, Oral, Daily With Breakfast, 325 mg at 06/10/24 0803    furosemide (LASIX) tablet 40 mg, 40 mg, Oral, Daily, 40 mg at 06/10/24 0803    gabapentin (NEURONTIN) capsule 300 mg, 300 mg, Oral, TID, 300 mg at 06/10/24 2119    HYDROcodone-acetaminophen (NORCO) 5-325 mg per tablet 1 tablet, 1 tablet, Oral, Q6H PRN, 1 tablet at 06/10/24 1608    [START ON 6/13/2024] hydrocortisone (CORTEF) tablet 10 mg, 10 mg, Oral, TID    hydrocortisone (CORTEF) tablet 25 mg, 25 mg, Oral, BID, 25 mg at 06/10/24 1714    hydrOXYzine HCL (ATARAX) tablet 25 mg, 25 mg, Oral, TID PRN, 25 mg at 06/10/24 1608    insulin glargine (LANTUS) subcutaneous injection 38 Units 0.38 mL, 38 Units, Subcutaneous, HS, 38 Units at 06/10/24 2119    insulin lispro (HumALOG/ADMELOG) 100 units/mL subcutaneous injection 1-5 Units, 1-5 Units, Subcutaneous, TID AC, 4 Units at 06/10/24 1610 **AND** Fingerstick Glucose (POCT), , , TID AC    insulin lispro (HumALOG/ADMELOG) 100 units/mL subcutaneous injection 1-5 Units, 1-5 Units, Subcutaneous, HS, 5 Units at 06/10/24 2119    insulin lispro (HumALOG/ADMELOG) 100 units/mL subcutaneous injection 12 Units, 12 Units, Subcutaneous, TID With Meals, 12 Units at 06/10/24 1610    levETIRAcetam (KEPPRA) tablet 500 mg, 500 mg, Oral, Q12H SANDRA, 500 mg at 06/10/24 2119    LORazepam (ATIVAN) injection 1 mg, 1 mg, Intravenous, Q6H PRN    midodrine (PROAMATINE) tablet 10 mg, 10 mg, Oral, TID AC, 10 mg at 06/11/24 0602    nicotine (NICODERM CQ) 14 mg/24hr TD 24 hr patch 1 patch, 1 patch, Transdermal, Daily, 1 patch at 06/10/24 0805     ondansetron (ZOFRAN) injection 4 mg, 4 mg, Intravenous, Q6H PRN    pantoprazole (PROTONIX) EC tablet 40 mg, 40 mg, Oral, Early Morning, 40 mg at 06/11/24 0602    propranolol (INDERAL) tablet 10 mg, 10 mg, Oral, Q12H SANDRA, 10 mg at 06/10/24 0803    rifaximin (XIFAXAN) tablet 550 mg, 550 mg, Oral, Q12H SANDRA, 550 mg at 06/10/24 2119    rOPINIRole (REQUIP) tablet 4 mg, 4 mg, Oral, BID, 4 mg at 06/10/24 2118    senna-docusate sodium (SENOKOT S) 8.6-50 mg per tablet 1 tablet, 1 tablet, Oral, QPM, 1 tablet at 06/10/24 1715    spironolactone (ALDACTONE) tablet 100 mg, 100 mg, Oral, Daily, 100 mg at 06/10/24 0803    tamsulosin (FLOMAX) capsule 0.4 mg, 0.4 mg, Oral, Daily With Dinner, 0.4 mg at 06/10/24 1609      Assessment/Plan:    Decompensated cirrhosis complicated by ascites, hepatic hydrothorax, h/o hepatic encephalopathy, varices noted on imaging only, pancytopenia, coagulopathy.     MELD-Na = 17 (using INR from 6/3)  MELD = 14  Admitted with volume overload and hepatic hydrothorax. CT imaging suggested mild ascites, stable diffuse mesenteric edema, moderate to large sized R pleural effusion s/p 600cc tap. Currently on PO lasix 40mg daily, spironolactone 100mg daily as BP allows. Recommend 2g Na diet but changed back to regular diet per SLIM at patient's request.    History of Hepatic encephalopathy: currently controlled. Stools more formed off lactulose. Xifaxan in place of lactulose to prevent HE. Monitor clinically. Can add lactulose back if needed.  Esophageal varices: Last EGD 2/2023 no varices seen; CT imaging shows extensive gastroesophageal varices. Will need outpatient surveillance EGD, should wait 3 months for elective procedures in setting of seizure activity. Currently on propranolol 10mg daily.    Labs shows chronic pancytopenia. Transfuse for hgb <7 and plt <10 or <50 w/bleeding.   Hepatoma screening UTD: No masses seen on imaging this admission. AFP 2.17.   Colon Cancer screening: Due for repeat colonoscopy  with 2 day prep given history of colon cancer and inadequate prep in 2/2023. Patient has described seeing blood in loose stool; suspect outlet bleed. Can plan outpatient colonoscopy at time of EGD. Fecal calprotectin pending (doubt IBD).   May benefit from bone density study in setting of cirrhosis and chronic steroid use.   Will be available if needed, please call with any questions.   1.3 cm pancreatic neck hypodensity on CT imaging, appears to be noted on previous imaging. Past imaging also suggesting chronic pancreatitis. Suggest further imaging with pancreatic protocol as outpatient. MRI Abd not available at [facility]. Pancreatic elastase pending to rule out EPI.          Sarah Barnett PA-C

## 2024-06-12 LAB
ALBUMIN SERPL BCP-MCNC: 2.8 G/DL (ref 3.5–5)
ALP SERPL-CCNC: 122 U/L (ref 34–104)
ALT SERPL W P-5'-P-CCNC: 49 U/L (ref 7–52)
ANION GAP SERPL CALCULATED.3IONS-SCNC: 8 MMOL/L (ref 4–13)
AST SERPL W P-5'-P-CCNC: 32 U/L (ref 13–39)
BASOPHILS # BLD AUTO: 0.01 THOUSANDS/ÂΜL (ref 0–0.1)
BASOPHILS NFR BLD AUTO: 0 % (ref 0–1)
BILIRUB SERPL-MCNC: 2.17 MG/DL (ref 0.2–1)
BUN SERPL-MCNC: 22 MG/DL (ref 5–25)
CALCIUM ALBUM COR SERPL-MCNC: 9.2 MG/DL (ref 8.3–10.1)
CALCIUM SERPL-MCNC: 8.2 MG/DL (ref 8.4–10.2)
CHLORIDE SERPL-SCNC: 101 MMOL/L (ref 96–108)
CO2 SERPL-SCNC: 32 MMOL/L (ref 21–32)
CREAT SERPL-MCNC: 0.88 MG/DL (ref 0.6–1.3)
EOSINOPHIL # BLD AUTO: 0.43 THOUSAND/ÂΜL (ref 0–0.61)
EOSINOPHIL NFR BLD AUTO: 8 % (ref 0–6)
ERYTHROCYTE [DISTWIDTH] IN BLOOD BY AUTOMATED COUNT: 16 % (ref 11.6–15.1)
GFR SERPL CREATININE-BSD FRML MDRD: 91 ML/MIN/1.73SQ M
GLUCOSE SERPL-MCNC: 153 MG/DL (ref 65–140)
GLUCOSE SERPL-MCNC: 166 MG/DL (ref 65–140)
GLUCOSE SERPL-MCNC: 285 MG/DL (ref 65–140)
GLUCOSE SERPL-MCNC: 88 MG/DL (ref 65–140)
GLUCOSE SERPL-MCNC: 94 MG/DL (ref 65–140)
HCT VFR BLD AUTO: 34.3 % (ref 36.5–49.3)
HGB BLD-MCNC: 11.7 G/DL (ref 12–17)
IMM GRANULOCYTES # BLD AUTO: 0.02 THOUSAND/UL (ref 0–0.2)
IMM GRANULOCYTES NFR BLD AUTO: 0 % (ref 0–2)
LYMPHOCYTES # BLD AUTO: 1.05 THOUSANDS/ÂΜL (ref 0.6–4.47)
LYMPHOCYTES NFR BLD AUTO: 19 % (ref 14–44)
MAGNESIUM SERPL-MCNC: 2.1 MG/DL (ref 1.9–2.7)
MCH RBC QN AUTO: 34.7 PG (ref 26.8–34.3)
MCHC RBC AUTO-ENTMCNC: 34.1 G/DL (ref 31.4–37.4)
MCV RBC AUTO: 102 FL (ref 82–98)
MONOCYTES # BLD AUTO: 0.35 THOUSAND/ÂΜL (ref 0.17–1.22)
MONOCYTES NFR BLD AUTO: 6 % (ref 4–12)
NEUTROPHILS # BLD AUTO: 3.83 THOUSANDS/ÂΜL (ref 1.85–7.62)
NEUTS SEG NFR BLD AUTO: 67 % (ref 43–75)
NRBC BLD AUTO-RTO: 0 /100 WBCS
PHOSPHATE SERPL-MCNC: 3.5 MG/DL (ref 2.3–4.1)
PLATELET # BLD AUTO: 38 THOUSANDS/UL (ref 149–390)
PMV BLD AUTO: 11.5 FL (ref 8.9–12.7)
POTASSIUM SERPL-SCNC: 2.9 MMOL/L (ref 3.5–5.3)
PROT SERPL-MCNC: 5.8 G/DL (ref 6.4–8.4)
RBC # BLD AUTO: 3.37 MILLION/UL (ref 3.88–5.62)
SODIUM SERPL-SCNC: 141 MMOL/L (ref 135–147)
WBC # BLD AUTO: 5.69 THOUSAND/UL (ref 4.31–10.16)

## 2024-06-12 PROCEDURE — 85025 COMPLETE CBC W/AUTO DIFF WBC: CPT | Performed by: INTERNAL MEDICINE

## 2024-06-12 PROCEDURE — 80053 COMPREHEN METABOLIC PANEL: CPT | Performed by: INTERNAL MEDICINE

## 2024-06-12 PROCEDURE — 82948 REAGENT STRIP/BLOOD GLUCOSE: CPT

## 2024-06-12 PROCEDURE — 99232 SBSQ HOSP IP/OBS MODERATE 35: CPT | Performed by: INTERNAL MEDICINE

## 2024-06-12 PROCEDURE — 83735 ASSAY OF MAGNESIUM: CPT | Performed by: INTERNAL MEDICINE

## 2024-06-12 PROCEDURE — 84100 ASSAY OF PHOSPHORUS: CPT | Performed by: INTERNAL MEDICINE

## 2024-06-12 RX ORDER — INSULIN GLARGINE 100 [IU]/ML
20 INJECTION, SOLUTION SUBCUTANEOUS
Status: DISCONTINUED | OUTPATIENT
Start: 2024-06-12 | End: 2024-06-13

## 2024-06-12 RX ORDER — INSULIN LISPRO 100 [IU]/ML
10 INJECTION, SOLUTION INTRAVENOUS; SUBCUTANEOUS
Status: DISCONTINUED | OUTPATIENT
Start: 2024-06-12 | End: 2024-06-15

## 2024-06-12 RX ORDER — POTASSIUM CHLORIDE 20 MEQ/1
40 TABLET, EXTENDED RELEASE ORAL EVERY 4 HOURS
Status: COMPLETED | OUTPATIENT
Start: 2024-06-12 | End: 2024-06-12

## 2024-06-12 RX ADMIN — HYDROCORTISONE 25 MG: 10 TABLET ORAL at 08:42

## 2024-06-12 RX ADMIN — SENNOSIDES AND DOCUSATE SODIUM 1 TABLET: 8.6; 5 TABLET ORAL at 17:25

## 2024-06-12 RX ADMIN — ROPINIROLE HYDROCHLORIDE 4 MG: 1 TABLET, FILM COATED ORAL at 21:23

## 2024-06-12 RX ADMIN — GABAPENTIN 300 MG: 300 CAPSULE ORAL at 21:18

## 2024-06-12 RX ADMIN — POTASSIUM CHLORIDE 40 MEQ: 1500 TABLET, EXTENDED RELEASE ORAL at 09:35

## 2024-06-12 RX ADMIN — INSULIN LISPRO 3 UNITS: 100 INJECTION, SOLUTION INTRAVENOUS; SUBCUTANEOUS at 16:31

## 2024-06-12 RX ADMIN — ROPINIROLE HYDROCHLORIDE 4 MG: 1 TABLET, FILM COATED ORAL at 08:43

## 2024-06-12 RX ADMIN — MIDODRINE HYDROCHLORIDE 10 MG: 5 TABLET ORAL at 06:02

## 2024-06-12 RX ADMIN — MIDODRINE HYDROCHLORIDE 10 MG: 5 TABLET ORAL at 16:29

## 2024-06-12 RX ADMIN — HYDROCORTISONE 25 MG: 10 TABLET ORAL at 17:25

## 2024-06-12 RX ADMIN — PANTOPRAZOLE SODIUM 40 MG: 40 TABLET, DELAYED RELEASE ORAL at 06:02

## 2024-06-12 RX ADMIN — INSULIN LISPRO 10 UNITS: 100 INJECTION, SOLUTION INTRAVENOUS; SUBCUTANEOUS at 11:54

## 2024-06-12 RX ADMIN — HYDROCODONE BITARTRATE AND ACETAMINOPHEN 1 TABLET: 5; 325 TABLET ORAL at 03:33

## 2024-06-12 RX ADMIN — INSULIN LISPRO 10 UNITS: 100 INJECTION, SOLUTION INTRAVENOUS; SUBCUTANEOUS at 16:29

## 2024-06-12 RX ADMIN — DICYCLOMINE HYDROCHLORIDE 20 MG: 20 TABLET ORAL at 06:02

## 2024-06-12 RX ADMIN — PANCRELIPASE 24000 UNITS: 30000; 6000; 19000 CAPSULE, DELAYED RELEASE PELLETS ORAL at 11:49

## 2024-06-12 RX ADMIN — RIFAXIMIN 550 MG: 550 TABLET ORAL at 08:45

## 2024-06-12 RX ADMIN — GABAPENTIN 300 MG: 300 CAPSULE ORAL at 08:44

## 2024-06-12 RX ADMIN — TAMSULOSIN HYDROCHLORIDE 0.4 MG: 0.4 CAPSULE ORAL at 16:29

## 2024-06-12 RX ADMIN — LEVETIRACETAM 500 MG: 500 TABLET, FILM COATED ORAL at 21:19

## 2024-06-12 RX ADMIN — INSULIN LISPRO 1 UNITS: 100 INJECTION, SOLUTION INTRAVENOUS; SUBCUTANEOUS at 21:24

## 2024-06-12 RX ADMIN — INSULIN GLARGINE 20 UNITS: 100 INJECTION, SOLUTION SUBCUTANEOUS at 21:23

## 2024-06-12 RX ADMIN — PANCRELIPASE 24000 UNITS: 30000; 6000; 19000 CAPSULE, DELAYED RELEASE PELLETS ORAL at 16:29

## 2024-06-12 RX ADMIN — HYDROCODONE BITARTRATE AND ACETAMINOPHEN 1 TABLET: 5; 325 TABLET ORAL at 23:50

## 2024-06-12 RX ADMIN — RIFAXIMIN 550 MG: 550 TABLET ORAL at 21:19

## 2024-06-12 RX ADMIN — LEVETIRACETAM 500 MG: 500 TABLET, FILM COATED ORAL at 08:44

## 2024-06-12 RX ADMIN — GABAPENTIN 300 MG: 300 CAPSULE ORAL at 16:29

## 2024-06-12 RX ADMIN — DICYCLOMINE HYDROCHLORIDE 20 MG: 20 TABLET ORAL at 11:49

## 2024-06-12 RX ADMIN — FERROUS SULFATE TAB 325 MG (65 MG ELEMENTAL FE) 325 MG: 325 (65 FE) TAB at 08:44

## 2024-06-12 RX ADMIN — PANCRELIPASE 24000 UNITS: 30000; 6000; 19000 CAPSULE, DELAYED RELEASE PELLETS ORAL at 08:40

## 2024-06-12 RX ADMIN — POTASSIUM CHLORIDE 40 MEQ: 1500 TABLET, EXTENDED RELEASE ORAL at 12:01

## 2024-06-12 RX ADMIN — INSULIN LISPRO 1 UNITS: 100 INJECTION, SOLUTION INTRAVENOUS; SUBCUTANEOUS at 11:54

## 2024-06-12 RX ADMIN — MIDODRINE HYDROCHLORIDE 10 MG: 5 TABLET ORAL at 11:49

## 2024-06-12 NOTE — PROGRESS NOTES
[facility]  Progress Note  Name: Foreign Armando I  MRN: [id number]  Unit/Bed#: 7T [facility] 705-01 I Date of Admission: 6/3/2024   Date of Service: 6/12/2024 I Hospital Day: 7      Assessment & Plan:    * Pleural effusion on right  Assessment & Plan  Patient presented with shortness of breath   BNP WNL, CT chest A/P show right pleural effusion, no evidence of pulmonary congestion   s/p thoracentesis on 6/4/2024 with 600 cc clear yellow fluid  Pleural fluid analysis show LDH 62, total protein 7.6 with a plasma total protein of 7.1  Per Light's criteria, pleural fluid is transudative  Cytology was negative for malignancy  Consider hepatic hydrothorax in the setting of decompensated cirrhosis (see below)  Medically stable, however, per neuropsychology, patient not competent to make own medical decisions ->  on board for safe disposition plan     Depression  Assessment & Plan  Patient appears to be very upset and tearful.  Reported that his mother passed away the night prior to his arrival here.  Unsure if this is true or not.  Patient is very tearful.  Appreciate psychiatry evaluation     Chest pain  Assessment & Plan  Previously complained of chest pain which was started during his seizure  EKG showed no acute ST-T changes  Serial troponins negative  Resolved     Seizure-like activity (HCC)  Assessment & Plan  Patient had RRT for seizure-like activity previously  Patient had similar episode when he was in [facility] in 10/6/23 -CT brain, CTA head and neck, MRI brain and EEG was negative at that time  Discontinued Tramadol due to the risk of lowered seizure threshold  Neurology consult -> since patient was previously on Keppra 500 mg twice daily continue home dose  Reported to [facility]     Nephrolithiasis  Assessment & Plan  Stated he might have passed a stone when he urinates and might be causing painful urination  Per patient, he had history of kidney stone   UA showed no  evidence of UTI  CT A/P showed simple renal cyst(s). Nonobstructing left renal nephrolithiasis. No hydronephrosis.   BMP showed normal renal function  Continue Flomax      Swelling of lower extremity  Assessment & Plan  Patient complains of left lower extremity swelling and tenderness  Venous duplex of bilateral lower extremities negative for DVT  Patient had history of neuropathy and currently on Gabapentin 300 mg 3 times daily  For restless leg syndrome, patient is on Requip 4 mg daily.     Volume overload  Assessment & Plan  Most likely due to liver cirrhosis   BNP WNL, CT chest showed no evidence of pulmonary congestion but pleural effusion which was tapped on 6/4  Recent stress test on 7/13/2023 showed LVEF 70%, and attenuation artifact   Patient received IV Lasix 40 mg once in ED - currently on an oral Lasix/Aldactone regimen  Monitor volume status     Adrenal insufficiency (HCC)  Assessment & Plan  Patient was on Cortef 10 mg p.o. every morning and 5 mg p.o. nightly  Endocrinology consult noted - appreciate recommendations regarding Cortef tapering instructions  Continue Midodrine     Type 2 diabetes mellitus (HCC)  Assessment & Plan        Lab Results   Component Value Date     HGBA1C 6.5 (H) 05/05/2024      Carbohydrate restriction  Continue basal/prandial insulin with additional SSI coverage prior checks -> titrate dosing daily tangents on blood sugars  Anticipate fluctuation of blood sugars in the setting of Cortef tapering     Smoking  Assessment & Plan  Cessation counseling  Transdermal nicotine patch on board     Thrombocytopenia (HCC)  Assessment & Plan  Likely sequela of cirrhosis  Monitor platelet count - monitor for bleeding     Cirrhosis (HCC)  Assessment & Plan  CT chest A/P with contrast showed cirrhosis with signs of portal hypertension as evidenced by gastroesophageal varices. Stable upper abdominal lymphadenopathy presumed secondary to underlying liver pathology although low-grade  "lymphoproliferative disorder is not fully excluded. Stable diffuse mesenteric edema   Gastroenterology following  Continue Inderal for gastroesophageal varices -> plan for outpatient surveillance EGD  Continue diuresis with Lasix/Aldactone and hepatic encephalopathy prophylaxis with Rifaximin (deviously on Lactulose, now discontinued, due to complaints of intolerable diarrhea)  Patient demanded liberation of salt restriction off diet    Anemia of chronic disease  Assessment & Plan  Continue iron supplementation  H/H stable    Hypokalemia  Assessment & Plan  Monitor/replete serum potassium and magnesium as necessary  Chronic diuretic use noted      DVT Prophylaxis: SCDs - Ambulatory    AM-PAC Basic Mobility:  Basic Mobility Inpatient Raw Score: 19  -HL Achieved: 6: Walk 10 steps or more  -HLM Goal: 6: Walk 10 steps or more    Patient Centered Rounds:  I have performed bedside rounds and discussed plan of care with nursing today.  Discussions with Specialists or Other Care Team Provider:  see above assessments if applicable    Education and Discussions with Family / Patient:  Patient at bedside    Time Spent for Care:  35 minutes. More than 50% of total time spent on counseling and coordination of care, on one or more of the following: performing physical exam; counseling and coordination of care, obtaining or reviewing history, documenting in the medical record, reviewing/ordering tests/medications/procedures, and communicating with other healthcare professionals.    Current Length of Stay: 7 day(s)  Current Patient Status: Inpatient   Certification Statement:  Patient will continue to require additional hospital stay due to assessments as noted above.    Code Status: Level 1 - Full Code        Subjective:     Seen and examined earlier in the day.  Resting fairly comfortably in bed.  Complains of occasional abdominal \"spasms\" but otherwise denies shortness of breath or other constitutional symptoms at this " time.        Objective:     Vitals:   Temp (24hrs), Av.7 °F (36.5 °C), Min:97.1 °F (36.2 °C), Max:98.4 °F (36.9 °C)    Temp:  [97.1 °F (36.2 °C)-98.4 °F (36.9 °C)] 97.4 °F (36.3 °C)  HR:  [43-58] 44  Resp:  [14-18] 14  BP: ()/(56-68) 102/62  SpO2:  [94 %-98 %] 94 %  Body mass index is 24.58 kg/m².     Input and Output Summary (last 24 hours):       Intake/Output Summary (Last 24 hours) at 2024 1458  Last data filed at 2024 1213  Gross per 24 hour   Intake 1800 ml   Output --   Net 1800 ml       Physical Exam:     GENERAL No acute distress at rest   HEAD   Normocephalic - atraumatic   EYES   PERRL - EOMI    MOUTH   Mucosa moist   NECK   Supple - full range of motion   CARDIAC Rate controlled   PULMONARY Improved at the right base   ABDOMEN Generally nontender but mildly distended   MUSCULOSKELETAL   Motor strength/range of motion intact - distal LE edema present   NEUROLOGIC Mild cognitive impairment noted   SKIN   Chronic wrinkles/blemishes    PSYCHIATRIC   Mood/affect somewhat flat         Additional Data:     Labs & Recent Cultures:    Results from last 7 days   Lab Units 24  0519 24  0450 24  0445   WBC Thousand/uL 5.69   < > 5.14   HEMOGLOBIN g/dL 11.7*   < > 9.6*   HEMATOCRIT % 34.3*   < > 29.0*   PLATELETS Thousands/uL 38*   < > 30*   BANDS PCT %  --   --  3   SEGS PCT % 67   < >  --    LYMPHO PCT % 19   < > 7*   MONO PCT % 6   < > 4   EOS PCT % 8*   < > 0    < > = values in this interval not displayed.     Results from last 7 days   Lab Units 24  0519   POTASSIUM mmol/L 2.9*   CHLORIDE mmol/L 101   CO2 mmol/L 32   BUN mg/dL 22   CREATININE mg/dL 0.88   CALCIUM mg/dL 8.2*   ALK PHOS U/L 122*   ALT U/L 49   AST U/L 32         Results from last 7 days   Lab Units 24  1052 24  0548 24  2011 24  1525 24  0553 06/10/24  2051 06/10/24  1533 06/10/24  1056 06/10/24  0546 06/10/24  0351 24  2113 24  1548   POC GLUCOSE mg/dl 166* 88  360* 389* 190* 431* 388* 285* 276* 263* 493* 384*                         Lines/Drains:  Invasive Devices       None                     Last 24 Hours Medication List:   Current Facility-Administered Medications   Medication Dose Route Frequency Provider Last Rate    dicyclomine  20 mg Oral BID before breakfast/lunch Natan Miguel PA-C      ferrous sulfate  325 mg Oral Daily With Breakfast Natan Miguel PA-C      furosemide  40 mg Oral Daily Rosario Whitfield MD      gabapentin  300 mg Oral TID Natan Miguel PA-C      HYDROcodone-acetaminophen  1 tablet Oral Q6H PRN Rosario Whitfield MD      [START ON 6/13/2024] hydrocortisone  10 mg Oral TID Rosario Whitfield MD      hydrocortisone  25 mg Oral BID Rosario Whitfield MD      hydrOXYzine HCL  25 mg Oral TID PRN Natan Miguel PA-C      insulin glargine  20 Units Subcutaneous HS Kori Dias MD      insulin lispro  1-5 Units Subcutaneous TID AC Natan Miguel PA-C      insulin lispro  1-5 Units Subcutaneous HS Natan Miguel PA-C      insulin lispro  10 Units Subcutaneous TID With Meals Kori Dias MD      levETIRAcetam  500 mg Oral Q12H SANDRA Rosario Whitfield MD      LORazepam  1 mg Intravenous Q6H PRN Rosario Whitfield MD      midodrine  10 mg Oral TID AC Rosario Whitfield MD      nicotine  1 patch Transdermal Daily Natan Miguel PA-C      ondansetron  4 mg Intravenous Q6H PRN Natan Miguel PA-C      pancrelipase (Lip-Prot-Amyl)  24,000 Units Oral TID With Meals Jerson Gibson MD      pantoprazole  40 mg Oral Early Morning Natan Miguel PA-C      propranolol  10 mg Oral Q12H SANDRA Natan Miguel PA-C      rifaximin  550 mg Oral Q12H [facility] Jerson Gibson MD      rOPINIRole  4 mg Oral BID Natan Miguel PA-C      senna-docusate sodium  1 tablet Oral QPM Natan Miguel PA-C      spironolactone  100 mg Oral Daily Natan Miguel PA-C      tamsulosin  0.4 mg Oral Daily With Dinner JERMAINE Birch MD   Hospitalist - [facility] Internal Medicine        ** Please Note: This note is constructed using a voice  recognition dictation system.  An occasional wrong word/phrase or “sound-a-like” substitution may have been picked up by dictation device due to the inherent limitations of voice recognition software.  Read the chart carefully and recognize, using reasonable context, where substitutions may have occurred.**

## 2024-06-12 NOTE — PLAN OF CARE
Problem: INFECTION - ADULT  Goal: Absence of fever/infection during neutropenic period  Description: INTERVENTIONS:  - Monitor WBC    Outcome: Progressing     Problem: SAFETY ADULT  Goal: Maintain or return to baseline ADL function  Description: INTERVENTIONS:  -  Assess patient's ability to carry out ADLs; assess patient's baseline for ADL function and identify physical deficits which impact ability to perform ADLs (bathing, care of mouth/teeth, toileting, grooming, dressing, etc.)  - Assess/evaluate cause of self-care deficits   - Assess range of motion  - Assess patient's mobility; develop plan if impaired  - Assess patient's need for assistive devices and provide as appropriate  - Encourage maximum independence but intervene and supervise when necessary  - Involve family in performance of ADLs  - Assess for home care needs following discharge   - Consider OT consult to assist with ADL evaluation and planning for discharge  - Provide patient education as appropriate  Outcome: Progressing     Problem: NEUROSENSORY - ADULT  Goal: Remains free of injury related to seizures activity  Description: INTERVENTIONS  - Maintain airway, patient safety  and administer oxygen as ordered  - Monitor patient for seizure activity, document and report duration and description of seizure to physician/advanced practitioner  - If seizure occurs,  ensure patient safety during seizure  - Reorient patient post seizure  - Seizure pads on all 4 side rails  - Instruct patient/family to notify RN of any seizure activity including if an aura is experienced  - Instruct patient/family to call for assistance with activity based on nursing assessment  - Administer anti-seizure medications if ordered    Outcome: Progressing     Problem: METABOLIC, FLUID AND ELECTROLYTES - ADULT  Goal: Glucose maintained within target range  Description: INTERVENTIONS:  - Monitor Blood Glucose as ordered  - Assess for signs and symptoms of hyperglycemia and  hypoglycemia  - Administer ordered medications to maintain glucose within target range  - Assess nutritional intake and initiate nutrition service referral as needed  Outcome: Progressing

## 2024-06-12 NOTE — UTILIZATION REVIEW
Continued Stay Review    Date: 6/12/2024                          Current Patient Class: inpatient   Current Level of Care: med surg     HPI:63 y.o. male initially admitted on 6/5/2024     Assessment/Plan: Medically stable, however, per neuropsychology, patient not competent to make own medical decisions ->  on board for safe disposition plan       Vital Signs (last 3 days)       Date/Time Temp Pulse Resp BP MAP (mmHg) SpO2 O2 Device Patient Position - Orthostatic VS Zach Coma Scale Score Pain    06/12/24 1020 -- -- -- -- -- -- -- -- 15 --    06/12/24 0900 -- -- -- -- -- -- -- -- -- No Pain    06/12/24 0836 97.4 °F (36.3 °C) 44 14 102/62 68 -- -- Lying -- --    06/12/24 0703 97.1 °F (36.2 °C) 43 16 94/56 61 94 % None (Room air) Lying -- --    06/12/24 0333 -- -- -- -- -- -- -- -- -- 10 - Worst Possible Pain    06/11/24 2308 98.4 °F (36.9 °C) 55 16 108/68 81 97 % None (Room air) Lying -- --    06/11/24 2130 -- -- -- -- -- -- -- -- 15 No Pain    06/11/24 2125 97.5 °F (36.4 °C) 53 16 114/67 76 98 % None (Room air) Sitting -- --    06/11/24 1535 98 °F (36.7 °C) 58 18 115/65 77 98 % None (Room air) Sitting -- --    06/11/24 0818 -- -- -- -- -- -- -- -- -- 8    06/11/24 0742 -- -- -- -- -- -- -- -- 15 No Pain    06/11/24 0703 97.4 °F (36.3 °C) 51 16 113/69 74 97 % None (Room air) Sitting -- --    06/10/24 2115 97.6 °F (36.4 °C) 49 16 113/68 75 93 % None (Room air) Lying -- --    06/10/24 2000 -- -- -- -- -- -- -- -- 15 No Pain    06/10/24 1608 -- -- -- -- -- -- -- -- -- 8    06/10/24 1546 97.6 °F (36.4 °C) 54 18 120/71 81 93 % None (Room air) Lying -- --    06/10/24 0806 -- -- -- -- -- -- -- -- -- 7    06/10/24 0728 -- -- -- -- -- -- -- -- 15 No Pain    06/10/24 0714 97.6 °F (36.4 °C) 48 20 114/66 76 94 % None (Room air) Lying -- --    06/09/24 2125 98.5 °F (36.9 °C) 50 16 115/65 75 94 % None (Room air) Lying -- --    06/09/24 1915 -- -- -- -- -- -- -- -- 15 6    06/09/24 1428 96.9 °F (36.1 °C) 58 18 118/73  85 96 % None (Room air) Sitting -- 8    06/09/24 0733 -- -- -- -- -- -- -- -- 15 No Pain    06/09/24 0700 96.9 °F (36.1 °C) 52 18 114/66 74 94 % None (Room air) Lying -- --          Weight (last 2 days)       None              Pertinent Labs/Diagnostic Results:   Radiology:  XR chest portable   Final Interpretation by Dick Harper MD (06/08 0626)      Small right pleural effusion.            Workstation performed: [id number]         CT head wo contrast   Final Interpretation by Allen Rodriguez MD (06/06 0811)      No acute intracranial abnormality.                  Workstation performed: [id number]          VAS VENOUS DUPLEX - LOWER LIMB BILATERAL   Final Interpretation by Danisha Osullivan DO (06/05 1609)      IR IN-Patient Thoracentesis   Final Interpretation by Nate Luevano MD (06/05 1258)   Diagnostic and therapeutic right thoracentesis.         Workstation performed: [id number]         CT chest abdomen pelvis w contrast   Final Interpretation by Nemo Damon MD (06/03 2100)      Moderate to large sized right pleural effusion      Cirrhosis with signs of portal hypertension as evidenced by gastroesophageal varices.      Stable upper abdominal lymphadenopathy presumed secondary to underlying liver pathology although low-grade lymphoproliferative disorder is not fully excluded      Stable diffuse mesenteric edema         Workstation performed: [id number]           Cardiology:  ECG 12 lead   Final Result by Terry Pelletier MD (06/07 1718)   Sinus bradycardia   Baseline artifact   Normal ECG   When compared with ECG of 07-JUN-2024 11:50, (unconfirmed)   No significant change was found   Confirmed by Terry Pelletier ([id number]) on 6/7/2024 5:18:26 PM      ECG 12 lead   Final Result by Terry Pelletier MD (06/07 1718)   ** Poor data quality, interpretation may be adversely affected   Sinus bradycardia   Baseline artifact   Normal ECG   When compared with ECG of 06-JUN-2024 05:42,   Vent. rate has  decreased BY  43 BPM      Confirmed by Terry Pelletier ([id number]) on 6/7/2024 5:18:04 PM      ECG 12 lead   Final Result by Mykel Oleary MD (06/06 0604)   Normal sinus rhythm   Normal ECG   When compared with ECG of 06-JUN-2024 05:41, (unconfirmed)   No significant change was found   Confirmed by Mykel Oleary ([id number]) on 6/6/2024 6:04:47 AM      ECG 12 lead   Final Result by Mykel Oleary MD (06/06 0604)   Sinus tachycardia   Otherwise normal ECG   When compared with ECG of 06-JUN-2024 05:40, (unconfirmed)   Premature ventricular complexes are no longer Present   Nonspecific T wave abnormality no longer evident in Lateral leads   Confirmed by Mykel Oleary ([id number]) on 6/6/2024 6:04:49 AM      ECG 12 lead   Final Result by Mykel Oleary MD (06/06 0605)   Sinus tachycardia with occasional Premature ventricular complexes   Nonspecific ST abnormality   Abnormal ECG   When compared with ECG of 06-JUN-2024 05:39, (unconfirmed)   Previous ECG has undetermined rhythm, needs review   Confirmed by Mykel Oleary ([id number]) on 6/6/2024 6:05:08 AM      ECG 12 lead   Final Result by Terry Pelletier MD (06/04 0545)   Normal sinus rhythm   Normal ECG   When compared with ECG of 03-MAY-2023 09:40,   Sinus rhythm has replaced Ectopic atrial rhythm   Confirmed by Terry Pelletier ([id number]) on 6/4/2024 5:45:26 AM        GI:  No orders to display           Results from last 7 days   Lab Units 06/12/24  0519 06/11/24  0446 06/10/24  0559 06/09/24  0542 06/08/24  0450 06/07/24  0445   WBC Thousand/uL 5.69 4.96 4.52 3.92* 4.63 5.14   HEMOGLOBIN g/dL 11.7* 11.3* 11.7* 9.8* 9.4* 9.6*   HEMATOCRIT % 34.3* 33.3* 32.8* 29.0* 27.3* 29.0*   PLATELETS Thousands/uL 38* 32* 33* 25* 27* 30*   TOTAL NEUT ABS Thousands/µL 3.83  --  3.44 3.32 4.04  --    BANDS PCT %  --   --   --   --   --  3         Results from last 7 days   Lab Units 06/12/24  0519 06/11/24  0446 06/10/24  0559 06/09/24  0542 06/08/24  0450   SODIUM mmol/L 141 139 138 135 135   POTASSIUM  "mmol/L 2.9* 3.4* 3.1* 3.4* 4.2   CHLORIDE mmol/L 101 102 101 102 105   CO2 mmol/L 32 31 29 25 23   ANION GAP mmol/L 8 6 8 8 7   BUN mg/dL 22 23 22 23 22   CREATININE mg/dL 0.88 0.86 0.89 0.89 0.91   EGFR ml/min/1.73sq m 91 92 90 90 89   CALCIUM mg/dL 8.2* 8.2* 8.3* 8.1* 8.2*   MAGNESIUM mg/dL 2.1 2.1  --   --   --    PHOSPHORUS mg/dL 3.5  --   --   --   --      Results from last 7 days   Lab Units 06/12/24  0519 06/06/24  0558   AST U/L 32 34   ALT U/L 49 41   ALK PHOS U/L 122* 153*   TOTAL PROTEIN g/dL 5.8* 7.1   ALBUMIN g/dL 2.8* 3.1*   TOTAL BILIRUBIN mg/dL 2.17* 2.28*     Results from last 7 days   Lab Units 06/12/24  1052 06/12/24  0548 06/11/24 2011 06/11/24  1525 06/11/24  0553 06/10/24  2051 06/10/24  1533 06/10/24  1056 06/10/24  0546 06/10/24  0351 06/09/24  2113 06/09/24  1548   POC GLUCOSE mg/dl 166* 88 360* 389* 190* 431* 388* 285* 276* 263* 493* 384*     Results from last 7 days   Lab Units 06/12/24  0519 06/11/24  0446 06/10/24  0559 06/09/24  0542 06/08/24  0450 06/07/24  0445 06/06/24  0558   GLUCOSE RANDOM mg/dL 94 207* 273* 314* 265* 281* 271*             No results found for: \"BETA-HYDROXYBUTYRATE\"                   Results from last 7 days   Lab Units 06/07/24  1439 06/07/24  1215   HS TNI 0HR ng/L  --  3   HS TNI 2HR ng/L 3  --    HSTNI D2 ng/L 0  --          Medications:   Scheduled Medications:  dicyclomine, 20 mg, Oral, BID before breakfast/lunch  ferrous sulfate, 325 mg, Oral, Daily With Breakfast  furosemide, 40 mg, Oral, Daily  gabapentin, 300 mg, Oral, TID  [START ON 6/13/2024] hydrocortisone, 10 mg, Oral, TID  hydrocortisone, 25 mg, Oral, BID  insulin glargine, 20 Units, Subcutaneous, HS  insulin lispro, 1-5 Units, Subcutaneous, TID AC  insulin lispro, 1-5 Units, Subcutaneous, HS  insulin lispro, 10 Units, Subcutaneous, TID With Meals  levETIRAcetam, 500 mg, Oral, Q12H SANDRA  midodrine, 10 mg, Oral, TID AC  nicotine, 1 patch, Transdermal, Daily  pancrelipase (Lip-Prot-Amyl), 24,000 " Units, Oral, TID With Meals  pantoprazole, 40 mg, Oral, Early Morning  propranolol, 10 mg, Oral, Q12H SANDRA  rifaximin, 550 mg, Oral, Q12H SANDRA  rOPINIRole, 4 mg, Oral, BID  senna-docusate sodium, 1 tablet, Oral, QPM  spironolactone, 100 mg, Oral, Daily  tamsulosin, 0.4 mg, Oral, Daily With Dinner      Continuous IV Infusions:     PRN Meds:  HYDROcodone-acetaminophen, 1 tablet, Oral, Q6H PRN  hydrOXYzine HCL, 25 mg, Oral, TID PRN  LORazepam, 1 mg, Intravenous, Q6H PRN  ondansetron, 4 mg, Intravenous, Q6H PRN    Discharge Plan: D  Network Utilization Review Department  ATTENTION: Please call with any questions or concerns to [phone] and carefully listen to the prompts so that you are directed to the right person. All voicemails are confidential.   For Discharge needs, contact Care Management DC Support Team at [phone] opt. [phone]  Send all requests for admission clinical reviews, approved or denied determinations and any other requests to dedicated fax number below belonging to the campus where the patient is receiving treatment. List of dedicated fax numbers for the Facilities:  FACILITY NAME UR FAX NUMBER   ADMISSION DENIALS (Administrative/Medical Necessity) [phone]   DISCHARGE SUPPORT TEAM (NETWORK) [phone]   PARENT CHILD HEALTH (Maternity/NICU/Pediatrics) [phone]   [facility] [phone]   [facility] [phone]   [facility] [phone]   [facility] [phone]   [facility] [phone]   [facility] [phone]   General acute hospital [phone]   [facility] [phone]   [facility] [phone]   [facility] [phone]   [facility] [phone]   [facility]  [place] [phone]

## 2024-06-12 NOTE — CASE MANAGEMENT
Case Management Progress Note    Patient name Foreign Armando  Location 7T /7T -01 MRN [id number]  : 1960 Date 2024       LOS (days): 7  Geometric Mean LOS (GMLOS) (days): 3.3  Days to GMLOS:-3.9        OBJECTIVE:        Current admission status: Inpatient  Preferred Pharmacy:   [id number] - [place] - [address]  [address] [place] [address]  Phone: [phone] Fax: [phone]    [facility]/pharmacy #[id number] [place], [place] - [address]  [address] [facility] [facility] [address]  Phone: [phone] Fax: [phone]    [id number] - [place] - [address]  [address] [place] [address]  Phone: [phone] Fax: [phone]    [id number] - [place] - [address] [facility]  [address]  [facility] [address]  Phone: [phone] Fax: [phone]    Primary Care Provider: No primary care provider on file.    Primary Insurance: BLUE CROSS  Secondary Insurance:     PROGRESS NOTE:    Guardianship Intake Assessment form completed and emailed to Trino Aguilar Esq.       Guardianship needed for afe discharge disposition.  No family/friends will house or assist patient at this time

## 2024-06-12 NOTE — CASE MANAGEMENT
"   Case Management Progress Note    Patient name Foreign Armando  Location 7T U 705/7T U 705-01 MRN [id number]  : 1960 Date 2024       LOS (days): 7  Geometric Mean LOS (GMLOS) (days): 3.3  Days to GMLOS:-3.8        OBJECTIVE:        Current admission status: Inpatient  Preferred Pharmacy:   [id number]  [place] - [address]  [address] [address]  Phone: [phone] Fax: [phone]    [facility]/pharmacy #[id number] [place], [place] - [address]  [address] [facility] [address]  Phone: [phone] Fax: [phone]    [facility]/pharmacy #[id number] - [place] - [address]  [address] [address]  Phone: [phone] Fax: [phone]    [facility]/pharmacy #[id number] - [place] - [address] [facility]  [address] [facility] [address]  Phone: [phone] Fax: [phone]    Primary Care Provider: No primary care provider on file.    Primary Insurance: BLUE CROSS  Secondary Insurance:     PROGRESS NOTE:    Call to Edelmira at [facility] [place] [phone] and discussed patient.  Edelmira reports that patient left his [employer] info at [place] she will keep it in a safe place.  Edelmira confirmed that patient did have a wallet with [place] ID, A small card birth certificate that fit in a wallet and his SS number.  Edelmira provided 2 emergency contact persons:  Paulo Kim [phone] and Lois [phone].    Staff again checked patient, room and all belongings and can not find wallet or cards.  Patient also missing banking card and 2 credit cards per patient.    Call to Paulo Kim [phone] (who we have listed as SO in EPIC) and talked to her.  Paulo reports patient can not return to  where she resides.  She reports they were together on-and-off x 4 years but she has kicked him out for good 1 month ago.  Paulo reports that patient is a \"demon persona\" and causes too much trouble.  Paulo reports patient " lies did not help her with bills meet other women on porn and web sites and gave them his money and she will not have him back.  Paulo reports she works 12 hr shifts and is raising her 2 children and patient can not come back. Paulo again states patient tells lies and she can not have him near her children.    Call to Lois [phone] and left  requesting call back.    Call to [facility] and made a report to APS for monies being stolen from women thru web sites.

## 2024-06-12 NOTE — PLAN OF CARE
Problem: Potential for Falls  Goal: Patient will remain free of falls  Description: INTERVENTIONS:  - Educate patient/family on patient safety including physical limitations  - Instruct patient to call for assistance with activity   - Consult OT/PT to assist with strengthening/mobility   - Keep Call bell within reach  - Keep bed low and locked with side rails adjusted as appropriate  - Keep care items and personal belongings within reach  - Initiate and maintain comfort rounds  - Make Fall Risk Sign visible to staff  - Apply yellow socks and bracelet for high fall risk patients  - Consider moving patient to room near nurses station  Outcome: Progressing     Problem: Prexisting or High Potential for Compromised Skin Integrity  Goal: Skin integrity is maintained or improved  Description: INTERVENTIONS:  - Identify patients at risk for skin breakdown  - Assess and monitor skin integrity  - Assess and monitor nutrition and hydration status  - Monitor labs   - Assess for incontinence   - Turn and reposition patient  - Assist with mobility/ambulation  - Relieve pressure over bony prominences  - Avoid friction and shearing  - Provide appropriate hygiene as needed including keeping skin clean and dry  - Evaluate need for skin moisturizer/barrier cream  - Collaborate with interdisciplinary team   - Patient/family teaching  - Consider wound care consult   Outcome: Progressing     Problem: PAIN - ADULT  Goal: Verbalizes/displays adequate comfort level or baseline comfort level  Description: Interventions:  - Encourage patient to monitor pain and request assistance  - Assess pain using appropriate pain scale  - Administer analgesics based on type and severity of pain and evaluate response  - Implement non-pharmacological measures as appropriate and evaluate response  - Consider cultural and social influences on pain and pain management  - Notify physician/advanced practitioner if interventions unsuccessful or patient reports  new pain  Outcome: Progressing     Problem: INFECTION - ADULT  Goal: Absence or prevention of progression during hospitalization  Description: INTERVENTIONS:  - Assess and monitor for signs and symptoms of infection  - Monitor lab/diagnostic results  - Monitor all insertion sites, i.e. indwelling lines, tubes, and drains  - Monitor endotracheal if appropriate and nasal secretions for changes in amount and color  - [place] appropriate cooling/warming therapies per order  - Administer medications as ordered  - Instruct and encourage patient and family to use good hand hygiene technique  - Identify and instruct in appropriate isolation precautions for identified infection/condition  Outcome: Progressing  Goal: Absence of fever/infection during neutropenic period  Description: INTERVENTIONS:  - Monitor WBC    Outcome: Progressing     Problem: SAFETY ADULT  Goal: Patient will remain free of falls  Description: INTERVENTIONS:  - Educate patient/family on patient safety including physical limitations  - Instruct patient to call for assistance with activity   - Consult OT/PT to assist with strengthening/mobility   - Keep Call bell within reach  - Keep bed low and locked with side rails adjusted as appropriate  - Keep care items and personal belongings within reach  - Initiate and maintain comfort rounds  - Make Fall Risk Sign visible to staff  - Apply yellow socks and bracelet for high fall risk patients  - Consider moving patient to room near nurses station  Outcome: Progressing  Goal: Maintain or return to baseline ADL function  Description: INTERVENTIONS:  -  Assess patient's ability to carry out ADLs; assess patient's baseline for ADL function and identify physical deficits which impact ability to perform ADLs (bathing, care of mouth/teeth, toileting, grooming, dressing, etc.)  - Assess/evaluate cause of self-care deficits   - Assess range of motion  - Assess patient's mobility; develop plan if impaired  - Assess  patient's need for assistive devices and provide as appropriate  - Encourage maximum independence but intervene and supervise when necessary  - Involve family in performance of ADLs  - Assess for home care needs following discharge   - Consider OT consult to assist with ADL evaluation and planning for discharge  - Provide patient education as appropriate  Outcome: Progressing  Goal: Maintains/Returns to pre admission functional level  Description: INTERVENTIONS:  - Perform AM-PAC 6 Click Basic Mobility/ Daily Activity assessment daily.  - Set and communicate daily mobility goal to care team and patient/family/caregiver.   - Collaborate with rehabilitation services on mobility goals if consulted  - Record patient progress and toleration of activity level   Outcome: Progressing     Problem: DISCHARGE PLANNING  Goal: Discharge to home or other facility with appropriate resources  Description: INTERVENTIONS:  - Identify barriers to discharge w/patient and caregiver  - Arrange for needed discharge resources and transportation as appropriate  - Identify discharge learning needs (meds, wound care, etc.)  - Arrange for interpretive services to assist at discharge as needed  - Refer to Case Management Department for coordinating discharge planning if the patient needs post-hospital services based on physician/advanced practitioner order or complex needs related to functional status, cognitive ability, or social support system  Outcome: Progressing     Problem: Knowledge Deficit  Goal: Patient/family/caregiver demonstrates understanding of disease process, treatment plan, medications, and discharge instructions  Description: Complete learning assessment and assess knowledge base.  Interventions:  - Provide teaching at level of understanding  - Provide teaching via preferred learning methods  Outcome: Progressing     Problem: NEUROSENSORY - ADULT  Goal: Remains free of injury related to seizures activity  Description:  INTERVENTIONS  - Maintain airway, patient safety  and administer oxygen as ordered  - Monitor patient for seizure activity, document and report duration and description of seizure to physician/advanced practitioner  - If seizure occurs,  ensure patient safety during seizure  - Reorient patient post seizure  - Seizure pads on all 4 side rails  - Instruct patient/family to notify RN of any seizure activity including if an aura is experienced  - Instruct patient/family to call for assistance with activity based on nursing assessment  - Administer anti-seizure medications if ordered    Outcome: Progressing

## 2024-06-13 PROBLEM — E11.9 TYPE 2 DIABETES MELLITUS (HCC): Status: ACTIVE | Noted: 2022-11-22

## 2024-06-13 PROBLEM — D63.8 ANEMIA OF CHRONIC DISEASE: Status: ACTIVE | Noted: 2024-06-13

## 2024-06-13 PROBLEM — R45.89 DEPRESSED MOOD: Status: ACTIVE | Noted: 2024-06-10

## 2024-06-13 LAB
ALBUMIN SERPL BCP-MCNC: 2.5 G/DL (ref 3.5–5)
ALP SERPL-CCNC: 114 U/L (ref 34–104)
ALT SERPL W P-5'-P-CCNC: 47 U/L (ref 7–52)
ANION GAP SERPL CALCULATED.3IONS-SCNC: 4 MMOL/L (ref 4–13)
AST SERPL W P-5'-P-CCNC: 33 U/L (ref 13–39)
BASOPHILS # BLD AUTO: 0 THOUSANDS/ÂΜL (ref 0–0.1)
BASOPHILS NFR BLD AUTO: 0 % (ref 0–1)
BILIRUB SERPL-MCNC: 1.93 MG/DL (ref 0.2–1)
BUN SERPL-MCNC: 18 MG/DL (ref 5–25)
CALCIUM ALBUM COR SERPL-MCNC: 9.2 MG/DL (ref 8.3–10.1)
CALCIUM SERPL-MCNC: 8 MG/DL (ref 8.4–10.2)
CALPROTECTIN STL-MCNT: 80 UG/G (ref 0–120)
CHLORIDE SERPL-SCNC: 101 MMOL/L (ref 96–108)
CO2 SERPL-SCNC: 32 MMOL/L (ref 21–32)
CREAT SERPL-MCNC: 0.79 MG/DL (ref 0.6–1.3)
EOSINOPHIL # BLD AUTO: 0.17 THOUSAND/ÂΜL (ref 0–0.61)
EOSINOPHIL NFR BLD AUTO: 4 % (ref 0–6)
ERYTHROCYTE [DISTWIDTH] IN BLOOD BY AUTOMATED COUNT: 15.8 % (ref 11.6–15.1)
GFR SERPL CREATININE-BSD FRML MDRD: 95 ML/MIN/1.73SQ M
GLUCOSE SERPL-MCNC: 117 MG/DL (ref 65–140)
GLUCOSE SERPL-MCNC: 119 MG/DL (ref 65–140)
GLUCOSE SERPL-MCNC: 191 MG/DL (ref 65–140)
GLUCOSE SERPL-MCNC: 196 MG/DL (ref 65–140)
GLUCOSE SERPL-MCNC: 232 MG/DL (ref 65–140)
HCT VFR BLD AUTO: 29.1 % (ref 36.5–49.3)
HGB BLD-MCNC: 10.4 G/DL (ref 12–17)
IMM GRANULOCYTES # BLD AUTO: 0.01 THOUSAND/UL (ref 0–0.2)
IMM GRANULOCYTES NFR BLD AUTO: 0 % (ref 0–2)
LYMPHOCYTES # BLD AUTO: 0.6 THOUSANDS/ÂΜL (ref 0.6–4.47)
LYMPHOCYTES NFR BLD AUTO: 13 % (ref 14–44)
MAGNESIUM SERPL-MCNC: 2.1 MG/DL (ref 1.9–2.7)
MCH RBC QN AUTO: 35.1 PG (ref 26.8–34.3)
MCHC RBC AUTO-ENTMCNC: 35.7 G/DL (ref 31.4–37.4)
MCV RBC AUTO: 98 FL (ref 82–98)
MONOCYTES # BLD AUTO: 0.29 THOUSAND/ÂΜL (ref 0.17–1.22)
MONOCYTES NFR BLD AUTO: 6 % (ref 4–12)
NEUTROPHILS # BLD AUTO: 3.53 THOUSANDS/ÂΜL (ref 1.85–7.62)
NEUTS SEG NFR BLD AUTO: 77 % (ref 43–75)
NRBC BLD AUTO-RTO: 0 /100 WBCS
PHOSPHATE SERPL-MCNC: 3.7 MG/DL (ref 2.3–4.1)
PLATELET # BLD AUTO: 34 THOUSANDS/UL (ref 149–390)
PMV BLD AUTO: 12.8 FL (ref 8.9–12.7)
POTASSIUM SERPL-SCNC: 4.2 MMOL/L (ref 3.5–5.3)
PROT SERPL-MCNC: 5.2 G/DL (ref 6.4–8.4)
RBC # BLD AUTO: 2.96 MILLION/UL (ref 3.88–5.62)
SODIUM SERPL-SCNC: 137 MMOL/L (ref 135–147)
WBC # BLD AUTO: 4.6 THOUSAND/UL (ref 4.31–10.16)

## 2024-06-13 PROCEDURE — 99232 SBSQ HOSP IP/OBS MODERATE 35: CPT | Performed by: INTERNAL MEDICINE

## 2024-06-13 PROCEDURE — 84100 ASSAY OF PHOSPHORUS: CPT | Performed by: INTERNAL MEDICINE

## 2024-06-13 PROCEDURE — 82948 REAGENT STRIP/BLOOD GLUCOSE: CPT

## 2024-06-13 PROCEDURE — 85025 COMPLETE CBC W/AUTO DIFF WBC: CPT | Performed by: INTERNAL MEDICINE

## 2024-06-13 PROCEDURE — 80053 COMPREHEN METABOLIC PANEL: CPT | Performed by: INTERNAL MEDICINE

## 2024-06-13 PROCEDURE — 83735 ASSAY OF MAGNESIUM: CPT | Performed by: INTERNAL MEDICINE

## 2024-06-13 RX ORDER — LIDOCAINE 40 MG/G
CREAM TOPICAL ONCE
Status: COMPLETED | OUTPATIENT
Start: 2024-06-14 | End: 2024-06-14

## 2024-06-13 RX ORDER — HYDROCODONE BITARTRATE AND ACETAMINOPHEN 5; 325 MG/1; MG/1
2 TABLET ORAL EVERY 6 HOURS PRN
Status: DISCONTINUED | OUTPATIENT
Start: 2024-06-13 | End: 2024-06-18 | Stop reason: HOSPADM

## 2024-06-13 RX ORDER — LIDOCAINE 40 MG/G
CREAM TOPICAL ONCE
Status: COMPLETED | OUTPATIENT
Start: 2024-06-13 | End: 2024-06-13

## 2024-06-13 RX ORDER — HYDROCODONE BITARTRATE AND ACETAMINOPHEN 5; 325 MG/1; MG/1
1 TABLET ORAL EVERY 6 HOURS PRN
Status: DISCONTINUED | OUTPATIENT
Start: 2024-06-13 | End: 2024-06-18 | Stop reason: HOSPADM

## 2024-06-13 RX ORDER — HYDROMORPHONE HCL/PF 1 MG/ML
0.5 SYRINGE (ML) INJECTION
Status: DISCONTINUED | OUTPATIENT
Start: 2024-06-13 | End: 2024-06-13

## 2024-06-13 RX ORDER — INSULIN GLARGINE 100 [IU]/ML
15 INJECTION, SOLUTION SUBCUTANEOUS
Status: DISCONTINUED | OUTPATIENT
Start: 2024-06-13 | End: 2024-06-15

## 2024-06-13 RX ADMIN — DICYCLOMINE HYDROCHLORIDE 20 MG: 20 TABLET ORAL at 11:09

## 2024-06-13 RX ADMIN — SENNOSIDES AND DOCUSATE SODIUM 1 TABLET: 8.6; 5 TABLET ORAL at 17:08

## 2024-06-13 RX ADMIN — GABAPENTIN 300 MG: 300 CAPSULE ORAL at 21:16

## 2024-06-13 RX ADMIN — ROPINIROLE HYDROCHLORIDE 4 MG: 1 TABLET, FILM COATED ORAL at 21:15

## 2024-06-13 RX ADMIN — MIDODRINE HYDROCHLORIDE 10 MG: 5 TABLET ORAL at 17:09

## 2024-06-13 RX ADMIN — GABAPENTIN 300 MG: 300 CAPSULE ORAL at 17:09

## 2024-06-13 RX ADMIN — HYDROCORTISONE 10 MG: 10 TABLET ORAL at 17:08

## 2024-06-13 RX ADMIN — LIDOCAINE 4%: 4 CREAM TOPICAL at 13:20

## 2024-06-13 RX ADMIN — FUROSEMIDE 40 MG: 40 TABLET ORAL at 08:42

## 2024-06-13 RX ADMIN — PANCRELIPASE 24000 UNITS: 30000; 6000; 19000 CAPSULE, DELAYED RELEASE PELLETS ORAL at 11:09

## 2024-06-13 RX ADMIN — MIDODRINE HYDROCHLORIDE 10 MG: 5 TABLET ORAL at 06:06

## 2024-06-13 RX ADMIN — INSULIN LISPRO 1 UNITS: 100 INJECTION, SOLUTION INTRAVENOUS; SUBCUTANEOUS at 21:17

## 2024-06-13 RX ADMIN — MIDODRINE HYDROCHLORIDE 10 MG: 5 TABLET ORAL at 11:09

## 2024-06-13 RX ADMIN — PROPRANOLOL HYDROCHLORIDE 10 MG: 10 TABLET ORAL at 08:43

## 2024-06-13 RX ADMIN — HYDROCODONE BITARTRATE AND ACETAMINOPHEN 1 TABLET: 5; 325 TABLET ORAL at 08:58

## 2024-06-13 RX ADMIN — LEVETIRACETAM 500 MG: 500 TABLET, FILM COATED ORAL at 08:42

## 2024-06-13 RX ADMIN — PANTOPRAZOLE SODIUM 40 MG: 40 TABLET, DELAYED RELEASE ORAL at 06:06

## 2024-06-13 RX ADMIN — PANCRELIPASE 24000 UNITS: 30000; 6000; 19000 CAPSULE, DELAYED RELEASE PELLETS ORAL at 17:09

## 2024-06-13 RX ADMIN — ROPINIROLE HYDROCHLORIDE 4 MG: 1 TABLET, FILM COATED ORAL at 08:42

## 2024-06-13 RX ADMIN — PANCRELIPASE 24000 UNITS: 30000; 6000; 19000 CAPSULE, DELAYED RELEASE PELLETS ORAL at 08:42

## 2024-06-13 RX ADMIN — HYDROCODONE BITARTRATE AND ACETAMINOPHEN 1 TABLET: 5; 325 TABLET ORAL at 12:57

## 2024-06-13 RX ADMIN — LEVETIRACETAM 500 MG: 500 TABLET, FILM COATED ORAL at 21:15

## 2024-06-13 RX ADMIN — SPIRONOLACTONE 100 MG: 100 TABLET, FILM COATED ORAL at 08:43

## 2024-06-13 RX ADMIN — DICYCLOMINE HYDROCHLORIDE 20 MG: 20 TABLET ORAL at 06:06

## 2024-06-13 RX ADMIN — INSULIN LISPRO 1 UNITS: 100 INJECTION, SOLUTION INTRAVENOUS; SUBCUTANEOUS at 11:10

## 2024-06-13 RX ADMIN — FERROUS SULFATE TAB 325 MG (65 MG ELEMENTAL FE) 325 MG: 325 (65 FE) TAB at 08:43

## 2024-06-13 RX ADMIN — HYDROCORTISONE 10 MG: 10 TABLET ORAL at 08:43

## 2024-06-13 RX ADMIN — INSULIN GLARGINE 15 UNITS: 100 INJECTION, SOLUTION SUBCUTANEOUS at 21:16

## 2024-06-13 RX ADMIN — HYDROCORTISONE 10 MG: 10 TABLET ORAL at 21:16

## 2024-06-13 RX ADMIN — RIFAXIMIN 550 MG: 550 TABLET ORAL at 08:43

## 2024-06-13 RX ADMIN — INSULIN LISPRO 2 UNITS: 100 INJECTION, SOLUTION INTRAVENOUS; SUBCUTANEOUS at 17:06

## 2024-06-13 RX ADMIN — HYDROCODONE BITARTRATE AND ACETAMINOPHEN 2 TABLET: 5; 325 TABLET ORAL at 22:45

## 2024-06-13 RX ADMIN — GABAPENTIN 300 MG: 300 CAPSULE ORAL at 08:43

## 2024-06-13 RX ADMIN — NICOTINE 1 PATCH: 14 PATCH, EXTENDED RELEASE TRANSDERMAL at 08:43

## 2024-06-13 RX ADMIN — RIFAXIMIN 550 MG: 550 TABLET ORAL at 21:16

## 2024-06-13 RX ADMIN — INSULIN LISPRO 10 UNITS: 100 INJECTION, SOLUTION INTRAVENOUS; SUBCUTANEOUS at 11:10

## 2024-06-13 RX ADMIN — TAMSULOSIN HYDROCHLORIDE 0.4 MG: 0.4 CAPSULE ORAL at 17:08

## 2024-06-13 RX ADMIN — INSULIN LISPRO 10 UNITS: 100 INJECTION, SOLUTION INTRAVENOUS; SUBCUTANEOUS at 17:07

## 2024-06-13 NOTE — ASSESSMENT & PLAN NOTE
Previously complained of chest pain which was started during his seizure  EKG showed no acute ST-T changes  Serial troponins negative  Resolved

## 2024-06-13 NOTE — ASSESSMENT & PLAN NOTE
Previously complained of left lower extremity swelling and tenderness  Venous duplex of bilateral lower extremities negative for DVT  History of neuropathy noted - c/w Gabapentin   On Requip for restless leg syndrome

## 2024-06-13 NOTE — PROGRESS NOTES
[facility]  Progress Note  Name: Foreign Armando I  MRN: [id number]  Unit/Bed#: 7T [facility] 705-01 I Date of Admission: 6/3/2024   Date of Service: 6/13/2024 I Hospital Day: 8      Assessment & Plan:    * Pleural effusion on right  Assessment & Plan  Patient presented with shortness of breath   BNP WNL, CT chest A/P show right pleural effusion, no evidence of pulmonary congestion   s/p thoracentesis on 6/4/2024 with 600 cc clear yellow fluid  Pleural fluid analysis show LDH 62, total protein 7.6 with a plasma total protein of 7.1  Per Light's criteria, pleural fluid is transudative  Cytology was negative for malignancy  Consider hepatic hydrothorax in the setting of decompensated cirrhosis (see below)  Medically stable, however, per neuropsychology, patient not competent to make own medical decisions ->  on board for safe disposition plan    Depressed mood  Assessment & Plan  On arrival was very upset and tearful - reported that his mother passed away the night prior to his arrival here (unsure of the validity)  Appreciated psychiatry input    Chest pain  Assessment & Plan  Previously complained of chest pain which was started during his seizure  EKG showed no acute ST-T changes  Serial troponins negative  Resolved    Seizure-like activity (HCC)  Assessment & Plan  Patient had RRT for seizure-like activity previously  Patient had similar episode when he was in [facility] in 10/6/23 -CT brain, CTA head and neck, MRI brain and EEG was negative at that time  Discontinued Tramadol due to the risk of lowered seizure threshold  Neurology consult -> since patient was previously on Keppra 500 mg twice daily continue home dose  Reported to [facility]    Nephrolithiasis  Assessment & Plan  Stated he might have passed a stone when he urinates and might be causing painful urination  Per patient, he had history of kidney stone   UA showed no evidence of UTI  CT A/P showed simple renal cyst(s).  Nonobstructing left renal nephrolithiasis. No hydronephrosis.   BMP showed normal renal function  Continue Flomax     Swelling of lower extremity  Assessment & Plan  Previously complained of left lower extremity swelling and tenderness  Venous duplex of bilateral lower extremities negative for DVT  History of neuropathy noted - c/w Gabapentin   On Requip for restless leg syndrome    Volume overload  Assessment & Plan  Most likely due to liver cirrhosis   BNP WNL, CT chest showed no evidence of pulmonary congestion but pleural effusion which was tapped on 6/4  Recent stress test on 7/13/2023 showed LVEF 70%, and attenuation artifact   Patient received IV Lasix 40 mg once in ED - currently on an oral Lasix/Aldactone regimen  Monitor volume status    Adrenal insufficiency (HCC)  Assessment & Plan  Endocrinology consult noted - appreciate recommendations regarding Cortef tapering instructions  Continue Midodrine    Type 2 diabetes mellitus (HCC)  Assessment & Plan  Lab Results   Component Value Date    HGBA1C 6.5 (H) 05/05/2024     Carbohydrate restriction  Continue basal/prandial insulin with additional SSI coverage prior checks -> titrate dosing daily tangents on blood sugars  Anticipate fluctuation of blood sugars in the setting of Cortef tapering    Smoking  Assessment & Plan  Cessation counseling  Transdermal nicotine patch on board    Thrombocytopenia (HCC)  Assessment & Plan  Likely sequela of cirrhosis  Monitor platelet count - monitor for bleeding    Cirrhosis (HCC)  Assessment & Plan  CT chest A/P with contrast showed cirrhosis with signs of portal hypertension as evidenced by gastroesophageal varices. Stable upper abdominal lymphadenopathy presumed secondary to underlying liver pathology although low-grade lymphoproliferative disorder is not fully excluded. Stable diffuse mesenteric edema   Gastroenterology following  Continue Inderal for gastroesophageal varices -> plan for outpatient surveillance  EGD  Continue diuresis with Lasix/Aldactone and hepatic encephalopathy prophylaxis with Rifaximin (deviously on Lactulose, now discontinued, due to complaints of intolerable diarrhea)  Patient demanded liberation of salt restriction off diet    Anemia of chronic disease  Assessment & Plan  Continue iron supplementation  H/H stable    Hypokalemia  Assessment & Plan  Monitor/replete serum potassium and magnesium as necessary  Chronic diuretic use noted      DVT Prophylaxis:  SCDs    AM-PAC Basic Mobility:  Basic Mobility Inpatient Raw Score: 23  JH-HLM Achieved: 6: Walk 10 steps or more  JH-HLM Goal: 7: Walk 25 feet or more    Patient Centered Rounds:  I have performed bedside rounds and discussed plan of care with nursing today.  Discussions with Specialists or Other Care Team Provider:  see above assessments if applicable    Education and Discussions with Family / Patient:  At bedside    Time Spent for Care:  35 minutes. More than 50% of total time spent on counseling and coordination of care, on one or more of the following: performing physical exam; counseling and coordination of care, obtaining or reviewing history, documenting in the medical record, reviewing/ordering tests/medications/procedures, and communicating with other healthcare professionals.    Current Length of Stay: 8 day(s)  Current Patient Status: Inpatient   Certification Statement:  Patient will continue to require additional hospital stay due to assessments as noted above.    Code Status: Level 1 - Full Code        Subjective:     Encountered earlier in the day.  States his prior abdominal discomfort has improved.  Denies worsening shortness of breath at this time.        Objective:     Vitals:   Temp (24hrs), Av °F (36.7 °C), Min:97.6 °F (36.4 °C), Max:98.4 °F (36.9 °C)    Temp:  [97.6 °F (36.4 °C)-98.4 °F (36.9 °C)] 98.4 °F (36.9 °C)  HR:  [51-56] 56  Resp:  [14-16] 16  BP: (103-122)/(63-78) 122/78  SpO2:  [98 %] 98 %  Body mass index is  24.58 kg/m².     Input and Output Summary (last 24 hours):       Intake/Output Summary (Last 24 hours) at 6/13/2024 1052  Last data filed at 6/12/2024 1213  Gross per 24 hour   Intake 1080 ml   Output --   Net 1080 ml       Physical Exam:     GENERAL No immediate distress at rest   HEAD   Normocephalic - atraumatic   EYES   PERRL - EOMI    MOUTH   Mucosa moist   NECK   Supple - full range of motion   CARDIAC Intermittently bradycardic   PULMONARY    Improved at the right base and fairly clear otherwise   ABDOMEN Currently nontender with improved distention   MUSCULOSKELETAL   Motor strength/range of motion intact - bilateral distal pedal edema present   NEUROLOGIC Mild cognitive impairment noted   SKIN   Chronic wrinkles/blemishes    PSYCHIATRIC   Mood/affect stable currently         Additional Data:     Labs & Recent Cultures:    Results from last 7 days   Lab Units 06/13/24  0554 06/08/24  0450 06/07/24  0445   WBC Thousand/uL 4.60   < > 5.14   HEMOGLOBIN g/dL 10.4*   < > 9.6*   HEMATOCRIT % 29.1*   < > 29.0*   PLATELETS Thousands/uL 34*   < > 30*   BANDS PCT %  --   --  3   SEGS PCT % 77*   < >  --    LYMPHO PCT % 13*   < > 7*   MONO PCT % 6   < > 4   EOS PCT % 4   < > 0    < > = values in this interval not displayed.     Results from last 7 days   Lab Units 06/13/24  0554   POTASSIUM mmol/L 4.2   CHLORIDE mmol/L 101   CO2 mmol/L 32   BUN mg/dL 18   CREATININE mg/dL 0.79   CALCIUM mg/dL 8.0*   ALK PHOS U/L 114*   ALT U/L 47   AST U/L 33         Results from last 7 days   Lab Units 06/13/24  1042 06/13/24  0557 06/12/24  2113 06/12/24  1529 06/12/24  1052 06/12/24  0548 06/11/24 2011 06/11/24  1525 06/11/24  0553 06/10/24  2051 06/10/24  1533 06/10/24  1056   POC GLUCOSE mg/dl 196* 119 153* 285* 166* 88 360* 389* 190* 431* 388* 285*                         Lines/Drains:  Invasive Devices       Central Venous Catheter Line  Duration             Port A Cath Chest -- days                      Last 24 Hours  Medication List:   Current Facility-Administered Medications   Medication Dose Route Frequency Provider Last Rate    dicyclomine  20 mg Oral BID before breakfast/lunch Natan Miguel PA-C      ferrous sulfate  325 mg Oral Daily With Breakfast Natan Miguel PA-C      furosemide  40 mg Oral Daily Rosario Whitfield MD      gabapentin  300 mg Oral TID Natan Miguel PA-C      HYDROcodone-acetaminophen  1 tablet Oral Q6H PRN Rosario Whitfield MD      hydrocortisone  10 mg Oral TID Rosario Whitfield MD      hydrOXYzine HCL  25 mg Oral TID PRN Natan Miguel PA-C      insulin glargine  15 Units Subcutaneous HS Kori Dias MD      insulin lispro  1-5 Units Subcutaneous TID AC Natan Miguel PA-C      insulin lispro  1-5 Units Subcutaneous HS Natan Miguel PA-C      insulin lispro  10 Units Subcutaneous TID With Meals Kori Dias MD      levETIRAcetam  500 mg Oral Q12H SANDRA Rosario Whitfield MD      LORazepam  1 mg Intravenous Q6H PRN Rosario Whitfield MD      midodrine  10 mg Oral TID AC Rosario Whitfield MD      nicotine  1 patch Transdermal Daily Natan Miguel PA-C      ondansetron  4 mg Intravenous Q6H PRN Natan Miguel PA-C      pancrelipase (Lip-Prot-Amyl)  24,000 Units Oral TID With Meals Jerson Gibson MD      pantoprazole  40 mg Oral Early Morning Natan Miguel PA-C      propranolol  10 mg Oral Q12H SANDRA Natan Miguel PA-C      rifaximin  550 mg Oral Q12H [facility] Jerson Gibson MD      rOPINIRole  4 mg Oral BID Natan Miguel PA-C      senna-docusate sodium  1 tablet Oral QPM Natan Miguel PA-C      spironolactone  100 mg Oral Daily Natan Miguel PA-C      tamsulosin  0.4 mg Oral Daily With Dinner JERMAINE Birch MD   Hospitalist - [facility] Internal Medicine        ** Please Note: This note is constructed using a voice recognition dictation system.  An occasional wrong word/phrase or “sound-a-like” substitution may have been picked up by dictation device due to the inherent limitations of voice recognition software.  Read the chart  carefully and recognize, using reasonable context, where substitutions may have occurred.**

## 2024-06-13 NOTE — ASSESSMENT & PLAN NOTE
Most likely due to liver cirrhosis   BNP WNL, CT chest showed no evidence of pulmonary congestion but pleural effusion which was tapped on 6/4  Recent stress test on 7/13/2023 showed LVEF 70%, and attenuation artifact   Patient received IV Lasix 40 mg once in ED - currently on an oral Lasix/Aldactone regimen  Monitor volume status

## 2024-06-13 NOTE — ASSESSMENT & PLAN NOTE
CT chest A/P with contrast showed cirrhosis with signs of portal hypertension as evidenced by gastroesophageal varices. Stable upper abdominal lymphadenopathy presumed secondary to underlying liver pathology although low-grade lymphoproliferative disorder is not fully excluded. Stable diffuse mesenteric edema   Gastroenterology following  Continue Inderal for gastroesophageal varices -> plan for outpatient surveillance EGD  Continue diuresis with Lasix/Aldactone and hepatic encephalopathy prophylaxis with Rifaximin (deviously on Lactulose, now discontinued, due to complaints of intolerable diarrhea)  Patient demanded liberation of salt restriction off diet

## 2024-06-13 NOTE — ASSESSMENT & PLAN NOTE
Patient presented with shortness of breath   BNP WNL, CT chest A/P show right pleural effusion, no evidence of pulmonary congestion   s/p thoracentesis on 6/4/2024 with 600 cc clear yellow fluid  Pleural fluid analysis show LDH 62, total protein 7.6 with a plasma total protein of 7.1  Per Light's criteria, pleural fluid is transudative  Cytology was negative for malignancy  Consider hepatic hydrothorax in the setting of decompensated cirrhosis (see below)  Medically stable, however, per neuropsychology, patient not competent to make own medical decisions ->  on board for safe disposition plan

## 2024-06-13 NOTE — PLAN OF CARE
Problem: PAIN - ADULT  Goal: Verbalizes/displays adequate comfort level or baseline comfort level  Description: Interventions:  - Encourage patient to monitor pain and request assistance  - Assess pain using appropriate pain scale  - Administer analgesics based on type and severity of pain and evaluate response  - Implement non-pharmacological measures as appropriate and evaluate response  - Consider cultural and social influences on pain and pain management  - Notify physician/advanced practitioner if interventions unsuccessful or patient reports new pain  Outcome: Progressing     Problem: METABOLIC, FLUID AND ELECTROLYTES - ADULT  Goal: Glucose maintained within target range  Description: INTERVENTIONS:  - Monitor Blood Glucose as ordered  - Assess for signs and symptoms of hyperglycemia and hypoglycemia  - Administer ordered medications to maintain glucose within target range  - Assess nutritional intake and initiate nutrition service referral as needed  Outcome: Progressing     Problem: Potential for Falls  Goal: Patient will remain free of falls  Description: INTERVENTIONS:  - Educate patient/family on patient safety including physical limitations  - Instruct patient to call for assistance with activity   - Consult OT/PT to assist with strengthening/mobility   - Keep Call bell within reach  - Keep bed low and locked with side rails adjusted as appropriate  - Keep care items and personal belongings within reach  - Initiate and maintain comfort rounds  - Make Fall Risk Sign visible to staff  - Offer Toileting every  Hours, in advance of need  - Initiate/Maintain alarm  - Obtain necessary fall risk management equipment:   - Apply yellow socks and bracelet for high fall risk patients  - Consider moving patient to room near nurses station  Outcome: Progressing

## 2024-06-13 NOTE — ASSESSMENT & PLAN NOTE
Endocrinology consult noted - appreciate recommendations regarding Cortef tapering instructions  Continue Midodrine

## 2024-06-13 NOTE — CASE MANAGEMENT
Case Management Progress Note    Patient name Foreign Armando  Location 7T /7T -01 MRN [id number]  : 1960 Date 2024       LOS (days): 8  Geometric Mean LOS (GMLOS) (days): 3.3  Days to GMLOS:-4.8        OBJECTIVE:        Current admission status: Inpatient  Preferred Pharmacy:   [id number] - [place] - [address]  [address] [place] [address]  Phone: [phone] Fax: [phone]    [facility]pharmacy #[id number] [place], [place] - [address]  [address] [facility] [facility] [address]  Phone: [phone] Fax: [phone]    [facility]/pharmacy #[id number] - [place] - [address]  [address] [address]  Phone: [phone] Fax: [phone]    [id number] - [place] - [address] [facility]  [address]  [place] [facility] [address]  Phone: [phone] Fax: [phone]    Primary Care Provider: No primary care provider on file.    Primary Insurance: BLUE CROSS  Secondary Insurance:     PROGRESS NOTE:    Message from Manager Bina that [employer] service has found patient's wallet.  Will be delivered to hospital 24.       Guardianship assigned to Roberto Maldonado.  Guardianship in progress for placement.

## 2024-06-13 NOTE — ASSESSMENT & PLAN NOTE
Patient had RRT for seizure-like activity previously  Patient had similar episode when he was in [facility] in 10/6/23 -CT brain, CTA head and neck, MRI brain and EEG was negative at that time  Discontinued Tramadol due to the risk of lowered seizure threshold  Neurology consult -> since patient was previously on Keppra 500 mg twice daily continue home dose  Reported to [facility]

## 2024-06-13 NOTE — ASSESSMENT & PLAN NOTE
On arrival was very upset and tearful - reported that his mother passed away the night prior to his arrival here (unsure of the validity)  Appreciated psychiatry input

## 2024-06-13 NOTE — ASSESSMENT & PLAN NOTE
Stated he might have passed a stone when he urinates and might be causing painful urination  Per patient, he had history of kidney stone   UA showed no evidence of UTI  CT A/P showed simple renal cyst(s). Nonobstructing left renal nephrolithiasis. No hydronephrosis.   BMP showed normal renal function  Continue Flomax

## 2024-06-13 NOTE — ASSESSMENT & PLAN NOTE
Lab Results   Component Value Date    HGBA1C 6.5 (H) 05/05/2024     Carbohydrate restriction  Continue basal/prandial insulin with additional SSI coverage prior checks -> titrate dosing daily tangents on blood sugars  Anticipate fluctuation of blood sugars in the setting of Cortef tapering

## 2024-06-14 PROBLEM — R07.9 CHEST PAIN: Status: RESOLVED | Noted: 2024-06-07 | Resolved: 2024-06-14

## 2024-06-14 PROBLEM — E87.6 HYPOKALEMIA: Status: RESOLVED | Noted: 2022-11-22 | Resolved: 2024-06-14

## 2024-06-14 LAB
ALBUMIN SERPL BCP-MCNC: 2.6 G/DL (ref 3.5–5)
ALP SERPL-CCNC: 115 U/L (ref 34–104)
ALT SERPL W P-5'-P-CCNC: 47 U/L (ref 7–52)
ANION GAP SERPL CALCULATED.3IONS-SCNC: 5 MMOL/L (ref 4–13)
ANISOCYTOSIS BLD QL SMEAR: PRESENT
AST SERPL W P-5'-P-CCNC: 31 U/L (ref 13–39)
BASOPHILS # BLD AUTO: 0 THOUSANDS/ÂΜL (ref 0–0.1)
BASOPHILS NFR BLD AUTO: 0 % (ref 0–1)
BILIRUB SERPL-MCNC: 2.16 MG/DL (ref 0.2–1)
BUN SERPL-MCNC: 17 MG/DL (ref 5–25)
CALCIUM ALBUM COR SERPL-MCNC: 9.2 MG/DL (ref 8.3–10.1)
CALCIUM SERPL-MCNC: 8.1 MG/DL (ref 8.4–10.2)
CHLORIDE SERPL-SCNC: 98 MMOL/L (ref 96–108)
CO2 SERPL-SCNC: 30 MMOL/L (ref 21–32)
CREAT SERPL-MCNC: 0.92 MG/DL (ref 0.6–1.3)
EOSINOPHIL # BLD AUTO: 0.16 THOUSAND/ÂΜL (ref 0–0.61)
EOSINOPHIL NFR BLD AUTO: 4 % (ref 0–6)
ERYTHROCYTE [DISTWIDTH] IN BLOOD BY AUTOMATED COUNT: 15.8 % (ref 11.6–15.1)
GFR SERPL CREATININE-BSD FRML MDRD: 88 ML/MIN/1.73SQ M
GLUCOSE SERPL-MCNC: 207 MG/DL (ref 65–140)
GLUCOSE SERPL-MCNC: 208 MG/DL (ref 65–140)
GLUCOSE SERPL-MCNC: 218 MG/DL (ref 65–140)
GLUCOSE SERPL-MCNC: 224 MG/DL (ref 65–140)
GLUCOSE SERPL-MCNC: 312 MG/DL (ref 65–140)
GLUCOSE SERPL-MCNC: 357 MG/DL (ref 65–140)
HCT VFR BLD AUTO: 31.9 % (ref 36.5–49.3)
HGB BLD-MCNC: 10.5 G/DL (ref 12–17)
IMM GRANULOCYTES # BLD AUTO: 0.02 THOUSAND/UL (ref 0–0.2)
IMM GRANULOCYTES NFR BLD AUTO: 1 % (ref 0–2)
LYMPHOCYTES # BLD AUTO: 0.53 THOUSANDS/ÂΜL (ref 0.6–4.47)
LYMPHOCYTES NFR BLD AUTO: 14 % (ref 14–44)
MACROCYTES BLD QL AUTO: PRESENT
MAGNESIUM SERPL-MCNC: 2.1 MG/DL (ref 1.9–2.7)
MCH RBC QN AUTO: 34 PG (ref 26.8–34.3)
MCHC RBC AUTO-ENTMCNC: 32.9 G/DL (ref 31.4–37.4)
MCV RBC AUTO: 103 FL (ref 82–98)
MONOCYTES # BLD AUTO: 0.32 THOUSAND/ÂΜL (ref 0.17–1.22)
MONOCYTES NFR BLD AUTO: 8 % (ref 4–12)
NEUTROPHILS # BLD AUTO: 2.85 THOUSANDS/ÂΜL (ref 1.85–7.62)
NEUTS SEG NFR BLD AUTO: 73 % (ref 43–75)
NRBC BLD AUTO-RTO: 0 /100 WBCS
PHOSPHATE SERPL-MCNC: 3.3 MG/DL (ref 2.3–4.1)
PLATELET # BLD AUTO: 29 THOUSANDS/UL (ref 149–390)
PLATELET BLD QL SMEAR: ABNORMAL
PMV BLD AUTO: 11.1 FL (ref 8.9–12.7)
POTASSIUM SERPL-SCNC: 4.6 MMOL/L (ref 3.5–5.3)
PROT SERPL-MCNC: 5.4 G/DL (ref 6.4–8.4)
RBC # BLD AUTO: 3.09 MILLION/UL (ref 3.88–5.62)
RBC MORPH BLD: PRESENT
SODIUM SERPL-SCNC: 133 MMOL/L (ref 135–147)
WBC # BLD AUTO: 3.88 THOUSAND/UL (ref 4.31–10.16)

## 2024-06-14 PROCEDURE — 82948 REAGENT STRIP/BLOOD GLUCOSE: CPT

## 2024-06-14 PROCEDURE — 85025 COMPLETE CBC W/AUTO DIFF WBC: CPT | Performed by: INTERNAL MEDICINE

## 2024-06-14 PROCEDURE — 83735 ASSAY OF MAGNESIUM: CPT | Performed by: INTERNAL MEDICINE

## 2024-06-14 PROCEDURE — 84100 ASSAY OF PHOSPHORUS: CPT | Performed by: INTERNAL MEDICINE

## 2024-06-14 PROCEDURE — 80053 COMPREHEN METABOLIC PANEL: CPT | Performed by: INTERNAL MEDICINE

## 2024-06-14 PROCEDURE — 99232 SBSQ HOSP IP/OBS MODERATE 35: CPT | Performed by: INTERNAL MEDICINE

## 2024-06-14 RX ADMIN — MIDODRINE HYDROCHLORIDE 10 MG: 5 TABLET ORAL at 06:03

## 2024-06-14 RX ADMIN — INSULIN LISPRO 3 UNITS: 100 INJECTION, SOLUTION INTRAVENOUS; SUBCUTANEOUS at 15:21

## 2024-06-14 RX ADMIN — ROPINIROLE HYDROCHLORIDE 4 MG: 1 TABLET, FILM COATED ORAL at 08:18

## 2024-06-14 RX ADMIN — GABAPENTIN 300 MG: 300 CAPSULE ORAL at 21:33

## 2024-06-14 RX ADMIN — INSULIN GLARGINE 15 UNITS: 100 INJECTION, SOLUTION SUBCUTANEOUS at 21:33

## 2024-06-14 RX ADMIN — GABAPENTIN 300 MG: 300 CAPSULE ORAL at 08:18

## 2024-06-14 RX ADMIN — DICYCLOMINE HYDROCHLORIDE 20 MG: 20 TABLET ORAL at 10:49

## 2024-06-14 RX ADMIN — PANCRELIPASE 24000 UNITS: 30000; 6000; 19000 CAPSULE, DELAYED RELEASE PELLETS ORAL at 11:00

## 2024-06-14 RX ADMIN — SENNOSIDES AND DOCUSATE SODIUM 1 TABLET: 8.6; 5 TABLET ORAL at 17:25

## 2024-06-14 RX ADMIN — INSULIN LISPRO 4 UNITS: 100 INJECTION, SOLUTION INTRAVENOUS; SUBCUTANEOUS at 21:35

## 2024-06-14 RX ADMIN — TAMSULOSIN HYDROCHLORIDE 0.4 MG: 0.4 CAPSULE ORAL at 15:32

## 2024-06-14 RX ADMIN — INSULIN LISPRO 1 UNITS: 100 INJECTION, SOLUTION INTRAVENOUS; SUBCUTANEOUS at 07:33

## 2024-06-14 RX ADMIN — INSULIN LISPRO 10 UNITS: 100 INJECTION, SOLUTION INTRAVENOUS; SUBCUTANEOUS at 15:31

## 2024-06-14 RX ADMIN — INSULIN LISPRO 2 UNITS: 100 INJECTION, SOLUTION INTRAVENOUS; SUBCUTANEOUS at 10:49

## 2024-06-14 RX ADMIN — HYDROXYZINE HYDROCHLORIDE 25 MG: 25 TABLET, FILM COATED ORAL at 15:21

## 2024-06-14 RX ADMIN — RIFAXIMIN 550 MG: 550 TABLET ORAL at 08:18

## 2024-06-14 RX ADMIN — PANTOPRAZOLE SODIUM 40 MG: 40 TABLET, DELAYED RELEASE ORAL at 06:03

## 2024-06-14 RX ADMIN — LIDOCAINE 4%: 4 CREAM TOPICAL at 00:00

## 2024-06-14 RX ADMIN — HYDROCORTISONE 10 MG: 10 TABLET ORAL at 21:32

## 2024-06-14 RX ADMIN — INSULIN LISPRO 10 UNITS: 100 INJECTION, SOLUTION INTRAVENOUS; SUBCUTANEOUS at 11:00

## 2024-06-14 RX ADMIN — ROPINIROLE HYDROCHLORIDE 4 MG: 1 TABLET, FILM COATED ORAL at 21:32

## 2024-06-14 RX ADMIN — FERROUS SULFATE TAB 325 MG (65 MG ELEMENTAL FE) 325 MG: 325 (65 FE) TAB at 07:33

## 2024-06-14 RX ADMIN — MIDODRINE HYDROCHLORIDE 10 MG: 5 TABLET ORAL at 15:20

## 2024-06-14 RX ADMIN — PANCRELIPASE 24000 UNITS: 30000; 6000; 19000 CAPSULE, DELAYED RELEASE PELLETS ORAL at 15:31

## 2024-06-14 RX ADMIN — LEVETIRACETAM 500 MG: 500 TABLET, FILM COATED ORAL at 21:32

## 2024-06-14 RX ADMIN — GABAPENTIN 300 MG: 300 CAPSULE ORAL at 15:20

## 2024-06-14 RX ADMIN — HYDROCORTISONE 10 MG: 10 TABLET ORAL at 15:21

## 2024-06-14 RX ADMIN — PANCRELIPASE 24000 UNITS: 30000; 6000; 19000 CAPSULE, DELAYED RELEASE PELLETS ORAL at 07:33

## 2024-06-14 RX ADMIN — INSULIN LISPRO 10 UNITS: 100 INJECTION, SOLUTION INTRAVENOUS; SUBCUTANEOUS at 07:34

## 2024-06-14 RX ADMIN — HYDROCORTISONE 10 MG: 10 TABLET ORAL at 08:18

## 2024-06-14 RX ADMIN — MIDODRINE HYDROCHLORIDE 10 MG: 5 TABLET ORAL at 10:49

## 2024-06-14 RX ADMIN — PROPRANOLOL HYDROCHLORIDE 10 MG: 10 TABLET ORAL at 21:34

## 2024-06-14 RX ADMIN — LEVETIRACETAM 500 MG: 500 TABLET, FILM COATED ORAL at 08:18

## 2024-06-14 RX ADMIN — NICOTINE 1 PATCH: 14 PATCH, EXTENDED RELEASE TRANSDERMAL at 08:18

## 2024-06-14 RX ADMIN — DICYCLOMINE HYDROCHLORIDE 20 MG: 20 TABLET ORAL at 06:03

## 2024-06-14 NOTE — PLAN OF CARE
Problem: METABOLIC, FLUID AND ELECTROLYTES - ADULT  Goal: Glucose maintained within target range  Description: INTERVENTIONS:  - Monitor Blood Glucose as ordered  - Assess for signs and symptoms of hyperglycemia and hypoglycemia  - Administer ordered medications to maintain glucose within target range  - Assess nutritional intake and initiate nutrition service referral as needed  Outcome: Progressing     Problem: NEUROSENSORY - ADULT  Goal: Remains free of injury related to seizures activity  Description: INTERVENTIONS  - Maintain airway, patient safety  and administer oxygen as ordered  - Monitor patient for seizure activity, document and report duration and description of seizure to physician/advanced practitioner  - If seizure occurs,  ensure patient safety during seizure  - Reorient patient post seizure  - Seizure pads on all 4 side rails  - Instruct patient/family to notify RN of any seizure activity including if an aura is experienced  - Instruct patient/family to call for assistance with activity based on nursing assessment  - Administer anti-seizure medications if ordered    Outcome: Progressing     Problem: INFECTION - ADULT  Goal: Absence or prevention of progression during hospitalization  Description: INTERVENTIONS:  - Assess and monitor for signs and symptoms of infection  - Monitor lab/diagnostic results  - Monitor all insertion sites, i.e. indwelling lines, tubes, and drains  - Monitor endotracheal if appropriate and nasal secretions for changes in amount and color  - Roy appropriate cooling/warming therapies per order  - Administer medications as ordered  - Instruct and encourage patient and family to use good hand hygiene technique  - Identify and instruct in appropriate isolation precautions for identified infection/condition  Outcome: Progressing

## 2024-06-14 NOTE — PROGRESS NOTES
[facility]  Progress Note  Name: Foreign Armando I  MRN: [id number]  Unit/Bed#: 7T [facility] 705-01 I Date of Admission: 6/3/2024   Date of Service: 6/14/2024 I Hospital Day: 9    Assessment & Plan   * Pleural effusion on right  Assessment & Plan  Patient presented with shortness of breath   BNP WNL, CT chest A/P show right pleural effusion, no evidence of pulmonary congestion   s/p thoracentesis on 6/4/2024 with 600 cc clear yellow fluid  Pleural fluid analysis show LDH 62, total protein 7.6 with a plasma total protein of 7.1  Per Light's criteria, pleural fluid is transudative  Cytology was negative for malignancy  Consider hepatic hydrothorax in the setting of decompensated cirrhosis (see below)  Medically stable, however, per neuropsychology, patient not competent to make own medical decisions ->  on board for safe disposition plan    Anemia of chronic disease  Assessment & Plan  Continue iron supplementation  H/H stable    Depressed mood  Assessment & Plan  On arrival was very upset and tearful - reported that his mother passed away the night prior to his arrival here (unsure of the validity)  Appreciated psychiatry input    Seizure-like activity (HCC)  Assessment & Plan  Patient had RRT for seizure-like activity previously  Patient had similar episode when he was in [facility] in 10/6/23 -CT brain, CTA head and neck, MRI brain and EEG was negative at that time  Discontinued Tramadol due to the risk of lowered seizure threshold  Neurology consult -> since patient was previously on Keppra 500 mg twice daily continue home dose  Reported to [facility]    Nephrolithiasis  Assessment & Plan  Stated he might have passed a stone when he urinates and might be causing painful urination  Per patient, he had history of kidney stone   UA showed no evidence of UTI  CT A/P showed simple renal cyst(s). Nonobstructing left renal nephrolithiasis. No hydronephrosis.   BMP showed normal renal  function  Continue Flomax     Swelling of lower extremity  Assessment & Plan  Previously complained of left lower extremity swelling and tenderness  Venous duplex of bilateral lower extremities negative for DVT  History of neuropathy noted - c/w Gabapentin   On Requip for restless leg syndrome    Volume overload  Assessment & Plan  Most likely due to liver cirrhosis   BNP WNL, CT chest showed no evidence of pulmonary congestion but pleural effusion which was tapped on 6/4  Recent stress test on 7/13/2023 showed LVEF 70%, and attenuation artifact   Patient received IV Lasix 40 mg once in ED - currently on an oral Lasix/Aldactone regimen  Monitor volume status    Adrenal insufficiency (HCC)  Assessment & Plan  Endocrinology consult noted - appreciate recommendations regarding Cortef tapering instructions  Continue Midodrine    Type 2 diabetes mellitus (HCC)  Assessment & Plan  Lab Results   Component Value Date    HGBA1C 6.5 (H) 05/05/2024     Carbohydrate restriction  Continue basal/prandial insulin with additional SSI coverage prior checks -> titrate dosing daily tangents on blood sugars  Anticipate fluctuation of blood sugars in the setting of Cortef tapering    Smoking  Assessment & Plan  Cessation counseling  Transdermal nicotine patch on board    Thrombocytopenia (HCC)  Assessment & Plan  Likely sequela of cirrhosis  Monitor platelet count & bleeding    Cirrhosis (HCC)  Assessment & Plan  CT chest A/P with contrast showed cirrhosis with signs of portal hypertension as evidenced by gastroesophageal varices. Stable upper abdominal lymphadenopathy presumed secondary to underlying liver pathology although low-grade lymphoproliferative disorder is not fully excluded. Stable diffuse mesenteric edema   Gastroenterology following  Continue Inderal for gastroesophageal varices -> plan for outpatient surveillance EGD  Continue diuresis with Lasix/Aldactone and hepatic encephalopathy prophylaxis with Rifaximin (deviously  on Lactulose, now discontinued, due to complaints of intolerable diarrhea)  Patient demanded liberation of salt restriction off diet    Chest pain-resolved as of 2024  Assessment & Plan  Previously complained of chest pain which was started during his seizure  EKG showed no acute ST-T changes  Serial troponins negative  Resolved    Hypokalemia-resolved as of 2024  Assessment & Plan  Monitor/replete serum potassium and magnesium as necessary  Chronic diuretic use noted             VTE Pharmacologic Prophylaxis:   Pharmacologic:  Ambulatory  Mechanical VTE Prophylaxis in Place: Yes    Patient Centered Rounds: I have performed bedside rounds with nursing staff today.    Discussions with Specialists or Other Care Team Provider: Discussed with , RN    Education and Discussions with Family / Patient: Discussed with patient    Time Spent for Care:  35 minutes .  This time was spent on one or more of the following: performing physical exam; counseling and coordination of care; obtaining or reviewing history; documenting in the medical record; reviewing/ordering tests, medications or procedures; communicating with other healthcare professionals and discussing with patient's family/caregivers.    Current Length of Stay: 9 day(s)    Current Patient Status: Inpatient   Certification Statement: The patient will continue to require additional inpatient hospital stay due to patient is medically stable, pending safe disposition plan    Discharge Plan / Estimated Discharge Date: Pending safe disposition plan      Code Status: Level 1 - Full Code      Subjective:   Patient was seen and examined at bedside. The patient denies any chest pain, palpitation, shortness of breath, N/V, abd pain.      Objective:     Vitals:   Temp (24hrs), Av.7 °F (36.5 °C), Min:97.1 °F (36.2 °C), Max:98.3 °F (36.8 °C)    Temp:  [97.1 °F (36.2 °C)-98.3 °F (36.8 °C)] 97.1 °F (36.2 °C)  HR:  [50-51] 50  Resp:  [16-18] 18  BP:  (100-108)/(55-65) 100/55  SpO2:  [96 %-97 %] 97 %  Body mass index is 24.58 kg/m².     Input and Output Summary (last 24 hours):       Intake/Output Summary (Last 24 hours) at 6/14/2024 0908  Last data filed at 6/13/2024 2000  Gross per 24 hour   Intake 600 ml   Output --   Net 600 ml       Physical Exam:     Physical Exam  General: no acute distress  HEENT: NC/AT, PERRL, EOM - normal  Neck: Supple  Pulm/Chest: Normal chest wall expansion, clear breath sounds on both side  CVS: normal S1&S2, capillary refill <2s  Abd: soft, non tender, non distended, bowel sounds +  MSK: move all 4 extremities spontaneously  Skin: warm  CNS: no acute focal neuro deficit      Additional Data:     Labs:    Results from last 7 days   Lab Units 06/14/24  0638   WBC Thousand/uL 3.88*   HEMOGLOBIN g/dL 10.5*   HEMATOCRIT % 31.9*   PLATELETS Thousands/uL 29*   SEGS PCT % 73   LYMPHO PCT % 14   MONO PCT % 8   EOS PCT % 4     Results from last 7 days   Lab Units 06/14/24  0638   POTASSIUM mmol/L 4.6   CHLORIDE mmol/L 98   CO2 mmol/L 30   BUN mg/dL 17   CREATININE mg/dL 0.92   CALCIUM mg/dL 8.1*   ALK PHOS U/L 115*   ALT U/L 47   AST U/L 31           * I Have Reviewed All Lab Data Listed Above.  * Additional Pertinent Lab Tests Reviewed: All Labs For Current Hospital Admission Reviewed    Imaging:      I have reviewed pertinent imaging.      Recent Cultures (last 7 days):           Last 24 Hours Medication List:   Current Facility-Administered Medications   Medication Dose Route Frequency Provider Last Rate    dicyclomine  20 mg Oral BID before breakfast/lunch Natan Miguel PA-C      ferrous sulfate  325 mg Oral Daily With Breakfast Natan Miguel PA-C      furosemide  40 mg Oral Daily Rosario Whitfield MD      gabapentin  300 mg Oral TID Natan Miguel PA-C      HYDROcodone-acetaminophen  1 tablet Oral Q6H PRN Kori Dias MD      HYDROcodone-acetaminophen  2 tablet Oral Q6H PRN Kori Dias MD      hydrocortisone  10 mg Oral TID Rosario Whitfield MD       hydrOXYzine HCL  25 mg Oral TID PRN Natan Miguel PA-C      insulin glargine  15 Units Subcutaneous HS Kori Dias MD      insulin lispro  1-5 Units Subcutaneous TID AC Natan Miguel PA-C      insulin lispro  1-5 Units Subcutaneous HS Natan Miguel PA-C      insulin lispro  10 Units Subcutaneous TID With Meals Kori Dias MD      levETIRAcetam  500 mg Oral Q12H SANDRA Rosario Whitfield MD      LORazepam  1 mg Intravenous Q6H PRN Rosario Whitfield MD      midodrine  10 mg Oral TID AC Rosario Whitfield MD      nicotine  1 patch Transdermal Daily Natan Miguel PA-C      ondansetron  4 mg Intravenous Q6H PRN Natan Miguel PA-C      pancrelipase (Lip-Prot-Amyl)  24,000 Units Oral TID With Meals Jerson Gibson MD      pantoprazole  40 mg Oral Early Morning Natan Miguel PA-C      propranolol  10 mg Oral Q12H SANDRA Natan Miguel PA-C      rifaximin  550 mg Oral Q12H [facility] Jerson Gibson MD      rOPINIRole  4 mg Oral BID Natan Miguel PA-C      senna-docusate sodium  1 tablet Oral QPM Natan Miguel PA-C      spironolactone  100 mg Oral Daily Natan Miguel PA-C      tamsulosin  0.4 mg Oral Daily With Dinner Natan Miguel PA-C          Today, Patient Was Seen By: Rosario Whitfield MD    ** Please Note: Dragon 360 Dictation voice to text software may have been used in the creation of this document. **

## 2024-06-14 NOTE — ASSESSMENT & PLAN NOTE
Patient had RRT for seizure-like activity previously  Patient had similar episode when he was in [facility] in 10/6/23 -CT brain, CTA head and neck, MRI brain and EEG was negative at that time  Discontinued Tramadol due to the risk of lowered seizure threshold  Neurology consult -> since patient was previously on Keppra 500 mg twice daily continue home dose  Reported to [facility]

## 2024-06-14 NOTE — PLAN OF CARE
Problem: PAIN - ADULT  Goal: Verbalizes/displays adequate comfort level or baseline comfort level  Description: Interventions:  - Encourage patient to monitor pain and request assistance  - Assess pain using appropriate pain scale  - Administer analgesics based on type and severity of pain and evaluate response  - Implement non-pharmacological measures as appropriate and evaluate response  - Consider cultural and social influences on pain and pain management  - Notify physician/advanced practitioner if interventions unsuccessful or patient reports new pain  Outcome: Progressing     Problem: INFECTION - ADULT  Goal: Absence or prevention of progression during hospitalization  Description: INTERVENTIONS:  - Assess and monitor for signs and symptoms of infection  - Monitor lab/diagnostic results  - Monitor all insertion sites, i.e. indwelling lines, tubes, and drains  - Monitor endotracheal if appropriate and nasal secretions for changes in amount and color  - [place] appropriate cooling/warming therapies per order  - Administer medications as ordered  - Instruct and encourage patient and family to use good hand hygiene technique  - Identify and instruct in appropriate isolation precautions for identified infection/condition  Outcome: Progressing     Problem: RESPIRATORY - ADULT  Goal: Achieves optimal ventilation and oxygenation  Description: INTERVENTIONS:  - Assess for changes in respiratory status  - Assess for changes in mentation and behavior  - Position to facilitate oxygenation and minimize respiratory effort  - Oxygen administered by appropriate delivery if ordered  - Initiate smoking cessation education as indicated  - Encourage broncho-pulmonary hygiene including cough, deep breathe, Incentive Spirometry  - Assess the need for suctioning and aspirate as needed  - Assess and instruct to report SOB or any respiratory difficulty  - Respiratory Therapy support as indicated  Outcome: Progressing     Problem:  NEUROSENSORY - ADULT  Goal: Remains free of injury related to seizures activity  Description: INTERVENTIONS  - Maintain airway, patient safety  and administer oxygen as ordered  - Monitor patient for seizure activity, document and report duration and description of seizure to physician/advanced practitioner  - If seizure occurs,  ensure patient safety during seizure  - Reorient patient post seizure  - Seizure pads on all 4 side rails  - Instruct patient/family to notify RN of any seizure activity including if an aura is experienced  - Instruct patient/family to call for assistance with activity based on nursing assessment  - Administer anti-seizure medications if ordered    Outcome: Progressing     Problem: Knowledge Deficit  Goal: Patient/family/caregiver demonstrates understanding of disease process, treatment plan, medications, and discharge instructions  Description: Complete learning assessment and assess knowledge base.  Interventions:  - Provide teaching at level of understanding  - Provide teaching via preferred learning methods  Outcome: Progressing     Problem: DISCHARGE PLANNING  Goal: Discharge to home or other facility with appropriate resources  Description: INTERVENTIONS:  - Identify barriers to discharge w/patient and caregiver  - Arrange for needed discharge resources and transportation as appropriate  - Identify discharge learning needs (meds, wound care, etc.)  - Arrange for interpretive services to assist at discharge as needed  - Refer to Case Management Department for coordinating discharge planning if the patient needs post-hospital services based on physician/advanced practitioner order or complex needs related to functional status, cognitive ability, or social support system  Outcome: Progressing

## 2024-06-15 LAB
ALBUMIN SERPL BCP-MCNC: 2.8 G/DL (ref 3.5–5)
ALP SERPL-CCNC: 125 U/L (ref 34–104)
ALT SERPL W P-5'-P-CCNC: 47 U/L (ref 7–52)
ANION GAP SERPL CALCULATED.3IONS-SCNC: 9 MMOL/L (ref 4–13)
AST SERPL W P-5'-P-CCNC: 36 U/L (ref 13–39)
BASOPHILS # BLD AUTO: 0.01 THOUSANDS/ÂΜL (ref 0–0.1)
BASOPHILS NFR BLD AUTO: 0 % (ref 0–1)
BILIRUB SERPL-MCNC: 2.26 MG/DL (ref 0.2–1)
BUN SERPL-MCNC: 16 MG/DL (ref 5–25)
CALCIUM ALBUM COR SERPL-MCNC: 9.4 MG/DL (ref 8.3–10.1)
CALCIUM SERPL-MCNC: 8.4 MG/DL (ref 8.4–10.2)
CHLORIDE SERPL-SCNC: 102 MMOL/L (ref 96–108)
CO2 SERPL-SCNC: 23 MMOL/L (ref 21–32)
CREAT SERPL-MCNC: 0.83 MG/DL (ref 0.6–1.3)
EOSINOPHIL # BLD AUTO: 0.16 THOUSAND/ÂΜL (ref 0–0.61)
EOSINOPHIL NFR BLD AUTO: 3 % (ref 0–6)
ERYTHROCYTE [DISTWIDTH] IN BLOOD BY AUTOMATED COUNT: 15.7 % (ref 11.6–15.1)
GFR SERPL CREATININE-BSD FRML MDRD: 93 ML/MIN/1.73SQ M
GLUCOSE SERPL-MCNC: 110 MG/DL (ref 65–140)
GLUCOSE SERPL-MCNC: 187 MG/DL (ref 65–140)
GLUCOSE SERPL-MCNC: 289 MG/DL (ref 65–140)
GLUCOSE SERPL-MCNC: 326 MG/DL (ref 65–140)
GLUCOSE SERPL-MCNC: 344 MG/DL (ref 65–140)
HCT VFR BLD AUTO: 38.1 % (ref 36.5–49.3)
HGB BLD-MCNC: 12.3 G/DL (ref 12–17)
IMM GRANULOCYTES # BLD AUTO: 0.01 THOUSAND/UL (ref 0–0.2)
IMM GRANULOCYTES NFR BLD AUTO: 0 % (ref 0–2)
LYMPHOCYTES # BLD AUTO: 0.54 THOUSANDS/ÂΜL (ref 0.6–4.47)
LYMPHOCYTES NFR BLD AUTO: 11 % (ref 14–44)
MAGNESIUM SERPL-MCNC: 2.6 MG/DL (ref 1.9–2.7)
MCH RBC QN AUTO: 33.9 PG (ref 26.8–34.3)
MCHC RBC AUTO-ENTMCNC: 32.3 G/DL (ref 31.4–37.4)
MCV RBC AUTO: 105 FL (ref 82–98)
MONOCYTES # BLD AUTO: 0.44 THOUSAND/ÂΜL (ref 0.17–1.22)
MONOCYTES NFR BLD AUTO: 9 % (ref 4–12)
NEUTROPHILS # BLD AUTO: 3.89 THOUSANDS/ÂΜL (ref 1.85–7.62)
NEUTS SEG NFR BLD AUTO: 77 % (ref 43–75)
NRBC BLD AUTO-RTO: 0 /100 WBCS
PHOSPHATE SERPL-MCNC: 3.1 MG/DL (ref 2.3–4.1)
PLATELET # BLD AUTO: 27 THOUSANDS/UL (ref 149–390)
PMV BLD AUTO: 12.8 FL (ref 8.9–12.7)
POTASSIUM SERPL-SCNC: 4.6 MMOL/L (ref 3.5–5.3)
PROT SERPL-MCNC: 6 G/DL (ref 6.4–8.4)
RBC # BLD AUTO: 3.63 MILLION/UL (ref 3.88–5.62)
SODIUM SERPL-SCNC: 134 MMOL/L (ref 135–147)
WBC # BLD AUTO: 5.05 THOUSAND/UL (ref 4.31–10.16)

## 2024-06-15 PROCEDURE — 83735 ASSAY OF MAGNESIUM: CPT | Performed by: INTERNAL MEDICINE

## 2024-06-15 PROCEDURE — 85025 COMPLETE CBC W/AUTO DIFF WBC: CPT | Performed by: INTERNAL MEDICINE

## 2024-06-15 PROCEDURE — 99232 SBSQ HOSP IP/OBS MODERATE 35: CPT | Performed by: INTERNAL MEDICINE

## 2024-06-15 PROCEDURE — 82948 REAGENT STRIP/BLOOD GLUCOSE: CPT

## 2024-06-15 PROCEDURE — 84100 ASSAY OF PHOSPHORUS: CPT | Performed by: INTERNAL MEDICINE

## 2024-06-15 PROCEDURE — 80053 COMPREHEN METABOLIC PANEL: CPT | Performed by: INTERNAL MEDICINE

## 2024-06-15 RX ORDER — INSULIN GLARGINE 100 [IU]/ML
20 INJECTION, SOLUTION SUBCUTANEOUS
Status: DISCONTINUED | OUTPATIENT
Start: 2024-06-15 | End: 2024-06-17

## 2024-06-15 RX ORDER — INSULIN LISPRO 100 [IU]/ML
13 INJECTION, SOLUTION INTRAVENOUS; SUBCUTANEOUS
Status: DISCONTINUED | OUTPATIENT
Start: 2024-06-15 | End: 2024-06-17

## 2024-06-15 RX ADMIN — PANTOPRAZOLE SODIUM 40 MG: 40 TABLET, DELAYED RELEASE ORAL at 06:25

## 2024-06-15 RX ADMIN — FUROSEMIDE 40 MG: 40 TABLET ORAL at 08:00

## 2024-06-15 RX ADMIN — MIDODRINE HYDROCHLORIDE 10 MG: 5 TABLET ORAL at 10:59

## 2024-06-15 RX ADMIN — SENNOSIDES AND DOCUSATE SODIUM 1 TABLET: 8.6; 5 TABLET ORAL at 17:16

## 2024-06-15 RX ADMIN — NICOTINE 1 PATCH: 14 PATCH, EXTENDED RELEASE TRANSDERMAL at 08:00

## 2024-06-15 RX ADMIN — PROPRANOLOL HYDROCHLORIDE 10 MG: 10 TABLET ORAL at 08:01

## 2024-06-15 RX ADMIN — TAMSULOSIN HYDROCHLORIDE 0.4 MG: 0.4 CAPSULE ORAL at 16:05

## 2024-06-15 RX ADMIN — INSULIN LISPRO 13 UNITS: 100 INJECTION, SOLUTION INTRAVENOUS; SUBCUTANEOUS at 11:00

## 2024-06-15 RX ADMIN — FERROUS SULFATE TAB 325 MG (65 MG ELEMENTAL FE) 325 MG: 325 (65 FE) TAB at 07:19

## 2024-06-15 RX ADMIN — ROPINIROLE HYDROCHLORIDE 4 MG: 1 TABLET, FILM COATED ORAL at 21:53

## 2024-06-15 RX ADMIN — INSULIN LISPRO 3 UNITS: 100 INJECTION, SOLUTION INTRAVENOUS; SUBCUTANEOUS at 07:20

## 2024-06-15 RX ADMIN — LEVETIRACETAM 500 MG: 500 TABLET, FILM COATED ORAL at 08:00

## 2024-06-15 RX ADMIN — PANCRELIPASE 24000 UNITS: 30000; 6000; 19000 CAPSULE, DELAYED RELEASE PELLETS ORAL at 16:05

## 2024-06-15 RX ADMIN — MIDODRINE HYDROCHLORIDE 10 MG: 5 TABLET ORAL at 16:05

## 2024-06-15 RX ADMIN — GABAPENTIN 300 MG: 300 CAPSULE ORAL at 16:05

## 2024-06-15 RX ADMIN — PANCRELIPASE 24000 UNITS: 30000; 6000; 19000 CAPSULE, DELAYED RELEASE PELLETS ORAL at 07:19

## 2024-06-15 RX ADMIN — HYDROCORTISONE 10 MG: 10 TABLET ORAL at 21:53

## 2024-06-15 RX ADMIN — INSULIN LISPRO 10 UNITS: 100 INJECTION, SOLUTION INTRAVENOUS; SUBCUTANEOUS at 07:20

## 2024-06-15 RX ADMIN — INSULIN GLARGINE 20 UNITS: 100 INJECTION, SOLUTION SUBCUTANEOUS at 21:50

## 2024-06-15 RX ADMIN — DICYCLOMINE HYDROCHLORIDE 20 MG: 20 TABLET ORAL at 06:25

## 2024-06-15 RX ADMIN — LEVETIRACETAM 500 MG: 500 TABLET, FILM COATED ORAL at 21:53

## 2024-06-15 RX ADMIN — HYDROCORTISONE 10 MG: 10 TABLET ORAL at 16:05

## 2024-06-15 RX ADMIN — GABAPENTIN 300 MG: 300 CAPSULE ORAL at 21:53

## 2024-06-15 RX ADMIN — GABAPENTIN 300 MG: 300 CAPSULE ORAL at 08:00

## 2024-06-15 RX ADMIN — INSULIN LISPRO 4 UNITS: 100 INJECTION, SOLUTION INTRAVENOUS; SUBCUTANEOUS at 21:51

## 2024-06-15 RX ADMIN — PANCRELIPASE 24000 UNITS: 30000; 6000; 19000 CAPSULE, DELAYED RELEASE PELLETS ORAL at 11:00

## 2024-06-15 RX ADMIN — DICYCLOMINE HYDROCHLORIDE 20 MG: 20 TABLET ORAL at 11:00

## 2024-06-15 RX ADMIN — INSULIN LISPRO 1 UNITS: 100 INJECTION, SOLUTION INTRAVENOUS; SUBCUTANEOUS at 10:59

## 2024-06-15 RX ADMIN — RIFAXIMIN 550 MG: 550 TABLET ORAL at 08:01

## 2024-06-15 RX ADMIN — SPIRONOLACTONE 100 MG: 100 TABLET, FILM COATED ORAL at 08:01

## 2024-06-15 RX ADMIN — MIDODRINE HYDROCHLORIDE 10 MG: 5 TABLET ORAL at 07:19

## 2024-06-15 RX ADMIN — HYDROCORTISONE 10 MG: 10 TABLET ORAL at 08:00

## 2024-06-15 RX ADMIN — ROPINIROLE HYDROCHLORIDE 4 MG: 1 TABLET, FILM COATED ORAL at 08:00

## 2024-06-15 NOTE — PLAN OF CARE
Problem: PAIN - ADULT  Goal: Verbalizes/displays adequate comfort level or baseline comfort level  Description: Interventions:  - Encourage patient to monitor pain and request assistance  - Assess pain using appropriate pain scale  - Administer analgesics based on type and severity of pain and evaluate response  - Implement non-pharmacological measures as appropriate and evaluate response  - Consider cultural and social influences on pain and pain management  - Notify physician/advanced practitioner if interventions unsuccessful or patient reports new pain  Outcome: Progressing     Problem: INFECTION - ADULT  Goal: Absence or prevention of progression during hospitalization  Description: INTERVENTIONS:  - Assess and monitor for signs and symptoms of infection  - Monitor lab/diagnostic results  - Monitor all insertion sites, i.e. indwelling lines, tubes, and drains  - Monitor endotracheal if appropriate and nasal secretions for changes in amount and color  - [place] appropriate cooling/warming therapies per order  - Administer medications as ordered  - Instruct and encourage patient and family to use good hand hygiene technique  - Identify and instruct in appropriate isolation precautions for identified infection/condition  Outcome: Progressing     Problem: Knowledge Deficit  Goal: Patient/family/caregiver demonstrates understanding of disease process, treatment plan, medications, and discharge instructions  Description: Complete learning assessment and assess knowledge base.  Interventions:  - Provide teaching at level of understanding  - Provide teaching via preferred learning methods  Outcome: Progressing     Problem: DISCHARGE PLANNING  Goal: Discharge to home or other facility with appropriate resources  Description: INTERVENTIONS:  - Identify barriers to discharge w/patient and caregiver  - Arrange for needed discharge resources and transportation as appropriate  - Identify discharge learning needs (meds,  wound care, etc.)  - Arrange for interpretive services to assist at discharge as needed  - Refer to Case Management Department for coordinating discharge planning if the patient needs post-hospital services based on physician/advanced practitioner order or complex needs related to functional status, cognitive ability, or social support system  Outcome: Progressing

## 2024-06-15 NOTE — PROGRESS NOTES
[facility]  Progress Note  Name: Foreign Armando I  MRN: [id number]  Unit/Bed#: 7T [facility] 705-01 I Date of Admission: 6/3/2024   Date of Service: 6/15/2024 I Hospital Day: 10    Assessment & Plan   * Pleural effusion on right  Assessment & Plan  Patient presented with shortness of breath   BNP WNL, CT chest A/P show right pleural effusion, no evidence of pulmonary congestion   s/p thoracentesis on 6/4/2024 with 600 cc clear yellow fluid  Pleural fluid analysis show LDH 62, total protein 7.6 with a plasma total protein of 7.1  Per Light's criteria, pleural fluid is transudative  Cytology was negative for malignancy  Consider hepatic hydrothorax in the setting of decompensated cirrhosis (see below)  Medically stable, however, per neuropsychology, patient not competent to make own medical decisions ->  on board for safe disposition plan    Anemia of chronic disease  Assessment & Plan  Continue iron supplementation  H/H stable    Depressed mood  Assessment & Plan  On arrival was very upset and tearful - reported that his mother passed away the night prior to his arrival here (unsure of the validity)  Appreciated psychiatry input    Seizure-like activity (HCC)  Assessment & Plan  Patient had RRT for seizure-like activity previously  Patient had similar episode when he was in [facility] in 10/6/23 -CT brain, CTA head and neck, MRI brain and EEG was negative at that time  Discontinued Tramadol due to the risk of lowered seizure threshold  Neurology consult -> since patient was previously on Keppra 500 mg twice daily continue home dose  Reported to [facility]    Nephrolithiasis  Assessment & Plan  Stated he might have passed a stone when he urinates and might be causing painful urination  Per patient, he had history of kidney stone   UA showed no evidence of UTI  CT A/P showed simple renal cyst(s). Nonobstructing left renal nephrolithiasis. No hydronephrosis.   BMP showed normal renal  function  Continue Flomax     Swelling of lower extremity  Assessment & Plan  Previously complained of left lower extremity swelling and tenderness  Venous duplex of bilateral lower extremities negative for DVT  History of neuropathy noted - c/w Gabapentin   On Requip for restless leg syndrome    Volume overload  Assessment & Plan  Most likely due to liver cirrhosis   BNP WNL, CT chest showed no evidence of pulmonary congestion but pleural effusion which was tapped on 6/4  Recent stress test on 7/13/2023 showed LVEF 70%, and attenuation artifact   Patient received IV Lasix 40 mg once in ED - currently on an oral Lasix/Aldactone regimen  Monitor volume status    Adrenal insufficiency (HCC)  Assessment & Plan  Endocrinology consult noted - appreciate recommendations regarding Cortef tapering instructions  Continue Midodrine    Type 2 diabetes mellitus (HCC)  Assessment & Plan  Lab Results   Component Value Date    HGBA1C 6.5 (H) 05/05/2024     Carbohydrate restriction  Continue basal/prandial insulin with additional SSI coverage prior checks -> titrate dosing daily tangents on blood sugars   Increased Lantus to 30 units nightly, lispro 13 units 3 times daily AC  Anticipate fluctuation of blood sugars in the setting of Cortef tapering    Smoking  Assessment & Plan  Cessation counseling  Transdermal nicotine patch on board    Thrombocytopenia (HCC)  Assessment & Plan  Likely sequela of cirrhosis  Monitor platelet count & bleeding    Cirrhosis (HCC)  Assessment & Plan  CT chest A/P with contrast showed cirrhosis with signs of portal hypertension as evidenced by gastroesophageal varices. Stable upper abdominal lymphadenopathy presumed secondary to underlying liver pathology although low-grade lymphoproliferative disorder is not fully excluded. Stable diffuse mesenteric edema   Gastroenterology following  Continue Inderal for gastroesophageal varices -> plan for outpatient surveillance EGD  Continue diuresis with  Lasix/Aldactone and hepatic encephalopathy prophylaxis with Rifaximin (deviously on Lactulose, now discontinued, due to complaints of intolerable diarrhea)  Patient demanded liberation of salt restriction off diet             VTE Pharmacologic Prophylaxis:   Pharmacologic:  Thrombocytopenia, patient is ambulating  Mechanical VTE Prophylaxis in Place: Yes    Patient Centered Rounds: I have performed bedside rounds with nursing staff today.    Discussions with Specialists or Other Care Team Provider: Discussed with RN    Education and Discussions with Family / Patient: Discussed with patient.  Patient declined call to .    Time Spent for Care:  35 minutes .  This time was spent on one or more of the following: performing physical exam; counseling and coordination of care; obtaining or reviewing history; documenting in the medical record; reviewing/ordering tests, medications or procedures; communicating with other healthcare professionals and discussing with patient's family/caregivers.    Current Length of Stay: 10 day(s)    Current Patient Status: Inpatient   Certification Statement: The patient will continue to require additional inpatient hospital stay due to patient is medically stable, pending safe disposition planning    Discharge Plan / Estimated Discharge Date: Pending safe disposition plan      Code Status: Level 1 - Full Code      Subjective:   Patient was seen and examined at bedside. The patient wants to leave AMA however patient was deemed to be incompetent by neuropsychiatry evaluation.  Reached out to neuropsychiatrist however not available this weekend.  Will reach out to neuropsychiatrist again on Monday.      Objective:     Vitals:   Temp (24hrs), Av.7 °F (36.5 °C), Min:97.6 °F (36.4 °C), Max:97.8 °F (36.6 °C)    Temp:  [97.6 °F (36.4 °C)-97.8 °F (36.6 °C)] 97.6 °F (36.4 °C)  HR:  [55-65] 55  Resp:  [18] 18  BP: (112-117)/(64-72) 117/72  SpO2:  [98 %] 98 %  Body mass index is 24.58  kg/m².     Input and Output Summary (last 24 hours):       Intake/Output Summary (Last 24 hours) at 6/15/2024 0950  Last data filed at 6/15/2024 0901  Gross per 24 hour   Intake 1100 ml   Output --   Net 1100 ml       Physical Exam:     Physical Exam  General: no acute distress  HEENT: NC/AT, PERRL, EOM - normal  Neck: Supple  Pulm/Chest: Normal chest wall expansion, clear breath sounds on both side  CVS: normal S1&S2, capillary refill <2s  Abd: soft, non tender, non distended, bowel sounds +  MSK: move all 4 extremities spontaneously  Skin: warm  CNS: no acute focal neuro deficit      Additional Data:     Labs:    Results from last 7 days   Lab Units 06/15/24  0500   WBC Thousand/uL 5.05   HEMOGLOBIN g/dL 12.3   HEMATOCRIT % 38.1   PLATELETS Thousands/uL 27*   SEGS PCT % 77*   LYMPHO PCT % 11*   MONO PCT % 9   EOS PCT % 3     Results from last 7 days   Lab Units 06/15/24  0500   POTASSIUM mmol/L 4.6   CHLORIDE mmol/L 102   CO2 mmol/L 23   BUN mg/dL 16   CREATININE mg/dL 0.83   CALCIUM mg/dL 8.4   ALK PHOS U/L 125*   ALT U/L 47   AST U/L 36           * I Have Reviewed All Lab Data Listed Above.  * Additional Pertinent Lab Tests Reviewed: All Labs For Current Hospital Admission Reviewed    Imaging:      I have reviewed pertinent imaging.      Recent Cultures (last 7 days):           Last 24 Hours Medication List:   Current Facility-Administered Medications   Medication Dose Route Frequency Provider Last Rate    dicyclomine  20 mg Oral BID before breakfast/lunch Natan Miguel PA-C      ferrous sulfate  325 mg Oral Daily With Breakfast Natan Miguel PA-C      furosemide  40 mg Oral Daily Rosario Whitfield MD      gabapentin  300 mg Oral TID Natan Miguel PA-C      HYDROcodone-acetaminophen  1 tablet Oral Q6H PRN Kori Dias MD      HYDROcodone-acetaminophen  2 tablet Oral Q6H PRN Kori Dias MD      hydrocortisone  10 mg Oral TID Rosario Whitfield MD      hydrOXYzine HCL  25 mg Oral TID PRN Natan Miguel PA-C      insulin  glargine  20 Units Subcutaneous HS Rosario Whitfield MD      insulin lispro  1-5 Units Subcutaneous TID AC Natan Miguel PA-C      insulin lispro  1-5 Units Subcutaneous HS Natan Miguel PA-C      insulin lispro  13 Units Subcutaneous TID With Meals Rosario Whitfield MD      levETIRAcetam  500 mg Oral Q12H SANDRA Rosario Whitfield MD      LORazepam  1 mg Intravenous Q6H PRN Rosario Whitfield MD      midodrine  10 mg Oral TID AC Rosario Whitfield MD      nicotine  1 patch Transdermal Daily Natan Miguel PA-C      ondansetron  4 mg Intravenous Q6H PRN Natan Miguel PA-C      pancrelipase (Lip-Prot-Amyl)  24,000 Units Oral TID With Meals Jerson Gibson MD      pantoprazole  40 mg Oral Early Morning Natan Miguel PA-C      propranolol  10 mg Oral Q12H SANDRA Natan Miguel PA-C      rifaximin  550 mg Oral Q12H SANDRA Jerson Gibson MD      rOPINIRole  4 mg Oral BID Natan Miguel PA-C      senna-docusate sodium  1 tablet Oral QPM Natan Miguel PA-C      spironolactone  100 mg Oral Daily Natan Miguel PA-C      tamsulosin  0.4 mg Oral Daily With Dinner Natan Miguel PA-C          Today, Patient Was Seen By: Rosario Whitfield MD    ** Please Note: Dragon 360 Dictation voice to text software may have been used in the creation of this document. **

## 2024-06-15 NOTE — ASSESSMENT & PLAN NOTE
Lab Results   Component Value Date    HGBA1C 6.5 (H) 05/05/2024     Carbohydrate restriction  Continue basal/prandial insulin with additional SSI coverage prior checks -> titrate dosing daily tangents on blood sugars   Increased Lantus to 30 units nightly, lispro 13 units 3 times daily AC  Anticipate fluctuation of blood sugars in the setting of Cortef tapering

## 2024-06-15 NOTE — ASSESSMENT & PLAN NOTE
Patient had RRT for seizure-like activity previously  Patient had similar episode when he was in [facility] in 10/6/23 -CT brain, CTA head and neck, MRI brain and EEG was negative at that time  Discontinued Tramadol due to the risk of lowered seizure threshold  Neurology consult -> since patient was previously on Keppra 500 mg twice daily continue home dose  Reported to [facility]

## 2024-06-16 LAB
ALBUMIN SERPL BCP-MCNC: 2.7 G/DL (ref 3.5–5)
ALP SERPL-CCNC: 111 U/L (ref 34–104)
ALT SERPL W P-5'-P-CCNC: 47 U/L (ref 7–52)
ANION GAP SERPL CALCULATED.3IONS-SCNC: 6 MMOL/L (ref 4–13)
AST SERPL W P-5'-P-CCNC: 27 U/L (ref 13–39)
BASOPHILS # BLD AUTO: 0.01 THOUSANDS/ÂΜL (ref 0–0.1)
BASOPHILS NFR BLD AUTO: 0 % (ref 0–1)
BILIRUB SERPL-MCNC: 2.25 MG/DL (ref 0.2–1)
BUN SERPL-MCNC: 16 MG/DL (ref 5–25)
CALCIUM ALBUM COR SERPL-MCNC: 9.2 MG/DL (ref 8.3–10.1)
CALCIUM SERPL-MCNC: 8.2 MG/DL (ref 8.4–10.2)
CHLORIDE SERPL-SCNC: 100 MMOL/L (ref 96–108)
CO2 SERPL-SCNC: 30 MMOL/L (ref 21–32)
CREAT SERPL-MCNC: 0.92 MG/DL (ref 0.6–1.3)
EOSINOPHIL # BLD AUTO: 0.14 THOUSAND/ÂΜL (ref 0–0.61)
EOSINOPHIL NFR BLD AUTO: 3 % (ref 0–6)
ERYTHROCYTE [DISTWIDTH] IN BLOOD BY AUTOMATED COUNT: 15.8 % (ref 11.6–15.1)
GFR SERPL CREATININE-BSD FRML MDRD: 88 ML/MIN/1.73SQ M
GLUCOSE SERPL-MCNC: 125 MG/DL (ref 65–140)
GLUCOSE SERPL-MCNC: 182 MG/DL (ref 65–140)
GLUCOSE SERPL-MCNC: 242 MG/DL (ref 65–140)
GLUCOSE SERPL-MCNC: 282 MG/DL (ref 65–140)
GLUCOSE SERPL-MCNC: 299 MG/DL (ref 65–140)
HCT VFR BLD AUTO: 31 % (ref 36.5–49.3)
HGB BLD-MCNC: 10.8 G/DL (ref 12–17)
IMM GRANULOCYTES # BLD AUTO: 0.01 THOUSAND/UL (ref 0–0.2)
IMM GRANULOCYTES NFR BLD AUTO: 0 % (ref 0–2)
LYMPHOCYTES # BLD AUTO: 0.59 THOUSANDS/ÂΜL (ref 0.6–4.47)
LYMPHOCYTES NFR BLD AUTO: 11 % (ref 14–44)
MAGNESIUM SERPL-MCNC: 2 MG/DL (ref 1.9–2.7)
MCH RBC QN AUTO: 34.8 PG (ref 26.8–34.3)
MCHC RBC AUTO-ENTMCNC: 34.8 G/DL (ref 31.4–37.4)
MCV RBC AUTO: 100 FL (ref 82–98)
MONOCYTES # BLD AUTO: 0.44 THOUSAND/ÂΜL (ref 0.17–1.22)
MONOCYTES NFR BLD AUTO: 8 % (ref 4–12)
NEUTROPHILS # BLD AUTO: 4.04 THOUSANDS/ÂΜL (ref 1.85–7.62)
NEUTS SEG NFR BLD AUTO: 78 % (ref 43–75)
NRBC BLD AUTO-RTO: 0 /100 WBCS
PHOSPHATE SERPL-MCNC: 3.3 MG/DL (ref 2.3–4.1)
PLATELET # BLD AUTO: 33 THOUSANDS/UL (ref 149–390)
PMV BLD AUTO: 11.6 FL (ref 8.9–12.7)
POTASSIUM SERPL-SCNC: 4.3 MMOL/L (ref 3.5–5.3)
PROT SERPL-MCNC: 5.4 G/DL (ref 6.4–8.4)
RBC # BLD AUTO: 3.1 MILLION/UL (ref 3.88–5.62)
SODIUM SERPL-SCNC: 136 MMOL/L (ref 135–147)
WBC # BLD AUTO: 5.23 THOUSAND/UL (ref 4.31–10.16)

## 2024-06-16 PROCEDURE — 83735 ASSAY OF MAGNESIUM: CPT | Performed by: INTERNAL MEDICINE

## 2024-06-16 PROCEDURE — 80053 COMPREHEN METABOLIC PANEL: CPT | Performed by: INTERNAL MEDICINE

## 2024-06-16 PROCEDURE — 99232 SBSQ HOSP IP/OBS MODERATE 35: CPT | Performed by: INTERNAL MEDICINE

## 2024-06-16 PROCEDURE — 84100 ASSAY OF PHOSPHORUS: CPT | Performed by: INTERNAL MEDICINE

## 2024-06-16 PROCEDURE — 82948 REAGENT STRIP/BLOOD GLUCOSE: CPT

## 2024-06-16 PROCEDURE — 85025 COMPLETE CBC W/AUTO DIFF WBC: CPT | Performed by: INTERNAL MEDICINE

## 2024-06-16 RX ADMIN — GABAPENTIN 300 MG: 300 CAPSULE ORAL at 21:18

## 2024-06-16 RX ADMIN — HYDROCORTISONE 10 MG: 10 TABLET ORAL at 15:16

## 2024-06-16 RX ADMIN — PANCRELIPASE 24000 UNITS: 30000; 6000; 19000 CAPSULE, DELAYED RELEASE PELLETS ORAL at 11:00

## 2024-06-16 RX ADMIN — TAMSULOSIN HYDROCHLORIDE 0.4 MG: 0.4 CAPSULE ORAL at 15:31

## 2024-06-16 RX ADMIN — PANCRELIPASE 24000 UNITS: 30000; 6000; 19000 CAPSULE, DELAYED RELEASE PELLETS ORAL at 15:31

## 2024-06-16 RX ADMIN — PANCRELIPASE 24000 UNITS: 30000; 6000; 19000 CAPSULE, DELAYED RELEASE PELLETS ORAL at 08:06

## 2024-06-16 RX ADMIN — DICYCLOMINE HYDROCHLORIDE 20 MG: 20 TABLET ORAL at 06:12

## 2024-06-16 RX ADMIN — INSULIN LISPRO 2 UNITS: 100 INJECTION, SOLUTION INTRAVENOUS; SUBCUTANEOUS at 21:19

## 2024-06-16 RX ADMIN — ROPINIROLE HYDROCHLORIDE 4 MG: 1 TABLET, FILM COATED ORAL at 21:19

## 2024-06-16 RX ADMIN — INSULIN LISPRO 13 UNITS: 100 INJECTION, SOLUTION INTRAVENOUS; SUBCUTANEOUS at 08:13

## 2024-06-16 RX ADMIN — GABAPENTIN 300 MG: 300 CAPSULE ORAL at 08:05

## 2024-06-16 RX ADMIN — INSULIN LISPRO 1 UNITS: 100 INJECTION, SOLUTION INTRAVENOUS; SUBCUTANEOUS at 15:16

## 2024-06-16 RX ADMIN — HYDROCORTISONE 10 MG: 10 TABLET ORAL at 08:05

## 2024-06-16 RX ADMIN — INSULIN LISPRO 13 UNITS: 100 INJECTION, SOLUTION INTRAVENOUS; SUBCUTANEOUS at 11:00

## 2024-06-16 RX ADMIN — LEVETIRACETAM 500 MG: 500 TABLET, FILM COATED ORAL at 21:19

## 2024-06-16 RX ADMIN — INSULIN LISPRO 13 UNITS: 100 INJECTION, SOLUTION INTRAVENOUS; SUBCUTANEOUS at 15:30

## 2024-06-16 RX ADMIN — MIDODRINE HYDROCHLORIDE 10 MG: 5 TABLET ORAL at 10:52

## 2024-06-16 RX ADMIN — INSULIN GLARGINE 20 UNITS: 100 INJECTION, SOLUTION SUBCUTANEOUS at 21:21

## 2024-06-16 RX ADMIN — LEVETIRACETAM 500 MG: 500 TABLET, FILM COATED ORAL at 08:09

## 2024-06-16 RX ADMIN — PANTOPRAZOLE SODIUM 40 MG: 40 TABLET, DELAYED RELEASE ORAL at 05:32

## 2024-06-16 RX ADMIN — NICOTINE 1 PATCH: 14 PATCH, EXTENDED RELEASE TRANSDERMAL at 08:06

## 2024-06-16 RX ADMIN — INSULIN LISPRO 3 UNITS: 100 INJECTION, SOLUTION INTRAVENOUS; SUBCUTANEOUS at 08:12

## 2024-06-16 RX ADMIN — ROPINIROLE HYDROCHLORIDE 4 MG: 1 TABLET, FILM COATED ORAL at 08:05

## 2024-06-16 RX ADMIN — MIDODRINE HYDROCHLORIDE 10 MG: 5 TABLET ORAL at 08:05

## 2024-06-16 RX ADMIN — FERROUS SULFATE TAB 325 MG (65 MG ELEMENTAL FE) 325 MG: 325 (65 FE) TAB at 08:05

## 2024-06-16 RX ADMIN — MIDODRINE HYDROCHLORIDE 10 MG: 5 TABLET ORAL at 15:16

## 2024-06-16 RX ADMIN — DICYCLOMINE HYDROCHLORIDE 20 MG: 20 TABLET ORAL at 10:52

## 2024-06-16 RX ADMIN — GABAPENTIN 300 MG: 300 CAPSULE ORAL at 15:16

## 2024-06-16 RX ADMIN — HYDROCORTISONE 10 MG: 10 TABLET ORAL at 21:19

## 2024-06-16 RX ADMIN — RIFAXIMIN 550 MG: 550 TABLET ORAL at 08:05

## 2024-06-16 NOTE — PLAN OF CARE
Problem: PAIN - ADULT  Goal: Verbalizes/displays adequate comfort level or baseline comfort level  Description: Interventions:  - Encourage patient to monitor pain and request assistance  - Assess pain using appropriate pain scale  - Administer analgesics based on type and severity of pain and evaluate response  - Implement non-pharmacological measures as appropriate and evaluate response  - Consider cultural and social influences on pain and pain management  - Notify physician/advanced practitioner if interventions unsuccessful or patient reports new pain  Outcome: Progressing     Problem: INFECTION - ADULT  Goal: Absence or prevention of progression during hospitalization  Description: INTERVENTIONS:  - Assess and monitor for signs and symptoms of infection  - Monitor lab/diagnostic results  - Monitor all insertion sites, i.e. indwelling lines, tubes, and drains  - Monitor endotracheal if appropriate and nasal secretions for changes in amount and color  - [place] appropriate cooling/warming therapies per order  - Administer medications as ordered  - Instruct and encourage patient and family to use good hand hygiene technique  - Identify and instruct in appropriate isolation precautions for identified infection/condition  Outcome: Progressing     Problem: NEUROSENSORY - ADULT  Goal: Remains free of injury related to seizures activity  Description: INTERVENTIONS  - Maintain airway, patient safety  and administer oxygen as ordered  - Monitor patient for seizure activity, document and report duration and description of seizure to physician/advanced practitioner  - If seizure occurs,  ensure patient safety during seizure  - Reorient patient post seizure  - Seizure pads on all 4 side rails  - Instruct patient/family to notify RN of any seizure activity including if an aura is experienced  - Instruct patient/family to call for assistance with activity based on nursing assessment  - Administer anti-seizure medications  if ordered    Outcome: Progressing     Problem: Knowledge Deficit  Goal: Patient/family/caregiver demonstrates understanding of disease process, treatment plan, medications, and discharge instructions  Description: Complete learning assessment and assess knowledge base.  Interventions:  - Provide teaching at level of understanding  - Provide teaching via preferred learning methods  Outcome: Progressing     Problem: DISCHARGE PLANNING  Goal: Discharge to home or other facility with appropriate resources  Description: INTERVENTIONS:  - Identify barriers to discharge w/patient and caregiver  - Arrange for needed discharge resources and transportation as appropriate  - Identify discharge learning needs (meds, wound care, etc.)  - Arrange for interpretive services to assist at discharge as needed  - Refer to Case Management Department for coordinating discharge planning if the patient needs post-hospital services based on physician/advanced practitioner order or complex needs related to functional status, cognitive ability, or social support system  Outcome: Progressing

## 2024-06-16 NOTE — ASSESSMENT & PLAN NOTE
Endocrinology consult noted - appreciate recommendations regarding Cortef tapering instructions  Continue Midodrine   Is This A New Presentation, Or A Follow-Up?: Follow Up Herpes Simplex Additional History: Patient takes Acyclovir 400mg/d for suppression and tid x5-7 days prn outbreak.

## 2024-06-16 NOTE — PROGRESS NOTES
[facility]  Progress Note  Name: Foreign Armando I  MRN: [id number]  Unit/Bed#: 7T [facility] 705-01 I Date of Admission: 6/3/2024   Date of Service: 6/16/2024 I Hospital Day: 11    Assessment & Plan   * Pleural effusion on right  Assessment & Plan  Patient presented with shortness of breath   BNP WNL, CT chest A/P show right pleural effusion, no evidence of pulmonary congestion   s/p thoracentesis on 6/4/2024 with 600 cc clear yellow fluid  Pleural fluid analysis show LDH 62, total protein 7.6 with a plasma total protein of 7.1  Per Light's criteria, pleural fluid is transudative  Cytology was negative for malignancy  Consider hepatic hydrothorax in the setting of decompensated cirrhosis (see below)  Medically stable, however, per neuropsychology, patient not competent to make own medical decisions ->  on board for safe disposition plan    Anemia of chronic disease  Assessment & Plan  Continue iron supplementation  H/H stable    Depressed mood  Assessment & Plan  On arrival was very upset and tearful - reported that his mother passed away the night prior to his arrival here (unsure of the validity)  Appreciated psychiatry input    Seizure-like activity (HCC)  Assessment & Plan  Patient had RRT for seizure-like activity previously  Patient had similar episode when he was in [facility] in 10/6/23 -CT brain, CTA head and neck, MRI brain and EEG was negative at that time  Discontinued Tramadol due to the risk of lowered seizure threshold  Neurology consult -> since patient was previously on Keppra 500 mg twice daily continue home dose  Reported to [facility]    Nephrolithiasis  Assessment & Plan  Stated he might have passed a stone when he urinates and might be causing painful urination  Per patient, he had history of kidney stone   UA showed no evidence of UTI  CT A/P showed simple renal cyst(s). Nonobstructing left renal nephrolithiasis. No hydronephrosis.   BMP showed normal renal  function  Continue Flomax     Swelling of lower extremity  Assessment & Plan  Previously complained of left lower extremity swelling and tenderness  Venous duplex of bilateral lower extremities negative for DVT  History of neuropathy noted - c/w Gabapentin   On Requip for restless leg syndrome    Volume overload  Assessment & Plan  Most likely due to liver cirrhosis   BNP WNL, CT chest showed no evidence of pulmonary congestion but pleural effusion which was tapped on 6/4  Recent stress test on 7/13/2023 showed LVEF 70%, and attenuation artifact   Patient received IV Lasix 40 mg once in ED - currently on an oral Lasix/Aldactone regimen  Monitor volume status    Adrenal insufficiency (HCC)  Assessment & Plan  Endocrinology consult noted - appreciate recommendations regarding Cortef tapering instructions  Continue Midodrine    Type 2 diabetes mellitus (HCC)  Assessment & Plan  Lab Results   Component Value Date    HGBA1C 6.5 (H) 05/05/2024     Carbohydrate restriction  Continue basal/prandial insulin with additional SSI coverage prior checks -> titrate dosing daily tangents on blood sugars   Increased Lantus to 30 units nightly, lispro 13 units 3 times daily AC  Anticipate fluctuation of blood sugars in the setting of Cortef tapering    Smoking  Assessment & Plan  Cessation counseling  Transdermal nicotine patch on board    Thrombocytopenia (HCC)  Assessment & Plan  Likely sequela of cirrhosis  Monitor platelet count & bleeding    Cirrhosis (HCC)  Assessment & Plan  CT chest A/P with contrast showed cirrhosis with signs of portal hypertension as evidenced by gastroesophageal varices. Stable upper abdominal lymphadenopathy presumed secondary to underlying liver pathology although low-grade lymphoproliferative disorder is not fully excluded. Stable diffuse mesenteric edema   Gastroenterology following  Continue Inderal for gastroesophageal varices -> plan for outpatient surveillance EGD  Continue diuresis with  Lasix/Aldactone and hepatic encephalopathy prophylaxis with Rifaximin (deviously on Lactulose, now discontinued, due to complaints of intolerable diarrhea)  Patient demanded liberation of salt restriction off diet             VTE Pharmacologic Prophylaxis:   Pharmacologic: Pharmacologic VTE Prophylaxis contraindicated due to thrombocytopenia, patient is ambulating  Mechanical VTE Prophylaxis in Place: Yes    Patient Centered Rounds: I have performed bedside rounds with nursing staff today.    Discussions with Specialists or Other Care Team Provider: Discussed with RN    Education and Discussions with Family / Patient: Discussed with patient    Time Spent for Care:  35 minutes .  This time was spent on one or more of the following: performing physical exam; counseling and coordination of care; obtaining or reviewing history; documenting in the medical record; reviewing/ordering tests, medications or procedures; communicating with other healthcare professionals and discussing with patient's family/caregivers.    Current Length of Stay: 11 day(s)    Current Patient Status: Inpatient   Certification Statement: The patient will continue to require additional inpatient hospital stay due to patient is medically stable, pending disposition plan    Discharge Plan / Estimated Discharge Date: Pending disposition plan      Code Status: Level 1 - Full Code      Subjective:   Patient was seen and examined at bedside. The patient denies any pain, headache, blurry vision, chest pain, palpitation, shortness of breath, N/V, abd pain.      Objective:     Vitals:   Temp (24hrs), Av °F (36.7 °C), Min:97.9 °F (36.6 °C), Max:98.1 °F (36.7 °C)    Temp:  [97.9 °F (36.6 °C)-98.1 °F (36.7 °C)] 97.9 °F (36.6 °C)  HR:  [54-60] 57  Resp:  [18] 18  BP: ()/(53-59) 95/59  SpO2:  [96 %-98 %] 96 %  Body mass index is 24.58 kg/m².     Input and Output Summary (last 24 hours):       Intake/Output Summary (Last 24 hours) at 2024  0949  Last data filed at 6/15/2024 1801  Gross per 24 hour   Intake 240 ml   Output --   Net 240 ml       Physical Exam:     Physical Exam  General: no acute distress  HEENT: NC/AT, PERRL, EOM - normal  Neck: Supple  Pulm/Chest: Normal chest wall expansion, clear breath sounds on both side  CVS: normal S1&S2, capillary refill <2s  Abd: soft, non tender, non distended, bowel sounds +  MSK: move all 4 extremities spontaneously  Skin: warm  CNS: no acute focal neuro deficit      Additional Data:     Labs:    Results from last 7 days   Lab Units 06/16/24  0535   WBC Thousand/uL 5.23   HEMOGLOBIN g/dL 10.8*   HEMATOCRIT % 31.0*   PLATELETS Thousands/uL 33*   SEGS PCT % 78*   LYMPHO PCT % 11*   MONO PCT % 8   EOS PCT % 3     Results from last 7 days   Lab Units 06/16/24  0535   POTASSIUM mmol/L 4.3   CHLORIDE mmol/L 100   CO2 mmol/L 30   BUN mg/dL 16   CREATININE mg/dL 0.92   CALCIUM mg/dL 8.2*   ALK PHOS U/L 111*   ALT U/L 47   AST U/L 27           * I Have Reviewed All Lab Data Listed Above.  * Additional Pertinent Lab Tests Reviewed: All Labs For Current Hospital Admission Reviewed    Imaging:      I have reviewed pertinent imaging.      Recent Cultures (last 7 days):           Last 24 Hours Medication List:   Current Facility-Administered Medications   Medication Dose Route Frequency Provider Last Rate    dicyclomine  20 mg Oral BID before breakfast/lunch Natan Miguel PA-C      ferrous sulfate  325 mg Oral Daily With Breakfast Natan Miguel PA-C      furosemide  40 mg Oral Daily Rosario Whitfield MD      gabapentin  300 mg Oral TID Natan Miguel PA-C      HYDROcodone-acetaminophen  1 tablet Oral Q6H PRN Kori Dias MD      HYDROcodone-acetaminophen  2 tablet Oral Q6H PRN Kori Dias MD      hydrocortisone  10 mg Oral TID Rosario Whitfield MD      hydrOXYzine HCL  25 mg Oral TID PRN Natan Miguel PA-C      insulin glargine  20 Units Subcutaneous HS Rosario Whitfield MD      insulin lispro  1-5 Units Subcutaneous TID AC Natan Miguel  JERMAINE      insulin lispro  1-5 Units Subcutaneous HS Natan Miguel PA-C      insulin lispro  13 Units Subcutaneous TID With Meals Rosario Whitfield MD      levETIRAcetam  500 mg Oral Q12H SANDRA Rosario Whitfield MD      LORazepam  1 mg Intravenous Q6H PRN Rosario Whitfield MD      midodrine  10 mg Oral TID AC Rosario Whitfield MD      nicotine  1 patch Transdermal Daily Natan Miguel PA-C      ondansetron  4 mg Intravenous Q6H PRN Natan Miguel PA-C      pancrelipase (Lip-Prot-Amyl)  24,000 Units Oral TID With Meals Jerson Gibson MD      pantoprazole  40 mg Oral Early Morning Natan Miguel PA-C      propranolol  10 mg Oral Q12H [facility] Natan Miguel PA-C      rifaximin  550 mg Oral Q12H [facility] Jerson Gibson MD      rOPINIRole  4 mg Oral BID Natan Miguel PA-C      senna-docusate sodium  1 tablet Oral QPM Natan Miguel PA-C      spironolactone  100 mg Oral Daily Natan Miguel PA-C      tamsulosin  0.4 mg Oral Daily With Dinner Natan Miguel PA-C          Today, Patient Was Seen By: Rosario Whitfield MD    ** Please Note: Dragon 360 Dictation voice to text software may have been used in the creation of this document. **

## 2024-06-16 NOTE — ASSESSMENT & PLAN NOTE
Patient had RRT for seizure-like activity previously  Patient had similar episode when he was in [facility] in 10/6/23 -CT brain, CTA head and neck, MRI brain and EEG was negative at that time  Discontinued Tramadol due to the risk of lowered seizure threshold  Neurology consult -> since patient was previously on Keppra 500 mg twice daily continue home dose  Reported to [facility]

## 2024-06-17 LAB
ALBUMIN SERPL BCP-MCNC: 2.6 G/DL (ref 3.5–5)
ALP SERPL-CCNC: 105 U/L (ref 34–104)
ALT SERPL W P-5'-P-CCNC: 46 U/L (ref 7–52)
ANION GAP SERPL CALCULATED.3IONS-SCNC: 6 MMOL/L (ref 4–13)
AST SERPL W P-5'-P-CCNC: 30 U/L (ref 13–39)
BASOPHILS # BLD AUTO: 0.01 THOUSANDS/ÂΜL (ref 0–0.1)
BASOPHILS NFR BLD AUTO: 0 % (ref 0–1)
BILIRUB SERPL-MCNC: 2.15 MG/DL (ref 0.2–1)
BUN SERPL-MCNC: 14 MG/DL (ref 5–25)
CALCIUM ALBUM COR SERPL-MCNC: 9.2 MG/DL (ref 8.3–10.1)
CALCIUM SERPL-MCNC: 8.1 MG/DL (ref 8.4–10.2)
CHLORIDE SERPL-SCNC: 104 MMOL/L (ref 96–108)
CO2 SERPL-SCNC: 26 MMOL/L (ref 21–32)
CREAT SERPL-MCNC: 0.83 MG/DL (ref 0.6–1.3)
EOSINOPHIL # BLD AUTO: 0.19 THOUSAND/ÂΜL (ref 0–0.61)
EOSINOPHIL NFR BLD AUTO: 4 % (ref 0–6)
ERYTHROCYTE [DISTWIDTH] IN BLOOD BY AUTOMATED COUNT: 15.9 % (ref 11.6–15.1)
GFR SERPL CREATININE-BSD FRML MDRD: 93 ML/MIN/1.73SQ M
GLUCOSE SERPL-MCNC: 159 MG/DL (ref 65–140)
GLUCOSE SERPL-MCNC: 191 MG/DL (ref 65–140)
GLUCOSE SERPL-MCNC: 195 MG/DL (ref 65–140)
GLUCOSE SERPL-MCNC: 244 MG/DL (ref 65–140)
GLUCOSE SERPL-MCNC: 244 MG/DL (ref 65–140)
HCT VFR BLD AUTO: 31.5 % (ref 36.5–49.3)
HGB BLD-MCNC: 10.3 G/DL (ref 12–17)
IMM GRANULOCYTES # BLD AUTO: 0.03 THOUSAND/UL (ref 0–0.2)
IMM GRANULOCYTES NFR BLD AUTO: 1 % (ref 0–2)
LYMPHOCYTES # BLD AUTO: 0.59 THOUSANDS/ÂΜL (ref 0.6–4.47)
LYMPHOCYTES NFR BLD AUTO: 12 % (ref 14–44)
MAGNESIUM SERPL-MCNC: 2.1 MG/DL (ref 1.9–2.7)
MCH RBC QN AUTO: 34.1 PG (ref 26.8–34.3)
MCHC RBC AUTO-ENTMCNC: 32.7 G/DL (ref 31.4–37.4)
MCV RBC AUTO: 104 FL (ref 82–98)
MONOCYTES # BLD AUTO: 0.36 THOUSAND/ÂΜL (ref 0.17–1.22)
MONOCYTES NFR BLD AUTO: 7 % (ref 4–12)
NEUTROPHILS # BLD AUTO: 3.79 THOUSANDS/ÂΜL (ref 1.85–7.62)
NEUTS SEG NFR BLD AUTO: 76 % (ref 43–75)
NRBC BLD AUTO-RTO: 0 /100 WBCS
PHOSPHATE SERPL-MCNC: 3.3 MG/DL (ref 2.3–4.1)
PLATELET # BLD AUTO: 34 THOUSANDS/UL (ref 149–390)
PMV BLD AUTO: 11.9 FL (ref 8.9–12.7)
POTASSIUM SERPL-SCNC: 4.3 MMOL/L (ref 3.5–5.3)
PROT SERPL-MCNC: 5.3 G/DL (ref 6.4–8.4)
RBC # BLD AUTO: 3.02 MILLION/UL (ref 3.88–5.62)
SODIUM SERPL-SCNC: 136 MMOL/L (ref 135–147)
WBC # BLD AUTO: 4.97 THOUSAND/UL (ref 4.31–10.16)

## 2024-06-17 PROCEDURE — 83735 ASSAY OF MAGNESIUM: CPT | Performed by: INTERNAL MEDICINE

## 2024-06-17 PROCEDURE — 99232 SBSQ HOSP IP/OBS MODERATE 35: CPT | Performed by: INTERNAL MEDICINE

## 2024-06-17 PROCEDURE — 80053 COMPREHEN METABOLIC PANEL: CPT | Performed by: INTERNAL MEDICINE

## 2024-06-17 PROCEDURE — 82948 REAGENT STRIP/BLOOD GLUCOSE: CPT

## 2024-06-17 PROCEDURE — 84100 ASSAY OF PHOSPHORUS: CPT | Performed by: INTERNAL MEDICINE

## 2024-06-17 PROCEDURE — 85025 COMPLETE CBC W/AUTO DIFF WBC: CPT | Performed by: INTERNAL MEDICINE

## 2024-06-17 RX ADMIN — INSULIN LISPRO 1 UNITS: 100 INJECTION, SOLUTION INTRAVENOUS; SUBCUTANEOUS at 21:37

## 2024-06-17 RX ADMIN — TAMSULOSIN HYDROCHLORIDE 0.4 MG: 0.4 CAPSULE ORAL at 15:35

## 2024-06-17 RX ADMIN — ROPINIROLE HYDROCHLORIDE 4 MG: 1 TABLET, FILM COATED ORAL at 09:54

## 2024-06-17 RX ADMIN — PANTOPRAZOLE SODIUM 40 MG: 40 TABLET, DELAYED RELEASE ORAL at 05:20

## 2024-06-17 RX ADMIN — GABAPENTIN 300 MG: 300 CAPSULE ORAL at 09:54

## 2024-06-17 RX ADMIN — PROPRANOLOL HYDROCHLORIDE 10 MG: 10 TABLET ORAL at 20:36

## 2024-06-17 RX ADMIN — FERROUS SULFATE TAB 325 MG (65 MG ELEMENTAL FE) 325 MG: 325 (65 FE) TAB at 14:35

## 2024-06-17 RX ADMIN — FUROSEMIDE 40 MG: 40 TABLET ORAL at 09:54

## 2024-06-17 RX ADMIN — HYDROCORTISONE 10 MG: 10 TABLET ORAL at 20:36

## 2024-06-17 RX ADMIN — SPIRONOLACTONE 100 MG: 100 TABLET, FILM COATED ORAL at 09:53

## 2024-06-17 RX ADMIN — RIFAXIMIN 550 MG: 550 TABLET ORAL at 20:36

## 2024-06-17 RX ADMIN — INSULIN LISPRO 2 UNITS: 100 INJECTION, SOLUTION INTRAVENOUS; SUBCUTANEOUS at 11:44

## 2024-06-17 RX ADMIN — HYDROCORTISONE 10 MG: 10 TABLET ORAL at 15:35

## 2024-06-17 RX ADMIN — PROPRANOLOL HYDROCHLORIDE 10 MG: 10 TABLET ORAL at 09:55

## 2024-06-17 RX ADMIN — RIFAXIMIN 550 MG: 550 TABLET ORAL at 09:55

## 2024-06-17 RX ADMIN — LEVETIRACETAM 500 MG: 500 TABLET, FILM COATED ORAL at 20:36

## 2024-06-17 RX ADMIN — GABAPENTIN 300 MG: 300 CAPSULE ORAL at 20:36

## 2024-06-17 RX ADMIN — ROPINIROLE HYDROCHLORIDE 4 MG: 1 TABLET, FILM COATED ORAL at 20:36

## 2024-06-17 RX ADMIN — MIDODRINE HYDROCHLORIDE 10 MG: 5 TABLET ORAL at 11:44

## 2024-06-17 RX ADMIN — INSULIN LISPRO 2 UNITS: 100 INJECTION, SOLUTION INTRAVENOUS; SUBCUTANEOUS at 16:06

## 2024-06-17 RX ADMIN — PANCRELIPASE 24000 UNITS: 30000; 6000; 19000 CAPSULE, DELAYED RELEASE PELLETS ORAL at 11:44

## 2024-06-17 RX ADMIN — METFORMIN HYDROCHLORIDE 1000 MG: 500 TABLET ORAL at 15:34

## 2024-06-17 RX ADMIN — DICYCLOMINE HYDROCHLORIDE 20 MG: 20 TABLET ORAL at 11:44

## 2024-06-17 RX ADMIN — LEVETIRACETAM 500 MG: 500 TABLET, FILM COATED ORAL at 09:55

## 2024-06-17 RX ADMIN — DICYCLOMINE HYDROCHLORIDE 20 MG: 20 TABLET ORAL at 06:31

## 2024-06-17 RX ADMIN — FERROUS SULFATE TAB 325 MG (65 MG ELEMENTAL FE) 325 MG: 325 (65 FE) TAB at 09:53

## 2024-06-17 RX ADMIN — SENNOSIDES AND DOCUSATE SODIUM 1 TABLET: 8.6; 5 TABLET ORAL at 17:10

## 2024-06-17 RX ADMIN — SITAGLIPTIN 100 MG: 100 TABLET, FILM COATED ORAL at 11:45

## 2024-06-17 NOTE — ASSESSMENT & PLAN NOTE
Lab Results   Component Value Date    HGBA1C 6.5 (H) 05/05/2024     Carbohydrate restriction  Continue basal/prandial insulin with additional SSI coverage prior checks -> titrate dosing daily tangents on blood sugars   Anticipate fluctuation of blood sugars in the setting of Cortef tapering  Due to poor social support at home, patient will be able to manage insulin as an outpatient  Switched to metformin 1000 mg twice daily, Januvia 100 mg daily  Will monitor over 24 hours and adjust medication as needed   No

## 2024-06-17 NOTE — PLAN OF CARE
Problem: Prexisting or High Potential for Compromised Skin Integrity  Goal: Skin integrity is maintained or improved  Description: INTERVENTIONS:  - Identify patients at risk for skin breakdown  - Assess and monitor skin integrity  - Assess and monitor nutrition and hydration status  - Monitor labs   - Assess for incontinence   - Turn and reposition patient  - Assist with mobility/ambulation  - Relieve pressure over bony prominences  - Avoid friction and shearing  - Provide appropriate hygiene as needed including keeping skin clean and dry  - Evaluate need for skin moisturizer/barrier cream  - Collaborate with interdisciplinary team   - Patient/family teaching  - Consider wound care consult   Outcome: Progressing     Problem: METABOLIC, FLUID AND ELECTROLYTES - ADULT  Goal: Glucose maintained within target range  Description: INTERVENTIONS:  - Monitor Blood Glucose as ordered  - Assess for signs and symptoms of hyperglycemia and hypoglycemia  - Administer ordered medications to maintain glucose within target range  - Assess nutritional intake and initiate nutrition service referral as needed  Outcome: Progressing

## 2024-06-17 NOTE — CASE MANAGEMENT
Case Management Progress Note    Patient name Foreign Armando  Location 7T /7T -01 MRN [id number]  : 1960 Date 2024       LOS (days): 12  Geometric Mean LOS (GMLOS) (days): 3.3  Days to GMLOS:-8.7        OBJECTIVE:        Current admission status: Inpatient  Preferred Pharmacy:   [id number]  [place] - [address]  [address] [place] [address]  Phone: [phone] Fax: [phone]    [facility][id number] - [place], [place] - [address] [facility]  [address] [facility] [address]  Phone: [phone] Fax: [phone]    [id number] [place] [facility] [address]  [address] [address]  Phone: [phone] Fax: [phone]    [id number] [place] - [address] [facility]  [address]  [facility] [address]  Phone: [phone] Fax: [phone]    Primary Care Provider: No primary care provider on file.    Primary Insurance: BLUE CROSS  Secondary Insurance:     PROGRESS NOTE:    Patient asking to see this CM.  Patient states he is leaving today.  Per TONG Whitfield neuro/psych re consulted to eval capacity.  Waiting on wallet from laundry services supervisor Bina to get wallet back today.  Same explained to patient.  Patient states he will find some where to go when he is discharged plans on going to finance in [place].             Severe episode of recurrent major depressive disorder, with psychotic features Axis II diagnosis

## 2024-06-17 NOTE — ASSESSMENT & PLAN NOTE
Patient had RRT for seizure-like activity previously  Patient had similar episode when he was in [facility] in 10/6/23 -CT brain, CTA head and neck, MRI brain and EEG was negative at that time  Discontinued Tramadol due to the risk of lowered seizure threshold  Neurology consult -> since patient was previously on Keppra 500 mg twice daily continue home dose  Reported to DMV

## 2024-06-17 NOTE — NURSING NOTE
Attempted twice to give patient his morning medication and patient refused both times. Doctor made aware. Will try again.

## 2024-06-17 NOTE — CONSULTS
Consultation - Neuropsychology/Psychology Department  Foreign Armando 63 y.o. male MRN: [id number]  Unit/Bed#: 7T [facility] 705-01 Encounter: [id number]        Reason for Consultation:  Foreign Armando is a 63 y.o. year old male who was referred for a Neuropsychological Exam to assess cognitive functioning and comment on capacity to make informed medical decisions.      History of Present Illness  Pleural Effusion, Seizure like activity  Physician Requesting Consult: Rosario Whitfield MD    PROBLEM LIST:  Patient Active Problem List   Diagnosis    Cirrhosis (HCC)    Thrombocytopenia (HCC)    Restless leg syndrome    Smoking    Type 2 diabetes mellitus (HCC)    Abdominal pain    Pancytopenia (HCC)    Paranoia (HCC)    History of colon cancer    Abnormal CT scan    Adrenal insufficiency (HCC)    Volume overload    Pleural effusion on right    Swelling of lower extremity    Nephrolithiasis    Seizure-like activity (HCC)    Depressed mood    Anemia of chronic disease         Historical Information   Past Medical History:   Diagnosis Date    CHF (congestive heart failure) (HCC)     Cirrhosis (HCC)     CHRISTIE    Colon cancer (HCC)     Diabetes mellitus (HCC)     Neuropathy     Restless leg syndrome      Past Surgical History:   Procedure Laterality Date    EGD AND SIGMOIDOSCOPY FLEXIBLE      IR PARACENTESIS  11/22/2022    IR PARACENTESIS  2/2/2023    IR THORACENTESIS  6/4/2024    LEFT COLON RESECTION      LITHOTRIPSY       Social History   Social History     Substance and Sexual Activity   Alcohol Use Not Currently     Social History     Substance and Sexual Activity   Drug Use Never     Social History     Tobacco Use   Smoking Status Every Day    Current packs/day: 0.25    Types: Cigarettes   Smokeless Tobacco Never     Family History:   Family History   Problem Relation Age of Onset    Diabetes Mother     Liver disease Mother     Diabetes Father     Bone cancer Father        Meds/Allergies   current meds:   Current  Facility-Administered Medications   Medication Dose Route Frequency    dicyclomine (BENTYL) tablet 20 mg  20 mg Oral BID before breakfast/lunch    ferrous sulfate tablet 325 mg  325 mg Oral Daily With Breakfast    furosemide (LASIX) tablet 40 mg  40 mg Oral Daily    gabapentin (NEURONTIN) capsule 300 mg  300 mg Oral TID    HYDROcodone-acetaminophen (NORCO) 5-325 mg per tablet 1 tablet  1 tablet Oral Q6H PRN    HYDROcodone-acetaminophen (NORCO) 5-325 mg per tablet 2 tablet  2 tablet Oral Q6H PRN    hydrocortisone (CORTEF) tablet 10 mg  10 mg Oral TID    hydrOXYzine HCL (ATARAX) tablet 25 mg  25 mg Oral TID PRN    insulin glargine (LANTUS) subcutaneous injection 20 Units 0.2 mL  20 Units Subcutaneous HS    insulin lispro (HumALOG/ADMELOG) 100 units/mL subcutaneous injection 1-5 Units  1-5 Units Subcutaneous TID AC    insulin lispro (HumALOG/ADMELOG) 100 units/mL subcutaneous injection 1-5 Units  1-5 Units Subcutaneous HS    insulin lispro (HumALOG/ADMELOG) 100 units/mL subcutaneous injection 13 Units  13 Units Subcutaneous TID With Meals    levETIRAcetam (KEPPRA) tablet 500 mg  500 mg Oral Q12H SANDRA    LORazepam (ATIVAN) injection 1 mg  1 mg Intravenous Q6H PRN    midodrine (PROAMATINE) tablet 10 mg  10 mg Oral TID AC    nicotine (NICODERM CQ) 14 mg/24hr TD 24 hr patch 1 patch  1 patch Transdermal Daily    ondansetron (ZOFRAN) injection 4 mg  4 mg Intravenous Q6H PRN    pancrelipase (Lip-Prot-Amyl) (CREON) delayed release capsule 24,000 Units  24,000 Units Oral TID With Meals    pantoprazole (PROTONIX) EC tablet 40 mg  40 mg Oral Early Morning    propranolol (INDERAL) tablet 10 mg  10 mg Oral Q12H SANDRA    rifaximin (XIFAXAN) tablet 550 mg  550 mg Oral Q12H SANDRA    rOPINIRole (REQUIP) tablet 4 mg  4 mg Oral BID    senna-docusate sodium (SENOKOT S) 8.6-50 mg per tablet 1 tablet  1 tablet Oral QPM    spironolactone (ALDACTONE) tablet 100 mg  100 mg Oral Daily    tamsulosin (FLOMAX) capsule 0.4 mg  0.4 mg Oral Daily With  "Dinner       Allergies   Allergen Reactions    Morphine Anaphylaxis    Morpholine Salicylate Other (See Comments)     \"I go unconscious\"    Clarithromycin Other (See Comments)     Dizzy, nausea, ulcers in stomach    Oxycodone Hives and Facial Swelling     \"Scratchy throat\"      Sulfamethoxazole-Trimethoprim Hives    Nsaids Rash         Family and Social Support:   No data recorded    Behavioral Observations: Patient was alert, oriented x 3 and initially refused to participate then became more cooperative. Patient denied depressed mood and anxiety; patient appeared aware of reason for hospitalization, medical history    Cognitive Examination    General Cognitive Functioning MMSE = Within Normal Kzrcob17/28;     Attention/Concentration Auditory Selective Attention = Average; Auditory Vigilance = Within Normal Limits; Information Processing Speed = Within Normal Limits    Frontal Systems/Executive Functioning Mental Flexibility/Cognitive Control = Within Normal Limits; Working Memory = Within Normal Limits Abstract Reasoning = Within Normal Limits;     Language Functioning Confrontation naming = Within Normal Limits,  Comprehension of Complex Ideational Material = Within Normal Limits;  Praxis = Within Normal Limits; Repetition = Within Normal Limits; Basic Reading = Within Normal Limits; Following Commands = Within Normal Limits    Memory Functioning Narrative Recall - Short Delay = Borderline; Long Delay Narrative Recall = Low Average; Three word recall = Impaired    Visuo-Spatial Abilities Not Assessed    Functional Knowledge  Health & Safety Knowledge = Within Normal Limits;     Summary/Impression:  Results of Neuropsychological Exam revealed adequate cognitive functioning and on a measure assessing awareness of personal health status and ability to evaluate health problems, handle medical emergencies and take safety precautions, patient performed within normal limits. During this encounter, patient appears to have " capacity to make informed medical decisions.

## 2024-06-17 NOTE — CASE MANAGEMENT
Case Management Progress Note    Patient name Foreign Armando  Location 7T /7T -01 MRN [id number]  : 1960 Date 2024       LOS (days): 12  Geometric Mean LOS (GMLOS) (days): 3.3  Days to GMLOS:-8.8        OBJECTIVE:        Current admission status: Inpatient  Preferred Pharmacy:   [id number] - [place] - [address]  [address] [place] [address]  Phone: [phone] Fax: [phone]    [id number] [place], [place] - [address] [facility]  [address] [facility] [address]  Phone: [phone] Fax: [phone]    [id number] [place] [facility] [address] [facility] [facility]  [address] [address]  Phone: [phone] Fax: [phone]    [id number] [place] [facility] [address] [facility]  [address] [facility] [address]  Phone: [phone] Fax: [phone]    Primary Care Provider: No primary care provider on file.    Primary Insurance: BLUE CROSS  Secondary Insurance:     PROGRESS NOTE:    Patient has capacity after additional consult with Neuro/psyh.    Call from RANDY Mcqueen asking for psych and neuro psych consults.  Faxed as requested to [phone]    Met with patient at bedside and discussed same.  Patient on phone trying to get bus ticket to [place].  Bina COSBY nurse manager updated waiting on patient's wallet.       TONG Whitfield updated same     Email to Trino Bishop and Roberto Maldonado attorneys to cancel guardianship request

## 2024-06-17 NOTE — PROGRESS NOTES
[facility]  Progress Note  Name: Foreign Armando I  MRN: [id number]  Unit/Bed#: 7T [facility] 705-01 I Date of Admission: 6/3/2024   Date of Service: 6/17/2024 I Hospital Day: 12    Assessment & Plan   * Pleural effusion on right  Assessment & Plan  Patient presented with shortness of breath   BNP WNL, CT chest A/P show right pleural effusion, no evidence of pulmonary congestion   s/p thoracentesis on 6/4/2024 with 600 cc clear yellow fluid  Pleural fluid analysis show LDH 62, total protein 7.6 with a plasma total protein of 7.1  Per Light's criteria, pleural fluid is transudative  Cytology was negative for malignancy  Consider hepatic hydrothorax in the setting of decompensated cirrhosis (see below)  Medically stable, however, per neuropsychology, patient not competent to make own medical decisions ->  on board for safe disposition plan    Anemia of chronic disease  Assessment & Plan  Continue iron supplementation  H/H stable    Depressed mood  Assessment & Plan  On arrival was very upset and tearful - reported that his mother passed away the night prior to his arrival here (unsure of the validity)  Appreciated psychiatry input    Seizure-like activity (HCC)  Assessment & Plan  Patient had RRT for seizure-like activity previously  Patient had similar episode when he was in [facility] in 10/6/23 -CT brain, CTA head and neck, MRI brain and EEG was negative at that time  Discontinued Tramadol due to the risk of lowered seizure threshold  Neurology consult -> since patient was previously on Keppra 500 mg twice daily continue home dose  Reported to [facility]    Nephrolithiasis  Assessment & Plan  Stated he might have passed a stone when he urinates and might be causing painful urination  Per patient, he had history of kidney stone   UA showed no evidence of UTI  CT A/P showed simple renal cyst(s). Nonobstructing left renal nephrolithiasis. No hydronephrosis.   BMP showed normal renal  function  Continue Flomax     Swelling of lower extremity  Assessment & Plan  Previously complained of left lower extremity swelling and tenderness  Venous duplex of bilateral lower extremities negative for DVT  History of neuropathy noted - c/w Gabapentin   On Requip for restless leg syndrome    Volume overload  Assessment & Plan  Most likely due to liver cirrhosis   BNP WNL, CT chest showed no evidence of pulmonary congestion but pleural effusion which was tapped on 6/4  Recent stress test on 7/13/2023 showed LVEF 70%, and attenuation artifact   Patient received IV Lasix 40 mg once in ED - currently on an oral Lasix/Aldactone regimen  Monitor volume status    Adrenal insufficiency (HCC)  Assessment & Plan  Endocrinology consult noted - appreciate recommendations regarding Cortef tapering instructions  Continue Midodrine    Type 2 diabetes mellitus (HCC)  Assessment & Plan  Lab Results   Component Value Date    HGBA1C 6.5 (H) 05/05/2024     Carbohydrate restriction  Continue basal/prandial insulin with additional SSI coverage prior checks -> titrate dosing daily tangents on blood sugars   Anticipate fluctuation of blood sugars in the setting of Cortef tapering  Due to poor social support at home, patient is not able to manage insulin as an outpatient  Switched to metformin 1000 mg twice daily, Januvia 100 mg daily  Will monitor over 24 hours and adjust medication as needed    Smoking  Assessment & Plan  Cessation counseling  Transdermal nicotine patch on board    Thrombocytopenia (HCC)  Assessment & Plan  Likely sequela of cirrhosis  Monitor platelet count & bleeding    Cirrhosis (HCC)  Assessment & Plan  CT chest A/P with contrast showed cirrhosis with signs of portal hypertension as evidenced by gastroesophageal varices. Stable upper abdominal lymphadenopathy presumed secondary to underlying liver pathology although low-grade lymphoproliferative disorder is not fully excluded. Stable diffuse mesenteric edema    Gastroenterology following  Continue Inderal for gastroesophageal varices -> plan for outpatient surveillance EGD  Continue diuresis with Lasix/Aldactone and hepatic encephalopathy prophylaxis with Rifaximin (deviously on Lactulose, now discontinued, due to complaints of intolerable diarrhea)  Patient demanded liberation of salt restriction off diet             VTE Pharmacologic Prophylaxis:   Pharmacologic: Pharmacologic VTE Prophylaxis contraindicated due to thrombocytopenia.  Patient is ambulating  Mechanical VTE Prophylaxis in Place: Yes    Patient Centered Rounds: I have performed bedside rounds with nursing staff today.    Discussions with Specialists or Other Care Team Provider: Discussed with , RN    Education and Discussions with Family / Patient: Discussed with patient    Time Spent for Care:  35 minutes .  This time was spent on one or more of the following: performing physical exam; counseling and coordination of care; obtaining or reviewing history; documenting in the medical record; reviewing/ordering tests, medications or procedures; communicating with other healthcare professionals and discussing with patient's family/caregivers.    Current Length of Stay: 12 day(s)    Current Patient Status: Inpatient   Certification Statement: The patient will continue to require additional inpatient hospital stay due to type II DM, adjusting p.o. medication since patient is not able to manage insulin as an outpatient    Discharge Plan / Estimated Discharge Date: In 24 hours      Code Status: Level 1 - Full Code      Subjective:   Patient was seen and examined at bedside. The patient has no major complaint.    Objective:     Vitals:   Temp (24hrs), Av.8 °F (36.6 °C), Min:97.4 °F (36.3 °C), Max:98.1 °F (36.7 °C)    Temp:  [97.4 °F (36.3 °C)-98.1 °F (36.7 °C)] 97.4 °F (36.3 °C)  HR:  [58-61] 58  Resp:  [18] 18  BP: (106-142)/(59) 142/59  SpO2:  [95 %-96 %] 95 %  Body mass index is 24.58 kg/m².      Input and Output Summary (last 24 hours):       Intake/Output Summary (Last 24 hours) at 6/17/2024 1513  Last data filed at 6/17/2024 1259  Gross per 24 hour   Intake 360 ml   Output --   Net 360 ml       Physical Exam:     Physical Exam  General: no acute distress  HEENT: NC/AT, PERRL, EOM - normal  Neck: Supple  Pulm/Chest: Normal chest wall expansion, clear breath sounds on both side  CVS: normal S1&S2, capillary refill <2s  Abd: soft, non tender, non distended, bowel sounds +  MSK: move all 4 extremities spontaneously  Skin: warm  CNS: no acute focal neuro deficit      Additional Data:     Labs:    Results from last 7 days   Lab Units 06/17/24  0522   WBC Thousand/uL 4.97   HEMOGLOBIN g/dL 10.3*   HEMATOCRIT % 31.5*   PLATELETS Thousands/uL 34*   SEGS PCT % 76*   LYMPHO PCT % 12*   MONO PCT % 7   EOS PCT % 4     Results from last 7 days   Lab Units 06/17/24  0522   POTASSIUM mmol/L 4.3   CHLORIDE mmol/L 104   CO2 mmol/L 26   BUN mg/dL 14   CREATININE mg/dL 0.83   CALCIUM mg/dL 8.1*   ALK PHOS U/L 105*   ALT U/L 46   AST U/L 30           * I Have Reviewed All Lab Data Listed Above.  * Additional Pertinent Lab Tests Reviewed: All Labs For Current Hospital Admission Reviewed    Imaging:      I have reviewed pertinent imaging.      Recent Cultures (last 7 days):           Last 24 Hours Medication List:   Current Facility-Administered Medications   Medication Dose Route Frequency Provider Last Rate    dicyclomine  20 mg Oral BID before breakfast/lunch Natan Miguel PA-C      ferrous sulfate  325 mg Oral Daily With Breakfast Natan Miguel PA-C      furosemide  40 mg Oral Daily Rosario Whitfield MD      gabapentin  300 mg Oral TID Natan Miguel PA-C      HYDROcodone-acetaminophen  1 tablet Oral Q6H PRN Kori Dias MD      HYDROcodone-acetaminophen  2 tablet Oral Q6H PRN Kori Dias MD      hydrocortisone  10 mg Oral TID Rosario Whitfield MD      hydrOXYzine HCL  25 mg Oral TID PRN Natan Miguel PA-C      insulin lispro  1-5  Units Subcutaneous TID AC Natan Miguel PA-C      insulin lispro  1-5 Units Subcutaneous HS Natan Miguel PA-C      levETIRAcetam  500 mg Oral Q12H SANDRA Rosario Whitfield MD      LORazepam  1 mg Intravenous Q6H PRN Rosario Whitfield MD      metFORMIN  1,000 mg Oral BID With Meals Rosario Whitfield MD      midodrine  10 mg Oral TID AC Rosario Whitfield MD      nicotine  1 patch Transdermal Daily Natan Miguel PA-C      ondansetron  4 mg Intravenous Q6H PRN Natan Miguel PA-C      pancrelipase (Lip-Prot-Amyl)  24,000 Units Oral TID With Meals Jerson Gibson MD      pantoprazole  40 mg Oral Early Morning Natan Miguel PA-C      propranolol  10 mg Oral Q12H SANDRA Natan Miguel PA-C      rifaximin  550 mg Oral Q12H [facility] Jerson Gibson MD      rOPINIRole  4 mg Oral BID Natan Miguel PA-C      senna-docusate sodium  1 tablet Oral QPM Natan Miguel PA-C      sitaGLIPtin  100 mg Oral Daily Rosario Whitfield MD      spironolactone  100 mg Oral Daily Natan Miguel PA-C      tamsulosin  0.4 mg Oral Daily With Dinner Natan Miguel PA-C          Today, Patient Was Seen By: Rosario Whitfield MD    ** Please Note: Dragon 360 Dictation voice to text software may have been used in the creation of this document. **

## 2024-06-18 VITALS
OXYGEN SATURATION: 99 % | HEIGHT: 67 IN | WEIGHT: 156.97 LBS | BODY MASS INDEX: 24.64 KG/M2 | TEMPERATURE: 97.5 F | HEART RATE: 78 BPM | RESPIRATION RATE: 18 BRPM | DIASTOLIC BLOOD PRESSURE: 79 MMHG | SYSTOLIC BLOOD PRESSURE: 119 MMHG

## 2024-06-18 LAB
ALBUMIN SERPL BCP-MCNC: 2.9 G/DL (ref 3.5–5)
ALP SERPL-CCNC: 111 U/L (ref 34–104)
ALT SERPL W P-5'-P-CCNC: 50 U/L (ref 7–52)
ANION GAP SERPL CALCULATED.3IONS-SCNC: 10 MMOL/L (ref 4–13)
AST SERPL W P-5'-P-CCNC: 29 U/L (ref 13–39)
BASOPHILS # BLD AUTO: 0.02 THOUSANDS/ÂΜL (ref 0–0.1)
BASOPHILS NFR BLD AUTO: 0 % (ref 0–1)
BILIRUB SERPL-MCNC: 2.47 MG/DL (ref 0.2–1)
BUN SERPL-MCNC: 15 MG/DL (ref 5–25)
CALCIUM ALBUM COR SERPL-MCNC: 9.5 MG/DL (ref 8.3–10.1)
CALCIUM SERPL-MCNC: 8.6 MG/DL (ref 8.4–10.2)
CHLORIDE SERPL-SCNC: 100 MMOL/L (ref 96–108)
CO2 SERPL-SCNC: 25 MMOL/L (ref 21–32)
CREAT SERPL-MCNC: 0.92 MG/DL (ref 0.6–1.3)
EOSINOPHIL # BLD AUTO: 0.21 THOUSAND/ÂΜL (ref 0–0.61)
EOSINOPHIL NFR BLD AUTO: 3 % (ref 0–6)
ERYTHROCYTE [DISTWIDTH] IN BLOOD BY AUTOMATED COUNT: 15.9 % (ref 11.6–15.1)
GFR SERPL CREATININE-BSD FRML MDRD: 88 ML/MIN/1.73SQ M
GLUCOSE SERPL-MCNC: 219 MG/DL (ref 65–140)
GLUCOSE SERPL-MCNC: 237 MG/DL (ref 65–140)
GLUCOSE SERPL-MCNC: 246 MG/DL (ref 65–140)
HCT VFR BLD AUTO: 32.4 % (ref 36.5–49.3)
HGB BLD-MCNC: 11.2 G/DL (ref 12–17)
IMM GRANULOCYTES # BLD AUTO: 0.03 THOUSAND/UL (ref 0–0.2)
IMM GRANULOCYTES NFR BLD AUTO: 0 % (ref 0–2)
LYMPHOCYTES # BLD AUTO: 0.85 THOUSANDS/ÂΜL (ref 0.6–4.47)
LYMPHOCYTES NFR BLD AUTO: 12 % (ref 14–44)
MAGNESIUM SERPL-MCNC: 1.9 MG/DL (ref 1.9–2.7)
MCH RBC QN AUTO: 34 PG (ref 26.8–34.3)
MCHC RBC AUTO-ENTMCNC: 34.6 G/DL (ref 31.4–37.4)
MCV RBC AUTO: 99 FL (ref 82–98)
MONOCYTES # BLD AUTO: 0.58 THOUSAND/ÂΜL (ref 0.17–1.22)
MONOCYTES NFR BLD AUTO: 8 % (ref 4–12)
NEUTROPHILS # BLD AUTO: 5.45 THOUSANDS/ÂΜL (ref 1.85–7.62)
NEUTS SEG NFR BLD AUTO: 77 % (ref 43–75)
NRBC BLD AUTO-RTO: 0 /100 WBCS
PHOSPHATE SERPL-MCNC: 3.4 MG/DL (ref 2.3–4.1)
PLATELET # BLD AUTO: 38 THOUSANDS/UL (ref 149–390)
PMV BLD AUTO: 11.6 FL (ref 8.9–12.7)
POTASSIUM SERPL-SCNC: 4.1 MMOL/L (ref 3.5–5.3)
PROT SERPL-MCNC: 5.8 G/DL (ref 6.4–8.4)
RBC # BLD AUTO: 3.29 MILLION/UL (ref 3.88–5.62)
SODIUM SERPL-SCNC: 135 MMOL/L (ref 135–147)
WBC # BLD AUTO: 7.14 THOUSAND/UL (ref 4.31–10.16)

## 2024-06-18 PROCEDURE — 80053 COMPREHEN METABOLIC PANEL: CPT | Performed by: INTERNAL MEDICINE

## 2024-06-18 PROCEDURE — 84100 ASSAY OF PHOSPHORUS: CPT | Performed by: INTERNAL MEDICINE

## 2024-06-18 PROCEDURE — 85025 COMPLETE CBC W/AUTO DIFF WBC: CPT | Performed by: INTERNAL MEDICINE

## 2024-06-18 PROCEDURE — 99239 HOSP IP/OBS DSCHRG MGMT >30: CPT | Performed by: INTERNAL MEDICINE

## 2024-06-18 PROCEDURE — 82948 REAGENT STRIP/BLOOD GLUCOSE: CPT

## 2024-06-18 PROCEDURE — 83735 ASSAY OF MAGNESIUM: CPT | Performed by: INTERNAL MEDICINE

## 2024-06-18 RX ORDER — HYDROCORTISONE 10 MG/1
10 TABLET ORAL 3 TIMES DAILY
Qty: 90 TABLET | Refills: 0 | Status: SHIPPED | OUTPATIENT
Start: 2024-06-18 | End: 2024-07-18

## 2024-06-18 RX ORDER — LANCETS 33 GAUGE
EACH MISCELLANEOUS
Qty: 100 EACH | Refills: 0 | Status: SHIPPED | OUTPATIENT
Start: 2024-06-18

## 2024-06-18 RX ORDER — BLOOD SUGAR DIAGNOSTIC
STRIP MISCELLANEOUS
Qty: 100 EACH | Refills: 0 | Status: SHIPPED | OUTPATIENT
Start: 2024-06-18

## 2024-06-18 RX ORDER — REPAGLINIDE 2 MG/1
2 TABLET ORAL
Qty: 90 TABLET | Refills: 0 | Status: SHIPPED | OUTPATIENT
Start: 2024-06-18 | End: 2024-07-18

## 2024-06-18 RX ORDER — BLOOD-GLUCOSE METER
KIT MISCELLANEOUS
Qty: 1 KIT | Refills: 0 | Status: SHIPPED | OUTPATIENT
Start: 2024-06-18

## 2024-06-18 RX ORDER — MIDODRINE HYDROCHLORIDE 5 MG/1
10 TABLET ORAL
Qty: 90 TABLET | Refills: 0 | Status: SHIPPED | OUTPATIENT
Start: 2024-06-18 | End: 2024-07-18

## 2024-06-18 RX ORDER — GLUCOSAMINE HCL/CHONDROITIN SU 500-400 MG
CAPSULE ORAL
Qty: 100 EACH | Refills: 0 | Status: SHIPPED | OUTPATIENT
Start: 2024-06-18

## 2024-06-18 RX ORDER — LEVETIRACETAM 500 MG/1
500 TABLET ORAL EVERY 12 HOURS SCHEDULED
Qty: 60 TABLET | Refills: 0 | Status: SHIPPED | OUTPATIENT
Start: 2024-06-18 | End: 2024-07-18

## 2024-06-18 RX ADMIN — SITAGLIPTIN 100 MG: 100 TABLET, FILM COATED ORAL at 10:31

## 2024-06-18 RX ADMIN — METFORMIN HYDROCHLORIDE 1000 MG: 500 TABLET ORAL at 08:31

## 2024-06-18 RX ADMIN — INSULIN LISPRO 2 UNITS: 100 INJECTION, SOLUTION INTRAVENOUS; SUBCUTANEOUS at 06:51

## 2024-06-18 RX ADMIN — RIFAXIMIN 550 MG: 550 TABLET ORAL at 08:31

## 2024-06-18 RX ADMIN — PANCRELIPASE 24000 UNITS: 30000; 6000; 19000 CAPSULE, DELAYED RELEASE PELLETS ORAL at 11:25

## 2024-06-18 RX ADMIN — NICOTINE 1 PATCH: 14 PATCH, EXTENDED RELEASE TRANSDERMAL at 08:35

## 2024-06-18 RX ADMIN — LEVETIRACETAM 500 MG: 500 TABLET, FILM COATED ORAL at 08:31

## 2024-06-18 RX ADMIN — MIDODRINE HYDROCHLORIDE 10 MG: 5 TABLET ORAL at 08:31

## 2024-06-18 RX ADMIN — FUROSEMIDE 40 MG: 40 TABLET ORAL at 08:31

## 2024-06-18 RX ADMIN — MIDODRINE HYDROCHLORIDE 10 MG: 5 TABLET ORAL at 11:26

## 2024-06-18 RX ADMIN — PANTOPRAZOLE SODIUM 40 MG: 40 TABLET, DELAYED RELEASE ORAL at 06:01

## 2024-06-18 RX ADMIN — PANCRELIPASE 24000 UNITS: 30000; 6000; 19000 CAPSULE, DELAYED RELEASE PELLETS ORAL at 08:30

## 2024-06-18 RX ADMIN — HYDROCORTISONE 10 MG: 10 TABLET ORAL at 08:31

## 2024-06-18 RX ADMIN — FERROUS SULFATE TAB 325 MG (65 MG ELEMENTAL FE) 325 MG: 325 (65 FE) TAB at 08:31

## 2024-06-18 RX ADMIN — INSULIN LISPRO 2 UNITS: 100 INJECTION, SOLUTION INTRAVENOUS; SUBCUTANEOUS at 11:28

## 2024-06-18 RX ADMIN — SPIRONOLACTONE 100 MG: 100 TABLET, FILM COATED ORAL at 08:31

## 2024-06-18 RX ADMIN — ROPINIROLE HYDROCHLORIDE 4 MG: 1 TABLET, FILM COATED ORAL at 08:31

## 2024-06-18 RX ADMIN — GABAPENTIN 300 MG: 300 CAPSULE ORAL at 08:31

## 2024-06-18 RX ADMIN — PROPRANOLOL HYDROCHLORIDE 10 MG: 10 TABLET ORAL at 08:31

## 2024-06-18 RX ADMIN — DICYCLOMINE HYDROCHLORIDE 20 MG: 20 TABLET ORAL at 06:01

## 2024-06-18 RX ADMIN — DICYCLOMINE HYDROCHLORIDE 20 MG: 20 TABLET ORAL at 11:26

## 2024-06-18 NOTE — ASSESSMENT & PLAN NOTE
CT chest A/P with contrast showed cirrhosis with signs of portal hypertension as evidenced by gastroesophageal varices. Stable upper abdominal lymphadenopathy presumed secondary to underlying liver pathology although low-grade lymphoproliferative disorder is not fully excluded. Stable diffuse mesenteric edema   Gastroenterology following  Continue Inderal for gastroesophageal varices -> plan for outpatient surveillance EGD  Continue diuresis with Lasix/Aldactone and hepatic encephalopathy prophylaxis with Rifaximin (deviously on Lactulose, now discontinued, due to complaints of intolerable diarrhea)  Patient demanded liberation of salt restriction off diet  Rifaxin is not covered by his insurance and patient cannot afford it.  Due to high risk of hepatic encephalopathy, he has to go back to lactulose.  Patient is instructed to titrate the lactulose dose to have 2-3 bowel movement a day..  Recommended outpatient GI follow-up

## 2024-06-18 NOTE — PLAN OF CARE
Problem: Potential for Falls  Goal: Patient will remain free of falls  Description: INTERVENTIONS:  - Educate patient/family on patient safety including physical limitations  - Instruct patient to call for assistance with activity   - Consult OT/PT to assist with strengthening/mobility   - Keep Call bell within reach  - Keep bed low and locked with side rails adjusted as appropriate  - Keep care items and personal belongings within reach  - Initiate and maintain comfort rounds  - Make Fall Risk Sign visible to staff  - Apply yellow socks and bracelet for high fall risk patients  - Consider moving patient to room near nurses station  Outcome: Progressing     Problem: PAIN - ADULT  Goal: Verbalizes/displays adequate comfort level or baseline comfort level  Description: Interventions:  - Encourage patient to monitor pain and request assistance  - Assess pain using appropriate pain scale  - Administer analgesics based on type and severity of pain and evaluate response  - Implement non-pharmacological measures as appropriate and evaluate response  - Consider cultural and social influences on pain and pain management  - Notify physician/advanced practitioner if interventions unsuccessful or patient reports new pain  Outcome: Progressing     Problem: INFECTION - ADULT  Goal: Absence or prevention of progression during hospitalization  Description: INTERVENTIONS:  - Assess and monitor for signs and symptoms of infection  - Monitor lab/diagnostic results  - Monitor all insertion sites, i.e. indwelling lines, tubes, and drains  - Monitor endotracheal if appropriate and nasal secretions for changes in amount and color  - [place] appropriate cooling/warming therapies per order  - Administer medications as ordered  - Instruct and encourage patient and family to use good hand hygiene technique  - Identify and instruct in appropriate isolation precautions for identified infection/condition  Outcome: Progressing  Goal: Absence  of fever/infection during neutropenic period  Description: INTERVENTIONS:  - Monitor WBC    Outcome: Progressing     Problem: DISCHARGE PLANNING  Goal: Discharge to home or other facility with appropriate resources  Description: INTERVENTIONS:  - Identify barriers to discharge w/patient and caregiver  - Arrange for needed discharge resources and transportation as appropriate  - Identify discharge learning needs (meds, wound care, etc.)  - Arrange for interpretive services to assist at discharge as needed  - Refer to Case Management Department for coordinating discharge planning if the patient needs post-hospital services based on physician/advanced practitioner order or complex needs related to functional status, cognitive ability, or social support system  Outcome: Progressing     Problem: SAFETY ADULT  Goal: Patient will remain free of falls  Description: INTERVENTIONS:  - Educate patient/family on patient safety including physical limitations  - Instruct patient to call for assistance with activity   - Consult OT/PT to assist with strengthening/mobility   - Keep Call bell within reach  - Keep bed low and locked with side rails adjusted as appropriate  - Keep care items and personal belongings within reach  - Initiate and maintain comfort rounds  - Make Fall Risk Sign visible to staff  - Apply yellow socks and bracelet for high fall risk patients  - Consider moving patient to room near nurses station  Outcome: Progressing  Goal: Maintain or return to baseline ADL function  Description: INTERVENTIONS:  -  Assess patient's ability to carry out ADLs; assess patient's baseline for ADL function and identify physical deficits which impact ability to perform ADLs (bathing, care of mouth/teeth, toileting, grooming, dressing, etc.)  - Assess/evaluate cause of self-care deficits   - Assess range of motion  - Assess patient's mobility; develop plan if impaired  - Assess patient's need for assistive devices and provide as  appropriate  - Encourage maximum independence but intervene and supervise when necessary  - Involve family in performance of ADLs  - Assess for home care needs following discharge   - Consider OT consult to assist with ADL evaluation and planning for discharge  - Provide patient education as appropriate  Outcome: Progressing  Goal: Maintains/Returns to pre admission functional level  Description: INTERVENTIONS:  - Perform AM-PAC 6 Click Basic Mobility/ Daily Activity assessment daily.  - Set and communicate daily mobility goal to care team and patient/family/caregiver.   - Collaborate with rehabilitation services on mobility goals if consulted  - Out of bed for toileting  - Record patient progress and toleration of activity level   Outcome: Progressing

## 2024-06-18 NOTE — DISCHARGE SUMMARY
[facility]  Discharge- Foreign Armando 1960, 63 y.o. male MRN: [id number]  Unit/Bed#: 7T [facility] 705-01 Encounter: [id number]  Primary Care Provider: No primary care provider on file.   Date and time admitted to hospital: 6/3/2024  6:08 PM    * Pleural effusion on right  Assessment & Plan  Patient presented with shortness of breath   BNP WNL, CT chest A/P show right pleural effusion, no evidence of pulmonary congestion   s/p thoracentesis on 6/4/2024 with 600 cc clear yellow fluid  Pleural fluid analysis show LDH 62, total protein 7.6 with a plasma total protein of 7.1  Per Light's criteria, pleural fluid is transudative  Cytology was negative for malignancy  Consider hepatic hydrothorax in the setting of decompensated cirrhosis (see below)      Anemia of chronic disease  Assessment & Plan  Continue iron supplementation  H/H stable    Depressed mood  Assessment & Plan  On arrival was very upset and tearful - reported that his mother passed away the night prior to his arrival here (unsure of the validity)  Appreciated psychiatry input    Seizure-like activity (HCC)  Assessment & Plan  Patient had RRT for seizure-like activity previously  Patient had similar episode when he was in [facility] in 10/6/23 -CT brain, CTA head and neck, MRI brain and EEG was negative at that time  Discontinued Tramadol due to the risk of lowered seizure threshold  Neurology consult -> since patient was previously on Keppra 500 mg twice daily continue home dose  Reported to [facility]    Nephrolithiasis  Assessment & Plan  Stated he might have passed a stone when he urinates and might be causing painful urination  Per patient, he had history of kidney stone   UA showed no evidence of UTI  CT A/P showed simple renal cyst(s). Nonobstructing left renal nephrolithiasis. No hydronephrosis.   BMP showed normal renal function  Continue Flomax     Swelling of lower extremity  Assessment & Plan  Previously complained  of left lower extremity swelling and tenderness  Venous duplex of bilateral lower extremities negative for DVT  History of neuropathy noted - c/w Gabapentin   On Requip for restless leg syndrome    Volume overload  Assessment & Plan  Most likely due to liver cirrhosis   BNP WNL, CT chest showed no evidence of pulmonary congestion but pleural effusion which was tapped on 6/4  Recent stress test on 7/13/2023 showed LVEF 70%, and attenuation artifact   Patient received IV Lasix 40 mg once in ED - currently on an oral Lasix/Aldactone regimen  Monitor volume status    Adrenal insufficiency (HCC)  Assessment & Plan  Endocrinology consult noted - appreciate recommendations regarding Cortef tapering instructions  Currently back on his home dose of Cortef  Continue Midodrine    Type 2 diabetes mellitus (HCC)  Assessment & Plan  Lab Results   Component Value Date    HGBA1C 6.5 (H) 05/05/2024     Carbohydrate restriction  Continue basal/prandial insulin with additional SSI coverage prior checks    Anticipate fluctuation of blood sugars in the setting of Cortef tapering  Due to poor social support at home, patient is not able to manage insulin as an outpatient  Switched to metformin 1000 mg twice daily, Januvia 100 mg daily  Per , patient cannot afford Januvia.  Patient will be discharged on repaglinide 2 mg 3 times daily instead of Januvia.  He will also be given metformin.  Patient is recommended to follow-up with PCP      Smoking  Assessment & Plan  Cessation counseling  Transdermal nicotine patch on board    Thrombocytopenia (HCC)  Assessment & Plan  Likely sequela of cirrhosis  Monitor platelet count & bleeding    Cirrhosis (HCC)  Assessment & Plan  CT chest A/P with contrast showed cirrhosis with signs of portal hypertension as evidenced by gastroesophageal varices. Stable upper abdominal lymphadenopathy presumed secondary to underlying liver pathology although low-grade lymphoproliferative disorder is not  fully excluded. Stable diffuse mesenteric edema   Gastroenterology following  Continue Inderal for gastroesophageal varices -> plan for outpatient surveillance EGD  Continue diuresis with Lasix/Aldactone and hepatic encephalopathy prophylaxis with Rifaximin (deviously on Lactulose, now discontinued, due to complaints of intolerable diarrhea)  Patient demanded liberation of salt restriction off diet  Rifaxin is not covered by his insurance and patient cannot afford it. Therefore, he has to go back to lactulose.  Recommended outpatient GI follow-up        Discharging Physician / Practitioner: Rosario Whitfield MD  PCP: No primary care provider on file.  Admission Date:   Admission Orders (From admission, onward)       Ordered        06/05/24 1007  INPATIENT ADMISSION  Once            06/03/24 2223  Place in Observation  Once                          Discharge Date: 06/18/24    Medical Problems       Resolved Problems  Date Reviewed: 6/18/2024            Resolved    Hypokalemia 6/14/2024     Resolved by  Rosario Whitfield MD    Hypotension 6/9/2024     Resolved by  Rosario Whitfield MD    Overview Deleted 6/5/2024 10:42 AM by Rosario Whitfield MD            Chest pain 6/14/2024     Resolved by  Rosario Whitfield MD          Consultations During Hospital Stay:  Neuropsychiatrist, psychiatrist, neurologist, gastroenterologist, endocrinologist    Procedures Performed:   Thoracentesis    Significant Findings / Test Results:   XR chest portable  Result Date: 6/8/2024  Impression: Small right pleural effusion. Workstation performed: [id number]     CT head wo contrast  Result Date: 6/6/2024  Impression: No acute intracranial abnormality. Workstation performed: [id number]      VAS VENOUS DUPLEX - LOWER LIMB BILATERAL  Result Date: 6/5/2024  Narrative:  [facility] REPORT CLINICAL: Indications: Patient presents with chronic bilateral lower extremity swelling and tenderness for several years. Operative History: No prior cardiovascular surgeries Risk  Factors The patient has history of Diabetes (NIDDM (Diet)) and smoking (current) 0.25 ppd.   CONCLUSION:  Impression: RIGHT LOWER LIMB: No evidence of acute or chronic deep vein thrombosis. No evidence of superficial thrombophlebitis noted. Doppler evaluation shows a normal response to augmentation maneuvers.. Popliteal, posterior tibial and anterior tibial arterial Doppler waveform's are triphasic.  LEFT LOWER LIMB: No evidence of acute or chronic deep vein thrombosis. No evidence of superficial thrombophlebitis noted. Doppler evaluation shows a normal response to augmentation maneuvers. Popliteal, posterior tibial and anterior tibial arterial Doppler waveform's are triphasic.  Technical findings were given to Rosario Whitfield via secure chat at 1:30 PM.  SIGNATURE: Electronically Signed by: LYNDSAY NGUYEN DO, RPVI on 2024-06-05 04:09:22 PM    IR IN-Patient Thoracentesis  Result Date: 6/5/2024  Impression: Diagnostic and therapeutic right thoracentesis. Workstation performed: [id number]     CT chest abdomen pelvis w contrast  Result Date: 6/3/2024  Impression: Moderate to large sized right pleural effusion Cirrhosis with signs of portal hypertension as evidenced by gastroesophageal varices. Stable upper abdominal lymphadenopathy presumed secondary to underlying liver pathology although low-grade lymphoproliferative disorder is not fully excluded Stable diffuse mesenteric edema Workstation performed: [id number]        Incidental Findings:   See above    Test Results Pending at Discharge (will require follow up):   Not applicable     Outpatient Tests Requested:  Not applicable    Complications: None    Reason for Admission: Abdominal pain, leg pain and swelling, diarrhea    Hospital Course:     Foreign Armando is a 63 y.o. male patient who originally presented to the hospital on 6/3/2024 due to abdominal pain and leg pain.  DVT was negative.  Patient was also found to have incidental finding of a pleural effusion for  "which he underwent thoracentesis without complication.  Patient had 2 episodes of seizures due to noncompliance.  Patient was initiated back on his home dose Keppra.  Patient was evaluated by neuropsychiatrist and now patient is deemed to be competent to make his own decision.  Patient is recommended to follow-up with PCP and outpatient GI.  Patient is clinically and hemodynamically stable to be discharged today.    Please see above list of diagnoses and related plan for additional information.     Condition at Discharge: stable     Discharge Day Visit / Exam:     Subjective:  Patient was seen and examined at bedside. The patient has no major complaint.  No acute changes.    Vitals: Blood Pressure: 119/79 (06/18/24 0729)  Pulse: 78 (06/18/24 0729)  Temperature: 97.5 °F (36.4 °C) (06/18/24 0729)  Temp Source: Temporal (06/18/24 0729)  Respirations: 18 (06/18/24 0729)  Height: 5' 7.01\" (170.2 cm) (06/03/24 2325)  Weight - Scale: 71.2 kg (156 lb 15.5 oz) (06/03/24 2325)  SpO2: 99 % (06/18/24 0729)  Exam:   Physical Exam  General: no acute distress  HEENT: NC/AT, PERRL, EOM - normal  Neck: Supple  Pulm/Chest: Normal chest wall expansion, clear breath sounds on both side  CVS: normal S1&S2, capillary refill <2s  Abd: soft, non tender, non distended, bowel sounds +  MSK: move all 4 extremities spontaneously  Skin: warm  CNS: no acute focal neuro deficit    Discussion with Family: Discussed with patient.    Discharge instructions/Information to patient and family:   See after visit summary for information provided to patient and family.      Provisions for Follow-Up Care:  See after visit summary for information related to follow-up care and any pertinent home health orders.      Disposition:     Home    For Discharges to [facility]:   Not Applicable to this Patient - Not Applicable to this Patient    Planned Readmission: No     Discharge Statement:  I spent 45 minutes discharging the patient. This time was " spent on the day of discharge. I had direct contact with the patient on the day of discharge. Greater than 50% of the total time was spent examining patient, answering all patient questions, arranging and discussing plan of care with patient as well as directly providing post-discharge instructions.  Additional time then spent on discharge activities.    Discharge Medications:  See after visit summary for reconciled discharge medications provided to patient and family.      ** Please Note: This note has been constructed using a voice recognition system **

## 2024-06-18 NOTE — CASE MANAGEMENT
Case Management Progress Note    Patient name Foreign Armando  Location 7T /7T -01 MRN [id number]  : 1960 Date 2024       LOS (days): 13  Geometric Mean LOS (GMLOS) (days): 3.3  Days to GMLOS:-9.7        OBJECTIVE:        Current admission status: Inpatient  Preferred Pharmacy:   [id number] [place] [facility] [address]  [address] [place] [address]  Phone: [phone] Fax: [phone]    [facility]/pharmacy #[id number] [place], [place] - [address]  [address] [facility] [address]  Phone: [phone] Fax: [phone]    [id number]  [facility] [place] [facility] [address]  [address] [address]  Phone: [phone] Fax: [phone]    [facility]/pharmacy #[id number] - [place], [place] - [address] [facility]  [address] [facility] [address]  Phone: [phone] Fax: [phone]     [facility] [place], [place] - [address] [place] [address]  Phone: [phone] Fax: [phone]    Primary Care Provider: No primary care provider on file.    Primary Insurance: BLUE CROSS  Secondary Insurance:     PROGRESS NOTE:    Met with patient at bedside to discuss discharge.  Patient reports Birth Certificate and a credit card is missing from wallet.  Notified manager Bina COSBY same.    Patient has 2 glucometer's and denies need for a new one.  Meds to beds completed and cost is $61.70.  Met with patient and discussed cost of meds provided patient with pharmacy phone number to make payment.    Discussed discharge disposition and patient needs a LYFT to [employer] from hospital.  Plans to go to [place].  Reminded patient that [employer] is at [facility] and Sanna has all paperwork.  Provided patient with 1 day [place] pass.

## 2024-06-18 NOTE — PLAN OF CARE
Problem: Potential for Falls  Goal: Patient will remain free of falls  Description: INTERVENTIONS:  - Educate patient/family on patient safety including physical limitations  - Instruct patient to call for assistance with activity   - Consult OT/PT to assist with strengthening/mobility   - Keep Call bell within reach  - Keep bed low and locked with side rails adjusted as appropriate  - Keep care items and personal belongings within reach  - Initiate and maintain comfort rounds  - Make Fall Risk Sign visible to staff  - Apply yellow socks and bracelet for high fall risk patients  - Consider moving patient to room near nurses station  Outcome: Adequate for Discharge     Problem: Prexisting or High Potential for Compromised Skin Integrity  Goal: Skin integrity is maintained or improved  Description: INTERVENTIONS:  - Identify patients at risk for skin breakdown  - Assess and monitor skin integrity  - Assess and monitor nutrition and hydration status  - Monitor labs   - Assess for incontinence   - Turn and reposition patient  - Assist with mobility/ambulation  - Relieve pressure over bony prominences  - Avoid friction and shearing  - Provide appropriate hygiene as needed including keeping skin clean and dry  - Evaluate need for skin moisturizer/barrier cream  - Collaborate with interdisciplinary team   - Patient/family teaching  - Consider wound care consult   Outcome: Adequate for Discharge     Problem: PAIN - ADULT  Goal: Verbalizes/displays adequate comfort level or baseline comfort level  Description: Interventions:  - Encourage patient to monitor pain and request assistance  - Assess pain using appropriate pain scale  - Administer analgesics based on type and severity of pain and evaluate response  - Implement non-pharmacological measures as appropriate and evaluate response  - Consider cultural and social influences on pain and pain management  - Notify physician/advanced practitioner if interventions  unsuccessful or patient reports new pain  Outcome: Adequate for Discharge     Problem: INFECTION - ADULT  Goal: Absence or prevention of progression during hospitalization  Description: INTERVENTIONS:  - Assess and monitor for signs and symptoms of infection  - Monitor lab/diagnostic results  - Monitor all insertion sites, i.e. indwelling lines, tubes, and drains  - Monitor endotracheal if appropriate and nasal secretions for changes in amount and color  - [place] appropriate cooling/warming therapies per order  - Administer medications as ordered  - Instruct and encourage patient and family to use good hand hygiene technique  - Identify and instruct in appropriate isolation precautions for identified infection/condition  Outcome: Adequate for Discharge  Goal: Absence of fever/infection during neutropenic period  Description: INTERVENTIONS:  - Monitor WBC    Outcome: Adequate for Discharge     Problem: SAFETY ADULT  Goal: Patient will remain free of falls  Description: INTERVENTIONS:  - Educate patient/family on patient safety including physical limitations  - Instruct patient to call for assistance with activity   - Consult OT/PT to assist with strengthening/mobility   - Keep Call bell within reach  - Keep bed low and locked with side rails adjusted as appropriate  - Keep care items and personal belongings within reach  - Initiate and maintain comfort rounds  - Make Fall Risk Sign visible to staff  - Apply yellow socks and bracelet for high fall risk patients  - Consider moving patient to room near nurses station  Outcome: Adequate for Discharge  Goal: Maintain or return to baseline ADL function  Description: INTERVENTIONS:  -  Assess patient's ability to carry out ADLs; assess patient's baseline for ADL function and identify physical deficits which impact ability to perform ADLs (bathing, care of mouth/teeth, toileting, grooming, dressing, etc.)  - Assess/evaluate cause of self-care deficits   - Assess range of  motion  - Assess patient's mobility; develop plan if impaired  - Assess patient's need for assistive devices and provide as appropriate  - Encourage maximum independence but intervene and supervise when necessary  - Involve family in performance of ADLs  - Assess for home care needs following discharge   - Consider OT consult to assist with ADL evaluation and planning for discharge  - Provide patient education as appropriate  Outcome: Adequate for Discharge  Goal: Maintains/Returns to pre admission functional level  Description: INTERVENTIONS:  - Perform AM-PAC 6 Click Basic Mobility/ Daily Activity assessment daily.  - Set and communicate daily mobility goal to care team and patient/family/caregiver.   - Collaborate with rehabilitation services on mobility goals if consulted  - Out of bed for toileting  - Record patient progress and toleration of activity level   Outcome: Adequate for Discharge     Problem: DISCHARGE PLANNING  Goal: Discharge to home or other facility with appropriate resources  Description: INTERVENTIONS:  - Identify barriers to discharge w/patient and caregiver  - Arrange for needed discharge resources and transportation as appropriate  - Identify discharge learning needs (meds, wound care, etc.)  - Arrange for interpretive services to assist at discharge as needed  - Refer to Case Management Department for coordinating discharge planning if the patient needs post-hospital services based on physician/advanced practitioner order or complex needs related to functional status, cognitive ability, or social support system  Outcome: Adequate for Discharge     Problem: Knowledge Deficit  Goal: Patient/family/caregiver demonstrates understanding of disease process, treatment plan, medications, and discharge instructions  Description: Complete learning assessment and assess knowledge base.  Interventions:  - Provide teaching at level of understanding  - Provide teaching via preferred learning  methods  Outcome: Adequate for Discharge     Problem: NEUROSENSORY - ADULT  Goal: Remains free of injury related to seizures activity  Description: INTERVENTIONS  - Maintain airway, patient safety  and administer oxygen as ordered  - Monitor patient for seizure activity, document and report duration and description of seizure to physician/advanced practitioner  - If seizure occurs,  ensure patient safety during seizure  - Reorient patient post seizure  - Seizure pads on all 4 side rails  - Instruct patient/family to notify RN of any seizure activity including if an aura is experienced  - Instruct patient/family to call for assistance with activity based on nursing assessment  - Administer anti-seizure medications if ordered    Outcome: Adequate for Discharge     Problem: RESPIRATORY - ADULT  Goal: Achieves optimal ventilation and oxygenation  Description: INTERVENTIONS:  - Assess for changes in respiratory status  - Assess for changes in mentation and behavior  - Position to facilitate oxygenation and minimize respiratory effort  - Oxygen administered by appropriate delivery if ordered  - Initiate smoking cessation education as indicated  - Encourage broncho-pulmonary hygiene including cough, deep breathe, Incentive Spirometry  - Assess the need for suctioning and aspirate as needed  - Assess and instruct to report SOB or any respiratory difficulty  - Respiratory Therapy support as indicated  Outcome: Adequate for Discharge     Problem: METABOLIC, FLUID AND ELECTROLYTES - ADULT  Goal: Glucose maintained within target range  Description: INTERVENTIONS:  - Monitor Blood Glucose as ordered  - Assess for signs and symptoms of hyperglycemia and hypoglycemia  - Administer ordered medications to maintain glucose within target range  - Assess nutritional intake and initiate nutrition service referral as needed  Outcome: Adequate for Discharge

## 2024-06-18 NOTE — ASSESSMENT & PLAN NOTE
Lab Results   Component Value Date    HGBA1C 6.5 (H) 05/05/2024     Carbohydrate restriction  Continue basal/prandial insulin with additional SSI coverage prior checks    Anticipate fluctuation of blood sugars in the setting of Cortef tapering  Due to poor social support at home, patient is not able to manage insulin as an outpatient  Switched to metformin 1000 mg twice daily, Januvia 100 mg daily  Per , patient cannot afford Januvia.  Patient will be discharged on repaglinide 2 mg 3 times daily instead of Januvia.  He will also be given metformin.  Patient is recommended to follow-up with PCP

## 2024-06-18 NOTE — NURSING NOTE
PT was D/C . D/C instruction was given to PT and PT verbalized understanding of D/C instruction. All personal belonging and his medication was given to PT.  . 7 T staff accompany PT to lobby.

## 2024-06-18 NOTE — ASSESSMENT & PLAN NOTE
Endocrinology consult noted - appreciate recommendations regarding Cortef tapering instructions  Currently back on his home dose of Cortef  Continue Midodrine

## 2024-06-18 NOTE — ASSESSMENT & PLAN NOTE
Patient had RRT for seizure-like activity previously  Patient had similar episode when he was in [facility] in 10/6/23 -CT brain, CTA head and neck, MRI brain and EEG was negative at that time  Discontinued Tramadol due to the risk of lowered seizure threshold  Neurology consult -> since patient was previously on Keppra 500 mg twice daily continue home dose  Reported to DMV

## 2024-06-19 NOTE — UTILIZATION REVIEW
NOTIFICATION OF ADMISSION DISCHARGE   This is a Notification of Discharge from [facility]. Please be advised that this patient has been discharge from our facility. Below you will find the admission and discharge date and time including the patient’s disposition.   UTILIZATION REVIEW CONTACT:  Clara Ayoub MA  Utilization   Network Utilization Review Department  Phone: [phone] x carefully listen to the prompts. All voicemails are confidential.  Email: [email or web address]     ADMISSION INFORMATION  PRESENTATION DATE: 6/3/2024  6:08 PM  OBERVATION ADMISSION DATE:   INPATIENT ADMISSION DATE: 6/5/24 10:07 AM   DISCHARGE DATE: 6/18/2024 12:35 PM   DISPOSITION:Home/Self Care    Network Utilization Review Department  ATTENTION: Please call with any questions or concerns to [phone] and carefully listen to the prompts so that you are directed to the right person. All voicemails are confidential.   For Discharge needs, contact Care Management DC Support Team at [phone] opt. 2  Send all requests for admission clinical reviews, approved or denied determinations and any other requests to dedicated fax number below belonging to the campus where the patient is receiving treatment. List of dedicated fax numbers for the Facilities:  FACILITY NAME UR FAX NUMBER   ADMISSION DENIALS (Administrative/Medical Necessity) [phone]   DISCHARGE SUPPORT TEAM ([facility]) [phone]   PARENT CHILD HEALTH (Maternity/NICU/Pediatrics) [phone]   [facility] [phone]   [facility] [phone]   [facility] [phone]   [facility] [phone]   [facility] [phone]   [facility] [phone]   [facility] [phone]   [facility] [phone]    [facility] [phone]   [facility] [phone]   [facility] [phone]   [facility] [phone]

## 2024-06-20 ENCOUNTER — APPOINTMENT (EMERGENCY)
Dept: NON INVASIVE DIAGNOSTICS | Facility: HOSPITAL | Age: 64
End: 2024-06-20
Payer: COMMERCIAL

## 2024-06-20 ENCOUNTER — HOSPITAL ENCOUNTER (EMERGENCY)
Facility: HOSPITAL | Age: 64
Discharge: HOME | End: 2024-06-20
Attending: EMERGENCY MEDICINE
Payer: COMMERCIAL

## 2024-06-20 VITALS
SYSTOLIC BLOOD PRESSURE: 109 MMHG | TEMPERATURE: 97.2 F | RESPIRATION RATE: 16 BRPM | OXYGEN SATURATION: 100 % | HEART RATE: 68 BPM | DIASTOLIC BLOOD PRESSURE: 64 MMHG

## 2024-06-20 DIAGNOSIS — M79.605 BILATERAL LEG PAIN: Primary | ICD-10-CM

## 2024-06-20 DIAGNOSIS — M79.604 BILATERAL LEG PAIN: Primary | ICD-10-CM

## 2024-06-20 LAB
2HR DELTA HS TROPONIN: 0 NG/L
ALBUMIN SERPL BCG-MCNC: 3.3 G/DL (ref 3.5–5)
ALP SERPL-CCNC: 111 U/L (ref 34–104)
ALT SERPL W P-5'-P-CCNC: 49 U/L (ref 7–52)
ANION GAP SERPL CALCULATED.3IONS-SCNC: 17 MMOL/L (ref 4–13)
APTT PPP: 32 SECONDS (ref 23–37)
AST SERPL W P-5'-P-CCNC: 27 U/L (ref 13–39)
ATRIAL RATE: 66 BPM
B-OH-BUTYR SERPL-MCNC: 0.14 MMOL/L (ref 0.02–0.27)
BASE EX.OXY STD BLDV CALC-SCNC: 82.6 % (ref 60–80)
BASE EXCESS BLDV CALC-SCNC: -1.6 MMOL/L
BASOPHILS # BLD AUTO: 0.06 THOUSANDS/ÂΜL (ref 0–0.1)
BASOPHILS NFR BLD AUTO: 1 % (ref 0–1)
BILIRUB SERPL-MCNC: 6.54 MG/DL (ref 0.2–1)
BNP SERPL-MCNC: 28 PG/ML (ref 0–100)
BUN SERPL-MCNC: 44 MG/DL (ref 5–25)
CALCIUM ALBUM COR SERPL-MCNC: 9.3 MG/DL (ref 8.3–10.1)
CALCIUM SERPL-MCNC: 8.7 MG/DL (ref 8.4–10.2)
CARDIAC TROPONIN I PNL SERPL HS: 10 NG/L
CARDIAC TROPONIN I PNL SERPL HS: 10 NG/L
CHLORIDE SERPL-SCNC: 92 MMOL/L (ref 96–108)
CO2 SERPL-SCNC: 19 MMOL/L (ref 21–32)
CREAT SERPL-MCNC: 1.4 MG/DL (ref 0.6–1.3)
D DIMER PPP FEU-MCNC: 0.77 UG/ML FEU
EOSINOPHIL # BLD AUTO: 0.18 THOUSAND/ÂΜL (ref 0–0.61)
EOSINOPHIL NFR BLD AUTO: 2 % (ref 0–6)
ERYTHROCYTE [DISTWIDTH] IN BLOOD BY AUTOMATED COUNT: 15.9 % (ref 11.6–15.1)
GFR SERPL CREATININE-BSD FRML MDRD: 53 ML/MIN/1.73SQ M
GLUCOSE SERPL-MCNC: 331 MG/DL (ref 65–140)
HCO3 BLDV-SCNC: 20.8 MMOL/L (ref 24–30)
HCT VFR BLD AUTO: 32.3 % (ref 36.5–49.3)
HGB BLD-MCNC: 11 G/DL (ref 12–17)
IMM GRANULOCYTES # BLD AUTO: 0.05 THOUSAND/UL (ref 0–0.2)
IMM GRANULOCYTES NFR BLD AUTO: 1 % (ref 0–2)
INR PPP: 1.47 (ref 0.84–1.19)
LACTATE SERPL-SCNC: 2.9 MMOL/L (ref 0.5–2)
LYMPHOCYTES # BLD AUTO: 1.46 THOUSANDS/ÂΜL (ref 0.6–4.47)
LYMPHOCYTES NFR BLD AUTO: 15 % (ref 14–44)
MCH RBC QN AUTO: 33.8 PG (ref 26.8–34.3)
MCHC RBC AUTO-ENTMCNC: 34.1 G/DL (ref 31.4–37.4)
MCV RBC AUTO: 99 FL (ref 82–98)
MONOCYTES # BLD AUTO: 0.86 THOUSAND/ÂΜL (ref 0.17–1.22)
MONOCYTES NFR BLD AUTO: 9 % (ref 4–12)
NEUTROPHILS # BLD AUTO: 7.04 THOUSANDS/ÂΜL (ref 1.85–7.62)
NEUTS SEG NFR BLD AUTO: 72 % (ref 43–75)
NRBC BLD AUTO-RTO: 0 /100 WBCS
O2 CT BLDV-SCNC: 12.7 ML/DL
P AXIS: -3 DEGREES
PCO2 BLDV: 27.8 MM HG (ref 42–50)
PH BLDV: 7.49 [PH] (ref 7.3–7.4)
PLATELET # BLD AUTO: 69 THOUSANDS/UL (ref 149–390)
PMV BLD AUTO: 10.9 FL (ref 8.9–12.7)
PO2 BLDV: 49 MM HG (ref 35–45)
POTASSIUM SERPL-SCNC: 4.4 MMOL/L (ref 3.5–5.3)
PR INTERVAL: 134 MS
PROT SERPL-MCNC: 6.3 G/DL (ref 6.4–8.4)
PROTHROMBIN TIME: 18.2 SECONDS (ref 11.6–14.5)
QRS AXIS: 4 DEGREES
QRSD INTERVAL: 72 MS
QT INTERVAL: 410 MS
QTC INTERVAL: 429 MS
RBC # BLD AUTO: 3.25 MILLION/UL (ref 3.88–5.62)
SODIUM SERPL-SCNC: 128 MMOL/L (ref 135–147)
T WAVE AXIS: 12 DEGREES
VENTRICULAR RATE: 66 BPM
WBC # BLD AUTO: 9.65 THOUSAND/UL (ref 4.31–10.16)

## 2024-06-20 PROCEDURE — 85730 THROMBOPLASTIN TIME PARTIAL: CPT | Performed by: EMERGENCY MEDICINE

## 2024-06-20 PROCEDURE — 80053 COMPREHEN METABOLIC PANEL: CPT | Performed by: EMERGENCY MEDICINE

## 2024-06-20 PROCEDURE — 93970 EXTREMITY STUDY: CPT | Performed by: SURGERY

## 2024-06-20 PROCEDURE — 93970 EXTREMITY STUDY: CPT

## 2024-06-20 PROCEDURE — 93010 ELECTROCARDIOGRAM REPORT: CPT | Performed by: INTERNAL MEDICINE

## 2024-06-20 PROCEDURE — 82805 BLOOD GASES W/O2 SATURATION: CPT | Performed by: EMERGENCY MEDICINE

## 2024-06-20 PROCEDURE — 85025 COMPLETE CBC W/AUTO DIFF WBC: CPT | Performed by: EMERGENCY MEDICINE

## 2024-06-20 PROCEDURE — 96360 HYDRATION IV INFUSION INIT: CPT

## 2024-06-20 PROCEDURE — 83605 ASSAY OF LACTIC ACID: CPT | Performed by: EMERGENCY MEDICINE

## 2024-06-20 PROCEDURE — 83880 ASSAY OF NATRIURETIC PEPTIDE: CPT | Performed by: EMERGENCY MEDICINE

## 2024-06-20 PROCEDURE — 99285 EMERGENCY DEPT VISIT HI MDM: CPT

## 2024-06-20 PROCEDURE — 82010 KETONE BODYS QUAN: CPT | Performed by: EMERGENCY MEDICINE

## 2024-06-20 PROCEDURE — 93005 ELECTROCARDIOGRAM TRACING: CPT

## 2024-06-20 PROCEDURE — 96361 HYDRATE IV INFUSION ADD-ON: CPT

## 2024-06-20 PROCEDURE — 99285 EMERGENCY DEPT VISIT HI MDM: CPT | Performed by: EMERGENCY MEDICINE

## 2024-06-20 PROCEDURE — 84484 ASSAY OF TROPONIN QUANT: CPT | Performed by: EMERGENCY MEDICINE

## 2024-06-20 PROCEDURE — 96374 THER/PROPH/DIAG INJ IV PUSH: CPT

## 2024-06-20 PROCEDURE — 85610 PROTHROMBIN TIME: CPT | Performed by: EMERGENCY MEDICINE

## 2024-06-20 PROCEDURE — 36415 COLL VENOUS BLD VENIPUNCTURE: CPT | Performed by: EMERGENCY MEDICINE

## 2024-06-20 PROCEDURE — 85379 FIBRIN DEGRADATION QUANT: CPT | Performed by: EMERGENCY MEDICINE

## 2024-06-20 RX ORDER — DIAZEPAM 5 MG/1
5 TABLET ORAL ONCE
Status: COMPLETED | OUTPATIENT
Start: 2024-06-20 | End: 2024-06-20

## 2024-06-20 RX ORDER — HYDROMORPHONE HCL/PF 1 MG/ML
0.5 SYRINGE (ML) INJECTION ONCE
Status: COMPLETED | OUTPATIENT
Start: 2024-06-20 | End: 2024-06-20

## 2024-06-20 RX ADMIN — SODIUM CHLORIDE 1000 ML: 0.9 INJECTION, SOLUTION INTRAVENOUS at 15:05

## 2024-06-20 RX ADMIN — HYDROMORPHONE HYDROCHLORIDE 0.5 MG: 1 INJECTION, SOLUTION INTRAMUSCULAR; INTRAVENOUS; SUBCUTANEOUS at 11:59

## 2024-06-20 RX ADMIN — DIAZEPAM 5 MG: 5 TABLET ORAL at 14:28

## 2024-06-20 RX ADMIN — SODIUM CHLORIDE 1000 ML: 0.9 INJECTION, SOLUTION INTRAVENOUS at 13:11

## 2024-06-20 NOTE — ED PROVIDER NOTES
History  Chief Complaint   Patient presents with    Leg Pain     Brought in by ems for b/l leg pain. States he has been having cramping in his legs since this morning. States that pain and cramping caused him to fall earlier. Blood sugar for ems was 361     HPI  Patient is a 63-year-old male presenting with bilateral leg pain.  States that the cramping has been ongoing since last night.  Has had similar symptoms in the past however complains that this is severe compared to his previous episodes.  Patient has restless leg syndrome, CHF, Carrillo cirrhosis, non-insulin-dependent diabetes, neuropathy.  Other than the bilateral leg pain patient has no other complaints.  Denies any chest pain, shortness of breath, abdominal pain, nausea, vomiting, fever, chills, diarrhea, urinary symptoms.  Prior to Admission Medications   Prescriptions Last Dose Informant Patient Reported? Taking?   Alcohol Swabs 70 % PADS   No No   Sig: May substitute brand based on insurance coverage. Check glucose TID.   Blood Glucose Monitoring Suppl (OneTouch Verio Reflect) w/Device KIT   No No   Sig: May substitute brand based on insurance coverage. Check glucose TID.   OneTouch Delica Lancets 33G MISC   No No   Sig: May substitute brand based on insurance coverage. Check glucose TID.   dicyclomine (BENTYL) 20 mg tablet   No No   Sig: Take 1 tablet (20 mg total) by mouth 2 (two) times a day before breakfast and lunch   ferrous sulfate 325 (65 Fe) mg tablet   Yes No   furosemide (LASIX) 20 mg tablet   No No   Sig: Take 1 tablet (20 mg total) by mouth daily   gabapentin (NEURONTIN) 300 mg capsule   Yes No   Sig: Take 300 mg by mouth Three times a day   glucose blood (OneTouch Verio) test strip   No No   Sig: May substitute brand based on insurance coverage. Check glucose TID.   hydrOXYzine HCL (ATARAX) 25 mg tablet   Yes No   Sig: TAKE 1 TABLET BY MOUTH 3 TIMES A DAY AS NEEDED FOR ITCHING   hydrocortisone (CORTEF) 10 mg tablet   No No   Sig: Take 1  tablet (10 mg total) by mouth 3 (three) times a day   lactulose 20 g/30 mL   No No   Sig: Take 30 mL (20 g total) by mouth daily Do not start before February 28, 2023.   levETIRAcetam (KEPPRA) 500 mg tablet   No No   Sig: Take 1 tablet (500 mg total) by mouth every 12 (twelve) hours   metFORMIN (GLUCOPHAGE) 1000 MG tablet   No No   Sig: Take 1 tablet (1,000 mg total) by mouth 2 (two) times a day with meals   midodrine (PROAMATINE) 5 mg tablet   No No   Sig: Take 2 tablets (10 mg total) by mouth 3 (three) times a day before meals   pancrelipase, Lip-Prot-Amyl, (CREON) 6,000 units delayed release capsule   No No   Sig: Take 4 capsules (24,000 Units total) by mouth 3 (three) times a day with meals   pantoprazole (PROTONIX) 40 mg tablet   No No   Sig: Take 1 tablet (40 mg total) by mouth daily in the early morning Do not start before May 5, 2023.   propranolol (INDERAL) 10 mg tablet   No No   Sig: Take 1 tablet (10 mg total) by mouth every 12 (twelve) hours   rOPINIRole (REQUIP) 4 mg tablet   No No   Sig: Take 1 tablet (4 mg total) by mouth 2 (two) times a day   repaglinide (PRANDIN) 2 mg tablet   No No   Sig: Take 1 tablet (2 mg total) by mouth 3 (three) times a day before meals   senna-docusate sodium (SENOKOT S) 8.6-50 mg per tablet   Yes No   Sig: Take 1 tablet by mouth every evening   spironolactone (ALDACTONE) 100 mg tablet   Yes No   tamsulosin (FLOMAX) 0.4 mg   No No   Sig: Take 1 capsule (0.4 mg total) by mouth daily with dinner      Facility-Administered Medications: None       Past Medical History:   Diagnosis Date    CHF (congestive heart failure) (HCC)     Cirrhosis (HCC)     CHRISTIE    Colon cancer (HCC)     Diabetes mellitus (HCC)     Neuropathy     Restless leg syndrome        Past Surgical History:   Procedure Laterality Date    EGD AND SIGMOIDOSCOPY FLEXIBLE      IR PARACENTESIS  11/22/2022    IR PARACENTESIS  2/2/2023    IR THORACENTESIS  6/4/2024    LEFT COLON RESECTION      LITHOTRIPSY         Family  History   Problem Relation Age of Onset    Diabetes Mother     Liver disease Mother     Diabetes Father     Bone cancer Father      I have reviewed and agree with the history as documented.    E-Cigarette/Vaping    E-Cigarette Use Never User      E-Cigarette/Vaping Substances    Nicotine No     THC No     CBD No     Flavoring No     Other No     Unknown No      Social History     Tobacco Use    Smoking status: Former     Current packs/day: 0.25     Types: Cigarettes    Smokeless tobacco: Never   Vaping Use    Vaping status: Never Used   Substance Use Topics    Alcohol use: Not Currently    Drug use: Never       Review of Systems   Constitutional:  Negative for chills, diaphoresis, fever and unexpected weight change.   HENT:  Negative for ear pain and sore throat.    Eyes:  Negative for visual disturbance.   Respiratory:  Negative for cough, chest tightness and shortness of breath.    Cardiovascular:  Negative for chest pain and leg swelling.   Gastrointestinal:  Negative for abdominal distention, abdominal pain, constipation, diarrhea, nausea and vomiting.   Endocrine: Negative.    Genitourinary:  Negative for difficulty urinating and dysuria.   Musculoskeletal:  Positive for myalgias.   Skin: Negative.    Allergic/Immunologic: Negative.    Neurological: Negative.    Hematological: Negative.    Psychiatric/Behavioral: Negative.     All other systems reviewed and are negative.      Physical Exam  Physical Exam  Vitals and nursing note reviewed.   Constitutional:       General: He is not in acute distress.     Appearance: Normal appearance. He is not ill-appearing.   HENT:      Head: Normocephalic and atraumatic.      Right Ear: External ear normal.      Left Ear: External ear normal.      Nose: Nose normal.      Mouth/Throat:      Mouth: Mucous membranes are moist.      Pharynx: Oropharynx is clear.   Eyes:      General: No scleral icterus.        Right eye: No discharge.         Left eye: No discharge.       Extraocular Movements: Extraocular movements intact.      Conjunctiva/sclera: Conjunctivae normal.      Pupils: Pupils are equal, round, and reactive to light.   Cardiovascular:      Rate and Rhythm: Normal rate and regular rhythm.      Pulses: Normal pulses.      Heart sounds: Normal heart sounds.   Pulmonary:      Effort: Pulmonary effort is normal.      Breath sounds: Normal breath sounds.   Abdominal:      General: Abdomen is flat. Bowel sounds are normal. There is no distension.      Palpations: Abdomen is soft.      Tenderness: There is no abdominal tenderness. There is no guarding or rebound.   Musculoskeletal:         General: Tenderness (Bilateral calves however calves were soft with low concerns for compartment syndrome) present. Normal range of motion.      Cervical back: Normal range of motion and neck supple.   Skin:     General: Skin is warm and dry.      Capillary Refill: Capillary refill takes less than 2 seconds.   Neurological:      General: No focal deficit present.      Mental Status: He is alert and oriented to person, place, and time. Mental status is at baseline.   Psychiatric:         Mood and Affect: Mood normal.         Behavior: Behavior normal.         Thought Content: Thought content normal.         Judgment: Judgment normal.         Vital Signs  ED Triage Vitals   Temperature Pulse Respirations Blood Pressure SpO2   06/20/24 1140 06/20/24 1140 06/20/24 1140 06/20/24 1140 06/20/24 1140   (!) 97.2 °F (36.2 °C) 84 18 129/97 99 %      Temp Source Heart Rate Source Patient Position - Orthostatic VS BP Location FiO2 (%)   06/20/24 1140 06/20/24 1140 06/20/24 1140 06/20/24 1140 --   Tympanic Monitor Sitting Left arm       Pain Score       06/20/24 1159       10 - Worst Possible Pain           Vitals:    06/20/24 1140 06/20/24 1253   BP: 129/97 109/64   Pulse: 84 68   Patient Position - Orthostatic VS: Sitting Lying         Visual Acuity      ED Medications  Medications   HYDROmorphone  (DILAUDID) injection 0.5 mg (0.5 mg Intravenous Given 6/20/24 1159)   sodium chloride 0.9 % bolus 1,000 mL (0 mL Intravenous Stopped 6/20/24 1411)   diazepam (VALIUM) tablet 5 mg (5 mg Oral Given 6/20/24 1428)   sodium chloride 0.9 % bolus 1,000 mL (0 mL Intravenous Stopped 6/20/24 1605)       Diagnostic Studies  Results Reviewed       Procedure Component Value Units Date/Time    Basic metabolic panel [[id number]]     Lab Status: No result Specimen: Blood     HS Troponin I 2hr [[id number]]  (Normal) Collected: 06/20/24 1420    Lab Status: Final result Specimen: Blood from Central Venous Line Updated: 06/20/24 1457     hs TnI 2hr 10 ng/L      Delta 2hr hsTnI 0 ng/L     Lactic acid, plasma (w/reflex if result > 2.0) [[id number]]  (Abnormal) Collected: 06/20/24 1420    Lab Status: Final result Specimen: Blood from Central Venous Line Updated: 06/20/24 1457     LACTIC ACID 2.9 mmol/L     Narrative:      Specimen Icteric.  Result may be elevated if tourniquet was used during collection.    Lactic acid 2 Hours [[id number]]     Lab Status: No result Specimen: Blood     Blood gas, venous [[id number]]  (Abnormal) Collected: 06/20/24 1420    Lab Status: Final result Specimen: Blood from Line Updated: 06/20/24 1438     pH, Ravin 7.491     pCO2, Ravin 27.8 mm Hg      pO2, Ravin 49.0 mm Hg      HCO3, Ravin 20.8 mmol/L      Base Excess, Rvain -1.6 mmol/L      O2 Content, Ravin 12.7 ml/dL      O2 HGB, VENOUS 82.6 %     CK [[id number]]     Lab Status: No result Specimen: Blood     Beta Hydroxybutyrate [[id number]]  (Normal) Collected: 06/20/24 1156    Lab Status: Final result Specimen: Blood from Arm, Left Updated: 06/20/24 1358     Beta- Hydroxybutyrate 0.14 mmol/L     Narrative:      Specimen Icteric.    Comprehensive metabolic panel [[id number]]  (Abnormal) Collected: 06/20/24 1156    Lab Status: Final result Specimen: Blood from Arm, Left Updated: 06/20/24 1237     Sodium 128 mmol/L      Potassium 4.4 mmol/L      Chloride 92 mmol/L      CO2 19  mmol/L      ANION GAP 17 mmol/L      BUN 44 mg/dL      Creatinine 1.40 mg/dL      Glucose 331 mg/dL      Calcium 8.7 mg/dL      Corrected Calcium 9.3 mg/dL      AST 27 U/L      ALT 49 U/L      Alkaline Phosphatase 111 U/L      Total Protein 6.3 g/dL      Albumin 3.3 g/dL      Total Bilirubin 6.54 mg/dL      eGFR 53 ml/min/1.73sq m     Narrative:      Specimen Icteric.  National Kid[employer] Foundation guidelines for Chronic Kidney Disease (CKD):     Stage 1 with normal or high GFR (GFR > 90 mL/min/1.73 square meters)    Stage 2 Mild CKD (GFR = 60-89 mL/min/1.73 square meters)    Stage 3A Moderate CKD (GFR = 45-59 mL/min/1.73 square meters)    Stage 3B Moderate CKD (GFR = 30-44 mL/min/1.73 square meters)    Stage 4 Severe CKD (GFR = 15-29 mL/min/1.73 square meters)    Stage 5 End Stage CKD (GFR <15 mL/min/1.73 square meters)  Note: GFR calculation is accurate only with a steady state creatinine    HS Troponin 0hr (reflex protocol) [[id number]]  (Normal) Collected: 06/20/24 1156    Lab Status: Final result Specimen: Blood from Arm, Left Updated: 06/20/24 1228     hs TnI 0hr 10 ng/L     HS Troponin I 4hr [[id number]]     Lab Status: No result Specimen: Blood     B-Type Natriuretic Peptide(BNP) [[id number]]  (Normal) Collected: 06/20/24 1156    Lab Status: Final result Specimen: Blood from Arm, Left Updated: 06/20/24 1227     BNP 28 pg/mL     D-Dimer [[id number]]  (Abnormal) Collected: 06/20/24 1156    Lab Status: Final result Specimen: Blood from Arm, Left Updated: 06/20/24 1217     D-Dimer, Quant 0.77 ug/ml FEU     Narrative:      In the evaluation for possible pulmonary embolism, in the appropriate (Well's Score of 4 or less) patient, the age adjusted d-dimer cutoff for this patient can be calculated as:    Age x 0.01 (in ug/mL) for Age-adjusted D-dimer exclusion threshold for a patient over 50 years.    APTT [[id number]]  (Normal) Collected: 06/20/24 1156    Lab Status: Final result Specimen: Blood from Arm, Left  Updated: 06/20/24 1216     PTT 32 seconds     Protime-INR [[id number]]  (Abnormal) Collected: 06/20/24 1156    Lab Status: Final result Specimen: Blood from Arm, Left Updated: 06/20/24 1216     Protime 18.2 seconds      INR 1.47    CBC and differential [[id number]]  (Abnormal) Collected: 06/20/24 1156    Lab Status: Final result Specimen: Blood from Arm, Left Updated: 06/20/24 1213     WBC 9.65 Thousand/uL      RBC 3.25 Million/uL      Hemoglobin 11.0 g/dL      Hematocrit 32.3 %      MCV 99 fL      MCH 33.8 pg      MCHC 34.1 g/dL      RDW 15.9 %      MPV 10.9 fL      Platelets 69 Thousands/uL      nRBC 0 /100 WBCs      Segmented % 72 %      Immature Grans % 1 %      Lymphocytes % 15 %      Monocytes % 9 %      Eosinophils Relative 2 %      Basophils Relative 1 %      Absolute Neutrophils 7.04 Thousands/µL      Absolute Immature Grans 0.05 Thousand/uL      Absolute Lymphocytes 1.46 Thousands/µL      Absolute Monocytes 0.86 Thousand/µL      Eosinophils Absolute 0.18 Thousand/µL      Basophils Absolute 0.06 Thousands/µL                     VAS VENOUS DUPLEX - LOWER LIMB BILATERAL    (Results Pending)              Procedures  Procedures         ED Course                                             Medical Decision Making  63-year-old male presents with bilateral leg pain  Will check for possible DVT, also check CK for possible rhabdo, and obtain basic labs to assess for possible electrolyte abnormalities causing cramping  Medication was given with symptomatic relief  Patient found to have an ALEXANDER  Also lactic acidosis  Patient was given fluids  While waiting for additional results patient states that he wanted to leave  Risks were discussed including death  Patient still adamant about leaving  Patient discharged with strict return precautions provided    Problems Addressed:  Bilateral leg pain: acute illness or injury    Amount and/or Complexity of Data Reviewed  Labs: ordered.    Risk  Prescription drug  management.             Disposition  Final diagnoses:   Bilateral leg pain     Time reflects when diagnosis was documented in both MDM as applicable and the Disposition within this note       Time User Action Codes Description Comment    6/20/2024  4:23 PM Juancho Henriquez Add [M79.604,  M79.605] Bilateral leg pain           ED Disposition       ED Disposition   Discharge    Condition   Stable    Date/Time   Thu Jun 20, 2024  4:22 PM    Comment   Foreign Armando discharge to home/self care.                   Follow-up Information       Follow up With Specialties Details Why Contact Info Additional Information    [facility] Emergency Department Emergency Medicine Go to  If symptoms worsen [address] [facility] [address] [phone] [facility] Emergency Department            Discharge Medication List as of 6/20/2024  4:29 PM        CONTINUE these medications which have NOT CHANGED    Details   Alcohol Swabs 70 % PADS May substitute brand based on insurance coverage. Check glucose TID., Normal      Blood Glucose Monitoring Suppl (OneTouch Verio Reflect) w/Device KIT May substitute brand based on insurance coverage. Check glucose TID., Normal      dicyclomine (BENTYL) 20 mg tablet Take 1 tablet (20 mg total) by mouth 2 (two) times a day before breakfast and lunch, Starting Thu 12/1/2022, Until Sat 12/31/2022, Print      ferrous sulfate 325 (65 Fe) mg tablet Starting Tue 3/28/2023, Historical Med      furosemide (LASIX) 20 mg tablet Take 1 tablet (20 mg total) by mouth daily, Starting Thu 5/4/2023, Until Sat 6/3/2023, Normal      gabapentin (NEURONTIN) 300 mg capsule Take 300 mg by mouth Three times a day, Starting Wed 3/15/2023, Historical Med      glucose blood (OneTouch Verio) test strip May substitute brand based on insurance coverage. Check glucose TID., Normal      hydrocortisone (CORTEF) 10 mg tablet Take 1 tablet (10 mg total) by mouth 3 (three) times a  day, Starting Tue 6/18/2024, Until Thu 7/18/2024, Normal      hydrOXYzine HCL (ATARAX) 25 mg tablet TAKE 1 TABLET BY MOUTH 3 TIMES A DAY AS NEEDED FOR ITCHING, Historical Med      lactulose 20 g/30 mL Take 30 mL (20 g total) by mouth daily Do not start before February 28, 2023., Starting Tue 2/28/2023, Normal      levETIRAcetam (KEPPRA) 500 mg tablet Take 1 tablet (500 mg total) by mouth every 12 (twelve) hours, Starting Tue 6/18/2024, Until Thu 7/18/2024, Normal      metFORMIN (GLUCOPHAGE) 1000 MG tablet Take 1 tablet (1,000 mg total) by mouth 2 (two) times a day with meals, Starting Tue 6/18/2024, Until Thu 7/18/2024, Normal      midodrine (PROAMATINE) 5 mg tablet Take 2 tablets (10 mg total) by mouth 3 (three) times a day before meals, Starting Tue 6/18/2024, Until Thu 7/18/2024, Normal      OneTouch Delica Lancets 33G MISC May substitute brand based on insurance coverage. Check glucose TID., Normal      pancrelipase, Lip-Prot-Amyl, (CREON) 6,000 units delayed release capsule Take 4 capsules (24,000 Units total) by mouth 3 (three) times a day with meals, Starting Tue 6/18/2024, Until Thu 7/18/2024, Normal      pantoprazole (PROTONIX) 40 mg tablet Take 1 tablet (40 mg total) by mouth daily in the early morning Do not start before May 5, 2023., Starting Fri 5/5/2023, Until Sun 6/4/2023, Normal      propranolol (INDERAL) 10 mg tablet Take 1 tablet (10 mg total) by mouth every 12 (twelve) hours, Starting Thu 5/4/2023, Until Sat 6/3/2023, Normal      repaglinide (PRANDIN) 2 mg tablet Take 1 tablet (2 mg total) by mouth 3 (three) times a day before meals, Starting Tue 6/18/2024, Until Thu 7/18/2024, Normal      rOPINIRole (REQUIP) 4 mg tablet Take 1 tablet (4 mg total) by mouth 2 (two) times a day, Starting Thu 5/4/2023, Until Sat 6/3/2023, Normal      senna-docusate sodium (SENOKOT S) 8.6-50 mg per tablet Take 1 tablet by mouth every evening, Starting Sat 2/18/2023, Historical Med      spironolactone (ALDACTONE) 100  mg tablet Starting Tue 3/28/2023, Historical Med      tamsulosin (FLOMAX) 0.4 mg Take 1 capsule (0.4 mg total) by mouth daily with dinner, Starting Thu 5/4/2023, Until Sat 6/3/2023, Normal             No discharge procedures on file.    PDMP Review         Value Time User    PDMP Reviewed  Yes 5/4/2023 11:47 AM Margaret Shell PA-C            ED Provider  Electronically Signed by             Juancho Henriquez MD  06/20/24 [id number]

## 2024-06-21 ENCOUNTER — APPOINTMENT (EMERGENCY)
Dept: CT IMAGING | Facility: HOSPITAL | Age: 64
End: 2024-06-21
Payer: COMMERCIAL

## 2024-06-21 ENCOUNTER — HOSPITAL ENCOUNTER (EMERGENCY)
Facility: HOSPITAL | Age: 64
End: 2024-06-21
Attending: EMERGENCY MEDICINE
Payer: COMMERCIAL

## 2024-06-21 ENCOUNTER — HOSPITAL ENCOUNTER (INPATIENT)
Facility: HOSPITAL | Age: 64
LOS: 4 days | Discharge: LEFT BEFORE DISCHARGE | DRG: 433 | End: 2024-06-25
Attending: INTERNAL MEDICINE | Admitting: INTERNAL MEDICINE
Payer: COMMERCIAL

## 2024-06-21 VITALS
WEIGHT: 151.46 LBS | BODY MASS INDEX: 23.72 KG/M2 | HEART RATE: 67 BPM | RESPIRATION RATE: 18 BRPM | DIASTOLIC BLOOD PRESSURE: 50 MMHG | TEMPERATURE: 98.2 F | OXYGEN SATURATION: 97 % | SYSTOLIC BLOOD PRESSURE: 119 MMHG

## 2024-06-21 DIAGNOSIS — Z01.89 ENCOUNTER FOR COMPETENCY EVALUATION: ICD-10-CM

## 2024-06-21 DIAGNOSIS — E80.6 HYPERBILIRUBINEMIA: Primary | ICD-10-CM

## 2024-06-21 DIAGNOSIS — K72.90 DECOMPENSATED HEPATIC CIRRHOSIS (HCC): ICD-10-CM

## 2024-06-21 DIAGNOSIS — K86.9 PANCREATIC LESION: ICD-10-CM

## 2024-06-21 DIAGNOSIS — R17 ELEVATED BILIRUBIN: ICD-10-CM

## 2024-06-21 DIAGNOSIS — M79.605 BILATERAL LEG PAIN: ICD-10-CM

## 2024-06-21 DIAGNOSIS — K74.60 CIRRHOSIS OF LIVER (HCC): ICD-10-CM

## 2024-06-21 DIAGNOSIS — N17.9 AKI (ACUTE KIDNEY INJURY) (HCC): Primary | ICD-10-CM

## 2024-06-21 DIAGNOSIS — R59.0 ABDOMINAL LYMPHADENOPATHY: ICD-10-CM

## 2024-06-21 DIAGNOSIS — K74.60 DECOMPENSATED HEPATIC CIRRHOSIS (HCC): ICD-10-CM

## 2024-06-21 DIAGNOSIS — M79.604 BILATERAL LEG PAIN: ICD-10-CM

## 2024-06-21 PROBLEM — G40.909 SEIZURE DISORDER (HCC): Status: ACTIVE | Noted: 2024-06-06

## 2024-06-21 PROBLEM — G93.40 ACUTE ENCEPHALOPATHY: Status: ACTIVE | Noted: 2024-06-21

## 2024-06-21 PROBLEM — E87.20 LACTIC ACIDOSIS: Status: ACTIVE | Noted: 2024-06-21

## 2024-06-21 PROBLEM — E11.65 TYPE 2 DIABETES MELLITUS WITH HYPERGLYCEMIA, WITHOUT LONG-TERM CURRENT USE OF INSULIN (HCC): Status: ACTIVE | Noted: 2022-11-22

## 2024-06-21 LAB
2HR DELTA HS TROPONIN: -1 NG/L
4HR DELTA HS TROPONIN: 1 NG/L
ALBUMIN SERPL BCG-MCNC: 3.3 G/DL (ref 3.5–5)
ALP SERPL-CCNC: 102 U/L (ref 34–104)
ALT SERPL W P-5'-P-CCNC: 50 U/L (ref 7–52)
AMMONIA PLAS-SCNC: 50 UMOL/L (ref 18–72)
ANION GAP SERPL CALCULATED.3IONS-SCNC: 14 MMOL/L (ref 4–13)
AST SERPL W P-5'-P-CCNC: 39 U/L (ref 13–39)
ATRIAL RATE: 89 BPM
BASOPHILS # BLD AUTO: 0.05 THOUSANDS/ÂΜL (ref 0–0.1)
BASOPHILS NFR BLD AUTO: 1 % (ref 0–1)
BILIRUB DIRECT SERPL-MCNC: 4.6 MG/DL (ref 0–0.2)
BILIRUB SERPL-MCNC: 10.07 MG/DL (ref 0.2–1)
BILIRUB SERPL-MCNC: 11.96 MG/DL (ref 0.2–1)
BNP SERPL-MCNC: 54 PG/ML (ref 0–100)
BUN SERPL-MCNC: 43 MG/DL (ref 5–25)
CALCIUM ALBUM COR SERPL-MCNC: 9.5 MG/DL (ref 8.3–10.1)
CALCIUM SERPL-MCNC: 8.9 MG/DL (ref 8.4–10.2)
CARDIAC TROPONIN I PNL SERPL HS: 14 NG/L
CARDIAC TROPONIN I PNL SERPL HS: 15 NG/L
CARDIAC TROPONIN I PNL SERPL HS: 16 NG/L
CHLORIDE SERPL-SCNC: 98 MMOL/L (ref 96–108)
CK SERPL-CCNC: 182 U/L (ref 39–308)
CO2 SERPL-SCNC: 21 MMOL/L (ref 21–32)
CREAT SERPL-MCNC: 1.34 MG/DL (ref 0.6–1.3)
EOSINOPHIL # BLD AUTO: 0.2 THOUSAND/ÂΜL (ref 0–0.61)
EOSINOPHIL NFR BLD AUTO: 2 % (ref 0–6)
ERYTHROCYTE [DISTWIDTH] IN BLOOD BY AUTOMATED COUNT: 15.9 % (ref 11.6–15.1)
GFR SERPL CREATININE-BSD FRML MDRD: 55 ML/MIN/1.73SQ M
GLUCOSE SERPL-MCNC: 158 MG/DL (ref 65–140)
GLUCOSE SERPL-MCNC: 92 MG/DL (ref 65–140)
HCT VFR BLD AUTO: 30.1 % (ref 36.5–49.3)
HGB BLD-MCNC: 10.6 G/DL (ref 12–17)
IMM GRANULOCYTES # BLD AUTO: 0.04 THOUSAND/UL (ref 0–0.2)
IMM GRANULOCYTES NFR BLD AUTO: 1 % (ref 0–2)
LACTATE SERPL-SCNC: 1.3 MMOL/L (ref 0.5–2)
LACTATE SERPL-SCNC: 2.9 MMOL/L (ref 0.5–2)
LYMPHOCYTES # BLD AUTO: 1.03 THOUSANDS/ÂΜL (ref 0.6–4.47)
LYMPHOCYTES NFR BLD AUTO: 12 % (ref 14–44)
MCH RBC QN AUTO: 35 PG (ref 26.8–34.3)
MCHC RBC AUTO-ENTMCNC: 35.2 G/DL (ref 31.4–37.4)
MCV RBC AUTO: 99 FL (ref 82–98)
MONOCYTES # BLD AUTO: 0.9 THOUSAND/ÂΜL (ref 0.17–1.22)
MONOCYTES NFR BLD AUTO: 10 % (ref 4–12)
NEUTROPHILS # BLD AUTO: 6.54 THOUSANDS/ÂΜL (ref 1.85–7.62)
NEUTS SEG NFR BLD AUTO: 74 % (ref 43–75)
NRBC BLD AUTO-RTO: 0 /100 WBCS
P AXIS: 86 DEGREES
PLATELET # BLD AUTO: 61 THOUSANDS/UL (ref 149–390)
PMV BLD AUTO: 10.5 FL (ref 8.9–12.7)
POTASSIUM SERPL-SCNC: 4.6 MMOL/L (ref 3.5–5.3)
PR INTERVAL: 126 MS
PROT SERPL-MCNC: 6.2 G/DL (ref 6.4–8.4)
QRS AXIS: 67 DEGREES
QRSD INTERVAL: 72 MS
QT INTERVAL: 394 MS
QTC INTERVAL: 479 MS
RBC # BLD AUTO: 3.03 MILLION/UL (ref 3.88–5.62)
SODIUM SERPL-SCNC: 133 MMOL/L (ref 135–147)
T WAVE AXIS: 49 DEGREES
VENTRICULAR RATE: 89 BPM
WBC # BLD AUTO: 8.76 THOUSAND/UL (ref 4.31–10.16)

## 2024-06-21 PROCEDURE — 83605 ASSAY OF LACTIC ACID: CPT | Performed by: PHYSICIAN ASSISTANT

## 2024-06-21 PROCEDURE — 99223 1ST HOSP IP/OBS HIGH 75: CPT | Performed by: INTERNAL MEDICINE

## 2024-06-21 PROCEDURE — 93010 ELECTROCARDIOGRAM REPORT: CPT | Performed by: INTERNAL MEDICINE

## 2024-06-21 PROCEDURE — 85025 COMPLETE CBC W/AUTO DIFF WBC: CPT | Performed by: PHYSICIAN ASSISTANT

## 2024-06-21 PROCEDURE — 80053 COMPREHEN METABOLIC PANEL: CPT | Performed by: PHYSICIAN ASSISTANT

## 2024-06-21 PROCEDURE — 71275 CT ANGIOGRAPHY CHEST: CPT

## 2024-06-21 PROCEDURE — 99285 EMERGENCY DEPT VISIT HI MDM: CPT | Performed by: PHYSICIAN ASSISTANT

## 2024-06-21 PROCEDURE — 82248 BILIRUBIN DIRECT: CPT | Performed by: PHYSICIAN ASSISTANT

## 2024-06-21 PROCEDURE — 93005 ELECTROCARDIOGRAM TRACING: CPT

## 2024-06-21 PROCEDURE — 74174 CTA ABD&PLVS W/CONTRAST: CPT

## 2024-06-21 PROCEDURE — 84484 ASSAY OF TROPONIN QUANT: CPT | Performed by: PHYSICIAN ASSISTANT

## 2024-06-21 PROCEDURE — 36415 COLL VENOUS BLD VENIPUNCTURE: CPT | Performed by: PHYSICIAN ASSISTANT

## 2024-06-21 PROCEDURE — 82948 REAGENT STRIP/BLOOD GLUCOSE: CPT

## 2024-06-21 PROCEDURE — 83880 ASSAY OF NATRIURETIC PEPTIDE: CPT | Performed by: PHYSICIAN ASSISTANT

## 2024-06-21 PROCEDURE — 82140 ASSAY OF AMMONIA: CPT | Performed by: NURSE PRACTITIONER

## 2024-06-21 PROCEDURE — 82247 BILIRUBIN TOTAL: CPT | Performed by: PHYSICIAN ASSISTANT

## 2024-06-21 PROCEDURE — 96375 TX/PRO/DX INJ NEW DRUG ADDON: CPT

## 2024-06-21 PROCEDURE — 82550 ASSAY OF CK (CPK): CPT | Performed by: PHYSICIAN ASSISTANT

## 2024-06-21 PROCEDURE — 96361 HYDRATE IV INFUSION ADD-ON: CPT

## 2024-06-21 PROCEDURE — 96374 THER/PROPH/DIAG INJ IV PUSH: CPT

## 2024-06-21 PROCEDURE — 99285 EMERGENCY DEPT VISIT HI MDM: CPT

## 2024-06-21 RX ORDER — HYDROCORTISONE 10 MG/1
10 TABLET ORAL 3 TIMES DAILY
Status: DISCONTINUED | OUTPATIENT
Start: 2024-06-21 | End: 2024-06-25 | Stop reason: HOSPADM

## 2024-06-21 RX ORDER — TAMSULOSIN HYDROCHLORIDE 0.4 MG/1
0.4 CAPSULE ORAL
Status: DISCONTINUED | OUTPATIENT
Start: 2024-06-22 | End: 2024-06-25 | Stop reason: HOSPADM

## 2024-06-21 RX ORDER — ACETAMINOPHEN 325 MG/1
650 TABLET ORAL EVERY 8 HOURS PRN
Status: DISCONTINUED | OUTPATIENT
Start: 2024-06-21 | End: 2024-06-21

## 2024-06-21 RX ORDER — SIMETHICONE 80 MG
80 TABLET,CHEWABLE ORAL 4 TIMES DAILY PRN
Status: DISCONTINUED | OUTPATIENT
Start: 2024-06-21 | End: 2024-06-25 | Stop reason: HOSPADM

## 2024-06-21 RX ORDER — MIDODRINE HYDROCHLORIDE 5 MG/1
10 TABLET ORAL
Status: DISCONTINUED | OUTPATIENT
Start: 2024-06-22 | End: 2024-06-25 | Stop reason: HOSPADM

## 2024-06-21 RX ORDER — ONDANSETRON 2 MG/ML
4 INJECTION INTRAMUSCULAR; INTRAVENOUS EVERY 6 HOURS PRN
Status: DISCONTINUED | OUTPATIENT
Start: 2024-06-21 | End: 2024-06-25 | Stop reason: HOSPADM

## 2024-06-21 RX ORDER — LEVETIRACETAM 500 MG/1
500 TABLET ORAL EVERY 12 HOURS SCHEDULED
Status: DISCONTINUED | OUTPATIENT
Start: 2024-06-21 | End: 2024-06-25 | Stop reason: HOSPADM

## 2024-06-21 RX ORDER — LORAZEPAM 2 MG/ML
1 INJECTION INTRAMUSCULAR ONCE
Status: COMPLETED | OUTPATIENT
Start: 2024-06-21 | End: 2024-06-21

## 2024-06-21 RX ORDER — PANTOPRAZOLE SODIUM 40 MG/1
40 TABLET, DELAYED RELEASE ORAL
Status: DISCONTINUED | OUTPATIENT
Start: 2024-06-22 | End: 2024-06-25 | Stop reason: HOSPADM

## 2024-06-21 RX ORDER — FERROUS SULFATE 325(65) MG
325 TABLET ORAL
Status: DISCONTINUED | OUTPATIENT
Start: 2024-06-22 | End: 2024-06-25 | Stop reason: HOSPADM

## 2024-06-21 RX ORDER — HYDROXYZINE HYDROCHLORIDE 25 MG/1
25 TABLET, FILM COATED ORAL EVERY 6 HOURS PRN
Status: DISCONTINUED | OUTPATIENT
Start: 2024-06-21 | End: 2024-06-25 | Stop reason: HOSPADM

## 2024-06-21 RX ORDER — HYDROMORPHONE HCL/PF 1 MG/ML
0.5 SYRINGE (ML) INJECTION ONCE
Status: COMPLETED | OUTPATIENT
Start: 2024-06-21 | End: 2024-06-21

## 2024-06-21 RX ORDER — HYDROMORPHONE HCL IN WATER/PF 6 MG/30 ML
0.2 PATIENT CONTROLLED ANALGESIA SYRINGE INTRAVENOUS EVERY 4 HOURS PRN
Status: DISCONTINUED | OUTPATIENT
Start: 2024-06-21 | End: 2024-06-22

## 2024-06-21 RX ORDER — MAGNESIUM HYDROXIDE/ALUMINUM HYDROXICE/SIMETHICONE 120; 1200; 1200 MG/30ML; MG/30ML; MG/30ML
30 SUSPENSION ORAL EVERY 6 HOURS PRN
Status: DISCONTINUED | OUTPATIENT
Start: 2024-06-21 | End: 2024-06-25 | Stop reason: HOSPADM

## 2024-06-21 RX ORDER — ROPINIROLE 1 MG/1
4 TABLET, FILM COATED ORAL 2 TIMES DAILY
Status: DISCONTINUED | OUTPATIENT
Start: 2024-06-21 | End: 2024-06-25 | Stop reason: HOSPADM

## 2024-06-21 RX ORDER — INSULIN LISPRO 100 [IU]/ML
1-5 INJECTION, SOLUTION INTRAVENOUS; SUBCUTANEOUS EVERY 6 HOURS SCHEDULED
Status: DISCONTINUED | OUTPATIENT
Start: 2024-06-22 | End: 2024-06-24

## 2024-06-21 RX ORDER — SODIUM CHLORIDE 9 MG/ML
50 INJECTION, SOLUTION INTRAVENOUS CONTINUOUS
Status: DISPENSED | OUTPATIENT
Start: 2024-06-21 | End: 2024-06-22

## 2024-06-21 RX ORDER — HYDROCODONE BITARTRATE AND ACETAMINOPHEN 5; 325 MG/1; MG/1
1 TABLET ORAL EVERY 6 HOURS PRN
Status: DISCONTINUED | OUTPATIENT
Start: 2024-06-21 | End: 2024-06-22

## 2024-06-21 RX ORDER — PROPRANOLOL HYDROCHLORIDE 10 MG/1
10 TABLET ORAL EVERY 12 HOURS SCHEDULED
Status: DISCONTINUED | OUTPATIENT
Start: 2024-06-21 | End: 2024-06-25 | Stop reason: HOSPADM

## 2024-06-21 RX ORDER — LACTULOSE 10 G/15ML
20 SOLUTION ORAL DAILY
Status: DISCONTINUED | OUTPATIENT
Start: 2024-06-22 | End: 2024-06-25 | Stop reason: HOSPADM

## 2024-06-21 RX ADMIN — ROPINIROLE HYDROCHLORIDE 4 MG: 1 TABLET, FILM COATED ORAL at 23:23

## 2024-06-21 RX ADMIN — HYDROCORTISONE 10 MG: 10 TABLET ORAL at 23:23

## 2024-06-21 RX ADMIN — HYDROMORPHONE HYDROCHLORIDE 0.5 MG: 1 INJECTION, SOLUTION INTRAMUSCULAR; INTRAVENOUS; SUBCUTANEOUS at 12:07

## 2024-06-21 RX ADMIN — LEVETIRACETAM 500 MG: 500 TABLET, FILM COATED ORAL at 23:23

## 2024-06-21 RX ADMIN — LORAZEPAM 1 MG: 2 INJECTION INTRAMUSCULAR; INTRAVENOUS at 10:49

## 2024-06-21 RX ADMIN — SODIUM CHLORIDE 50 ML/HR: 0.9 INJECTION, SOLUTION INTRAVENOUS at 23:21

## 2024-06-21 RX ADMIN — PROPRANOLOL HYDROCHLORIDE 10 MG: 10 TABLET ORAL at 23:23

## 2024-06-21 RX ADMIN — SODIUM CHLORIDE 1000 ML: 0.9 INJECTION, SOLUTION INTRAVENOUS at 10:34

## 2024-06-21 RX ADMIN — IOHEXOL 100 ML: 350 INJECTION, SOLUTION INTRAVENOUS at 12:42

## 2024-06-21 NOTE — ED PROVIDER NOTES
"History  Chief Complaint   Patient presents with    Leg Pain     Pt arrives with EMS c/o b/l leg pain \"I was walking around all night and I didn't have any food\"     Foreign Armando is a 63 y.o. male w/ a PMHx of restless leg syndrome, CHF, Carrillo cirrhosis, DM, and nueropathy presenting to the ED c/o bilateral leg pain. Patient is a poor historian and stating he can't answer questions due to the pain. Patient initially admits to having chest pain and then shortly after denies chest pain. He reports that he used to be on Requip for leg pain and restless leg syndrome but he has not taken it in a long time. He denies any injury. He reports he is homeless and was walking around all night due to his restless legs. He was seen in ED yesterday where he was found to have an ALEXANDER and elevated lactic acid. Bilateral venous duplex negative for DVT yesterday. Yesterday patient refused admission and left the ED AMA.         Prior to Admission Medications   Prescriptions Last Dose Informant Patient Reported? Taking?   Alcohol Swabs 70 % PADS   No No   Sig: May substitute brand based on insurance coverage. Check glucose TID.   Blood Glucose Monitoring Suppl (OneTouch Verio Reflect) w/Device KIT   No No   Sig: May substitute brand based on insurance coverage. Check glucose TID.   OneTouch Delica Lancets 33G MISC   No No   Sig: May substitute brand based on insurance coverage. Check glucose TID.   dicyclomine (BENTYL) 20 mg tablet   No No   Sig: Take 1 tablet (20 mg total) by mouth 2 (two) times a day before breakfast and lunch   ferrous sulfate 325 (65 Fe) mg tablet   Yes No   furosemide (LASIX) 20 mg tablet   No No   Sig: Take 1 tablet (20 mg total) by mouth daily   gabapentin (NEURONTIN) 300 mg capsule   Yes No   Sig: Take 300 mg by mouth Three times a day   glucose blood (OneTouch Verio) test strip   No No   Sig: May substitute brand based on insurance coverage. Check glucose TID.   hydrOXYzine HCL (ATARAX) 25 mg tablet   Yes No "   Sig: TAKE 1 TABLET BY MOUTH 3 TIMES A DAY AS NEEDED FOR ITCHING   hydrocortisone (CORTEF) 10 mg tablet   No No   Sig: Take 1 tablet (10 mg total) by mouth 3 (three) times a day   lactulose 20 g/30 mL   No No   Sig: Take 30 mL (20 g total) by mouth daily Do not start before February 28, 2023.   levETIRAcetam (KEPPRA) 500 mg tablet   No No   Sig: Take 1 tablet (500 mg total) by mouth every 12 (twelve) hours   metFORMIN (GLUCOPHAGE) 1000 MG tablet   No No   Sig: Take 1 tablet (1,000 mg total) by mouth 2 (two) times a day with meals   midodrine (PROAMATINE) 5 mg tablet   No No   Sig: Take 2 tablets (10 mg total) by mouth 3 (three) times a day before meals   pancrelipase, Lip-Prot-Amyl, (CREON) 6,000 units delayed release capsule   No No   Sig: Take 4 capsules (24,000 Units total) by mouth 3 (three) times a day with meals   pantoprazole (PROTONIX) 40 mg tablet   No No   Sig: Take 1 tablet (40 mg total) by mouth daily in the early morning Do not start before May 5, 2023.   propranolol (INDERAL) 10 mg tablet   No No   Sig: Take 1 tablet (10 mg total) by mouth every 12 (twelve) hours   rOPINIRole (REQUIP) 4 mg tablet   No No   Sig: Take 1 tablet (4 mg total) by mouth 2 (two) times a day   repaglinide (PRANDIN) 2 mg tablet   No No   Sig: Take 1 tablet (2 mg total) by mouth 3 (three) times a day before meals   senna-docusate sodium (SENOKOT S) 8.6-50 mg per tablet   Yes No   Sig: Take 1 tablet by mouth every evening   spironolactone (ALDACTONE) 100 mg tablet   Yes No   tamsulosin (FLOMAX) 0.4 mg   No No   Sig: Take 1 capsule (0.4 mg total) by mouth daily with dinner      Facility-Administered Medications: None       Past Medical History:   Diagnosis Date    CHF (congestive heart failure) (HCC)     Cirrhosis (HCC)     CHRISTIE    Colon cancer (HCC)     Diabetes mellitus (HCC)     Neuropathy     Restless leg syndrome        Past Surgical History:   Procedure Laterality Date    EGD AND SIGMOIDOSCOPY FLEXIBLE      IR PARACENTESIS   11/22/2022    IR PARACENTESIS  2/2/2023    IR THORACENTESIS  6/4/2024    LEFT COLON RESECTION      LITHOTRIPSY         Family History   Problem Relation Age of Onset    Diabetes Mother     Liver disease Mother     Diabetes Father     Bone cancer Father      I have reviewed and agree with the history as documented.    E-Cigarette/Vaping    E-Cigarette Use Never User      E-Cigarette/Vaping Substances    Nicotine No     THC No     CBD No     Flavoring No     Other No     Unknown No      Social History     Tobacco Use    Smoking status: Former     Current packs/day: 0.25     Types: Cigarettes    Smokeless tobacco: Never   Vaping Use    Vaping status: Never Used   Substance Use Topics    Alcohol use: Not Currently    Drug use: Never       Review of Systems   Constitutional:  Negative for chills and fever.   HENT:  Negative for ear pain and sore throat.    Eyes:  Negative for pain and visual disturbance.   Respiratory:  Negative for cough and shortness of breath.    Cardiovascular:  Positive for chest pain. Negative for palpitations.   Gastrointestinal:  Positive for abdominal pain. Negative for vomiting.   Genitourinary:  Negative for dysuria and hematuria.   Musculoskeletal:  Negative for arthralgias and back pain.        Bilateral leg pain   Skin:  Negative for color change and rash.   Neurological:  Negative for seizures and syncope.   All other systems reviewed and are negative.      Physical Exam  Physical Exam  Vitals and nursing note reviewed.   Constitutional:       Appearance: He is well-developed. He is not ill-appearing, toxic-appearing or diaphoretic.   HENT:      Head: Normocephalic and atraumatic.   Eyes:      Conjunctiva/sclera: Conjunctivae normal.   Cardiovascular:      Rate and Rhythm: Normal rate and regular rhythm.      Heart sounds: No murmur heard.  Pulmonary:      Effort: Pulmonary effort is normal. No respiratory distress.      Breath sounds: Normal breath sounds.   Abdominal:      Palpations:  Abdomen is soft.      Tenderness: There is no abdominal tenderness.      Comments: Diffuse tenderness to light palpation at abdomen.   Musculoskeletal:         General: No swelling.      Cervical back: Neck supple.      Comments: Diffuse tenderness to very light palpation at bilateral lower extremities. No significant redness or swelling. Chronic dry skin and erythema to plantar aspect of feet. Images of feet attached to chart.    Skin:     General: Skin is warm and dry.      Capillary Refill: Capillary refill takes less than 2 seconds.   Neurological:      Mental Status: He is alert.   Psychiatric:         Mood and Affect: Mood normal.      Comments: Patient appears very anxious and hyperactive.               Vital Signs  ED Triage Vitals   Temperature Pulse Respirations Blood Pressure SpO2   06/21/24 1010 06/21/24 1010 06/21/24 1010 06/21/24 1010 06/21/24 1010   98.2 °F (36.8 °C) 93 18 135/64 100 %      Temp Source Heart Rate Source Patient Position - Orthostatic VS BP Location FiO2 (%)   06/21/24 1010 06/21/24 1010 06/21/24 1010 06/21/24 1010 --   Tympanic Monitor Lying Right arm       Pain Score       06/21/24 1207       10 - Worst Possible Pain           Vitals:    06/21/24 1010 06/21/24 1210 06/21/24 1457 06/21/24 1646   BP: 135/64 143/73 122/67 131/78   Pulse: 93 78 62 68   Patient Position - Orthostatic VS: Lying Lying Lying Sitting         Visual Acuity      ED Medications  Medications   sodium chloride 0.9 % bolus 1,000 mL (0 mL Intravenous Stopped 6/21/24 1155)   LORazepam (ATIVAN) injection 1 mg (1 mg Intravenous Given 6/21/24 1049)   HYDROmorphone (DILAUDID) injection 0.5 mg (0.5 mg Intravenous Given 6/21/24 1207)   iohexol (OMNIPAQUE) 350 MG/ML injection (MULTI-DOSE) 100 mL (100 mL Intravenous Given 6/21/24 1242)       Diagnostic Studies  Results Reviewed       Procedure Component Value Units Date/Time    Bilirubin, total [[id number]]  (Abnormal) Collected: 06/21/24 3227    Lab Status: Final result  Specimen: Blood from Arm, Right Updated: 06/21/24 1558     Total Bilirubin 10.07 mg/dL     Narrative:      Specimen Icteric.    HS Troponin I 4hr [[id number]]  (Normal) Collected: 06/21/24 1457    Lab Status: Final result Specimen: Blood from Arm, Right Updated: 06/21/24 1527     hs TnI 4hr 16 ng/L      Delta 4hr hsTnI 1 ng/L     Bilirubin, direct [[id number]]  (Abnormal) Collected: 06/21/24 1034    Lab Status: Final result Specimen: Blood from Arm, Right Updated: 06/21/24 1456     Bilirubin, Direct 4.60 mg/dL     Narrative:      Specimen Icteric.    Lactic acid 2 Hours [[id number]]  (Normal) Collected: 06/21/24 1319    Lab Status: Final result Specimen: Blood from Arm, Right Updated: 06/21/24 1350     LACTIC ACID 1.3 mmol/L     Narrative:      Specimen Icteric.  Result may be elevated if tourniquet was used during collection.    HS Troponin I 2hr [[id number]]  (Normal) Collected: 06/21/24 1228    Lab Status: Final result Specimen: Blood from Arm, Right Updated: 06/21/24 1259     hs TnI 2hr 14 ng/L      Delta 2hr hsTnI -1 ng/L     UA w Reflex to Microscopic w Reflex to Culture [[id number]]     Lab Status: No result Specimen: Urine     Lactic acid, plasma (w/reflex if result > 2.0) [[id number]]  (Abnormal) Collected: 06/21/24 1034    Lab Status: Final result Specimen: Blood from Arm, Right Updated: 06/21/24 1120     LACTIC ACID 2.9 mmol/L     Narrative:      Specimen Icteric.  Result may be elevated if tourniquet was used during collection.    Comprehensive metabolic panel [[id number]]  (Abnormal) Collected: 06/21/24 1034    Lab Status: Final result Specimen: Blood from Arm, Right Updated: 06/21/24 1120     Sodium 133 mmol/L      Potassium 4.6 mmol/L      Chloride 98 mmol/L      CO2 21 mmol/L      ANION GAP 14 mmol/L      BUN 43 mg/dL      Creatinine 1.34 mg/dL      Glucose 158 mg/dL      Calcium 8.9 mg/dL      Corrected Calcium 9.5 mg/dL      AST 39 U/L      ALT 50 U/L      Alkaline Phosphatase 102 U/L      Total Protein  6.2 g/dL      Albumin 3.3 g/dL      Total Bilirubin 11.96 mg/dL      eGFR 55 ml/min/1.73sq m     Narrative:      Specimen Icteric.  National Kid[employer] Foundation guidelines for Chronic Kidney Disease (CKD):     Stage 1 with normal or high GFR (GFR > 90 mL/min/1.73 square meters)    Stage 2 Mild CKD (GFR = 60-89 mL/min/1.73 square meters)    Stage 3A Moderate CKD (GFR = 45-59 mL/min/1.73 square meters)    Stage 3B Moderate CKD (GFR = 30-44 mL/min/1.73 square meters)    Stage 4 Severe CKD (GFR = 15-29 mL/min/1.73 square meters)    Stage 5 End Stage CKD (GFR <15 mL/min/1.73 square meters)  Note: GFR calculation is accurate only with a steady state creatinine    CK [[id number]]  (Normal) Collected: 06/21/24 1034    Lab Status: Final result Specimen: Blood from Arm, Right Updated: 06/21/24 1120     Total  U/L     Narrative:      Specimen Icteric.    B-Type Natriuretic Peptide(BNP) [[id number]]  (Normal) Collected: 06/21/24 1034    Lab Status: Final result Specimen: Blood from Arm, Right Updated: 06/21/24 1105     BNP 54 pg/mL     HS Troponin 0hr (reflex protocol) [[id number]]  (Normal) Collected: 06/21/24 1034    Lab Status: Final result Specimen: Blood from Arm, Right Updated: 06/21/24 1105     hs TnI 0hr 15 ng/L     CBC and differential [[id number]]  (Abnormal) Collected: 06/21/24 1034    Lab Status: Final result Specimen: Blood from Arm, Right Updated: 06/21/24 1043     WBC 8.76 Thousand/uL      RBC 3.03 Million/uL      Hemoglobin 10.6 g/dL      Hematocrit 30.1 %      MCV 99 fL      MCH 35.0 pg      MCHC 35.2 g/dL      RDW 15.9 %      MPV 10.5 fL      Platelets 61 Thousands/uL      nRBC 0 /100 WBCs      Segmented % 74 %      Immature Grans % 1 %      Lymphocytes % 12 %      Monocytes % 10 %      Eosinophils Relative 2 %      Basophils Relative 1 %      Absolute Neutrophils 6.54 Thousands/µL      Absolute Immature Grans 0.04 Thousand/uL      Absolute Lymphocytes 1.03 Thousands/µL      Absolute Monocytes 0.90  Thousand/µL      Eosinophils Absolute 0.20 Thousand/µL      Basophils Absolute 0.05 Thousands/µL                    CTA dissection protocol chest/abdomen/pelvis   Final Result by Azeb Marcelino MD (06/21 1406)   No thoracic aortic dissection      No pulmonary embolism      Cirrhotic liver      Splenomegaly with cirrhosis raise concern for portal hypertension      Incidentally noted cystic pancreatic lesion in the body and tail of the pancreas with the larger measuring 1.3 cm, unchanged from the previous study of Liv 3 2024 can be assessed with nonemergent MRI along with MRCP. These probably represent small IPMN      Upper abdominal lymphadenopathy in the gastrocolic ligament, zeyad hepatis, in the portacaval region, stable likely related to chronic liver disease      Small para-aortic lymph nodes, stable. The previously noted mesenteric fatty lesion, stable may be sequela of fat necrosis   Mild pericholecystic fluid may related to chronic liver disease, unchanged               Workstation performed: [id number]                    Procedures  ECG 12 Lead Documentation Only    Date/Time: 6/21/2024 11:57 AM    Performed by: Kay Boyle PA-C  Authorized by: Kay Boyle PA-C    Indications / Diagnosis:  Chest pain  ECG reviewed by me, the ED Provider: yes    Patient location:  ED  Previous ECG:     Previous ECG:  Unavailable  Interpretation:     Interpretation: normal    Rate:     ECG rate:  89    ECG rate assessment: normal    Rhythm:     Rhythm: sinus rhythm    Ectopy:     Ectopy: none    QRS:     QRS axis:  Normal    QRS intervals:  Normal  Conduction:     Conduction: normal    ST segments:     ST segments:  Normal  T waves:     T waves: normal             ED Course  ED Course as of 06/21/24 1725   Fri Jun 21, 2024   1159 Creatinine(!): 1.34  ALEXANDER   1159 Total Bilirubin(!): 11.96  More significantly elevated when compared to yesterday. LFTs normal.    1159 CBC and differential(!)  Baseline hemoglobin, white  count, and platelets.    1200 LACTIC ACID(!): 2.9  Increased similar to yesterday. Vital signs stable. Low suspicion for infection. Suspect secondary to dehydration. Given liter of fluids and will recheck.   1200 Total CK: 182  Normal   1200 BNP: 54  Normal    1201 hs TnI 0hr: 15  Will need delta   1201 Patient re-evaluated. He still appears to be very restless and complaining of full body pain. He is now complaining of the chest pain which radiates to the mid upper back and abdominal pain. Will order dissection study. Will give dilaudid for pain.    1410 CTA dissection protocol chest/abdomen/pelvis  No thoracic aortic dissection     No pulmonary embolism     Cirrhotic liver     Splenomegaly with cirrhosis raise concern for portal hypertension     Incidentally noted cystic pancreatic lesion in the body and tail of the pancreas with the larger measuring 1.3 cm, unchanged from the previous study of Liv 3 2024 can be assessed with nonemergent MRI along with MRCP. These probably represent small IPMN     Upper abdominal lymphadenopathy in the gastrocolic ligament, zeyad hepatis, in the portacaval region, stable likely related to chronic liver disease     Small para-aortic lymph nodes, stable. The previously noted mesenteric fatty lesion, stable may be sequela of fat necrosis  Mild pericholecystic fluid may related to chronic liver disease, unchanged                                 SBIRT 22yo+      Flowsheet Row Most Recent Value   Initial Alcohol Screen: US AUDIT-C     1. How often do you have a drink containing alcohol? 0 Filed at: 06/21/2024 1013   2. How many drinks containing alcohol do you have on a typical day you are drinking?  0 Filed at: 06/21/2024 1013   3a. Male UNDER 65: How often do you have five or more drinks on one occasion? 0 Filed at: 06/21/2024 1013   Audit-C Score 0 Filed at: 06/21/2024 1013   LUCAS: How many times in the past year have you...    Used an illegal drug or used a prescription medication  "for non-medical reasons? Never Filed at: 06/21/2024 1013                      Medical Decision Making  Foreign Armando is a 63 y.o. male w/ a PMHx of restless leg syndrome, CHF, Carrillo cirrhosis, DM, and nueropathy presenting to the ED c/o bilateral leg pain. On exam pt appears anxious and hyperactive.  Vital signs are within normal limits.  Physical examination limited by patient's anxiety and hyperactivity.  He does appear to have tenderness to very light palpation of the abdomen and bilateral lower extremities.  No significant swelling or erythema.  No bony deformities.  Strength equal bilateral legs and patient is able to ambulate.  Examination is otherwise unremarkable.  I discussed patient that we will redraw the labs from yesterday and attempt treatment with Ativan at this time.  He appears to be agreeable.    Patient's creatinine is still elevated and his bilirubin is significantly elevated at 11.9.  CBC and differential is baseline.  Lactic acid is still elevated.  Patient was given a liter of fluids.  When I reevaluated patient he states that he has chest pain radiating to his back.  At this time I ordered dissection study which shows \"No thoracic aortic dissection. No pulmonary embolism. Cirrhotic liver. Splenomegaly with cirrhosis raise concern for portal hypertension. Incidentally noted cystic pancreatic lesion in the body and tail of the pancreas with the larger measuring 1.3 cm, unchanged from the previous study of Liv 3 2024 can be assessed with nonemergent MRI along with MRCP. These probably represent small IPMN. Upper abdominal lymphadenopathy in the gastrocolic ligament, zeyad hepatis, in the portacaval region, stable likely related to chronic liver disease. Small para-aortic lymph nodes, stable. The previously noted mesenteric fatty lesion, stable may be sequela of fat necrosis. Mild pericholecystic fluid may related to chronic liver disease, unchanged\".  I discussed this case with internal " medicine and they recommend discussing with gastroenterology.  I discussed this case with Dr. Boyer from gastroenterology and he recommends admission to either [facility] or [facility] depending on patient preference.  Discussed imaging and lab results with patient.  He prefers to go to [facility]. Transfer orders placed.    Discussed case with and signed out to Yesy Delvalle PA-C    Problems Addressed:  Abdominal lymphadenopathy: acute illness or injury  ALEXANDER (acute kidney injury) (HCC): acute illness or injury  Bilateral leg pain: acute illness or injury  Cirrhosis of liver (HCC): chronic illness or injury  Elevated bilirubin: acute illness or injury  Pancreatic lesion: acute illness or injury    Amount and/or Complexity of Data Reviewed  Labs: ordered. Decision-making details documented in ED Course.  Radiology: ordered. Decision-making details documented in ED Course.    Risk  Prescription drug management.             Disposition  Final diagnoses:   ALEXANDER (acute kidney injury) (HCC)   Elevated bilirubin   Bilateral leg pain   Cirrhosis of liver (HCC)   Pancreatic lesion   Abdominal lymphadenopathy     Time reflects when diagnosis was documented in both MDM as applicable and the Disposition within this note       Time User Action Codes Description Comment    6/21/2024  4:15 PM Kay Boyle Add [N17.9] ALEXANDER (acute kidney injury) (HCC)     6/21/2024  4:17 PM Carlos Eduardo Boyleuz Add [R17] Elevated bilirubin     6/21/2024  4:17 PM Carlos Eduardo Boyleuz Add [M79.604,  M79.605] Bilateral leg pain     6/21/2024  4:17 PM Luis Angel Boyleeruz Add [K74.60] Cirrhosis of liver (HCC)     6/21/2024  4:17 PM Kay Boyle Add [K86.9] Pancreatic lesion     6/21/2024  4:17 PM Kay Boyle Add [R59.0] Abdominal lymphadenopathy           ED Disposition       ED Disposition   Transfer to Another Facility-In Network    Condition   --    Date/Time   Fri Jun 21, 2024 1641    Comment   Foreign Armando should be transferred out to [facility]  [place].               Follow-up Information    None         Patient's Medications   Discharge Prescriptions    No medications on file       No discharge procedures on file.    PDMP Review         Value Time User    PDMP Reviewed  Yes 5/4/2023 11:47 AM Margaret Shell PA-C            ED Provider  Electronically Signed by             Kay Boyle PA-C  06/21/24 3163

## 2024-06-21 NOTE — ED NOTES
Fund pt on the corner of the room peeing on the wall/floor. Handed urinal that was next to pt to him and asked him to use the urinal. Pt took urinal, walked across the room, placed urinal on the counter and went on to urinate on the other side of the room on the floor. While this nurse stepped out to contact EVS pt defecated all over the floor. Took pt to bathroom, cleaned pt and changed into a new gown. EVS cleaned room. Pt  confused. Back in bed, will continue to monitor.      Clau Degroot RN  06/21/24 1860

## 2024-06-22 ENCOUNTER — APPOINTMENT (OUTPATIENT)
Dept: ULTRASOUND IMAGING | Facility: HOSPITAL | Age: 64
DRG: 433 | End: 2024-06-22
Payer: COMMERCIAL

## 2024-06-22 PROBLEM — E87.20 LACTIC ACIDOSIS: Status: RESOLVED | Noted: 2024-06-21 | Resolved: 2024-06-22

## 2024-06-22 LAB
AFP-TM SERPL-MCNC: 1.97 NG/ML (ref 0–9)
ALBUMIN SERPL BCG-MCNC: 2.6 G/DL (ref 3.5–5)
ALP SERPL-CCNC: 75 U/L (ref 34–104)
ALT SERPL W P-5'-P-CCNC: 38 U/L (ref 7–52)
ANION GAP SERPL CALCULATED.3IONS-SCNC: 5 MMOL/L (ref 4–13)
AST SERPL W P-5'-P-CCNC: 31 U/L (ref 13–39)
BASOPHILS # BLD AUTO: 0.02 THOUSANDS/ÂΜL (ref 0–0.1)
BASOPHILS NFR BLD AUTO: 1 % (ref 0–1)
BILIRUB SERPL-MCNC: 7.5 MG/DL (ref 0.2–1)
BUN SERPL-MCNC: 31 MG/DL (ref 5–25)
CALCIUM ALBUM COR SERPL-MCNC: 9.7 MG/DL (ref 8.3–10.1)
CALCIUM SERPL-MCNC: 8.6 MG/DL (ref 8.4–10.2)
CHLORIDE SERPL-SCNC: 104 MMOL/L (ref 96–108)
CO2 SERPL-SCNC: 26 MMOL/L (ref 21–32)
CREAT SERPL-MCNC: 0.86 MG/DL (ref 0.6–1.3)
EOSINOPHIL # BLD AUTO: 0.16 THOUSAND/ÂΜL (ref 0–0.61)
EOSINOPHIL NFR BLD AUTO: 5 % (ref 0–6)
ERYTHROCYTE [DISTWIDTH] IN BLOOD BY AUTOMATED COUNT: 16 % (ref 11.6–15.1)
GFR SERPL CREATININE-BSD FRML MDRD: 92 ML/MIN/1.73SQ M
GLUCOSE SERPL-MCNC: 204 MG/DL (ref 65–140)
GLUCOSE SERPL-MCNC: 207 MG/DL (ref 65–140)
GLUCOSE SERPL-MCNC: 298 MG/DL (ref 65–140)
GLUCOSE SERPL-MCNC: 85 MG/DL (ref 65–140)
GLUCOSE SERPL-MCNC: 91 MG/DL (ref 65–140)
GLUCOSE SERPL-MCNC: 91 MG/DL (ref 65–140)
HCT VFR BLD AUTO: 25.2 % (ref 36.5–49.3)
HGB BLD-MCNC: 8.4 G/DL (ref 12–17)
IMM GRANULOCYTES # BLD AUTO: 0.01 THOUSAND/UL (ref 0–0.2)
IMM GRANULOCYTES NFR BLD AUTO: 0 % (ref 0–2)
INR PPP: 1.61 (ref 0.84–1.19)
LYMPHOCYTES # BLD AUTO: 0.47 THOUSANDS/ÂΜL (ref 0.6–4.47)
LYMPHOCYTES NFR BLD AUTO: 15 % (ref 14–44)
MCH RBC QN AUTO: 33.6 PG (ref 26.8–34.3)
MCHC RBC AUTO-ENTMCNC: 33.3 G/DL (ref 31.4–37.4)
MCV RBC AUTO: 101 FL (ref 82–98)
MONOCYTES # BLD AUTO: 0.33 THOUSAND/ÂΜL (ref 0.17–1.22)
MONOCYTES NFR BLD AUTO: 11 % (ref 4–12)
NEUTROPHILS # BLD AUTO: 2.11 THOUSANDS/ÂΜL (ref 1.85–7.62)
NEUTS SEG NFR BLD AUTO: 68 % (ref 43–75)
NRBC BLD AUTO-RTO: 0 /100 WBCS
PLATELET # BLD AUTO: 27 THOUSANDS/UL (ref 149–390)
PMV BLD AUTO: 10.8 FL (ref 8.9–12.7)
POTASSIUM SERPL-SCNC: 4.3 MMOL/L (ref 3.5–5.3)
PROT SERPL-MCNC: 5 G/DL (ref 6.4–8.4)
PROTHROMBIN TIME: 19.3 SECONDS (ref 11.6–14.5)
RBC # BLD AUTO: 2.5 MILLION/UL (ref 3.88–5.62)
SODIUM SERPL-SCNC: 135 MMOL/L (ref 135–147)
WBC # BLD AUTO: 3.1 THOUSAND/UL (ref 4.31–10.16)

## 2024-06-22 PROCEDURE — 99233 SBSQ HOSP IP/OBS HIGH 50: CPT | Performed by: INTERNAL MEDICINE

## 2024-06-22 PROCEDURE — 82105 ALPHA-FETOPROTEIN SERUM: CPT | Performed by: STUDENT IN AN ORGANIZED HEALTH CARE EDUCATION/TRAINING PROGRAM

## 2024-06-22 PROCEDURE — 99222 1ST HOSP IP/OBS MODERATE 55: CPT | Performed by: INTERNAL MEDICINE

## 2024-06-22 PROCEDURE — 85610 PROTHROMBIN TIME: CPT | Performed by: NURSE PRACTITIONER

## 2024-06-22 PROCEDURE — 76705 ECHO EXAM OF ABDOMEN: CPT

## 2024-06-22 PROCEDURE — 82948 REAGENT STRIP/BLOOD GLUCOSE: CPT

## 2024-06-22 PROCEDURE — 85025 COMPLETE CBC W/AUTO DIFF WBC: CPT | Performed by: NURSE PRACTITIONER

## 2024-06-22 PROCEDURE — 80053 COMPREHEN METABOLIC PANEL: CPT | Performed by: NURSE PRACTITIONER

## 2024-06-22 RX ORDER — SPIRONOLACTONE 50 MG/1
100 TABLET, FILM COATED ORAL DAILY
Status: DISCONTINUED | OUTPATIENT
Start: 2024-06-22 | End: 2024-06-25 | Stop reason: HOSPADM

## 2024-06-22 RX ORDER — HYDROCODONE BITARTRATE AND ACETAMINOPHEN 5; 325 MG/1; MG/1
1 TABLET ORAL EVERY 6 HOURS PRN
Status: DISCONTINUED | OUTPATIENT
Start: 2024-06-22 | End: 2024-06-25 | Stop reason: HOSPADM

## 2024-06-22 RX ORDER — HYDROMORPHONE HCL IN WATER/PF 6 MG/30 ML
0.2 PATIENT CONTROLLED ANALGESIA SYRINGE INTRAVENOUS EVERY 6 HOURS PRN
Status: DISCONTINUED | OUTPATIENT
Start: 2024-06-22 | End: 2024-06-25 | Stop reason: HOSPADM

## 2024-06-22 RX ORDER — GABAPENTIN 300 MG/1
300 CAPSULE ORAL 3 TIMES DAILY
Status: DISCONTINUED | OUTPATIENT
Start: 2024-06-22 | End: 2024-06-25 | Stop reason: HOSPADM

## 2024-06-22 RX ORDER — HYDROMORPHONE HCL IN WATER/PF 6 MG/30 ML
0.2 PATIENT CONTROLLED ANALGESIA SYRINGE INTRAVENOUS EVERY 6 HOURS PRN
Status: DISCONTINUED | OUTPATIENT
Start: 2024-06-22 | End: 2024-06-22

## 2024-06-22 RX ORDER — METHOCARBAMOL 750 MG/1
750 TABLET, FILM COATED ORAL EVERY 6 HOURS PRN
Status: DISCONTINUED | OUTPATIENT
Start: 2024-06-22 | End: 2024-06-25 | Stop reason: HOSPADM

## 2024-06-22 RX ORDER — FUROSEMIDE 20 MG/1
20 TABLET ORAL DAILY
Status: DISCONTINUED | OUTPATIENT
Start: 2024-06-22 | End: 2024-06-25 | Stop reason: HOSPADM

## 2024-06-22 RX ADMIN — MIDODRINE HYDROCHLORIDE 10 MG: 5 TABLET ORAL at 06:26

## 2024-06-22 RX ADMIN — GABAPENTIN 300 MG: 300 CAPSULE ORAL at 20:58

## 2024-06-22 RX ADMIN — HYDROCORTISONE 10 MG: 10 TABLET ORAL at 15:55

## 2024-06-22 RX ADMIN — HYDROMORPHONE HYDROCHLORIDE 0.2 MG: 0.2 INJECTION, SOLUTION INTRAMUSCULAR; INTRAVENOUS; SUBCUTANEOUS at 22:17

## 2024-06-22 RX ADMIN — TAMSULOSIN HYDROCHLORIDE 0.4 MG: 0.4 CAPSULE ORAL at 15:55

## 2024-06-22 RX ADMIN — SPIRONOLACTONE 100 MG: 50 TABLET ORAL at 08:51

## 2024-06-22 RX ADMIN — HYDROCODONE BITARTRATE AND ACETAMINOPHEN 1 TABLET: 5; 325 TABLET ORAL at 20:16

## 2024-06-22 RX ADMIN — HYDROCORTISONE 10 MG: 10 TABLET ORAL at 08:03

## 2024-06-22 RX ADMIN — MIDODRINE HYDROCHLORIDE 10 MG: 5 TABLET ORAL at 15:55

## 2024-06-22 RX ADMIN — PANTOPRAZOLE SODIUM 40 MG: 40 TABLET, DELAYED RELEASE ORAL at 06:26

## 2024-06-22 RX ADMIN — LEVETIRACETAM 500 MG: 500 TABLET, FILM COATED ORAL at 08:03

## 2024-06-22 RX ADMIN — INSULIN LISPRO 3 UNITS: 100 INJECTION, SOLUTION INTRAVENOUS; SUBCUTANEOUS at 17:32

## 2024-06-22 RX ADMIN — PANCRELIPASE 24000 UNITS: 24000; 76000; 120000 CAPSULE, DELAYED RELEASE PELLETS ORAL at 15:54

## 2024-06-22 RX ADMIN — ROPINIROLE HYDROCHLORIDE 4 MG: 1 TABLET, FILM COATED ORAL at 20:16

## 2024-06-22 RX ADMIN — SODIUM CHLORIDE 50 ML/HR: 0.9 INJECTION, SOLUTION INTRAVENOUS at 15:59

## 2024-06-22 RX ADMIN — ROPINIROLE HYDROCHLORIDE 4 MG: 1 TABLET, FILM COATED ORAL at 08:04

## 2024-06-22 RX ADMIN — LEVETIRACETAM 500 MG: 500 TABLET, FILM COATED ORAL at 20:16

## 2024-06-22 RX ADMIN — MIDODRINE HYDROCHLORIDE 10 MG: 5 TABLET ORAL at 11:39

## 2024-06-22 RX ADMIN — HYDROCORTISONE 10 MG: 10 TABLET ORAL at 20:16

## 2024-06-22 RX ADMIN — FUROSEMIDE 20 MG: 20 TABLET ORAL at 08:52

## 2024-06-22 RX ADMIN — FERROUS SULFATE TAB 325 MG (65 MG ELEMENTAL FE) 325 MG: 325 (65 FE) TAB at 08:04

## 2024-06-22 NOTE — ED CARE HANDOFF
"Emergency Department Sign Out Note        Sign out and transfer of care from Kay Boyle PA-C. See Separate Emergency Department note.     The patient, Foreign Armando, was evaluated by the previous provider for b/l leg pain pain, additional pain radiating to the back and notable pancreatic CT abnls and elevated bili.        Workup Completed:  Blood work, CTA, EKG    ED Course / Workup Pending (followup):  Transfer alone is pending for this patient to [facility] to follow-up with gastroenterology due to CT findings of pancreatic cyst and elevated bilirubin.  Patient was accepted after discussion with internal medicine to the care of  pending transfer to [facility]    JERMAINE Boyle has already informed the patient of admission, transfer and workup results patient was agreeable, no incidents occurred with the patient under my care, see prior note for further information regarding HPI, physical exam, MDM.               Procedures  Medical Decision Making    All imaging and/or lab testing discussed with patient by JERMAINE Boyle Patient and/or family members verbalizes understanding and agrees with plan for transfer and admission. Patient is stable for transfer and admission.     Portions of the record may have been created with voice recognition software. Occasional wrong word or \"sound a like\" substitutions may have occurred due to the inherent limitations of voice recognition software. Read the chart carefully and recognize, using context, where substitutions have occurred.              Disposition  Final diagnoses:   Hyperbilirubinemia   Decompensated hepatic cirrhosis (HCC)     Time reflects when diagnosis was documented in both MDM as applicable and the Disposition within this note       Time User Action Codes Description Comment    6/21/2024 10:49 PM Rufina De La Cruz Add [E80.6] Hyperbilirubinemia     6/21/2024 10:49 PM Rufina De La Cruz Add [K72.90,  K74.60] Decompensated hepatic " cirrhosis (HCC)           ED Disposition       None          Follow-up Information    None       Current Discharge Medication List        CONTINUE these medications which have NOT CHANGED    Details   Alcohol Swabs 70 % PADS May substitute brand based on insurance coverage. Check glucose TID.  Qty: 100 each, Refills: 0    Associated Diagnoses: Type 2 diabetes mellitus with other specified complication, unspecified whether long term insulin use ([facility])      Blood Glucose Monitoring Suppl (OneTouch Verio Reflect) w/Device KIT May substitute brand based on insurance coverage. Check glucose TID.  Qty: 1 kit, Refills: 0    Associated Diagnoses: Type 2 diabetes mellitus with other specified complication, unspecified whether long term insulin use ([facility])      dicyclomine (BENTYL) 20 mg tablet Take 1 tablet (20 mg total) by mouth 2 (two) times a day before breakfast and lunch  Qty: 60 tablet, Refills: 0    Associated Diagnoses: Abdominal pain, unspecified abdominal location      ferrous sulfate 325 (65 Fe) mg tablet       furosemide (LASIX) 20 mg tablet Take 1 tablet (20 mg total) by mouth daily  Qty: 30 tablet, Refills: 0    Associated Diagnoses: Abdominal pain, unspecified abdominal location      gabapentin (NEURONTIN) 300 mg capsule Take 300 mg by mouth Three times a day      glucose blood (OneTouch Verio) test strip May substitute brand based on insurance coverage. Check glucose TID.  Qty: 100 each, Refills: 0    Associated Diagnoses: Type 2 diabetes mellitus with other specified complication, unspecified whether long term insulin use ([facility])      hydrocortisone (CORTEF) 10 mg tablet Take 1 tablet (10 mg total) by mouth 3 (three) times a day  Qty: 90 tablet, Refills: 0    Associated Diagnoses: Adrenal insufficiency ([facility])      hydrOXYzine HCL (ATARAX) 25 mg tablet TAKE 1 TABLET BY MOUTH 3 TIMES A DAY AS NEEDED FOR ITCHING      lactulose 20 g/30 mL Take 30 mL (20 g total) by mouth daily Do not start before February 28,  2023.  Qty: 1800 mL, Refills: 0    Associated Diagnoses: Cirrhosis of liver with ascites, unspecified hepatic cirrhosis type  (HCC)      levETIRAcetam (KEPPRA) 500 mg tablet Take 1 tablet (500 mg total) by mouth every 12 (twelve) hours  Qty: 60 tablet, Refills: 0    Associated Diagnoses: Seizure-like activity (HCC)      metFORMIN (GLUCOPHAGE) 1000 MG tablet Take 1 tablet (1,000 mg total) by mouth 2 (two) times a day with meals  Qty: 60 tablet, Refills: 0    Associated Diagnoses: Type 2 diabetes mellitus with other specified complication, unspecified whether long term insulin use (HCC)      midodrine (PROAMATINE) 5 mg tablet Take 2 tablets (10 mg total) by mouth 3 (three) times a day before meals  Qty: 90 tablet, Refills: 0    Associated Diagnoses: Hx of cirrhosis; Hepatorenal syndrome (HCC)      OneTouch Delica Lancets 33G MISC May substitute brand based on insurance coverage. Check glucose TID.  Qty: 100 each, Refills: 0    Associated Diagnoses: Type 2 diabetes mellitus with other specified complication, unspecified whether long term insulin use (HCC)      pancrelipase, Lip-Prot-Amyl, (CREON) 6,000 units delayed release capsule Take 4 capsules (24,000 Units total) by mouth 3 (three) times a day with meals  Qty: 360 capsule, Refills: 0    Associated Diagnoses: Hepatorenal syndrome (HCC)      pantoprazole (PROTONIX) 40 mg tablet Take 1 tablet (40 mg total) by mouth daily in the early morning Do not start before May 5, 2023.  Qty: 30 tablet, Refills: 0    Associated Diagnoses: Hx of cirrhosis      propranolol (INDERAL) 10 mg tablet Take 1 tablet (10 mg total) by mouth every 12 (twelve) hours  Qty: 60 tablet, Refills: 0    Associated Diagnoses: Gastrointestinal hemorrhage, unspecified gastrointestinal hemorrhage type      repaglinide (PRANDIN) 2 mg tablet Take 1 tablet (2 mg total) by mouth 3 (three) times a day before meals  Qty: 90 tablet, Refills: 0    Associated Diagnoses: Type 2 diabetes mellitus with other  specified complication, unspecified whether long term insulin use (HCC)      rOPINIRole (REQUIP) 4 mg tablet Take 1 tablet (4 mg total) by mouth 2 (two) times a day  Qty: 60 tablet, Refills: 0    Associated Diagnoses: Hx of cirrhosis      senna-docusate sodium (SENOKOT S) 8.6-50 mg per tablet Take 1 tablet by mouth every evening      spironolactone (ALDACTONE) 100 mg tablet       tamsulosin (FLOMAX) 0.4 mg Take 1 capsule (0.4 mg total) by mouth daily with dinner  Qty: 30 capsule, Refills: 0    Associated Diagnoses: Hepatorenal syndrome (HCC)           No discharge procedures on file.       ED Provider  Electronically Signed by     Yesy Pimentel PA-C  06/21/24 [id number]

## 2024-06-22 NOTE — ASSESSMENT & PLAN NOTE
"No evidence of hepatic encephalopathy  Continue frequent orientation  Patient seen by neuropsych during admission at [place] on June 17 and determined to have capacity     Previously at [facility], per documentation:  \"Patient had a change in mental status for which he underwent normal CT head and normal EEG. He was found to have ammonia of 271, was started on Lactulose and Rifaximin with improvement in his mental status. During course of hospitalization he exhibited inappropriate sexual behaviors with female staff requiring male only care. He intermittently refused medical treatments that was incongruent with his symptoms, for example refusing pain medications for reported pain. He was therefore seen by Psychiatry and evaluated as having capacity to leave [facility].\"  "

## 2024-06-22 NOTE — ASSESSMENT & PLAN NOTE
Known liver cirrhosis 2/2 CHRISTIE, decompensated with ascites, hepatic encephalopathy, varices, pancytopenia, coagulopathy and hepatic hydrothorax.  Transferred from [place] for hyperbilirubinemia increased from baseline of 2, now 11.9    EV: Propranolol 10mg BID  HE: During admission at  was switched off lactulose for rifaximin due to intolerable diarrhea.  Insurance did not cover rifaximin therefore he was discharged on lactulose 20g qd  Diuretics: Furosemide 20mg qd and aldactone 100mg qd.  Hold given ALEXANDER  Monitor CBC, CMP and INR daily  GI consult    Patient's MELD Score is: 20    MELD Score 90-day mortality   ?9 1.9%   10-19 6.0%   20-29 19.6%   30-39 52.6%   ?40 71.3%     MELD Score Component Values:  Component Value Date   Creatinine: 0.86 mg/dL 6/22/2024   Dialysis at least twice   in the past week  Or CVVHD for ?24 hours   in the past week:     No    Bilirubin, total: 7.5 mg/dL 6/22/2024   INR: 1.61 6/22/2024   Sodium: 135 mmol/L 6/22/2024

## 2024-06-22 NOTE — ASSESSMENT & PLAN NOTE
"Noted urinating on floor and walls while at [place].  He was given urinal then placed it on the counter and defecated on the floor  Check serum ammonia level  Patient seen by neuropsych during admission at [place] on June 17 and determined to have capacity     Previously at [facility], per documentation:  \"Patient had a change in mental status for which he underwent normal CT head and normal EEG. He was found to have ammonia of 271, was started on Lactulose and Rifaximin with improvement in his mental status. During course of hospitalization he exhibited inappropriate sexual behaviors with female staff requiring male only care. He intermittently refused medical treatments that was incongruent with his symptoms, for example refusing pain medications for reported pain. He was therefore seen by Psychiatry and evaluated as having capacity to leave [facility].\"  "

## 2024-06-22 NOTE — PLAN OF CARE
Problem: Prexisting or High Potential for Compromised Skin Integrity  Goal: Skin integrity is maintained or improved  Description: INTERVENTIONS:  - Identify patients at risk for skin breakdown  - Assess and monitor skin integrity  - Assess and monitor nutrition and hydration status  - Monitor labs   - Assess for incontinence   - Turn and reposition patient  - Assist with mobility/ambulation  - Relieve pressure over bony prominences  - Avoid friction and shearing  - Provide appropriate hygiene as needed including keeping skin clean and dry  - Evaluate need for skin moisturizer/barrier cream  - Collaborate with interdisciplinary team   - Patient/family teaching  - Consider wound care consult   Outcome: Progressing     Problem: PAIN - ADULT  Goal: Verbalizes/displays adequate comfort level or baseline comfort level  Description: Interventions:  - Encourage patient to monitor pain and request assistance  - Assess pain using appropriate pain scale  - Administer analgesics based on type and severity of pain and evaluate response  - Implement non-pharmacological measures as appropriate and evaluate response  - Consider cultural and social influences on pain and pain management  - Notify physician/advanced practitioner if interventions unsuccessful or patient reports new pain  Outcome: Progressing     Problem: INFECTION - ADULT  Goal: Absence or prevention of progression during hospitalization  Description: INTERVENTIONS:  - Assess and monitor for signs and symptoms of infection  - Monitor lab/diagnostic results  - Monitor all insertion sites, i.e. indwelling lines, tubes, and drains  - Monitor endotracheal if appropriate and nasal secretions for changes in amount and color  - [place] appropriate cooling/warming therapies per order  - Administer medications as ordered  - Instruct and encourage patient and family to use good hand hygiene technique  - Identify and instruct in appropriate isolation precautions for  identified infection/condition  Outcome: Progressing     Problem: SAFETY ADULT  Goal: Patient will remain free of falls  Description: INTERVENTIONS:  - Educate patient/family on patient safety including physical limitations  - Instruct patient to call for assistance with activity   - Consult OT/PT to assist with strengthening/mobility   - Keep Call bell within reach  - Keep bed low and locked with side rails adjusted as appropriate  - Keep care items and personal belongings within reach  - Initiate and maintain comfort rounds  - Make Fall Risk Sign visible to staff  - Offer Toileting every 2 Hours, in advance of need  - Initiate/Maintain bed alarm  - Obtain necessary fall risk management equipment: bed alarm  - Apply yellow socks and bracelet for high fall risk patients  - Consider moving patient to room near nurses station  Outcome: Progressing  Goal: Maintain or return to baseline ADL function  Description: INTERVENTIONS:  -  Assess patient's ability to carry out ADLs; assess patient's baseline for ADL function and identify physical deficits which impact ability to perform ADLs (bathing, care of mouth/teeth, toileting, grooming, dressing, etc.)  - Assess/evaluate cause of self-care deficits   - Assess range of motion  - Assess patient's mobility; develop plan if impaired  - Assess patient's need for assistive devices and provide as appropriate  - Encourage maximum independence but intervene and supervise when necessary  - Involve family in performance of ADLs  - Assess for home care needs following discharge   - Consider OT consult to assist with ADL evaluation and planning for discharge  - Provide patient education as appropriate  Outcome: Progressing     Problem: DISCHARGE PLANNING  Goal: Discharge to home or other facility with appropriate resources  Description: INTERVENTIONS:  - Identify barriers to discharge w/patient and caregiver  - Arrange for needed discharge resources and transportation as  appropriate  - Identify discharge learning needs (meds, wound care, etc.)  - Arrange for interpretive services to assist at discharge as needed  - Refer to Case Management Department for coordinating discharge planning if the patient needs post-hospital services based on physician/advanced practitioner order or complex needs related to functional status, cognitive ability, or social support system  Outcome: Progressing     Problem: Knowledge Deficit  Goal: Patient/family/caregiver demonstrates understanding of disease process, treatment plan, medications, and discharge instructions  Description: Complete learning assessment and assess knowledge base.  Interventions:  - Provide teaching at level of understanding  - Provide teaching via preferred learning methods  Outcome: Progressing     Problem: NEUROSENSORY - ADULT  Goal: Achieves stable or improved neurological status  Description: INTERVENTIONS  - Monitor and report changes in neurological status  - Monitor vital signs such as temperature, blood pressure, glucose, and any other labs ordered   - Initiate measures to prevent increased intracranial pressure  - Monitor for seizure activity and implement precautions if appropriate      Outcome: Progressing  Goal: Remains free of injury related to seizures activity  Description: INTERVENTIONS  - Maintain airway, patient safety  and administer oxygen as ordered  - Monitor patient for seizure activity, document and report duration and description of seizure to physician/advanced practitioner  - If seizure occurs,  ensure patient safety during seizure  - Reorient patient post seizure  - Seizure pads on all 4 side rails  - Instruct patient/family to notify RN of any seizure activity including if an aura is experienced  - Instruct patient/family to call for assistance with activity based on nursing assessment  - Administer anti-seizure medications if ordered    Outcome: Progressing  Goal: Achieves maximal functionality and  self care  Description: INTERVENTIONS  - Monitor swallowing and airway patency with patient fatigue and changes in neurological status  - Encourage and assist patient to increase activity and self care.   - Encourage visually impaired, hearing impaired and aphasic patients to use assistive/communication devices  Outcome: Progressing     Problem: CARDIOVASCULAR - ADULT  Goal: Maintains optimal cardiac output and hemodynamic stability  Description: INTERVENTIONS:  - Monitor I/O, vital signs and rhythm  - Monitor for S/S and trends of decreased cardiac output  - Administer and titrate ordered vasoactive medications to optimize hemodynamic stability  - Assess quality of pulses, skin color and temperature  - Assess for signs of decreased coronary artery perfusion  - Instruct patient to report change in severity of symptoms  Outcome: Progressing  Goal: Absence of cardiac dysrhythmias or at baseline rhythm  Description: INTERVENTIONS:  - Continuous cardiac monitoring, vital signs, obtain 12 lead EKG if ordered  - Administer antiarrhythmic and heart rate control medications as ordered  - Monitor electrolytes and administer replacement therapy as ordered  Outcome: Progressing     Problem: RESPIRATORY - ADULT  Goal: Achieves optimal ventilation and oxygenation  Description: INTERVENTIONS:  - Assess for changes in respiratory status  - Assess for changes in mentation and behavior  - Position to facilitate oxygenation and minimize respiratory effort  - Oxygen administered by appropriate delivery if ordered  - Initiate smoking cessation education as indicated  - Encourage broncho-pulmonary hygiene including cough, deep breathe, Incentive Spirometry  - Assess the need for suctioning and aspirate as needed  - Assess and instruct to report SOB or any respiratory difficulty  - Respiratory Therapy support as indicated  Outcome: Progressing     Problem: GASTROINTESTINAL - ADULT  Goal: Minimal or absence of nausea and/or  vomiting  Description: INTERVENTIONS:  - Administer IV fluids if ordered to ensure adequate hydration  - Maintain NPO status until nausea and vomiting are resolved  - Nasogastric tube if ordered  - Administer ordered antiemetic medications as needed  - Provide nonpharmacologic comfort measures as appropriate  - Advance diet as tolerated, if ordered  - Consider nutrition services referral to assist patient with adequate nutrition and appropriate food choices  Outcome: Progressing  Goal: Maintains or returns to baseline bowel function  Description: INTERVENTIONS:  - Assess bowel function  - Encourage oral fluids to ensure adequate hydration  - Administer IV fluids if ordered to ensure adequate hydration  - Administer ordered medications as needed  - Encourage mobilization and activity  - Consider nutritional services referral to assist patient with adequate nutrition and appropriate food choices  Outcome: Progressing  Goal: Maintains adequate nutritional intake  Description: INTERVENTIONS:  - Monitor percentage of each meal consumed  - Identify factors contributing to decreased intake, treat as appropriate  - Assist with meals as needed  - Monitor I&O, weight, and lab values if indicated  - Obtain nutrition services referral as needed  Outcome: Progressing  Goal: Oral mucous membranes remain intact  Description: INTERVENTIONS  - Assess oral mucosa and hygiene practices  - Implement preventative oral hygiene regimen  - Implement oral medicated treatments as ordered  - Initiate Nutrition services referral as needed  Outcome: Progressing     Problem: GENITOURINARY - ADULT  Goal: Maintains or returns to baseline urinary function  Description: INTERVENTIONS:  - Assess urinary function  - Encourage oral fluids to ensure adequate hydration if ordered  - Administer IV fluids as ordered to ensure adequate hydration  - Administer ordered medications as needed  - Offer frequent toileting  - Follow urinary retention protocol if  ordered  Outcome: Progressing  Goal: Absence of urinary retention  Description: INTERVENTIONS:  - Assess patient’s ability to void and empty bladder  - Monitor I/O  - Bladder scan as needed  - Discuss with physician/AP medications to alleviate retention as needed  - Discuss catheterization for long term situations as appropriate  Outcome: Progressing     Problem: METABOLIC, FLUID AND ELECTROLYTES - ADULT  Goal: Electrolytes maintained within normal limits  Description: INTERVENTIONS:  - Monitor labs and assess patient for signs and symptoms of electrolyte imbalances  - Administer electrolyte replacement as ordered  - Monitor response to electrolyte replacements, including repeat lab results as appropriate  - Instruct patient on fluid and nutrition as appropriate  Outcome: Progressing  Goal: Fluid balance maintained  Description: INTERVENTIONS:  - Monitor labs   - Monitor I/O and WT  - Instruct patient on fluid and nutrition as appropriate  - Assess for signs & symptoms of volume excess or deficit  Outcome: Progressing     Problem: SKIN/TISSUE INTEGRITY - ADULT  Goal: Incision(s), wounds(s) or drain site(s) healing without S/S of infection  Description: INTERVENTIONS  - Assess and document dressing, incision, wound bed, drain sites and surrounding tissue  - Provide patient and family education  - Perform skin care/dressing changes  Outcome: Progressing     Problem: HEMATOLOGIC - ADULT  Goal: Maintains hematologic stability  Description: INTERVENTIONS  - Assess for signs and symptoms of bleeding or hemorrhage  - Monitor labs  - Administer supportive blood products/factors as ordered and appropriate  Outcome: Progressing     Problem: MUSCULOSKELETAL - ADULT  Goal: Maintain or return mobility to safest level of function  Description: INTERVENTIONS:  - Assess patient's ability to carry out ADLs; assess patient's baseline for ADL function and identify physical deficits which impact ability to perform ADLs (bathing, care  of mouth/teeth, toileting, grooming, dressing, etc.)  - Assess/evaluate cause of self-care deficits   - Assess range of motion  - Assess patient's mobility  - Assess patient's need for assistive devices and provide as appropriate  - Encourage maximum independence but intervene and supervise when necessary  - Involve family in performance of ADLs  - Assess for home care needs following discharge   - Consider OT consult to assist with ADL evaluation and planning for discharge  - Provide patient education as appropriate  Outcome: Progressing     Problem: Nutrition/Hydration-ADULT  Goal: Nutrient/Hydration intake appropriate for improving, restoring or maintaining nutritional needs  Description: Monitor and assess patient's nutrition/hydration status for malnutrition. Collaborate with interdisciplinary team and initiate plan and interventions as ordered.  Monitor patient's weight and dietary intake as ordered or per policy. Utilize nutrition screening tool and intervene as necessary. Determine patient's food preferences and provide high-protein, high-caloric foods as appropriate.     INTERVENTIONS:  - Monitor oral intake, urinary output, labs, and treatment plans  - Assess nutrition and hydration status and recommend course of action  - Evaluate amount of meals eaten  - Assist patient with eating if necessary   - Allow adequate time for meals  - Recommend/ encourage appropriate diets, oral nutritional supplements, and vitamin/mineral supplements  - Order, calculate, and assess calorie counts as needed  - Recommend, monitor, and adjust tube feedings and TPN/PPN based on assessed needs  - Assess need for intravenous fluids  - Provide specific nutrition/hydration education as appropriate  - Include patient/family/caregiver in decisions related to nutrition  Outcome: Progressing

## 2024-06-22 NOTE — PLAN OF CARE
Problem: Prexisting or High Potential for Compromised Skin Integrity  Goal: Skin integrity is maintained or improved  Description: INTERVENTIONS:  - Identify patients at risk for skin breakdown  - Assess and monitor skin integrity  - Assess and monitor nutrition and hydration status  - Monitor labs   - Assess for incontinence   - Turn and reposition patient  - Assist with mobility/ambulation  - Relieve pressure over bony prominences  - Avoid friction and shearing  - Provide appropriate hygiene as needed including keeping skin clean and dry  - Evaluate need for skin moisturizer/barrier cream  - Collaborate with interdisciplinary team   - Patient/family teaching  - Consider wound care consult   Outcome: Progressing     Problem: PAIN - ADULT  Goal: Verbalizes/displays adequate comfort level or baseline comfort level  Description: Interventions:  - Encourage patient to monitor pain and request assistance  - Assess pain using appropriate pain scale  - Administer analgesics based on type and severity of pain and evaluate response  - Implement non-pharmacological measures as appropriate and evaluate response  - Consider cultural and social influences on pain and pain management  - Notify physician/advanced practitioner if interventions unsuccessful or patient reports new pain  Outcome: Progressing     Problem: INFECTION - ADULT  Goal: Absence or prevention of progression during hospitalization  Description: INTERVENTIONS:  - Assess and monitor for signs and symptoms of infection  - Monitor lab/diagnostic results  - Monitor all insertion sites, i.e. indwelling lines, tubes, and drains  - Monitor endotracheal if appropriate and nasal secretions for changes in amount and color  - [place] appropriate cooling/warming therapies per order  - Administer medications as ordered  - Instruct and encourage patient and family to use good hand hygiene technique  - Identify and instruct in appropriate isolation precautions for  identified infection/condition  Outcome: Progressing     Problem: SAFETY ADULT  Goal: Patient will remain free of falls  Description: INTERVENTIONS:  - Educate patient/family on patient safety including physical limitations  - Instruct patient to call for assistance with activity   - Consult OT/PT to assist with strengthening/mobility   - Keep Call bell within reach  - Keep bed low and locked with side rails adjusted as appropriate  - Keep care items and personal belongings within reach  - Initiate and maintain comfort rounds  - Make Fall Risk Sign visible to staff  - Offer Toileting every 2 Hours, in advance of need  - Initiate/Maintain bed alarm  - Obtain necessary fall risk management equipment: bed alarm  - Apply yellow socks and bracelet for high fall risk patients  - Consider moving patient to room near nurses station  Outcome: Progressing  Goal: Maintain or return to baseline ADL function  Description: INTERVENTIONS:  -  Assess patient's ability to carry out ADLs; assess patient's baseline for ADL function and identify physical deficits which impact ability to perform ADLs (bathing, care of mouth/teeth, toileting, grooming, dressing, etc.)  - Assess/evaluate cause of self-care deficits   - Assess range of motion  - Assess patient's mobility; develop plan if impaired  - Assess patient's need for assistive devices and provide as appropriate  - Encourage maximum independence but intervene and supervise when necessary  - Involve family in performance of ADLs  - Assess for home care needs following discharge   - Consider OT consult to assist with ADL evaluation and planning for discharge  - Provide patient education as appropriate  Outcome: Progressing     Problem: DISCHARGE PLANNING  Goal: Discharge to home or other facility with appropriate resources  Description: INTERVENTIONS:  - Identify barriers to discharge w/patient and caregiver  - Arrange for needed discharge resources and transportation as  appropriate  - Identify discharge learning needs (meds, wound care, etc.)  - Arrange for interpretive services to assist at discharge as needed  - Refer to Case Management Department for coordinating discharge planning if the patient needs post-hospital services based on physician/advanced practitioner order or complex needs related to functional status, cognitive ability, or social support system  Outcome: Progressing     Problem: NEUROSENSORY - ADULT  Goal: Achieves stable or improved neurological status  Description: INTERVENTIONS  - Monitor and report changes in neurological status  - Monitor vital signs such as temperature, blood pressure, glucose, and any other labs ordered   - Initiate measures to prevent increased intracranial pressure  - Monitor for seizure activity and implement precautions if appropriate      Outcome: Progressing  Goal: Remains free of injury related to seizures activity  Description: INTERVENTIONS  - Maintain airway, patient safety  and administer oxygen as ordered  - Monitor patient for seizure activity, document and report duration and description of seizure to physician/advanced practitioner  - If seizure occurs,  ensure patient safety during seizure  - Reorient patient post seizure  - Seizure pads on all 4 side rails  - Instruct patient/family to notify RN of any seizure activity including if an aura is experienced  - Instruct patient/family to call for assistance with activity based on nursing assessment  - Administer anti-seizure medications if ordered    Outcome: Progressing  Goal: Achieves maximal functionality and self care  Description: INTERVENTIONS  - Monitor swallowing and airway patency with patient fatigue and changes in neurological status  - Encourage and assist patient to increase activity and self care.   - Encourage visually impaired, hearing impaired and aphasic patients to use assistive/communication devices  Outcome: Progressing     Problem: CARDIOVASCULAR -  ADULT  Goal: Maintains optimal cardiac output and hemodynamic stability  Description: INTERVENTIONS:  - Monitor I/O, vital signs and rhythm  - Monitor for S/S and trends of decreased cardiac output  - Administer and titrate ordered vasoactive medications to optimize hemodynamic stability  - Assess quality of pulses, skin color and temperature  - Assess for signs of decreased coronary artery perfusion  - Instruct patient to report change in severity of symptoms  Outcome: Progressing  Goal: Absence of cardiac dysrhythmias or at baseline rhythm  Description: INTERVENTIONS:  - Continuous cardiac monitoring, vital signs, obtain 12 lead EKG if ordered  - Administer antiarrhythmic and heart rate control medications as ordered  - Monitor electrolytes and administer replacement therapy as ordered  Outcome: Progressing     Problem: RESPIRATORY - ADULT  Goal: Achieves optimal ventilation and oxygenation  Description: INTERVENTIONS:  - Assess for changes in respiratory status  - Assess for changes in mentation and behavior  - Position to facilitate oxygenation and minimize respiratory effort  - Oxygen administered by appropriate delivery if ordered  - Initiate smoking cessation education as indicated  - Encourage broncho-pulmonary hygiene including cough, deep breathe, Incentive Spirometry  - Assess the need for suctioning and aspirate as needed  - Assess and instruct to report SOB or any respiratory difficulty  - Respiratory Therapy support as indicated  Outcome: Progressing     Problem: GASTROINTESTINAL - ADULT  Goal: Minimal or absence of nausea and/or vomiting  Description: INTERVENTIONS:  - Administer IV fluids if ordered to ensure adequate hydration  - Maintain NPO status until nausea and vomiting are resolved  - Nasogastric tube if ordered  - Administer ordered antiemetic medications as needed  - Provide nonpharmacologic comfort measures as appropriate  - Advance diet as tolerated, if ordered  - Consider nutrition  services referral to assist patient with adequate nutrition and appropriate food choices  Outcome: Progressing  Goal: Maintains or returns to baseline bowel function  Description: INTERVENTIONS:  - Assess bowel function  - Encourage oral fluids to ensure adequate hydration  - Administer IV fluids if ordered to ensure adequate hydration  - Administer ordered medications as needed  - Encourage mobilization and activity  - Consider nutritional services referral to assist patient with adequate nutrition and appropriate food choices  Outcome: Progressing  Goal: Maintains adequate nutritional intake  Description: INTERVENTIONS:  - Monitor percentage of each meal consumed  - Identify factors contributing to decreased intake, treat as appropriate  - Assist with meals as needed  - Monitor I&O, weight, and lab values if indicated  - Obtain nutrition services referral as needed  Outcome: Progressing  Goal: Oral mucous membranes remain intact  Description: INTERVENTIONS  - Assess oral mucosa and hygiene practices  - Implement preventative oral hygiene regimen  - Implement oral medicated treatments as ordered  - Initiate Nutrition services referral as needed  Outcome: Progressing     Problem: GENITOURINARY - ADULT  Goal: Maintains or returns to baseline urinary function  Description: INTERVENTIONS:  - Assess urinary function  - Encourage oral fluids to ensure adequate hydration if ordered  - Administer IV fluids as ordered to ensure adequate hydration  - Administer ordered medications as needed  - Offer frequent toileting  - Follow urinary retention protocol if ordered  Outcome: Progressing  Goal: Absence of urinary retention  Description: INTERVENTIONS:  - Assess patient’s ability to void and empty bladder  - Monitor I/O  - Bladder scan as needed  - Discuss with physician/AP medications to alleviate retention as needed  - Discuss catheterization for long term situations as appropriate  Outcome: Progressing     Problem:  HEMATOLOGIC - ADULT  Goal: Maintains hematologic stability  Description: INTERVENTIONS  - Assess for signs and symptoms of bleeding or hemorrhage  - Monitor labs  - Administer supportive blood products/factors as ordered and appropriate  Outcome: Progressing     Problem: SKIN/TISSUE INTEGRITY - ADULT  Goal: Incision(s), wounds(s) or drain site(s) healing without S/S of infection  Description: INTERVENTIONS  - Assess and document dressing, incision, wound bed, drain sites and surrounding tissue  - Provide patient and family education  - Perform skin care/dressing changes  Outcome: Progressing     Problem: MUSCULOSKELETAL - ADULT  Goal: Maintain or return mobility to safest level of function  Description: INTERVENTIONS:  - Assess patient's ability to carry out ADLs; assess patient's baseline for ADL function and identify physical deficits which impact ability to perform ADLs (bathing, care of mouth/teeth, toileting, grooming, dressing, etc.)  - Assess/evaluate cause of self-care deficits   - Assess range of motion  - Assess patient's mobility  - Assess patient's need for assistive devices and provide as appropriate  - Encourage maximum independence but intervene and supervise when necessary  - Involve family in performance of ADLs  - Assess for home care needs following discharge   - Consider OT consult to assist with ADL evaluation and planning for discharge  - Provide patient education as appropriate  Outcome: Progressing     Problem: Nutrition/Hydration-ADULT  Goal: Nutrient/Hydration intake appropriate for improving, restoring or maintaining nutritional needs  Description: Monitor and assess patient's nutrition/hydration status for malnutrition. Collaborate with interdisciplinary team and initiate plan and interventions as ordered.  Monitor patient's weight and dietary intake as ordered or per policy. Utilize nutrition screening tool and intervene as necessary. Determine patient's food preferences and provide  high-protein, high-caloric foods as appropriate.     INTERVENTIONS:  - Monitor oral intake, urinary output, labs, and treatment plans  - Assess nutrition and hydration status and recommend course of action  - Evaluate amount of meals eaten  - Assist patient with eating if necessary   - Allow adequate time for meals  - Recommend/ encourage appropriate diets, oral nutritional supplements, and vitamin/mineral supplements  - Order, calculate, and assess calorie counts as needed  - Recommend, monitor, and adjust tube feedings and TPN/PPN based on assessed needs  - Assess need for intravenous fluids  - Provide specific nutrition/hydration education as appropriate  - Include patient/family/caregiver in decisions related to nutrition  Outcome: Progressing

## 2024-06-22 NOTE — ASSESSMENT & PLAN NOTE
Recent Labs     06/20/24  1156 06/21/24  1034 06/22/24  0449   CREATININE 1.40* 1.34* 0.86   Resume diuretics  Monitor lytes and serum creatinine

## 2024-06-22 NOTE — CONSULTS
[facility]ultation -  [facility]  Foreign Armando 63 y.o. male MRN: [id number]  Unit/Bed#: E5 -01 Encounter: [id number]            Inpatient consult to gastroenterology     Date/Time  6/22/2024 1:32 PM     Performed by  Sheila Martin DO   Authorized by  NUBIA Hawk             Reason for Consult / Principal Problem:     Cirrhosis    ASSESSMENT AND PLAN:      63-year-old male with history significant for RLS, MASH cirrhosis d/b ascites, nonbleeding EV, HD, and prior hepatic hydrothorax, history of colon cancer status post right hemicolectomy, seizure disorder, type 2 diabetes, GERD, iron deficiency anemia, BPH, chronic diarrhea with prior history of C. difficile colitis, who was admitted with bilateral lower extremity pain.  GI is asked to evaluate due to concern for elevated bilirubin in the setting of known decompensated MASH cirrhosis.    Patient continues to have significant bilateral lower extremity pain.  There is no evidence of volume overload, hepatic encephalopathy, or ascites on exam.  He was noted to have bilirubin elevation to 11.96, prompting transfer [facility].  Bilirubin is now downtrending.  Suspect elevated bilirubin likely in the setting of medication nonadherence given patient is homeless and notes someone took his medications.  MELD (3.0) 22.     He had a CTA chest abdomen pelvis with and without contrast which demonstrated a 1.3 cm cyst in the pancreatic neck as well as a 7 mm cyst in the pancreatic body along with several large lymph nodes stable from prior imaging.     Continue lactulose 20 g daily.  Titrate to maintain 3-4 bowel movements daily.  Continue propranolol 10 mg twice daily for primary prophylaxis.  Continue Lasix 20 mg daily and Aldactone 100 mg daily.  Continue midodrine 10 mg 3 times daily.  Variceal surveillance: Last EGD in November 2022 with small varices.  Needs repeat EGD as an outpatient.  CRC screening: Last colonoscopy in  November 2022 with poor prep and friable anastomosis.  Needs repeat colonoscopy as an outpatient within the next 3 to 6 months.  HCC screening: CTA chest abdomen pelvis with IV contrast on 6/21/2024 without evidence of liver lesions.  Will check AFP.  Needs repeat screening in December 2024.  Recommend MRI with pancreatic protocol for follow-up of incidentally noted pancreatic cysts.    Monitor renal function and liver enzymes daily with CBC, CMP, and INR.  Continue low-salt, high-protein calorie diet.    Rest of care per primary team.  Thank you for this consultation.   ______________________________________________________________________    HPI: Foreign Armando is a 63-year-old male with history significant for RLS, MASH cirrhosis d/b ascites, nonbleeding EV, HD, and prior hepatic hydrothorax, history of colon cancer status post right hemicolectomy, seizure disorder, type 2 diabetes, GERD, iron deficiency anemia, BPH, chronic diarrhea with prior history of C. difficile colitis, whom we are asked to see in consultation for hyperbilirubinemia in the setting of cirrhosis.    Patient presented to [facility] on 6/21 with complaints of bilateral leg pain.  Unfortunately, he is a poor historian and is unable to provide much history.  He denies any fevers, chills, abdominal pain, lower extremity swelling, chest pain, palpitations, hematochezia, melena.  Denies recent illness.  Of note, he was recently admitted to the hospital from 6/8-6/18 with volume overload and evidence of a right pleural effusion concerning for hepatic hydrothorax in the setting of decompensated cirrhosis.  He was previously on lactulose and rifaximin, however, lactulose was initially discontinued this admission due to concern for significant bowel movements.  However, due to lack of insurance coverage for [employer], he was started back on lactulose.  Last EGD and colonoscopy in November 2022 which demonstrated small esophageal varices and  severe antral gastritis along with friable thickening on the colonic side of the anastomosis and few pancolonic diverticula.  Esophageal, stomach, and colonic biopsies were all unremarkable.    REVIEW OF SYSTEMS:  10 point ROS reviewed and negative except otherwise noted in the HPI above.     Historical Information   Past Medical History:   Diagnosis Date    CHF (congestive heart failure) (HCC)     Cirrhosis (HCC)     CHRISTIE    Colon cancer (HCC)     Diabetes mellitus (HCC)     Neuropathy     Restless leg syndrome      Past Surgical History:   Procedure Laterality Date    EGD AND SIGMOIDOSCOPY FLEXIBLE      IR PARACENTESIS  11/22/2022    IR PARACENTESIS  2/2/2023    IR THORACENTESIS  6/4/2024    LEFT COLON RESECTION      LITHOTRIPSY       Social History   Social History     Substance and Sexual Activity   Alcohol Use Not Currently     Social History     Substance and Sexual Activity   Drug Use Never     Social History     Tobacco Use   Smoking Status Former    Current packs/day: 0.25    Types: Cigarettes   Smokeless Tobacco Never     Family History   Problem Relation Age of Onset    Diabetes Mother     Liver disease Mother     Diabetes Father     Bone cancer Father        Meds/Allergies       Medications Prior to Admission:     Alcohol Swabs 70 % PADS    Blood Glucose Monitoring Suppl (OneTouch Verio Reflect) w/Device KIT    dicyclomine (BENTYL) 20 mg tablet    ferrous sulfate 325 (65 Fe) mg tablet    furosemide (LASIX) 20 mg tablet    gabapentin (NEURONTIN) 300 mg capsule    glucose blood (OneTouch Verio) test strip    hydrocortisone (CORTEF) 10 mg tablet    hydrOXYzine HCL (ATARAX) 25 mg tablet    lactulose 20 g/30 mL    levETIRAcetam (KEPPRA) 500 mg tablet    metFORMIN (GLUCOPHAGE) 1000 MG tablet    midodrine (PROAMATINE) 5 mg tablet    OneTouch Delica Lancets 33G MISC    pancrelipase, Lip-Prot-Amyl, (CREON) 6,000 units delayed release capsule    pantoprazole (PROTONIX) 40 mg tablet    propranolol (INDERAL) 10 mg  "tablet    repaglinide (PRANDIN) 2 mg tablet    rOPINIRole (REQUIP) 4 mg tablet    senna-docusate sodium (SENOKOT S) 8.6-50 mg per tablet    spironolactone (ALDACTONE) 100 mg tablet    tamsulosin (FLOMAX) 0.4 mg  Current Facility-Administered Medications   Medication Dose Route Frequency    aluminum-magnesium hydroxide-simethicone (MAALOX) oral suspension 30 mL  30 mL Oral Q6H PRN    ferrous sulfate tablet 325 mg  325 mg Oral Daily With Breakfast    furosemide (LASIX) tablet 20 mg  20 mg Oral Daily    HYDROcodone-acetaminophen (NORCO) 5-325 mg per tablet 1 tablet  1 tablet Oral Q6H PRN    hydrocortisone (CORTEF) tablet 10 mg  10 mg Oral TID    HYDROmorphone HCl (DILAUDID) injection 0.2 mg  0.2 mg Intravenous Q4H PRN    hydrOXYzine HCL (ATARAX) tablet 25 mg  25 mg Oral Q6H PRN    insulin lispro (HumALOG/ADMELOG) 100 units/mL subcutaneous injection 1-5 Units  1-5 Units Subcutaneous Q6H SANDRA    lactulose (CHRONULAC) oral solution 20 g  20 g Oral Daily    levETIRAcetam (KEPPRA) tablet 500 mg  500 mg Oral Q12H SANDRA    midodrine (PROAMATINE) tablet 10 mg  10 mg Oral TID AC    ondansetron (ZOFRAN) injection 4 mg  4 mg Intravenous Q6H PRN    pancrelipase (Lip-Prot-Amyl) (CREON) delayed release capsule 24,000 Units  24,000 Units Oral TID With Meals    pantoprazole (PROTONIX) EC tablet 40 mg  40 mg Oral Early Morning    propranolol (INDERAL) tablet 10 mg  10 mg Oral Q12H SANDRA    rOPINIRole (REQUIP) tablet 4 mg  4 mg Oral BID    simethicone (MYLICON) chewable tablet 80 mg  80 mg Oral 4x Daily PRN    sodium chloride 0.9 % infusion  50 mL/hr Intravenous Continuous    spironolactone (ALDACTONE) tablet 100 mg  100 mg Oral Daily    tamsulosin (FLOMAX) capsule 0.4 mg  0.4 mg Oral Daily With Dinner       Allergies   Allergen Reactions    Morphine Anaphylaxis    Morpholine Salicylate Other (See Comments)     \"I go unconscious\"    Clarithromycin Other (See Comments)     Dizzy, nausea, ulcers in stomach    Oxycodone Hives and Facial " "Swelling     \"Scratchy throat\"      Sulfamethoxazole-Trimethoprim Hives    Nsaids Rash         Objective     Blood pressure (!) 99/48, pulse 55, temperature 98.2 °F (36.8 °C), resp. rate 18, SpO2 95%. There is no height or weight on file to calculate BMI.    PHYSICAL EXAM:    General: Chronically ill-appearing, NAD  Eyes: no conjunctival icterus or pallor  Abdominal: Soft, non-tender, non-distended  Extremities: significantly TTP, no edema. Erythematous macular lesions on the soles of the feet.  Neuro: alert and oriented    Lab Results:   Admission on 06/21/2024   Component Date Value    Ammonia 06/21/2024 50     POC Glucose 06/21/2024 92     Sodium 06/22/2024 135     Potassium 06/22/2024 4.3     Chloride 06/22/2024 104     CO2 06/22/2024 26     ANION GAP 06/22/2024 5     BUN 06/22/2024 31 (H)     Creatinine 06/22/2024 0.86     Glucose 06/22/2024 91     Calcium 06/22/2024 8.6     Corrected Calcium 06/22/2024 9.7     AST 06/22/2024 31     ALT 06/22/2024 38     Alkaline Phosphatase 06/22/2024 75     Total Protein 06/22/2024 5.0 (L)     Albumin 06/22/2024 2.6 (L)     Total Bilirubin 06/22/2024 7.50 (H)     eGFR 06/22/2024 92     WBC 06/22/2024 3.10 (L)     RBC 06/22/2024 2.50 (L)     Hemoglobin 06/22/2024 8.4 (L)     Hematocrit 06/22/2024 25.2 (L)     MCV 06/22/2024 101 (H)     MCH 06/22/2024 33.6     MCHC 06/22/2024 33.3     RDW 06/22/2024 16.0 (H)     MPV 06/22/2024 10.8     Platelets 06/22/2024 27 (L)     nRBC 06/22/2024 0     Segmented % 06/22/2024 68     Immature Grans % 06/22/2024 0     Lymphocytes % 06/22/2024 15     Monocytes % 06/22/2024 11     Eosinophils Relative 06/22/2024 5     Basophils Relative 06/22/2024 1     Absolute Neutrophils 06/22/2024 2.11     Absolute Immature Grans 06/22/2024 0.01     Absolute Lymphocytes 06/22/2024 0.47 (L)     Absolute Monocytes 06/22/2024 0.33     Eosinophils Absolute 06/22/2024 0.16     Basophils Absolute 06/22/2024 0.02     Protime 06/22/2024 19.3 (H)     INR 06/22/2024 " 1.61 (H)     POC Glucose 06/22/2024 91     POC Glucose 06/22/2024 85        Imaging Studies: I have personally reviewed pertinent imaging studies.    Sheila Martin D.O.  Fellow, PGY-5  Division of Gastroenterology & Hepatology  Available on [facility]  6/22/2024 1:10 PM

## 2024-06-22 NOTE — ASSESSMENT & PLAN NOTE
Known liver cirrhosis 2/2 CHRISTIE, decompensated with ascites, hepatic encephalopathy, varices, pancytopenia, coagulopathy and hepatic hydrothorax.  Transferred from [place] for hyperbilirubinemia increased from baseline of 2, now 11.9    CT: Cirrhotic liver. Splenomegaly with cirrhosis raise concern for portal hypertension  Incidentally noted cystic pancreatic lesion in the body and tail of the pancreas with the larger measuring 1.3 cm, unchanged from the previous study in June. Can assessed further with nonemergent MRI along with MRCP. These probably represent small IPMN  Upper abdominal lymphadenopathy in the gastrocolic ligament, zeyad hepatis, in the portacaval region, stable likely related to chronic liver disease  Small para-aortic lymph nodes, stable. The previously noted mesenteric fatty lesion, stable may be sequela of fat necrosis   Mild pericholecystic fluid may related to chronic liver disease, unchanged  EV: Propranolol 10mg BID  HE: During admission at  was switched off lactulose for rifaximin due to intolerable diarrhea.  Insurance did not cover rifaximin therefore he was discharged on lactulose 20g qd  Diuretics: Furosemide 20mg qd and aldactone 100mg qd.  Hold given ALEXANDER  Monitor CBC, CMP and INR daily  GI consult

## 2024-06-22 NOTE — ASSESSMENT & PLAN NOTE
Patient presented to [facility] with complaint of lower extremity pain which is chronic from diabetic neuropathy.  He was noted with elevated bilirubin.  ER provider discussed with GI who recommended admission.  No GI coverage at [facility] on weekends, transfer recommended    Chronically elevated bilirubin in setting of liver cirrhosis although increased to 11.9 from baseline 2.2.  Direct bili 4.6.  Normal AST/ALT/ALKP  CT showing:   Trace pericholecystic fluid.  Mild pericholecystic fluid may related to chronic liver disease, unchanged   NPO at midnight  Check RUQ US  P.r.n. Analgesics  GI consult   Repeat CMP in a.m.
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
## 2024-06-22 NOTE — ASSESSMENT & PLAN NOTE
"Lab Results   Component Value Date    HGBA1C 6.5 (H) 05/05/2024     Hold oral hypoglycemics inpatient.  Add accu-Cheks and sliding scale coverage  ADA diet when diet advanced    No results for input(s): \"POCGLU\" in the last 72 hours.    Blood Sugar Average: Last 72 hrs:      "

## 2024-06-22 NOTE — ASSESSMENT & PLAN NOTE
Patient presented to [place] with complaint of lower extremity pain which is chronic from diabetic neuropathy.  He was noted with elevated bilirubin.  ER provider discussed with GI who recommended admission.  No GI coverage at [place] on weekends, transfer recommended    Chronically elevated bilirubin in setting of liver cirrhosis although increased to 11.9 from baseline 2.2.  Right upper quadrant ultrasound pending  Monitor bilirubin

## 2024-06-22 NOTE — ASSESSMENT & PLAN NOTE
Lab Results   Component Value Date    HGBA1C 6.5 (H) 05/05/2024     Hold oral hypoglycemics inpatient.  Add accu-Cheks and sliding scale coverage  ADA diet when diet advanced    Recent Labs     06/21/24 2318 06/22/24  0613 06/22/24  1125   POCGLU 92 91 85       Blood Sugar Average: Last 72 hrs:  (P) 89.50995342077482205

## 2024-06-22 NOTE — ASSESSMENT & PLAN NOTE
Multifactorial given hypovolemia, ALEXANDER and decreased clearance in liver cirrhosis. Cleared with 1L IVF
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
## 2024-06-23 LAB
ALBUMIN SERPL BCG-MCNC: 2.7 G/DL (ref 3.5–5)
ALP SERPL-CCNC: 93 U/L (ref 34–104)
ALT SERPL W P-5'-P-CCNC: 44 U/L (ref 7–52)
ANION GAP SERPL CALCULATED.3IONS-SCNC: 5 MMOL/L (ref 4–13)
AST SERPL W P-5'-P-CCNC: 38 U/L (ref 13–39)
BILIRUB SERPL-MCNC: 4.87 MG/DL (ref 0.2–1)
BUN SERPL-MCNC: 22 MG/DL (ref 5–25)
CALCIUM ALBUM COR SERPL-MCNC: 9.2 MG/DL (ref 8.3–10.1)
CALCIUM SERPL-MCNC: 8.2 MG/DL (ref 8.4–10.2)
CHLORIDE SERPL-SCNC: 103 MMOL/L (ref 96–108)
CO2 SERPL-SCNC: 23 MMOL/L (ref 21–32)
CREAT SERPL-MCNC: 0.94 MG/DL (ref 0.6–1.3)
ERYTHROCYTE [DISTWIDTH] IN BLOOD BY AUTOMATED COUNT: 15.8 % (ref 11.6–15.1)
GFR SERPL CREATININE-BSD FRML MDRD: 85 ML/MIN/1.73SQ M
GLUCOSE SERPL-MCNC: 231 MG/DL (ref 65–140)
GLUCOSE SERPL-MCNC: 242 MG/DL (ref 65–140)
GLUCOSE SERPL-MCNC: 245 MG/DL (ref 65–140)
GLUCOSE SERPL-MCNC: 254 MG/DL (ref 65–140)
GLUCOSE SERPL-MCNC: 273 MG/DL (ref 65–140)
GLUCOSE SERPL-MCNC: 304 MG/DL (ref 65–140)
GLUCOSE SERPL-MCNC: 324 MG/DL (ref 65–140)
HCT VFR BLD AUTO: 25.9 % (ref 36.5–49.3)
HGB BLD-MCNC: 8.7 G/DL (ref 12–17)
INR PPP: 1.55 (ref 0.84–1.19)
MCH RBC QN AUTO: 34 PG (ref 26.8–34.3)
MCHC RBC AUTO-ENTMCNC: 33.6 G/DL (ref 31.4–37.4)
MCV RBC AUTO: 101 FL (ref 82–98)
PLATELET # BLD AUTO: 29 THOUSANDS/UL (ref 149–390)
PMV BLD AUTO: 12.3 FL (ref 8.9–12.7)
POTASSIUM SERPL-SCNC: 4.3 MMOL/L (ref 3.5–5.3)
PROT SERPL-MCNC: 5.2 G/DL (ref 6.4–8.4)
PROTHROMBIN TIME: 18.8 SECONDS (ref 11.6–14.5)
RBC # BLD AUTO: 2.56 MILLION/UL (ref 3.88–5.62)
SODIUM SERPL-SCNC: 131 MMOL/L (ref 135–147)
TSH SERPL DL<=0.05 MIU/L-ACNC: 0.43 UIU/ML (ref 0.45–4.5)
WBC # BLD AUTO: 2.47 THOUSAND/UL (ref 4.31–10.16)

## 2024-06-23 PROCEDURE — 99232 SBSQ HOSP IP/OBS MODERATE 35: CPT | Performed by: INTERNAL MEDICINE

## 2024-06-23 PROCEDURE — 82948 REAGENT STRIP/BLOOD GLUCOSE: CPT

## 2024-06-23 PROCEDURE — 80053 COMPREHEN METABOLIC PANEL: CPT | Performed by: INTERNAL MEDICINE

## 2024-06-23 PROCEDURE — 84443 ASSAY THYROID STIM HORMONE: CPT | Performed by: STUDENT IN AN ORGANIZED HEALTH CARE EDUCATION/TRAINING PROGRAM

## 2024-06-23 PROCEDURE — 85610 PROTHROMBIN TIME: CPT | Performed by: INTERNAL MEDICINE

## 2024-06-23 PROCEDURE — 85027 COMPLETE CBC AUTOMATED: CPT | Performed by: INTERNAL MEDICINE

## 2024-06-23 PROCEDURE — 84439 ASSAY OF FREE THYROXINE: CPT | Performed by: STUDENT IN AN ORGANIZED HEALTH CARE EDUCATION/TRAINING PROGRAM

## 2024-06-23 RX ORDER — INSULIN GLARGINE 100 [IU]/ML
10 INJECTION, SOLUTION SUBCUTANEOUS
Status: DISCONTINUED | OUTPATIENT
Start: 2024-06-23 | End: 2024-06-25 | Stop reason: HOSPADM

## 2024-06-23 RX ADMIN — LEVETIRACETAM 500 MG: 500 TABLET, FILM COATED ORAL at 08:28

## 2024-06-23 RX ADMIN — MIDODRINE HYDROCHLORIDE 10 MG: 5 TABLET ORAL at 06:15

## 2024-06-23 RX ADMIN — HYDROCORTISONE 10 MG: 10 TABLET ORAL at 08:27

## 2024-06-23 RX ADMIN — INSULIN LISPRO 2 UNITS: 100 INJECTION, SOLUTION INTRAVENOUS; SUBCUTANEOUS at 00:25

## 2024-06-23 RX ADMIN — GABAPENTIN 300 MG: 300 CAPSULE ORAL at 16:15

## 2024-06-23 RX ADMIN — ROPINIROLE HYDROCHLORIDE 4 MG: 1 TABLET, FILM COATED ORAL at 08:27

## 2024-06-23 RX ADMIN — TAMSULOSIN HYDROCHLORIDE 0.4 MG: 0.4 CAPSULE ORAL at 16:14

## 2024-06-23 RX ADMIN — HYDROCORTISONE 10 MG: 10 TABLET ORAL at 21:25

## 2024-06-23 RX ADMIN — FERROUS SULFATE TAB 325 MG (65 MG ELEMENTAL FE) 325 MG: 325 (65 FE) TAB at 08:27

## 2024-06-23 RX ADMIN — HYDROCORTISONE 10 MG: 10 TABLET ORAL at 16:15

## 2024-06-23 RX ADMIN — PANCRELIPASE 24000 UNITS: 24000; 76000; 120000 CAPSULE, DELAYED RELEASE PELLETS ORAL at 08:27

## 2024-06-23 RX ADMIN — PANTOPRAZOLE SODIUM 40 MG: 40 TABLET, DELAYED RELEASE ORAL at 06:15

## 2024-06-23 RX ADMIN — INSULIN LISPRO 3 UNITS: 100 INJECTION, SOLUTION INTRAVENOUS; SUBCUTANEOUS at 16:16

## 2024-06-23 RX ADMIN — GABAPENTIN 300 MG: 300 CAPSULE ORAL at 08:28

## 2024-06-23 RX ADMIN — INSULIN LISPRO 3 UNITS: 100 INJECTION, SOLUTION INTRAVENOUS; SUBCUTANEOUS at 11:32

## 2024-06-23 RX ADMIN — LACTULOSE 20 G: 20 SOLUTION ORAL at 08:28

## 2024-06-23 RX ADMIN — PANCRELIPASE 24000 UNITS: 24000; 76000; 120000 CAPSULE, DELAYED RELEASE PELLETS ORAL at 11:29

## 2024-06-23 RX ADMIN — HYDROCODONE BITARTRATE AND ACETAMINOPHEN 1 TABLET: 5; 325 TABLET ORAL at 16:15

## 2024-06-23 RX ADMIN — MIDODRINE HYDROCHLORIDE 10 MG: 5 TABLET ORAL at 16:14

## 2024-06-23 RX ADMIN — INSULIN GLARGINE 10 UNITS: 100 INJECTION, SOLUTION SUBCUTANEOUS at 21:27

## 2024-06-23 RX ADMIN — HYDROMORPHONE HYDROCHLORIDE 0.2 MG: 0.2 INJECTION, SOLUTION INTRAMUSCULAR; INTRAVENOUS; SUBCUTANEOUS at 11:32

## 2024-06-23 RX ADMIN — MIDODRINE HYDROCHLORIDE 10 MG: 5 TABLET ORAL at 11:29

## 2024-06-23 RX ADMIN — INSULIN LISPRO 2 UNITS: 100 INJECTION, SOLUTION INTRAVENOUS; SUBCUTANEOUS at 06:15

## 2024-06-23 RX ADMIN — PANCRELIPASE 24000 UNITS: 24000; 76000; 120000 CAPSULE, DELAYED RELEASE PELLETS ORAL at 16:14

## 2024-06-23 RX ADMIN — LEVETIRACETAM 500 MG: 500 TABLET, FILM COATED ORAL at 21:26

## 2024-06-23 RX ADMIN — HYDROCODONE BITARTRATE AND ACETAMINOPHEN 1 TABLET: 5; 325 TABLET ORAL at 06:15

## 2024-06-23 RX ADMIN — ROPINIROLE HYDROCHLORIDE 4 MG: 1 TABLET, FILM COATED ORAL at 21:25

## 2024-06-23 RX ADMIN — GABAPENTIN 300 MG: 300 CAPSULE ORAL at 21:25

## 2024-06-23 NOTE — PROGRESS NOTES
Progress Note - Foreign Armando 63 y.o. male MRN: [id number]    Unit/Bed#: E5 -01 Encounter: [id number]    Assessment/Plan:    Decompensated hepatic cirrhosis  ascites encephalopathy, varices, pancytopenia, coagulopathy and hyperbilirubinemia, initiated propranolol continue rifaximin and diuretics, MELD score 20    Acute encephalopathy   continue rifaximin with lactulose and cognitive support slowly improving    Hyperbilirubinemia    improving bilirubin 4.87 today , trending toward baseline 2.2    Anemia     chronic disease hemoglobin 8.7 continue to monitor with p.o. iron    Seizure disorder    continue Keppra    Adrenal insufficiency    continue Cortef    ALEXANDER      now resolved creatinine 0.94    Diabetes     poorly controlled AM glucose 245 will initiate Lantus at 10 units and continue ADA diet and sliding scale.    Subjective:   Feels much better, improving appetite, thoughts clearer, no chest pain no shortness of breath no nausea vomiting no fever      Objective:     Vitals: Blood pressure (!) 92/42, pulse 60, temperature 97.5 °F (36.4 °C), temperature source Oral, resp. rate 18, SpO2 97%.,There is no height or weight on file to calculate BMI.      Results from last 7 days   Lab Units 06/23/24  0533   WBC Thousand/uL 2.47*   HEMOGLOBIN g/dL 8.7*   HEMATOCRIT % 25.9*   PLATELETS Thousands/uL 29*   INR  1.55*     Results from last 7 days   Lab Units 06/23/24  0533   POTASSIUM mmol/L 4.3   CHLORIDE mmol/L 103   CO2 mmol/L 23   BUN mg/dL 22   CREATININE mg/dL 0.94   CALCIUM mg/dL 8.2*   ALK PHOS U/L 93   ALT U/L 44   AST U/L 38       Scheduled Meds:  Current Facility-Administered Medications   Medication Dose Route Frequency Provider Last Rate    aluminum-magnesium hydroxide-simethicone  30 mL Oral Q6H PRN NUBIA Hawk      ferrous sulfate  325 mg Oral Daily With Breakfast NUBIA Hawk      furosemide  20 mg Oral Daily Maynor Antonio DO      gabapentin  300 mg Oral TID Rufina  Jono, NUBIA      HYDROcodone-acetaminophen  1 tablet Oral Q6H PRN Rufina De La Cruz, CRNP      hydrocortisone  10 mg Oral TID Rufina De La Cruz, QUINCYNP      HYDROmorphone  0.2 mg Intravenous Q6H PRN Rufina De La Cruz, CRNP      hydrOXYzine HCL  25 mg Oral Q6H PRN Rufina De La Cruz, CRNP      insulin lispro  1-5 Units Subcutaneous Q6H [facility] Rufina De La Cruz, CRNP      lactulose  20 g Oral Daily Rufina De La Cruz, CRNP      levETIRAcetam  500 mg Oral Q12H [facility] Rufina De La Cruz, CRNP      methocarbamol  750 mg Oral Q6H PRN Rufina De La Cruz, CRNP      midodrine  10 mg Oral TID  Rufina De La Cruz, QUINCYNP      ondansetron  4 mg Intravenous Q6H PRN Rufina De La Cruz, NUBIA      pancrelipase (Lip-Prot-Amyl)  24,000 Units Oral TID With Meals Rufina De La Cruz, NUBIA      pantoprazole  40 mg Oral Early Morning Rufina De La Cruz, QUINCYNP      propranolol  10 mg Oral Q12H [facility] Rufina De La Cruz, CRNP      rOPINIRole  4 mg Oral BID Rufina De La Cruz, QUINCYNP      simethicone  80 mg Oral 4x Daily PRN Rufina De La Cruz, CRNP      spironolactone  100 mg Oral Daily Maynor Antonio, DO      tamsulosin  0.4 mg Oral Daily With Dinner NUBIA Hawk         Continuous Infusions:         Physical exam:  General appearance: Alert no distress poor interaction  Head/Eyes: Icteric EOMI  Neck: Supple  Lungs: Decreased BS bilateral no wheezing rhonchi or rales  Heart: normal S1 S2 regular  Abdomen: Distended dullness nontender with bowel sounds  Extremities: Chronic edema  Skin: no rash    Invasive Devices       Central Venous Catheter Line  Duration             Port A Cath Chest -- days              Peripheral Intravenous Line  Duration             Peripheral IV 06/22/24 Left;Ventral (anterior) Forearm 1 day                          Counseling / Coordination of Care  Total floor / unit time spent today 30  minutes.  Greater than 50% of total time was spent with the patient and / or family counseling and / or coordination of  care.  A description of the counseling / coordination of care: .

## 2024-06-23 NOTE — PLAN OF CARE
Problem: Prexisting or High Potential for Compromised Skin Integrity  Goal: Skin integrity is maintained or improved  Description: INTERVENTIONS:  - Identify patients at risk for skin breakdown  - Assess and monitor skin integrity  - Assess and monitor nutrition and hydration status  - Monitor labs   - Assess for incontinence   - Turn and reposition patient  - Assist with mobility/ambulation  - Relieve pressure over bony prominences  - Avoid friction and shearing  - Provide appropriate hygiene as needed including keeping skin clean and dry  - Evaluate need for skin moisturizer/barrier cream  - Collaborate with interdisciplinary team   - Patient/family teaching  - Consider wound care consult   Outcome: Progressing     Problem: PAIN - ADULT  Goal: Verbalizes/displays adequate comfort level or baseline comfort level  Description: Interventions:  - Encourage patient to monitor pain and request assistance  - Assess pain using appropriate pain scale  - Administer analgesics based on type and severity of pain and evaluate response  - Implement non-pharmacological measures as appropriate and evaluate response  - Consider cultural and social influences on pain and pain management  - Notify physician/advanced practitioner if interventions unsuccessful or patient reports new pain  Outcome: Progressing     Problem: INFECTION - ADULT  Goal: Absence or prevention of progression during hospitalization  Description: INTERVENTIONS:  - Assess and monitor for signs and symptoms of infection  - Monitor lab/diagnostic results  - Monitor all insertion sites, i.e. indwelling lines, tubes, and drains  - Monitor endotracheal if appropriate and nasal secretions for changes in amount and color  - [place] appropriate cooling/warming therapies per order  - Administer medications as ordered  - Instruct and encourage patient and family to use good hand hygiene technique  - Identify and instruct in appropriate isolation precautions for  identified infection/condition  Outcome: Progressing     Problem: SAFETY ADULT  Goal: Patient will remain free of falls  Description: INTERVENTIONS:  - Educate patient/family on patient safety including physical limitations  - Instruct patient to call for assistance with activity   - Consult OT/PT to assist with strengthening/mobility   - Keep Call bell within reach  - Keep bed low and locked with side rails adjusted as appropriate  - Keep care items and personal belongings within reach  - Initiate and maintain comfort rounds  - Make Fall Risk Sign visible to staff  - Offer Toileting every 2 Hours, in advance of need  - Initiate/Maintain bed alarm  - Obtain necessary fall risk management equipment: bed alarm  - Apply yellow socks and bracelet for high fall risk patients  - Consider moving patient to room near nurses station  Outcome: Progressing  Goal: Maintain or return to baseline ADL function  Description: INTERVENTIONS:  -  Assess patient's ability to carry out ADLs; assess patient's baseline for ADL function and identify physical deficits which impact ability to perform ADLs (bathing, care of mouth/teeth, toileting, grooming, dressing, etc.)  - Assess/evaluate cause of self-care deficits   - Assess range of motion  - Assess patient's mobility; develop plan if impaired  - Assess patient's need for assistive devices and provide as appropriate  - Encourage maximum independence but intervene and supervise when necessary  - Involve family in performance of ADLs  - Assess for home care needs following discharge   - Consider OT consult to assist with ADL evaluation and planning for discharge  - Provide patient education as appropriate  Outcome: Progressing     Problem: DISCHARGE PLANNING  Goal: Discharge to home or other facility with appropriate resources  Description: INTERVENTIONS:  - Identify barriers to discharge w/patient and caregiver  - Arrange for needed discharge resources and transportation as  appropriate  - Identify discharge learning needs (meds, wound care, etc.)  - Arrange for interpretive services to assist at discharge as needed  - Refer to Case Management Department for coordinating discharge planning if the patient needs post-hospital services based on physician/advanced practitioner order or complex needs related to functional status, cognitive ability, or social support system  Outcome: Progressing     Problem: Knowledge Deficit  Goal: Patient/family/caregiver demonstrates understanding of disease process, treatment plan, medications, and discharge instructions  Description: Complete learning assessment and assess knowledge base.  Interventions:  - Provide teaching at level of understanding  - Provide teaching via preferred learning methods  Outcome: Progressing     Problem: NEUROSENSORY - ADULT  Goal: Achieves stable or improved neurological status  Description: INTERVENTIONS  - Monitor and report changes in neurological status  - Monitor vital signs such as temperature, blood pressure, glucose, and any other labs ordered   - Initiate measures to prevent increased intracranial pressure  - Monitor for seizure activity and implement precautions if appropriate      Outcome: Progressing  Goal: Remains free of injury related to seizures activity  Description: INTERVENTIONS  - Maintain airway, patient safety  and administer oxygen as ordered  - Monitor patient for seizure activity, document and report duration and description of seizure to physician/advanced practitioner  - If seizure occurs,  ensure patient safety during seizure  - Reorient patient post seizure  - Seizure pads on all 4 side rails  - Instruct patient/family to notify RN of any seizure activity including if an aura is experienced  - Instruct patient/family to call for assistance with activity based on nursing assessment  - Administer anti-seizure medications if ordered    Outcome: Progressing  Goal: Achieves maximal functionality and  self care  Description: INTERVENTIONS  - Monitor swallowing and airway patency with patient fatigue and changes in neurological status  - Encourage and assist patient to increase activity and self care.   - Encourage visually impaired, hearing impaired and aphasic patients to use assistive/communication devices  Outcome: Progressing     Problem: CARDIOVASCULAR - ADULT  Goal: Maintains optimal cardiac output and hemodynamic stability  Description: INTERVENTIONS:  - Monitor I/O, vital signs and rhythm  - Monitor for S/S and trends of decreased cardiac output  - Administer and titrate ordered vasoactive medications to optimize hemodynamic stability  - Assess quality of pulses, skin color and temperature  - Assess for signs of decreased coronary artery perfusion  - Instruct patient to report change in severity of symptoms  Outcome: Progressing  Goal: Absence of cardiac dysrhythmias or at baseline rhythm  Description: INTERVENTIONS:  - Continuous cardiac monitoring, vital signs, obtain 12 lead EKG if ordered  - Administer antiarrhythmic and heart rate control medications as ordered  - Monitor electrolytes and administer replacement therapy as ordered  Outcome: Progressing     Problem: RESPIRATORY - ADULT  Goal: Achieves optimal ventilation and oxygenation  Description: INTERVENTIONS:  - Assess for changes in respiratory status  - Assess for changes in mentation and behavior  - Position to facilitate oxygenation and minimize respiratory effort  - Oxygen administered by appropriate delivery if ordered  - Initiate smoking cessation education as indicated  - Encourage broncho-pulmonary hygiene including cough, deep breathe, Incentive Spirometry  - Assess the need for suctioning and aspirate as needed  - Assess and instruct to report SOB or any respiratory difficulty  - Respiratory Therapy support as indicated  Outcome: Progressing     Problem: GASTROINTESTINAL - ADULT  Goal: Minimal or absence of nausea and/or  vomiting  Description: INTERVENTIONS:  - Administer IV fluids if ordered to ensure adequate hydration  - Maintain NPO status until nausea and vomiting are resolved  - Nasogastric tube if ordered  - Administer ordered antiemetic medications as needed  - Provide nonpharmacologic comfort measures as appropriate  - Advance diet as tolerated, if ordered  - Consider nutrition services referral to assist patient with adequate nutrition and appropriate food choices  Outcome: Progressing  Goal: Maintains or returns to baseline bowel function  Description: INTERVENTIONS:  - Assess bowel function  - Encourage oral fluids to ensure adequate hydration  - Administer IV fluids if ordered to ensure adequate hydration  - Administer ordered medications as needed  - Encourage mobilization and activity  - Consider nutritional services referral to assist patient with adequate nutrition and appropriate food choices  Outcome: Progressing  Goal: Maintains adequate nutritional intake  Description: INTERVENTIONS:  - Monitor percentage of each meal consumed  - Identify factors contributing to decreased intake, treat as appropriate  - Assist with meals as needed  - Monitor I&O, weight, and lab values if indicated  - Obtain nutrition services referral as needed  Outcome: Progressing  Goal: Oral mucous membranes remain intact  Description: INTERVENTIONS  - Assess oral mucosa and hygiene practices  - Implement preventative oral hygiene regimen  - Implement oral medicated treatments as ordered  - Initiate Nutrition services referral as needed  Outcome: Progressing     Problem: GENITOURINARY - ADULT  Goal: Maintains or returns to baseline urinary function  Description: INTERVENTIONS:  - Assess urinary function  - Encourage oral fluids to ensure adequate hydration if ordered  - Administer IV fluids as ordered to ensure adequate hydration  - Administer ordered medications as needed  - Offer frequent toileting  - Follow urinary retention protocol if  ordered  Outcome: Progressing  Goal: Absence of urinary retention  Description: INTERVENTIONS:  - Assess patient’s ability to void and empty bladder  - Monitor I/O  - Bladder scan as needed  - Discuss with physician/AP medications to alleviate retention as needed  - Discuss catheterization for long term situations as appropriate  Outcome: Progressing     Problem: METABOLIC, FLUID AND ELECTROLYTES - ADULT  Goal: Electrolytes maintained within normal limits  Description: INTERVENTIONS:  - Monitor labs and assess patient for signs and symptoms of electrolyte imbalances  - Administer electrolyte replacement as ordered  - Monitor response to electrolyte replacements, including repeat lab results as appropriate  - Instruct patient on fluid and nutrition as appropriate  Outcome: Progressing  Goal: Fluid balance maintained  Description: INTERVENTIONS:  - Monitor labs   - Monitor I/O and WT  - Instruct patient on fluid and nutrition as appropriate  - Assess for signs & symptoms of volume excess or deficit  Outcome: Progressing     Problem: SKIN/TISSUE INTEGRITY - ADULT  Goal: Incision(s), wounds(s) or drain site(s) healing without S/S of infection  Description: INTERVENTIONS  - Assess and document dressing, incision, wound bed, drain sites and surrounding tissue  - Provide patient and family education  - Perform skin care/dressing changes  Outcome: Progressing     Problem: HEMATOLOGIC - ADULT  Goal: Maintains hematologic stability  Description: INTERVENTIONS  - Assess for signs and symptoms of bleeding or hemorrhage  - Monitor labs  - Administer supportive blood products/factors as ordered and appropriate  Outcome: Progressing     Problem: MUSCULOSKELETAL - ADULT  Goal: Maintain or return mobility to safest level of function  Description: INTERVENTIONS:  - Assess patient's ability to carry out ADLs; assess patient's baseline for ADL function and identify physical deficits which impact ability to perform ADLs (bathing, care  of mouth/teeth, toileting, grooming, dressing, etc.)  - Assess/evaluate cause of self-care deficits   - Assess range of motion  - Assess patient's mobility  - Assess patient's need for assistive devices and provide as appropriate  - Encourage maximum independence but intervene and supervise when necessary  - Involve family in performance of ADLs  - Assess for home care needs following discharge   - Consider OT consult to assist with ADL evaluation and planning for discharge  - Provide patient education as appropriate  Outcome: Progressing     Problem: Nutrition/Hydration-ADULT  Goal: Nutrient/Hydration intake appropriate for improving, restoring or maintaining nutritional needs  Description: Monitor and assess patient's nutrition/hydration status for malnutrition. Collaborate with interdisciplinary team and initiate plan and interventions as ordered.  Monitor patient's weight and dietary intake as ordered or per policy. Utilize nutrition screening tool and intervene as necessary. Determine patient's food preferences and provide high-protein, high-caloric foods as appropriate.     INTERVENTIONS:  - Monitor oral intake, urinary output, labs, and treatment plans  - Assess nutrition and hydration status and recommend course of action  - Evaluate amount of meals eaten  - Assist patient with eating if necessary   - Allow adequate time for meals  - Recommend/ encourage appropriate diets, oral nutritional supplements, and vitamin/mineral supplements  - Order, calculate, and assess calorie counts as needed  - Recommend, monitor, and adjust tube feedings and TPN/PPN based on assessed needs  - Assess need for intravenous fluids  - Provide specific nutrition/hydration education as appropriate  - Include patient/family/caregiver in decisions related to nutrition  Outcome: Progressing

## 2024-06-24 ENCOUNTER — TELEPHONE (OUTPATIENT)
Dept: PSYCHIATRY | Facility: CLINIC | Age: 64
End: 2024-06-24

## 2024-06-24 LAB
ERYTHROCYTE [DISTWIDTH] IN BLOOD BY AUTOMATED COUNT: 15.9 % (ref 11.6–15.1)
GLUCOSE SERPL-MCNC: 209 MG/DL (ref 65–140)
GLUCOSE SERPL-MCNC: 270 MG/DL (ref 65–140)
GLUCOSE SERPL-MCNC: 280 MG/DL (ref 65–140)
GLUCOSE SERPL-MCNC: 298 MG/DL (ref 65–140)
GLUCOSE SERPL-MCNC: 302 MG/DL (ref 65–140)
HCT VFR BLD AUTO: 27.7 % (ref 36.5–49.3)
HGB BLD-MCNC: 9.2 G/DL (ref 12–17)
INR PPP: 1.53 (ref 0.84–1.19)
MCH RBC QN AUTO: 33.6 PG (ref 26.8–34.3)
MCHC RBC AUTO-ENTMCNC: 33.2 G/DL (ref 31.4–37.4)
MCV RBC AUTO: 101 FL (ref 82–98)
PLATELET # BLD AUTO: 26 THOUSANDS/UL (ref 149–390)
PMV BLD AUTO: 10.8 FL (ref 8.9–12.7)
PROTHROMBIN TIME: 18.6 SECONDS (ref 11.6–14.5)
RBC # BLD AUTO: 2.74 MILLION/UL (ref 3.88–5.62)
T4 FREE SERPL-MCNC: 1.22 NG/DL (ref 0.61–1.12)
WBC # BLD AUTO: 3.75 THOUSAND/UL (ref 4.31–10.16)

## 2024-06-24 PROCEDURE — 99232 SBSQ HOSP IP/OBS MODERATE 35: CPT | Performed by: HOSPITALIST

## 2024-06-24 PROCEDURE — 99497 ADVNCD CARE PLAN 30 MIN: CPT | Performed by: HOSPITALIST

## 2024-06-24 PROCEDURE — 85610 PROTHROMBIN TIME: CPT | Performed by: INTERNAL MEDICINE

## 2024-06-24 PROCEDURE — 97162 PT EVAL MOD COMPLEX 30 MIN: CPT

## 2024-06-24 PROCEDURE — 82948 REAGENT STRIP/BLOOD GLUCOSE: CPT

## 2024-06-24 PROCEDURE — 97166 OT EVAL MOD COMPLEX 45 MIN: CPT

## 2024-06-24 PROCEDURE — 85027 COMPLETE CBC AUTOMATED: CPT | Performed by: INTERNAL MEDICINE

## 2024-06-24 RX ORDER — INSULIN LISPRO 100 [IU]/ML
1-5 INJECTION, SOLUTION INTRAVENOUS; SUBCUTANEOUS
Status: DISCONTINUED | OUTPATIENT
Start: 2024-06-24 | End: 2024-06-25 | Stop reason: HOSPADM

## 2024-06-24 RX ADMIN — MIDODRINE HYDROCHLORIDE 10 MG: 5 TABLET ORAL at 05:29

## 2024-06-24 RX ADMIN — GABAPENTIN 300 MG: 300 CAPSULE ORAL at 21:15

## 2024-06-24 RX ADMIN — INSULIN LISPRO 3 UNITS: 100 INJECTION, SOLUTION INTRAVENOUS; SUBCUTANEOUS at 12:05

## 2024-06-24 RX ADMIN — PANTOPRAZOLE SODIUM 40 MG: 40 TABLET, DELAYED RELEASE ORAL at 05:29

## 2024-06-24 RX ADMIN — ROPINIROLE HYDROCHLORIDE 4 MG: 1 TABLET, FILM COATED ORAL at 21:15

## 2024-06-24 NOTE — ACP (ADVANCE CARE PLANNING)
"Advanced Care Planning Progress Note    Serious Illness Conversation    1. What is your understanding now of where you are with your illness?  Patient has been refusing he is medications for cirrhosis.   I explained that he is feeling poorly because he is not taking these medications.     I explained to him that if he does not take these medications he will get worse and can have significant injury and even die.    He expressed understanding.      I also explained that on CT abdomen we found a \"spot\" on his pancreas.   We are concerned that it may be pancreatic cancer.   He is refusing MRI.     I explained that if it is pancreatic cancer.   The sooner we diagnose and treat it, the better the prognosis.  If he waits until he moves to [place] to get this evaluated, it could be a much worse prognosis and even death.    He expressed understanding.     2. How much information about what is likely to be ahead with your illness would you like to have?  Information: patient wants to be fully informed     3. What did you (clinician) communicate to the patient?  I explained that he needs to be compliant with are treatment and testing or he can have significant morbity and mortality     4. If your health situation worsens, what are your most important goals?     5. What are the biggest fears and worries about the future and your health?     6. What abilities are so critical to your life that you cannot imagine living without them?     7. What gives you strength as you think about the future with your illness?  Patient has a fiancee who helps take care of him     8. If you become sicker, how much are you willing to go through for the possibility of gaining more time?  9. How much does your proxy and family know about your priorities and wishes?  Patient does not want me to call his father nor fiancee        How does this plan sound to you? I will do everything I can to help you through this.        Advanced directives      I " have spent 40 minutes face to face talking to patient.         Neri BLANC Prechtel, DO

## 2024-06-24 NOTE — PROGRESS NOTES
[facility]  Progress Note  Name: Foreign Armando I  MRN: [id number]  Unit/Bed#: E5 -01 I Date of Admission: 6/21/2024   Date of Service: 6/24/2024  Hospital Day: 3    Assessment & Plan   * Decompensated hepatic cirrhosis (HCC)  Assessment & Plan  Known liver cirrhosis 2/2 CHRISTIE, decompensated with ascites, hepatic encephalopathy, varices, pancytopenia, coagulopathy and hepatic hydrothorax.  Transferred from [place] for hyperbilirubinemia increased from baseline of 2, now 11.9    EV: Propranolol 10mg BID  HE: During admission at  was switched off lactulose for rifaximin due to intolerable diarrhea.  Insurance did not cover rifaximin therefore he was discharged on lactulose 20g qd  Diuretics: Furosemide 20mg qd and aldactone 100mg qd.  Hold given ALEXANDER      Patient states he did better on lactulose and rifaximin.  But he is also still confused so I do not know if that is really legitimate    He is refusing his lactulose this morning    Acute encephalopathy  Assessment & Plan  Due to hepatic encephalopathy.  Will have to see what GI recommends with rifaximin versus lactulose and what his insurance will cover    Hyperbilirubinemia  Assessment & Plan  Patient presented to [facility] with complaint of lower extremity pain which is chronic from diabetic neuropathy.  He was noted with elevated bilirubin.  ER provider discussed with GI who recommended admission.  No GI coverage at [facility] on weekends, transfer recommended    Chronically elevated bilirubin in setting of liver cirrhosis although increased to 11.9 from baseline 2.2.      Going for MRI abdomen today to investigate pancreatic lesion               Subjective:   He states he is doing well.  Less leg pain.  Less leg swelling.    He knows he is at [facility].  He does not know the month or the year    He unfortunately refused his lactulose this morning and all his other medications      Objective:     Vitals:    Temp (24hrs), Av.8 °F (36.6 °C), Min:97.8 °F (36.6 °C), Max:97.8 °F (36.6 °C)    Temp:  [97.8 °F (36.6 °C)] 97.8 °F (36.6 °C)  HR:  [63-72] 69  Resp:  [16-18] 18  BP: (102-118)/(51-59) 102/56  SpO2:  [93 %-97 %] 95 %  There is no height or weight on file to calculate BMI.     Input and Output Summary (last 24 hours):       Intake/Output Summary (Last 24 hours) at 2024 1045  Last data filed at 2024 1001  Gross per 24 hour   Intake 815 ml   Output 700 ml   Net 115 ml       Physical Exam:     Physical Exam  Vitals and nursing note reviewed.   HENT:      Head: Normocephalic and atraumatic.   Eyes:      Pupils: Pupils are equal, round, and reactive to light.   Cardiovascular:      Rate and Rhythm: Normal rate and regular rhythm.      Heart sounds: No murmur heard.     No friction rub. No gallop.   Pulmonary:      Effort: Pulmonary effort is normal.      Breath sounds: Normal breath sounds. No wheezing or rales.   Abdominal:      General: Bowel sounds are normal.      Palpations: Abdomen is soft.      Tenderness: There is no abdominal tenderness.   Musculoskeletal:      Right lower leg: No edema.      Left lower leg: No edema.       .       Additional Data:     Labs:    Results from last 7 days   Lab Units 24  0547 24  0533 24  0449   WBC Thousand/uL 3.75*   < > 3.10*   HEMOGLOBIN g/dL 9.2*   < > 8.4*   HEMATOCRIT % 27.7*   < > 25.2*   PLATELETS Thousands/uL 26*   < > 27*   SEGS PCT %  --   --  68   LYMPHO PCT %  --   --  15   MONO PCT %  --   --  11   EOS PCT %  --   --  5    < > = values in this interval not displayed.     Results from last 7 days   Lab Units 24  0533   POTASSIUM mmol/L 4.3   CHLORIDE mmol/L 103   CO2 mmol/L 23   BUN mg/dL 22   CREATININE mg/dL 0.94   CALCIUM mg/dL 8.2*   ALK PHOS U/L 93   ALT U/L 44   AST U/L 38     Results from last 7 days   Lab Units 24  0547   INR  1.53*     Results from last 7 days   Lab Units 24  0714 24  0003  06/23/24  2102 06/23/24  1604 06/23/24  1132 06/23/24  0907 06/23/24  0554 06/23/24  0024 06/22/24  2348 06/22/24  2104 06/22/24  1615 06/22/24  1125   POC GLUCOSE mg/dl 270* 209* 273* 324* 304* 245* 242* 254* 207* 204* 298* 85               * I Have Reviewed All Lab Data     Recent Cultures (last 7 days):             Last 24 Hours Medication List:   Current Facility-Administered Medications   Medication Dose Route Frequency Provider Last Rate    aluminum-magnesium hydroxide-simethicone  30 mL Oral Q6H PRN NUBIA Hawk      ferrous sulfate  325 mg Oral Daily With Breakfast NUBIA Hawk      furosemide  20 mg Oral Daily Maynor Antonio, DO      gabapentin  300 mg Oral TID NUBIA Hawk      HYDROcodone-acetaminophen  1 tablet Oral Q6H PRN NUBIA Hawk      hydrocortisone  10 mg Oral TID NUBIA Hawk      HYDROmorphone  0.2 mg Intravenous Q6H PRN NUBIA Hawk      hydrOXYzine HCL  25 mg Oral Q6H PRN NUBIA Hawk      insulin glargine  10 Units Subcutaneous HS Andrew Adkins,       insulin lispro  1-5 Units Subcutaneous 4x Daily (AC & HS) Patric Nath PA-C      lactulose  20 g Oral Daily NUBIA Hawk      levETIRAcetam  500 mg Oral Q12H [facility] NUBIA Hawk      methocarbamol  750 mg Oral Q6H PRN NUBIA Hawk      midodrine  10 mg Oral TID AC NUBIA Hawk      ondansetron  4 mg Intravenous Q6H PRN NUBIA Hawk      pancrelipase (Lip-Prot-Amyl)  24,000 Units Oral TID With Meals NUBIA Hawk      pantoprazole  40 mg Oral Early Morning NUBIA Hawk      propranolol  10 mg Oral Q12H SANDRA Rufina De La Cruz, NUBIA      rOPINIRole  4 mg Oral BID NUBIA Hawk      simethicone  80 mg Oral 4x Daily PRN NUBIA Hawk      spironolactone  100 mg Oral Daily Maynor Antonio, DO      tamsulosin  0.4 mg Oral Daily With Dinner NUBIA Hawk            VTE Pharmacologic Prophylaxis:   Pharmacologic:  none      Current Length of Stay: 3 day(s)    Current Patient Status: Inpatient       Discharge Plan: greater than 48 hours    Code Status: Level 1 - Full Code           Today, Patient Was Seen By: Neri Gonzales DO    ** Please Note: Dictation voice to text software may have been used in the creation of this document. **

## 2024-06-24 NOTE — ASSESSMENT & PLAN NOTE
Patient presented to [place] with complaint of lower extremity pain which is chronic from diabetic neuropathy.  He was noted with elevated bilirubin.  ER provider discussed with GI who recommended admission.  No GI coverage at [place] on weekends, transfer recommended    Chronically elevated bilirubin in setting of liver cirrhosis although increased to 11.9 from baseline 2.2.      Going for MRI abdomen today to investigate pancreatic lesion

## 2024-06-24 NOTE — PHYSICAL THERAPY NOTE
PT EVALUATION    Pt. Name: Foreign Armando  Pt. Age: 63 y.o.  MRN: [id number]  LENGTH OF STAY: 3      Admitting Diagnoses:   ALEXANDER (acute kidney injury) (HCC)    Past Medical History:   Diagnosis Date    CHF (congestive heart failure) (HCC)     Cirrhosis (HCC)     CHRISTIE    Colon cancer (HCC)     Diabetes mellitus (HCC)     Neuropathy     Restless leg syndrome        Past Surgical History:   Procedure Laterality Date    EGD AND SIGMOIDOSCOPY FLEXIBLE      IR PARACENTESIS  11/22/2022    IR PARACENTESIS  2/2/2023    IR THORACENTESIS  6/4/2024    LEFT COLON RESECTION      LITHOTRIPSY         Imaging Studies:  US right upper quadrant   Final Result by Azeb Marcelino MD (06/22 6651)   Cirrhotic liver      Biliary sludge without evidence of cholelithiasis   Mild gallbladder wall thickening, stable, may be sequela of chronic liver disease      No biliary dilatation seen      Workstation performed: [id number]         MRI abdomen w wo contrast and mrcp    (Results Pending)         06/24/24 0825   PT Last Visit   PT Visit Date 06/24/24   Note Type   Note type Evaluation   Pain Assessment   Pain Assessment Tool 0-10   Pain Score No Pain   Restrictions/Precautions   Weight Bearing Precautions Per Order No   Other Precautions Chair Alarm;Bed Alarm;Multiple lines;Fall Risk   Home Living   Type of Home Homeless  (pt reports he is currently homeless but will be returning to his parents house when D/Nicholas)   Home Layout One level;Stairs to enter with rails  (2STE with HR (parents home))   Bathroom Shower/Tub Tub/shower unit   Bathroom Toilet Standard   Bathroom Equipment Other (Comment)  (no DME)   Home Equipment Other (Comment)  (no DME)   Additional Comments pt currently homeless; home set-up information referring to his parents home which he plans to return to upon DC   Prior Function   Level of [place] Independent with ADLs;Independent with functional mobility;Independent with IADLS   Lives With Other (Comment)  (plans  to live with father)   Receives Help From Family   Falls in the last 6 months 0   Comments (-)    General   Additional Pertinent History h/o CHF, T2Dm, colon CA, seizures, adrenal insufficiency   Family/Caregiver Present No   Cognition   Overall Cognitive Status WFL   Arousal/Participation Alert   Orientation Level Oriented to person;Oriented to place;Oriented to time  (general to date)   Following Commands Follows one step commands without difficulty   Comments cooperative   Subjective   Subjective Pt agreeable to PT/OT evals   RUE Assessment   RUE Assessment   (refer to OT)   LUE Assessment   LUE Assessment   (refer to OT)   RLE Assessment   RLE Assessment WFL  (4+/5 grossly)   LLE Assessment   LLE Assessment WFL  (4+/5 grossly)   Coordination   Sensation X  (B/L numbness which pt reports is chronic; mild pitting edema on LLE)   Bed Mobility   Supine to Sit 6  Modified independent   Additional items HOB elevated;Bedrails   Sit to Supine Unable to assess  (pt OOB in recliner post session)   Transfers   Sit to Stand 5  Supervision   Additional items Verbal cues   Stand to Sit 5  Supervision   Additional items Verbal cues   Additional Comments cues for technique and safety   Ambulation/Elevation   Gait pattern Narrow ORACIO;Decreased foot clearance;Inconsistent sravani;Short stride;Excessively slow;Step through pattern   Gait Assistance 5  Supervision   Assistive Device None   Distance 200'x1   Ambulation/Elevation Additional Comments mildly unsteady gait; pt reports some dizziness with amb 2* to current medications; no gross LOB   Balance   Static Sitting Good   Dynamic Sitting Fair +   Static Standing Fair   Dynamic Standing Fair -   Ambulatory Fair -   Endurance Deficit   Endurance Deficit No   Activity Tolerance   Activity Tolerance Patient tolerated treatment well   Medical Staff Made Aware OTR Maria Guadalupe   Nurse Made Aware yes, Lucy   Assessment   Prognosis Fair   Problem List Impaired balance;Decreased  mobility;Impaired judgement;Decreased safety awareness;Impaired sensation   Assessment Pt is 63 y.o. male initially presented to [place] ED w/ c/o LE pain. Pt transferred to [facility] for hyperbilirubinemia management. Pt admitted for hyperbilirubinemia, decompensated hepatic cirrhosis, acute encephalopathy, ALEXANDER, and lactic acidosis.. Imaging (+) for cirrhotic liver. PT was consulted for mobility assessment and safe D/C planning with orders of Up and OOB. Please see above flowsheet for PLOF and home set-up. PTA, pt was I with ADLs/functional mobility/iADLs w/o AD. Upon eval, pt is mod I for supine to sit transitions, and S w/o AD for transfers and ambulation. Gait deficits are noted above, pt reported mild dizziness with ambulation however no gross LOB episodes observed. The patient's AM-PAC Basic Mobility Inpatient Short Form Raw Score is 19. A Raw score of greater than 16 suggests the patient may benefit from discharge to home. Please also refer to the recommendation of the Physical Therapist for safe discharge planning. PT recommends no acute post rehabilitation needs upon D/C. Pt currently functioning close to baseline levels so IPPT will be D/C at this time. If pt should demonstrate a decline in function or mobility below baseline, PT needs may be re-addressed. Will maintain on restorative for daily OOB and ambulation to prevent further decline in function while admitted. Co-eval was necessary to complete this PT eval for the pts best interest given pts medical acuity and complexity.   Barriers to Discharge Inaccessible home environment;Decreased caregiver support   Barriers to Discharge Comments pt currently homeless   Goals   Patient Goals to go back to [place]   PT Treatment Day 0   Plan   Treatment/Interventions Spoke to nursing;OT  (PT eval only)   PT Frequency Other (Comment)  (D/C PT. Restorative for daily amb)   Discharge Recommendation   Rehab Resource Intensity Level, PT No post-acute  rehabilitation needs   AM-PAC Basic Mobility Inpatient   Turning in Flat Bed Without Bedrails 4   Lying on Back to Sitting on Edge of Flat Bed Without Bedrails 3   Moving Bed to Chair 3   Standing Up From Chair Using Arms 3   Walk in Room 3   Climb 3-5 Stairs With Railing 3   Basic Mobility Inpatient Raw Score 19   Basic Mobility Standardized Score 42.48   [facility] Highest Level Of Mobility   -HLM Goal 6: Walk 10 steps or more   JH-HLM Achieved 7: Walk 25 feet or more   End of Consult   Patient Position at End of Consult Bedside chair;Bed/Chair alarm activated;All needs within reach   End of Consult Comments Pt in stable condition. All needs met. Chair alarm activated   Hx/personal factors: co-morbidities, inaccessible home, dec caregiver support, dec cognition, and fall risk  Examination: dec mobility, dec balance, dec amb, risk for falls, dec cognition  Clinical: unpredictable (ongoing medical status, abnormal lab values, risk for falls, and imaging test/result pending)  Complexity: high    Aida Crisp, SPT

## 2024-06-24 NOTE — PROGRESS NOTES
Sepsis note.    I do NOT feel that the patient has sepsis.    He has no signs of infection      No cough.  No headache  No neck stiffness  No abdominal pain  No diarrhea  No dysuria  No fever  No chills      Patient is NOT septic

## 2024-06-24 NOTE — UTILIZATION REVIEW
Initial Clinical Review    TRANSFER FROM [place] ED    Admission: Date/Time/Statement:   Admission Orders (From admission, onward)       Ordered        06/21/24 2248  INPATIENT ADMISSION  Once                          Orders Placed This Encounter   Procedures    INPATIENT ADMISSION     Standing Status:   Standing     Number of Occurrences:   1     Order Specific Question:   Level of Care     Answer:   Med Surg [16]     Order Specific Question:   Estimated length of stay     Answer:   More than 2 Midnights     Order Specific Question:   Certification     Answer:   I certify that inpatient services are medically necessary for this patient for a duration of greater than two midnights. See H&P and MD Progress Notes for additional information about the patient's course of treatment.       Initial Presentation: 63 y.o. male  initially  presented to ED at  [facility]  with  elevated  bilirubin. Seen in ED  the day prior to this with LE  pain and signed AMA. Now returns with   LE  pain, walked  around all night, not eating.   Confused, restless and agitated in ED and given ativan  and dilaudid.   Labs  show  bili  increased   from baseline  of  2  to  11.9.   AST/ALT/ALKP  normal.  Transferred to [place] for further care.    Given  a urinal at  [place],  walked across room and urinated on floor  and wall,  then  defecated on floor.    Evaluated by neuro pschy  on  6/17  by  neuro pschy and determined to  have capacity.  CT scan shows cirrhotic liver.   . PMH  is  DM2,  Seizure disorder, adrenal insufficiency, chronic anemia,  known liver cirrhosis D/T  CHRISTIE.  Admit  Ip  with  Decompensated   hepatic cirrhosis, Hyperbilirubinemia,  Acute  Encephalopathy, Lactic acidosis and plan is   GI consult,  monitor labs,  US RUQ,  IVF  and continue  home meds.        Anticipated Length of Stay/Certification Statement:  Patient will be admitted on an Inpatient basis with an anticipated length of stay of  > 2  midnights.   Justification for Hospital Stay: Decompensated liver cirrhosis, encephalopathy, hyperbilirubinemia     Date:   6/22   Day 2:   Gi consult  Continue to monitor  liver function.   Was on  lactulose, had intolerable diarrhea.    Restart rifaximin  and  monitor  bowel function.  Monitor mental status.    Wait  MRI.  MELD  score  20.     Wait  US  RUQ.  Neaj PT/OT.     Patient tangential in thought process although can report that he was at [employer] earlier and his legs hurt so he came to the hospital.  Also re started lactulose.    ED Triage Vitals   Temperature Pulse Respirations Blood Pressure SpO2 Pain Score   06/21/24 2225 06/21/24 2225 06/21/24 2225 06/21/24 2225 06/21/24 2225 06/21/24 2256   97.7 °F (36.5 °C) 65 18 125/95 98 % 7           Vital Signs (last 3 days)       Date/Time Temp Pulse Resp BP MAP (mmHg) SpO2 O2 Device Patient Position - Orthostatic VS Zach Coma Scale Score Pain    06/24/24 0900 -- -- -- -- -- -- -- -- -- No Pain    06/24/24 0839 -- -- -- -- -- -- -- -- -- No Pain    06/24/24 0825 -- -- -- -- -- -- -- -- -- No Pain    06/24/24 07:14:45 97.8 °F (36.6 °C) 69 18 102/56 71 95 % -- -- -- --    06/23/24 23:28:20 -- 72 16 110/54 73 93 % -- -- -- --    06/23/24 21:25:32 -- 64 -- 108/59 75 94 % -- -- -- --    06/23/24 2015 -- -- -- -- -- -- None (Room air) -- 14 No Pain    06/23/24 1615 -- -- -- -- -- -- -- -- -- 9    06/23/24 15:30:02 -- 72 -- 118/58 78 97 % -- -- -- --    06/23/24 1132 -- -- -- -- -- -- -- -- -- 9    06/23/24 10:56:51 -- 63 -- 105/51 69 95 % -- -- -- --    06/23/24 0832 -- -- -- -- -- -- -- -- 14 --    06/23/24 07:31:52 97.5 °F (36.4 °C) 60 18 92/42 59 97 % None (Room air) Lying -- --    06/23/24 0615 -- -- -- -- -- -- -- -- -- 9 06/23/24 05:54:53 -- 75 -- 110/54 73 96 % -- -- -- --    06/22/24 23:48:23 -- 68 17 100/53 69 95 % -- -- -- --    06/22/24 2217 -- -- -- -- -- -- -- -- -- 10 - Worst Possible Pain    06/22/24 2016 -- -- -- -- -- -- -- -- -- 10 - Worst  Possible Pain    06/22/24 20:14:38 -- 72 -- 95/53 67 95 % -- -- -- --    06/22/24 20:13:29 -- 74 -- 91/43 59 94 % -- -- -- --    06/22/24 15:25:23 97.9 °F (36.6 °C) 59 -- 85/38 54 97 % -- -- -- --    06/22/24 0900 -- -- -- -- -- -- None (Room air) -- 14 No Pain    06/22/24 08:45:14 -- 55 -- 99/48 65 95 % -- -- -- --    06/22/24 07:36:53 98.2 °F (36.8 °C) 54 18 100/45 63 97 % -- -- -- --    06/22/24 06:27:29 -- 59 -- 91/52 65 97 % -- -- -- --    06/21/24 2256 -- -- -- -- -- -- -- -- -- 7    06/21/24 22:25:35 97.7 °F (36.5 °C) 65 18 125/95 105 98 % None (Room air) Lying -- --              Pertinent Labs/Diagnostic Test Results:   Radiology:  US right upper quadrant   Final Interpretation by Azeb Marcelino MD (06/22 2258)   Cirrhotic liver      Biliary sludge without evidence of cholelithiasis   Mild gallbladder wall thickening, stable, may be sequela of chronic liver disease      No biliary dilatation seen      Workstation performed: [id number]         MRI abdomen w wo contrast and mrcp    (Results Pending)         Results from last 7 days   Lab Units 06/24/24  0547 06/23/24  0533 06/22/24  0449 06/21/24  1034 06/20/24  1156   WBC Thousand/uL 3.75* 2.47* 3.10* 8.76 9.65   HEMOGLOBIN g/dL 9.2* 8.7* 8.4* 10.6* 11.0*   HEMATOCRIT % 27.7* 25.9* 25.2* 30.1* 32.3*   PLATELETS Thousands/uL 26* 29* 27* 61* 69*   TOTAL NEUT ABS Thousands/µL  --   --  2.11 6.54 7.04         Results from last 7 days   Lab Units 06/23/24  0533 06/22/24  0449 06/21/24  1034 06/20/24  1156 06/18/24  0507   SODIUM mmol/L 131* 135 133* 128* 135   POTASSIUM mmol/L 4.3 4.3 4.6 4.4 4.1   CHLORIDE mmol/L 103 104 98 92* 100   CO2 mmol/L 23 26 21 19* 25   ANION GAP mmol/L 5 5 14* 17* 10   BUN mg/dL 22 31* 43* 44* 15   CREATININE mg/dL 0.94 0.86 1.34* 1.40* 0.92   EGFR ml/min/1.73sq m 85 92 55 53 88   CALCIUM mg/dL 8.2* 8.6 8.9 8.7 8.6   MAGNESIUM mg/dL  --   --   --   --  1.9   PHOSPHORUS mg/dL  --   --   --   --  3.4     Results from last 7 days   Lab  Units 06/23/24  0533 06/22/24  0449 06/21/24  2318 06/21/24  1515 06/21/24  1034 06/20/24  1156 06/18/24  0507   AST U/L 38 31  --   --  39 27 29   ALT U/L 44 38  --   --  50 49 50   ALK PHOS U/L 93 75  --   --  102 111* 111*   TOTAL PROTEIN g/dL 5.2* 5.0*  --   --  6.2* 6.3* 5.8*   ALBUMIN g/dL 2.7* 2.6*  --   --  3.3* 3.3* 2.9*   TOTAL BILIRUBIN mg/dL 4.87* 7.50*  --  10.07* 11.96* 6.54* 2.47*   BILIRUBIN DIRECT mg/dL  --   --   --   --  4.60*  --   --    AMMONIA umol/L  --   --  50  --   --   --   --      Results from last 7 days   Lab Units 06/24/24  1127 06/24/24  0714 06/24/24  0003 06/23/24  2102 06/23/24  1604 06/23/24  1132 06/23/24  0907 06/23/24  0554 06/23/24  0024 06/22/24  2348 06/22/24  2104 06/22/24  1615   POC GLUCOSE mg/dl 302* 270* 209* 273* 324* 304* 245* 242* 254* 207* 204* 298*     Results from last 7 days   Lab Units 06/23/24  0533 06/22/24  0449 06/21/24  1034 06/20/24  1156 06/18/24  0507   GLUCOSE RANDOM mg/dL 231* 91 158* 331* 237*             Beta- Hydroxybutyrate   Date Value Ref Range Status   06/20/2024 0.14 0.02 - 0.27 mmol/L Final          Results from last 7 days   Lab Units 06/20/24  1420   PH KERRY  7.491*   PCO2 KERRY mm Hg 27.8*   PO2 KERRY mm Hg 49.0*   HCO3 KERRY mmol/L 20.8*   BASE EXC KERRY mmol/L -1.6   O2 CONTENT KERRY ml/dL 12.7   O2 HGB, VENOUS % 82.6*         Results from last 7 days   Lab Units 06/21/24  1034   CK TOTAL U/L 182     Results from last 7 days   Lab Units 06/21/24  1457 06/21/24  1228 06/21/24  1034 06/20/24  1420 06/20/24  1156   HS TNI 0HR ng/L  --   --  15  --  10   HS TNI 2HR ng/L  --  14  --  10  --    HSTNI D2 ng/L  --  -1  --  0  --    HS TNI 4HR ng/L 16  --   --   --   --    HSTNI D4 ng/L 1  --   --   --   --      Results from last 7 days   Lab Units 06/20/24  1156   D-DIMER QUANTITATIVE ug/ml FEU 0.77*     Results from last 7 days   Lab Units 06/24/24  0547 06/23/24  0533 06/22/24  0449 06/20/24  1156   PROTIME seconds 18.6* 18.8* 19.3* 18.2*   INR  1.53*  1.55* 1.61* 1.47*   PTT seconds  --   --   --  32     Results from last 7 days   Lab Units 06/23/24  0533   TSH 3RD GENERATON uIU/mL 0.427*         Results from last 7 days   Lab Units 06/21/24  1319 06/21/24  1034 06/20/24  1420   LACTIC ACID mmol/L 1.3 2.9* 2.9*             Results from last 7 days   Lab Units 06/21/24  1034 06/20/24  1156   BNP pg/mL 54 28             Present on Admission:   ALEXANDER (acute kidney injury) (HCC)   Anemia of chronic disease   Thrombocytopenia (HCC)   (Resolved) Lactic acidosis   Decompensated hepatic cirrhosis (HCC)   Hyperbilirubinemia   Seizure disorder (HCC)   Acute encephalopathy   Restless leg syndrome   Adrenal insufficiency (HCC)   Type 2 diabetes mellitus with hyperglycemia, without long-term current use of insulin (HCC)      Admitting Diagnosis: ALEXANDER (acute kidney injury) (HCC)  Age/Sex: 63 y.o. male  Admission Orders:  Scheduled Medications:  ferrous sulfate, 325 mg, Oral, Daily With Breakfast  furosemide, 20 mg, Oral, Daily  gabapentin, 300 mg, Oral, TID  hydrocortisone, 10 mg, Oral, TID  insulin glargine, 10 Units, Subcutaneous, HS  insulin lispro, 1-5 Units, Subcutaneous, 4x Daily (AC & HS)  lactulose, 20 g, Oral, Daily  levETIRAcetam, 500 mg, Oral, Q12H SANDRA  midodrine, 10 mg, Oral, TID AC  pancrelipase (Lip-Prot-Amyl), 24,000 Units, Oral, TID With Meals  pantoprazole, 40 mg, Oral, Early Morning  propranolol, 10 mg, Oral, Q12H SANDRA  rOPINIRole, 4 mg, Oral, BID  spironolactone, 100 mg, Oral, Daily  tamsulosin, 0.4 mg, Oral, Daily With Dinner      Continuous IV Infusions:     PRN Meds:  aluminum-magnesium hydroxide-simethicone, 30 mL, Oral, Q6H PRN  HYDROcodone-acetaminophen, 1 tablet, Oral, Q6H PRN  HYDROmorphone, 0.2 mg, Intravenous, Q6H PRN  hydrOXYzine HCL, 25 mg, Oral, Q6H PRN  methocarbamol, 750 mg, Oral, Q6H PRN  ondansetron, 4 mg, Intravenous, Q6H PRN  simethicone, 80 mg, Oral, 4x Daily PRN        IP CONSULT TO GASTROENTEROLOGY    Network Utilization Review  Department  ATTENTION: Please call with any questions or concerns to [phone] and carefully listen to the prompts so that you are directed to the right person. All voicemails are confidential.   For Discharge needs, contact Care Management DC Support Team at [phone] opt. [phone]  Send all requests for admission clinical reviews, approved or denied determinations and any other requests to dedicated fax number below belonging to the campus where the patient is receiving treatment. List of dedicated fax numbers for the Facilities:  FACILITY NAME UR FAX NUMBER   ADMISSION DENIALS (Administrative/Medical Necessity) [phone]   DISCHARGE SUPPORT TEAM (NETWORK) [phone]   PARENT CHILD HEALTH (Maternity/NICU/Pediatrics) [phone]   [facility] [phone]   [facility] [phone]   [facility] [phone]   [facility] [phone]   [facility] [phone]   [facility] [phone]   [facility] [phone]   [facility] [phone]   [facility] [phone]   [facility] [phone]   [facility] [phone]   [facility] [phone]

## 2024-06-24 NOTE — ASSESSMENT & PLAN NOTE
Known liver cirrhosis 2/2 CHRISTIE, decompensated with ascites, hepatic encephalopathy, varices, pancytopenia, coagulopathy and hepatic hydrothorax.  Transferred from [place] for hyperbilirubinemia increased from baseline of 2, now 11.9    EV: Propranolol 10mg BID  HE: During admission at  was switched off lactulose for rifaximin due to intolerable diarrhea.  Insurance did not cover rifaximin therefore he was discharged on lactulose 20g qd  Diuretics: Furosemide 20mg qd and aldactone 100mg qd.  Hold given ALEXANDER      Patient states he did better on lactulose and rifaximin.  But he is also still confused so I do not know if that is really legitimate    He is refusing his lactulose this morning

## 2024-06-24 NOTE — PLAN OF CARE
Problem: Prexisting or High Potential for Compromised Skin Integrity  Goal: Skin integrity is maintained or improved  Description: INTERVENTIONS:  - Identify patients at risk for skin breakdown  - Assess and monitor skin integrity  - Assess and monitor nutrition and hydration status  - Monitor labs   - Assess for incontinence   - Turn and reposition patient  - Assist with mobility/ambulation  - Relieve pressure over bony prominences  - Avoid friction and shearing  - Provide appropriate hygiene as needed including keeping skin clean and dry  - Evaluate need for skin moisturizer/barrier cream  - Collaborate with interdisciplinary team   - Patient/family teaching  - Consider wound care consult   Outcome: Progressing     Problem: PAIN - ADULT  Goal: Verbalizes/displays adequate comfort level or baseline comfort level  Description: Interventions:  - Encourage patient to monitor pain and request assistance  - Assess pain using appropriate pain scale  - Administer analgesics based on type and severity of pain and evaluate response  - Implement non-pharmacological measures as appropriate and evaluate response  - Consider cultural and social influences on pain and pain management  - Notify physician/advanced practitioner if interventions unsuccessful or patient reports new pain  Outcome: Progressing     Problem: INFECTION - ADULT  Goal: Absence or prevention of progression during hospitalization  Description: INTERVENTIONS:  - Assess and monitor for signs and symptoms of infection  - Monitor lab/diagnostic results  - Monitor all insertion sites, i.e. indwelling lines, tubes, and drains  - Monitor endotracheal if appropriate and nasal secretions for changes in amount and color  - [place] appropriate cooling/warming therapies per order  - Administer medications as ordered  - Instruct and encourage patient and family to use good hand hygiene technique  - Identify and instruct in appropriate isolation precautions for  identified infection/condition  Outcome: Progressing     Problem: SAFETY ADULT  Goal: Patient will remain free of falls  Description: INTERVENTIONS:  - Educate patient/family on patient safety including physical limitations  - Instruct patient to call for assistance with activity   - Consult OT/PT to assist with strengthening/mobility   - Keep Call bell within reach  - Keep bed low and locked with side rails adjusted as appropriate  - Keep care items and personal belongings within reach  - Initiate and maintain comfort rounds  - Make Fall Risk Sign visible to staff  - Offer Toileting every 2 Hours, in advance of need  - Initiate/Maintain bed alarm  - Obtain necessary fall risk management equipment: bed alarm  - Apply yellow socks and bracelet for high fall risk patients  - Consider moving patient to room near nurses station  Outcome: Progressing  Goal: Maintain or return to baseline ADL function  Description: INTERVENTIONS:  -  Assess patient's ability to carry out ADLs; assess patient's baseline for ADL function and identify physical deficits which impact ability to perform ADLs (bathing, care of mouth/teeth, toileting, grooming, dressing, etc.)  - Assess/evaluate cause of self-care deficits   - Assess range of motion  - Assess patient's mobility; develop plan if impaired  - Assess patient's need for assistive devices and provide as appropriate  - Encourage maximum independence but intervene and supervise when necessary  - Involve family in performance of ADLs  - Assess for home care needs following discharge   - Consider OT consult to assist with ADL evaluation and planning for discharge  - Provide patient education as appropriate  Outcome: Progressing     Problem: DISCHARGE PLANNING  Goal: Discharge to home or other facility with appropriate resources  Description: INTERVENTIONS:  - Identify barriers to discharge w/patient and caregiver  - Arrange for needed discharge resources and transportation as  appropriate  - Identify discharge learning needs (meds, wound care, etc.)  - Arrange for interpretive services to assist at discharge as needed  - Refer to Case Management Department for coordinating discharge planning if the patient needs post-hospital services based on physician/advanced practitioner order or complex needs related to functional status, cognitive ability, or social support system  Outcome: Progressing     Problem: Knowledge Deficit  Goal: Patient/family/caregiver demonstrates understanding of disease process, treatment plan, medications, and discharge instructions  Description: Complete learning assessment and assess knowledge base.  Interventions:  - Provide teaching at level of understanding  - Provide teaching via preferred learning methods  Outcome: Progressing     Problem: NEUROSENSORY - ADULT  Goal: Achieves stable or improved neurological status  Description: INTERVENTIONS  - Monitor and report changes in neurological status  - Monitor vital signs such as temperature, blood pressure, glucose, and any other labs ordered   - Initiate measures to prevent increased intracranial pressure  - Monitor for seizure activity and implement precautions if appropriate      Outcome: Progressing  Goal: Remains free of injury related to seizures activity  Description: INTERVENTIONS  - Maintain airway, patient safety  and administer oxygen as ordered  - Monitor patient for seizure activity, document and report duration and description of seizure to physician/advanced practitioner  - If seizure occurs,  ensure patient safety during seizure  - Reorient patient post seizure  - Seizure pads on all 4 side rails  - Instruct patient/family to notify RN of any seizure activity including if an aura is experienced  - Instruct patient/family to call for assistance with activity based on nursing assessment  - Administer anti-seizure medications if ordered    Outcome: Progressing  Goal: Achieves maximal functionality and  self care  Description: INTERVENTIONS  - Monitor swallowing and airway patency with patient fatigue and changes in neurological status  - Encourage and assist patient to increase activity and self care.   - Encourage visually impaired, hearing impaired and aphasic patients to use assistive/communication devices  Outcome: Progressing     Problem: CARDIOVASCULAR - ADULT  Goal: Maintains optimal cardiac output and hemodynamic stability  Description: INTERVENTIONS:  - Monitor I/O, vital signs and rhythm  - Monitor for S/S and trends of decreased cardiac output  - Administer and titrate ordered vasoactive medications to optimize hemodynamic stability  - Assess quality of pulses, skin color and temperature  - Assess for signs of decreased coronary artery perfusion  - Instruct patient to report change in severity of symptoms  Outcome: Progressing  Goal: Absence of cardiac dysrhythmias or at baseline rhythm  Description: INTERVENTIONS:  - Continuous cardiac monitoring, vital signs, obtain 12 lead EKG if ordered  - Administer antiarrhythmic and heart rate control medications as ordered  - Monitor electrolytes and administer replacement therapy as ordered  Outcome: Progressing     Problem: RESPIRATORY - ADULT  Goal: Achieves optimal ventilation and oxygenation  Description: INTERVENTIONS:  - Assess for changes in respiratory status  - Assess for changes in mentation and behavior  - Position to facilitate oxygenation and minimize respiratory effort  - Oxygen administered by appropriate delivery if ordered  - Initiate smoking cessation education as indicated  - Encourage broncho-pulmonary hygiene including cough, deep breathe, Incentive Spirometry  - Assess the need for suctioning and aspirate as needed  - Assess and instruct to report SOB or any respiratory difficulty  - Respiratory Therapy support as indicated  Outcome: Progressing     Problem: GASTROINTESTINAL - ADULT  Goal: Minimal or absence of nausea and/or  vomiting  Description: INTERVENTIONS:  - Administer IV fluids if ordered to ensure adequate hydration  - Maintain NPO status until nausea and vomiting are resolved  - Nasogastric tube if ordered  - Administer ordered antiemetic medications as needed  - Provide nonpharmacologic comfort measures as appropriate  - Advance diet as tolerated, if ordered  - Consider nutrition services referral to assist patient with adequate nutrition and appropriate food choices  Outcome: Progressing  Goal: Maintains or returns to baseline bowel function  Description: INTERVENTIONS:  - Assess bowel function  - Encourage oral fluids to ensure adequate hydration  - Administer IV fluids if ordered to ensure adequate hydration  - Administer ordered medications as needed  - Encourage mobilization and activity  - Consider nutritional services referral to assist patient with adequate nutrition and appropriate food choices  Outcome: Progressing  Goal: Maintains adequate nutritional intake  Description: INTERVENTIONS:  - Monitor percentage of each meal consumed  - Identify factors contributing to decreased intake, treat as appropriate  - Assist with meals as needed  - Monitor I&O, weight, and lab values if indicated  - Obtain nutrition services referral as needed  Outcome: Progressing  Goal: Oral mucous membranes remain intact  Description: INTERVENTIONS  - Assess oral mucosa and hygiene practices  - Implement preventative oral hygiene regimen  - Implement oral medicated treatments as ordered  - Initiate Nutrition services referral as needed  Outcome: Progressing     Problem: GENITOURINARY - ADULT  Goal: Maintains or returns to baseline urinary function  Description: INTERVENTIONS:  - Assess urinary function  - Encourage oral fluids to ensure adequate hydration if ordered  - Administer IV fluids as ordered to ensure adequate hydration  - Administer ordered medications as needed  - Offer frequent toileting  - Follow urinary retention protocol if  ordered  Outcome: Progressing  Goal: Absence of urinary retention  Description: INTERVENTIONS:  - Assess patient’s ability to void and empty bladder  - Monitor I/O  - Bladder scan as needed  - Discuss with physician/AP medications to alleviate retention as needed  - Discuss catheterization for long term situations as appropriate  Outcome: Progressing     Problem: METABOLIC, FLUID AND ELECTROLYTES - ADULT  Goal: Electrolytes maintained within normal limits  Description: INTERVENTIONS:  - Monitor labs and assess patient for signs and symptoms of electrolyte imbalances  - Administer electrolyte replacement as ordered  - Monitor response to electrolyte replacements, including repeat lab results as appropriate  - Instruct patient on fluid and nutrition as appropriate  Outcome: Progressing  Goal: Fluid balance maintained  Description: INTERVENTIONS:  - Monitor labs   - Monitor I/O and WT  - Instruct patient on fluid and nutrition as appropriate  - Assess for signs & symptoms of volume excess or deficit  Outcome: Progressing     Problem: SKIN/TISSUE INTEGRITY - ADULT  Goal: Incision(s), wounds(s) or drain site(s) healing without S/S of infection  Description: INTERVENTIONS  - Assess and document dressing, incision, wound bed, drain sites and surrounding tissue  - Provide patient and family education  - Perform skin care/dressing changes  Outcome: Progressing     Problem: HEMATOLOGIC - ADULT  Goal: Maintains hematologic stability  Description: INTERVENTIONS  - Assess for signs and symptoms of bleeding or hemorrhage  - Monitor labs  - Administer supportive blood products/factors as ordered and appropriate  Outcome: Progressing     Problem: MUSCULOSKELETAL - ADULT  Goal: Maintain or return mobility to safest level of function  Description: INTERVENTIONS:  - Assess patient's ability to carry out ADLs; assess patient's baseline for ADL function and identify physical deficits which impact ability to perform ADLs (bathing, care  of mouth/teeth, toileting, grooming, dressing, etc.)  - Assess/evaluate cause of self-care deficits   - Assess range of motion  - Assess patient's mobility  - Assess patient's need for assistive devices and provide as appropriate  - Encourage maximum independence but intervene and supervise when necessary  - Involve family in performance of ADLs  - Assess for home care needs following discharge   - Consider OT consult to assist with ADL evaluation and planning for discharge  - Provide patient education as appropriate  Outcome: Progressing     Problem: Nutrition/Hydration-ADULT  Goal: Nutrient/Hydration intake appropriate for improving, restoring or maintaining nutritional needs  Description: Monitor and assess patient's nutrition/hydration status for malnutrition. Collaborate with interdisciplinary team and initiate plan and interventions as ordered.  Monitor patient's weight and dietary intake as ordered or per policy. Utilize nutrition screening tool and intervene as necessary. Determine patient's food preferences and provide high-protein, high-caloric foods as appropriate.     INTERVENTIONS:  - Monitor oral intake, urinary output, labs, and treatment plans  - Assess nutrition and hydration status and recommend course of action  - Evaluate amount of meals eaten  - Assist patient with eating if necessary   - Allow adequate time for meals  - Recommend/ encourage appropriate diets, oral nutritional supplements, and vitamin/mineral supplements  - Order, calculate, and assess calorie counts as needed  - Recommend, monitor, and adjust tube feedings and TPN/PPN based on assessed needs  - Assess need for intravenous fluids  - Provide specific nutrition/hydration education as appropriate  - Include patient/family/caregiver in decisions related to nutrition  Outcome: Progressing

## 2024-06-24 NOTE — ASSESSMENT & PLAN NOTE
Due to hepatic encephalopathy.  Will have to see what GI recommends with rifaximin versus lactulose and what his insurance will cover

## 2024-06-24 NOTE — OCCUPATIONAL THERAPY NOTE
Occupational Therapy Evaluation     Patient Name: Foreign Armando  Today's Date: 6/24/2024  Problem List  Principal Problem:    Decompensated hepatic cirrhosis (HCC)  Active Problems:    Thrombocytopenia (HCC)    Restless leg syndrome    Type 2 diabetes mellitus with hyperglycemia, without long-term current use of insulin (HCC)    ALEXANDER (acute kidney injury) (HCC)    Adrenal insufficiency (HCC)    Seizure disorder (HCC)    Anemia of chronic disease    Hyperbilirubinemia    Acute encephalopathy    Past Medical History  Past Medical History:   Diagnosis Date    CHF (congestive heart failure) (HCC)     Cirrhosis (HCC)     CHRISTIE    Colon cancer (HCC)     Diabetes mellitus (HCC)     Neuropathy     Restless leg syndrome      Past Surgical History  Past Surgical History:   Procedure Laterality Date    EGD AND SIGMOIDOSCOPY FLEXIBLE      IR PARACENTESIS  11/22/2022    IR PARACENTESIS  2/2/2023    IR THORACENTESIS  6/4/2024    LEFT COLON RESECTION      LITHOTRIPSY             06/24/24 0839   OT Last Visit   OT Visit Date 06/24/24   Note Type   Note type Evaluation   Pain Assessment   Pain Assessment Tool 0-10   Pain Score No Pain   Restrictions/Precautions   Weight Bearing Precautions Per Order No   Other Precautions Chair Alarm;Bed Alarm;Multiple lines;Fall Risk   Home Living   Type of Home Homeless;House  (pt reports he is currently homeless but will be returning to his father'carolyn upon D/C)   Home Layout One level;Stairs to enter with rails  (2 HERVE)   Bathroom Shower/Tub Tub/shower unit   Bathroom Toilet Standard   Bathroom Equipment Other (Comment)  (Denies DME)   Bathroom Accessibility Accessible   Home Equipment Other (Comment)  (Denies DME)   Additional Comments Pt reports he is currently homeless, however will be going to his father's home upon D/C. Pt's father lives in a one level house with 2 HERVE.   Prior Function   Level of [place] Independent with ADLs;Independent with functional mobility;Independent  with IADLS   Lives With Other (Comment)  (plans to live with father upon D/C)   Receives Help From Family   IADLs Family/Friend/Other provides transportation;Independent with medication management   Falls in the last 6 months 0   Comments At baseline, pt was reports I w/ ADLs, IADLs, and functional transfers/mobility w/o use of AD. (-) . Denies falls PTA.   Lifestyle   Autonomy At baseline, pt was reports I w/ ADLs, IADLs, and functional transfers/mobility w/o use of AD. (-) . Denies falls PTA.   Reciprocal Relationships Father   ADL   Where Assessed Edge of bed   Eating Assistance 7  Independent   Grooming Assistance 7  Independent   UB Bathing Assistance 6  Modified Independent   LB Bathing Assistance 5  Supervision/Setup   UB Dressing Assistance 6  Modified independent   LB Dressing Assistance 5  Supervision/Setup   Toileting Assistance  5  Supervision/Setup   Bed Mobility   Supine to Sit 6  Modified independent   Additional items HOB elevated;Bedrails;Increased time required   Sit to Supine Unable to assess   Additional Comments Pt seated OOB in chair with chair alarm activated at end of session. Call bell and phone within reach. All needs met and pt reports no further questions for OT at this time.   Transfers   Sit to Stand 5  Supervision   Additional items Verbal cues   Stand to Sit 5  Supervision   Additional items Verbal cues   Additional Comments Cues for safe technique and hand placement   Functional Mobility   Functional Mobility 5  Supervision   Additional Comments w/o use of AD   Balance   Static Sitting Good   Dynamic Sitting Fair +   Static Standing Fair   Dynamic Standing Fair -   Ambulatory Fair -   Activity Tolerance   Activity Tolerance Patient tolerated treatment well   Medical Staff Made Aware Amelia, PT; JAYANT Parra   Nurse Made Aware yes; KRISTINA Ayala   RUE Assessment   RUE Assessment WFL  (4/5 throughout)   LUE Assessment   LUE Assessment WFL  (4/5 throughout)   Hand Function    Gross Motor Coordination Functional   Fine Motor Coordination Functional   Sensation   Light Touch No apparent deficits   Proprioception   Proprioception No apparent deficits   Vision-Basic Assessment   Current Vision Wears glasses all the time  (and contacts. Not present during OT evaluation)   Vision - Complex Assessment   Ocular Range of Motion Intact   Acuity Able to read clock/calendar on wall without difficulty;Able to read employee name badge without difficulty   Psychosocial   Psychosocial (WDL) WDL   Perception   Inattention/Neglect Appears intact   Cognition   Overall Cognitive Status WFL   Arousal/Participation Alert;Cooperative   Attention Within functional limits   Orientation Level Oriented to person;Oriented to place;Oriented to time  (general to time- not specific date)   Memory Within functional limits   Following Commands Follows one step commands without difficulty   Assessment   Prognosis Good   Assessment Pt is a 63 y.o. male seen for OT evaluation s/p adm to [facility] on 6/21/2024 w/ Decompensated hepatic cirrhosis (HCC) . Comorbidities affecting pt’s functional performance include a significant PMH of CHF, Cirrhosis, Colon CA, DM, Neuropathy, Restless leg syndrome. Pt with active OT orders and activity orders for Up and OOB as tolerated. Pt reports he is currently homeless, however will be going to his father's home upon D/C. Pt's father lives in a one level house with 2 HERVE. At baseline, pt was reports I w/ ADLs, IADLs, and functional transfers/mobility w/o use of AD. (-) . Denies falls PTA. Upon evaluation, pt currently functioning at a Supervision-Mod I level for ADLs, Mod I for bed mobility, and Supervision for functional mobility/transfers 2* the following deficits impacting occupational performance: decreased strength , decreased balance, and decreased activity tolerance. Pt with the following personal factors of: HERVE home environment, limited home support, fall risk ,  homelessness , and access to transportation . Despite above mentioned deficits and personal factors, pt is functioning near baseline level of performance. Limited ADL deficits. No further acute OT needs identified at this time. Recommend continued mobilization with hospital staff and restorative program while in the hospital to increase pt’s endurance and strength upon D/C. The patient's raw score on the AM-PAC Daily Activity Inpatient Short Form is 21. A raw score of greater than or equal to 19 suggests the patient may benefit from discharge to home. Please refer to the recommendation of the Occupational Therapist for safe discharge planning. D/C pt from OT caseload at this time.   Goals   Patient Goals To go back to [place]   Plan   OT Frequency Eval only  (D/C OT)   Discharge Recommendation   Rehab Resource Intensity Level, OT No post-acute rehabilitation needs   AM-PAC Daily Activity Inpatient   Lower Body Dressing 3   Bathing 3   Toileting 3   Upper Body Dressing 4   Grooming 4   Eating 4   Daily Activity Raw Score 21   Daily Activity Standardized Score (Calc for Raw Score >=11) 44.27   AM-PAC Applied Cognition Inpatient   Following a Speech/Presentation 4   Understanding Ordinary Conversation 4   Taking Medications 3   Remembering Where Things Are Placed or Put Away 4   Remembering List of 4-5 Errands 3   Taking Care of Complicated Tasks 3   Applied Cognition Raw Score 21   Applied Cognition Standardized Score 44.3       Maria Guadalupe Hoskins OTR/L

## 2024-06-24 NOTE — PLAN OF CARE
Problem: Prexisting or High Potential for Compromised Skin Integrity  Goal: Skin integrity is maintained or improved  Description: INTERVENTIONS:  - Identify patients at risk for skin breakdown  - Assess and monitor skin integrity  - Assess and monitor nutrition and hydration status  - Monitor labs   - Assess for incontinence   - Turn and reposition patient  - Assist with mobility/ambulation  - Relieve pressure over bony prominences  - Avoid friction and shearing  - Provide appropriate hygiene as needed including keeping skin clean and dry  - Evaluate need for skin moisturizer/barrier cream  - Collaborate with interdisciplinary team   - Patient/family teaching  - Consider wound care consult   Outcome: Progressing     Problem: PAIN - ADULT  Goal: Verbalizes/displays adequate comfort level or baseline comfort level  Description: Interventions:  - Encourage patient to monitor pain and request assistance  - Assess pain using appropriate pain scale  - Administer analgesics based on type and severity of pain and evaluate response  - Implement non-pharmacological measures as appropriate and evaluate response  - Consider cultural and social influences on pain and pain management  - Notify physician/advanced practitioner if interventions unsuccessful or patient reports new pain  Outcome: Progressing     Problem: INFECTION - ADULT  Goal: Absence or prevention of progression during hospitalization  Description: INTERVENTIONS:  - Assess and monitor for signs and symptoms of infection  - Monitor lab/diagnostic results  - Monitor all insertion sites, i.e. indwelling lines, tubes, and drains  - Monitor endotracheal if appropriate and nasal secretions for changes in amount and color  - [place] appropriate cooling/warming therapies per order  - Administer medications as ordered  - Instruct and encourage patient and family to use good hand hygiene technique  - Identify and instruct in appropriate isolation precautions for  identified infection/condition  Outcome: Progressing     Problem: SAFETY ADULT  Goal: Patient will remain free of falls  Description: INTERVENTIONS:  - Educate patient/family on patient safety including physical limitations  - Instruct patient to call for assistance with activity   - Consult OT/PT to assist with strengthening/mobility   - Keep Call bell within reach  - Keep bed low and locked with side rails adjusted as appropriate  - Keep care items and personal belongings within reach  - Initiate and maintain comfort rounds  - Make Fall Risk Sign visible to staff  - Offer Toileting every 2 Hours, in advance of need  - Initiate/Maintain bed alarm  - Obtain necessary fall risk management equipment: bed alarm  - Apply yellow socks and bracelet for high fall risk patients  - Consider moving patient to room near nurses station  Outcome: Progressing  Goal: Maintain or return to baseline ADL function  Description: INTERVENTIONS:  -  Assess patient's ability to carry out ADLs; assess patient's baseline for ADL function and identify physical deficits which impact ability to perform ADLs (bathing, care of mouth/teeth, toileting, grooming, dressing, etc.)  - Assess/evaluate cause of self-care deficits   - Assess range of motion  - Assess patient's mobility; develop plan if impaired  - Assess patient's need for assistive devices and provide as appropriate  - Encourage maximum independence but intervene and supervise when necessary  - Involve family in performance of ADLs  - Assess for home care needs following discharge   - Consider OT consult to assist with ADL evaluation and planning for discharge  - Provide patient education as appropriate  Outcome: Progressing     Problem: DISCHARGE PLANNING  Goal: Discharge to home or other facility with appropriate resources  Description: INTERVENTIONS:  - Identify barriers to discharge w/patient and caregiver  - Arrange for needed discharge resources and transportation as  appropriate  - Identify discharge learning needs (meds, wound care, etc.)  - Arrange for interpretive services to assist at discharge as needed  - Refer to Case Management Department for coordinating discharge planning if the patient needs post-hospital services based on physician/advanced practitioner order or complex needs related to functional status, cognitive ability, or social support system  Outcome: Progressing     Problem: Knowledge Deficit  Goal: Patient/family/caregiver demonstrates understanding of disease process, treatment plan, medications, and discharge instructions  Description: Complete learning assessment and assess knowledge base.  Interventions:  - Provide teaching at level of understanding  - Provide teaching via preferred learning methods  Outcome: Progressing     Problem: NEUROSENSORY - ADULT  Goal: Achieves stable or improved neurological status  Description: INTERVENTIONS  - Monitor and report changes in neurological status  - Monitor vital signs such as temperature, blood pressure, glucose, and any other labs ordered   - Initiate measures to prevent increased intracranial pressure  - Monitor for seizure activity and implement precautions if appropriate      Outcome: Progressing  Goal: Remains free of injury related to seizures activity  Description: INTERVENTIONS  - Maintain airway, patient safety  and administer oxygen as ordered  - Monitor patient for seizure activity, document and report duration and description of seizure to physician/advanced practitioner  - If seizure occurs,  ensure patient safety during seizure  - Reorient patient post seizure  - Seizure pads on all 4 side rails  - Instruct patient/family to notify RN of any seizure activity including if an aura is experienced  - Instruct patient/family to call for assistance with activity based on nursing assessment  - Administer anti-seizure medications if ordered    Outcome: Progressing  Goal: Achieves maximal functionality and  self care  Description: INTERVENTIONS  - Monitor swallowing and airway patency with patient fatigue and changes in neurological status  - Encourage and assist patient to increase activity and self care.   - Encourage visually impaired, hearing impaired and aphasic patients to use assistive/communication devices  Outcome: Progressing     Problem: CARDIOVASCULAR - ADULT  Goal: Maintains optimal cardiac output and hemodynamic stability  Description: INTERVENTIONS:  - Monitor I/O, vital signs and rhythm  - Monitor for S/S and trends of decreased cardiac output  - Administer and titrate ordered vasoactive medications to optimize hemodynamic stability  - Assess quality of pulses, skin color and temperature  - Assess for signs of decreased coronary artery perfusion  - Instruct patient to report change in severity of symptoms  Outcome: Progressing  Goal: Absence of cardiac dysrhythmias or at baseline rhythm  Description: INTERVENTIONS:  - Continuous cardiac monitoring, vital signs, obtain 12 lead EKG if ordered  - Administer antiarrhythmic and heart rate control medications as ordered  - Monitor electrolytes and administer replacement therapy as ordered  Outcome: Progressing     Problem: RESPIRATORY - ADULT  Goal: Achieves optimal ventilation and oxygenation  Description: INTERVENTIONS:  - Assess for changes in respiratory status  - Assess for changes in mentation and behavior  - Position to facilitate oxygenation and minimize respiratory effort  - Oxygen administered by appropriate delivery if ordered  - Initiate smoking cessation education as indicated  - Encourage broncho-pulmonary hygiene including cough, deep breathe, Incentive Spirometry  - Assess the need for suctioning and aspirate as needed  - Assess and instruct to report SOB or any respiratory difficulty  - Respiratory Therapy support as indicated  Outcome: Progressing     Problem: GASTROINTESTINAL - ADULT  Goal: Minimal or absence of nausea and/or  vomiting  Description: INTERVENTIONS:  - Administer IV fluids if ordered to ensure adequate hydration  - Maintain NPO status until nausea and vomiting are resolved  - Nasogastric tube if ordered  - Administer ordered antiemetic medications as needed  - Provide nonpharmacologic comfort measures as appropriate  - Advance diet as tolerated, if ordered  - Consider nutrition services referral to assist patient with adequate nutrition and appropriate food choices  Outcome: Progressing  Goal: Maintains or returns to baseline bowel function  Description: INTERVENTIONS:  - Assess bowel function  - Encourage oral fluids to ensure adequate hydration  - Administer IV fluids if ordered to ensure adequate hydration  - Administer ordered medications as needed  - Encourage mobilization and activity  - Consider nutritional services referral to assist patient with adequate nutrition and appropriate food choices  Outcome: Progressing  Goal: Maintains adequate nutritional intake  Description: INTERVENTIONS:  - Monitor percentage of each meal consumed  - Identify factors contributing to decreased intake, treat as appropriate  - Assist with meals as needed  - Monitor I&O, weight, and lab values if indicated  - Obtain nutrition services referral as needed  Outcome: Progressing  Goal: Oral mucous membranes remain intact  Description: INTERVENTIONS  - Assess oral mucosa and hygiene practices  - Implement preventative oral hygiene regimen  - Implement oral medicated treatments as ordered  - Initiate Nutrition services referral as needed  Outcome: Progressing     Problem: GENITOURINARY - ADULT  Goal: Maintains or returns to baseline urinary function  Description: INTERVENTIONS:  - Assess urinary function  - Encourage oral fluids to ensure adequate hydration if ordered  - Administer IV fluids as ordered to ensure adequate hydration  - Administer ordered medications as needed  - Offer frequent toileting  - Follow urinary retention protocol if  ordered  Outcome: Progressing  Goal: Absence of urinary retention  Description: INTERVENTIONS:  - Assess patient’s ability to void and empty bladder  - Monitor I/O  - Bladder scan as needed  - Discuss with physician/AP medications to alleviate retention as needed  - Discuss catheterization for long term situations as appropriate  Outcome: Progressing     Problem: METABOLIC, FLUID AND ELECTROLYTES - ADULT  Goal: Electrolytes maintained within normal limits  Description: INTERVENTIONS:  - Monitor labs and assess patient for signs and symptoms of electrolyte imbalances  - Administer electrolyte replacement as ordered  - Monitor response to electrolyte replacements, including repeat lab results as appropriate  - Instruct patient on fluid and nutrition as appropriate  Outcome: Progressing  Goal: Fluid balance maintained  Description: INTERVENTIONS:  - Monitor labs   - Monitor I/O and WT  - Instruct patient on fluid and nutrition as appropriate  - Assess for signs & symptoms of volume excess or deficit  Outcome: Progressing     Problem: SKIN/TISSUE INTEGRITY - ADULT  Goal: Incision(s), wounds(s) or drain site(s) healing without S/S of infection  Description: INTERVENTIONS  - Assess and document dressing, incision, wound bed, drain sites and surrounding tissue  - Provide patient and family education  - Perform skin care/dressing changes  Outcome: Progressing     Problem: HEMATOLOGIC - ADULT  Goal: Maintains hematologic stability  Description: INTERVENTIONS  - Assess for signs and symptoms of bleeding or hemorrhage  - Monitor labs  - Administer supportive blood products/factors as ordered and appropriate  Outcome: Progressing     Problem: MUSCULOSKELETAL - ADULT  Goal: Maintain or return mobility to safest level of function  Description: INTERVENTIONS:  - Assess patient's ability to carry out ADLs; assess patient's baseline for ADL function and identify physical deficits which impact ability to perform ADLs (bathing, care  of mouth/teeth, toileting, grooming, dressing, etc.)  - Assess/evaluate cause of self-care deficits   - Assess range of motion  - Assess patient's mobility  - Assess patient's need for assistive devices and provide as appropriate  - Encourage maximum independence but intervene and supervise when necessary  - Involve family in performance of ADLs  - Assess for home care needs following discharge   - Consider OT consult to assist with ADL evaluation and planning for discharge  - Provide patient education as appropriate  Outcome: Progressing     Problem: Nutrition/Hydration-ADULT  Goal: Nutrient/Hydration intake appropriate for improving, restoring or maintaining nutritional needs  Description: Monitor and assess patient's nutrition/hydration status for malnutrition. Collaborate with interdisciplinary team and initiate plan and interventions as ordered.  Monitor patient's weight and dietary intake as ordered or per policy. Utilize nutrition screening tool and intervene as necessary. Determine patient's food preferences and provide high-protein, high-caloric foods as appropriate.     INTERVENTIONS:  - Monitor oral intake, urinary output, labs, and treatment plans  - Assess nutrition and hydration status and recommend course of action  - Evaluate amount of meals eaten  - Assist patient with eating if necessary   - Allow adequate time for meals  - Recommend/ encourage appropriate diets, oral nutritional supplements, and vitamin/mineral supplements  - Order, calculate, and assess calorie counts as needed  - Recommend, monitor, and adjust tube feedings and TPN/PPN based on assessed needs  - Assess need for intravenous fluids  - Provide specific nutrition/hydration education as appropriate  - Include patient/family/caregiver in decisions related to nutrition  Outcome: Progressing

## 2024-06-24 NOTE — CASE MANAGEMENT
Case Management Assessment & Discharge Planning Note    Patient name Foreign Armando  [facility] -* MRN [id number]  : 1960 Date 2024       Current Admission Date: 2024  Current Admission Diagnosis:Decompensated hepatic cirrhosis (HCC)   Patient Active Problem List    Diagnosis Date Noted Date Diagnosed    Hyperbilirubinemia 2024     Acute encephalopathy 2024     Anemia of chronic disease 2024     Depressed mood 06/10/2024     Seizure disorder (HCC) 2024     Swelling of lower extremity 2024     Nephrolithiasis 2024     Adrenal insufficiency (HCC) 2024     Volume overload 2024     Pleural effusion on right 2024     Abnormal CT scan 2023     ALEXANDER (acute kidney injury) (HCC) 2023     History of colon cancer      Paranoia (HCC) 2022     Abdominal pain 2022     Pancytopenia (HCC) 2022     Decompensated hepatic cirrhosis (HCC) 2022     Thrombocytopenia (HCC) 2022     Restless leg syndrome 2022     Smoking 2022     Type 2 diabetes mellitus with hyperglycemia, without long-term current use of insulin (HCC) 2022       LOS (days): 3  Geometric Mean LOS (GMLOS) (days):   Days to GMLOS:     OBJECTIVE:  PATIENT READMITTED TO HOSPITAL  Risk of Unplanned Readmission Score: 29.71         Current admission status: Inpatient       Preferred Pharmacy:   [id number]  [place]  [address]  [address] [address]  Phone: [phone] Fax: [phone]    [id number]  [place], [place] - [address] [facility]  [address] [facility] [address]  Phone: [phone] Fax: [phone]    [id number] [place] - [address]   [address]   [place] [address]  Phone: [phone] Fax: [phone]    [id number] [place] - [address] [facility]  [address]  [facility]  [address]  Phone: [phone] Fax: [phone]     [facility] [place], [place] - [address] [place] [address]  Phone: [phone] Fax: [phone]    Primary Care Provider: No primary care provider on file.    Primary Insurance: BLUE CROSS  Secondary Insurance:     ASSESSMENT:  Active Health Care Proxies       kimani wolfe [facility] Care Representative - Significant Other   Primary Phone: [phone] (Mobile)                           Readmission Root Cause  30 Day Readmission: Yes  Who directed you to return to the hospital?: Self  Did you understand whom to contact if you had questions or problems?: Refused to provide information  Did you get your prescriptions before you left the hospital?: Yes  Were you able to get your prescriptions filled when you left the hospital?: Yes  Did you take your medications as prescribed?: Refused to provide information  Were you able to get to your follow-up appointments?: Refused to provide information  During previous admission, was a post-acute recommendation made?: No  Patient was readmitted due to: Decompensated hepatic cirrhosis, Acute encephalopathy  Action Plan: admit to inpatient    Patient Information  Admitted from:: Other (comment) (refused to provide info)  Mental Status: Alert  During Assessment patient was accompanied by: Not accompanied during assessment  Assessment information provided by:: Patient  Primary Caregiver: Self  Support Systems: Self  Type of Current Residence: Other (Comment)    Activities of Daily Living Prior to Admission  Functional Status: Independent  Completes ADLs independently?: Yes  Ambulates independently?: Yes  Does patient use assisted devices?: No  Does patient currently own DME?: No         Patient Information Continued  Income Source: Unknown                Social Determinants of Health (SDOH)      Flowsheet Row Most Recent Value   Housing Stability    In the last 12 months, was there a time when you were  "not able to pay the mortgage or rent on time? Pt Declined   In the past 12 months, how many times have you moved where you were living? 0  [pt dozing off, unable to answer questions]   At any time in the past 12 months, were you homeless or living in a shelter (including now)? Pt Declined   Transportation Needs    In the past 12 months, has lack of transportation kept you from medical appointments or from getting medications? Pt Declined   In the past 12 months, has lack of transportation kept you from meetings, work, or from getting things needed for daily living? Pt Declined   Food Insecurity    Within the past 12 months, you worried that your food would run out before you got the money to buy more. Pt Declined   Within the past 12 months, the food you bought just didn't last and you didn't have money to get more. Pt Declined   Utilities    In the past 12 months has the electric, gas, oil, or water company threatened to shut off services in your home? Pt Declined            DISCHARGE DETAILS:    Discharge planning discussed with:: patient at bedside  Freedom of Choice: Yes     CM contacted family/caregiver?: No- see comments (patient refused to provide permission to call family)             Contacts  Patient Contacts: none at this time                                                       Additional Comments: Introduced self and role to patient at bedside.  Noted patient had recently been discharged from [facility], with a plan to go to [place] to reunite with family; CM attempted to ascertain where patient has been living in the interim, patient refused to answer.  Patient stated that his father will be sending him money so he can get a bus ticket to come live with him in [place].  CM asked if we could call father to facilitate his discharge in any way, patient stated no, \"you people need to leave him alone.\"  Extensive review of CM notes from last admission indicate many phone calls to friends/relatives, " "including Vidal Billingsley [phone], who was identified at patient's father.  A call to Mr. Billingsley during previous admission resulted in Mr. Billingsley stating he could not help patient in any capacity.  Patient stated that the staff at the previous hospital were \"trying to kill him\" by making him have a procedure he did not consent to.  Patient continued to state during conversation that he was given some medication that made him feel \"weird and dizzy\".  CM will continue to follow, discussion with provider regarding patient's capacity to make decisions at this time.  Patient's wallet visible on the table in patient's room.                    "

## 2024-06-24 NOTE — CONSULTS
TeleConsultation - Behavioral Health   Foreign Armando 63 y.o. male MRN: [id number]  Unit/Bed#: E5 -01 Encounter: [id number]      VIRTUAL CARE DOCUMENTATION:     1. This service was provided via Telemedicine using Teams Virtual Rounding      2. Parties in the room with patient during teleconsult Patient only    3. Confidentiality My office door was closed     4. Participants No one else was in the room    5. Patient acknowledged consent and understanding of privacy and security of the  Telemedicine consult. I informed the patient that I have reviewed their record in Epic and presented the opportunity for them to ask any questions regarding the visit today.  The patient agreed to participate.    6. Time spent 60 min       Assessment & Plan     Present on Admission:   ALEXANDER (acute kidney injury) (HCC)   Anemia of chronic disease   Thrombocytopenia (HCC)   (Resolved) Lactic acidosis   Decompensated hepatic cirrhosis (HCC)   Hyperbilirubinemia   Seizure disorder (HCC)   Acute encephalopathy   Restless leg syndrome   Adrenal insufficiency (HCC)   Type 2 diabetes mellitus with hyperglycemia, without long-term current use of insulin (HCC)    Assessment:    Adjustment Disorder with mixed emotions    Pt does not appear to have capacity at this time. Pt is aware of his diagnosis, however does not understand the risks of non-compliance to his medications. In addition he has been changing his decisions, recently leaving hospital AMA and then returning with worsening symptoms. Recommend to find next of kin for decision making purposes at this time.   Treatment Plan:    Planned Medication Changes:    deffered    Current Medications:     Current Facility-Administered Medications   Medication Dose Route Frequency Provider Last Rate    aluminum-magnesium hydroxide-simethicone  30 mL Oral Q6H PRN NUBIA Hawk      ferrous sulfate  325 mg Oral Daily With Breakfast NUBIA Hawk      furosemide  20 mg Oral  Daily Maynor Antonio, DO      gabapentin  300 mg Oral TID Rufina De La Cruz, NUBIA      HYDROcodone-acetaminophen  1 tablet Oral Q6H PRN Rufina De La Cruz, QUINCYNP      hydrocortisone  10 mg Oral TID Rufina De La Cruz, NUBIA      HYDROmorphone  0.2 mg Intravenous Q6H PRN Rufina De La Cruz, CRNP      hydrOXYzine HCL  25 mg Oral Q6H PRN Rufina De La Cruz, CRASHA      insulin glargine  10 Units Subcutaneous HS Andrew Adkins, DO      insulin lispro  1-5 Units Subcutaneous 4x Daily (AC & HS) Patric Nath PA-C      lactulose  20 g Oral Daily Rufina De La Cruz, NUBIA      levETIRAcetam  500 mg Oral Q12H SANDRA Rufina De La Cruz, QUINCYNP      methocarbamol  750 mg Oral Q6H PRN Rufina De La Cruz, NUBIA      midodrine  10 mg Oral TID AC Rufina De La Cruz, NUBIA      ondansetron  4 mg Intravenous Q6H PRN Rufina De La Cruz, NUBIA      pancrelipase (Lip-Prot-Amyl)  24,000 Units Oral TID With Meals Rufina De La Cruz, NUBIA      pantoprazole  40 mg Oral Early Morning Rufina De La Cruz, QUINCYNP      propranolol  10 mg Oral Q12H [facility] Rufina De La Cruz, CRNP      rOPINIRole  4 mg Oral BID Rufina De La Cruz, NUBIA      simethicone  80 mg Oral 4x Daily PRN Rufina De La Cruz, NUBIA      spironolactone  100 mg Oral Daily Maynor Antonio,       tamsulosin  0.4 mg Oral Daily With Dinner NUBIA Hawk         Risks / Benefits of Treatment:    Risks, benefits, and possible side effects of medications explained to patient and patient verbalizes understanding.      Other treatment modalities recommended as indicated:    psychotherapy  outpatient referral      Inpatient consult to Psychiatry  Consult performed by: Violet Jules MD  Consult ordered by: Neri Gonzales DO        Physician Requesting Consult: Neri Gonzales DO  Principal Problem:Decompensated hepatic cirrhosis (HCC)    Reason for Consult: capacity      History of Present Illness     64 y/o male with hx of HCC and encephalopathy presents for capacity evaluation. Pt  "was AAOx2, disorganized throughout evaluation. Pt unable to explain the risks of refusing treatment. Pt does not appear to have capacity at this time. Pt is aware of his diagnosis, however does not understand the risks of non-compliance to his medications. In addition he has been changing his decisions, recently leaving hospital AMA and then returning with worsening symptoms. Recommend to find next of kin for decision making           Past Medical History:   Diagnosis Date    CHF (congestive heart failure) (HCC)     Cirrhosis (HCC)     CHRISTIE    Colon cancer (HCC)     Diabetes mellitus (HCC)     Neuropathy     Restless leg syndrome        Medical Review Of Systems:    Review of Systems    Meds/Allergies     all current active meds have been reviewed  Allergies   Allergen Reactions    Morphine Anaphylaxis    Morpholine Salicylate Other (See Comments)     \"I go unconscious\"    Clarithromycin Other (See Comments)     Dizzy, nausea, ulcers in stomach    Oxycodone Hives and Facial Swelling     \"Scratchy throat\"      Sulfamethoxazole-Trimethoprim Hives    Nsaids Rash       Objective     Vital signs in last 24 hours:  Temp:  [97.8 °F (36.6 °C)] 97.8 °F (36.6 °C)  HR:  [64-72] 69  Resp:  [16-18] 18  BP: (102-118)/(54-59) 102/56      Intake/Output Summary (Last 24 hours) at 6/24/2024 1500  Last data filed at 6/24/2024 1224  Gross per 24 hour   Intake 875 ml   Output 700 ml   Net 175 ml       Mental Status Evaluation:    Appearance:  age appropriate   Behavior:  guarded   Speech:  delayed   Mood:  constricted   Affect:  blunted       Thought Process:  disorganized   Associations flight of ideas   Thought Content:  delusions  obsessive/rumination   Perceptual Disturbances: denies   Risk Potential: Suicidal Ideations none  Homicidal Ideations none  Potential for Aggression Yes                            Insight:  limited   Judgment: limited     Lab Results: I have personally reviewed all pertinent laboratory/tests results. "     Most Recent Labs:   Lab Results   Component Value Date    WBC 3.75 (L) 06/24/2024    RBC 2.74 (L) 06/24/2024    HGB 9.2 (L) 06/24/2024    HCT 27.7 (L) 06/24/2024    PLT 26 (L) 06/24/2024    RDW 15.9 (H) 06/24/2024    NEUTROABS 2.11 06/22/2024    SODIUM 131 (L) 06/23/2024    K 4.3 06/23/2024     06/23/2024    CO2 23 06/23/2024    BUN 22 06/23/2024    CREATININE 0.94 06/23/2024    GLUC 231 (H) 06/23/2024    CALCIUM 8.2 (L) 06/23/2024    AST 38 06/23/2024    ALT 44 06/23/2024    ALKPHOS 93 06/23/2024    TP 5.2 (L) 06/23/2024    ALB 2.7 (L) 06/23/2024    TBILI 4.87 (H) 06/23/2024    AMMONIA 50 06/21/2024    [id number] 0.427 (L) 06/23/2024    FREET4 1.22 (H) 06/23/2024    HGBA1C 6.5 (H) 05/05/2024     05/05/2024     Last Laboratory Results with Depakote and/or Tegretol levels:   Lab Results   Component Value Date    WBC 3.75 (L) 06/24/2024    RBC 2.74 (L) 06/24/2024    HGB 9.2 (L) 06/24/2024    HCT 27.7 (L) 06/24/2024    PLT 26 (L) 06/24/2024    RDW 15.9 (H) 06/24/2024    NEUTROABS 2.11 06/22/2024    SODIUM 131 (L) 06/23/2024    K 4.3 06/23/2024     06/23/2024    CO2 23 06/23/2024    BUN 22 06/23/2024    CREATININE 0.94 06/23/2024    GLUC 231 (H) 06/23/2024    CALCIUM 8.2 (L) 06/23/2024    AST 38 06/23/2024    ALT 44 06/23/2024    ALKPHOS 93 06/23/2024    TP 5.2 (L) 06/23/2024    ALB 2.7 (L) 06/23/2024    TBILI 4.87 (H) 06/23/2024       Imaging Studies: US right upper quadrant    Result Date: 6/22/2024  Narrative: RIGHT UPPER QUADRANT ULTRASOUND INDICATION: hyperbilirubinemia in liver cirrhosis. COMPARISON: None TECHNIQUE: Real-time ultrasound of the right upper quadrant was performed with a curvilinear transducer with both volumetric sweeps and still imaging techniques. FINDINGS: PANCREAS: Visualized portions of the pancreas are within normal limits. AORTA AND IVC: Visualized portions are normal for patient age. LIVER: Size: Within normal range. The liver measures 11.9 cm in the midclavicular  line. Contour: Surface contour is lobulated. Parenchyma: There is diffuse coarsened heterogeneous echotexture suggesting underlying cirrhotic changes. No liver mass identified. Limited imaging of the main portal vein shows it to be patent and hepatopetal. BILIARY: Gallbladder wall thickness is 5 mm. This is unchanged from the previous study #20/2022 Biliary sludge seen No intrahepatic biliary dilatation. CBD measures 4.0 mm. No choledocholithiasis. KIDNEY: Right kidney measures 10.2 x 5.0 x 4.5 cm. Volume 121.0 mL Kidney within normal limits. ASCITES: None.     Impression: Cirrhotic liver Biliary sludge without evidence of cholelithiasis Mild gallbladder wall thickening, stable, may be sequela of chronic liver disease No biliary dilatation seen Workstation performed: [id number]     CTA dissection protocol chest/abdomen/pelvis    Result Date: 6/21/2024  Narrative: CTA - CHEST, ABDOMEN AND PELVIS - WITHOUT AND WITH IV CONTRAST INDICATION: chest pain with radiation to back. COMPARISON: Liv 3, 2024 CT from May 3, 2023 TECHNIQUE: CT examination of the chest, abdomen and pelvis was performed both prior to and after the administration of intravenous contrast. The noncontrast portion of this examination was performed utilizing low radiation dose technique.  Thin section angiographic arterial phase post contrast technique was used in order to evaluate for aortic dissection. 3D reformatted images and volume rendering were performed on an independent workstation. Multiplanar 2D reformatted images were created from the source data. Radiation dose length product (DLP) for this visit: 1472 mGy-cm . This examination, like all CT scans performed in the [facility], was performed utilizing techniques to minimize radiation dose exposure, including the use of iterative reconstruction and automated exposure control. IV Contrast: 100 mL of iohexol (OMNIPAQUE) Enteric Contrast: Not administered. FINDINGS: Evaluation is  degraded by pulsation artifact AORTA: No  intramural hematoma seen No dissection flap seen Ascending aorta; at the level of the valve plane aorta measures 2.2 cm Aortic sinus measures about 3 cm Tubular portion of the ascending aorta measures 3.2 cm Arch; brachiocephalic, left common carotid artery is patent Subclavian artery is patent Visualized vertebral arteries are patent Descending thoracic aorta is known aneurysmal Abdominal aorta; no abdominal aortic aneurysm seen. Mild atherosclerotic changes seen within the celiac trunk SMA is patent JENNIFER is patent Iliac vessels are patent on the CHEST LUNGS: Bibasilar densities seen due to atelectasis Trachea and central bronchi are patent PLEURA: No pleural effusion seen No pneumothorax seen HEART/PULMONARY ARTERIAL TREE: Heart is unremarkable for patient's age. MEDIASTINUM AND VICTOR HUGO: Lower left paratracheal lymph node measuring about 8 mm Subaortic lymph node measuring about 7 to 8 mm Lower right paratracheal lymph node, measuring 8 mm CHEST WALL AND LOWER NECK: No significant axillary lymph node ABDOMEN LIVER/BILIARY TREE: Cirrhotic liver seen GALLBLADDER: Trace pericholecystic fluid seen SPLEEN: Spleen measures 18 cm PANCREAS: Cystic pancreatic lesion measuring 1.0 cm, unchanged from the previous CT No pancreatic ductal dilatation Additional cystic lesion seen measuring about 7 mm in the distal body of the pancreas ADRENAL GLANDS: Unremarkable. KIDNEYS/URETERS: Left renal cyst seen STOMACH AND BOWEL: Unremarkable. Postsurgical changes from right hemicolectomy Mild chronic wall thickening in the left transverse colon APPENDIX: No findings to suggest appendicitis. ABDOMINOPELVIC CAVITY: Again noted are multiple small lymph nodes in the upper abdominal and the gastrohepatic ligament, zeyad hepatis, portacaval region. These are unchanged from the previous study of May 3, 2023 A fat-containing rounded lesion in the mesentery seen measuring about 4 cm. This is unchanged  Additional smaller cystic lesion adjacent to the spleen measuring about 1 cm with marginal calcification, measuring 1.4 cm, stable Para-aortic lymph nodes are seen measuring about 6 to 7 mm, stable Lymph node seen within the cardiophrenic angle measuring about 6 to 7 mm, stable PELVIS REPRODUCTIVE ORGANS: Unremarkable for patient's age. URINARY BLADDER: Unremarkable. ABDOMINAL WALL/INGUINAL REGIONS: Unremarkable. BONES: No acute fracture or suspicious osseous lesion.     Impression: No thoracic aortic dissection No pulmonary embolism Cirrhotic liver Splenomegaly with cirrhosis raise concern for portal hypertension Incidentally noted cystic pancreatic lesion in the body and tail of the pancreas with the larger measuring 1.3 cm, unchanged from the previous study of Liv 3 2024 can be assessed with nonemergent MRI along with MRCP. These probably represent small IPMN Upper abdominal lymphadenopathy in the gastrocolic ligament, zeyad hepatis, in the portacaval region, stable likely related to chronic liver disease Small para-aortic lymph nodes, stable. The previously noted mesenteric fatty lesion, stable may be sequela of fat necrosis Mild pericholecystic fluid may related to chronic liver disease, unchanged Workstation performed: [id number]      VAS VENOUS DUPLEX - LOWER LIMB BILATERAL    Result Date: 6/20/2024  Narrative:  [facility] REPORT CLINICAL: Indications: Physician wants to determine patency of the venous system secondary to lower extremity pain and swelling. Operative History: No prior cardiovascular surgeries Risk Factors The patient has history of Diabetes (NIDDM (Diet)) and smoking (current) 0.25 ppd.   CONCLUSION: Impression: RIGHT LOWER LIMB: No evidence of acute or chronic deep vein thrombosis. No evidence of superficial thrombophlebitis noted. Doppler evaluation shows a normal response to augmentation maneuvers. Popliteal, posterior tibial and anterior tibial arterial Doppler waveforms are  triphasic.  Note: There is a well defined hypoechoic non-vascularized cystic-type structure, 3.2 cm, noted in the popliteal fossa.  LEFT LOWER LIMB: No evidence of acute or chronic deep vein thrombosis. No evidence of superficial thrombophlebitis noted. Doppler evaluation shows a normal response to augmentation maneuvers. Popliteal, posterior tibial and anterior tibial arterial Doppler waveforms are triphasic.  Technical findings were given to Dr. Henriquez via [employer] Secure Chat.  SIGNATURE: Electronically Signed by: LAMBERTO COX MD on 2024-06-20 08:22:06 PM    XR chest portable    Result Date: 6/8/2024  Narrative: XR CHEST PORTABLE INDICATION: c/o right sided chest wall/right upper quad pain with deep breathing.. COMPARISON: CT scan 06/03/2024 FINDINGS: Clear lungs. No pneumothorax. Small right-sided pleural effusion. Normal cardiomediastinal silhouette. Left MediPort with its tip in the right atrium. Bones are unremarkable for age. Normal upper abdomen.     Impression: Small right pleural effusion. Workstation performed: [id number]     CT head wo contrast    Result Date: 6/6/2024  Narrative: CT BRAIN - WITHOUT CONTRAST INDICATION:   seizure. COMPARISON: 5/3/2023 TECHNIQUE:  CT examination of the brain was performed.  Multiplanar 2D reformatted images were created from the source data. Radiation dose length product (DLP) for this visit:  782 mGy-cm .  This examination, like all CT scans performed in the [facility], was performed utilizing techniques to minimize radiation dose exposure, including the use of iterative reconstruction and automated exposure control. IMAGE QUALITY:  Diagnostic. FINDINGS: PARENCHYMA: Decreased attenuation is noted in periventricular and subcortical white matter demonstrating an appearance that is statistically most likely to represent mild microangiopathic change. No CT signs of acute infarction.  No intracranial mass, mass effect or midline shift.  No acute parenchymal  hemorrhage. VENTRICLES AND EXTRA-AXIAL SPACES:  Normal for the patient's age. VISUALIZED ORBITS: Normal visualized orbits. PARANASAL SINUSES: Normal visualized paranasal sinuses. CALVARIUM AND EXTRACRANIAL SOFT TISSUES:  Normal.     Impression: No acute intracranial abnormality. Workstation performed: [id number]      VAS VENOUS DUPLEX - LOWER LIMB BILATERAL    Result Date: 6/5/2024  Narrative:  [facility] REPORT CLINICAL: Indications: Patient presents with chronic bilateral lower extremity swelling and tenderness for several years. Operative History: No prior cardiovascular surgeries Risk Factors The patient has history of Diabetes (NIDDM (Diet)) and smoking (current) 0.25 ppd.   CONCLUSION:  Impression: RIGHT LOWER LIMB: No evidence of acute or chronic deep vein thrombosis. No evidence of superficial thrombophlebitis noted. Doppler evaluation shows a normal response to augmentation maneuvers.. Popliteal, posterior tibial and anterior tibial arterial Doppler waveform's are triphasic.  LEFT LOWER LIMB: No evidence of acute or chronic deep vein thrombosis. No evidence of superficial thrombophlebitis noted. Doppler evaluation shows a normal response to augmentation maneuvers. Popliteal, posterior tibial and anterior tibial arterial Doppler waveform's are triphasic.  Technical findings were given to Rosario Whitfield via secure chat at 1:30 PM.  SIGNATURE: Electronically Signed by: LYNDSAY NGUYEN DO, RPVI on 2024-06-05 04:09:22 PM    IR IN-Patient Thoracentesis    Result Date: 6/5/2024  Narrative: PROCEDURE: Diagnostic and therapeutic right thoracentesis STAFF: Nate Luevano M.D. NUMBER OF IMAGES: Multiple. COMPLICATIONS: None. INDICATION: Symptomatic right pleural effusion. PROCEDURE: Using ultrasound guidance, the right pleural cavity was punctured and a catheter placed into the pleural cavity. A total of 600 milliliters of fluid was removed. The catheter was then removed. FINDINGS: Moderate right pleural  effusion. Straw colored pleural fluid was removed.     Impression: Diagnostic and therapeutic right thoracentesis. Workstation performed: [id number]     CT chest abdomen pelvis w contrast    Result Date: 6/3/2024  Narrative: CT CHEST, ABDOMEN AND PELVIS WITH IV CONTRAST INDICATION: shortness of breath with hx of pulmonary edema in the setting of cirrhosis, Abdominal pain. COMPARISON: None. TECHNIQUE: CT examination of the chest, abdomen and pelvis was performed. Multiplanar 2D reformatted images were created from the source data. This examination, like all CT scans performed in the [facility], was performed utilizing techniques to minimize radiation dose exposure, including the use of iterative reconstruction and automated exposure control. Radiation dose length product (DLP) for this visit: 484 mGy-cm IV Contrast: 100 mL of iohexol (OMNIPAQUE) Enteric Contrast: Not administered. FINDINGS: CHEST LUNGS: Lungs are clear. No tracheal or endobronchial lesion. PLEURA: Moderate to large right pleural effusion. HEART/GREAT VESSELS: Heart is unremarkable for patient's age. No thoracic aortic aneurysm. MEDIASTINUM AND VICTOR HUGO: Unremarkable. CHEST WALL AND LOWER NECK: Unremarkable. ABDOMEN LIVER/BILIARY TREE: Cirrhotic morphology. No suspicious mass within study limitations. No biliary dilation. Portal vein is patent. GALLBLADDER: No calcified gallstones. No pericholecystic inflammatory change. SPLEEN: Enlarged spleen measuring 18.5 cm PANCREAS: 1.3 cm hypodensity at the pancreatic neck (2/162) ADRENAL GLANDS: Unremarkable. KIDNEYS/URETERS: Simple renal cyst(s). Nonobstructing left renal nephrolithiasis. No hydronephrosis. STOMACH AND BOWEL: Status post right hemicolectomy. APPENDIX: No findings to suggest appendicitis. ABDOMINOPELVIC CAVITY: Mild ascites. No pneumoperitoneum. Mild upper abdominal lymphadenopathy, for instance a 1.8 cm periportal lymph node and a 1.6 cm portacaval lymph node. Diffuse mesenteric  edema. Stable 4 cm rim-enhancing focus of mesenteric fat in  the right abdomen compatible with fat necrosis VESSELS: Extensive gastro esophageal varices. PELVIS REPRODUCTIVE ORGANS: Unremarkable for patient's age. URINARY BLADDER: Unremarkable. ABDOMINAL WALL/INGUINAL REGIONS: Unremarkable. BONES: No acute fracture or suspicious osseous lesion.     Impression: Moderate to large sized right pleural effusion Cirrhosis with signs of portal hypertension as evidenced by gastroesophageal varices. Stable upper abdominal lymphadenopathy presumed secondary to underlying liver pathology although low-grade lymphoproliferative disorder is not fully excluded Stable diffuse mesenteric edema Workstation performed: [id number]     EKG/Pathology/Other Studies:   Lab Results   Component Value Date    VENTRATE 89 06/21/2024    ATRIALRATE 89 06/21/2024    PRINT 126 06/21/2024    QRSDINT 72 06/21/2024    QTINT 394 06/21/2024    QTCINT 479 06/21/2024    PAXIS 86 06/21/2024    QRSAXIS 67 06/21/2024    TWAVEAXIS 49 06/21/2024        Code Status: Level 1 - Full Code  Advance Directive and Living Will:      Power of :    POLST:          Counseling / Coordination of Care:    Total floor / unit time spent today 60 minutes. Greater than 50% of total time was spent with the patient and / or family counseling and / or coordination of care. A description of the counseling / coordination of care: capacity assessment

## 2024-06-25 VITALS
TEMPERATURE: 97.9 F | SYSTOLIC BLOOD PRESSURE: 97 MMHG | DIASTOLIC BLOOD PRESSURE: 62 MMHG | RESPIRATION RATE: 17 BRPM | OXYGEN SATURATION: 95 % | HEART RATE: 87 BPM

## 2024-06-25 LAB
ALBUMIN SERPL BCG-MCNC: 2.6 G/DL (ref 3.5–5)
ALP SERPL-CCNC: 108 U/L (ref 34–104)
ALT SERPL W P-5'-P-CCNC: 52 U/L (ref 7–52)
ANION GAP SERPL CALCULATED.3IONS-SCNC: 4 MMOL/L (ref 4–13)
AST SERPL W P-5'-P-CCNC: 32 U/L (ref 13–39)
BASOPHILS # BLD AUTO: 0.01 THOUSANDS/ÂΜL (ref 0–0.1)
BASOPHILS NFR BLD AUTO: 0 % (ref 0–1)
BILIRUB SERPL-MCNC: 2.86 MG/DL (ref 0.2–1)
BUN SERPL-MCNC: 12 MG/DL (ref 5–25)
CALCIUM ALBUM COR SERPL-MCNC: 9.6 MG/DL (ref 8.3–10.1)
CALCIUM SERPL-MCNC: 8.5 MG/DL (ref 8.4–10.2)
CHLORIDE SERPL-SCNC: 107 MMOL/L (ref 96–108)
CO2 SERPL-SCNC: 25 MMOL/L (ref 21–32)
CREAT SERPL-MCNC: 0.9 MG/DL (ref 0.6–1.3)
EOSINOPHIL # BLD AUTO: 0.18 THOUSAND/ÂΜL (ref 0–0.61)
EOSINOPHIL NFR BLD AUTO: 5 % (ref 0–6)
ERYTHROCYTE [DISTWIDTH] IN BLOOD BY AUTOMATED COUNT: 16.3 % (ref 11.6–15.1)
GFR SERPL CREATININE-BSD FRML MDRD: 90 ML/MIN/1.73SQ M
GLUCOSE SERPL-MCNC: 204 MG/DL (ref 65–140)
GLUCOSE SERPL-MCNC: 238 MG/DL (ref 65–140)
GLUCOSE SERPL-MCNC: 268 MG/DL (ref 65–140)
HCT VFR BLD AUTO: 28.8 % (ref 36.5–49.3)
HGB BLD-MCNC: 9.6 G/DL (ref 12–17)
IMM GRANULOCYTES # BLD AUTO: 0.02 THOUSAND/UL (ref 0–0.2)
IMM GRANULOCYTES NFR BLD AUTO: 1 % (ref 0–2)
INR PPP: 1.56 (ref 0.84–1.19)
LYMPHOCYTES # BLD AUTO: 0.62 THOUSANDS/ÂΜL (ref 0.6–4.47)
LYMPHOCYTES NFR BLD AUTO: 18 % (ref 14–44)
MAGNESIUM SERPL-MCNC: 1.9 MG/DL (ref 1.9–2.7)
MCH RBC QN AUTO: 33.9 PG (ref 26.8–34.3)
MCHC RBC AUTO-ENTMCNC: 33.3 G/DL (ref 31.4–37.4)
MCV RBC AUTO: 102 FL (ref 82–98)
MONOCYTES # BLD AUTO: 0.28 THOUSAND/ÂΜL (ref 0.17–1.22)
MONOCYTES NFR BLD AUTO: 8 % (ref 4–12)
NEUTROPHILS # BLD AUTO: 2.36 THOUSANDS/ÂΜL (ref 1.85–7.62)
NEUTS SEG NFR BLD AUTO: 68 % (ref 43–75)
NRBC BLD AUTO-RTO: 0 /100 WBCS
PLATELET # BLD AUTO: 27 THOUSANDS/UL (ref 149–390)
PMV BLD AUTO: 12.1 FL (ref 8.9–12.7)
POTASSIUM SERPL-SCNC: 3.9 MMOL/L (ref 3.5–5.3)
PROT SERPL-MCNC: 5.3 G/DL (ref 6.4–8.4)
PROTHROMBIN TIME: 18.8 SECONDS (ref 11.6–14.5)
RBC # BLD AUTO: 2.83 MILLION/UL (ref 3.88–5.62)
SODIUM SERPL-SCNC: 136 MMOL/L (ref 135–147)
WBC # BLD AUTO: 3.47 THOUSAND/UL (ref 4.31–10.16)

## 2024-06-25 PROCEDURE — 99239 HOSP IP/OBS DSCHRG MGMT >30: CPT | Performed by: HOSPITALIST

## 2024-06-25 PROCEDURE — 83735 ASSAY OF MAGNESIUM: CPT | Performed by: HOSPITALIST

## 2024-06-25 PROCEDURE — 85025 COMPLETE CBC W/AUTO DIFF WBC: CPT | Performed by: HOSPITALIST

## 2024-06-25 PROCEDURE — 85610 PROTHROMBIN TIME: CPT | Performed by: INTERNAL MEDICINE

## 2024-06-25 PROCEDURE — 80053 COMPREHEN METABOLIC PANEL: CPT | Performed by: HOSPITALIST

## 2024-06-25 PROCEDURE — 82948 REAGENT STRIP/BLOOD GLUCOSE: CPT

## 2024-06-25 NOTE — ASSESSMENT & PLAN NOTE
This is from hepatic encephalopathy.  This has resolved.    He is now refusing all his medication here in the hospital.  He says he will take it at home.    I did explain if he does not take his medications compliantly then he can get more confused and have serious injury or death

## 2024-06-25 NOTE — PLAN OF CARE
Problem: Prexisting or High Potential for Compromised Skin Integrity  Goal: Skin integrity is maintained or improved  Description: INTERVENTIONS:  - Identify patients at risk for skin breakdown  - Assess and monitor skin integrity  - Assess and monitor nutrition and hydration status  - Monitor labs   - Assess for incontinence   - Turn and reposition patient  - Assist with mobility/ambulation  - Relieve pressure over bony prominences  - Avoid friction and shearing  - Provide appropriate hygiene as needed including keeping skin clean and dry  - Evaluate need for skin moisturizer/barrier cream  - Collaborate with interdisciplinary team   - Patient/family teaching  - Consider wound care consult   Outcome: Progressing     Problem: PAIN - ADULT  Goal: Verbalizes/displays adequate comfort level or baseline comfort level  Description: Interventions:  - Encourage patient to monitor pain and request assistance  - Assess pain using appropriate pain scale  - Administer analgesics based on type and severity of pain and evaluate response  - Implement non-pharmacological measures as appropriate and evaluate response  - Consider cultural and social influences on pain and pain management  - Notify physician/advanced practitioner if interventions unsuccessful or patient reports new pain  Outcome: Progressing     Problem: INFECTION - ADULT  Goal: Absence or prevention of progression during hospitalization  Description: INTERVENTIONS:  - Assess and monitor for signs and symptoms of infection  - Monitor lab/diagnostic results  - Monitor all insertion sites, i.e. indwelling lines, tubes, and drains  - Monitor endotracheal if appropriate and nasal secretions for changes in amount and color  - [place] appropriate cooling/warming therapies per order  - Administer medications as ordered  - Instruct and encourage patient and family to use good hand hygiene technique  - Identify and instruct in appropriate isolation precautions for  identified infection/condition  Outcome: Progressing     Problem: SAFETY ADULT  Goal: Patient will remain free of falls  Description: INTERVENTIONS:  - Educate patient/family on patient safety including physical limitations  - Instruct patient to call for assistance with activity   - Consult OT/PT to assist with strengthening/mobility   - Keep Call bell within reach  - Keep bed low and locked with side rails adjusted as appropriate  - Keep care items and personal belongings within reach  - Initiate and maintain comfort rounds  - Make Fall Risk Sign visible to staff  - Offer Toileting every 2 Hours, in advance of need  - Initiate/Maintain bed alarm  - Obtain necessary fall risk management equipment: bed alarm  - Apply yellow socks and bracelet for high fall risk patients  - Consider moving patient to room near nurses station  Outcome: Progressing  Goal: Maintain or return to baseline ADL function  Description: INTERVENTIONS:  -  Assess patient's ability to carry out ADLs; assess patient's baseline for ADL function and identify physical deficits which impact ability to perform ADLs (bathing, care of mouth/teeth, toileting, grooming, dressing, etc.)  - Assess/evaluate cause of self-care deficits   - Assess range of motion  - Assess patient's mobility; develop plan if impaired  - Assess patient's need for assistive devices and provide as appropriate  - Encourage maximum independence but intervene and supervise when necessary  - Involve family in performance of ADLs  - Assess for home care needs following discharge   - Consider OT consult to assist with ADL evaluation and planning for discharge  - Provide patient education as appropriate  Outcome: Progressing     Problem: DISCHARGE PLANNING  Goal: Discharge to home or other facility with appropriate resources  Description: INTERVENTIONS:  - Identify barriers to discharge w/patient and caregiver  - Arrange for needed discharge resources and transportation as  appropriate  - Identify discharge learning needs (meds, wound care, etc.)  - Arrange for interpretive services to assist at discharge as needed  - Refer to Case Management Department for coordinating discharge planning if the patient needs post-hospital services based on physician/advanced practitioner order or complex needs related to functional status, cognitive ability, or social support system  Outcome: Progressing     Problem: Knowledge Deficit  Goal: Patient/family/caregiver demonstrates understanding of disease process, treatment plan, medications, and discharge instructions  Description: Complete learning assessment and assess knowledge base.  Interventions:  - Provide teaching at level of understanding  - Provide teaching via preferred learning methods  Outcome: Progressing     Problem: NEUROSENSORY - ADULT  Goal: Achieves stable or improved neurological status  Description: INTERVENTIONS  - Monitor and report changes in neurological status  - Monitor vital signs such as temperature, blood pressure, glucose, and any other labs ordered   - Initiate measures to prevent increased intracranial pressure  - Monitor for seizure activity and implement precautions if appropriate      Outcome: Progressing  Goal: Remains free of injury related to seizures activity  Description: INTERVENTIONS  - Maintain airway, patient safety  and administer oxygen as ordered  - Monitor patient for seizure activity, document and report duration and description of seizure to physician/advanced practitioner  - If seizure occurs,  ensure patient safety during seizure  - Reorient patient post seizure  - Seizure pads on all 4 side rails  - Instruct patient/family to notify RN of any seizure activity including if an aura is experienced  - Instruct patient/family to call for assistance with activity based on nursing assessment  - Administer anti-seizure medications if ordered    Outcome: Progressing  Goal: Achieves maximal functionality and  self care  Description: INTERVENTIONS  - Monitor swallowing and airway patency with patient fatigue and changes in neurological status  - Encourage and assist patient to increase activity and self care.   - Encourage visually impaired, hearing impaired and aphasic patients to use assistive/communication devices  Outcome: Progressing     Problem: CARDIOVASCULAR - ADULT  Goal: Maintains optimal cardiac output and hemodynamic stability  Description: INTERVENTIONS:  - Monitor I/O, vital signs and rhythm  - Monitor for S/S and trends of decreased cardiac output  - Administer and titrate ordered vasoactive medications to optimize hemodynamic stability  - Assess quality of pulses, skin color and temperature  - Assess for signs of decreased coronary artery perfusion  - Instruct patient to report change in severity of symptoms  Outcome: Progressing  Goal: Absence of cardiac dysrhythmias or at baseline rhythm  Description: INTERVENTIONS:  - Continuous cardiac monitoring, vital signs, obtain 12 lead EKG if ordered  - Administer antiarrhythmic and heart rate control medications as ordered  - Monitor electrolytes and administer replacement therapy as ordered  Outcome: Progressing     Problem: RESPIRATORY - ADULT  Goal: Achieves optimal ventilation and oxygenation  Description: INTERVENTIONS:  - Assess for changes in respiratory status  - Assess for changes in mentation and behavior  - Position to facilitate oxygenation and minimize respiratory effort  - Oxygen administered by appropriate delivery if ordered  - Initiate smoking cessation education as indicated  - Encourage broncho-pulmonary hygiene including cough, deep breathe, Incentive Spirometry  - Assess the need for suctioning and aspirate as needed  - Assess and instruct to report SOB or any respiratory difficulty  - Respiratory Therapy support as indicated  Outcome: Progressing     Problem: GASTROINTESTINAL - ADULT  Goal: Minimal or absence of nausea and/or  vomiting  Description: INTERVENTIONS:  - Administer IV fluids if ordered to ensure adequate hydration  - Maintain NPO status until nausea and vomiting are resolved  - Nasogastric tube if ordered  - Administer ordered antiemetic medications as needed  - Provide nonpharmacologic comfort measures as appropriate  - Advance diet as tolerated, if ordered  - Consider nutrition services referral to assist patient with adequate nutrition and appropriate food choices  Outcome: Progressing  Goal: Maintains or returns to baseline bowel function  Description: INTERVENTIONS:  - Assess bowel function  - Encourage oral fluids to ensure adequate hydration  - Administer IV fluids if ordered to ensure adequate hydration  - Administer ordered medications as needed  - Encourage mobilization and activity  - Consider nutritional services referral to assist patient with adequate nutrition and appropriate food choices  Outcome: Progressing  Goal: Maintains adequate nutritional intake  Description: INTERVENTIONS:  - Monitor percentage of each meal consumed  - Identify factors contributing to decreased intake, treat as appropriate  - Assist with meals as needed  - Monitor I&O, weight, and lab values if indicated  - Obtain nutrition services referral as needed  Outcome: Progressing  Goal: Oral mucous membranes remain intact  Description: INTERVENTIONS  - Assess oral mucosa and hygiene practices  - Implement preventative oral hygiene regimen  - Implement oral medicated treatments as ordered  - Initiate Nutrition services referral as needed  Outcome: Progressing     Problem: GENITOURINARY - ADULT  Goal: Maintains or returns to baseline urinary function  Description: INTERVENTIONS:  - Assess urinary function  - Encourage oral fluids to ensure adequate hydration if ordered  - Administer IV fluids as ordered to ensure adequate hydration  - Administer ordered medications as needed  - Offer frequent toileting  - Follow urinary retention protocol if  ordered  Outcome: Progressing  Goal: Absence of urinary retention  Description: INTERVENTIONS:  - Assess patient’s ability to void and empty bladder  - Monitor I/O  - Bladder scan as needed  - Discuss with physician/AP medications to alleviate retention as needed  - Discuss catheterization for long term situations as appropriate  Outcome: Progressing     Problem: METABOLIC, FLUID AND ELECTROLYTES - ADULT  Goal: Electrolytes maintained within normal limits  Description: INTERVENTIONS:  - Monitor labs and assess patient for signs and symptoms of electrolyte imbalances  - Administer electrolyte replacement as ordered  - Monitor response to electrolyte replacements, including repeat lab results as appropriate  - Instruct patient on fluid and nutrition as appropriate  Outcome: Progressing  Goal: Fluid balance maintained  Description: INTERVENTIONS:  - Monitor labs   - Monitor I/O and WT  - Instruct patient on fluid and nutrition as appropriate  - Assess for signs & symptoms of volume excess or deficit  Outcome: Progressing     Problem: SKIN/TISSUE INTEGRITY - ADULT  Goal: Incision(s), wounds(s) or drain site(s) healing without S/S of infection  Description: INTERVENTIONS  - Assess and document dressing, incision, wound bed, drain sites and surrounding tissue  - Provide patient and family education  - Perform skin care/dressing changes  Outcome: Progressing     Problem: HEMATOLOGIC - ADULT  Goal: Maintains hematologic stability  Description: INTERVENTIONS  - Assess for signs and symptoms of bleeding or hemorrhage  - Monitor labs  - Administer supportive blood products/factors as ordered and appropriate  Outcome: Progressing     Problem: MUSCULOSKELETAL - ADULT  Goal: Maintain or return mobility to safest level of function  Description: INTERVENTIONS:  - Assess patient's ability to carry out ADLs; assess patient's baseline for ADL function and identify physical deficits which impact ability to perform ADLs (bathing, care  of mouth/teeth, toileting, grooming, dressing, etc.)  - Assess/evaluate cause of self-care deficits   - Assess range of motion  - Assess patient's mobility  - Assess patient's need for assistive devices and provide as appropriate  - Encourage maximum independence but intervene and supervise when necessary  - Involve family in performance of ADLs  - Assess for home care needs following discharge   - Consider OT consult to assist with ADL evaluation and planning for discharge  - Provide patient education as appropriate  Outcome: Progressing     Problem: Nutrition/Hydration-ADULT  Goal: Nutrient/Hydration intake appropriate for improving, restoring or maintaining nutritional needs  Description: Monitor and assess patient's nutrition/hydration status for malnutrition. Collaborate with interdisciplinary team and initiate plan and interventions as ordered.  Monitor patient's weight and dietary intake as ordered or per policy. Utilize nutrition screening tool and intervene as necessary. Determine patient's food preferences and provide high-protein, high-caloric foods as appropriate.     INTERVENTIONS:  - Monitor oral intake, urinary output, labs, and treatment plans  - Assess nutrition and hydration status and recommend course of action  - Evaluate amount of meals eaten  - Assist patient with eating if necessary   - Allow adequate time for meals  - Recommend/ encourage appropriate diets, oral nutritional supplements, and vitamin/mineral supplements  - Order, calculate, and assess calorie counts as needed  - Recommend, monitor, and adjust tube feedings and TPN/PPN based on assessed needs  - Assess need for intravenous fluids  - Provide specific nutrition/hydration education as appropriate  - Include patient/family/caregiver in decisions related to nutrition  Outcome: Progressing

## 2024-06-25 NOTE — CASE MANAGEMENT
Case Management Discharge Planning Note    Patient name Foreign Armando  Location [facility] /E5 -* MRN [id number]  : 1960 Date 2024       Current Admission Date: 2024  Current Admission Diagnosis:Decompensated hepatic cirrhosis (HCC)   Patient Active Problem List    Diagnosis Date Noted Date Diagnosed    Hyperbilirubinemia 2024     Acute encephalopathy 2024     Anemia of chronic disease 2024     Depressed mood 06/10/2024     Seizure disorder (HCC) 2024     Swelling of lower extremity 2024     Nephrolithiasis 2024     Adrenal insufficiency (HCC) 2024     Volume overload 2024     Pleural effusion on right 2024     Abnormal CT scan 2023     ALEXANDER (acute kidney injury) (HCC) 2023     History of colon cancer      Paranoia (HCC) 2022     Abdominal pain 2022     Pancytopenia (HCC) 2022     Decompensated hepatic cirrhosis (HCC) 2022     Thrombocytopenia (HCC) 2022     Restless leg syndrome 2022     Smoking 2022     Type 2 diabetes mellitus with hyperglycemia, without long-term current use of insulin (HCC) 2022       LOS (days): 4  Geometric Mean LOS (GMLOS) (days):   Days to GMLOS:     OBJECTIVE:  Risk of Unplanned Readmission Score: 26.7         Current admission status: Inpatient   Preferred Pharmacy:   [id number]  [place] - [address]  [address] [address]  Phone: [phone] Fax: [phone]    [id number] [place], [place] - [address]  [address] [address]  Phone: [phone] Fax: [phone]    [id number] [place] [facility] [address]  [address]  [place] [address]  Phone: [phone] Fax: [phone]    [id number] [place] - [address]  [address] [place] [address]  Phone: [phone] Fax: [phone]    [facility] [place] [facility] [address] [place] [address]  Phone: [phone] Fax: [phone]    Primary Care Provider: No primary care provider on file.    Primary Insurance: BLUE CROSS  Secondary Insurance:     DISCHARGE DETAILS:     Additional Comments: Patient reviewed during care coordination rounds with Dr. Gonzales, awaiting Neuropsych evaluation to determine capacity at this time.  department to follow for coordination of discharge needs.

## 2024-06-25 NOTE — CONSULTS
Consultation - Neuropsychology/Psychology Department  Foreign Armando 63 y.o. male MRN: [id number]  Unit/Bed#: E5 -01 Encounter: [id number]        Reason for Consultation:  Foreign Armando is a 63 y.o. year old male who was referred for a Neuropsychological Exam to assess cognitive functioning and comment on capacity to make informed medical decisions.      History of Present Illness  Decompensated hepatic cirrhosis  Physician Requesting Consult: Neri Gonzales DO    PROBLEM LIST:  Patient Active Problem List   Diagnosis    Decompensated hepatic cirrhosis (HCC)    Thrombocytopenia (HCC)    Restless leg syndrome    Smoking    Type 2 diabetes mellitus with hyperglycemia, without long-term current use of insulin (HCC)    Abdominal pain    Pancytopenia (HCC)    Paranoia (HCC)    History of colon cancer    ALEXANDER (acute kidney injury) (HCC)    Abnormal CT scan    Adrenal insufficiency (HCC)    Volume overload    Pleural effusion on right    Swelling of lower extremity    Nephrolithiasis    Seizure disorder (HCC)    Depressed mood    Anemia of chronic disease    Hyperbilirubinemia    Acute encephalopathy         Historical Information   Past Medical History:   Diagnosis Date    CHF (congestive heart failure) (HCC)     Cirrhosis (HCC)     CHRISTIE    Colon cancer (HCC)     Diabetes mellitus (HCC)     Neuropathy     Restless leg syndrome      Past Surgical History:   Procedure Laterality Date    EGD AND SIGMOIDOSCOPY FLEXIBLE      IR PARACENTESIS  11/22/2022    IR PARACENTESIS  2/2/2023    IR THORACENTESIS  6/4/2024    LEFT COLON RESECTION      LITHOTRIPSY       Social History   Social History     Substance and Sexual Activity   Alcohol Use Not Currently     Social History     Substance and Sexual Activity   Drug Use Never     Social History     Tobacco Use   Smoking Status Former    Current packs/day: 0.25    Types: Cigarettes   Smokeless Tobacco Never     Family History:   Family History   Problem Relation Age of  "Onset    Diabetes Mother     Liver disease Mother     Diabetes Father     Bone cancer Father        Meds/Allergies   current meds:   Current Facility-Administered Medications   Medication Dose Route Frequency    aluminum-magnesium hydroxide-simethicone (MAALOX) oral suspension 30 mL  30 mL Oral Q6H PRN    ferrous sulfate tablet 325 mg  325 mg Oral Daily With Breakfast    furosemide (LASIX) tablet 20 mg  20 mg Oral Daily    gabapentin (NEURONTIN) capsule 300 mg  300 mg Oral TID    HYDROcodone-acetaminophen (NORCO) 5-325 mg per tablet 1 tablet  1 tablet Oral Q6H PRN    hydrocortisone (CORTEF) tablet 10 mg  10 mg Oral TID    HYDROmorphone HCl (DILAUDID) injection 0.2 mg  0.2 mg Intravenous Q6H PRN    hydrOXYzine HCL (ATARAX) tablet 25 mg  25 mg Oral Q6H PRN    insulin glargine (LANTUS) subcutaneous injection 10 Units 0.1 mL  10 Units Subcutaneous HS    insulin lispro (HumALOG/ADMELOG) 100 units/mL subcutaneous injection 1-5 Units  1-5 Units Subcutaneous 4x Daily (AC & HS)    lactulose (CHRONULAC) oral solution 20 g  20 g Oral Daily    levETIRAcetam (KEPPRA) tablet 500 mg  500 mg Oral Q12H SANDRA    methocarbamol (ROBAXIN) tablet 750 mg  750 mg Oral Q6H PRN    midodrine (PROAMATINE) tablet 10 mg  10 mg Oral TID AC    ondansetron (ZOFRAN) injection 4 mg  4 mg Intravenous Q6H PRN    pancrelipase (Lip-Prot-Amyl) (CREON) delayed release capsule 24,000 Units  24,000 Units Oral TID With Meals    pantoprazole (PROTONIX) EC tablet 40 mg  40 mg Oral Early Morning    propranolol (INDERAL) tablet 10 mg  10 mg Oral Q12H SANDRA    rOPINIRole (REQUIP) tablet 4 mg  4 mg Oral BID    simethicone (MYLICON) chewable tablet 80 mg  80 mg Oral 4x Daily PRN    spironolactone (ALDACTONE) tablet 100 mg  100 mg Oral Daily    tamsulosin (FLOMAX) capsule 0.4 mg  0.4 mg Oral Daily With Dinner       Allergies   Allergen Reactions    Morphine Anaphylaxis    Morpholine Salicylate Other (See Comments)     \"I go unconscious\"    Clarithromycin Other (See " "Comments)     Dizzy, nausea, ulcers in stomach    Oxycodone Hives and Facial Swelling     \"Scratchy throat\"      Sulfamethoxazole-Trimethoprim Hives    Nsaids Rash         Family and Social Support:   Support Systems: Self  Type of Current Residence: Other (Comment)  Discharge planning discussed with:: patient at bedside  Freedom of Choice: Yes      Behavioral Observations: Patient was alert, oriented x 3 and cooperative; patient denied depressed mood and anxiety; affect appeared appropriate; no overt evidence of psychotic process or confusion. Patient appeared aware of reason for hospitalization, medical history and what he is being treated for in hospital; patient appears aware of risks and consequences of refusing current medical treatment.    Cognitive Examination    General Cognitive Functioning MMSE = [id number];     Attention/Concentration Auditory Selective Attention = Within Normal Limits; Auditory Vigilance = Within Normal Limits; Information Processing Speed = Within Normal Limits    Frontal Systems/Executive Functioning Mental Flexibility/Cognitive Control = Within Normal Limits; Working Memory = Within Normal Limits Abstract Reasoning = Within Normal Limits; Generative Ability = Impaired,     Language Functioning Confrontation naming = Within Normal Limits, Phonemic Fluency = Impaired; Semantic Retrieval = Within Normal Limits; Comprehension of Complex Ideational Material = Within Normal Limits; Praxis = Within Normal Limits; Repetition = Within Normal Limits; Basic Reading = Within Normal Limits; Following Commands = Within Normal Limits    Memory Functioning Narrative Recall - Short Delay = Low Average; Long Delay Narrative Recall = Low Average;     Visuo-Spatial Abilities Not assessed    Functional Knowledge  Health & Safety Knowledge = Within Normal Limits;     Summary/Impression:  Results of Neuropsychological Exam revealed cognitive deficit in semantic retrieval with all other tested areas " within normal limits. This appears to be consistent with Occupational therapy cognitive test results. On a measure assessing awareness of personal health status and ability to evaluate health problems, handle medical emergencies and take safety precautions, patient performed within normal limits. During this encounter, patient appears to have capacity to make informed medical decisions.

## 2024-06-25 NOTE — PLAN OF CARE
Problem: Prexisting or High Potential for Compromised Skin Integrity  Goal: Skin integrity is maintained or improved  Description: INTERVENTIONS:  - Identify patients at risk for skin breakdown  - Assess and monitor skin integrity  - Assess and monitor nutrition and hydration status  - Monitor labs   - Assess for incontinence   - Turn and reposition patient  - Assist with mobility/ambulation  - Relieve pressure over bony prominences  - Avoid friction and shearing  - Provide appropriate hygiene as needed including keeping skin clean and dry  - Evaluate need for skin moisturizer/barrier cream  - Collaborate with interdisciplinary team   - Patient/family teaching  - Consider wound care consult   Outcome: Progressing     Problem: PAIN - ADULT  Goal: Verbalizes/displays adequate comfort level or baseline comfort level  Description: Interventions:  - Encourage patient to monitor pain and request assistance  - Assess pain using appropriate pain scale  - Administer analgesics based on type and severity of pain and evaluate response  - Implement non-pharmacological measures as appropriate and evaluate response  - Consider cultural and social influences on pain and pain management  - Notify physician/advanced practitioner if interventions unsuccessful or patient reports new pain  Outcome: Progressing     Problem: INFECTION - ADULT  Goal: Absence or prevention of progression during hospitalization  Description: INTERVENTIONS:  - Assess and monitor for signs and symptoms of infection  - Monitor lab/diagnostic results  - Monitor all insertion sites, i.e. indwelling lines, tubes, and drains  - Monitor endotracheal if appropriate and nasal secretions for changes in amount and color  - Arabi appropriate cooling/warming therapies per order  - Administer medications as ordered  - Instruct and encourage patient and family to use good hand hygiene technique  - Identify and instruct in appropriate isolation precautions for  identified infection/condition  Outcome: Progressing     Problem: SAFETY ADULT  Goal: Patient will remain free of falls  Description: INTERVENTIONS:  - Educate patient/family on patient safety including physical limitations  - Instruct patient to call for assistance with activity   - Consult OT/PT to assist with strengthening/mobility   - Keep Call bell within reach  - Keep bed low and locked with side rails adjusted as appropriate  - Keep care items and personal belongings within reach  - Initiate and maintain comfort rounds  - Make Fall Risk Sign visible to staff  - Offer Toileting every 2 Hours, in advance of need  - Initiate/Maintain bed alarm  - Obtain necessary fall risk management equipment: bed alarm  - Apply yellow socks and bracelet for high fall risk patients  - Consider moving patient to room near nurses station  Outcome: Progressing  Goal: Maintain or return to baseline ADL function  Description: INTERVENTIONS:  -  Assess patient's ability to carry out ADLs; assess patient's baseline for ADL function and identify physical deficits which impact ability to perform ADLs (bathing, care of mouth/teeth, toileting, grooming, dressing, etc.)  - Assess/evaluate cause of self-care deficits   - Assess range of motion  - Assess patient's mobility; develop plan if impaired  - Assess patient's need for assistive devices and provide as appropriate  - Encourage maximum independence but intervene and supervise when necessary  - Involve family in performance of ADLs  - Assess for home care needs following discharge   - Consider OT consult to assist with ADL evaluation and planning for discharge  - Provide patient education as appropriate  Outcome: Progressing     Problem: DISCHARGE PLANNING  Goal: Discharge to home or other facility with appropriate resources  Description: INTERVENTIONS:  - Identify barriers to discharge w/patient and caregiver  - Arrange for needed discharge resources and transportation as  appropriate  - Identify discharge learning needs (meds, wound care, etc.)  - Arrange for interpretive services to assist at discharge as needed  - Refer to Case Management Department for coordinating discharge planning if the patient needs post-hospital services based on physician/advanced practitioner order or complex needs related to functional status, cognitive ability, or social support system  Outcome: Progressing     Problem: Knowledge Deficit  Goal: Patient/family/caregiver demonstrates understanding of disease process, treatment plan, medications, and discharge instructions  Description: Complete learning assessment and assess knowledge base.  Interventions:  - Provide teaching at level of understanding  - Provide teaching via preferred learning methods  Outcome: Progressing     Problem: NEUROSENSORY - ADULT  Goal: Achieves stable or improved neurological status  Description: INTERVENTIONS  - Monitor and report changes in neurological status  - Monitor vital signs such as temperature, blood pressure, glucose, and any other labs ordered   - Initiate measures to prevent increased intracranial pressure  - Monitor for seizure activity and implement precautions if appropriate      Outcome: Progressing  Goal: Remains free of injury related to seizures activity  Description: INTERVENTIONS  - Maintain airway, patient safety  and administer oxygen as ordered  - Monitor patient for seizure activity, document and report duration and description of seizure to physician/advanced practitioner  - If seizure occurs,  ensure patient safety during seizure  - Reorient patient post seizure  - Seizure pads on all 4 side rails  - Instruct patient/family to notify RN of any seizure activity including if an aura is experienced  - Instruct patient/family to call for assistance with activity based on nursing assessment  - Administer anti-seizure medications if ordered    Outcome: Progressing  Goal: Achieves maximal functionality and  self care  Description: INTERVENTIONS  - Monitor swallowing and airway patency with patient fatigue and changes in neurological status  - Encourage and assist patient to increase activity and self care.   - Encourage visually impaired, hearing impaired and aphasic patients to use assistive/communication devices  Outcome: Progressing     Problem: CARDIOVASCULAR - ADULT  Goal: Maintains optimal cardiac output and hemodynamic stability  Description: INTERVENTIONS:  - Monitor I/O, vital signs and rhythm  - Monitor for S/S and trends of decreased cardiac output  - Administer and titrate ordered vasoactive medications to optimize hemodynamic stability  - Assess quality of pulses, skin color and temperature  - Assess for signs of decreased coronary artery perfusion  - Instruct patient to report change in severity of symptoms  Outcome: Progressing  Goal: Absence of cardiac dysrhythmias or at baseline rhythm  Description: INTERVENTIONS:  - Continuous cardiac monitoring, vital signs, obtain 12 lead EKG if ordered  - Administer antiarrhythmic and heart rate control medications as ordered  - Monitor electrolytes and administer replacement therapy as ordered  Outcome: Progressing     Problem: RESPIRATORY - ADULT  Goal: Achieves optimal ventilation and oxygenation  Description: INTERVENTIONS:  - Assess for changes in respiratory status  - Assess for changes in mentation and behavior  - Position to facilitate oxygenation and minimize respiratory effort  - Oxygen administered by appropriate delivery if ordered  - Initiate smoking cessation education as indicated  - Encourage broncho-pulmonary hygiene including cough, deep breathe, Incentive Spirometry  - Assess the need for suctioning and aspirate as needed  - Assess and instruct to report SOB or any respiratory difficulty  - Respiratory Therapy support as indicated  Outcome: Progressing     Problem: GASTROINTESTINAL - ADULT  Goal: Minimal or absence of nausea and/or  vomiting  Description: INTERVENTIONS:  - Administer IV fluids if ordered to ensure adequate hydration  - Maintain NPO status until nausea and vomiting are resolved  - Nasogastric tube if ordered  - Administer ordered antiemetic medications as needed  - Provide nonpharmacologic comfort measures as appropriate  - Advance diet as tolerated, if ordered  - Consider nutrition services referral to assist patient with adequate nutrition and appropriate food choices  Outcome: Progressing  Goal: Maintains or returns to baseline bowel function  Description: INTERVENTIONS:  - Assess bowel function  - Encourage oral fluids to ensure adequate hydration  - Administer IV fluids if ordered to ensure adequate hydration  - Administer ordered medications as needed  - Encourage mobilization and activity  - Consider nutritional services referral to assist patient with adequate nutrition and appropriate food choices  Outcome: Progressing  Goal: Maintains adequate nutritional intake  Description: INTERVENTIONS:  - Monitor percentage of each meal consumed  - Identify factors contributing to decreased intake, treat as appropriate  - Assist with meals as needed  - Monitor I&O, weight, and lab values if indicated  - Obtain nutrition services referral as needed  Outcome: Progressing  Goal: Oral mucous membranes remain intact  Description: INTERVENTIONS  - Assess oral mucosa and hygiene practices  - Implement preventative oral hygiene regimen  - Implement oral medicated treatments as ordered  - Initiate Nutrition services referral as needed  Outcome: Progressing     Problem: GENITOURINARY - ADULT  Goal: Maintains or returns to baseline urinary function  Description: INTERVENTIONS:  - Assess urinary function  - Encourage oral fluids to ensure adequate hydration if ordered  - Administer IV fluids as ordered to ensure adequate hydration  - Administer ordered medications as needed  - Offer frequent toileting  - Follow urinary retention protocol if  ordered  Outcome: Progressing  Goal: Absence of urinary retention  Description: INTERVENTIONS:  - Assess patient’s ability to void and empty bladder  - Monitor I/O  - Bladder scan as needed  - Discuss with physician/AP medications to alleviate retention as needed  - Discuss catheterization for long term situations as appropriate  Outcome: Progressing     Problem: METABOLIC, FLUID AND ELECTROLYTES - ADULT  Goal: Electrolytes maintained within normal limits  Description: INTERVENTIONS:  - Monitor labs and assess patient for signs and symptoms of electrolyte imbalances  - Administer electrolyte replacement as ordered  - Monitor response to electrolyte replacements, including repeat lab results as appropriate  - Instruct patient on fluid and nutrition as appropriate  Outcome: Progressing  Goal: Fluid balance maintained  Description: INTERVENTIONS:  - Monitor labs   - Monitor I/O and WT  - Instruct patient on fluid and nutrition as appropriate  - Assess for signs & symptoms of volume excess or deficit  Outcome: Progressing     Problem: SKIN/TISSUE INTEGRITY - ADULT  Goal: Incision(s), wounds(s) or drain site(s) healing without S/S of infection  Description: INTERVENTIONS  - Assess and document dressing, incision, wound bed, drain sites and surrounding tissue  - Provide patient and family education  - Perform skin care/dressing changes  Outcome: Progressing     Problem: MUSCULOSKELETAL - ADULT  Goal: Maintain or return mobility to safest level of function  Description: INTERVENTIONS:  - Assess patient's ability to carry out ADLs; assess patient's baseline for ADL function and identify physical deficits which impact ability to perform ADLs (bathing, care of mouth/teeth, toileting, grooming, dressing, etc.)  - Assess/evaluate cause of self-care deficits   - Assess range of motion  - Assess patient's mobility  - Assess patient's need for assistive devices and provide as appropriate  - Encourage maximum independence but  intervene and supervise when necessary  - Involve family in performance of ADLs  - Assess for home care needs following discharge   - Consider OT consult to assist with ADL evaluation and planning for discharge  - Provide patient education as appropriate  Outcome: Progressing     Problem: Nutrition/Hydration-ADULT  Goal: Nutrient/Hydration intake appropriate for improving, restoring or maintaining nutritional needs  Description: Monitor and assess patient's nutrition/hydration status for malnutrition. Collaborate with interdisciplinary team and initiate plan and interventions as ordered.  Monitor patient's weight and dietary intake as ordered or per policy. Utilize nutrition screening tool and intervene as necessary. Determine patient's food preferences and provide high-protein, high-caloric foods as appropriate.     INTERVENTIONS:  - Monitor oral intake, urinary output, labs, and treatment plans  - Assess nutrition and hydration status and recommend course of action  - Evaluate amount of meals eaten  - Assist patient with eating if necessary   - Allow adequate time for meals  - Recommend/ encourage appropriate diets, oral nutritional supplements, and vitamin/mineral supplements  - Order, calculate, and assess calorie counts as needed  - Recommend, monitor, and adjust tube feedings and TPN/PPN based on assessed needs  - Assess need for intravenous fluids  - Provide specific nutrition/hydration education as appropriate  - Include patient/family/caregiver in decisions related to nutrition  Outcome: Progressing

## 2024-06-25 NOTE — CASE MANAGEMENT
Case Management Discharge Planning Note    Patient name Foreign Armando  Location [facility] /E5 -* MRN [id number]  : 1960 Date 2024       Current Admission Date: 2024  Current Admission Diagnosis:Decompensated hepatic cirrhosis (HCC)   Patient Active Problem List    Diagnosis Date Noted Date Diagnosed    Hyperbilirubinemia 2024     Acute encephalopathy 2024     Anemia of chronic disease 2024     Depressed mood 06/10/2024     Seizure disorder (HCC) 2024     Swelling of lower extremity 2024     Nephrolithiasis 2024     Adrenal insufficiency (HCC) 2024     Volume overload 2024     Pleural effusion on right 2024     Abnormal CT scan 2023     ALEXANDER (acute kidney injury) (HCC) 2023     History of colon cancer      Paranoia (HCC) 2022     Abdominal pain 2022     Pancytopenia (HCC) 2022     Decompensated hepatic cirrhosis (HCC) 2022     Thrombocytopenia (HCC) 2022     Restless leg syndrome 2022     Smoking 2022     Type 2 diabetes mellitus with hyperglycemia, without long-term current use of insulin (HCC) 2022       LOS (days): 4  Geometric Mean LOS (GMLOS) (days):   Days to GMLOS:     OBJECTIVE:  Risk of Unplanned Readmission Score: 26.7         Current admission status: Inpatient   Preferred Pharmacy:   [facility]  [place] - [address]  [address] [address]  Phone: [phone] Fax: [phone]    [id number] [place], [place] - [address] [facility]  [address] [facility] [address]  Phone: [phone] Fax: [phone]    [id number] [place] [facility] [address]  [address]  [place] [address]  Phone: [phone] Fax: [phone]    [id number] [place] - [address]  [address] [place] [address]  Phone: [phone] Fax: [phone]    [facility] [place] [place] - [address] [place] [address]  Phone: [phone] Fax: [phone]    Primary Care Provider: No primary care provider on file.    Primary Insurance: BLUE CROSS  Secondary Insurance:     DISCHARGE DETAILS:    Additional Comments: Per Neuropsych note, pt does have capacity to make decisions. Pt will be discharging from the hospital AMA. CM provided pt with clothes and two bus passes at bedside, pt aware and thankful.

## 2024-06-25 NOTE — ASSESSMENT & PLAN NOTE
Patient is refusing his medications here in the hospital.  He is demanding to leave.    I explained to him he has a lesion on his pancreas that could be pancreatic cancer but he is refusing workup including refusing an MRI    I did explain to him that if we do not diagnose cancer quickly it could spread.  But he is refusing to stay and demanding to leave.  I told him he should follow-up as an outpatient as soon as possible    I also told him if he does not take his medications compliantly he can get confused, possibly fall and injure or kill himself    He agrees to take his meds when he leaves the hospital.    I did have him evaluated by neuropsych.  They felt he was competent to make his own decisions.    I am having him sign out AGAINST MEDICAL ADVICE as he is not following my advice and refusing all medications and tests

## 2024-06-25 NOTE — DISCHARGE SUMMARY
[facility]  Discharge- Foreign Armando 1960, 63 y.o. male MRN: [id number]  Unit/Bed#: E5 -01 Encounter: [id number]  Primary Care Provider: No primary care provider on file.   Date and time admitted to hospital: 6/21/2024 10:23 PM    * Decompensated hepatic cirrhosis (HCC)  Assessment & Plan  Patient is refusing his medications here in the hospital.  He is demanding to leave.    I explained to him he has a lesion on his pancreas that could be pancreatic cancer but he is refusing workup including refusing an MRI    I did explain to him that if we do not diagnose cancer quickly it could spread.  But he is refusing to stay and demanding to leave.  I told him he should follow-up as an outpatient as soon as possible    I also told him if he does not take his medications compliantly he can get confused, possibly fall and injure or kill himself    He agrees to take his meds when he leaves the hospital.    I did have him evaluated by neuropsych.  They felt he was competent to make his own decisions.    I am having him sign out AGAINST MEDICAL ADVICE as he is not following my advice and refusing all medications and tests    Acute encephalopathy  Assessment & Plan  This is from hepatic encephalopathy.  This has resolved.    He is now refusing all his medication here in the hospital.  He says he will take it at home.    I did explain if he does not take his medications compliantly then he can get more confused and have serious injury or death      Discharging Physician / Practitioner: Neri Gonzales DO  PCP: No primary care provider on file.  Admission Date:   Admission Orders (From admission, onward)       Ordered        06/21/24 2248  INPATIENT ADMISSION  Once                          Discharge Date: 06/25/24    Medical Problems       Resolved Problems  Date Reviewed: 6/22/2024            Resolved    Lactic acidosis 6/22/2024     Resolved by  Maynor Antonio DO             Consultations During Hospital Stay:  GI        Reason for Admission: Confusion      Hospital Course:     Foreign Armando is a 63 y.o. male patient who originally presented to the hospital on 6/21/2024 due to confusion.  Patient had metabolic encephalopathy due to likely noncompliance with his cirrhotic medications.  He quickly improved in the hospital.  We were working him up for this.  We found a lesion on his pancreas on imaging.  It is concerning for pancreatic cancer.  GI recommended MRI of the abdomen.  That was ordered and when they came to get him for the test, the patient adamantly refused.  He is refusing to take all medications.  He wants to go home.  I did have him evaluated by neuropsychology to make sure he was capable of making his own decisions as these are poor decisions did not take his medications and refused workup for something is concerning his cancer.  Neuropsychology did feel he was capable of making his own decisions.  So I had him sign out AGAINST MEDICAL ADVICE.  But I did stress to him that he really needs to be compliant with his medications when he goes home or can have significant injury or death as he can easily get confused.  I also suggest that he get a quick follow-up for this pancreatic lesion to see if it is pancreatic cancer and if needs treatment.  I explained if you get treated for cancer earlier than later your prognosis is better and he may not have metastasis.  Again he is refusing all medications and refusing MRIs or any further workup here in the hospital.  He is signing out AGAINST MEDICAL ADVICE.    Please see above list of diagnoses and related plan for additional information.       Condition at Discharge: stable       Discharge Day Visit / Exam:     Subjective:  feels well  He Is demanding to go home.  He is oriented x 3        Vitals: Blood Pressure: 97/62 (06/25/24 1515)  Pulse: 87 (06/25/24 1515)  Temperature: 97.9 °F (36.6 °C) (06/25/24 1515)  Temp Source:  Oral (06/25/24 0715)  Respirations: 17 (06/25/24 1515)  SpO2: 95 % (06/25/24 1515)    Exam:     Physical Exam  Vitals and nursing note reviewed.   HENT:      Head: Normocephalic and atraumatic.   Eyes:      Pupils: Pupils are equal, round, and reactive to light.   Cardiovascular:      Rate and Rhythm: Normal rate and regular rhythm.      Heart sounds: No murmur heard.     No friction rub. No gallop.   Pulmonary:      Effort: Pulmonary effort is normal.      Breath sounds: Normal breath sounds. No wheezing or rales.   Abdominal:      General: Bowel sounds are normal.      Palpations: Abdomen is soft.      Tenderness: There is no abdominal tenderness.   Musculoskeletal:      Right lower leg: No edema.      Left lower leg: No edema.       .         Discharge instructions/Information to patient and family:   See after visit summary for information provided to patient and family.      Provisions for Follow-Up Care:  See after visit summary for information related to follow-up care and any pertinent home health orders.      Disposition:     Home       Discharge Statement:  I spent 36 minutes discharging the patient. This time was spent on the day of discharge. I had direct contact with the patient on the day of discharge. Greater than 50% of the total time was spent examining patient, answering all patient questions, arranging and discussing plan of care with patient as well as directly providing post-discharge instructions.  Additional time then spent on discharge activities.    Discharge Medications:  See after visit summary for reconciled discharge medications provided to patient and family.      ** Please Note: This note has been constructed using a voice recognition system **

## 2024-06-26 NOTE — UTILIZATION REVIEW
ATTENTION:  SMITH    Additional information as requested:      From [place] ED encounter:     Date/Time Weight Weight Method   06/21/24 1010 68.7 kg (151 lb 7.3 oz) Bed scale         Continuous infusion from [place] Inpatient admission:     sodium chloride 0.9 % infusion  Rate: 50 mL/hr Dose: 50 mL/hr  Freq: Continuous Route: IV  Start: 06/21/24 2300 End: 06/22/24 1920

## 2024-06-26 NOTE — UTILIZATION REVIEW
NOTIFICATION OF ADMISSION DISCHARGE   This is a Notification of Discharge from [facility]. Please be advised that this patient has been discharge from our facility. Below you will find the admission and discharge date and time including the patient’s disposition.   UTILIZATION REVIEW CONTACT:  Angela Alvarez  Utilization   Network Utilization Review Department  Phone: [phone] x carefully listen to the prompts. All voicemails are confidential.  Email: [email or web address]     ADMISSION INFORMATION  PRESENTATION DATE: 6/21/2024 10:23 PM  OBERVATION ADMISSION DATE:   INPATIENT ADMISSION DATE: 6/21/24 10:23 PM   DISCHARGE DATE: 6/25/2024  4:28 PM   DISPOSITION:Left against medical advice or discontinued care    Network Utilization Review Department  ATTENTION: Please call with any questions or concerns to [phone] and carefully listen to the prompts so that you are directed to the right person. All voicemails are confidential.   For Discharge needs, contact Care Management DC Support Team at [phone] opt. [phone]  Send all requests for admission clinical reviews, approved or denied determinations and any other requests to dedicated fax number below belonging to the campus where the patient is receiving treatment. List of dedicated fax numbers for the Facilities:  FACILITY NAME UR FAX NUMBER   ADMISSION DENIALS (Administrative/Medical Necessity) [phone]   DISCHARGE SUPPORT TEAM ([facility]) [phone]   PARENT CHILD HEALTH (Maternity/NICU/Pediatrics) [phone]   [facility] [phone]   [facility] [phone]   [facility] [phone]   [facility] [phone]   [facility] [phone]   [facility] [phone]   [facility] [phone]   [facility]  [facility] [phone]   [facility] [phone]   [facility] [phone]   [facility] [phone]   [facility] [phone]

## 2024-08-24 ENCOUNTER — HOSPITAL ENCOUNTER (INPATIENT)
Facility: HOSPITAL | Age: 64
LOS: 3 days | Discharge: HOME | End: 2024-08-27
Attending: INTERNAL MEDICINE | Admitting: INTERNAL MEDICINE
Payer: COMMERCIAL

## 2024-08-24 ENCOUNTER — APPOINTMENT (EMERGENCY)
Dept: RADIOLOGY | Facility: HOSPITAL | Age: 64
End: 2024-08-24
Payer: COMMERCIAL

## 2024-08-24 ENCOUNTER — HOSPITAL ENCOUNTER (EMERGENCY)
Facility: HOSPITAL | Age: 64
Discharge: ANOTHER HOSPITAL | End: 2024-08-24
Attending: EMERGENCY MEDICINE
Payer: COMMERCIAL

## 2024-08-24 VITALS
DIASTOLIC BLOOD PRESSURE: 54 MMHG | SYSTOLIC BLOOD PRESSURE: 105 MMHG | WEIGHT: 146 LBS | HEART RATE: 77 BPM | TEMPERATURE: 97.9 F | OXYGEN SATURATION: 92 % | RESPIRATION RATE: 20 BRPM | BODY MASS INDEX: 22.87 KG/M2

## 2024-08-24 DIAGNOSIS — K40.90 INGUINAL HERNIA: ICD-10-CM

## 2024-08-24 DIAGNOSIS — Z87.19 HX OF CIRRHOSIS: ICD-10-CM

## 2024-08-24 DIAGNOSIS — R10.9 ABDOMINAL PAIN, UNSPECIFIED ABDOMINAL LOCATION: ICD-10-CM

## 2024-08-24 DIAGNOSIS — K74.60 DECOMPENSATED HEPATIC CIRRHOSIS (HCC): ICD-10-CM

## 2024-08-24 DIAGNOSIS — R56.9 SEIZURE-LIKE ACTIVITY (HCC): ICD-10-CM

## 2024-08-24 DIAGNOSIS — R18.8 ASCITES: Primary | ICD-10-CM

## 2024-08-24 DIAGNOSIS — R10.11 RIGHT UPPER QUADRANT ABDOMINAL PAIN: ICD-10-CM

## 2024-08-24 DIAGNOSIS — E27.40 ADRENAL INSUFFICIENCY (HCC): ICD-10-CM

## 2024-08-24 DIAGNOSIS — R18.8 CIRRHOSIS OF LIVER WITH ASCITES, UNSPECIFIED HEPATIC CIRRHOSIS TYPE  (HCC): ICD-10-CM

## 2024-08-24 DIAGNOSIS — K86.1 CHRONIC PANCREATITIS, UNSPECIFIED PANCREATITIS TYPE (HCC): Primary | ICD-10-CM

## 2024-08-24 DIAGNOSIS — K72.90 DECOMPENSATED HEPATIC CIRRHOSIS (HCC): ICD-10-CM

## 2024-08-24 DIAGNOSIS — K92.2 GASTROINTESTINAL HEMORRHAGE, UNSPECIFIED GASTROINTESTINAL HEMORRHAGE TYPE: ICD-10-CM

## 2024-08-24 DIAGNOSIS — K74.60 CIRRHOSIS OF LIVER WITH ASCITES, UNSPECIFIED HEPATIC CIRRHOSIS TYPE  (HCC): ICD-10-CM

## 2024-08-24 DIAGNOSIS — E80.6 HYPERBILIRUBINEMIA: ICD-10-CM

## 2024-08-24 DIAGNOSIS — K76.7 HEPATORENAL SYNDROME (HCC): ICD-10-CM

## 2024-08-24 LAB
2HR DELTA HS TROPONIN: 1 NG/L
ALBUMIN SERPL BCG-MCNC: 2.8 G/DL (ref 3.5–5)
ALP SERPL-CCNC: 159 U/L (ref 34–104)
ALT SERPL W P-5'-P-CCNC: 31 U/L (ref 7–52)
AMMONIA PLAS-SCNC: 20 UMOL/L (ref 18–72)
ANION GAP SERPL CALCULATED.3IONS-SCNC: 9 MMOL/L (ref 4–13)
APTT PPP: 26 SECONDS (ref 23–34)
AST SERPL W P-5'-P-CCNC: 36 U/L (ref 13–39)
ATRIAL RATE: 83 BPM
BASOPHILS # BLD AUTO: 0.03 THOUSANDS/ÂΜL (ref 0–0.1)
BASOPHILS NFR BLD AUTO: 1 % (ref 0–1)
BILIRUB SERPL-MCNC: 2.78 MG/DL (ref 0.2–1)
BILIRUB UR QL STRIP: NEGATIVE
BNP SERPL-MCNC: 58 PG/ML (ref 0–100)
BUN SERPL-MCNC: 11 MG/DL (ref 5–25)
CALCIUM ALBUM COR SERPL-MCNC: 9.3 MG/DL (ref 8.3–10.1)
CALCIUM SERPL-MCNC: 8.3 MG/DL (ref 8.4–10.2)
CARDIAC TROPONIN I PNL SERPL HS: 5 NG/L
CARDIAC TROPONIN I PNL SERPL HS: 6 NG/L
CHLORIDE SERPL-SCNC: 107 MMOL/L (ref 96–108)
CLARITY UR: CLEAR
CO2 SERPL-SCNC: 22 MMOL/L (ref 21–32)
COLOR UR: ABNORMAL
CREAT SERPL-MCNC: 0.76 MG/DL (ref 0.6–1.3)
EOSINOPHIL # BLD AUTO: 0.18 THOUSAND/ÂΜL (ref 0–0.61)
EOSINOPHIL NFR BLD AUTO: 4 % (ref 0–6)
ERYTHROCYTE [DISTWIDTH] IN BLOOD BY AUTOMATED COUNT: 17.8 % (ref 11.6–15.1)
GFR SERPL CREATININE-BSD FRML MDRD: 97 ML/MIN/1.73SQ M
GLUCOSE SERPL-MCNC: 120 MG/DL (ref 65–140)
GLUCOSE SERPL-MCNC: 218 MG/DL (ref 65–140)
GLUCOSE UR STRIP-MCNC: NEGATIVE MG/DL
HCT VFR BLD AUTO: 31.1 % (ref 36.5–49.3)
HGB BLD-MCNC: 10.1 G/DL (ref 12–17)
HGB UR QL STRIP.AUTO: NEGATIVE
IMM GRANULOCYTES # BLD AUTO: 0.07 THOUSAND/UL (ref 0–0.2)
IMM GRANULOCYTES NFR BLD AUTO: 2 % (ref 0–2)
INR PPP: 1.38 (ref 0.85–1.19)
KETONES UR STRIP-MCNC: NEGATIVE MG/DL
LEUKOCYTE ESTERASE UR QL STRIP: NEGATIVE
LIPASE SERPL-CCNC: 23 U/L (ref 11–82)
LYMPHOCYTES # BLD AUTO: 0.53 THOUSANDS/ÂΜL (ref 0.6–4.47)
LYMPHOCYTES NFR BLD AUTO: 13 % (ref 14–44)
MCH RBC QN AUTO: 32.5 PG (ref 26.8–34.3)
MCHC RBC AUTO-ENTMCNC: 32.5 G/DL (ref 31.4–37.4)
MCV RBC AUTO: 100 FL (ref 82–98)
MONOCYTES # BLD AUTO: 0.3 THOUSAND/ÂΜL (ref 0.17–1.22)
MONOCYTES NFR BLD AUTO: 7 % (ref 4–12)
NEUTROPHILS # BLD AUTO: 2.94 THOUSANDS/ÂΜL (ref 1.85–7.62)
NEUTS SEG NFR BLD AUTO: 73 % (ref 43–75)
NITRITE UR QL STRIP: NEGATIVE
NRBC BLD AUTO-RTO: 0 /100 WBCS
P AXIS: 28 DEGREES
PH UR STRIP.AUTO: 5 [PH]
PLATELET # BLD AUTO: 46 THOUSANDS/UL (ref 149–390)
PMV BLD AUTO: 11.4 FL (ref 8.9–12.7)
POTASSIUM SERPL-SCNC: 3.2 MMOL/L (ref 3.5–5.3)
PR INTERVAL: 136 MS
PROT SERPL-MCNC: 6.4 G/DL (ref 6.4–8.4)
PROT UR STRIP-MCNC: NEGATIVE MG/DL
PROTHROMBIN TIME: 17.2 SECONDS (ref 12.3–15)
QRS AXIS: 68 DEGREES
QRSD INTERVAL: 68 MS
QT INTERVAL: 400 MS
QTC INTERVAL: 470 MS
RBC # BLD AUTO: 3.11 MILLION/UL (ref 3.88–5.62)
SODIUM SERPL-SCNC: 138 MMOL/L (ref 135–147)
SP GR UR STRIP.AUTO: 1.01 (ref 1–1.04)
T WAVE AXIS: 40 DEGREES
UROBILINOGEN UA: NEGATIVE MG/DL
VENTRICULAR RATE: 83 BPM
WBC # BLD AUTO: 4.05 THOUSAND/UL (ref 4.31–10.16)

## 2024-08-24 PROCEDURE — 85610 PROTHROMBIN TIME: CPT

## 2024-08-24 PROCEDURE — 96374 THER/PROPH/DIAG INJ IV PUSH: CPT

## 2024-08-24 PROCEDURE — 83690 ASSAY OF LIPASE: CPT

## 2024-08-24 PROCEDURE — 85730 THROMBOPLASTIN TIME PARTIAL: CPT

## 2024-08-24 PROCEDURE — 99285 EMERGENCY DEPT VISIT HI MDM: CPT

## 2024-08-24 PROCEDURE — 84484 ASSAY OF TROPONIN QUANT: CPT

## 2024-08-24 PROCEDURE — 36415 COLL VENOUS BLD VENIPUNCTURE: CPT

## 2024-08-24 PROCEDURE — 93005 ELECTROCARDIOGRAM TRACING: CPT

## 2024-08-24 PROCEDURE — 82140 ASSAY OF AMMONIA: CPT

## 2024-08-24 PROCEDURE — 80053 COMPREHEN METABOLIC PANEL: CPT

## 2024-08-24 PROCEDURE — 99222 1ST HOSP IP/OBS MODERATE 55: CPT | Performed by: INTERNAL MEDICINE

## 2024-08-24 PROCEDURE — 83880 ASSAY OF NATRIURETIC PEPTIDE: CPT

## 2024-08-24 PROCEDURE — 85025 COMPLETE CBC W/AUTO DIFF WBC: CPT

## 2024-08-24 PROCEDURE — 82948 REAGENT STRIP/BLOOD GLUCOSE: CPT

## 2024-08-24 PROCEDURE — 96375 TX/PRO/DX INJ NEW DRUG ADDON: CPT

## 2024-08-24 PROCEDURE — 71045 X-RAY EXAM CHEST 1 VIEW: CPT

## 2024-08-24 PROCEDURE — 93010 ELECTROCARDIOGRAM REPORT: CPT | Performed by: STUDENT IN AN ORGANIZED HEALTH CARE EDUCATION/TRAINING PROGRAM

## 2024-08-24 RX ORDER — GABAPENTIN 300 MG/1
300 CAPSULE ORAL 3 TIMES DAILY
Status: DISCONTINUED | OUTPATIENT
Start: 2024-08-24 | End: 2024-08-27 | Stop reason: HOSPADM

## 2024-08-24 RX ORDER — SPIRONOLACTONE 50 MG/1
100 TABLET, FILM COATED ORAL DAILY
Status: DISCONTINUED | OUTPATIENT
Start: 2024-08-25 | End: 2024-08-26

## 2024-08-24 RX ORDER — ENOXAPARIN SODIUM 100 MG/ML
40 INJECTION SUBCUTANEOUS DAILY
Status: DISCONTINUED | OUTPATIENT
Start: 2024-08-25 | End: 2024-08-26

## 2024-08-24 RX ORDER — INSULIN LISPRO 100 [IU]/ML
1-5 INJECTION, SOLUTION INTRAVENOUS; SUBCUTANEOUS
Status: DISCONTINUED | OUTPATIENT
Start: 2024-08-25 | End: 2024-08-27 | Stop reason: HOSPADM

## 2024-08-24 RX ORDER — LIDOCAINE 50 MG/G
1 PATCH TOPICAL DAILY
Status: DISCONTINUED | OUTPATIENT
Start: 2024-08-24 | End: 2024-08-27 | Stop reason: HOSPADM

## 2024-08-24 RX ORDER — LEVETIRACETAM 500 MG/1
500 TABLET ORAL EVERY 12 HOURS SCHEDULED
Status: DISCONTINUED | OUTPATIENT
Start: 2024-08-24 | End: 2024-08-27 | Stop reason: HOSPADM

## 2024-08-24 RX ORDER — HYDROMORPHONE HCL IN WATER/PF 6 MG/30 ML
0.2 PATIENT CONTROLLED ANALGESIA SYRINGE INTRAVENOUS EVERY 4 HOURS PRN
Status: DISCONTINUED | OUTPATIENT
Start: 2024-08-24 | End: 2024-08-27

## 2024-08-24 RX ORDER — ONDANSETRON 2 MG/ML
4 INJECTION INTRAMUSCULAR; INTRAVENOUS EVERY 6 HOURS PRN
Status: DISCONTINUED | OUTPATIENT
Start: 2024-08-24 | End: 2024-08-27 | Stop reason: HOSPADM

## 2024-08-24 RX ORDER — MIDODRINE HYDROCHLORIDE 5 MG/1
10 TABLET ORAL
Status: CANCELLED | OUTPATIENT
Start: 2024-08-24

## 2024-08-24 RX ORDER — FERROUS SULFATE 325(65) MG
325 TABLET ORAL
Status: DISCONTINUED | OUTPATIENT
Start: 2024-08-25 | End: 2024-08-27 | Stop reason: HOSPADM

## 2024-08-24 RX ORDER — POTASSIUM CHLORIDE 1500 MG/1
40 TABLET, EXTENDED RELEASE ORAL ONCE
Status: COMPLETED | OUTPATIENT
Start: 2024-08-24 | End: 2024-08-24

## 2024-08-24 RX ORDER — ACETAMINOPHEN 325 MG/1
325 TABLET ORAL EVERY 6 HOURS PRN
Status: DISCONTINUED | OUTPATIENT
Start: 2024-08-24 | End: 2024-08-27 | Stop reason: HOSPADM

## 2024-08-24 RX ORDER — HYDROCORTISONE 10 MG/1
10 TABLET ORAL EVERY MORNING
Status: CANCELLED | OUTPATIENT
Start: 2024-08-25

## 2024-08-24 RX ORDER — INSULIN LISPRO 100 [IU]/ML
1-5 INJECTION, SOLUTION INTRAVENOUS; SUBCUTANEOUS
Status: DISCONTINUED | OUTPATIENT
Start: 2024-08-24 | End: 2024-08-27 | Stop reason: HOSPADM

## 2024-08-24 RX ORDER — ROPINIROLE 1 MG/1
2 TABLET, FILM COATED ORAL 2 TIMES DAILY
Status: DISCONTINUED | OUTPATIENT
Start: 2024-08-24 | End: 2024-08-27 | Stop reason: HOSPADM

## 2024-08-24 RX ORDER — FUROSEMIDE 20 MG
20 TABLET ORAL DAILY
Status: DISCONTINUED | OUTPATIENT
Start: 2024-08-25 | End: 2024-08-26

## 2024-08-24 RX ORDER — MIDODRINE HYDROCHLORIDE 5 MG/1
10 TABLET ORAL
Status: DISCONTINUED | OUTPATIENT
Start: 2024-08-25 | End: 2024-08-27 | Stop reason: HOSPADM

## 2024-08-24 RX ORDER — HYDROCORTISONE 10 MG/1
10 TABLET ORAL EVERY MORNING
COMMUNITY
End: 2024-08-27

## 2024-08-24 RX ORDER — POLYETHYLENE GLYCOL 3350 17 G/17G
17 POWDER, FOR SOLUTION ORAL DAILY PRN
Status: DISCONTINUED | OUTPATIENT
Start: 2024-08-24 | End: 2024-08-27 | Stop reason: HOSPADM

## 2024-08-24 RX ORDER — MAGNESIUM HYDROXIDE/ALUMINUM HYDROXICE/SIMETHICONE 120; 1200; 1200 MG/30ML; MG/30ML; MG/30ML
30 SUSPENSION ORAL EVERY 6 HOURS PRN
Status: DISCONTINUED | OUTPATIENT
Start: 2024-08-24 | End: 2024-08-27 | Stop reason: HOSPADM

## 2024-08-24 RX ORDER — LIDOCAINE 50 MG/G
1 PATCH TOPICAL ONCE
Status: DISCONTINUED | OUTPATIENT
Start: 2024-08-24 | End: 2024-08-24 | Stop reason: HOSPADM

## 2024-08-24 RX ORDER — TAMSULOSIN HYDROCHLORIDE 0.4 MG/1
0.4 CAPSULE ORAL
Status: DISCONTINUED | OUTPATIENT
Start: 2024-08-25 | End: 2024-08-27 | Stop reason: HOSPADM

## 2024-08-24 RX ORDER — FENTANYL CITRATE 50 UG/ML
25 INJECTION, SOLUTION INTRAMUSCULAR; INTRAVENOUS ONCE
Status: COMPLETED | OUTPATIENT
Start: 2024-08-24 | End: 2024-08-24

## 2024-08-24 RX ORDER — LACTULOSE 10 G/15ML
20 SOLUTION ORAL DAILY
Status: DISCONTINUED | OUTPATIENT
Start: 2024-08-25 | End: 2024-08-27 | Stop reason: HOSPADM

## 2024-08-24 RX ORDER — LEVETIRACETAM 500 MG/1
500 TABLET ORAL EVERY 12 HOURS SCHEDULED
Status: CANCELLED | OUTPATIENT
Start: 2024-08-24

## 2024-08-24 RX ORDER — HYDROCORTISONE 5 MG/1
5 TABLET ORAL
Status: CANCELLED | OUTPATIENT
Start: 2024-08-24

## 2024-08-24 RX ORDER — FUROSEMIDE 10 MG/ML
40 INJECTION INTRAMUSCULAR; INTRAVENOUS ONCE
Status: COMPLETED | OUTPATIENT
Start: 2024-08-24 | End: 2024-08-24

## 2024-08-24 RX ORDER — HYDROMORPHONE HCL/PF 1 MG/ML
0.5 SYRINGE (ML) INJECTION
Status: DISCONTINUED | OUTPATIENT
Start: 2024-08-24 | End: 2024-08-27

## 2024-08-24 RX ORDER — NICOTINE 21 MG/24HR
21 PATCH, TRANSDERMAL 24 HOURS TRANSDERMAL DAILY
Status: DISCONTINUED | OUTPATIENT
Start: 2024-08-24 | End: 2024-08-27 | Stop reason: HOSPADM

## 2024-08-24 RX ORDER — HYDROCORTISONE 10 MG/1
10 TABLET ORAL EVERY MORNING
Status: DISCONTINUED | OUTPATIENT
Start: 2024-08-25 | End: 2024-08-27 | Stop reason: HOSPADM

## 2024-08-24 RX ADMIN — LIDOCAINE 1 PATCH: 50 PATCH CUTANEOUS at 16:17

## 2024-08-24 RX ADMIN — HYDROMORPHONE HYDROCHLORIDE 0.2 MG: 0.2 INJECTION, SOLUTION INTRAMUSCULAR; INTRAVENOUS; SUBCUTANEOUS at 22:57

## 2024-08-24 RX ADMIN — ACETAMINOPHEN 325 MG: 325 TABLET ORAL at 22:09

## 2024-08-24 RX ADMIN — FUROSEMIDE 40 MG: 10 INJECTION, SOLUTION INTRAVENOUS at 14:25

## 2024-08-24 RX ADMIN — RIFAXIMIN 550 MG: 550 TABLET ORAL at 22:08

## 2024-08-24 RX ADMIN — GABAPENTIN 300 MG: 300 CAPSULE ORAL at 22:08

## 2024-08-24 RX ADMIN — LEVETIRACETAM 500 MG: 500 TABLET, FILM COATED ORAL at 22:08

## 2024-08-24 RX ADMIN — ROPINIROLE HYDROCHLORIDE 2 MG: 1 TABLET, FILM COATED ORAL at 22:09

## 2024-08-24 RX ADMIN — FENTANYL CITRATE 25 MCG: 50 INJECTION, SOLUTION INTRAMUSCULAR; INTRAVENOUS at 18:37

## 2024-08-24 RX ADMIN — POTASSIUM CHLORIDE 40 MEQ: 1500 TABLET, EXTENDED RELEASE ORAL at 14:24

## 2024-08-24 NOTE — ASSESSMENT & PLAN NOTE
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  Patient initially presented to [place] being transferred to [facility] for paracentesis.    A.) ascites  Patient is maintained on furosemide 40 mg daily and Aldactone 100 mg daily  IR consulted for paracentesis  B.) esophageal varices  Patient had recent EGD in August 2024 at [facility] where he was found to have medium esophageal varices, portal gastropathy and GAVE  Maintained on carvedilol 3.125 mg twice daily as blood pressure tolerates  C.) hepatic encephalopathy  Continue lactulose  D.) hyperbilirubinemia  E.) coagulopathy  F.) MELD: 14

## 2024-08-24 NOTE — ASSESSMENT & PLAN NOTE
Lab Results   Component Value Date    HGBA1C 6.0 (H) 07/09/2024   Monitor Accu-Cheks, sliding scale for coverage  Metformin was previously discontinued due to lactic acidosis in setting of liver cirrhosis

## 2024-08-24 NOTE — ASSESSMENT & PLAN NOTE
Maintained on on Solu-Cortef 10 mg in the morning, 5 mg in the evening and midodrine 10 mg 3 times daily

## 2024-08-24 NOTE — EMTALA/ACUTE CARE TRANSFER
[facility] EMERGENCY DEPARTMENT  [address] [address]  [phone]  Dept: [phone]      EMTALA TRANSFER CONSENT    NAME Foreign Armando                                         1960                              MRN [id number]    I have been informed of my rights regarding examination, treatment, and transfer   by Dr. Juancho Henriquez MD    Benefits: Specialized equipment and/or services available at the receiving facility (Include comment)________________________ (IR)    Risks: Potential for delay in receiving treatment, Potential deterioration of medical condition, Loss of IV, Increased discomfort during transfer, Possible worsening of condition or death during transfer      Consent for Transfer:  I acknowledge that my medical condition has been evaluated and explained to me by the emergency department physician or other qualified medical person and/or my attending physician, who has recommended that I be transferred to the service of  Accepting Physician: Dr. Gaston at Accepting Facility Name, City & State : [facility]. The above potential benefits of such transfer, the potential risks associated with such transfer, and the probable risks of not being transferred have been explained to me, and I fully understand them.  The doctor has explained that, in my case, the benefits of transfer outweigh the risks.  I agree to be transferred.    I authorize the performance of emergency medical procedures and treatments upon me in both transit and upon arrival at the receiving facility.  Additionally, I authorize the release of any and all medical records to the receiving facility and request they be transported with me, if possible.  I understand that the safest mode of transportation during a medical emergency is an ambulance and that the Hospital advocates the use of this mode of transport. Risks of traveling to the receiving facility by car, including absence of medical control,  life sustaining equipment, such as oxygen, and medical personnel has been explained to me and I fully understand them.    (YASMIN CORRECT BOX BELOW)  [  ]  I consent to the stated transfer and to be transported by ambulance/helicopter.  [  ]  I consent to the stated transfer, but refuse transportation by ambulance and accept full responsibility for my transportation by car.  I understand the risks of non-ambulance transfers and I exonerate the Hospital and its staff from any deterioration in my condition that results from this refusal.    X___________________________________________    DATE  24  TIME________  Signature of patient or legally responsible individual signing on patient behalf           RELATIONSHIP TO PATIENT_________________________          Provider Certification    NAME Foreign Armando                                         1960                              MRN [id number]    A medical screening exam was performed on the above named patient.  Based on the examination:    Condition Necessitating Transfer The encounter diagnosis was Ascites.    Patient Condition: The patient has been stabilized such that within reasonable medical probability, no material deterioration of the patient condition or the condition of the unborn child(shabnam) is likely to result from the transfer    Reason for Transfer: Level of Care needed not available at this facility, Patient/Family request    Transfer Requirements: Facility SLA   Space available and qualified personnel available for treatment as acknowledged by    Agreed to accept transfer and to provide appropriate medical treatment as acknowledged by       Dr. Gaston  Appropriate medical records of the examination and treatment of the patient are provided at the time of transfer   STAFF INITIAL WHEN COMPLETED _______  Transfer will be performed by qualified personnel from    and appropriate transfer equipment as required, including the use of necessary and  appropriate life support measures.    Provider Certification: I have examined the patient and explained the following risks and benefits of being transferred/refusing transfer to the patient/family:  Risk of worsening condition, Unanticipated needs of medical equipment and personnel during transport, General risk, such as traffic hazards, adverse weather conditions, rough terrain or turbulence, possible failure of equipment (including vehicle or aircraft), or consequences of actions of persons outside the control of the transport personnel, The possibility of a transport vehicle being unavailable      Based on these reasonable risks and benefits to the patient and/or the unborn child(shabnam), and based upon the information available at the time of the patient’s examination, I certify that the medical benefits reasonably to be expected from the provision of appropriate medical treatments at another medical facility outweigh the increasing risks, if any, to the individual’s medical condition, and in the case of labor to the unborn child, from effecting the transfer.    X____________________________________________ DATE 08/24/24        TIME_______      ORIGINAL - SEND TO MEDICAL RECORDS   COPY - SEND WITH PATIENT DURING TRANSFER

## 2024-08-24 NOTE — ED PROVIDER NOTES
History  Chief Complaint   Patient presents with    Chest Pain     Patient arrives with chest and abdominal pain for a couple of days.      63-year-old male past medical history of cirrhosis, DM 2, colon cancer presents to emergency department via EMS complaining of abdominal pain, shortness of breath. Reports similar to previous hospitalizations. States it has been going on for months but worsening past week. Also reports cp that hurts to touch. Denies trauma or injury. +nausea. +leg swelling. Was at bus stop, planned on going to [place] but did not feel well enough. Denies F, vomiting, palpitations, bleeding, cough, sore throat, confusion, skin changes. Having BM.       History provided by:  Patient and medical records      Prior to Admission Medications   Prescriptions Last Dose Informant Patient Reported? Taking?   Alcohol Swabs 70 % PADS   No No   Sig: May substitute brand based on insurance coverage. Check glucose TID.   Blood Glucose Monitoring Suppl (OneTouch Verio Reflect) w/Device KIT   No No   Sig: May substitute brand based on insurance coverage. Check glucose TID.   OneTouch Delica Lancets 33G MISC   No No   Sig: May substitute brand based on insurance coverage. Check glucose TID.   dicyclomine (BENTYL) 20 mg tablet Not Taking  No No   Sig: Take 1 tablet (20 mg total) by mouth 2 (two) times a day before breakfast and lunch   Patient not taking: Reported on 8/24/2024   ferrous sulfate 325 (65 Fe) mg tablet Not Taking  Yes No   Patient not taking: Reported on 8/24/2024   furosemide (LASIX) 20 mg tablet 8/24/2024  No Yes   Sig: Take 1 tablet (20 mg total) by mouth daily   gabapentin (NEURONTIN) 300 mg capsule 8/24/2024  Yes Yes   Sig: Take 300 mg by mouth Three times a day   glucose blood (OneTouch Verio) test strip   No No   Sig: May substitute brand based on insurance coverage. Check glucose TID.   hydrOXYzine HCL (ATARAX) 25 mg tablet Not Taking  Yes No   Sig: TAKE 1 TABLET BY MOUTH 3 TIMES A DAY AS  NEEDED FOR ITCHING   Patient not taking: Reported on 8/24/2024   hydrocortisone (CORTEF) 10 mg tablet 8/24/2024  Yes Yes   Sig: Take 10 mg by mouth every morning   lactulose 20 g/30 mL Not Taking  No No   Sig: Take 30 mL (20 g total) by mouth daily Do not start before February 28, 2023.   Patient not taking: Reported on 8/24/2024   levETIRAcetam (KEPPRA) 500 mg tablet 8/24/2024  No Yes   Sig: Take 1 tablet (500 mg total) by mouth every 12 (twelve) hours   metFORMIN (GLUCOPHAGE) 1000 MG tablet Not Taking  No No   Sig: Take 1 tablet (1,000 mg total) by mouth 2 (two) times a day with meals   Patient not taking: Reported on 8/24/2024   midodrine (PROAMATINE) 5 mg tablet Not Taking  No No   Sig: Take 2 tablets (10 mg total) by mouth 3 (three) times a day before meals   Patient not taking: Reported on 8/24/2024   pancrelipase, Lip-Prot-Amyl, (CREON) 6,000 units delayed release capsule Not Taking  No No   Sig: Take 4 capsules (24,000 Units total) by mouth 3 (three) times a day with meals   Patient not taking: Reported on 8/24/2024   pantoprazole (PROTONIX) 40 mg tablet Not Taking  No No   Sig: Take 1 tablet (40 mg total) by mouth daily in the early morning Do not start before May 5, 2023.   Patient not taking: Reported on 8/24/2024   propranolol (INDERAL) 10 mg tablet Not Taking  No No   Sig: Take 1 tablet (10 mg total) by mouth every 12 (twelve) hours   Patient not taking: Reported on 8/24/2024   rOPINIRole (REQUIP) 4 mg tablet 8/24/2024  No Yes   Sig: Take 1 tablet (4 mg total) by mouth 2 (two) times a day   repaglinide (PRANDIN) 2 mg tablet Not Taking  No No   Sig: Take 1 tablet (2 mg total) by mouth 3 (three) times a day before meals   Patient not taking: Reported on 8/24/2024   rifaximin (XIFAXAN) 550 mg tablet 8/24/2024  Yes Yes   Sig: Take 550 mg by mouth 2 (two) times a day   senna-docusate sodium (SENOKOT S) 8.6-50 mg per tablet Not Taking  Yes No   Sig: Take 1 tablet by mouth every evening   Patient not taking:  Reported on 8/24/2024   spironolactone (ALDACTONE) 100 mg tablet Not Taking  Yes No   Patient not taking: Reported on 8/24/2024   tamsulosin (FLOMAX) 0.4 mg 8/23/2024  No Yes   Sig: Take 1 capsule (0.4 mg total) by mouth daily with dinner      Facility-Administered Medications: None       Past Medical History:   Diagnosis Date    CHF (congestive heart failure) (HCC)     Cirrhosis (HCC)     CHRISTIE    Colon cancer (HCC)     Diabetes mellitus (HCC)     Neuropathy     Restless leg syndrome        Past Surgical History:   Procedure Laterality Date    EGD AND SIGMOIDOSCOPY FLEXIBLE      IR PARACENTESIS  11/22/2022    IR PARACENTESIS  2/2/2023    IR THORACENTESIS  6/4/2024    LEFT COLON RESECTION      LITHOTRIPSY         Family History   Problem Relation Age of Onset    Diabetes Mother     Liver disease Mother     Diabetes Father     Bone cancer Father      I have reviewed and agree with the history as documented.    E-Cigarette/Vaping    E-Cigarette Use Never User      E-Cigarette/Vaping Substances    Nicotine No     THC No     CBD No     Flavoring No     Other No     Unknown No      Social History     Tobacco Use    Smoking status: Former     Current packs/day: 0.25     Types: Cigarettes    Smokeless tobacco: Never   Vaping Use    Vaping status: Never Used   Substance Use Topics    Alcohol use: Not Currently    Drug use: Never       Review of Systems   Constitutional:  Negative for diaphoresis and fever.   Respiratory:  Positive for shortness of breath. Negative for cough.    Cardiovascular:  Positive for chest pain and leg swelling.   Gastrointestinal:  Positive for abdominal distention and abdominal pain.   All other systems reviewed and are negative.      Physical Exam  Physical Exam  Vitals and nursing note reviewed.   Constitutional:       General: He is not in acute distress.     Appearance: Normal appearance. He is ill-appearing. He is not toxic-appearing or diaphoretic.   HENT:      Head: Normocephalic and  atraumatic.      Mouth/Throat:      Mouth: Mucous membranes are moist.   Eyes:      Pupils: Pupils are equal, round, and reactive to light.   Cardiovascular:      Rate and Rhythm: Normal rate and regular rhythm.      Pulses:           Radial pulses are 2+ on the right side and 2+ on the left side.        Dorsalis pedis pulses are 2+ on the right side and 2+ on the left side.      Heart sounds: Normal heart sounds.   Pulmonary:      Effort: Tachypnea present. No accessory muscle usage, respiratory distress or retractions.      Breath sounds: No decreased air movement. Rales present. No decreased breath sounds.      Comments: Patient crying and tachypnic on presentation, RR 23   Chest:      Chest wall: Tenderness present. No crepitus or edema.   Abdominal:      General: There is distension.      Palpations: Abdomen is soft.      Tenderness: There is generalized abdominal tenderness.      Comments: Voluntary guarding- noted to be guarding prior to palpation    Musculoskeletal:      Right lower leg: Edema present.      Left lower leg: Edema present.   Skin:     General: Skin is warm and dry.      Capillary Refill: Capillary refill takes less than 2 seconds.      Coloration: Skin is not jaundiced.      Comments: +port present, no purulent drainage or surrounding erythema/swelling.    Neurological:      Mental Status: He is alert and oriented to person, place, and time.      Motor: No weakness.      Gait: Gait normal.   Psychiatric:         Behavior: Behavior is cooperative.         Vital Signs  ED Triage Vitals   Temperature Pulse Respirations Blood Pressure SpO2   08/24/24 1233 08/24/24 1206 08/24/24 1206 08/24/24 1206 08/24/24 1206   97.9 °F (36.6 °C) 84 (!) 36 135/81 96 %      Temp Source Heart Rate Source Patient Position - Orthostatic VS BP Location FiO2 (%)   08/24/24 1233 08/24/24 1206 08/24/24 1206 08/24/24 1206 --   Oral Monitor Lying Right arm       Pain Score       --                  Vitals:    08/24/24 1206  08/24/24 1234 08/24/24 1515   BP: 135/81 142/85 100/65   Pulse: 84 80 78   Patient Position - Orthostatic VS: Lying Lying Lying         Visual Acuity      ED Medications  Medications   lidocaine (LIDODERM) 5 % patch 1 patch (1 patch Topical Medication Applied 8/24/24 1617)   furosemide (LASIX) injection 40 mg (40 mg Intravenous Given 8/24/24 1425)   potassium chloride (Klor-Con M20) CR tablet 40 mEq (40 mEq Oral Given 8/24/24 1424)       Diagnostic Studies  Results Reviewed       Procedure Component Value Units Date/Time    HS Troponin I 2hr [[id number]]  (Normal) Collected: 08/24/24 1514    Lab Status: Final result Specimen: Blood from Arm, Right Updated: 08/24/24 1543     hs TnI 2hr 6 ng/L      Delta 2hr hsTnI 1 ng/L     UA (URINE) with reflex to Scope [[id number]]  (Abnormal) Collected: 08/24/24 1501    Lab Status: Final result Specimen: Urine, Other Updated: 08/24/24 1514     Color, UA Viky     Clarity, UA Clear     Specific Gravity, UA 1.015     pH, UA 5.0     Leukocytes, UA Negative     Nitrite, UA Negative     Protein, UA Negative mg/dl      Glucose, UA Negative mg/dl      Ketones, UA Negative mg/dl      Bilirubin, UA Negative     Occult Blood, UA Negative     UROBILINOGEN UA Negative mg/dL     HS Troponin I 4hr [[id number]]     Lab Status: No result Specimen: Blood     Ammonia [[id number]]  (Normal) Collected: 08/24/24 1329    Lab Status: Final result Specimen: Blood from Arm, Right Updated: 08/24/24 1430     Ammonia 20 umol/L     Comprehensive metabolic panel [[id number]]  (Abnormal) Collected: 08/24/24 1329    Lab Status: Final result Specimen: Blood from Arm, Right Updated: 08/24/24 1358     Sodium 138 mmol/L      Potassium 3.2 mmol/L      Chloride 107 mmol/L      CO2 22 mmol/L      ANION GAP 9 mmol/L      BUN 11 mg/dL      Creatinine 0.76 mg/dL      Glucose 218 mg/dL      Calcium 8.3 mg/dL      Corrected Calcium 9.3 mg/dL      AST 36 U/L      ALT 31 U/L      Alkaline Phosphatase 159 U/L      Total Protein  6.4 g/dL      Albumin 2.8 g/dL      Total Bilirubin 2.78 mg/dL      eGFR 97 ml/min/1.73sq m     Narrative:      [employer] guidelines for Chronic Kidney Disease (CKD):     Stage 1 with normal or high GFR (GFR > 90 mL/min/1.73 square meters)    Stage 2 Mild CKD (GFR = 60-89 mL/min/1.73 square meters)    Stage 3A Moderate CKD (GFR = 45-59 mL/min/1.73 square meters)    Stage 3B Moderate CKD (GFR = 30-44 mL/min/1.73 square meters)    Stage 4 Severe CKD (GFR = 15-29 mL/min/1.73 square meters)    Stage 5 End Stage CKD (GFR <15 mL/min/1.73 square meters)  Note: GFR calculation is accurate only with a steady state creatinine    Lipase [[id number]]  (Normal) Collected: 08/24/24 1329    Lab Status: Final result Specimen: Blood from Arm, Right Updated: 08/24/24 1358     Lipase 23 u/L     Protime-INR [[id number]]  (Abnormal) Collected: 08/24/24 1252    Lab Status: Final result Specimen: Blood from Arm, Right Updated: 08/24/24 1339     Protime 17.2 seconds      INR 1.38    Narrative:      INR Therapeutic Range    Indication                                             INR Range      Atrial Fibrillation                                               2.0-3.0  Hypercoagulable State                                    2.0.2.3  Left Ventricular Asist Device                            2.0-3.0  Mechanical Heart Valve                                  -    Aortic(with afib, MI, embolism, HF, LA enlargement,    and/or coagulopathy)                                     2.0-3.0 (2.5-3.5)     Mitral                                                             2.5-3.5  Prosthetic/Bioprosthetic Heart Valve               2.0-3.0  Venous thromboembolism (VTE: VT, PE        2.0-3.0    APTT [[id number]]  (Normal) Collected: 08/24/24 1252    Lab Status: Final result Specimen: Blood from Arm, Right Updated: 08/24/24 1339     PTT 26 seconds     HS Troponin 0hr (reflex protocol) [[id number]]  (Normal) Collected: 08/24/24 1252    Lab  Status: Final result Specimen: Blood from Arm, Right Updated: 08/24/24 1322     hs TnI 0hr 5 ng/L     B-Type Natriuretic Peptide(BNP) [[id number]]  (Normal) Collected: 08/24/24 1252    Lab Status: Final result Specimen: Blood from Arm, Right Updated: 08/24/24 1321     BNP 58 pg/mL     CBC and differential [[id number]]  (Abnormal) Collected: 08/24/24 1252    Lab Status: Final result Specimen: Blood from Arm, Right Updated: 08/24/24 1308     WBC 4.05 Thousand/uL      RBC 3.11 Million/uL      Hemoglobin 10.1 g/dL      Hematocrit 31.1 %       fL      MCH 32.5 pg      MCHC 32.5 g/dL      RDW 17.8 %      MPV 11.4 fL      Platelets 46 Thousands/uL      nRBC 0 /100 WBCs      Segmented % 73 %      Immature Grans % 2 %      Lymphocytes % 13 %      Monocytes % 7 %      Eosinophils Relative 4 %      Basophils Relative 1 %      Absolute Neutrophils 2.94 Thousands/µL      Absolute Immature Grans 0.07 Thousand/uL      Absolute Lymphocytes 0.53 Thousands/µL      Absolute Monocytes 0.30 Thousand/µL      Eosinophils Absolute 0.18 Thousand/µL      Basophils Absolute 0.03 Thousands/µL                    XR chest portable   Final Result by Avi Barreto MD (08/24 1329)      Possible small right pleural effusion. Otherwise, unremarkable chest radiograph.            Resident: MADISON RUIZ I, the attending radiologist, have reviewed the images and agree with the final report above.      Workstation performed: [id number]                    Procedures  Procedures         ED Course  ED Course as of 08/24/24 1725   Sat Aug 24, 2024   1224 Procedure Note: EKG  Date/Time: 08/24/24 12:24 PM   Performed by: Carolina Rosado   Authorized by: Carolina Rosado  ECG interpreted by me, the ED Provider: yes   The EKG demonstrates:  Rate 83 bpm  Rhythm NSR   QTc 470 ms  No ST elevations/depressions     1512 Discussed with IR    1700 Home meds from most recent hospitalization restarted per SLIM recommendation.    1725 Signed out to Brice Richardson PA-C  pending transfer.                HEART Risk Score      Flowsheet Row Most Recent Value   Heart Score Risk Calculator    History 0 Filed at: 08/24/2024 1543   ECG 0 Filed at: 08/24/2024 1543   Age 1 Filed at: 08/24/2024 1543   Risk Factors 2 Filed at: 08/24/2024 1543   Troponin 0 Filed at: 08/24/2024 1543   HEART Score 3 Filed at: 08/24/2024 1543                          SBIRT 20yo+      Flowsheet Row Most Recent Value   Initial Alcohol Screen: US AUDIT-C     1. How often do you have a drink containing alcohol? 0 Filed at: 08/24/2024 1258   2. How many drinks containing alcohol do you have on a typical day you are drinking?  0 Filed at: 08/24/2024 1258   3a. Male UNDER 65: How often do you have five or more drinks on one occasion? 0 Filed at: 08/24/2024 1258   3b. FEMALE Any Age, or MALE 65+: How often do you have 4 or more drinks on one occassion? 0 Filed at: 08/24/2024 1258   Audit-C Score 0 Filed at: 08/24/2024 1258   LUCAS: How many times in the past year have you...    Used an illegal drug or used a prescription medication for non-medical reasons? Never Filed at: 08/24/2024 1258                      Medical Decision Making  Tachypnea, no accessory muscle use or retractions, mild. Fluid overload. +abdominal distention - not rigid, voluntary guarding. Discussed with Attending Dr. Martin, low suspicion SBP given exam/history/workup; significant imaging history reviewed.  Chest x-ray showing small pleural effusion.  ECG without acute ischemic changes.  CBC labs slightly improved from previous admission.elevated total bili, alp remainder of LFTs WNL consistent with chronic dz. Plan for admission, IR paracentesis for symptomatic ascites. Discussed with IR.     All imaging and/or lab testing discussed with patient. Patient and/or family members verbalizes understanding and agrees with plan for transfer and admission. Patient is stable for transfer at time of sign out.     Portions of the record may have been created with  "voice recognition software. Occasional wrong word or \"sound a like\" substitutions may have occurred due to the inherent limitations of voice recognition software. Read the chart carefully and recognize, using context, where substitutions have occurred.             Amount and/or Complexity of Data Reviewed  Labs: ordered.  Radiology: ordered.    Risk  Prescription drug management.                 Disposition  Final diagnoses:   Ascites     Time reflects when diagnosis was documented in both MDM as applicable and the Disposition within this note       Time User Action Codes Description Comment    8/24/2024  4:32 PM Carolina Rosado Add [R18.8] Ascites           ED Disposition       ED Disposition   Transfer to Another Facility-In Network    Condition   --    Date/Time   Sat Aug 24, 2024 1615    Comment   Foreign Prabha should be transferred out to [facility].               MD Documentation      Flowsheet Row Most Recent Value   Patient Condition The patient has been stabilized such that within reasonable medical probability, no material deterioration of the patient condition or the condition of the unborn child(shabnam) is likely to result from the transfer   Reason for Transfer Level of Care needed not available at this facility, Patient/Family request   Benefits of Transfer Specialized equipment and/or services available at the receiving facility (Include comment)________________________  [IR]   Risks of Transfer Potential for delay in receiving treatment, Potential deterioration of medical condition, Loss of IV, Increased discomfort during transfer, Possible worsening of condition or death during transfer   Accepting Physician Dr. Gaston   Accepting Facility Name, City & State  [facility]   Sending MD Dr. Henriquez   Provider Certification Risk of worsening condition, Unanticipated needs of medical equipment and personnel during transport, General risk, such as traffic hazards, adverse weather conditions, rough terrain or turbulence, " possible failure of equipment (including vehicle or aircraft), or consequences of actions of persons outside the control of the transport personnel, The possibility of a transport vehicle being unavailable          RN Documentation      Flowsheet Row Most Recent Value   Accepting Facility Name, City & State  [facility]          Follow-up Information    None         Patient's Medications   Discharge Prescriptions    No medications on file       No discharge procedures on file.    PDMP Review         Value Time User    PDMP Reviewed  Yes 5/4/2023 11:47 AM Margaret Shell PA-C            ED Provider  Electronically Signed by             Carolina Rosaod PA-C  08/24/24 3111

## 2024-08-25 ENCOUNTER — APPOINTMENT (OUTPATIENT)
Dept: ULTRASOUND IMAGING | Facility: HOSPITAL | Age: 64
End: 2024-08-25
Payer: COMMERCIAL

## 2024-08-25 ENCOUNTER — APPOINTMENT (INPATIENT)
Dept: CT IMAGING | Facility: HOSPITAL | Age: 64
End: 2024-08-25
Payer: COMMERCIAL

## 2024-08-25 LAB
ALBUMIN SERPL BCG-MCNC: 2.3 G/DL (ref 3.5–5)
ALP SERPL-CCNC: 116 U/L (ref 34–104)
ALT SERPL W P-5'-P-CCNC: 25 U/L (ref 7–52)
ANION GAP SERPL CALCULATED.3IONS-SCNC: 5 MMOL/L (ref 4–13)
AST SERPL W P-5'-P-CCNC: 31 U/L (ref 13–39)
BASOPHILS # BLD AUTO: 0.02 THOUSANDS/ÂΜL (ref 0–0.1)
BASOPHILS NFR BLD AUTO: 1 % (ref 0–1)
BILIRUB SERPL-MCNC: 1.9 MG/DL (ref 0.2–1)
BUN SERPL-MCNC: 9 MG/DL (ref 5–25)
CALCIUM ALBUM COR SERPL-MCNC: 8 MG/DL (ref 8.3–10.1)
CALCIUM SERPL-MCNC: 6.6 MG/DL (ref 8.4–10.2)
CHLORIDE SERPL-SCNC: 106 MMOL/L (ref 96–108)
CO2 SERPL-SCNC: 24 MMOL/L (ref 21–32)
CREAT SERPL-MCNC: 0.69 MG/DL (ref 0.6–1.3)
EOSINOPHIL # BLD AUTO: 0.24 THOUSAND/ÂΜL (ref 0–0.61)
EOSINOPHIL NFR BLD AUTO: 7 % (ref 0–6)
ERYTHROCYTE [DISTWIDTH] IN BLOOD BY AUTOMATED COUNT: 17.5 % (ref 11.6–15.1)
GFR SERPL CREATININE-BSD FRML MDRD: 101 ML/MIN/1.73SQ M
GLUCOSE SERPL-MCNC: 146 MG/DL (ref 65–140)
GLUCOSE SERPL-MCNC: 168 MG/DL (ref 65–140)
GLUCOSE SERPL-MCNC: 169 MG/DL (ref 65–140)
GLUCOSE SERPL-MCNC: 174 MG/DL (ref 65–140)
GLUCOSE SERPL-MCNC: 198 MG/DL (ref 65–140)
HCT VFR BLD AUTO: 25.7 % (ref 36.5–49.3)
HGB BLD-MCNC: 8.1 G/DL (ref 12–17)
IMM GRANULOCYTES # BLD AUTO: 0.02 THOUSAND/UL (ref 0–0.2)
IMM GRANULOCYTES NFR BLD AUTO: 1 % (ref 0–2)
LYMPHOCYTES # BLD AUTO: 0.68 THOUSANDS/ÂΜL (ref 0.6–4.47)
LYMPHOCYTES NFR BLD AUTO: 20 % (ref 14–44)
MAGNESIUM SERPL-MCNC: 1.4 MG/DL (ref 1.9–2.7)
MCH RBC QN AUTO: 32 PG (ref 26.8–34.3)
MCHC RBC AUTO-ENTMCNC: 31.5 G/DL (ref 31.4–37.4)
MCV RBC AUTO: 102 FL (ref 82–98)
MONOCYTES # BLD AUTO: 0.31 THOUSAND/ÂΜL (ref 0.17–1.22)
MONOCYTES NFR BLD AUTO: 9 % (ref 4–12)
NEUTROPHILS # BLD AUTO: 2.17 THOUSANDS/ÂΜL (ref 1.85–7.62)
NEUTS SEG NFR BLD AUTO: 62 % (ref 43–75)
NRBC BLD AUTO-RTO: 0 /100 WBCS
PHOSPHATE SERPL-MCNC: 2.9 MG/DL (ref 2.3–4.1)
PLATELET # BLD AUTO: 39 THOUSANDS/UL (ref 149–390)
PMV BLD AUTO: 11.3 FL (ref 8.9–12.7)
POTASSIUM SERPL-SCNC: 3.1 MMOL/L (ref 3.5–5.3)
PROT SERPL-MCNC: 4.9 G/DL (ref 6.4–8.4)
RBC # BLD AUTO: 2.53 MILLION/UL (ref 3.88–5.62)
SODIUM SERPL-SCNC: 135 MMOL/L (ref 135–147)
TSH SERPL DL<=0.05 MIU/L-ACNC: 2.11 UIU/ML (ref 0.45–4.5)
WBC # BLD AUTO: 3.44 THOUSAND/UL (ref 4.31–10.16)

## 2024-08-25 PROCEDURE — 84443 ASSAY THYROID STIM HORMONE: CPT

## 2024-08-25 PROCEDURE — 76700 US EXAM ABDOM COMPLETE: CPT

## 2024-08-25 PROCEDURE — 83735 ASSAY OF MAGNESIUM: CPT

## 2024-08-25 PROCEDURE — 84100 ASSAY OF PHOSPHORUS: CPT

## 2024-08-25 PROCEDURE — 82948 REAGENT STRIP/BLOOD GLUCOSE: CPT

## 2024-08-25 PROCEDURE — 94664 DEMO&/EVAL PT USE INHALER: CPT

## 2024-08-25 PROCEDURE — 85025 COMPLETE CBC W/AUTO DIFF WBC: CPT

## 2024-08-25 PROCEDURE — 94640 AIRWAY INHALATION TREATMENT: CPT

## 2024-08-25 PROCEDURE — 80053 COMPREHEN METABOLIC PANEL: CPT

## 2024-08-25 PROCEDURE — 99232 SBSQ HOSP IP/OBS MODERATE 35: CPT | Performed by: STUDENT IN AN ORGANIZED HEALTH CARE EDUCATION/TRAINING PROGRAM

## 2024-08-25 PROCEDURE — 94760 N-INVAS EAR/PLS OXIMETRY 1: CPT

## 2024-08-25 PROCEDURE — 74177 CT ABD & PELVIS W/CONTRAST: CPT

## 2024-08-25 RX ORDER — POTASSIUM CHLORIDE 1500 MG/1
40 TABLET, EXTENDED RELEASE ORAL
Status: COMPLETED | OUTPATIENT
Start: 2024-08-25 | End: 2024-08-25

## 2024-08-25 RX ORDER — MAGNESIUM SULFATE HEPTAHYDRATE 40 MG/ML
4 INJECTION, SOLUTION INTRAVENOUS ONCE
Status: COMPLETED | OUTPATIENT
Start: 2024-08-25 | End: 2024-08-25

## 2024-08-25 RX ORDER — ALBUTEROL SULFATE 0.83 MG/ML
2.5 SOLUTION RESPIRATORY (INHALATION) EVERY 6 HOURS PRN
Status: DISCONTINUED | OUTPATIENT
Start: 2024-08-25 | End: 2024-08-27 | Stop reason: HOSPADM

## 2024-08-25 RX ADMIN — HYDROMORPHONE HYDROCHLORIDE 0.2 MG: 0.2 INJECTION, SOLUTION INTRAMUSCULAR; INTRAVENOUS; SUBCUTANEOUS at 03:51

## 2024-08-25 RX ADMIN — FERROUS SULFATE TAB 325 MG (65 MG ELEMENTAL FE) 325 MG: 325 (65 FE) TAB at 08:09

## 2024-08-25 RX ADMIN — RIFAXIMIN 550 MG: 550 TABLET ORAL at 22:01

## 2024-08-25 RX ADMIN — ROPINIROLE HYDROCHLORIDE 2 MG: 1 TABLET, FILM COATED ORAL at 08:08

## 2024-08-25 RX ADMIN — HYDROMORPHONE HYDROCHLORIDE 0.5 MG: 1 INJECTION, SOLUTION INTRAMUSCULAR; INTRAVENOUS; SUBCUTANEOUS at 06:28

## 2024-08-25 RX ADMIN — FUROSEMIDE 20 MG: 20 TABLET ORAL at 08:09

## 2024-08-25 RX ADMIN — ALBUTEROL SULFATE 2.5 MG: 2.5 SOLUTION RESPIRATORY (INHALATION) at 21:38

## 2024-08-25 RX ADMIN — GABAPENTIN 300 MG: 300 CAPSULE ORAL at 16:32

## 2024-08-25 RX ADMIN — HYDROCORTISONE 10 MG: 10 TABLET ORAL at 08:09

## 2024-08-25 RX ADMIN — TAMSULOSIN HYDROCHLORIDE 0.4 MG: 0.4 CAPSULE ORAL at 16:32

## 2024-08-25 RX ADMIN — ENOXAPARIN SODIUM 40 MG: 40 INJECTION SUBCUTANEOUS at 08:08

## 2024-08-25 RX ADMIN — PANCRELIPASE 24000 UNITS: 120000; 24000; 76000 CAPSULE, DELAYED RELEASE PELLETS ORAL at 08:09

## 2024-08-25 RX ADMIN — MIDODRINE HYDROCHLORIDE 10 MG: 5 TABLET ORAL at 11:46

## 2024-08-25 RX ADMIN — POTASSIUM CHLORIDE 40 MEQ: 1500 TABLET, EXTENDED RELEASE ORAL at 14:09

## 2024-08-25 RX ADMIN — PANCRELIPASE 24000 UNITS: 120000; 24000; 76000 CAPSULE, DELAYED RELEASE PELLETS ORAL at 11:46

## 2024-08-25 RX ADMIN — HYDROMORPHONE HYDROCHLORIDE 0.5 MG: 1 INJECTION, SOLUTION INTRAMUSCULAR; INTRAVENOUS; SUBCUTANEOUS at 10:44

## 2024-08-25 RX ADMIN — HYDROMORPHONE HYDROCHLORIDE 0.5 MG: 1 INJECTION, SOLUTION INTRAMUSCULAR; INTRAVENOUS; SUBCUTANEOUS at 01:32

## 2024-08-25 RX ADMIN — LEVETIRACETAM 500 MG: 500 TABLET, FILM COATED ORAL at 08:09

## 2024-08-25 RX ADMIN — SPIRONOLACTONE 100 MG: 50 TABLET ORAL at 08:08

## 2024-08-25 RX ADMIN — ALBUTEROL SULFATE 2.5 MG: 2.5 SOLUTION RESPIRATORY (INHALATION) at 08:20

## 2024-08-25 RX ADMIN — GABAPENTIN 300 MG: 300 CAPSULE ORAL at 22:01

## 2024-08-25 RX ADMIN — HYDROMORPHONE HYDROCHLORIDE 0.5 MG: 1 INJECTION, SOLUTION INTRAMUSCULAR; INTRAVENOUS; SUBCUTANEOUS at 22:31

## 2024-08-25 RX ADMIN — ONDANSETRON 4 MG: 2 INJECTION INTRAMUSCULAR; INTRAVENOUS at 16:37

## 2024-08-25 RX ADMIN — MAGNESIUM SULFATE HEPTAHYDRATE 4 G: 40 INJECTION, SOLUTION INTRAVENOUS at 11:46

## 2024-08-25 RX ADMIN — INSULIN LISPRO 1 UNITS: 100 INJECTION, SOLUTION INTRAVENOUS; SUBCUTANEOUS at 22:03

## 2024-08-25 RX ADMIN — INSULIN LISPRO 1 UNITS: 100 INJECTION, SOLUTION INTRAVENOUS; SUBCUTANEOUS at 16:32

## 2024-08-25 RX ADMIN — IOHEXOL 100 ML: 350 INJECTION, SOLUTION INTRAVENOUS at 22:30

## 2024-08-25 RX ADMIN — LACTULOSE 20 G: 20 SOLUTION ORAL at 08:09

## 2024-08-25 RX ADMIN — MIDODRINE HYDROCHLORIDE 10 MG: 5 TABLET ORAL at 06:28

## 2024-08-25 RX ADMIN — POTASSIUM CHLORIDE 40 MEQ: 1500 TABLET, EXTENDED RELEASE ORAL at 11:46

## 2024-08-25 RX ADMIN — LEVETIRACETAM 500 MG: 500 TABLET, FILM COATED ORAL at 22:01

## 2024-08-25 RX ADMIN — MIDODRINE HYDROCHLORIDE 10 MG: 5 TABLET ORAL at 16:32

## 2024-08-25 RX ADMIN — RIFAXIMIN 550 MG: 550 TABLET ORAL at 08:09

## 2024-08-25 RX ADMIN — LIDOCAINE 1 PATCH: 50 PATCH TOPICAL at 08:08

## 2024-08-25 RX ADMIN — ROPINIROLE HYDROCHLORIDE 2 MG: 1 TABLET, FILM COATED ORAL at 22:01

## 2024-08-25 RX ADMIN — HYDROMORPHONE HYDROCHLORIDE 0.5 MG: 1 INJECTION, SOLUTION INTRAMUSCULAR; INTRAVENOUS; SUBCUTANEOUS at 16:34

## 2024-08-25 RX ADMIN — ALUMINUM HYDROXIDE, MAGNESIUM HYDROXIDE, AND DIMETHICONE 30 ML: 200; 20; 200 SUSPENSION ORAL at 22:40

## 2024-08-25 RX ADMIN — INSULIN LISPRO 1 UNITS: 100 INJECTION, SOLUTION INTRAVENOUS; SUBCUTANEOUS at 08:09

## 2024-08-25 RX ADMIN — PANCRELIPASE 24000 UNITS: 120000; 24000; 76000 CAPSULE, DELAYED RELEASE PELLETS ORAL at 16:32

## 2024-08-25 RX ADMIN — GABAPENTIN 300 MG: 300 CAPSULE ORAL at 08:09

## 2024-08-25 NOTE — CONSULTS
Patient MRN: [id number]  Date of Service: 8/25/2024  Referring Provider: NUBIA Díaz   Provider Creating Note: Sarah Barnett PA-C  PCP: No primary care provider on file.    Reason for Consult:   Chronic pancreatitis, unspecified pancreatitis type (HCC) [K86.1]  - Primary      Hyperbilirubinemia [E80.6]      Decompensated hepatic cirrhosis (HCC) [K72.90, K74.60]        HISTORY OF PRESENT ILLNESS:  Foreign Armando is a 63 y.o. 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  Also chronic pancreatitis, CRC 2017 status post chemo and R hemicolectomy, T2DM,  seizure disorder, adrenal insufficiency, HTN and homelessness.  He was recently seen at [facility] for abdominal pain with EGD 8/7/2024 showed medium esophageal varices in the distal portion of the esophagus with portal gastropathy and GAVE as well as duodenitis.  Coreg 3.125 twice daily not started due to BP in the setting of adrenal insufficiency and liver disease.  He was discharged on Lasix 40 mg daily/spironolactone 100 mg daily.  He he was also discharged on lactulose titrated to produce 3-5 BMs per day.  Patient homeless and unable to afford Xifaxan.  Last colonoscopy 2/2023 with inadequate prep-normal ileum and normal ileocolic anastomosis, friable mucosa through colon, internal hemorrhoids.  Past labs include low fecal pancreatic elastase (94) noted during June 2024 admission.   Patient presented to yesterday for chest discomfort and abdominal pain while waiting at the bus stop.  Labs at  showed hypokalemia elevated liver enzymes with chest x-ray showing probable small right pleural effusion.  He was transferred to [facility] for paracentesis.  His main complaint this morning is groin pain from an inguinal hernia.  Complains of ongoing diarrhea.  No fevers or chills.  Also diffuse abdominal pain noted.     Review of Systems:    General:   No fever or chills; No significant weight loss or gain.  "  EENT:   No ear pain, facial swelling; No sneezing, sore throat.   Skin:   No rashes, color changes.   Respiratory:     + shortness of breath, cough, wheezing, stridor.   Cardiovascular:     No chest pain, palpitations.   Gastrointestinal:    As per HPI.   Musculoskeletal:     No arthralgias, myalgias, +swelling.   Neurologic:   No dizziness, numbness, +weakness. No speech difficulties.   Psych:   No agitation, suicidal ideations    Otherwise, All other twelve-point review of systems normal.     Past Medical History:   Diagnosis Date    CHF (congestive heart failure) (HCC)     Cirrhosis (HCC)     CHRISTIE    Colon cancer (HCC)     Diabetes mellitus (HCC)     Neuropathy     Restless leg syndrome      Past Surgical History:   Procedure Laterality Date    EGD AND SIGMOIDOSCOPY FLEXIBLE      IR PARACENTESIS  11/22/2022    IR PARACENTESIS  2/2/2023    IR THORACENTESIS  6/4/2024    LEFT COLON RESECTION      LITHOTRIPSY       Allergies   Allergen Reactions    Morphine Anaphylaxis    Morpholine Salicylate Other (See Comments)     \"I go unconscious\"    Clarithromycin Other (See Comments)     Dizzy, nausea, ulcers in stomach    Oxycodone Hives and Facial Swelling     \"Scratchy throat\"      Sulfamethoxazole-Trimethoprim Hives    Nsaids Rash       Medications:  Home Medications  Prior to Admission medications    Medication Sig Start Date End Date Taking? Authorizing Provider   Alcohol Swabs 70 % PADS May substitute brand based on insurance coverage. Check glucose TID. 6/18/24   Rosario Whitfield MD   Blood Glucose Monitoring Suppl (OneTouch Verio Reflect) w/Device KIT May substitute brand based on insurance coverage. Check glucose TID. 6/18/24   Rosario Whitfield MD   dicyclomine (BENTYL) 20 mg tablet Take 1 tablet (20 mg total) by mouth 2 (two) times a day before breakfast and lunch  Patient not taking: Reported on 8/24/2024 12/1/22 12/31/22  Robb Lazaro MD   ferrous sulfate 325 (65 Fe) mg tablet  3/28/23   Historical Provider, MD "   furosemide (LASIX) 20 mg tablet Take 1 tablet (20 mg total) by mouth daily 5/4/23 8/24/24  Margaret Shell PA-C   gabapentin (NEURONTIN) 300 mg capsule Take 300 mg by mouth Three times a day 3/15/23   Historical Provider, MD   glucose blood (OneTouch Verio) test strip May substitute brand based on insurance coverage. Check glucose TID. 6/18/24   Rosario Whitfield MD   hydrocortisone (CORTEF) 10 mg tablet Take 10 mg by mouth every morning    Historical Provider, MD   hydrOXYzine HCL (ATARAX) 25 mg tablet TAKE 1 TABLET BY MOUTH 3 TIMES A DAY AS NEEDED FOR ITCHING  Patient not taking: Reported on 8/24/2024 2/20/23   Historical Provider, MD   lactulose 20 g/30 mL Take 30 mL (20 g total) by mouth daily Do not start before February 28, 2023.  Patient not taking: Reported on 8/24/2024 2/28/23   Telly Barajas MD   levETIRAcetam (KEPPRA) 500 mg tablet Take 1 tablet (500 mg total) by mouth every 12 (twelve) hours 6/18/24 8/24/24  Rosario Whitfield MD   metFORMIN (GLUCOPHAGE) 1000 MG tablet Take 1 tablet (1,000 mg total) by mouth 2 (two) times a day with meals  Patient not taking: Reported on 8/24/2024 6/18/24 7/18/24  Rosario Whitfield MD   midodrine (PROAMATINE) 5 mg tablet Take 2 tablets (10 mg total) by mouth 3 (three) times a day before meals  Patient not taking: Reported on 8/24/2024 6/18/24 7/18/24  MD Paula MilesTouch Delica Lancets 33G MISC May substitute brand based on insurance coverage. Check glucose TID. 6/18/24   Rosario Whitfield MD   pancrelipase, Lip-Prot-Amyl, (CREON) 6,000 units delayed release capsule Take 4 capsules (24,000 Units total) by mouth 3 (three) times a day with meals  Patient not taking: Reported on 8/24/2024 6/18/24 7/18/24  Rosario Whitfield MD   pantoprazole (PROTONIX) 40 mg tablet Take 1 tablet (40 mg total) by mouth daily in the early morning Do not start before May 5, 2023.  Patient not taking: Reported on 8/24/2024 5/5/23 6/4/23  Margaret Shell PA-C   propranolol (INDERAL) 10 mg tablet Take 1 tablet (10 mg total) by  mouth every 12 (twelve) hours  Patient not taking: Reported on 8/24/2024 5/4/23 6/3/23  Margaret Shell PA-C   repaglinide (PRANDIN) 2 mg tablet Take 1 tablet (2 mg total) by mouth 3 (three) times a day before meals  Patient not taking: Reported on 8/24/2024 6/18/24 7/18/24  Rosario Whitfield MD   rifaximin (XIFAXAN) 550 mg tablet Take 550 mg by mouth 2 (two) times a day    Historical Provider, MD   rOPINIRole (REQUIP) 4 mg tablet Take 1 tablet (4 mg total) by mouth 2 (two) times a day 5/4/23 8/24/24  Margaret Shell PA-C   senna-docusate sodium (SENOKOT S) 8.6-50 mg per tablet Take 1 tablet by mouth every evening  Patient not taking: Reported on 8/24/2024 2/18/23   Historical Provider, MD   spironolactone (ALDACTONE) 100 mg tablet  3/28/23   Historical Provider, MD   tamsulosin (FLOMAX) 0.4 mg Take 1 capsule (0.4 mg total) by mouth daily with dinner 5/4/23 8/24/24  Margaret Shell PA-C       Inhouse Medications    Current Facility-Administered Medications:     acetaminophen (TYLENOL) tablet 325 mg, 325 mg, Oral, Q6H PRN, 325 mg at 08/24/24 2209    albuterol inhalation solution 2.5 mg, 2.5 mg, Nebulization, Q6H PRN, 2.5 mg at 08/25/24 0820    aluminum-magnesium hydroxide-simethicone (MAALOX) oral suspension 30 mL, 30 mL, Oral, Q6H PRN    enoxaparin (LOVENOX) subcutaneous injection 40 mg, 40 mg, Subcutaneous, Daily, 40 mg at 08/25/24 0808    ferrous sulfate tablet 325 mg, 325 mg, Oral, Daily With Breakfast, 325 mg at 08/25/24 0809    furosemide (LASIX) tablet 20 mg, 20 mg, Oral, Daily, 20 mg at 08/25/24 0809    gabapentin (NEURONTIN) capsule 300 mg, 300 mg, Oral, TID, 300 mg at 08/25/24 0809    hydrocortisone (CORTEF) tablet 10 mg, 10 mg, Oral, QAM, 10 mg at 08/25/24 0809    HYDROmorphone (DILAUDID) injection 0.5 mg, 0.5 mg, Intravenous, Q3H PRN, 0.5 mg at 08/25/24 0628    HYDROmorphone HCl (DILAUDID) injection 0.2 mg, 0.2 mg, Intravenous, Q4H PRN, 0.2 mg at 08/25/24 0351    insulin lispro (HumALOG/ADMELOG) 100 units/mL  subcutaneous injection 1-5 Units, 1-5 Units, Subcutaneous, TID AC, 1 Units at 08/25/24 0809 **AND** Fingerstick Glucose (POCT), , , TID AC    insulin lispro (HumALOG/ADMELOG) 100 units/mL subcutaneous injection 1-5 Units, 1-5 Units, Subcutaneous, HS    lactulose (CHRONULAC) oral solution 20 g, 20 g, Oral, Daily, 20 g at 08/25/24 0809    levETIRAcetam (KEPPRA) tablet 500 mg, 500 mg, Oral, Q12H SANDRA, 500 mg at 08/25/24 0809    lidocaine (LIDODERM) 5 % patch 1 patch, 1 patch, Topical, Daily, 1 patch at 08/25/24 0808    midodrine (PROAMATINE) tablet 10 mg, 10 mg, Oral, TID AC, 10 mg at 08/25/24 0628    nicotine (NICODERM CQ) 21 mg/24 hr TD 24 hr patch 21 mg, 21 mg, Transdermal, Daily    ondansetron (ZOFRAN) injection 4 mg, 4 mg, Intravenous, Q6H PRN    pancrelipase (Lip-Prot-Amyl) (CREON) delayed release capsule 24,000 Units, 24,000 Units, Oral, TID With Meals, 24,000 Units at 08/25/24 0809    polyethylene glycol (MIRALAX) packet 17 g, 17 g, Oral, Daily PRN    rifaximin (XIFAXAN) tablet 550 mg, 550 mg, Oral, BID, 550 mg at 08/25/24 0809    rOPINIRole (REQUIP) tablet 2 mg, 2 mg, Oral, BID, 2 mg at 08/25/24 0808    spironolactone (ALDACTONE) tablet 100 mg, 100 mg, Oral, Daily, 100 mg at 08/25/24 0808    tamsulosin (FLOMAX) capsule 0.4 mg, 0.4 mg, Oral, Daily With Dinner      Social History   reports that he has quit smoking. His smoking use included cigarettes. He has never used smokeless tobacco. He reports that he does not currently use alcohol. He reports that he does not use drugs.    Family History  Family History   Problem Relation Age of Onset    Diabetes Mother     Liver disease Mother     Diabetes Father     Bone cancer Father          OBJECTIVE:    /70 (BP Location: Right arm)   Pulse 76   Temp 97.5 °F (36.4 °C) (Oral)   Resp 18   Wt 73.8 kg (162 lb 11.2 oz)   SpO2 94%   BMI 25.48 kg/m²   Physical Exam:     General Appearance:    Awake, alert, oriented x3, no distress, ill-appearing   Head:     Normocephalic without obvious abnormality   Eyes:    PERRL, conjunctiva/corneas clear, EOM's intact        Neck:   Supple, no adenopathy   Throat:   Mucous membranes moist   Lungs:     +wheezing, coarse breath sounds   Heart:    Regular rate and rhythm, S1 and S2 normal, no murmur   Abdomen:   Diffusely tender, distended without fluid wave. bowel sounds active. + Abdominal hernia. no masses, rebound or guarding.    Extremities: Lower extremities with edema   Psych  Derm:   Normal affect    No jaundice   Neurologic:   CNII-XII grossly intact. Speech intact.  No asterixis         Laboratory Studies:  Results from last 7 days   Lab Units 08/25/24  0648 08/24/24  1252   WBC Thousand/uL 3.44* 4.05*   HEMOGLOBIN g/dL 8.1* 10.1*   HEMATOCRIT % 25.7* 31.1*   MCV fL 102* 100*   PLATELETS Thousands/uL 39* 46*   INR   --  1.38*     Results from last 7 days   Lab Units 08/25/24  0648 08/24/24  1329   SODIUM mmol/L 135 138   POTASSIUM mmol/L 3.1* 3.2*   CHLORIDE mmol/L 106 107   CO2 mmol/L 24 22   BUN mg/dL 9 11   CREATININE mg/dL 0.69 0.76   CALCIUM mg/dL 6.6* 8.3*   ALBUMIN g/dL 2.3* 2.8*   TOTAL BILIRUBIN mg/dL 1.90* 2.78*   ALK PHOS U/L 116* 159*   ALT U/L 25 31   AST U/L 31 36           Imaging and Other Studies:  XR chest portable    Result Date: 8/24/2024  Narrative: XR CHEST PORTABLE INDICATION: cp. Chest and abdominal pain for few days. COMPARISON: Chest radiograph 6/7/2024. CT chest abdomen pelvis 6/21/2024. FINDINGS: Left chest wall port catheter with tip projecting over the right atrium. Low lung volumes, which causes crowding of bronchovascular markings.  Within that limitation, there is no focal lung opacity. No pneumothorax. Possible small right pleural effusion. Normal cardiomediastinal silhouette. Bones are unremarkable for age. Normal upper abdomen.     Impression: Possible small right pleural effusion. Otherwise, unremarkable chest radiograph. Resident: MADISON RUIZ I, the attending radiologist, have  reviewed the images and agree with the final report above. Workstation performed: [id number]     CTA abdomen pelvis w wo contrast    Result Date: 8/11/2024  Narrative: HISTORY:  Abdominal pain. TECHNIQUE: Axial 3 x 1 mm helical CT acquisitions were obtained from the lung base to the pelvis following intravenous administration of 85 cc Omnipaque 350 nonionic contrast material. MPR reconstructions were created.  CT examination performed with dose lowering protocol in accordance with ALARA. COMPARISON:  8/8/2024 LUNG BASES:  There is a small right pleural effusion. LIVER:  The liver surface has a nodular contour and there is hypertrophy of the caudate lobe. There is no measurable mass. There is normal opacification of the portal and hepatic veins. GALLBLADDER:  No abnormal densities. SPLEEN:  The spleen measures 15 x 7 x 19 cm. PANCREAS:  There is chronic atrophy of the pancreas. KIDNEYS:  There is a 3.9 cm low-density cyst arising from the lower pole of the left kidney. ADRENAL GLANDS:  Normal density, morphology, and enhancement. AORTA/RETROPERITONEUM:  Normal caliber aorta. No mass, fluid, or suspected pathologic adenopathy. BOWEL:  There is nonspecific thickening and enhancement of the gastric mucosa. There is a moderate gastric air-fluid level. There are no dilated loops of bowel. There is diffuse nonspecific mucosal enhancement of the small bowel. The large bowel is grossly normal. PERITONEAL CAVITY:  No free air, free fluid, or adenopathy. PELVIS: There is fluid within a moderate right inguinal hernia. BONES:  No acute pathology of the visualized osseous structures. SURROUNDING SOFT TISSUES:  No abnormal morphology or density.    Impression: IMPRESSION:   Cirrhosis and splenomegaly. Nonspecific enhancement of the gastric mucosa with moderate gastric air-fluid level. Nonspecific mucosal enhancement of the small bowel. No evidence of bowel obstruction. No evidence of free air, or abscess.  Workstation:[id number]    XR chest pa & lateral    Result Date: 8/11/2024  Narrative: HISTORY: acites. . MRN:  [id number]     DATE OF SERVICE:  8/11/2024 8:25 PM                       EXAM DESCRIPTION:  XR CHEST 2 VIEWS (PA AND LAT)   COMPARISON:  No relevant recent priors. FINDINGS: There is no focal consolidation. Left-sided chest port with tip overlying the SVC. There is no pneumothorax. No evidence of pleural effusions.   The heart and mediastinum are unremarkable. The osseous structures are intact.    Impression: IMPRESSION: 1. No acute cardiopulmonary process. Workstation:[id number]    CTA abdomen pelvis w wo contrast    Result Date: 8/8/2024  Narrative: History: Peritonitis or perforation suspected.   Exam: CT of the abdomen and pelvis with IV contrast.   Technique: Using helical technique, axial images were obtained through the abdomen and pelvis following administration of 85 cc of Omnipaque 350 intravenous contrast. Coronal and sagittal reformations were performed.   Comparison: 8/4/2024 CT.     Abdomen: Lung bases: Similar moderate right effusion with dependent atelectasis. Liver: Cirrhotic liver. The with mildly decreased density/steatosis. Gallbladder/Bile ducts: Within normal limits. Pancreas: Atrophic pancreas with small nonspecific appearing cyst pancreatic neck measuring 11 mm, possibly IPMN. No main duct dilatation. Spleen: Moderate splenomegaly. Adrenal glands: Within normal limits. Kidneys/Ureters: Symmetric nephrograms. No hydronephrosis. Punctate calculi right lower pole, left interpolar kidney. Vessels: Abdominal aorta mildly atherosclerotic without aneurysm. There are gastrohepatic and esophageal/periesophageal varices. Lymph nodes: Mild upper abdominal and retroperitoneal adenopathy redemonstrated, nonspecific and can be reactive. Bowel/Mesentery/Peritoneum: No bowel obstruction. Prominent small bowel wall throughout nonspecific in the setting of ascites possibly reflecting hypoproteinemia. Right  hemicolectomy. Sigmoid diverticulosis. Stable presumed fat necrosis right mesentery. There is moderate abdominopelvic ascites, similar, ascitic fluid extending into small umbilical and large right inguinal hernia. Abdominal Wall: No bowel containing hernia.   Pelvis: Prostate seminal vesicles grossly normal. Urinary Bladder: Within normal limits. Lymph nodes:  No suspiciously enlarged nodes.   Bones: No destructive lesions or acute displaced fractures.    Impression: Impression: Stable CT examination compared to 8/4/2024. Redemonstration of cirrhotic liver with findings of portal hypertension including ascites, varices, splenomegaly, bowel wall thickening which is nonspecific but can reflect hypoproteinemia. Mild adenopathy probably reactive. Workstation:[id number]    XR abdomen obstruction series    Result Date: 8/8/2024  Narrative: History: Abdominal pain . Technique: PA view of the chest and supine and left decubitus radiograph of the abdomen appear Comparisons: Comparison is made to prior CT of the abdomen and pelvis performed on 8/4/2024. Findings: No free air is visualized. There is a large amount of fecal retention. No dilated bowel loops visualized. Distal tip of left IJ Port-A-Cath extends to the right atrium. The cardiac silhouette is not enlarged. The mediastinum is not widened. No pneumothorax is visualized. No pneumomediastinum is visualized. There are right basilar opacities. No acute fracture or dislocation is visualized.    Impression: Impression: Large amount of fecal retention. No dilated bowel loops visualized. Right basilar opacities may represent pleural effusion, atelectasis and/or pneumonia. Follow-up is recommended document resolution and exclude other pathology. Workstation:[id number]    GI IMAGE CAPTURE - EGD    Result Date: 8/7/2024  Narrative: Please See Opnote for Result    PARACENTESIS DIAGNOSTIC    Result Date: 8/6/2024  Narrative: Indication: Ascites. A limited four-quadrant abdominal  pelvic ultrasound was performed to evaluate for ascites in anticipation of performing a paracentesis. However there is only a trace amount of ascites seen within the abdomen and pelvis insufficient for paracentesis.    Impression: Impression: Trace ascites visualized. Workstation:[id number]    THORACENTESIS DIAGNOSTIC RIGHT    Result Date: 8/6/2024  Narrative: This result has an attachment that is not available. Procedure: Thoracentesis Clinical History: Right pleural effusion. Estimated Blood Loss (mL): < 1 Informed consent for the procedure was obtained. The procedure was performed by HÉCTOR Awan, under direct Radiologist supervision. A time-out was performed with all team members present and in agreement, confirming the correct patient, correct procedure, and correct side/site. The right hemithorax was scanned with ultrasound localizing the pleural effusion. A posterior intercostal site was marked and prepped and draped, using ChloraPrep which was allowed to dry before sterile draping applied in the usual sterile fashion, and infiltrated with 1% lidocaine. A 5 [employer] centesis catheter was placed into the effusion, and a sample of clear zabrina fluid was withdrawn and sent for lab analysis. A total of 970 mL of fluid was withdrawn. The catheter was subsequently removed and a sterile dressing applied. The patient tolerated the procedure well without immediate complication.    Impression: Impression: Successful ultrasound-guided right thoracentesis yielding 970 mL of fluid. Workstation:[id number]    VAS VENOUS DUPLEX LOWER EXTREMITY LEFT    Result Date: 8/5/2024  Narrative: Doppler ultrasound of the left lower extremity 8/5/2024 7:10 AM History: Male, 63 years of age, with history of pain in the left lower extremity. Technique: Sagittal and transverse images of the left lower extremity were obtained utilizing real-time ultrasonography, in conjunction with color and spectral Doppler interrogation. Comparison:  7/8/2024 Findings: There is patency of the left common femoral, femoral, and popliteal veins. These structures demonstrate normal compressibility, and there are no abnormal intraluminal echoes to suggest thrombus. Flow is demonstrated by color Doppler evaluation. Normal pulsed Doppler venous waveforms are obtained , with normal response to augmentation of flow. Visualized segments of the calf veins including segments of the posterior tibial and peroneal veins are patent. Profunda femoris and greater saphenous veins are patent. Limited survey images of the right common femoral and popliteal veins are unremarkable. There is subcutaneous edema in the left calf with associated interstitial fluid noted.    Impression: Impression: 1.  No sonographic evidence of deep venous thrombosis in the visualized left lower extremity. 2.  Subcutaneous edema left calf with associated interstitial fluid. Workstation:[id number]    CTA abdomen pelvis w wo contrast    Result Date: 8/4/2024  Narrative: History: Chest pain.. Abdominal pain   Exam: CT of the chest, abdomen, and pelvis with IV contrast.   Technique: Using helical technique, axial images were obtained through the chest, abdomen, and pelvis following administration of 80 cc Omnipaque 350 contrast intravenously. Please note that evaluation of the chest was performed as part of a pulmonary embolism protocol for optimal opacification of pulmonary arteries. Coronal and sagittal reformations were performed.   Comparison: Abdominal CT of 7/16/2024 and chest CT of 7/12/2024   Findings: Chest CT: Chest Wall: Cardiac pacemaker noted. Mediastinum/Lymph nodes: There is no adenopathy in the chest. Heart/Vessels: Heart size is unchanged. There is no pericardial effusion. Thoracic aorta is normal in caliber. Pulmonary arteries: There is significant patient respiration which somewhat limits evaluation of some of the subsegmental pulmonary arteries in the upper lobes and some segments of left  lower lobe pulmonary arteries, latter especially on image 59.. No definite filling defect is present in the remaining pulmonary arteries to confirm presence of underlying pulmonary embolism. Lungs and pleura: There is a large loculated right-sided pleural effusion with mild adjacent atelectasis. Left lung is clear. There is no left-sided pleural effusion  Abdomen CT: Liver: Liver again has cirrhotic morphology. No focal lesion is present in the liver. Gallbladder/Bile ducts: Gallbladder is contracted and cannot be evaluated Pancreas: Pancreas is probably normal in appearance Spleen: Spleen remains enlarged measuring about 17.7 cm in length, likely related to portal hypertension Adrenal glands: Normal in appearance. Kidneys/ureters: Enhance symmetrically without hydronephrosis. Peritoneum: There is new, small ascites in the abdomen and pelvis Bowel/Mesentery: No evidence of bowel obstruction. Mild diffuse bowel wall edema mostly in the small bowel could be secondary to hypoproteinemia. Patient remains status post right hemicolectomy. A small area of probable fat necrosis in the right abdominal mesentery on image 197 is stable. Mesenteric veins are patent. Vessels: Normal caliber abdominal aorta. Lymph nodes: Stable mild adenopathy in the upper abdomen in the periportal, periceliac and portacaval chains is stable, probably reactive since there is little change since 9/15/2022 Abdominal Wall: Unremarkable.   Pelvis: Prostate is normal in size. There is some enlargement of a right inguinal hernia containing ascites fluid Urinary Bladder: No asymmetric bladder wall thickening present. Lymph nodes:  No adenopathy in the pelvis. Bones: No acute findings      Impression: Impression: 1. Limited evaluation due to patient respiration. Within this limitation there is no definite pulmonary embolism 2. Large right pleural effusion and new, small amount of ascites in the abdomen and pelvis as well as new small bowel wall edema  latter probably related to hypoproteinemia 3. Stable splenomegaly, related to portal hypertension 4. Stable adenopathy in the upper abdomen 5. Enlarging right inguinal hernia containing ascites fluid Workstation:[id number]    CTA chest wo w contrast    Result Date: 8/4/2024  Narrative: History: Chest pain.. Abdominal pain   Exam: CT of the chest, abdomen, and pelvis with IV contrast.   Technique: Using helical technique, axial images were obtained through the chest, abdomen, and pelvis following administration of 80 cc Omnipaque 350 contrast intravenously. Please note that evaluation of the chest was performed as part of a pulmonary embolism protocol for optimal opacification of pulmonary arteries. Coronal and sagittal reformations were performed.   Comparison: Abdominal CT of 7/16/2024 and chest CT of 7/12/2024   Findings: Chest CT: Chest Wall: Cardiac pacemaker noted. Mediastinum/Lymph nodes: There is no adenopathy in the chest. Heart/Vessels: Heart size is unchanged. There is no pericardial effusion. Thoracic aorta is normal in caliber. Pulmonary arteries: There is significant patient respiration which somewhat limits evaluation of some of the subsegmental pulmonary arteries in the upper lobes and some segments of left lower lobe pulmonary arteries, latter especially on image 59.. No definite filling defect is present in the remaining pulmonary arteries to confirm presence of underlying pulmonary embolism. Lungs and pleura: There is a large loculated right-sided pleural effusion with mild adjacent atelectasis. Left lung is clear. There is no left-sided pleural effusion  Abdomen CT: Liver: Liver again has cirrhotic morphology. No focal lesion is present in the liver. Gallbladder/Bile ducts: Gallbladder is contracted and cannot be evaluated Pancreas: Pancreas is probably normal in appearance Spleen: Spleen remains enlarged measuring about 17.7 cm in length, likely related to portal hypertension Adrenal glands: Normal  in appearance. Kidneys/ureters: Enhance symmetrically without hydronephrosis. Peritoneum: There is new, small ascites in the abdomen and pelvis Bowel/Mesentery: No evidence of bowel obstruction. Mild diffuse bowel wall edema mostly in the small bowel could be secondary to hypoproteinemia. Patient remains status post right hemicolectomy. A small area of probable fat necrosis in the right abdominal mesentery on image 197 is stable. Mesenteric veins are patent. Vessels: Normal caliber abdominal aorta. Lymph nodes: Stable mild adenopathy in the upper abdomen in the periportal, periceliac and portacaval chains is stable, probably reactive since there is little change since 9/15/2022 Abdominal Wall: Unremarkable.   Pelvis: Prostate is normal in size. There is some enlargement of a right inguinal hernia containing ascites fluid Urinary Bladder: No asymmetric bladder wall thickening present. Lymph nodes:  No adenopathy in the pelvis. Bones: No acute findings      Impression: Impression: 1. Limited evaluation due to patient respiration. Within this limitation there is no definite pulmonary embolism 2. Large right pleural effusion and new, small amount of ascites in the abdomen and pelvis as well as new small bowel wall edema latter probably related to hypoproteinemia 3. Stable splenomegaly, related to portal hypertension 4. Stable adenopathy in the upper abdomen 5. Enlarging right inguinal hernia containing ascites fluid Workstation:[id number]    XR chest portable    Result Date: 8/4/2024  Narrative: XR CHEST 1 VW PORTABLE HISTORY:  63 years  Male   REASON FOR STUDY:  port EXAM: Portable chest one view:     PRIOR: 7/18/2024 chest 7/16/2024 CT abdomen FINDINGS: Lung volumes lower. Increased right pleural effusion with associated airspace density. No left pleural effusion. Slight interstitial prominence left perihilar region. Probably minimal atelectasis at the base. Left side approach port with tip in distal right atrium.  Stable cardiomediastinal silhouette.    Impression: IMPRESSION: Increased right pleural effusion, at least moderate if not larger, suspect the patient is not fully upright. Associated airspace density likely atelectasis, clinically correlate to exclude pneumonia.    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  Homelessness complicating his care.  Check ultrasound for ascites, if present contact IR for paracentesis.  Continue lactulose to produce 2-3 soft stools daily.  Continue Xifaxan during hospitalization but patient unable to afford as outpatient. NSBB held previously due to BP.  Continue diuretics, midodrine.  Variceal surveillance UTD, done recently at [facility].  CRC screening with last colonoscopy 11/2022 poorly prepped with friable anastomosis -will need repeat as outpatient.  2g sodium diet.   Chronic pancreatitis with low fecal elastase.  Recommend MRI with pancreatic protocol for incidentally noted pancreatic cysts with abdominal lymphadenopathy on prior imaging.  On pancreatic enzymes however this be difficult to maintain given his homelessness.   Moderate-large right pleural effusion recommend thoracentesis, defer management to slim.  Large right inguinal hernia.  Doubt patient is a surgical candidate given liver disease however could consider surgical evaluation during admission.  Defer to SLIM.        Sarah Barnett PA-C

## 2024-08-25 NOTE — ASSESSMENT & PLAN NOTE
Lab Results   Component Value Date    HGBA1C 6.0 (H) 07/09/2024       Recent Labs     08/24/24 2057   POCGLU 120     Blood glucose on arrival 120  Previously on Metformin, medication has been discontinued in the setting of lactic acidosis  continue with sliding scale and close monitoring  Carb/carine diet  Hypoglycemia protocol

## 2024-08-25 NOTE — UTILIZATION REVIEW
Initial Clinical Review    Admission: Date/Time/Statement:   Admission Orders (From admission, onward)       Ordered        08/24/24 2031  INPATIENT ADMISSION  Once                          Orders Placed This Encounter   Procedures    INPATIENT ADMISSION     Standing Status:   Standing     Number of Occurrences:   1     Order Specific Question:   Level of Care     Answer:   Med Surg [16]     Order Specific Question:   Estimated length of stay     Answer:   More than 2 Midnights     Order Specific Question:   Certification     Answer:   I certify that inpatient services are medically necessary for this patient for a duration of greater than two midnights. See H&P and MD Progress Notes for additional information about the patient's course of treatment.     ED Arrival Information       Patient not seen in ED                       No chief complaint on file.      Initial Presentation: 63 y.o. male with a PMH of cirrhosis of the liver, diabetes type 2, colon cancer status post right hemicolectomy in 2017, leg swelling, volume overload, adrenal insufficiency, encephalopathy, neuropathy was transferred from [facility] to [facility] for a higher level of care.  Pt initially presented to [facility] w/ abdominal and chest pain for a few days. w/u showed hypokalemia, elevated total bilirubin, negative troponin, chest x-ray shows probable a small right pleural effusion, negative for acute cardiopulmonary process. maintained on furosemide 40 mg daily and Aldactone 100 mg daily.  Transferred to [facility] for further management.  On arrival to [facility], pt ill appearing, aaox3, anxious, depressed, abdominal distension tenderness in the RUQ, RLQ, LUQ, LLQ and guarding present. Fluid wave present on palpitation. +2LE edema present. MELD 14.    Admit as Inpatient for evaluation and treatment of abdominal pain, decompensated hepatic cirrhosis.  Plan: IR consulted for paracentesis. GI consult. Pain control. Cont Lactulose.     Date: 08/25   Day  2:   GI Consult: Pt w/ advanced liver disease and previous hospitalizations for the same issue. He does have discomfort with large abdominal hernia but given his advanced liver disease I do not think surgical correction is in his best interest.   On exam, wheezing, +bowel sounds, abdomina;l distension and discomfort on palpation. Noted w/ large inguinal hernia which extends down into the scrotum, seem to be reducible.  No exquisite tenderness.  Peripheral edema is present. Small reducible umbilical hernia.  Plan: Cont home meds. Low salt diet. Lactulose. Goal for 3 BM/day. Xifaxan. Thoracentesis. Surgical evaluation of large hernia     ED Triage Vitals   Temperature Pulse Respirations Blood Pressure SpO2 Pain Score   08/24/24 2005 08/24/24 2005 08/24/24 2005 08/24/24 2005 08/24/24 2005 08/24/24 2209   97.5 °F (36.4 °C) 82 16 129/79 97 % 10 - Worst Possible Pain     Weight (last 2 days)       Date/Time Weight    08/25/24 0600 73.8 (162.7)    08/24/24 2042 71.3 (157.19)    08/24/24 20:05:50 71.3 (157.19)            Vital Signs (last 3 days)       Date/Time Temp Pulse Resp BP MAP (mmHg) SpO2 Calculated FIO2 (%) - Nasal Cannula Nasal Cannula O2 Flow Rate (L/min) O2 Device Patient Position - Orthostatic VS Pain    08/25/24 07:10:45 97.5 °F (36.4 °C) 76 18 112/70 84 94 % 28 2 L/min -- Lying --    08/25/24 0351 -- -- -- -- -- -- -- -- None (Room air) -- 10 - Worst Possible Pain    08/25/24 0132 -- -- -- -- -- -- -- -- -- -- 10 - Worst Possible Pain    08/24/24 2344 -- -- -- -- -- -- -- -- None (Room air) -- 10 - Worst Possible Pain    08/24/24 23:35:20 97.3 °F (36.3 °C) 66 16 111/66 81 99 % -- -- None (Room air) Lying --    08/24/24 2300 -- -- -- -- -- -- -- -- None (Room air) -- 10 - Worst Possible Pain    08/24/24 2257 -- -- -- -- -- -- -- -- -- -- 10 - Worst Possible Pain    08/24/24 2209 -- -- -- -- -- -- -- -- -- -- 10 - Worst Possible Pain    08/24/24 20:05:50 97.5 °F (36.4 °C) 82 16 129/79 96 97 % 28 2 L/min Nasal  cannula Lying --              Pertinent Labs/Diagnostic Test Results:   Radiology:  IR INPATIENT Paracentesis    (Results Pending)     Cardiology:  No orders to display     GI:  No orders to display           Results from last 7 days   Lab Units 08/25/24  0648 08/24/24  1252   WBC Thousand/uL 3.44* 4.05*   HEMOGLOBIN g/dL 8.1* 10.1*   HEMATOCRIT % 25.7* 31.1*   PLATELETS Thousands/uL 39* 46*   TOTAL NEUT ABS Thousands/µL 2.17 2.94         Results from last 7 days   Lab Units 08/25/24  0648 08/24/24  1329   SODIUM mmol/L 135 138   POTASSIUM mmol/L 3.1* 3.2*   CHLORIDE mmol/L 106 107   CO2 mmol/L 24 22   ANION GAP mmol/L 5 9   BUN mg/dL 9 11   CREATININE mg/dL 0.69 0.76   EGFR ml/min/1.73sq m 101 97   CALCIUM mg/dL 6.6* 8.3*   MAGNESIUM mg/dL 1.4*  --    PHOSPHORUS mg/dL 2.9  --      Results from last 7 days   Lab Units 08/25/24  0648 08/24/24  1329   AST U/L 31 36   ALT U/L 25 31   ALK PHOS U/L 116* 159*   TOTAL PROTEIN g/dL 4.9* 6.4   ALBUMIN g/dL 2.3* 2.8*   TOTAL BILIRUBIN mg/dL 1.90* 2.78*   AMMONIA umol/L  --  20     Results from last 7 days   Lab Units 08/25/24  0710 08/24/24  2057   POC GLUCOSE mg/dl 198* 120     Results from last 7 days   Lab Units 08/25/24  0648 08/24/24  1329   GLUCOSE RANDOM mg/dL 174* 218*             Beta- Hydroxybutyrate   Date Value Ref Range Status   06/20/2024 0.14 0.02 - 0.27 mmol/L Final                      Results from last 7 days   Lab Units 08/24/24  1514 08/24/24  1252   HS TNI 0HR ng/L  --  5   HS TNI 2HR ng/L 6  --    HSTNI D2 ng/L 1  --          Results from last 7 days   Lab Units 08/24/24  1252   PROTIME seconds 17.2*   INR  1.38*   PTT seconds 26     Results from last 7 days   Lab Units 08/25/24  0648   TSH 3RD GENERATON uIU/mL 2.113                     Results from last 7 days   Lab Units 08/24/24  1252   BNP pg/mL 58                     Results from last 7 days   Lab Units 08/24/24  1329   LIPASE u/L 23                 Results from last 7 days   Lab Units  08/24/24  1501   CLARITY UA  Clear   COLOR UA  Viky*   SPEC GRAV UA  1.015   PH UA  5.0   GLUCOSE UA mg/dl Negative   KETONES UA mg/dl Negative   BLOOD UA  Negative   PROTEIN UA mg/dl Negative   NITRITE UA  Negative   BILIRUBIN UA  Negative   UROBILINOGEN UA mg/dL Negative   LEUKOCYTES UA  Negative           ED Treatment-Medication Administration - No Administrations Displayed (No Start Event Found)       None            Past Medical History:   Diagnosis Date    CHF (congestive heart failure) (HCC)     Cirrhosis (HCC)     CHRISTIE    Colon cancer (HCC)     Diabetes mellitus (HCC)     Neuropathy     Restless leg syndrome      Present on Admission:   Type 2 diabetes mellitus with hyperglycemia, without long-term current use of insulin (HCC)   Decompensated hepatic cirrhosis (HCC)   Restless leg syndrome   Adrenal insufficiency (HCC)   Pancytopenia (HCC)   History of colon cancer   Seizure disorder (HCC)   Chronic pancreatitis (HCC)   Smoking   Anemia of chronic disease   Abdominal pain      Admitting Diagnosis: Ascites  Age/Sex: 63 y.o. male  Admission Orders:  SCD    Scheduled Medications:  enoxaparin, 40 mg, Subcutaneous, Daily  ferrous sulfate, 325 mg, Oral, Daily With Breakfast  furosemide, 20 mg, Oral, Daily  gabapentin, 300 mg, Oral, TID  hydrocortisone, 10 mg, Oral, QAM  insulin lispro, 1-5 Units, Subcutaneous, TID AC  insulin lispro, 1-5 Units, Subcutaneous, HS  lactulose, 20 g, Oral, Daily  levETIRAcetam, 500 mg, Oral, Q12H SANDRA  lidocaine, 1 patch, Topical, Daily  midodrine, 10 mg, Oral, TID AC  nicotine, 21 mg, Transdermal, Daily  pancrelipase (Lip-Prot-Amyl), 24,000 Units, Oral, TID With Meals  rifaximin, 550 mg, Oral, BID  rOPINIRole, 2 mg, Oral, BID  spironolactone, 100 mg, Oral, Daily  tamsulosin, 0.4 mg, Oral, Daily With Dinner      Continuous IV Infusions: none     PRN Meds:  acetaminophen, 325 mg, Oral, Q6H PRN 08/24 x 1  albuterol, 2.5 mg, Nebulization, Q6H PRN 08/25 x 1  aluminum-magnesium  hydroxide-simethicone, 30 mL, Oral, Q6H PRN  HYDROmorphone, 0.5 mg, Intravenous, Q3H PRN 08/25 x 2  HYDROmorphone, 0.2 mg, Intravenous, Q4H PRN 08/24 x 1, 08/25 x 1  ondansetron, 4 mg, Intravenous, Q6H PRN  polyethylene glycol, 17 g, Oral, Daily PRN        IP CONSULT TO GASTROENTEROLOGY    Network Utilization Review Department  ATTENTION: Please call with any questions or concerns to [phone] and carefully listen to the prompts so that you are directed to the right person. All voicemails are confidential.   For Discharge needs, contact Care Management DC Support Team at [phone] opt. [phone]  Send all requests for admission clinical reviews, approved or denied determinations and any other requests to dedicated fax number below belonging to the campus where the patient is receiving treatment. List of dedicated fax numbers for the Facilities:  FACILITY NAME UR FAX NUMBER   ADMISSION DENIALS (Administrative/Medical Necessity) [phone]   DISCHARGE SUPPORT TEAM (NETWORK) [phone]   PARENT CHILD HEALTH (Maternity/NICU/Pediatrics) [phone]   [facility] [phone]   [facility] [phone]   [facility] [phone]   [facility] [phone]   [facility] [phone]   [facility] [phone]   [facility] [phone]   [facility] [phone]   [facility] [phone]   [facility] [phone]   [facility] [phone]   [facility] [phone]

## 2024-08-25 NOTE — H&P
[facility]  H&P  Name: Foreign Armando 63 y.o. male I MRN: [id number]  Unit/Bed#: E5 -01 I Date of Admission: 8/24/2024   Date of Service: 8/25/2024 I Hospital Day: 1      Assessment & Plan   * Decompensated hepatic cirrhosis (HCC)  Assessment & Plan  Is a 63-year-old male with history of liver cirrhosis secondary to Christie, decompensated with ascites, hepatic encephalopathy, esophageal varices, pancytopenia, coagulopathy, diabetes mellitus, history of colon cancer status posttreatment, adrenal insufficiency presented with dyspnea and abdominal pain.  Patient initially presented to [place] being transferred to [facility] for paracentesis.     A.) ascites  Patient is maintained on furosemide 40 mg daily and Aldactone 100 mg daily  IR consulted for paracentesis  B.) esophageal varices  Patient had recent EGD in August 2024 at [facility] where he was found to have medium esophageal varices, portal gastropathy and GAVE  Maintained on carvedilol 3.125 mg twice daily as blood pressure tolerates  C.) hepatic encephalopathy  Continue lactulose  D.) hyperbilirubinemia  E.) coagulopathy  F.) MELD: 14  GI consult in place     Abdominal pain  Assessment & Plan  Likely due to CHRISTIE and liver cirrhosis  Recently on 8/11/2024, the patient visited [facility] ER due to the same after hospitalization  On assessment abdomen is tender to touch,  fluid wave positive   Originally presented at  and transferred to [facility] for further treatment and IR paracentesis  Dilaudid and non-pharmacological measures ordered for pain  IR and GI consult in place         Chronic pancreatitis (HCC)  Assessment & Plan  On Creon, continue her regimen    Type 2 diabetes mellitus with hyperglycemia, without long-term current use of insulin (HCC)  Assessment & Plan  Lab Results   Component Value Date    HGBA1C 6.0 (H) 07/09/2024       Recent Labs     08/24/24 2057   POCGLU 120       Blood glucose on arrival  120  Previously on Metformin, medication has been discontinued in the setting of lactic acidosis  continue with sliding scale and close monitoring  Carb/carine diet  Hypoglycemia protocol        Anemia of chronic disease  Assessment & Plan  Hgb stable on admission   Continue with Iron supplement daily     Pancytopenia (HCC)  Assessment & Plan  Secondary to liver cirrhosis     History of colon cancer  Assessment & Plan  History of colon cancer status post right hemicolectomy in 2017 and chemotherapy     Seizure disorder (HCC)  Assessment & Plan  Continue with Keppra daily    Adrenal insufficiency (HCC)  Assessment & Plan  Maintained on on Solu-Cortef 10 mg in the morning, 5 mg in the evening and midodrine 10 mg 3 times daily    Restless leg syndrome  Assessment & Plan  On Requip daily    Smoking  Assessment & Plan  Patient reports continue smoking at home  Education on the importance of smoking cessation and the risk of tobacco use represents for his health was explained at bedside greater than 3 minutes  Nicotine patch ordered         VTE Pharmacologic Prophylaxis: VTE Score: 4 Moderate Risk (Score 3-4) - Pharmacological DVT Prophylaxis Ordered: enoxaparin (Lovenox).  Code Status: Level 1 - Full Code   Discussion with family: Patient declined call to .     Anticipated Length of Stay: Patient will be admitted on an observation basis with an anticipated length of stay of less than 2 midnights secondary to decompensated hepatic cirrhosis and pending IR consult for paracentesis.    Total Time Spent on Date of Encounter in care of patient: 60 mins. This time was spent on one or more of the following: performing physical exam; counseling and coordination of care; obtaining or reviewing history; documenting in the medical record; reviewing/ordering tests, medications or procedures; communicating with other healthcare professionals and discussing with patient's family/caregivers.    Chief Complaint: Patient  originally presented to [place] due to chest and abdominal pain for a few days.    History of Present Illness:  Foreign Armando is a 63 y.o. male with a PMH of cirrhosis of the liver, diabetes type 2, colon cancer status post right hemicolectomy in 2017, leg swelling, volume overload, adrenal insufficiency, encephalopathy, neuropathy who presents with abdominal and chest pain.  Patient originally presented on 8/24/2024 around noon to [place] due to reports of chest and abdominal pain for a few days, the patient report he was at the bus stop and going to the [facility] but due to his symptoms he decided to seek for further evaluation.  Blood work from [place] shows hypokalemia, elevated total bilirubin, negative troponin, chest x-ray shows probable a small right pleural effusion, negative for acute cardiopulmonary process.  The patient arrived to [facility] around 2000.  On assessment he reports abdominal pain and discomfort and denied other symptoms than the stated above.    Review of Systems:  Review of Systems   Constitutional:  Negative for chills and fever.   HENT:  Negative for ear pain and sore throat.    Eyes:  Negative for pain and visual disturbance.   Respiratory:  Positive for chest tightness. Negative for cough and shortness of breath.    Cardiovascular:  Negative for chest pain and palpitations.   Gastrointestinal:  Positive for abdominal distention and abdominal pain. Negative for vomiting.   Genitourinary:  Negative for dysuria and hematuria.   Musculoskeletal:  Negative for arthralgias and back pain.   Skin:  Negative for color change and rash.   Neurological:  Negative for seizures and syncope.   All other systems reviewed and are negative.      Past Medical and Surgical History:   Past Medical History:   Diagnosis Date    CHF (congestive heart failure) (HCC)     Cirrhosis (HCC)     CHRISTIE    Colon cancer (HCC)     Diabetes mellitus (HCC)     Neuropathy     Restless leg  syndrome        Past Surgical History:   Procedure Laterality Date    EGD AND SIGMOIDOSCOPY FLEXIBLE      IR PARACENTESIS  11/22/2022    IR PARACENTESIS  2/2/2023    IR THORACENTESIS  6/4/2024    LEFT COLON RESECTION      LITHOTRIPSY         Meds/Allergies:  Prior to Admission medications    Medication Sig Start Date End Date Taking? Authorizing Provider   Alcohol Swabs 70 % PADS May substitute brand based on insurance coverage. Check glucose TID. 6/18/24   Rosario Whitfield MD   Blood Glucose Monitoring Suppl (OneTouch Verio Reflect) w/Device KIT May substitute brand based on insurance coverage. Check glucose TID. 6/18/24   Rosario Whitfield MD   dicyclomine (BENTYL) 20 mg tablet Take 1 tablet (20 mg total) by mouth 2 (two) times a day before breakfast and lunch  Patient not taking: Reported on 8/24/2024 12/1/22 12/31/22  Robb Lazaro MD   ferrous sulfate 325 (65 Fe) mg tablet  3/28/23   Historical Provider, MD   furosemide (LASIX) 20 mg tablet Take 1 tablet (20 mg total) by mouth daily 5/4/23 8/24/24  Margaret Shell PA-C   gabapentin (NEURONTIN) 300 mg capsule Take 300 mg by mouth Three times a day 3/15/23   Historical Provider, MD   glucose blood (OneTouch Verio) test strip May substitute brand based on insurance coverage. Check glucose TID. 6/18/24   Rosario Whitfield MD   hydrocortisone (CORTEF) 10 mg tablet Take 10 mg by mouth every morning    Historical Provider, MD   hydrOXYzine HCL (ATARAX) 25 mg tablet TAKE 1 TABLET BY MOUTH 3 TIMES A DAY AS NEEDED FOR ITCHING  Patient not taking: Reported on 8/24/2024 2/20/23   Historical Provider, MD   lactulose 20 g/30 mL Take 30 mL (20 g total) by mouth daily Do not start before February 28, 2023.  Patient not taking: Reported on 8/24/2024 2/28/23   Telly Barajas MD   levETIRAcetam (KEPPRA) 500 mg tablet Take 1 tablet (500 mg total) by mouth every 12 (twelve) hours 6/18/24 8/24/24  Rosario Whitfield MD   metFORMIN (GLUCOPHAGE) 1000 MG tablet Take 1 tablet (1,000 mg total) by mouth 2  (two) times a day with meals  Patient not taking: Reported on 8/24/2024 6/18/24 7/18/24  Rosario Whitfield MD   midodrine (PROAMATINE) 5 mg tablet Take 2 tablets (10 mg total) by mouth 3 (three) times a day before meals  Patient not taking: Reported on 8/24/2024 6/18/24 7/18/24  Rosario Whitfield MD   PaulaTouch Delica Lancets 33G MISC May substitute brand based on insurance coverage. Check glucose TID. 6/18/24   Rosario Whitfield MD   pancrelipase, Lip-Prot-Amyl, (CREON) 6,000 units delayed release capsule Take 4 capsules (24,000 Units total) by mouth 3 (three) times a day with meals  Patient not taking: Reported on 8/24/2024 6/18/24 7/18/24  Rosario Whitfield MD   pantoprazole (PROTONIX) 40 mg tablet Take 1 tablet (40 mg total) by mouth daily in the early morning Do not start before May 5, 2023.  Patient not taking: Reported on 8/24/2024 5/5/23 6/4/23  Margaret Shell PA-C   propranolol (INDERAL) 10 mg tablet Take 1 tablet (10 mg total) by mouth every 12 (twelve) hours  Patient not taking: Reported on 8/24/2024 5/4/23 6/3/23  Margaret Shell PA-C   repaglinide (PRANDIN) 2 mg tablet Take 1 tablet (2 mg total) by mouth 3 (three) times a day before meals  Patient not taking: Reported on 8/24/2024 6/18/24 7/18/24  Rosario Whitfield MD   rifaximin (XIFAXAN) 550 mg tablet Take 550 mg by mouth 2 (two) times a day    Historical Provider, MD   rOPINIRole (REQUIP) 4 mg tablet Take 1 tablet (4 mg total) by mouth 2 (two) times a day 5/4/23 8/24/24  Margaret Shell PA-C   senna-docusate sodium (SENOKOT S) 8.6-50 mg per tablet Take 1 tablet by mouth every evening  Patient not taking: Reported on 8/24/2024 2/18/23   Historical Provider, MD   spironolactone (ALDACTONE) 100 mg tablet  3/28/23   Historical Provider, MD   tamsulosin (FLOMAX) 0.4 mg Take 1 capsule (0.4 mg total) by mouth daily with dinner 5/4/23 8/24/24  Margaret Shell PA-C     I have reviewed home medications with patient personally.  At bedside in bed     Allergies:   Allergies   Allergen Reactions     "Morphine Anaphylaxis    Morpholine Salicylate Other (See Comments)     \"I go unconscious\"    Clarithromycin Other (See Comments)     Dizzy, nausea, ulcers in stomach    Oxycodone Hives and Facial Swelling     \"Scratchy throat\"      Sulfamethoxazole-Trimethoprim Hives    Nsaids Rash       Social History:  Marital Status: Single   Occupation: Retired  Patient Pre-hospital Living Situation: Home  Patient Pre-hospital Level of Mobility: walks  Patient Pre-hospital Diet Restrictions: None  Substance Use History:   Social History     Substance and Sexual Activity   Alcohol Use Not Currently     Social History     Tobacco Use   Smoking Status Former    Current packs/day: 0.25    Types: Cigarettes   Smokeless Tobacco Never     Social History     Substance and Sexual Activity   Drug Use Never       Family History:  Family History   Problem Relation Age of Onset    Diabetes Mother     Liver disease Mother     Diabetes Father     Bone cancer Father        Physical Exam:     Vitals:   Blood Pressure: 111/66 (08/24/24 2335)  Pulse: 66 (08/24/24 2335)  Temperature: (!) 97.3 °F (36.3 °C) (08/24/24 2335)  Temp Source: Oral (08/24/24 2335)  Respirations: 16 (08/24/24 2335)  Weight - Scale: 73.8 kg (162 lb 11.2 oz) (08/25/24 0600)  SpO2: 99 % (08/24/24 2335)    Physical Exam  Vitals and nursing note reviewed.   Constitutional:       General: He is not in acute distress.     Appearance: He is well-developed. He is ill-appearing.   HENT:      Head: Normocephalic and atraumatic.      Mouth/Throat:      Mouth: Mucous membranes are moist.   Eyes:      Conjunctiva/sclera: Conjunctivae normal.      Pupils: Pupils are equal, round, and reactive to light.   Cardiovascular:      Rate and Rhythm: Normal rate and regular rhythm.      Heart sounds: No murmur heard.  Pulmonary:      Effort: Pulmonary effort is normal. No respiratory distress.      Breath sounds: Normal breath sounds.   Abdominal:      General: There is distension.      Palpations: " There is fluid wave.      Tenderness: There is abdominal tenderness in the right upper quadrant, right lower quadrant, left upper quadrant and left lower quadrant. There is guarding.   Musculoskeletal:         General: No swelling.      Cervical back: Neck supple.      Right lower le+ Edema present.      Left lower le+ Edema present.   Skin:     General: Skin is warm and dry.      Capillary Refill: Capillary refill takes less than 2 seconds.   Neurological:      General: No focal deficit present.      Mental Status: He is alert and oriented to person, place, and time. Mental status is at baseline.   Psychiatric:         Mood and Affect: Mood is anxious and depressed.         Speech: Speech normal.         Behavior: Behavior is cooperative.         Thought Content: Thought content normal.         Cognition and Memory: Cognition normal.          Additional Data:     Lab Results:  Results from last 7 days   Lab Units 24  1252   WBC Thousand/uL 4.05*   HEMOGLOBIN g/dL 10.1*   HEMATOCRIT % 31.1*   PLATELETS Thousands/uL 46*   SEGS PCT % 73   LYMPHO PCT % 13*   MONO PCT % 7   EOS PCT % 4     Results from last 7 days   Lab Units 24  1329   SODIUM mmol/L 138   POTASSIUM mmol/L 3.2*   CHLORIDE mmol/L 107   CO2 mmol/L 22   BUN mg/dL 11   CREATININE mg/dL 0.76   ANION GAP mmol/L 9   CALCIUM mg/dL 8.3*   ALBUMIN g/dL 2.8*   TOTAL BILIRUBIN mg/dL 2.78*   ALK PHOS U/L 159*   ALT U/L 31   AST U/L 36   GLUCOSE RANDOM mg/dL 218*     Results from last 7 days   Lab Units 24  1252   INR  1.38*     Results from last 7 days   Lab Units 24  2057   POC GLUCOSE mg/dl 120     Lab Results   Component Value Date    HGBA1C 6.0 (H) 2024    HGBA1C 6.5 (H) 2024    HGBA1C 5.0 2023           Lines/Drains:  Invasive Devices       Central Venous Catheter Line  Duration             Port A Cath Chest -- days                    Central Line:  Goal for removal: N/A - Chronic PICC           Imaging: I have  personally reviewed all imaging pertaining this admission.  IR INPATIENT Paracentesis    (Results Pending)       EKG and Other Studies Reviewed on Admission:   EKG: NSR. HR 83.    ** Please Note: This note has been constructed using a voice recognition system. **

## 2024-08-25 NOTE — PROGRESS NOTES
[facility]  Progress Note  Name: Foreign Armando I  MRN: [id number]  Unit/Bed#: E5 -01 I Date of Admission: 8/24/2024   Date of Service: 8/25/2024 I Hospital Day: 1    Assessment & Plan   * Decompensated hepatic cirrhosis (HCC)  Assessment & Plan  Is a 63-year-old male with history of liver cirrhosis secondary to Christie, decompensated with ascites, hepatic encephalopathy, esophageal varices, pancytopenia, coagulopathy, diabetes mellitus, history of colon cancer status posttreatment, adrenal insufficiency presented with dyspnea and abdominal pain.  Patient initially presented to [place] being transferred to [facility] for paracentesis.     A.) ascites  Patient is maintained on furosemide 40 mg daily and Aldactone 100 mg daily  IR consulted for paracentesis  B.) esophageal varices  Patient had recent EGD in August 2024 at [facility] where he was found to have medium esophageal varices, portal gastropathy and GAVE  Maintained on carvedilol 3.125 mg twice daily as blood pressure tolerates  C.) hepatic encephalopathy  Continue lactulose  D.) hyperbilirubinemia  E.) coagulopathy  F.) MELD: 14  Evaluated by GI, recommended thoracentesis.  Follow-up ultrasound abdomen.    Abdominal pain  Assessment & Plan  Likely due to CHRISTIE and liver cirrhosis  Recently on 8/11/2024, the patient visited [facility] ER due to the same after hospitalization  On assessment abdomen is tender to touch,  fluid wave positive   Originally presented at  and transferred to [facility] for further treatment and IR paracentesis  Dilaudid and non-pharmacological measures ordered for pain  IR and GI consult in place   Patient also has concerns for right inguinal hernia which she is attributing to abdominal pain, requested surgical evaluation.        Chronic pancreatitis (HCC)  Assessment & Plan  On Creon, continue her regimen    Anemia of chronic disease  Assessment & Plan  Hgb stable on admission    Continue with Iron supplement daily     Seizure disorder (HCC)  Assessment & Plan  Continue with Keppra daily    Adrenal insufficiency (HCC)  Assessment & Plan  Maintained on on Solu-Cortef 10 mg in the morning, 5 mg in the evening and midodrine 10 mg 3 times daily    History of colon cancer  Assessment & Plan  History of colon cancer status post right hemicolectomy in 2017 and chemotherapy     Pancytopenia (HCC)  Assessment & Plan  Secondary to liver cirrhosis     Type 2 diabetes mellitus with hyperglycemia, without long-term current use of insulin (HCC)  Assessment & Plan  Lab Results   Component Value Date    HGBA1C 6.0 (H) 07/09/2024       Recent Labs     08/24/24 2057   POCGLU 120       Blood glucose on arrival 120  Previously on Metformin, medication has been discontinued in the setting of lactic acidosis  continue with sliding scale and close monitoring  Carb/carine diet  Hypoglycemia protocol        Smoking  Assessment & Plan  Patient reports continue smoking at home  Education on the importance of smoking cessation and the risk of tobacco use represents for his health was explained at bedside greater than 3 minutes  Nicotine patch ordered    Restless leg syndrome  Assessment & Plan  On Requip daily               VTE Pharmacologic Prophylaxis: VTE Score: 4 Moderate Risk (Score 3-4) - Pharmacological DVT Prophylaxis Ordered: enoxaparin (Lovenox).    Mobility:   Basic Mobility Inpatient Raw Score: 23  JH-HLM Goal: 7: Walk 25 feet or more  JH-HLM Achieved: 4: Move to chair/commode  JH-HLM Goal NOT achieved. Continue with multidisciplinary rounding and encourage appropriate mobility to improve upon JH-HLM goals.    Patient Centered Rounds: I performed bedside rounds with nursing staff today.   Discussions with Specialists or Other Care Team Provider: GI    Education and Discussions with Family / Patient: Patient declined call to .     Total Time Spent on Date of Encounter in care of patient: 40  mins. This time was spent on one or more of the following: performing physical exam; counseling and coordination of care; obtaining or reviewing history; documenting in the medical record; reviewing/ordering tests, medications or procedures; communicating with other healthcare professionals and discussing with patient's family/caregivers.    Current Length of Stay: 1 day(s)  Current Patient Status: Inpatient   Certification Statement: The patient will continue to require additional inpatient hospital stay due to ongoing management for ascites and pleural effusion.  Discharge Plan: Anticipate discharge in 24-48 hrs to home.    Code Status: Level 1 - Full Code    Subjective:   Patient seen at bedside, continues with abdominal pain.  He also reports nausea but no episodes of vomiting.    Objective:     Vitals:   Temp (24hrs), Av.4 °F (36.3 °C), Min:97.3 °F (36.3 °C), Max:97.5 °F (36.4 °C)    Temp:  [97.3 °F (36.3 °C)-97.5 °F (36.4 °C)] 97.5 °F (36.4 °C)  HR:  [66-82] 76  Resp:  [16-20] 18  BP: (105-129)/(54-79) 112/70  SpO2:  [92 %-99 %] 94 %  Body mass index is 25.48 kg/m².     Input and Output Summary (last 24 hours):     Intake/Output Summary (Last 24 hours) at 2024 1558  Last data filed at 2024 0801  Gross per 24 hour   Intake 960 ml   Output 200 ml   Net 760 ml       Physical Exam:   Physical Exam   Constitutional: Ill-appearing in mild distress.  CVS: Normal rate and rhythm.  Respiratory: Decreased breath sounds bilaterally, crackles on right side.  Abdomen: Firm, distended, tenderness elicited on palpation of right upper and lower quadrant.  Neuro: Alert and oriented x 3, speech normal, follows commands.    Additional Data:     Labs:  Results from last 7 days   Lab Units 24  0648   WBC Thousand/uL 3.44*   HEMOGLOBIN g/dL 8.1*   HEMATOCRIT % 25.7*   PLATELETS Thousands/uL 39*   SEGS PCT % 62   LYMPHO PCT % 20   MONO PCT % 9   EOS PCT % 7*     Results from last 7 days   Lab Units 24  0648    SODIUM mmol/L 135   POTASSIUM mmol/L 3.1*   CHLORIDE mmol/L 106   CO2 mmol/L 24   BUN mg/dL 9   CREATININE mg/dL 0.69   ANION GAP mmol/L 5   CALCIUM mg/dL 6.6*   ALBUMIN g/dL 2.3*   TOTAL BILIRUBIN mg/dL 1.90*   ALK PHOS U/L 116*   ALT U/L 25   AST U/L 31   GLUCOSE RANDOM mg/dL 174*     Results from last 7 days   Lab Units 08/24/24  1252   INR  1.38*     Results from last 7 days   Lab Units 08/25/24  1058 08/25/24  0710 08/24/24  2057   POC GLUCOSE mg/dl 146* 198* 120               Lines/Drains:  Invasive Devices       Central Venous Catheter Line  Duration             Port A Cath Chest -- days                    Central Line:  Goal for removal: Will discontinue when peripheral access obtained.              Imaging: No pertinent imaging reviewed.    Recent Cultures (last 7 days):         Last 24 Hours Medication List:   Current Facility-Administered Medications   Medication Dose Route Frequency Provider Last Rate    acetaminophen  325 mg Oral Q6H PRN NUBIA Díaz      albuterol  2.5 mg Nebulization Q6H PRN NUBIA Díaz      aluminum-magnesium hydroxide-simethicone  30 mL Oral Q6H PRN NUBIA Díaz      enoxaparin  40 mg Subcutaneous Daily NUBIA Díaz      ferrous sulfate  325 mg Oral Daily With Breakfast NUBIA Díaz      furosemide  20 mg Oral Daily NUBIA Díaz      gabapentin  300 mg Oral TID NUBIA Díaz      hydrocortisone  10 mg Oral QAM NUBIA Díaz      HYDROmorphone  0.5 mg Intravenous Q3H PRN NUBIA Díaz      HYDROmorphone  0.2 mg Intravenous Q4H PRN NUBIA Díaz      insulin lispro  1-5 Units Subcutaneous TID AC NUBIA Díaz      insulin lispro  1-5 Units Subcutaneous HS NUBIA Díaz      lactulose  20 g Oral Daily NUBIA Díaz      levETIRAcetam  500 mg Oral Q12H SANDRA NUBIA Díaz      lidocaine  1 patch Topical Daily NUBIA Díaz      midodrine  10 mg Oral TID AC NUBIA Díaz      nicotine  21 mg  Transdermal Daily NUBIA Díaz      ondansetron  4 mg Intravenous Q6H PRN NUBIA Díaz      pancrelipase (Lip-Prot-Amyl)  24,000 Units Oral TID With Meals NUBIA Díaz      polyethylene glycol  17 g Oral Daily PRN NUBIA Díaz      rifaximin  550 mg Oral BID NUBIA Díaz      rOPINIRole  2 mg Oral BID NUBIA Díaz      spironolactone  100 mg Oral Daily NUBIA Díaz      tamsulosin  0.4 mg Oral Daily With Dinner UNBIA Díaz          Today, Patient Was Seen By: Sheila Esqueda MD    **Please Note: This note may have been constructed using a voice recognition system.**

## 2024-08-25 NOTE — ASSESSMENT & PLAN NOTE
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  Patient initially presented to [place] being transferred to [facility] for paracentesis.     A.) ascites  Patient is maintained on furosemide 40 mg daily and Aldactone 100 mg daily  IR consulted for paracentesis  B.) esophageal varices  Patient had recent EGD in August 2024 at [facility] where he was found to have medium esophageal varices, portal gastropathy and GAVE  Maintained on carvedilol 3.125 mg twice daily as blood pressure tolerates  C.) hepatic encephalopathy  Continue lactulose  D.) hyperbilirubinemia  E.) coagulopathy  F.) MELD: 14  Evaluated by GI, recommended thoracentesis.  Follow-up ultrasound abdomen.

## 2024-08-25 NOTE — PLAN OF CARE
Problem: PAIN - ADULT  Goal: Verbalizes/displays adequate comfort level or baseline comfort level  Description: Interventions:  - Encourage patient to monitor pain and request assistance  - Assess pain using appropriate pain scale  - Administer analgesics based on type and severity of pain and evaluate response  - Implement non-pharmacological measures as appropriate and evaluate response  - Consider cultural and social influences on pain and pain management  - Notify physician/advanced practitioner if interventions unsuccessful or patient reports new pain  Outcome: Progressing     Problem: INFECTION - ADULT  Goal: Absence or prevention of progression during hospitalization  Description: INTERVENTIONS:  - Assess and monitor for signs and symptoms of infection  - Monitor lab/diagnostic results  - Monitor all insertion sites, i.e. indwelling lines, tubes, and drains  - Monitor endotracheal if appropriate and nasal secretions for changes in amount and color  - [place] appropriate cooling/warming therapies per order  - Administer medications as ordered  - Instruct and encourage patient and family to use good hand hygiene technique  - Identify and instruct in appropriate isolation precautions for identified infection/condition  Outcome: Progressing  Goal: Absence of fever/infection during neutropenic period  Description: INTERVENTIONS:  - Monitor WBC    Outcome: Progressing     Problem: SAFETY ADULT  Goal: Patient will remain free of falls  Description: INTERVENTIONS:  - Educate patient/family on patient safety including physical limitations  - Instruct patient to call for assistance with activity   - Consult OT/PT to assist with strengthening/mobility   - Keep Call bell within reach  - Keep bed low and locked with side rails adjusted as appropriate  - Keep care items and personal belongings within reach  - Initiate and maintain comfort rounds  - Make Fall Risk Sign visible to staff  - - Apply yellow socks and  bracelet for high fall risk patients  - Consider moving patient to room near nurses station  Outcome: Progressing  Goal: Maintain or return to baseline ADL function  Description: INTERVENTIONS:  -  Assess patient's ability to carry out ADLs; assess patient's baseline for ADL function and identify physical deficits which impact ability to perform ADLs (bathing, care of mouth/teeth, toileting, grooming, dressing, etc.)  - Assess/evaluate cause of self-care deficits   - Assess range of motion  - Assess patient's mobility; develop plan if impaired  - Assess patient's need for assistive devices and provide as appropriate  - Encourage maximum independence but intervene and supervise when necessary  - Involve family in performance of ADLs  - Assess for home care needs following discharge   - Consider OT consult to assist with ADL evaluation and planning for discharge  - Provide patient education as appropriate  Outcome: Progressing  Goal: Maintains/Returns to pre admission functional level  Description: INTERVENTIONS:  - Perform AM-PAC 6 Click Basic Mobility/ Daily Activity assessment daily.  - Set and communicate daily mobility goal to care team and patient/family/caregiver.   - Collaborate with rehabilitation services on mobility goals if consulted  -- Out of bed for toileting  - Record patient progress and toleration of activity level   Outcome: Progressing     Problem: DISCHARGE PLANNING  Goal: Discharge to home or other facility with appropriate resources  Description: INTERVENTIONS:  - Identify barriers to discharge w/patient and caregiver  - Arrange for needed discharge resources and transportation as appropriate  - Identify discharge learning needs (meds, wound care, etc.)  - Arrange for interpretive services to assist at discharge as needed  - Refer to Case Management Department for coordinating discharge planning if the patient needs post-hospital services based on physician/advanced practitioner order or complex  needs related to functional status, cognitive ability, or social support system  Outcome: Progressing     Problem: Knowledge Deficit  Goal: Patient/family/caregiver demonstrates understanding of disease process, treatment plan, medications, and discharge instructions  Description: Complete learning assessment and assess knowledge base.  Interventions:  - Provide teaching at level of understanding  - Provide teaching via preferred learning methods  Outcome: Progressing     Problem: Nutrition/Hydration-ADULT  Goal: Nutrient/Hydration intake appropriate for improving, restoring or maintaining nutritional needs  Description: Monitor and assess patient's nutrition/hydration status for malnutrition. Collaborate with interdisciplinary team and initiate plan and interventions as ordered.  Monitor patient's weight and dietary intake as ordered or per policy. Utilize nutrition screening tool and intervene as necessary. Determine patient's food preferences and provide high-protein, high-caloric foods as appropriate.     INTERVENTIONS:  - Monitor oral intake, urinary output, labs, and treatment plans  - Assess nutrition and hydration status and recommend course of action  - Evaluate amount of meals eaten  - Assist patient with eating if necessary   - Allow adequate time for meals  - Recommend/ encourage appropriate diets, oral nutritional supplements, and vitamin/mineral supplements  - Order, calculate, and assess calorie counts as needed  - Recommend, monitor, and adjust tube feedings and TPN/PPN based on assessed needs  - Assess need for intravenous fluids  - Provide specific nutrition/hydration education as appropriate  - Include patient/family/caregiver in decisions related to nutrition  Outcome: Progressing

## 2024-08-25 NOTE — ASSESSMENT & PLAN NOTE
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  Patient initially presented to [place] being transferred to [facility] for paracentesis.     A.) ascites  Patient is maintained on furosemide 40 mg daily and Aldactone 100 mg daily  IR consulted for paracentesis  B.) esophageal varices  Patient had recent EGD in August 2024 at [facility] where he was found to have medium esophageal varices, portal gastropathy and GAVE  Maintained on carvedilol 3.125 mg twice daily as blood pressure tolerates  C.) hepatic encephalopathy  Continue lactulose  D.) hyperbilirubinemia  E.) coagulopathy  F.) MELD: 14

## 2024-08-25 NOTE — PLAN OF CARE
Problem: PAIN - ADULT  Goal: Verbalizes/displays adequate comfort level or baseline comfort level  Description: Interventions:  - Encourage patient to monitor pain and request assistance  - Assess pain using appropriate pain scale  - Administer analgesics based on type and severity of pain and evaluate response  - Implement non-pharmacological measures as appropriate and evaluate response  - Consider cultural and social influences on pain and pain management  - Notify physician/advanced practitioner if interventions unsuccessful or patient reports new pain  Outcome: Progressing     Problem: INFECTION - ADULT  Goal: Absence or prevention of progression during hospitalization  Description: INTERVENTIONS:  - Assess and monitor for signs and symptoms of infection  - Monitor lab/diagnostic results  - Monitor all insertion sites, i.e. indwelling lines, tubes, and drains  - Monitor endotracheal if appropriate and nasal secretions for changes in amount and color  - [place] appropriate cooling/warming therapies per order  - Administer medications as ordered  - Instruct and encourage patient and family to use good hand hygiene technique  - Identify and instruct in appropriate isolation precautions for identified infection/condition  Outcome: Progressing  Goal: Absence of fever/infection during neutropenic period  Description: INTERVENTIONS:  - Monitor WBC    Outcome: Progressing     Problem: SAFETY ADULT  Goal: Patient will remain free of falls  Description: INTERVENTIONS:  - Educate patient/family on patient safety including physical limitations  - Instruct patient to call for assistance with activity   - Consult OT/PT to assist with strengthening/mobility   - Keep Call bell within reach  - Keep bed low and locked with side rails adjusted as appropriate  - Keep care items and personal belongings within reach  - Initiate and maintain comfort rounds  - Make Fall Risk Sign visible to staff  - Apply yellow socks and bracelet  for high fall risk patients  - Consider moving patient to room near nurses station  Outcome: Progressing  Goal: Maintain or return to baseline ADL function  Description: INTERVENTIONS:  -  Assess patient's ability to carry out ADLs; assess patient's baseline for ADL function and identify physical deficits which impact ability to perform ADLs (bathing, care of mouth/teeth, toileting, grooming, dressing, etc.)  - Assess/evaluate cause of self-care deficits   - Assess range of motion  - Assess patient's mobility; develop plan if impaired  - Assess patient's need for assistive devices and provide as appropriate  - Encourage maximum independence but intervene and supervise when necessary  - Involve family in performance of ADLs  - Assess for home care needs following discharge   - Consider OT consult to assist with ADL evaluation and planning for discharge  - Provide patient education as appropriate  Outcome: Progressing  Goal: Maintains/Returns to pre admission functional level  Description: INTERVENTIONS:  - Perform AM-PAC 6 Click Basic Mobility/ Daily Activity assessment daily.  - Set and communicate daily mobility goal to care team and patient/family/caregiver.   - Collaborate with rehabilitation services on mobility goals if consulted  - Out of bed for toileting  - Record patient progress and toleration of activity level   Outcome: Progressing     Problem: DISCHARGE PLANNING  Goal: Discharge to home or other facility with appropriate resources  Description: INTERVENTIONS:  - Identify barriers to discharge w/patient and caregiver  - Arrange for needed discharge resources and transportation as appropriate  - Identify discharge learning needs (meds, wound care, etc.)  - Arrange for interpretive services to assist at discharge as needed  - Refer to Case Management Department for coordinating discharge planning if the patient needs post-hospital services based on physician/advanced practitioner order or complex needs  related to functional status, cognitive ability, or social support system  Outcome: Progressing     Problem: Knowledge Deficit  Goal: Patient/family/caregiver demonstrates understanding of disease process, treatment plan, medications, and discharge instructions  Description: Complete learning assessment and assess knowledge base.  Interventions:  - Provide teaching at level of understanding  - Provide teaching via preferred learning methods  Outcome: Progressing     Problem: Nutrition/Hydration-ADULT  Goal: Nutrient/Hydration intake appropriate for improving, restoring or maintaining nutritional needs  Description: Monitor and assess patient's nutrition/hydration status for malnutrition. Collaborate with interdisciplinary team and initiate plan and interventions as ordered.  Monitor patient's weight and dietary intake as ordered or per policy. Utilize nutrition screening tool and intervene as necessary. Determine patient's food preferences and provide high-protein, high-caloric foods as appropriate.     INTERVENTIONS:  - Monitor oral intake, urinary output, labs, and treatment plans  - Assess nutrition and hydration status and recommend course of action  - Evaluate amount of meals eaten  - Assist patient with eating if necessary   - Allow adequate time for meals  - Recommend/ encourage appropriate diets, oral nutritional supplements, and vitamin/mineral supplements  - Order, calculate, and assess calorie counts as needed  - Recommend, monitor, and adjust tube feedings and TPN/PPN based on assessed needs  - Assess need for intravenous fluids  - Provide specific nutrition/hydration education as appropriate  - Include patient/family/caregiver in decisions related to nutrition  Outcome: Progressing

## 2024-08-25 NOTE — ASSESSMENT & PLAN NOTE
Patient reports continue smoking at home  Education on the importance of smoking cessation and the risk of tobacco use represents for his health was explained at bedside greater than 3 minutes  Nicotine patch ordered

## 2024-08-25 NOTE — ASSESSMENT & PLAN NOTE
Likely due to CHRISTIE and liver cirrhosis  Recently on 8/11/2024, the patient visited [facility] ER due to the same after hospitalization  On assessment abdomen is tender to touch,  fluid wave positive   Originally presented at  and transferred to [facility] for further treatment and IR paracentesis  Dilaudid and non-pharmacological measures ordered for pain  IR consult in place

## 2024-08-25 NOTE — ASSESSMENT & PLAN NOTE
Likely due to CHRISTIE and liver cirrhosis  Recently on 8/11/2024, the patient visited [facility] ER due to the same after hospitalization  On assessment abdomen is tender to touch,  fluid wave positive   Originally presented at  and transferred to [facility] for further treatment and IR paracentesis  Dilaudid and non-pharmacological measures ordered for pain  IR and GI consult in place   Patient also has concerns for right inguinal hernia which she is attributing to abdominal pain, requested surgical evaluation.

## 2024-08-26 ENCOUNTER — APPOINTMENT (INPATIENT)
Dept: RADIOLOGY | Facility: HOSPITAL | Age: 64
End: 2024-08-26
Payer: COMMERCIAL

## 2024-08-26 ENCOUNTER — APPOINTMENT (INPATIENT)
Dept: RADIOLOGY | Facility: HOSPITAL | Age: 64
End: 2024-08-26
Attending: STUDENT IN AN ORGANIZED HEALTH CARE EDUCATION/TRAINING PROGRAM
Payer: COMMERCIAL

## 2024-08-26 PROBLEM — D64.9 ANEMIA: Status: ACTIVE | Noted: 2024-08-26

## 2024-08-26 LAB
ALBUMIN FLD-MCNC: <1.5 G/DL
ALBUMIN SERPL BCG-MCNC: 2.3 G/DL (ref 3.5–5)
ALP SERPL-CCNC: 123 U/L (ref 34–104)
ALT SERPL W P-5'-P-CCNC: 24 U/L (ref 7–52)
ANION GAP SERPL CALCULATED.3IONS-SCNC: 2 MMOL/L (ref 4–13)
APPEARANCE FLD: CLEAR
APPEARANCE FLD: CLEAR
AST SERPL W P-5'-P-CCNC: 28 U/L (ref 13–39)
BASOPHILS # BLD AUTO: 0.02 THOUSANDS/ÂΜL (ref 0–0.1)
BASOPHILS NFR BLD AUTO: 1 % (ref 0–1)
BILIRUB SERPL-MCNC: 1.61 MG/DL (ref 0.2–1)
BUN SERPL-MCNC: 10 MG/DL (ref 5–25)
CALCIUM ALBUM COR SERPL-MCNC: 8.8 MG/DL (ref 8.3–10.1)
CALCIUM SERPL-MCNC: 7.4 MG/DL (ref 8.4–10.2)
CHLORIDE SERPL-SCNC: 105 MMOL/L (ref 96–108)
CO2 SERPL-SCNC: 28 MMOL/L (ref 21–32)
COLOR FLD: YELLOW
COLOR FLD: YELLOW
CREAT SERPL-MCNC: 0.82 MG/DL (ref 0.6–1.3)
EOSINOPHIL # BLD AUTO: 0.17 THOUSAND/ÂΜL (ref 0–0.61)
EOSINOPHIL NFR BLD AUTO: 5 % (ref 0–6)
ERYTHROCYTE [DISTWIDTH] IN BLOOD BY AUTOMATED COUNT: 17.4 % (ref 11.6–15.1)
FERRITIN SERPL-MCNC: 27 NG/ML (ref 24–336)
GFR SERPL CREATININE-BSD FRML MDRD: 94 ML/MIN/1.73SQ M
GLUCOSE FLD-MCNC: 166 MG/DL
GLUCOSE FLD-MCNC: 174 MG/DL
GLUCOSE SERPL-MCNC: 147 MG/DL (ref 65–140)
GLUCOSE SERPL-MCNC: 156 MG/DL (ref 65–140)
GLUCOSE SERPL-MCNC: 180 MG/DL (ref 65–140)
GLUCOSE SERPL-MCNC: 188 MG/DL (ref 65–140)
GLUCOSE SERPL-MCNC: 199 MG/DL (ref 65–140)
HCT VFR BLD AUTO: 23.7 % (ref 36.5–49.3)
HCT VFR BLD AUTO: 24.3 % (ref 36.5–49.3)
HGB BLD-MCNC: 7.6 G/DL (ref 12–17)
HGB BLD-MCNC: 7.7 G/DL (ref 12–17)
HISTIOCYTES NFR FLD: 64 %
HISTIOCYTES NFR FLD: 82 %
IMM GRANULOCYTES # BLD AUTO: 0.02 THOUSAND/UL (ref 0–0.2)
IMM GRANULOCYTES NFR BLD AUTO: 1 % (ref 0–2)
IRON SATN MFR SERPL: 22 % (ref 15–50)
IRON SERPL-MCNC: 54 UG/DL (ref 50–212)
LDH FLD L TO P-CCNC: 30 U/L
LDH FLD L TO P-CCNC: 50 U/L
LYMPHOCYTES # BLD AUTO: 0.56 THOUSANDS/ÂΜL (ref 0.6–4.47)
LYMPHOCYTES NFR BLD AUTO: 16 %
LYMPHOCYTES NFR BLD AUTO: 17 % (ref 14–44)
LYMPHOCYTES NFR BLD AUTO: 30 %
MCH RBC QN AUTO: 31.3 PG (ref 26.8–34.3)
MCHC RBC AUTO-ENTMCNC: 31.3 G/DL (ref 31.4–37.4)
MCV RBC AUTO: 100 FL (ref 82–98)
MONO+MESO NFR FLD MANUAL: 1 %
MONO+MESO NFR FLD MANUAL: 1 %
MONOCYTES # BLD AUTO: 0.31 THOUSAND/ÂΜL (ref 0.17–1.22)
MONOCYTES NFR BLD AUTO: 4 %
MONOCYTES NFR BLD AUTO: 9 % (ref 4–12)
NEUTROPHILS # BLD AUTO: 2.32 THOUSANDS/ÂΜL (ref 1.85–7.62)
NEUTS SEG NFR BLD AUTO: 1 %
NEUTS SEG NFR BLD AUTO: 1 %
NEUTS SEG NFR BLD AUTO: 67 % (ref 43–75)
NRBC BLD AUTO-RTO: 0 /100 WBCS
PH BODY FLUID: 7.5
PLATELET # BLD AUTO: 33 THOUSANDS/UL (ref 149–390)
PMV BLD AUTO: 11.2 FL (ref 8.9–12.7)
POTASSIUM SERPL-SCNC: 4.1 MMOL/L (ref 3.5–5.3)
PROT FLD-MCNC: <3 G/DL
PROT FLD-MCNC: <3 G/DL
PROT SERPL-MCNC: 5.5 G/DL (ref 6.4–8.4)
RBC # BLD AUTO: 2.43 MILLION/UL (ref 3.88–5.62)
SITE: NORMAL
SITE: NORMAL
SODIUM SERPL-SCNC: 135 MMOL/L (ref 135–147)
TIBC SERPL-MCNC: 247 UG/DL (ref 250–450)
TOTAL CELLS COUNTED SPEC: 100
TOTAL CELLS COUNTED SPEC: 100
TOTAL NUCLEATED CELL COUNT: 304 /UL
TOTAL PROTEIN FLUID: 0.6 G/DL
TOTAL PROTEIN FLUID: 0.9 G/DL
UIBC SERPL-MCNC: 193 UG/DL (ref 155–355)
WBC # BLD AUTO: 3.4 THOUSAND/UL (ref 4.31–10.16)
WBC # FLD MANUAL: 157 /UL

## 2024-08-26 PROCEDURE — 32555 ASPIRATE PLEURA W/ IMAGING: CPT | Performed by: NURSE PRACTITIONER

## 2024-08-26 PROCEDURE — 84157 ASSAY OF PROTEIN OTHER: CPT

## 2024-08-26 PROCEDURE — 83615 LACTATE (LD) (LDH) ENZYME: CPT

## 2024-08-26 PROCEDURE — 82948 REAGENT STRIP/BLOOD GLUCOSE: CPT

## 2024-08-26 PROCEDURE — 88341 IMHCHEM/IMCYTCHM EA ADD ANTB: CPT | Performed by: PATHOLOGY

## 2024-08-26 PROCEDURE — 88112 CYTOPATH CELL ENHANCE TECH: CPT | Performed by: PATHOLOGY

## 2024-08-26 PROCEDURE — 49083 ABD PARACENTESIS W/IMAGING: CPT | Performed by: NURSE PRACTITIONER

## 2024-08-26 PROCEDURE — 82042 OTHER SOURCE ALBUMIN QUAN EA: CPT

## 2024-08-26 PROCEDURE — 87205 SMEAR GRAM STAIN: CPT

## 2024-08-26 PROCEDURE — 88342 IMHCHEM/IMCYTCHM 1ST ANTB: CPT | Performed by: PATHOLOGY

## 2024-08-26 PROCEDURE — 83540 ASSAY OF IRON: CPT | Performed by: INTERNAL MEDICINE

## 2024-08-26 PROCEDURE — 49083 ABD PARACENTESIS W/IMAGING: CPT

## 2024-08-26 PROCEDURE — 89051 BODY FLUID CELL COUNT: CPT | Performed by: STUDENT IN AN ORGANIZED HEALTH CARE EDUCATION/TRAINING PROGRAM

## 2024-08-26 PROCEDURE — 87070 CULTURE OTHR SPECIMN AEROBIC: CPT | Performed by: STUDENT IN AN ORGANIZED HEALTH CARE EDUCATION/TRAINING PROGRAM

## 2024-08-26 PROCEDURE — 80053 COMPREHEN METABOLIC PANEL: CPT | Performed by: PHYSICIAN ASSISTANT

## 2024-08-26 PROCEDURE — 83615 LACTATE (LD) (LDH) ENZYME: CPT | Performed by: STUDENT IN AN ORGANIZED HEALTH CARE EDUCATION/TRAINING PROGRAM

## 2024-08-26 PROCEDURE — 0W9G3ZX DRAINAGE OF PERITONEAL CAVITY, PERCUTANEOUS APPROACH, DIAGNOSTIC: ICD-10-PCS | Performed by: NURSE PRACTITIONER

## 2024-08-26 PROCEDURE — 85018 HEMOGLOBIN: CPT | Performed by: INTERNAL MEDICINE

## 2024-08-26 PROCEDURE — 84157 ASSAY OF PROTEIN OTHER: CPT | Performed by: STUDENT IN AN ORGANIZED HEALTH CARE EDUCATION/TRAINING PROGRAM

## 2024-08-26 PROCEDURE — 0W993ZX DRAINAGE OF RIGHT PLEURAL CAVITY, PERCUTANEOUS APPROACH, DIAGNOSTIC: ICD-10-PCS | Performed by: NURSE PRACTITIONER

## 2024-08-26 PROCEDURE — 83986 ASSAY PH BODY FLUID NOS: CPT | Performed by: STUDENT IN AN ORGANIZED HEALTH CARE EDUCATION/TRAINING PROGRAM

## 2024-08-26 PROCEDURE — 89051 BODY FLUID CELL COUNT: CPT

## 2024-08-26 PROCEDURE — 82728 ASSAY OF FERRITIN: CPT | Performed by: INTERNAL MEDICINE

## 2024-08-26 PROCEDURE — 82945 GLUCOSE OTHER FLUID: CPT

## 2024-08-26 PROCEDURE — 99233 SBSQ HOSP IP/OBS HIGH 50: CPT | Performed by: INTERNAL MEDICINE

## 2024-08-26 PROCEDURE — 88305 TISSUE EXAM BY PATHOLOGIST: CPT | Performed by: PATHOLOGY

## 2024-08-26 PROCEDURE — 87070 CULTURE OTHR SPECIMN AEROBIC: CPT

## 2024-08-26 PROCEDURE — 85025 COMPLETE CBC W/AUTO DIFF WBC: CPT | Performed by: PHYSICIAN ASSISTANT

## 2024-08-26 PROCEDURE — 85014 HEMATOCRIT: CPT | Performed by: INTERNAL MEDICINE

## 2024-08-26 PROCEDURE — 87205 SMEAR GRAM STAIN: CPT | Performed by: STUDENT IN AN ORGANIZED HEALTH CARE EDUCATION/TRAINING PROGRAM

## 2024-08-26 PROCEDURE — 32555 ASPIRATE PLEURA W/ IMAGING: CPT

## 2024-08-26 PROCEDURE — 83550 IRON BINDING TEST: CPT | Performed by: INTERNAL MEDICINE

## 2024-08-26 PROCEDURE — 82945 GLUCOSE OTHER FLUID: CPT | Performed by: STUDENT IN AN ORGANIZED HEALTH CARE EDUCATION/TRAINING PROGRAM

## 2024-08-26 PROCEDURE — 99254 IP/OBS CNSLTJ NEW/EST MOD 60: CPT | Performed by: SURGERY

## 2024-08-26 PROCEDURE — 99232 SBSQ HOSP IP/OBS MODERATE 35: CPT | Performed by: STUDENT IN AN ORGANIZED HEALTH CARE EDUCATION/TRAINING PROGRAM

## 2024-08-26 RX ORDER — DIPHENHYDRAMINE HCL 25 MG
25 TABLET ORAL EVERY 6 HOURS PRN
Status: DISCONTINUED | OUTPATIENT
Start: 2024-08-26 | End: 2024-08-26

## 2024-08-26 RX ORDER — FAMOTIDINE 10 MG/ML
20 INJECTION, SOLUTION INTRAVENOUS ONCE
Status: COMPLETED | OUTPATIENT
Start: 2024-08-26 | End: 2024-08-26

## 2024-08-26 RX ORDER — OCTREOTIDE ACETATE 50 UG/ML
50 INJECTION, SOLUTION INTRAVENOUS; SUBCUTANEOUS ONCE
Status: COMPLETED | OUTPATIENT
Start: 2024-08-26 | End: 2024-08-26

## 2024-08-26 RX ORDER — CHOLESTYRAMINE LIGHT 4 G/5.7G
4 POWDER, FOR SUSPENSION ORAL ONCE
Status: COMPLETED | OUTPATIENT
Start: 2024-08-26 | End: 2024-08-26

## 2024-08-26 RX ORDER — LIDOCAINE WITH 8.4% SOD BICARB 0.9%(10ML)
SYRINGE (ML) INJECTION AS NEEDED
Status: COMPLETED | OUTPATIENT
Start: 2024-08-26 | End: 2024-08-26

## 2024-08-26 RX ORDER — DIPHENHYDRAMINE HYDROCHLORIDE 50 MG/ML
25 INJECTION INTRAMUSCULAR; INTRAVENOUS EVERY 6 HOURS PRN
Status: DISCONTINUED | OUTPATIENT
Start: 2024-08-26 | End: 2024-08-27 | Stop reason: HOSPADM

## 2024-08-26 RX ORDER — DIAPER,BRIEF,INFANT-TODD,DISP
EACH MISCELLANEOUS 4 TIMES DAILY PRN
Status: DISCONTINUED | OUTPATIENT
Start: 2024-08-26 | End: 2024-08-27 | Stop reason: HOSPADM

## 2024-08-26 RX ORDER — PANTOPRAZOLE SODIUM 40 MG/10ML
40 INJECTION, POWDER, LYOPHILIZED, FOR SOLUTION INTRAVENOUS EVERY 12 HOURS SCHEDULED
Status: DISCONTINUED | OUTPATIENT
Start: 2024-08-26 | End: 2024-08-27

## 2024-08-26 RX ADMIN — CHOLESTYRAMINE 4 G: 4 POWDER, FOR SUSPENSION ORAL at 23:35

## 2024-08-26 RX ADMIN — ROPINIROLE HYDROCHLORIDE 2 MG: 1 TABLET, FILM COATED ORAL at 20:54

## 2024-08-26 RX ADMIN — MIDODRINE HYDROCHLORIDE 10 MG: 5 TABLET ORAL at 06:11

## 2024-08-26 RX ADMIN — LEVETIRACETAM 500 MG: 500 TABLET, FILM COATED ORAL at 20:54

## 2024-08-26 RX ADMIN — ROPINIROLE HYDROCHLORIDE 2 MG: 1 TABLET, FILM COATED ORAL at 08:15

## 2024-08-26 RX ADMIN — MIDODRINE HYDROCHLORIDE 10 MG: 5 TABLET ORAL at 17:05

## 2024-08-26 RX ADMIN — PANCRELIPASE 24000 UNITS: 120000; 24000; 76000 CAPSULE, DELAYED RELEASE PELLETS ORAL at 12:28

## 2024-08-26 RX ADMIN — Medication 10 ML: at 08:54

## 2024-08-26 RX ADMIN — HYDROMORPHONE HYDROCHLORIDE 0.5 MG: 1 INJECTION, SOLUTION INTRAMUSCULAR; INTRAVENOUS; SUBCUTANEOUS at 14:45

## 2024-08-26 RX ADMIN — OCTREOTIDE ACETATE 50 MCG: 50 INJECTION, SOLUTION INTRAVENOUS; SUBCUTANEOUS at 14:45

## 2024-08-26 RX ADMIN — LEVETIRACETAM 500 MG: 500 TABLET, FILM COATED ORAL at 08:15

## 2024-08-26 RX ADMIN — INSULIN LISPRO 1 UNITS: 100 INJECTION, SOLUTION INTRAVENOUS; SUBCUTANEOUS at 12:28

## 2024-08-26 RX ADMIN — HYDROCORTISONE 10 MG: 10 TABLET ORAL at 08:15

## 2024-08-26 RX ADMIN — DEXTROSE 1000 MG: 50 INJECTION, SOLUTION INTRAVENOUS at 14:44

## 2024-08-26 RX ADMIN — MIDODRINE HYDROCHLORIDE 10 MG: 5 TABLET ORAL at 12:28

## 2024-08-26 RX ADMIN — GABAPENTIN 300 MG: 300 CAPSULE ORAL at 20:54

## 2024-08-26 RX ADMIN — HYDROMORPHONE HYDROCHLORIDE 0.5 MG: 1 INJECTION, SOLUTION INTRAMUSCULAR; INTRAVENOUS; SUBCUTANEOUS at 20:55

## 2024-08-26 RX ADMIN — PANCRELIPASE 24000 UNITS: 120000; 24000; 76000 CAPSULE, DELAYED RELEASE PELLETS ORAL at 17:05

## 2024-08-26 RX ADMIN — Medication 10 ML: at 09:36

## 2024-08-26 RX ADMIN — SPIRONOLACTONE 100 MG: 50 TABLET ORAL at 08:15

## 2024-08-26 RX ADMIN — RIFAXIMIN 550 MG: 550 TABLET ORAL at 20:54

## 2024-08-26 RX ADMIN — INSULIN LISPRO 1 UNITS: 100 INJECTION, SOLUTION INTRAVENOUS; SUBCUTANEOUS at 17:06

## 2024-08-26 RX ADMIN — GABAPENTIN 300 MG: 300 CAPSULE ORAL at 08:15

## 2024-08-26 RX ADMIN — TAMSULOSIN HYDROCHLORIDE 0.4 MG: 0.4 CAPSULE ORAL at 17:05

## 2024-08-26 RX ADMIN — FERROUS SULFATE TAB 325 MG (65 MG ELEMENTAL FE) 325 MG: 325 (65 FE) TAB at 08:15

## 2024-08-26 RX ADMIN — FAMOTIDINE 20 MG: 10 INJECTION, SOLUTION INTRAVENOUS at 23:34

## 2024-08-26 RX ADMIN — LIDOCAINE 1 PATCH: 50 PATCH TOPICAL at 08:14

## 2024-08-26 RX ADMIN — HYDROMORPHONE HYDROCHLORIDE 0.5 MG: 1 INJECTION, SOLUTION INTRAMUSCULAR; INTRAVENOUS; SUBCUTANEOUS at 06:10

## 2024-08-26 RX ADMIN — DIPHENHYDRAMINE HYDROCHLORIDE 25 MG: 25 TABLET ORAL at 22:29

## 2024-08-26 RX ADMIN — GABAPENTIN 300 MG: 300 CAPSULE ORAL at 17:05

## 2024-08-26 RX ADMIN — LACTULOSE 20 G: 20 SOLUTION ORAL at 08:14

## 2024-08-26 RX ADMIN — ENOXAPARIN SODIUM 40 MG: 40 INJECTION SUBCUTANEOUS at 08:15

## 2024-08-26 RX ADMIN — FUROSEMIDE 20 MG: 20 TABLET ORAL at 08:15

## 2024-08-26 RX ADMIN — PANCRELIPASE 24000 UNITS: 120000; 24000; 76000 CAPSULE, DELAYED RELEASE PELLETS ORAL at 08:15

## 2024-08-26 RX ADMIN — RIFAXIMIN 550 MG: 550 TABLET ORAL at 08:15

## 2024-08-26 RX ADMIN — ALUMINUM HYDROXIDE, MAGNESIUM HYDROXIDE, AND DIMETHICONE 30 ML: 200; 20; 200 SUSPENSION ORAL at 08:15

## 2024-08-26 RX ADMIN — PANTOPRAZOLE SODIUM 40 MG: 40 INJECTION, POWDER, FOR SOLUTION INTRAVENOUS at 14:45

## 2024-08-26 RX ADMIN — OCTREOTIDE ACETATE 50 MCG/HR: 500 INJECTION, SOLUTION INTRAVENOUS; SUBCUTANEOUS at 14:45

## 2024-08-26 RX ADMIN — INSULIN LISPRO 1 UNITS: 100 INJECTION, SOLUTION INTRAVENOUS; SUBCUTANEOUS at 21:01

## 2024-08-26 NOTE — PLAN OF CARE
Problem: PAIN - ADULT  Goal: Verbalizes/displays adequate comfort level or baseline comfort level  Description: Interventions:  - Encourage patient to monitor pain and request assistance  - Assess pain using appropriate pain scale  - Administer analgesics based on type and severity of pain and evaluate response  - Implement non-pharmacological measures as appropriate and evaluate response  - Consider cultural and social influences on pain and pain management  - Notify physician/advanced practitioner if interventions unsuccessful or patient reports new pain  Outcome: Progressing     Problem: INFECTION - ADULT  Goal: Absence or prevention of progression during hospitalization  Description: INTERVENTIONS:  - Assess and monitor for signs and symptoms of infection  - Monitor lab/diagnostic results  - Monitor all insertion sites, i.e. indwelling lines, tubes, and drains  - Monitor endotracheal if appropriate and nasal secretions for changes in amount and color  - [place] appropriate cooling/warming therapies per order  - Administer medications as ordered  - Instruct and encourage patient and family to use good hand hygiene technique  - Identify and instruct in appropriate isolation precautions for identified infection/condition  Outcome: Progressing  Goal: Absence of fever/infection during neutropenic period  Description: INTERVENTIONS:  - Monitor WBC    Outcome: Progressing     Problem: SAFETY ADULT  Goal: Patient will remain free of falls  Description: INTERVENTIONS:  - Educate patient/family on patient safety including physical limitations  - Instruct patient to call for assistance with activity   - Consult OT/PT to assist with strengthening/mobility   - Keep Call bell within reach  - Keep bed low and locked with side rails adjusted as appropriate  - Keep care items and personal belongings within reach  - Initiate and maintain comfort rounds  - Make Fall Risk Sign visible to staff  -- Apply yellow socks and  bracelet for high fall risk patients  - Consider moving patient to room near nurses station  Outcome: Progressing  Goal: Maintain or return to baseline ADL function  Description: INTERVENTIONS:  -  Assess patient's ability to carry out ADLs; assess patient's baseline for ADL function and identify physical deficits which impact ability to perform ADLs (bathing, care of mouth/teeth, toileting, grooming, dressing, etc.)  - Assess/evaluate cause of self-care deficits   - Assess range of motion  - Assess patient's mobility; develop plan if impaired  - Assess patient's need for assistive devices and provide as appropriate  - Encourage maximum independence but intervene and supervise when necessary  - Involve family in performance of ADLs  - Assess for home care needs following discharge   - Consider OT consult to assist with ADL evaluation and planning for discharge  - Provide patient education as appropriate  Outcome: Progressing  Goal: Maintains/Returns to pre admission functional level  Description: INTERVENTIONS:  - Perform AM-PAC 6 Click Basic Mobility/ Daily Activity assessment daily.  - Set and communicate daily mobility goal to care team and patient/family/caregiver.   - Collaborate with rehabilitation services on mobility goals if consulted  - - Out of bed for toileting  - Record patient progress and toleration of activity level   Outcome: Progressing     Problem: DISCHARGE PLANNING  Goal: Discharge to home or other facility with appropriate resources  Description: INTERVENTIONS:  - Identify barriers to discharge w/patient and caregiver  - Arrange for needed discharge resources and transportation as appropriate  - Identify discharge learning needs (meds, wound care, etc.)  - Arrange for interpretive services to assist at discharge as needed  - Refer to Case Management Department for coordinating discharge planning if the patient needs post-hospital services based on physician/advanced practitioner order or  complex needs related to functional status, cognitive ability, or social support system  Outcome: Progressing     Problem: Knowledge Deficit  Goal: Patient/family/caregiver demonstrates understanding of disease process, treatment plan, medications, and discharge instructions  Description: Complete learning assessment and assess knowledge base.  Interventions:  - Provide teaching at level of understanding  - Provide teaching via preferred learning methods  Outcome: Progressing     Problem: Nutrition/Hydration-ADULT  Goal: Nutrient/Hydration intake appropriate for improving, restoring or maintaining nutritional needs  Description: Monitor and assess patient's nutrition/hydration status for malnutrition. Collaborate with interdisciplinary team and initiate plan and interventions as ordered.  Monitor patient's weight and dietary intake as ordered or per policy. Utilize nutrition screening tool and intervene as necessary. Determine patient's food preferences and provide high-protein, high-caloric foods as appropriate.     INTERVENTIONS:  - Monitor oral intake, urinary output, labs, and treatment plans  - Assess nutrition and hydration status and recommend course of action  - Evaluate amount of meals eaten  - Assist patient with eating if necessary   - Allow adequate time for meals  - Recommend/ encourage appropriate diets, oral nutritional supplements, and vitamin/mineral supplements  - Order, calculate, and assess calorie counts as needed  - Recommend, monitor, and adjust tube feedings and TPN/PPN based on assessed needs  - Assess need for intravenous fluids  - Provide specific nutrition/hydration education as appropriate  - Include patient/family/caregiver in decisions related to nutrition  Outcome: Progressing

## 2024-08-26 NOTE — ASSESSMENT & PLAN NOTE
Likely due to CHRISTIE and liver cirrhosis  Recently on 8/11/2024, the patient visited [facility] ER due to the same after hospitalization  Suspect secondary to ascites  CT with Distended fluid-filled stomach.  Likely related to recent ingestion as it was taken in the evening.  Patient tolerating oral diet today without issue  Abdominal pain is improved overall today following paracentesis   Surgery evaluated: Patient is high risk for anesthesia and is not compensated well enough at this time for procedure

## 2024-08-26 NOTE — PROGRESS NOTES
Progress Note- Foreign Armando 63 y.o. 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  Overall symptoms secondary to decompensated cirrhosis acutely worsened by patient's living situation as he is homeless and has been living with friends trying to get down to [place] to be closer to his parents.    Decompensated MASH cirrhosis  Abdominal pain  History of esophageal varices  History of hepatic encephalopathy  - Etiology: [facility]  - Esophageal variceal screening: Most recent EGD 8/7 at OSH showing non-bleeding varices, GAVE, portal gastropathy, duodenitis.  Patient does have evidence of medium to large esophageal varices and is not tolerating NSBB.  Reasonable to stop both midodrine and NSBB and pursue esophageal banding. Should plan for outpt EGD for banding. Pt agreeable   - Ascites: Diuretic therapy with Lasix 40mg and aldactone 100mg and will monitor electrolytes until he is on a stable dose of diuretics. Recommend 2g Na diet (no added salt to food and no pre-packaged foods).   - Portosystemic encephalopathy: Continue lactulose.  Okay to transition to 10 g daily as patient states he was having significant bowel movements at home  - HCC Screening: Repeat US and AFP q6 months.  AFP 1.97 on 6/22, ultrasound on 8/25 without limitation  - HRS:  Currently, no evidence of HRS. Avoid NSAIDs, ARBs, ACE-Is    MELD 3.0: 15 at 8/26/2024  6:07 AM  MELD-Na: 14 at 8/26/2024  6:07 AM  Calculated from:  Serum Creatinine: 0.82 mg/dL (Using min of 1 mg/dL) at 8/26/2024  6:07 AM  Serum Sodium: 135 mmol/L at 8/26/2024  6:07 AM  Total Bilirubin: 1.61 mg/dL at 8/26/2024  6:07 AM  Serum Albumin: 2.3 g/dL at 8/26/2024  6:07 AM  INR(ratio): 1.38 at 8/24/2024 12:52 PM  Age at listing (hypothetical): 63 years  Sex: Male at 8/26/2024  6:07 AM      ______________________________________________________________________    Subjective:  Patient with paracentesis and thoracentesis today.  He had 2170 cc of fluid from abdomen and 117 0 cc from right hemithorax.  Patient overall feeling much better.  He states he is taking lactulose twice a day at home and having 5-6 bowel movements a day.  He has been taking his Lasix and spironolactone at home as well.  Denies nausea, vomiting, melena, hematochezia.    Medication Administration - last 24 hours from 08/25/2024 1300 to 08/26/2024 1300         Date/Time Order Dose Route Action Action by     08/26/2024 0815 EDT furosemide (LASIX) tablet 20 mg 20 mg Oral Given Corin Anaya     08/26/2024 0815 EDT gabapentin (NEURONTIN) capsule 300 mg 300 mg Oral Given Corin Anaya     08/25/2024 2201 EDT gabapentin (NEURONTIN) capsule 300 mg 300 mg Oral Given Ana Hawthorne RN     08/25/2024 1632 EDT gabapentin (NEURONTIN) capsule 300 mg 300 mg Oral Given Ministerio Gutierrez RN     08/26/2024 0815 EDT hydrocortisone (CORTEF) tablet 10 mg 10 mg [facility]     08/26/2024 0815 EDT levETIRAcetam (KEPPRA) tablet 500 mg 500 mg [facility]     08/25/2024 2201 EDT levETIRAcetam (KEPPRA) tablet 500 mg 500 mg Oral Given Ana Hawthorne RN     08/26/2024 0815 EDT rifaximin (XIFAXAN) tablet 550 mg 550 mg Oral Given Corintony Anaya     08/25/2024 2201 EDT rifaximin (XIFAXAN) tablet 550 mg 550 mg Oral Given Ana Hawthorne RN     08/26/2024  0815 EDT rOPINIRole (REQUIP) tablet 2 mg 2 mg Oral Given Corin Anaya     08/25/2024 2201 EDT rOPINIRole (REQUIP) tablet 2 mg 2 mg Oral Given Ana Hawthorne RN     08/25/2024 1632 EDT tamsulosin (FLOMAX) capsule 0.4 mg 0.4 mg Oral Given Ministerio Gutierrez RN     08/25/2024 1637 EDT ondansetron (ZOFRAN) injection 4 mg 4 mg Intravenous Given Ministerio Gutierrez RN     08/26/2024 0815 EDT aluminum-magnesium hydroxide-simethicone (MAALOX) oral suspension 30 mL 30 mL Oral Given Corin Anaya     08/25/2024 2240 EDT aluminum-magnesium hydroxide-simethicone (MAALOX) oral suspension 30 mL 30 mL Oral Given Ana Hawthorne RN     08/26/2024 0815 EDT enoxaparin (LOVENOX) subcutaneous injection 40 mg 40 mg Subcutaneous Given Corintony Anaya     08/26/2024 1228 EDT insulin lispro (HumALOG/ADMELOG) 100 units/mL subcutaneous injection 1-5 Units 1 Units Subcutaneous [facility]     08/26/2024 0741 EDT insulin lispro (HumALOG/ADMELOG) 100 units/mL subcutaneous injection 1-5 Units -- Subcutaneous Not Given Corintony [facility]     08/25/2024 1632 EDT insulin lispro (HumALOG/ADMELOG) 100 units/mL subcutaneous injection 1-5 Units 1 Units Subcutaneous Given Ministerio Gutierrez RN     08/25/2024 2203 EDT insulin lispro (HumALOG/ADMELOG) 100 units/mL subcutaneous injection 1-5 Units 1 Units Subcutaneous Given Ana Hawthorne RN     08/26/2024 0610 EDT HYDROmorphone (DILAUDID) injection 0.5 mg 0.5 mg Intravenous Given Ana Hawthorne RN     08/25/2024 2231 EDT HYDROmorphone (DILAUDID) injection 0.5 mg 0.5 mg Intravenous Given Ana Hawthorne RN     08/25/2024 1634 EDT HYDROmorphone (DILAUDID) injection 0.5 mg 0.5 mg Intravenous Given Ministerio Gutierrez RN     08/26/2024 0814 EDT lactulose (CHRONULAC) oral solution 20 g 20 g Oral Given Corinaida Anaya     08/26/2024 1228 EDT pancrelipase (Lip-Prot-Amyl) (CREON) delayed release capsule 24,000 Units 24,000 Units Oral Given Corinaida Anaya     08/26/2024 0815 EDT pancrelipase (Lip-Prot-Amyl)  (CREON) delayed release capsule 24,000 Units 24,000 Units Oral Given Corintony Anaya     08/25/2024 1632 EDT pancrelipase (Lip-Prot-Amyl) (CREON) delayed release capsule 24,000 Units 24,000 Units Oral Given Ministerio Gutierrez RN     08/26/2024 1228 EDT midodrine (PROAMATINE) tablet 10 mg 10 mg [facility]     08/26/2024 0611 EDT midodrine (PROAMATINE) tablet 10 mg 10 mg Oral Given Ana Hawthorne RN     08/25/2024 1632 EDT midodrine (PROAMATINE) tablet 10 mg 10 mg Oral Given Ministerio Gutierrez RN     08/26/2024 0815 EDT spironolactone (ALDACTONE) tablet 100 mg 100 mg Oral Given [facility]     08/26/2024 0815 EDT ferrous sulfate tablet 325 mg 325 mg Oral Given [facility]     08/26/2024 0814 EDT lidocaine (LIDODERM) 5 % patch 1 patch 1 patch Topical Medication Applied [facility]     08/25/2024 2201 EDT lidocaine (LIDODERM) 5 % patch 1 patch 1 patch Topical Patch Removed Ana Hawthorne RN     08/26/2024 0808 EDT nicotine (NICODERM CQ) 21 mg/24 hr TD 24 hr patch 21 mg 21 mg Transdermal Not Given [facility]     08/25/2024 2138 EDT albuterol inhalation solution 2.5 mg 2.5 mg Nebulization Given Temo Rodriguez, RT     08/25/2024 1409 EDT potassium chloride (Klor-Con M20) CR tablet 40 mEq 40 mEq Oral Given Ministerio Gutierrez RN     08/25/2024 2230 EDT iohexol (OMNIPAQUE) 350 MG/ML injection (MULTI-DOSE) 100 mL 100 mL Intravenous Given Cam Alvarez     08/26/2024 0854 EDT lidocaine 1% buffered 10 mL Infiltration Given NUBIA Tenorio     08/26/2024 0936 EDT lidocaine 1% buffered 10 mL Infiltration Given NUBIA Tenorio            Objective:     Vitals: Blood pressure 93/53, pulse 65, temperature 98.3 °F (36.8 °C), temperature source Oral, resp. rate 16, weight 73.8 kg (162 lb 11.2 oz), SpO2 100%.,Body mass index is 25.48 kg/m².      Intake/Output Summary (Last 24 hours) at 8/26/2024 1300  Last data filed at 8/26/2024 1100  Gross per 24 hour   Intake 870 ml   Output 4590 ml    Net -3720 ml       Physical Exam:   General Appearance: Awake and alert, in no acute distress  Abdomen: Soft, non-tender, non-distended; bowel sounds normal; no masses or no organomegaly    Invasive Devices       Central Venous Catheter Line  Duration             Port A Cath Chest -- days                    Lab Results:  Admission on 08/24/2024   Component Date Value    POC Glucose 08/24/2024 120     TSH 3RD GENERATON 08/25/2024 2.113     Sodium 08/25/2024 135     Potassium 08/25/2024 3.1 (L)     Chloride 08/25/2024 106     CO2 08/25/2024 24     ANION GAP 08/25/2024 5     BUN 08/25/2024 9     Creatinine 08/25/2024 0.69     Glucose 08/25/2024 174 (H)     Calcium 08/25/2024 6.6 (L)     Corrected Calcium 08/25/2024 8.0 (L)     AST 08/25/2024 31     ALT 08/25/2024 25     Alkaline Phosphatase 08/25/2024 116 (H)     Total Protein 08/25/2024 4.9 (L)     Albumin 08/25/2024 2.3 (L)     Total Bilirubin 08/25/2024 1.90 (H)     eGFR 08/25/2024 101     Magnesium 08/25/2024 1.4 (L)     Phosphorus 08/25/2024 2.9     WBC 08/25/2024 3.44 (L)     RBC 08/25/2024 2.53 (L)     Hemoglobin 08/25/2024 8.1 (L)     Hematocrit 08/25/2024 25.7 (L)     MCV 08/25/2024 102 (H)     MCH 08/25/2024 32.0     MCHC 08/25/2024 31.5     RDW 08/25/2024 17.5 (H)     MPV 08/25/2024 11.3     Platelets 08/25/2024 39 (L)     nRBC 08/25/2024 0     Segmented % 08/25/2024 62     Immature Grans % 08/25/2024 1     Lymphocytes % 08/25/2024 20     Monocytes % 08/25/2024 9     Eosinophils Relative 08/25/2024 7 (H)     Basophils Relative 08/25/2024 1     Absolute Neutrophils 08/25/2024 2.17     Absolute Immature Grans 08/25/2024 0.02     Absolute Lymphocytes 08/25/2024 0.68     Absolute Monocytes 08/25/2024 0.31     Eosinophils Absolute 08/25/2024 0.24     Basophils Absolute 08/25/2024 0.02     POC Glucose 08/25/2024 198 (H)     POC Glucose 08/25/2024 146 (H)     POC Glucose 08/25/2024 168 (H)     POC Glucose 08/25/2024 169 (H)     WBC 08/26/2024 3.40 (L)     RBC  08/26/2024 2.43 (L)     Hemoglobin 08/26/2024 7.6 (L)     Hematocrit 08/26/2024 24.3 (L)     MCV 08/26/2024 100 (H)     MCH 08/26/2024 31.3     MCHC 08/26/2024 31.3 (L)     RDW 08/26/2024 17.4 (H)     MPV 08/26/2024 11.2     Platelets 08/26/2024 33 (L)     nRBC 08/26/2024 0     Segmented % 08/26/2024 67     Immature Grans % 08/26/2024 1     Lymphocytes % 08/26/2024 17     Monocytes % 08/26/2024 9     Eosinophils Relative 08/26/2024 5     Basophils Relative 08/26/2024 1     Absolute Neutrophils 08/26/2024 2.32     Absolute Immature Grans 08/26/2024 0.02     Absolute Lymphocytes 08/26/2024 0.56 (L)     Absolute Monocytes 08/26/2024 0.31     Eosinophils Absolute 08/26/2024 0.17     Basophils Absolute 08/26/2024 0.02     Sodium 08/26/2024 135     Potassium 08/26/2024 4.1     Chloride 08/26/2024 105     CO2 08/26/2024 28     ANION GAP 08/26/2024 2 (L)     BUN 08/26/2024 10     Creatinine 08/26/2024 0.82     Glucose 08/26/2024 156 (H)     Calcium 08/26/2024 7.4 (L)     Corrected Calcium 08/26/2024 8.8     AST 08/26/2024 28     ALT 08/26/2024 24     Alkaline Phosphatase 08/26/2024 123 (H)     Total Protein 08/26/2024 5.5 (L)     Albumin 08/26/2024 2.3 (L)     Total Bilirubin 08/26/2024 1.61 (H)     eGFR 08/26/2024 94     POC Glucose 08/26/2024 147 (H)     Site 08/26/2024 Pleural, Right     Color, Fluid 08/26/2024 Yellow     Clarity, Fluid 08/26/2024 Clear     TOTAL NUCLEATED CELL COU* 08/26/2024 304     PH BODY FLUID 08/26/2024 7.5     Site 08/26/2024 Paracentesis, Co     Color, Fluid 08/26/2024 Yellow     Clarity, Fluid 08/26/2024 Clear     WBC, Fluid 08/26/2024 157     POC Glucose 08/26/2024 199 (H)        Imaging Studies: I have personally reviewed pertinent imaging studies.

## 2024-08-26 NOTE — DISCHARGE INSTRUCTIONS
Abdominal Paracentesis     WHAT YOU NEED TO KNOW:   Abdominal paracentesis is a procedure to remove abnormal fluid buildup in your abdomen. Fluid builds up because of liver problems, such as swelling and scarring. Heart failure, kidney disease, a mass, or problems with your pancreas may also cause fluid buildup.     DISCHARGE INSTRUCTIONS:     Follow up with your healthcare provider as directed: Write down your questions so you remember to ask them during your visits.     Wound care: Remove dressing after 24 hours. Leave glue in place.    Return to your normal activities    Contact Interventional Radiology at [phone] ([facility] PATIENTS: Contact Interventional Radiology at [phone]) ([facility] PATIENTS: Contact Interventional Radiology at [phone]) if:  You have a fever and your wound is red and swollen.   You have yellow, green, or bad-smelling discharge coming from your wound.   You have pain or swelling in your abdomen.   You have an upset stomach or you vomit.   You have sudden, sharp pain in your abdomen.   You urinate very little or not at all.   You feel confused and more tired than usual.   Your arm or leg feels warm, tender, and painful. It may look swollen and red.   You suddenly feel lightheaded and have trouble breathing.          Thoracentesis   WHAT YOU NEED TO KNOW:   A thoracentesis is a procedure to remove extra fluid or air from between your lungs and your inner chest wall. Air or fluid buildup may make it hard for you to breathe. A thoracentesis allows your lungs to expand fully so you can breathe more easily.  DISCHARGE INSTRUCTIONS:   Medicines:   Pain medicine:  You may be given a prescription medicine to decrease pain. Do not wait until the pain is severe before you take your medicine.    Antibiotics:  This medicine helps fight or prevent an infection.    Take your medicine as directed.  Call your healthcare provider if you think your medicine is not helping or if you have side effects.  Tell him if you are allergic to any medicine. Keep a list of the medicines, vitamins, and herbs you take. Include the amounts, and when and why you take them. Bring the list or the pill bottles to follow-up visits. Carry your medicine list with you in case of an emergency.  Follow up with your healthcare provider as directed:  Write down your questions so you remember to ask them during your visits.   Rest:  Rest when you feel it is needed. Slowly start to do more each day. Return to your daily activities as directed.   Do not smoke:  If you smoke, it is never too late to quit. Ask for information about how to stop smoking if you need help.  Contact your healthcare provider if:   You have a fever.    Your puncture site is red, warm, swollen, or draining pus.    You have questions or concerns about your procedure, medicine, or care.    If you have any questions regarding, call the IR department @ [phone].     Seek care immediately or call 911 if:   Blood soaks through your bandage.    There is blood in your spit.

## 2024-08-26 NOTE — ASSESSMENT & PLAN NOTE
Suspect acute blood loss anemia, patient reports episodes of melena at home  Baseline appears to be in the low 9s - 10s   Given history of varices we will start IV Protonix, IV octreotide, IV Rocephin  N.p.o. at midnight, in case further drop patient may require inpatient scope  Trend hemoglobin

## 2024-08-26 NOTE — ASSESSMENT & PLAN NOTE
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  Patient initially presented to [place] being transferred to [facility] for paracentesis.     A.) ascites  Patient is maintained on furosemide 40 mg daily and Aldactone 100 mg daily  S/p IR paracentesis 8/26 pending fluid studies   B.) esophageal varices  Patient had recent EGD in August 2024 at [facility] where he was found to have medium esophageal varices, portal gastropathy and GAVE  Reportedly on bb as well as midodrine. No optimal therapy per GI. If unable to tolerate NSBB, then would need serial banding  Given drop in Hg, hold bb and diuretics for now. IV rocephin, IV ppi and IV rocephin started   C.) hepatic encephalopathy  Continue lactulose  D.) hyperbilirubinemia  E.) coagulopathy  F.) Will calculate meld score daily

## 2024-08-26 NOTE — BRIEF OP NOTE (RAD/CATH)
IR PARACENTESIS Procedure Note    PATIENT NAME: Foreign Armando  : 1960  MRN: [id number]    Pre-op Diagnosis:   1. Chronic pancreatitis, unspecified pancreatitis type (HCC)    2. Hyperbilirubinemia    3. Decompensated hepatic cirrhosis (HCC)    4. Right upper quadrant abdominal pain    5. Inguinal hernia      Post-op Diagnosis:   1. Chronic pancreatitis, unspecified pancreatitis type (HCC)    2. Hyperbilirubinemia    3. Decompensated hepatic cirrhosis (HCC)    4. Right upper quadrant abdominal pain    5. Inguinal hernia        Provider:   NUBIA Tenorio  Assistants:     No qualified resident was available.    Estimated Blood Loss: none    Findings: 2170 ml of clear yellow fluid obtained from left paracentesis    Specimens: labs collected and sent    Complications:  none    Anesthesia: local    NUBIA Tenorio     Date: 2024  Time: 9:18 AM

## 2024-08-26 NOTE — PROGRESS NOTES
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  Patient initially presented to [place] being transferred to [facility] for paracentesis.     A.) ascites  Patient is maintained on furosemide 40 mg daily and Aldactone 100 mg daily  S/p IR paracentesis 8/26 pending fluid studies   B.) esophageal varices  Patient had recent EGD in August 2024 at [facility] where he was found to have medium esophageal varices, portal gastropathy and GAVE  Reportedly on bb as well as midodrine. No optimal therapy per GI. If unable to tolerate NSBB, then would need serial banding  Given drop in Hg, hold bb and diuretics for now. IV rocephin, IV ppi and IV rocephin started   C.) hepatic encephalopathy  Continue lactulose  D.) hyperbilirubinemia  E.) coagulopathy  F.) Will calculate meld score daily     Anemia  Assessment & Plan  Suspect acute blood loss anemia, patient reports episodes of melena at home  Baseline appears to be in the low 9s - 10s   Given history of varices we will start IV Protonix, IV octreotide, IV Rocephin  N.p.o. at midnight, in case further drop patient may require inpatient scope  Trend hemoglobin    Chronic pancreatitis (HCC)  Assessment & Plan  On Creon, continue home regimen    Anemia of chronic disease  Assessment & Plan  Hgb stable on admission   Continue with Iron supplement daily     Seizure disorder (HCC)  Assessment & Plan  Continue with Keppra daily    Adrenal  insufficiency (HCC)  Assessment & Plan  Maintained on on Solu-Cortef 10 mg in the morning, 5 mg in the evening and midodrine 10 mg 3 times daily    History of colon cancer  Assessment & Plan  History of colon cancer status post right hemicolectomy in 2017 and chemotherapy     Pancytopenia (HCC)  Assessment & Plan  Secondary to liver cirrhosis     Abdominal pain  Assessment & Plan  Likely due to CHRISTIE and liver cirrhosis  Recently on 8/11/2024, the patient visited [facility] ER due to the same after hospitalization  Suspect secondary to ascites  CT with Distended fluid-filled stomach.  Likely related to recent ingestion as it was taken in the evening.  Patient tolerating oral diet today without issue  Abdominal pain is improved overall today following paracentesis   Surgery evaluated: Patient is high risk for anesthesia and is not compensated well enough at this time for procedure         Type 2 diabetes mellitus with hyperglycemia, without long-term current use of insulin (HCC)  Assessment & Plan  Continue SSI        Smoking  Assessment & Plan  Patient reports continue smoking at home  Nicotine patch ordered    Restless leg syndrome  Assessment & Plan  On Requip daily           VTE Pharmacologic Prophylaxis: on hold due to anemia    Patient Centered Rounds:  Patient care rounds were performed with nursing    Discussions with Specialists or Other Care Team Provider: Independent discussion with gastroenterologist on call     Education and Discussions with Family / Patient: Patient     Time Spent for Care: I have spent a total time of 52 minutes on 08/26/24 in caring for this patient including Diagnostic results, Risks and benefits of tx options, Instructions for management, Patient and family education, Impressions, Counseling / Coordination of care, Documenting in the medical record, Reviewing / ordering tests, medicine, procedures  , and Communicating with other healthcare professionals .      Current Length of Stay: 2  day(s)    Current Patient Status: Inpatient   Certification Statement: The patient will continue to require additional inpatient hospital stay due to need for management of anemia     Discharge Plan: 24 hours     Code Status: Level 1 - Full Code      Subjective:   Patient seen and evaluated at bedside. No overnight events. Feels improved after paracentesis     Objective:     Vitals:   Temp (24hrs), Av.1 °F (36.7 °C), Min:97.8 °F (36.6 °C), Max:98.3 °F (36.8 °C)    Temp:  [97.8 °F (36.6 °C)-98.3 °F (36.8 °C)] 98.3 °F (36.8 °C)  HR:  [65-82] 65  Resp:  [14-17] 16  BP: ()/(53-65) 93/53  SpO2:  [96 %-100 %] 100 %  Body mass index is 25.48 kg/m².     Input and Output Summary (last 24 hours):       Intake/Output Summary (Last 24 hours) at 2024 1416  Last data filed at 2024 1306  Gross per 24 hour   Intake 990 ml   Output 4590 ml   Net -3600 ml       Physical Exam:     Physical Exam  Vitals reviewed.   Constitutional:       General: He is not in acute distress.     Appearance: He is well-developed. He is not toxic-appearing or diaphoretic.      Comments: Chronically ill-appearing   HENT:      Head: Normocephalic and atraumatic.      Mouth/Throat:      Mouth: Mucous membranes are moist.   Eyes:      General: No scleral icterus.     Extraocular Movements: Extraocular movements intact.   Cardiovascular:      Rate and Rhythm: Normal rate and regular rhythm.      Heart sounds: Normal heart sounds.   Pulmonary:      Effort: Pulmonary effort is normal. No respiratory distress.      Breath sounds: Normal breath sounds. No wheezing or rales.   Abdominal:      General: There is no distension.      Palpations: Abdomen is soft.      Tenderness: There is no abdominal tenderness. There is no guarding or rebound.   Musculoskeletal:         General: No swelling, tenderness or deformity.   Skin:     General: Skin is warm and dry.   Neurological:      General: No focal deficit present.      Mental Status: He is alert.  Mental status is at baseline.      Comments: No asterixis   Psychiatric:         Mood and Affect: Mood normal.         Behavior: Behavior normal.         Thought Content: Thought content normal.         Judgment: Judgment normal.         Additional Data:     Labs: I have reviewed pertinent results     Results from last 7 days   Lab Units 08/26/24  0607   WBC Thousand/uL 3.40*   HEMOGLOBIN g/dL 7.6*   HEMATOCRIT % 24.3*   PLATELETS Thousands/uL 33*   SEGS PCT % 67   LYMPHO PCT % 17   MONO PCT % 9   EOS PCT % 5     Results from last 7 days   Lab Units 08/26/24  0607   SODIUM mmol/L 135   POTASSIUM mmol/L 4.1   CHLORIDE mmol/L 105   CO2 mmol/L 28   BUN mg/dL 10   CREATININE mg/dL 0.82   ANION GAP mmol/L 2*   CALCIUM mg/dL 7.4*   ALBUMIN g/dL 2.3*   TOTAL BILIRUBIN mg/dL 1.61*   ALK PHOS U/L 123*   ALT U/L 24   AST U/L 28   GLUCOSE RANDOM mg/dL 156*     Results from last 7 days   Lab Units 08/24/24  1252   INR  1.38*     Results from last 7 days   Lab Units 08/26/24  1104 08/26/24  0713 08/25/24  2130 08/25/24  1626 08/25/24  1058 08/25/24  0710 08/24/24  2057   POC GLUCOSE mg/dl 199* 147* 169* 168* 146* 198* 120                 Imaging: I have reviewed pertinent imaging       Recent Cultures (last 7 days):           Last 24 Hours Medication List:   Current Facility-Administered Medications   Medication Dose Route Frequency Provider Last Rate    acetaminophen  325 mg Oral Q6H PRN NUBIA Díaz      albuterol  2.5 mg Nebulization Q6H PRN NUBIA Díaz      aluminum-magnesium hydroxide-simethicone  30 mL Oral Q6H PRN NUBIA Díaz      cefTRIAXone  1,000 mg Intravenous Q24H Barney Garvey DO      ferrous sulfate  325 mg Oral Daily With Breakfast NUBIA Díaz      gabapentin  300 mg Oral TID NUBIA Díaz      hydrocortisone  10 mg Oral QAM NUBIA Díaz      HYDROmorphone  0.5 mg Intravenous Q3H PRN NUBIA Díaz      HYDROmorphone  0.2 mg Intravenous Q4H PRN NUBIA Díaz       insulin lispro  1-5 Units Subcutaneous TID AC NUBIA Díaz      insulin lispro  1-5 Units Subcutaneous HS NUBIA Díaz      lactulose  20 g Oral Daily NUBIA Díaz      levETIRAcetam  500 mg Oral Q12H [facility] NUBIA Díaz      lidocaine  1 patch Topical Daily NUBIA Díaz      midodrine  10 mg Oral TID AC NUBIA Díaz      nicotine  21 mg Transdermal Daily NUBIA Díaz      octreotide  50 mcg Intravenous Once Barney Garvey DO      Followed by    octreotide  50 mcg/hr Intravenous Continuous Barney Garvey DO      ondansetron  4 mg Intravenous Q6H PRN NUBIA Díaz      pancrelipase (Lip-Prot-Amyl)  24,000 Units Oral TID With Meals NUBIA Díaz      pantoprazole  40 mg Intravenous Q12H [facility] Barney Garvey DO      polyethylene glycol  17 g Oral Daily PRN NUBIA Díaz      rifaximin  550 mg Oral BID NUBIA Díaz      rOPINIRole  2 mg Oral BID NUBIA Díaz      tamsulosin  0.4 mg Oral Daily With Dinner NUBIA Díaz          Today, Patient Was Seen By: Barney Garvey DO    ** Please Note: Dictation voice to text software may have been used in the creation of this document. **

## 2024-08-26 NOTE — PLAN OF CARE
Problem: PAIN - ADULT  Goal: Verbalizes/displays adequate comfort level or baseline comfort level  Description: Interventions:  - Encourage patient to monitor pain and request assistance  - Assess pain using appropriate pain scale  - Administer analgesics based on type and severity of pain and evaluate response  - Implement non-pharmacological measures as appropriate and evaluate response  - Consider cultural and social influences on pain and pain management  - Notify physician/advanced practitioner if interventions unsuccessful or patient reports new pain  Outcome: Progressing     Problem: INFECTION - ADULT  Goal: Absence or prevention of progression during hospitalization  Description: INTERVENTIONS:  - Assess and monitor for signs and symptoms of infection  - Monitor lab/diagnostic results  - Monitor all insertion sites, i.e. indwelling lines, tubes, and drains  - Monitor endotracheal if appropriate and nasal secretions for changes in amount and color  - [place] appropriate cooling/warming therapies per order  - Administer medications as ordered  - Instruct and encourage patient and family to use good hand hygiene technique  - Identify and instruct in appropriate isolation precautions for identified infection/condition  Outcome: Progressing  Goal: Absence of fever/infection during neutropenic period  Description: INTERVENTIONS:  - Monitor WBC    Outcome: Progressing     Problem: SAFETY ADULT  Goal: Patient will remain free of falls  Description: INTERVENTIONS:  - Educate patient/family on patient safety including physical limitations  - Instruct patient to call for assistance with activity   - Consult OT/PT to assist with strengthening/mobility   - Keep Call bell within reach  - Keep bed low and locked with side rails adjusted as appropriate  - Keep care items and personal belongings within reach  - Initiate and maintain comfort rounds  - Make Fall Risk Sign visible to staff  - Offer Toileting every 2 Hours,  in advance of need  - Apply yellow socks and bracelet for high fall risk patients  - Consider moving patient to room near nurses station  Outcome: Progressing  Goal: Maintain or return to baseline ADL function  Description: INTERVENTIONS:  -  Assess patient's ability to carry out ADLs; assess patient's baseline for ADL function and identify physical deficits which impact ability to perform ADLs (bathing, care of mouth/teeth, toileting, grooming, dressing, etc.)  - Assess/evaluate cause of self-care deficits   - Assess range of motion  - Assess patient's mobility; develop plan if impaired  - Assess patient's need for assistive devices and provide as appropriate  - Encourage maximum independence but intervene and supervise when necessary  - Involve family in performance of ADLs  - Assess for home care needs following discharge   - Consider OT consult to assist with ADL evaluation and planning for discharge  - Provide patient education as appropriate  Outcome: Progressing  Goal: Maintains/Returns to pre admission functional level  Description: INTERVENTIONS:  - Perform AM-PAC 6 Click Basic Mobility/ Daily Activity assessment daily.  - Set and communicate daily mobility goal to care team and patient/family/caregiver.   - Collaborate with rehabilitation services on mobility goals if consulted  - Perform Range of Motion 3 times a day.  - Reposition patient every 2 hours.  - Dangle patient 3 times a day  - Stand patient 3 times a day  - Ambulate patient 3 times a day  - Out of bed to chair 3 times a day   - Out of bed for meals 3 times a day  - Out of bed for toileting  - Record patient progress and toleration of activity level   Outcome: Progressing     Problem: DISCHARGE PLANNING  Goal: Discharge to home or other facility with appropriate resources  Description: INTERVENTIONS:  - Identify barriers to discharge w/patient and caregiver  - Arrange for needed discharge resources and transportation as appropriate  - Identify  discharge learning needs (meds, wound care, etc.)  - Arrange for interpretive services to assist at discharge as needed  - Refer to Case Management Department for coordinating discharge planning if the patient needs post-hospital services based on physician/advanced practitioner order or complex needs related to functional status, cognitive ability, or social support system  Outcome: Progressing     Problem: Knowledge Deficit  Goal: Patient/family/caregiver demonstrates understanding of disease process, treatment plan, medications, and discharge instructions  Description: Complete learning assessment and assess knowledge base.  Interventions:  - Provide teaching at level of understanding  - Provide teaching via preferred learning methods  Outcome: Progressing     Problem: Nutrition/Hydration-ADULT  Goal: Nutrient/Hydration intake appropriate for improving, restoring or maintaining nutritional needs  Description: Monitor and assess patient's nutrition/hydration status for malnutrition. Collaborate with interdisciplinary team and initiate plan and interventions as ordered.  Monitor patient's weight and dietary intake as ordered or per policy. Utilize nutrition screening tool and intervene as necessary. Determine patient's food preferences and provide high-protein, high-caloric foods as appropriate.     INTERVENTIONS:  - Monitor oral intake, urinary output, labs, and treatment plans  - Assess nutrition and hydration status and recommend course of action  - Evaluate amount of meals eaten  - Assist patient with eating if necessary   - Allow adequate time for meals  - Recommend/ encourage appropriate diets, oral nutritional supplements, and vitamin/mineral supplements  - Order, calculate, and assess calorie counts as needed  - Recommend, monitor, and adjust tube feedings and TPN/PPN based on assessed needs  - Assess need for intravenous fluids  - Provide specific nutrition/hydration education as appropriate  - Include  patient/family/caregiver in decisions related to nutrition  Outcome: Progressing

## 2024-08-26 NOTE — TREATMENT PLAN
Patient chart reviewed and discussed with primary service. Reportedly having pain of the R inguinal region of a known large hernia. Reportedly reducible at the bedside. Given decompensated cirrhosis MELD 14, in the setting of CHRISTIE with ascites, hepatic encephalopathy hx of esophageal varices, pancytopenia s/p colon cancer, DM patient is a poor surgical candidate. No surgical intervention would be offered until strict medical optimization by medical team. Per reports has RUQ tenderness in addition to remaining abdominal quadrants, low suspicious at this moment of biliary source though pending US abdomen may give better characterization, will follow up study. May consider CTAP to characterize contents of hernia for information. Will see and discuss findings with patient in the morning during rounds. Full consult note to follow.    John Stiles MD  PGY-2

## 2024-08-26 NOTE — CASE MANAGEMENT
Case Management Discharge Planning Note    Patient name Foreign Armando  [place] [address] /E5 -* MRN [id number]  : 1960 Date 2024       Current Admission Date: 2024  Current Admission Diagnosis:Decompensated hepatic cirrhosis (HCC)   Patient Active Problem List    Diagnosis Date Noted Date Diagnosed    Chronic pancreatitis (HCC) 2024     Hyperbilirubinemia 2024     Acute encephalopathy 2024     Anemia of chronic disease 2024     Depressed mood 06/10/2024     Seizure disorder (HCC) 2024     Swelling of lower extremity 2024     Nephrolithiasis 2024     Adrenal insufficiency (HCC) 2024     Volume overload 2024     Pleural effusion on right 2024     Abnormal CT scan 2023     ALEXANDER (acute kidney injury) (HCC) 2023     History of colon cancer      Paranoia (HCC) 2022     Abdominal pain 2022     Pancytopenia (HCC) 2022     Decompensated hepatic cirrhosis (HCC) 2022     Thrombocytopenia (HCC) 2022     Restless leg syndrome 2022     Smoking 2022     Type 2 diabetes mellitus with hyperglycemia, without long-term current use of insulin (HCC) 2022       LOS (days): 2  Geometric Mean LOS (GMLOS) (days):   Days to GMLOS:     OBJECTIVE:  Risk of Unplanned Readmission Score: 37.95         Current admission status: Inpatient   Preferred Pharmacy:   [id number] - [place] - [address]  [address] [address]  Phone: [phone] Fax: [phone]    [id number] - [place] - [address] [address]  Phone: [phone] Fax: [phone]    [id number] - [place] - [address]   [address]   [place] [address]  Phone: [phone] Fax: [phone]    [id number] - [place] -  [address]  [address]  [facility] [address]  Phone:  [phone] Fax: [phone]     [facility] [place] - [address] [place] [address]  Phone: [phone] Fax: [phone]    Primary Care Provider: No primary care provider on file.    Primary Insurance: BLUE CROSS  Secondary Insurance:     DISCHARGE DETAILS:       Other Referral/Resources/Interventions Provided:  Interventions: Clothing           Additional Comments: 2x pairs of pants provided at bedside. CM unable to provide shoes in pts side. CM will setup [employer] upon dc. CM dept continues following.

## 2024-08-26 NOTE — BRIEF OP NOTE (RAD/CATH)
IR THORACENTESIS Procedure Note    PATIENT NAME: Foreign Armando  : 1960  MRN: [id number]    Pre-op Diagnosis:   1. Chronic pancreatitis, unspecified pancreatitis type (HCC)    2. Hyperbilirubinemia    3. Decompensated hepatic cirrhosis (HCC)    4. Right upper quadrant abdominal pain    5. Inguinal hernia      Post-op Diagnosis:   1. Chronic pancreatitis, unspecified pancreatitis type (HCC)    2. Hyperbilirubinemia    3. Decompensated hepatic cirrhosis (HCC)    4. Right upper quadrant abdominal pain    5. Inguinal hernia        Provider:   NUBIA Tenorio  Assistants:     No qualified resident was available.    Estimated Blood Loss: none  Findings: 1170 ml of clear yellow fluid obtained from right thoracentesis    Specimens: labs collected and sent    Complications:  none immediately    Anesthesia: local    NUBIA Tenorio     Date: 2024  Time: 10:48 AM

## 2024-08-26 NOTE — CONSULTS
Consultation - General Surgery   Foreign Armando 63 y.o. male MRN: [id number]  Unit/Bed#: E5 -01 Encounter: [id number]    Assessment & Plan     Assessment:  63-year-old male with MELD 14, decompensated cirrhosis history of diabetes, colon cancer, adrenal insufficiency with symptomatic right inguinal hernia      Plan:  -Obtain CTAP to evaluate for contents of inguinal hernia  -Monitor for obstructive symptoms  -No acute indication for surgical repair  -Symptomatic management with analgesia  -Patient not surgical candidate at this time due to decompensated cirrhosis  -Will follow-up ultrasound abdomen-low clinical suspicion for biliary etiology  -Patient would require medical optimization prior to surgical intervention  -Patient may follow-up in the outpatient setting      History of Present Illness     HPI:  Foreign Armando is a 63 y.o. 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  Patient with MELD 14.  GI following along.  Patient reports he has had an abdominal hernia for over 6 months and notes that he has increased pain to this region.  He has associated nausea and vomiting but does not directly related to his p.o. intake.  Additionally the patient notes he is able to have bowel movements and passing flatus.  The patient reports he is normally able to reduce his hernia but notes he feels like this comes back out again immediately after reducing.    Inpatient consult to Acute Care Surgery  Consult performed by: John Stiles MD  Consult ordered by: Sheila Esqueda MD          Review of Systems  Negative except for mentioned above.  Historical Information   Past Medical History:   Diagnosis Date     CHF (congestive heart failure) (HCC)     Cirrhosis (HCC)     CHRISTIE    Colon cancer (HCC)     Diabetes mellitus (HCC)     Neuropathy     Restless leg syndrome      Past Surgical History:   Procedure Laterality Date    EGD AND SIGMOIDOSCOPY FLEXIBLE      IR PARACENTESIS  11/22/2022    IR PARACENTESIS  2/2/2023    IR THORACENTESIS  6/4/2024    LEFT COLON RESECTION      LITHOTRIPSY       Social History   Social History     Substance and Sexual Activity   Alcohol Use Not Currently     Social History     Substance and Sexual Activity   Drug Use Never     E-Cigarette/Vaping    E-Cigarette Use Never User      E-Cigarette/Vaping Substances    Nicotine No     THC No     CBD No     Flavoring No     Other No     Unknown No      Social History     Tobacco Use   Smoking Status Former    Current packs/day: 0.25    Types: Cigarettes   Smokeless Tobacco Never       Meds/Allergies   all current active meds have been reviewed and current meds:   Current Facility-Administered Medications   Medication Dose Route Frequency    acetaminophen (TYLENOL) tablet 325 mg  325 mg Oral Q6H PRN    albuterol inhalation solution 2.5 mg  2.5 mg Nebulization Q6H PRN    aluminum-magnesium hydroxide-simethicone (MAALOX) oral suspension 30 mL  30 mL Oral Q6H PRN    enoxaparin (LOVENOX) subcutaneous injection 40 mg  40 mg Subcutaneous Daily    ferrous sulfate tablet 325 mg  325 mg Oral Daily With Breakfast    furosemide (LASIX) tablet 20 mg  20 mg Oral Daily    gabapentin (NEURONTIN) capsule 300 mg  300 mg Oral TID    hydrocortisone (CORTEF) tablet 10 mg  10 mg Oral QAM    HYDROmorphone (DILAUDID) injection 0.5 mg  0.5 mg Intravenous Q3H PRN    HYDROmorphone HCl (DILAUDID) injection 0.2 mg  0.2 mg Intravenous Q4H PRN    insulin lispro (HumALOG/ADMELOG) 100 units/mL subcutaneous injection 1-5 Units  1-5 Units Subcutaneous TID AC    insulin lispro (HumALOG/ADMELOG) 100 units/mL subcutaneous injection 1-5 Units  1-5 Units Subcutaneous HS    lactulose  "(CHRONULAC) oral solution 20 g  20 g Oral Daily    levETIRAcetam (KEPPRA) tablet 500 mg  500 mg Oral Q12H SANDRA    lidocaine (LIDODERM) 5 % patch 1 patch  1 patch Topical Daily    midodrine (PROAMATINE) tablet 10 mg  10 mg Oral TID AC    nicotine (NICODERM CQ) 21 mg/24 hr TD 24 hr patch 21 mg  21 mg Transdermal Daily    ondansetron (ZOFRAN) injection 4 mg  4 mg Intravenous Q6H PRN    pancrelipase (Lip-Prot-Amyl) (CREON) delayed release capsule 24,000 Units  24,000 Units Oral TID With Meals    polyethylene glycol (MIRALAX) packet 17 g  17 g Oral Daily PRN    rifaximin (XIFAXAN) tablet 550 mg  550 mg Oral BID    rOPINIRole (REQUIP) tablet 2 mg  2 mg Oral BID    spironolactone (ALDACTONE) tablet 100 mg  100 mg Oral Daily    tamsulosin (FLOMAX) capsule 0.4 mg  0.4 mg Oral Daily With Dinner     Allergies   Allergen Reactions    Morphine Anaphylaxis    Morpholine Salicylate Other (See Comments)     \"I go unconscious\"    Clarithromycin Other (See Comments)     Dizzy, nausea, ulcers in stomach    Oxycodone Hives and Facial Swelling     \"Scratchy throat\"      Sulfamethoxazole-Trimethoprim Hives    Nsaids Rash       Objective   First Vitals:   Blood Pressure: 129/79 (08/24/24 2005)  Pulse: 82 (08/24/24 2005)  Temperature: 97.5 °F (36.4 °C) (08/24/24 2005)  Temp Source: Oral (08/24/24 2005)  Respirations: 16 (08/24/24 2005)  Weight - Scale: 71.3 kg (157 lb 3 oz) (08/24/24 2005)  SpO2: 97 % (08/24/24 2005)    Current Vitals:   Blood Pressure: 112/60 (08/25/24 2355)  Pulse: 73 (08/25/24 2355)  Temperature: 98.1 °F (36.7 °C) (08/25/24 2355)  Temp Source: Oral (08/25/24 0710)  Respirations: 18 (08/25/24 0710)  Weight - Scale: 73.8 kg (162 lb 11.2 oz) (08/26/24 0539)  SpO2: 96 % (08/25/24 2355)      Intake/Output Summary (Last 24 hours) at 8/26/2024 0632  Last data filed at 8/26/2024 0539  Gross per 24 hour   Intake 930 ml   Output 1150 ml   Net -220 ml       Invasive Devices       Central Venous Catheter Line  Duration             " Port A Cath Chest -- days                    Physical Exam  Constitutional:       General: He is not in acute distress.     Appearance: He is ill-appearing.   HENT:      Head: Normocephalic.      Nose: Nose normal.      Mouth/Throat:      Mouth: Mucous membranes are dry.   Eyes:      Pupils: Pupils are equal, round, and reactive to light.   Cardiovascular:      Rate and Rhythm: Normal rate.   Pulmonary:      Effort: Pulmonary effort is normal.   Abdominal:      General: There is distension.      Tenderness: There is abdominal tenderness. There is no guarding.      Hernia: A hernia is present.      Comments: Inguinal hernia, contents feel reducible, no overlying skin change, umbilical hernia   Neurological:      Mental Status: He is oriented to person, place, and time. Mental status is at baseline.         Lab Results: I have personally reviewed pertinent lab results.  , CBC:   Lab Results   Component Value Date    WBC 3.44 (L) 08/25/2024    HGB 8.1 (L) 08/25/2024    HCT 25.7 (L) 08/25/2024     (H) 08/25/2024    PLT 39 (L) 08/25/2024    RBC 2.53 (L) 08/25/2024    MCH 32.0 08/25/2024    MCHC 31.5 08/25/2024    RDW 17.5 (H) 08/25/2024    MPV 11.3 08/25/2024    NRBC 0 08/25/2024   , CMP:   Lab Results   Component Value Date    SODIUM 135 08/25/2024    K 3.1 (L) 08/25/2024     08/25/2024    CO2 24 08/25/2024    BUN 9 08/25/2024    CREATININE 0.69 08/25/2024    CALCIUM 6.6 (L) 08/25/2024    AST 31 08/25/2024    ALT 25 08/25/2024    ALKPHOS 116 (H) 08/25/2024    EGFR 101 08/25/2024     Imaging: I have personally reviewed pertinent reports.   and I have personally reviewed pertinent films in PACS  EKG, Pathology, and Other Studies: I have personally reviewed pertinent reports.   and I have personally reviewed pertinent films in PACS    Counseling / Coordination of Care  Total floor / unit time spent today 25 minutes.  Greater than 50% of total time was spent with the patient and / or family counseling and / or  coordination of care.

## 2024-08-26 NOTE — NURSING NOTE
Pt reported inability to urinate along with itching in BLE and numbness and tingling in the LLE. Pt with +2 pedal pulse on LLE and +3 pitting edema. Pt asked ocreotide drip to be stopped as he thinks this is the cause of his symptoms. On call SLIM made aware. VS WDL.Drip stopped.     Bladder scan PVR was 261 however pt continued to void without difficulty throughout the rest of the night. Difficulty urinating most likely d/t large inguinal hernia.

## 2024-08-27 ENCOUNTER — TELEPHONE (OUTPATIENT)
Dept: GASTROENTEROLOGY | Facility: CLINIC | Age: 64
End: 2024-08-27

## 2024-08-27 VITALS
WEIGHT: 156.31 LBS | HEART RATE: 78 BPM | SYSTOLIC BLOOD PRESSURE: 101 MMHG | DIASTOLIC BLOOD PRESSURE: 57 MMHG | RESPIRATION RATE: 18 BRPM | OXYGEN SATURATION: 98 % | TEMPERATURE: 98.1 F | BODY MASS INDEX: 24.48 KG/M2

## 2024-08-27 LAB
ALBUMIN SERPL BCG-MCNC: 2.3 G/DL (ref 3.5–5)
ALP SERPL-CCNC: 114 U/L (ref 34–104)
ALT SERPL W P-5'-P-CCNC: 27 U/L (ref 7–52)
ANION GAP SERPL CALCULATED.3IONS-SCNC: 1 MMOL/L (ref 4–13)
AST SERPL W P-5'-P-CCNC: 31 U/L (ref 13–39)
BILIRUB SERPL-MCNC: 2.01 MG/DL (ref 0.2–1)
BUN SERPL-MCNC: 8 MG/DL (ref 5–25)
CALCIUM ALBUM COR SERPL-MCNC: 8.8 MG/DL (ref 8.3–10.1)
CALCIUM SERPL-MCNC: 7.4 MG/DL (ref 8.4–10.2)
CHLORIDE SERPL-SCNC: 102 MMOL/L (ref 96–108)
CO2 SERPL-SCNC: 30 MMOL/L (ref 21–32)
CREAT SERPL-MCNC: 0.86 MG/DL (ref 0.6–1.3)
ERYTHROCYTE [DISTWIDTH] IN BLOOD BY AUTOMATED COUNT: 17.2 % (ref 11.6–15.1)
GFR SERPL CREATININE-BSD FRML MDRD: 92 ML/MIN/1.73SQ M
GLUCOSE SERPL-MCNC: 164 MG/DL (ref 65–140)
GLUCOSE SERPL-MCNC: 167 MG/DL (ref 65–140)
GLUCOSE SERPL-MCNC: 202 MG/DL (ref 65–140)
HCT VFR BLD AUTO: 23.9 % (ref 36.5–49.3)
HGB BLD-MCNC: 8 G/DL (ref 12–17)
MCH RBC QN AUTO: 32.5 PG (ref 26.8–34.3)
MCHC RBC AUTO-ENTMCNC: 33.5 G/DL (ref 31.4–37.4)
MCV RBC AUTO: 97 FL (ref 82–98)
PLATELET # BLD AUTO: 19 THOUSANDS/UL (ref 149–390)
POTASSIUM SERPL-SCNC: 4.4 MMOL/L (ref 3.5–5.3)
PROT SERPL-MCNC: 5.4 G/DL (ref 6.4–8.4)
RBC # BLD AUTO: 2.46 MILLION/UL (ref 3.88–5.62)
SODIUM SERPL-SCNC: 133 MMOL/L (ref 135–147)
WBC # BLD AUTO: 2.56 THOUSAND/UL (ref 4.31–10.16)

## 2024-08-27 PROCEDURE — 80053 COMPREHEN METABOLIC PANEL: CPT | Performed by: INTERNAL MEDICINE

## 2024-08-27 PROCEDURE — 82948 REAGENT STRIP/BLOOD GLUCOSE: CPT

## 2024-08-27 PROCEDURE — 85027 COMPLETE CBC AUTOMATED: CPT | Performed by: INTERNAL MEDICINE

## 2024-08-27 PROCEDURE — 99239 HOSP IP/OBS DSCHRG MGMT >30: CPT | Performed by: INTERNAL MEDICINE

## 2024-08-27 RX ORDER — MIDODRINE HYDROCHLORIDE 5 MG/1
10 TABLET ORAL
Qty: 180 TABLET | Refills: 0 | Status: SHIPPED | OUTPATIENT
Start: 2024-08-27 | End: 2024-08-27

## 2024-08-27 RX ORDER — ROPINIROLE 4 MG/1
4 TABLET, FILM COATED ORAL 2 TIMES DAILY
Qty: 60 TABLET | Refills: 0 | Status: SHIPPED | OUTPATIENT
Start: 2024-08-27 | End: 2024-09-26

## 2024-08-27 RX ORDER — SPIRONOLACTONE 100 MG/1
100 TABLET, FILM COATED ORAL DAILY
Qty: 30 TABLET | Refills: 0 | Status: SHIPPED | OUTPATIENT
Start: 2024-08-27 | End: 2024-08-27

## 2024-08-27 RX ORDER — PANTOPRAZOLE SODIUM 40 MG/1
40 TABLET, DELAYED RELEASE ORAL
Qty: 30 TABLET | Refills: 0 | Status: SHIPPED | OUTPATIENT
Start: 2024-08-27 | End: 2024-08-27

## 2024-08-27 RX ORDER — LEVETIRACETAM 500 MG/1
500 TABLET ORAL EVERY 12 HOURS SCHEDULED
Qty: 60 TABLET | Refills: 0 | Status: SHIPPED | OUTPATIENT
Start: 2024-08-27 | End: 2024-09-26

## 2024-08-27 RX ORDER — LEVETIRACETAM 500 MG/1
500 TABLET ORAL EVERY 12 HOURS SCHEDULED
Qty: 60 TABLET | Refills: 0 | Status: SHIPPED | OUTPATIENT
Start: 2024-08-27 | End: 2024-08-27

## 2024-08-27 RX ORDER — PANTOPRAZOLE SODIUM 40 MG/1
40 TABLET, DELAYED RELEASE ORAL
Qty: 30 TABLET | Refills: 0 | Status: SHIPPED | OUTPATIENT
Start: 2024-08-27 | End: 2024-09-26

## 2024-08-27 RX ORDER — SPIRONOLACTONE 100 MG/1
100 TABLET, FILM COATED ORAL DAILY
Qty: 30 TABLET | Refills: 0 | Status: SHIPPED | OUTPATIENT
Start: 2024-08-27

## 2024-08-27 RX ORDER — LACTULOSE 20 G/30ML
20 SOLUTION ORAL DAILY
Qty: 1800 ML | Refills: 0 | Status: SHIPPED | OUTPATIENT
Start: 2024-08-27 | End: 2024-08-27

## 2024-08-27 RX ORDER — SPIRONOLACTONE 50 MG/1
100 TABLET, FILM COATED ORAL DAILY
Status: DISCONTINUED | OUTPATIENT
Start: 2024-08-27 | End: 2024-08-27 | Stop reason: HOSPADM

## 2024-08-27 RX ORDER — FUROSEMIDE 20 MG
20 TABLET ORAL DAILY
Qty: 30 TABLET | Refills: 0 | Status: SHIPPED | OUTPATIENT
Start: 2024-08-27 | End: 2024-08-27

## 2024-08-27 RX ORDER — MIDODRINE HYDROCHLORIDE 5 MG/1
10 TABLET ORAL
Qty: 180 TABLET | Refills: 0 | Status: SHIPPED | OUTPATIENT
Start: 2024-08-27 | End: 2024-09-26

## 2024-08-27 RX ORDER — HYDROCORTISONE 5 MG/1
TABLET ORAL
Qty: 60 TABLET | Refills: 0 | Status: SHIPPED | OUTPATIENT
Start: 2024-08-27 | End: 2024-08-27

## 2024-08-27 RX ORDER — FUROSEMIDE 20 MG
20 TABLET ORAL DAILY
Status: DISCONTINUED | OUTPATIENT
Start: 2024-08-27 | End: 2024-08-27 | Stop reason: HOSPADM

## 2024-08-27 RX ORDER — HYDROCORTISONE 5 MG/1
TABLET ORAL
Qty: 60 TABLET | Refills: 0 | Status: SHIPPED | OUTPATIENT
Start: 2024-08-27

## 2024-08-27 RX ORDER — FUROSEMIDE 20 MG
20 TABLET ORAL DAILY
Qty: 30 TABLET | Refills: 0 | Status: SHIPPED | OUTPATIENT
Start: 2024-08-27 | End: 2024-09-26

## 2024-08-27 RX ORDER — HYDROCORTISONE 5 MG/1
5 TABLET ORAL
Status: ON HOLD | COMMUNITY
Start: 2024-05-14 | End: 2024-08-27

## 2024-08-27 RX ORDER — LACTULOSE 20 G/30ML
20 SOLUTION ORAL DAILY
Qty: 1800 ML | Refills: 0 | Status: SHIPPED | OUTPATIENT
Start: 2024-08-27

## 2024-08-27 RX ORDER — ROPINIROLE 4 MG/1
4 TABLET, FILM COATED ORAL 2 TIMES DAILY
Qty: 60 TABLET | Refills: 0 | Status: SHIPPED | OUTPATIENT
Start: 2024-08-27 | End: 2024-08-27

## 2024-08-27 RX ADMIN — ACETAMINOPHEN 325 MG: 325 TABLET ORAL at 08:22

## 2024-08-27 RX ADMIN — LACTULOSE 20 G: 20 SOLUTION ORAL at 08:12

## 2024-08-27 RX ADMIN — HYDROCORTISONE 10 MG: 10 TABLET ORAL at 08:12

## 2024-08-27 RX ADMIN — PANCRELIPASE 24000 UNITS: 120000; 24000; 76000 CAPSULE, DELAYED RELEASE PELLETS ORAL at 11:07

## 2024-08-27 RX ADMIN — HYDROCORTISONE: 10 OINTMENT TOPICAL at 00:09

## 2024-08-27 RX ADMIN — PANTOPRAZOLE SODIUM 40 MG: 40 INJECTION, POWDER, FOR SOLUTION INTRAVENOUS at 08:12

## 2024-08-27 RX ADMIN — INSULIN LISPRO 1 UNITS: 100 INJECTION, SOLUTION INTRAVENOUS; SUBCUTANEOUS at 11:07

## 2024-08-27 RX ADMIN — GABAPENTIN 300 MG: 300 CAPSULE ORAL at 08:12

## 2024-08-27 RX ADMIN — FUROSEMIDE 20 MG: 20 TABLET ORAL at 10:27

## 2024-08-27 RX ADMIN — MIDODRINE HYDROCHLORIDE 10 MG: 5 TABLET ORAL at 11:07

## 2024-08-27 RX ADMIN — HYDROMORPHONE HYDROCHLORIDE 0.5 MG: 1 INJECTION, SOLUTION INTRAMUSCULAR; INTRAVENOUS; SUBCUTANEOUS at 00:23

## 2024-08-27 RX ADMIN — ROPINIROLE HYDROCHLORIDE 2 MG: 1 TABLET, FILM COATED ORAL at 08:12

## 2024-08-27 RX ADMIN — LEVETIRACETAM 500 MG: 500 TABLET, FILM COATED ORAL at 08:12

## 2024-08-27 RX ADMIN — MIDODRINE HYDROCHLORIDE 10 MG: 5 TABLET ORAL at 06:21

## 2024-08-27 RX ADMIN — SPIRONOLACTONE 100 MG: 50 TABLET ORAL at 10:27

## 2024-08-27 RX ADMIN — RIFAXIMIN 550 MG: 550 TABLET ORAL at 08:12

## 2024-08-27 RX ADMIN — INSULIN LISPRO 1 UNITS: 100 INJECTION, SOLUTION INTRAVENOUS; SUBCUTANEOUS at 08:12

## 2024-08-27 NOTE — RESTORATIVE TECHNICIAN NOTE
Restorative Technician Note      Patient Name: Foreign Armando     Restorative Tech Visit Date: 08/27/24  Note Type: Mobility  Patient Position Upon Consult: Supine  Activity Performed: Ambulated  Assistive Device: -- (none)  Patient Position at End of Consult: All needs within reach; Supine            ns

## 2024-08-27 NOTE — ASSESSMENT & PLAN NOTE
Patient is a poor historian but did report intermittent melena at home  No events here  Hemoglobin stable, patient having brown stool  Management of varices as above

## 2024-08-27 NOTE — TELEPHONE ENCOUNTER
Attempted to leave voicemail messages at [phone] and [phone] (Candace Kim), however, unable to do so as both voicemail mailboxes were full.  Pt has been scheduled for an appointment on 11/21/24 with Dr. Karri Martin per Dr. Lopez' directive.  Pt has diagnosis of Cirrhosis without established follow up and needs to discuss serial EGD for variceal banding per Dr. Lopez.  Appointment letter sent to Pt's address listed in chart, Pt does not MyChart/Email access upon review of Pt's chart.

## 2024-08-27 NOTE — DISCHARGE SUMMARY
[facility]  Discharge- Foreign Armando 1960, 63 y.o. male MRN: [id number]  Unit/Bed#: E5 -01 Encounter: [id number]  Primary Care Provider: No primary care provider on file.   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  Patient initially presented to [place] being transferred to [facility] for paracentesis.     A.) ascites  Patient is maintained on furosemide 40 mg daily and Aldactone 100 mg daily  S/p IR paracentesis 8/26, fluid studies negative for SBP  B.) esophageal varices  Patient had recent EGD in August 2024 at [facility] where he was found to have medium esophageal varices, portal gastropathy and GAVE  Reportedly on bb as well as midodrine. No optimal therapy per GI. If unable to tolerate NSBB, then would need serial banding  Hold propranolol on discharge  C.) hepatic encephalopathy  Continue lactulose  D.) hyperbilirubinemia  E.) coagulopathy    Close outpatient follow-up with GI  Refilled chronic medications prior to discharge    Anemia  Assessment & Plan  Patient is a poor historian but did report intermittent melena at home  No events here  Hemoglobin stable, patient having brown stool  Management of varices as above      Chronic pancreatitis (HCC)  Assessment & Plan  On Creon, continue home regimen    Anemia of chronic disease  Assessment & Plan  Hgb stable on admission   Continue with Iron supplement daily     Seizure disorder (HCC)  Assessment & Plan  Continue with Keppra daily    Adrenal insufficiency (HCC)  Assessment & Plan  Maintained on on Solu-Cortef 10 mg in the morning, 5  mg in the evening and midodrine 10 mg 3 times daily    History of colon cancer  Assessment & Plan  History of colon cancer status post right hemicolectomy in 2017 and chemotherapy     Pancytopenia (HCC)  Assessment & Plan  Secondary to liver cirrhosis     Abdominal pain  Assessment & Plan  Likely due to CHRISTIE and liver cirrhosis  Recently on 8/11/2024, the patient visited [facility] ER due to the same after hospitalization  Suspect secondary to ascites  CT with Distended fluid-filled stomach.  Likely related to recent ingestion as it was taken in the evening.  Patient tolerating oral diet today without issue  Abdominal pain largely resolved following paracentesis  Surgery evaluated: Patient is high risk for anesthesia and is not compensated well enough at this time for procedure         Type 2 diabetes mellitus with hyperglycemia, without long-term current use of insulin (HCC)  Assessment & Plan  Continue SSI        Smoking  Assessment & Plan  Discussed smoking cessation    Restless leg syndrome  Assessment & Plan  On Requip daily      Discharging Physician / Practitioner: Barney Garvey DO  PCP: No primary care provider on file.  Admission Date:   Admission Orders (From admission, onward)       Ordered        08/24/24 2031  INPATIENT ADMISSION  Once            08/24/24 2031  Place in Observation  Once                          Discharge Date: 08/27/24    Medical Problems       Resolved Problems  Date Reviewed: 8/25/2024   None         Consultations During Hospital Stay:   IP CONSULT TO GASTROENTEROLOGY  IP CONSULT TO ACUTE CARE SURGERY    Procedures Performed: None    Significant Findings / Test Results:     IR INPATIENT Paracentesis    Result Date: 8/27/2024  Impression: Ultrasound guided left paracentesis. Signed, performed, and dictated by NUBIA Hanks Supervising Physician: Dr. Martin Workstation performed: [id number]     IR IN-Patient Thoracentesis    Result Date: 8/27/2024  Impression: Ultrasound guided  right thoracentesis. Signed, performed, and dictated by NUBIA Hanks Supervising Physician: Dr Martin Workstation performed: [id number]     US abdomen complete with doppler    Result Date: 8/26/2024  Impression: Cirrhosis with moderate volume abdominopelvic ascites and splenomegaly. Workstation performed: [id number]     CT abdomen pelvis w contrast    Result Date: 8/26/2024  Impression: 1. Distended fluid-filled stomach. Correlate with recent ingestion or for the possibility of delayed gastric emptying or gastric outlet obstruction. No evidence of small bowel obstruction. 2. Cirrhosis with portal hypertension, moderate volume ascites, moderate mesenteric edema, moderate to large right pleural effusion and mild body wall edema. 3. 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 For simple cyst(s) less than 1.5 cm, recommend yearly follow-up 5 times, then every 2 years for 2 times. If cyst(s) stable after 9 years, no further  follow-ups. Recommend next follow-up in 1 year. Preferred imaging modality: abdomen MRI and MRCP with and without IV contrast, or triple phase abdomen CT with IV contrast, or abdomen MRI and MRCP without IV contrast. The major findings are in agreement with the preliminary report provided by [employer] which was provided shortly after completion of the exam. The additional finding of distended stomach and pancreatic cyst   will now be communicated with patient's clinical team by our radiology liaison. The study was marked in EPIC for immediate notification. Workstation performed: [id number]     XR chest portable    Result Date: 8/24/2024  Impression: Possible small right pleural effusion. Otherwise, unremarkable chest radiograph. Resident: MADISON RUIZ I, the attending radiologist, have reviewed the images and agree with the final report above. Workstation performed: [id number]       Incidental Findings: None    Test  Results Pending at Discharge (will require follow up): None     Outpatient Tests Requested: None    Reason for Admission: Abdominal Pain     Hospital Course:     Foreign Armando is a 63 y.o. male With past medical history of cirrhosis secondary to Carrillo complicated by ascites/hepatic encephalopathy/varices/pancytopenia, type 2 diabetes, history of colon cancer, adrenal insufficiency on chronic steroids presents to the hospital with abdominal pain.  Patient was found to have large ascites as well as pleural effusion.  Patient underwent paracentesis and thoracentesis with improvement of his symptoms.  Fluid studies were benign.  Patient had inquired about hernia repair, surgery evaluated and at this time he is too high risk for anesthesia.  Patient's pertinent home medications were refilled at time of discharge.    Please see above list of diagnoses and related plan for additional information.     Condition at Discharge: stable     Discharge Day Visit / Exam:     Subjective:  Patient seen and evaluated at bedside.  No overnight events.  We discussed discharge planning.    Vitals: Blood Pressure: 101/57 (08/27/24 1025)  Pulse: 78 (08/27/24 1025)  Temperature: 98.1 °F (36.7 °C) (08/27/24 0705)  Temp Source: Oral (08/27/24 0705)  Respirations: 18 (08/27/24 0705)  Weight - Scale: 70.9 kg (156 lb 4.9 oz) (08/27/24 0600)  SpO2: 98 % (08/27/24 1025)  Exam:   Physical Exam  Vitals reviewed.   Constitutional:       General: He is not in acute distress.     Appearance: He is well-developed. He is not toxic-appearing or diaphoretic.      Comments: Chronically ill-appearing   HENT:      Head: Normocephalic and atraumatic.      Mouth/Throat:      Mouth: Mucous membranes are moist.      Pharynx: No oropharyngeal exudate.   Eyes:      General: No scleral icterus.     Extraocular Movements: Extraocular movements intact.      Conjunctiva/sclera: Conjunctivae normal.   Cardiovascular:      Rate and Rhythm: Normal rate and regular  rhythm.      Heart sounds: Normal heart sounds.   Pulmonary:      Effort: Pulmonary effort is normal. No respiratory distress.      Breath sounds: Normal breath sounds. No wheezing or rales.   Abdominal:      General: There is no distension.      Palpations: Abdomen is soft.      Tenderness: There is no abdominal tenderness. There is no guarding or rebound.   Musculoskeletal:         General: No swelling, tenderness or deformity.   Skin:     General: Skin is warm and dry.   Neurological:      General: No focal deficit present.      Mental Status: He is alert. Mental status is at baseline.   Psychiatric:         Mood and Affect: Mood normal.         Behavior: Behavior normal.         Thought Content: Thought content normal.         Judgment: Judgment normal.           Discharge instructions/Information to patient and family:   See after visit summary for information provided to patient and family.      Provisions for Follow-Up Care:  See after visit summary for information related to follow-up care and any pertinent home health orders.      Disposition:     Home     Discharge Statement:  I spent 60 minutes discharging the patient. This time was spent on the day of discharge. I had direct contact with the patient on the day of discharge. Greater than 50% of the total time was spent examining patient, answering all patient questions, arranging and discussing plan of care with patient as well as directly providing post-discharge instructions.  Additional time then spent on discharge activities.    Discharge Medications:  See after visit summary for reconciled discharge medications provided to patient and family.      ** Please Note: This note has been constructed using a voice recognition system **

## 2024-08-27 NOTE — PLAN OF CARE
Problem: PAIN - ADULT  Goal: Verbalizes/displays adequate comfort level or baseline comfort level  Description: Interventions:  - Encourage patient to monitor pain and request assistance  - Assess pain using appropriate pain scale  - Administer analgesics based on type and severity of pain and evaluate response  - Implement non-pharmacological measures as appropriate and evaluate response  - Consider cultural and social influences on pain and pain management  - Notify physician/advanced practitioner if interventions unsuccessful or patient reports new pain  Outcome: Progressing     Problem: INFECTION - ADULT  Goal: Absence or prevention of progression during hospitalization  Description: INTERVENTIONS:  - Assess and monitor for signs and symptoms of infection  - Monitor lab/diagnostic results  - Monitor all insertion sites, i.e. indwelling lines, tubes, and drains  - Monitor endotracheal if appropriate and nasal secretions for changes in amount and color  - [place] appropriate cooling/warming therapies per order  - Administer medications as ordered  - Instruct and encourage patient and family to use good hand hygiene technique  - Identify and instruct in appropriate isolation precautions for identified infection/condition  Outcome: Progressing  Goal: Absence of fever/infection during neutropenic period  Description: INTERVENTIONS:  - Monitor WBC    Outcome: Progressing     Problem: SAFETY ADULT  Goal: Patient will remain free of falls  Description: INTERVENTIONS:  - Educate patient/family on patient safety including physical limitations  - Instruct patient to call for assistance with activity   - Consult OT/PT to assist with strengthening/mobility   - Keep Call bell within reach  - Keep bed low and locked with side rails adjusted as appropriate  - Keep care items and personal belongings within reach  - Initiate and maintain comfort rounds  - Make Fall Risk Sign visible to staff  - Offer Toileting every 2 Hours,  in advance of need  - Initiate/Maintain bed alarm  - Apply yellow socks and bracelet for high fall risk patients  - Consider moving patient to room near nurses station  Outcome: Progressing  Goal: Maintain or return to baseline ADL function  Description: INTERVENTIONS:  -  Assess patient's ability to carry out ADLs; assess patient's baseline for ADL function and identify physical deficits which impact ability to perform ADLs (bathing, care of mouth/teeth, toileting, grooming, dressing, etc.)  - Assess/evaluate cause of self-care deficits   - Assess range of motion  - Assess patient's mobility; develop plan if impaired  - Assess patient's need for assistive devices and provide as appropriate  - Encourage maximum independence but intervene and supervise when necessary  - Involve family in performance of ADLs  - Assess for home care needs following discharge   - Consider OT consult to assist with ADL evaluation and planning for discharge  - Provide patient education as appropriate  Outcome: Progressing  Goal: Maintains/Returns to pre admission functional level  Description: INTERVENTIONS:  - Perform AM-PAC 6 Click Basic Mobility/ Daily Activity assessment daily.  - Set and communicate daily mobility goal to care team and patient/family/caregiver.   - Collaborate with rehabilitation services on mobility goals if consulted  - Perform Range of Motion 3 times a day.  - Reposition patient every 2 hours.  - Dangle patient 3 times a day  - Stand patient 3 times a day  - Ambulate patient 3 times a day  - Out of bed to chair 3 times a day   - Out of bed for meals 3 times a day  - Out of bed for toileting  - Record patient progress and toleration of activity level   Outcome: Progressing     Problem: DISCHARGE PLANNING  Goal: Discharge to home or other facility with appropriate resources  Description: INTERVENTIONS:  - Identify barriers to discharge w/patient and caregiver  - Arrange for needed discharge resources and  transportation as appropriate  - Identify discharge learning needs (meds, wound care, etc.)  - Arrange for interpretive services to assist at discharge as needed  - Refer to Case Management Department for coordinating discharge planning if the patient needs post-hospital services based on physician/advanced practitioner order or complex needs related to functional status, cognitive ability, or social support system  Outcome: Progressing     Problem: Knowledge Deficit  Goal: Patient/family/caregiver demonstrates understanding of disease process, treatment plan, medications, and discharge instructions  Description: Complete learning assessment and assess knowledge base.  Interventions:  - Provide teaching at level of understanding  - Provide teaching via preferred learning methods  Outcome: Progressing     Problem: Nutrition/Hydration-ADULT  Goal: Nutrient/Hydration intake appropriate for improving, restoring or maintaining nutritional needs  Description: Monitor and assess patient's nutrition/hydration status for malnutrition. Collaborate with interdisciplinary team and initiate plan and interventions as ordered.  Monitor patient's weight and dietary intake as ordered or per policy. Utilize nutrition screening tool and intervene as necessary. Determine patient's food preferences and provide high-protein, high-caloric foods as appropriate.     INTERVENTIONS:  - Monitor oral intake, urinary output, labs, and treatment plans  - Assess nutrition and hydration status and recommend course of action  - Evaluate amount of meals eaten  - Assist patient with eating if necessary   - Allow adequate time for meals  - Recommend/ encourage appropriate diets, oral nutritional supplements, and vitamin/mineral supplements  - Order, calculate, and assess calorie counts as needed  - Recommend, monitor, and adjust tube feedings and TPN/PPN based on assessed needs  - Assess need for intravenous fluids  - Provide specific  nutrition/hydration education as appropriate  - Include patient/family/caregiver in decisions related to nutrition  Outcome: Progressing

## 2024-08-27 NOTE — ASSESSMENT & PLAN NOTE
Likely due to CHRISTIE and liver cirrhosis  Recently on 8/11/2024, the patient visited [facility] ER due to the same after hospitalization  Suspect secondary to ascites  CT with Distended fluid-filled stomach.  Likely related to recent ingestion as it was taken in the evening.  Patient tolerating oral diet today without issue  Abdominal pain largely resolved following paracentesis  Surgery evaluated: Patient is high risk for anesthesia and is not compensated well enough at this time for procedure

## 2024-08-27 NOTE — ASSESSMENT & PLAN NOTE
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  Patient initially presented to [place] being transferred to [facility] for paracentesis.     A.) ascites  Patient is maintained on furosemide 40 mg daily and Aldactone 100 mg daily  S/p IR paracentesis 8/26, fluid studies negative for SBP  B.) esophageal varices  Patient had recent EGD in August 2024 at [facility] where he was found to have medium esophageal varices, portal gastropathy and GAVE  Reportedly on bb as well as midodrine. No optimal therapy per GI. If unable to tolerate NSBB, then would need serial banding  Hold propranolol on discharge  C.) hepatic encephalopathy  Continue lactulose  D.) hyperbilirubinemia  E.) coagulopathy    Close outpatient follow-up with GI  Refilled chronic medications prior to discharge

## 2024-08-27 NOTE — QUICK NOTE
Night Progress Note 08/26/24:  Paged regarding itching. Patient examined, eyes closed on initial arrival. Mainly complaining of diffuse pruritus/leg discomfort after started on octreotide infusion. No rash/urticaria appreciated. Various scabs/erythema from scratching. Ecchymosis around tape/bandage of thoracentesis site.     Assessment & Plan:  Urticaria  - lower suspicion for acute reaction given no urticaria, upper airway complaints.   - trial benadryl, famotidine, choleysteramine, topical creams   - low suspicion for association with octreotide but will hold for now

## 2024-08-27 NOTE — DISCHARGE INSTR - AVS FIRST PAGE
Dear Foreign Armando,     It was our pleasure to care for you here at [facility].  It is our hope that we were always able to exceed the expected standards for your care during your stay.  You were hospitalized due to decompensated cirrhosis with fluid accumulation in your belly and your lungs.  You were cared for on the fifth floor under the service of Barney Garvey DO with the [facility] who covers for your primary care physician (PCP), No primary care provider on file., while you were hospitalized.  If you have any questions or concerns related to this hospitalization, you may contact us at .  For follow up as well as medication refills, we recommend that you follow up with your primary care physician.  A registered nurse will reach out to you by phone within a few days after your discharge to answer any additional questions that you may have after going home.  However, at this time we provide for you here, the most important instructions / recommendations at discharge:     Notable Medication Adjustments -   Refills of pertinent home medications.   Testing Required after Discharge -   Need an EGD as an outpatient  ** Please contact your PCP to request testing orders for any of the testing recommended here **  Important Follow Up Information -   Please follow-up with GI doctor for repeat endoscopy to evaluate your varices  Other Instructions -   If you have black stool, please call your doctor or present to the emergency department  Please review this entire after visit summary as additional general instructions including medication list, appointments, activity, diet, any pertinent wound care, and other additional recommendations from your care team that may be provided for you.      Sincerely,     Barney Garvey DO

## 2024-08-27 NOTE — INCIDENTAL FINDINGS
The following findings require follow up:  Radiographic finding   Finding: 3. 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 For simple cyst(s) less than 1.5 cm, recommend yearly follow-up 5 times, then every 2 years for 2 times. If cyst(s) stable after 9 years, no further, follow-ups. Recommend next follow-up in 1 year. Preferred imaging modality: abdomen MRI and MRCP with and without IV contrast, or triple phase abdomen CT with IV contrast, or abdomen MRI and MRCP without IV contrast.,     Follow up with PCP

## 2024-08-27 NOTE — PLAN OF CARE
Problem: PAIN - ADULT  Goal: Verbalizes/displays adequate comfort level or baseline comfort level  Description: Interventions:  - Encourage patient to monitor pain and request assistance  - Assess pain using appropriate pain scale  - Administer analgesics based on type and severity of pain and evaluate response  - Implement non-pharmacological measures as appropriate and evaluate response  - Consider cultural and social influences on pain and pain management  - Notify physician/advanced practitioner if interventions unsuccessful or patient reports new pain  Outcome: Progressing     Problem: INFECTION - ADULT  Goal: Absence or prevention of progression during hospitalization  Description: INTERVENTIONS:  - Assess and monitor for signs and symptoms of infection  - Monitor lab/diagnostic results  - Monitor all insertion sites, i.e. indwelling lines, tubes, and drains  - Monitor endotracheal if appropriate and nasal secretions for changes in amount and color  - [place] appropriate cooling/warming therapies per order  - Administer medications as ordered  - Instruct and encourage patient and family to use good hand hygiene technique  - Identify and instruct in appropriate isolation precautions for identified infection/condition  Outcome: Progressing  Goal: Absence of fever/infection during neutropenic period  Description: INTERVENTIONS:  - Monitor WBC    Outcome: Progressing     Problem: SAFETY ADULT  Goal: Patient will remain free of falls  Description: INTERVENTIONS:  - Educate patient/family on patient safety including physical limitations  - Instruct patient to call for assistance with activity   - Consult OT/PT to assist with strengthening/mobility   - Keep Call bell within reach  - Keep bed low and locked with side rails adjusted as appropriate  - Keep care items and personal belongings within reach  - Initiate and maintain comfort rounds  - Make Fall Risk Sign visible to staff  - Offer Toileting every 2 Hours,  in advance of need  - Initiate/Maintain bed alarm  - Obtain necessary fall risk management equipment: socks   - Apply yellow socks and bracelet for high fall risk patients  - Consider moving patient to room near nurses station  Outcome: Progressing  Goal: Maintain or return to baseline ADL function  Description: INTERVENTIONS:  -  Assess patient's ability to carry out ADLs; assess patient's baseline for ADL function and identify physical deficits which impact ability to perform ADLs (bathing, care of mouth/teeth, toileting, grooming, dressing, etc.)  - Assess/evaluate cause of self-care deficits   - Assess range of motion  - Assess patient's mobility; develop plan if impaired  - Assess patient's need for assistive devices and provide as appropriate  - Encourage maximum independence but intervene and supervise when necessary  - Involve family in performance of ADLs  - Assess for home care needs following discharge   - Consider OT consult to assist with ADL evaluation and planning for discharge  - Provide patient education as appropriate  Outcome: Progressing  Goal: Maintains/Returns to pre admission functional level  Description: INTERVENTIONS:  - Perform AM-PAC 6 Click Basic Mobility/ Daily Activity assessment daily.  - Set and communicate daily mobility goal to care team and patient/family/caregiver.   - Collaborate with rehabilitation services on mobility goals if consulted  - Perform Range of Motion 4 times a day.  - Reposition patient every 2 hours.  - Dangle patient 3 times a day  - Stand patient 3 times a day  - Ambulate patient 3 times a day  - Out of bed to chair 3 times a day   - Out of bed for meals 3 times a day  - Out of bed for toileting  - Record patient progress and toleration of activity level   Outcome: Progressing     Problem: DISCHARGE PLANNING  Goal: Discharge to home or other facility with appropriate resources  Description: INTERVENTIONS:  - Identify barriers to discharge w/patient and  caregiver  - Arrange for needed discharge resources and transportation as appropriate  - Identify discharge learning needs (meds, wound care, etc.)  - Arrange for interpretive services to assist at discharge as needed  - Refer to Case Management Department for coordinating discharge planning if the patient needs post-hospital services based on physician/advanced practitioner order or complex needs related to functional status, cognitive ability, or social support system  Outcome: Progressing     Problem: Knowledge Deficit  Goal: Patient/family/caregiver demonstrates understanding of disease process, treatment plan, medications, and discharge instructions  Description: Complete learning assessment and assess knowledge base.  Interventions:  - Provide teaching at level of understanding  - Provide teaching via preferred learning methods  Outcome: Progressing     Problem: Nutrition/Hydration-ADULT  Goal: Nutrient/Hydration intake appropriate for improving, restoring or maintaining nutritional needs  Description: Monitor and assess patient's nutrition/hydration status for malnutrition. Collaborate with interdisciplinary team and initiate plan and interventions as ordered.  Monitor patient's weight and dietary intake as ordered or per policy. Utilize nutrition screening tool and intervene as necessary. Determine patient's food preferences and provide high-protein, high-caloric foods as appropriate.     INTERVENTIONS:  - Monitor oral intake, urinary output, labs, and treatment plans  - Assess nutrition and hydration status and recommend course of action  - Evaluate amount of meals eaten  - Assist patient with eating if necessary   - Allow adequate time for meals  - Recommend/ encourage appropriate diets, oral nutritional supplements, and vitamin/mineral supplements  - Order, calculate, and assess calorie counts as needed  - Recommend, monitor, and adjust tube feedings and TPN/PPN based on assessed needs  - Assess need for  intravenous fluids  - Provide specific nutrition/hydration education as appropriate  - Include patient/family/caregiver in decisions related to nutrition  Outcome: Progressing

## 2024-08-27 NOTE — NURSING NOTE
No IV in place, port deaccessed. Medications returned to pt from pharmacy. New prescriptions picked up and delivered to pt. Discharge instructions reviewed. Questions answered.

## 2024-08-28 NOTE — UTILIZATION REVIEW
NOTIFICATION OF ADMISSION DISCHARGE   This is a Notification of Discharge from [facility]. Please be advised that this patient has been discharge from our facility. Below you will find the admission and discharge date and time including the patient’s disposition.   UTILIZATION REVIEW CONTACT:  Angela Alvarez  Utilization   Network Utilization Review Department  Phone: [phone] x carefully listen to the prompts. All voicemails are confidential.  Email: [email or web address]     ADMISSION INFORMATION  PRESENTATION DATE: 8/24/2024  7:58 PM  OBERVATION ADMISSION DATE: N/A  INPATIENT ADMISSION DATE: 8/24/24  7:58 PM   DISCHARGE DATE: 8/27/2024  2:42 PM   DISPOSITION:Home/Self Care    Network Utilization Review Department  ATTENTION: Please call with any questions or concerns to [phone] and carefully listen to the prompts so that you are directed to the right person. All voicemails are confidential.   For Discharge needs, contact Care Management DC Support Team at [phone] opt. [phone]  Send all requests for admission clinical reviews, approved or denied determinations and any other requests to dedicated fax number below belonging to the campus where the patient is receiving treatment. List of dedicated fax numbers for the Facilities:  FACILITY NAME UR FAX NUMBER   ADMISSION DENIALS (Administrative/Medical Necessity) [phone]   DISCHARGE SUPPORT TEAM ([facility]) [phone]   PARENT CHILD HEALTH (Maternity/NICU/Pediatrics) [phone]   [facility] [phone]   [facility] [phone]   [facility] [phone]   [facility] [phone]   [facility] [phone]   [facility] [phone]   [facility] [phone]   [facility] [phone]    [facility] [phone]   [facility] [phone]   [facility] [phone]   [facility] [phone]

## 2024-08-28 NOTE — RESTORATIVE TECHNICIAN NOTE
Restorative Technician Note      Patient Name: Foreign Armando     Restorative Tech Visit Date: 08/28/24  Note Type: Mobility  Patient Position Upon Consult: Supine  Activity Performed: Ambulated; Range of motion  Patient Position at End of Consult: All needs within reach; Supine

## 2024-08-29 LAB
BACTERIA SPEC BFLD CULT: NO GROWTH
BACTERIA SPEC BFLD CULT: NO GROWTH
GRAM STN SPEC: NORMAL

## 2024-08-29 PROCEDURE — 88342 IMHCHEM/IMCYTCHM 1ST ANTB: CPT | Performed by: PATHOLOGY

## 2024-08-29 PROCEDURE — 88305 TISSUE EXAM BY PATHOLOGIST: CPT | Performed by: PATHOLOGY

## 2024-08-29 PROCEDURE — 88341 IMHCHEM/IMCYTCHM EA ADD ANTB: CPT | Performed by: PATHOLOGY

## 2024-08-29 PROCEDURE — 88112 CYTOPATH CELL ENHANCE TECH: CPT | Performed by: PATHOLOGY

## 2024-09-16 ENCOUNTER — PATIENT OUTREACH (OUTPATIENT)
Dept: FAMILY MEDICINE CLINIC | Facility: CLINIC | Age: 64
End: 2024-09-16

## 2024-09-16 DIAGNOSIS — Z78.9 NEED FOR FOLLOW-UP BY SOCIAL WORKER: Primary | ICD-10-CM

## 2024-09-16 NOTE — PROGRESS NOTES
MICHELA RAMIREZ did receive an IB message from Sherry Gutierrez about Pt current issues and stated that she placed a referral to MICHELA RAMIREZ. Referred by Ranjan HUSAIN CM. Pt requesting assistance w/ getting an award letter for SSI and housing (is currently housed). New pt appt scheduled @ [facility] on 9/24.      MICHELA RAMIREZ will follow-up with Pt to assist as needed.   MICHELA RAMIREZ is remain available for further assistance as needed.

## 2024-09-17 ENCOUNTER — PATIENT OUTREACH (OUTPATIENT)
Dept: FAMILY MEDICINE CLINIC | Facility: CLINIC | Age: 64
End: 2024-09-17

## 2024-09-17 ENCOUNTER — RA CDI HCC (OUTPATIENT)
Dept: OTHER | Facility: HOSPITAL | Age: 64
End: 2024-09-17

## 2024-09-17 NOTE — PROGRESS NOTES
HCC coding opportunities          Chart Reviewed number of suggestions sent to Provider: 3   G40.909  F22  E11.42    Patients Insurance        Commercial Insurance: [employer]

## 2024-09-17 NOTE — PROGRESS NOTES
MICHELA RAMIREZ did place a call to Pt to assist as needed, but he did not  the phone. MICHELA RAMIREZ was unable to leave , she will attempt to reach out Pt a later time.     MICHELA RAMIREZ is remain available for further assistance as needed.

## 2024-09-25 ENCOUNTER — PATIENT OUTREACH (OUTPATIENT)
Dept: FAMILY MEDICINE CLINIC | Facility: CLINIC | Age: 64
End: 2024-09-25

## 2024-09-25 NOTE — PROGRESS NOTES
MICHELA RAMIREZ did place second call to Pt with no response, unable to leave VM. MICHELA RAMIREZ will send Unable to Reach Letter by mail since Mychart status seems code .     MICHELA RAMIREZ is closing case today due to unable to contact Pt.  MICHELA RAMIREZ is remain available for further assistance as needed.

## 2024-09-25 NOTE — LETTER
[address] [place] [address], [address]  [facility] [address] [phone]    Re: care coordination   9/25/2024       Dear Foreign,    We tried to reach you by phone on 9/25/2024 and was unfortunately unable to reach you.  It is important that you contact the [facility] at [phone].    Sincerely,         Rafaelina Reyes

## 2025-05-23 NOTE — H&P
[facility]  H&P  Name: Foreign Armando 63 y.o. male I MRN: [id number]  Unit/Bed#: E5 -01 I Date of Admission: 6/21/2024   Date of Service: 6/22/2024 I Hospital Day: 1      Assessment & Plan   * Decompensated hepatic cirrhosis (HCC)  Assessment & Plan  Known liver cirrhosis 2/2 CHRISTIE, decompensated with ascites, hepatic encephalopathy, varices, pancytopenia, coagulopathy and hepatic hydrothorax.  Transferred from [place] for hyperbilirubinemia increased from baseline of 2, now 11.9    CT: Cirrhotic liver. Splenomegaly with cirrhosis raise concern for portal hypertension  Incidentally noted cystic pancreatic lesion in the body and tail of the pancreas with the larger measuring 1.3 cm, unchanged from the previous study in June. Can assessed further with nonemergent MRI along with MRCP. These probably represent small IPMN  Upper abdominal lymphadenopathy in the gastrocolic ligament, zeyad hepatis, in the portacaval region, stable likely related to chronic liver disease  Small para-aortic lymph nodes, stable. The previously noted mesenteric fatty lesion, stable may be sequela of fat necrosis   Mild pericholecystic fluid may related to chronic liver disease, unchanged  EV: Propranolol 10mg BID  HE: During admission at  was switched off lactulose for rifaximin due to intolerable diarrhea.  Insurance did not cover rifaximin therefore he was discharged on lactulose 20g qd  Diuretics: Furosemide 20mg qd and aldactone 100mg qd.  Hold given ALEXANDER  Monitor CBC, CMP and INR daily  GI consult    Hyperbilirubinemia  Assessment & Plan  Patient presented to [place] with complaint of lower extremity pain which is chronic from diabetic neuropathy.  He was noted with elevated bilirubin.  ER provider discussed with GI who recommended admission.  No GI coverage at [place] on weekends, transfer recommended    Chronically elevated bilirubin in setting of liver cirrhosis although increased to  You were seen in the ED for priapism (inappropriate and/or prolonged erection) caused by the injection you had earlier today.  Please call your urologist to discuss alternative medications.  Return to the ED if this happens again or for further concerns.   "11.9 from baseline 2.2.  Direct bili 4.6.  Normal AST/ALT/ALKP  CT showing:   Trace pericholecystic fluid.  Mild pericholecystic fluid may related to chronic liver disease, unchanged   NPO at midnight  Check RUQ US  P.r.n. Analgesics  GI consult   Repeat CMP in a.m.    Acute encephalopathy  Assessment & Plan  Noted urinating on floor and walls while at [place].  He was given urinal then placed it on the counter and defecated on the floor  Check serum ammonia level  Patient seen by neuropsych during admission at [place] on June 17 and determined to have capacity     Previously at [facility], per documentation:  \"Patient had a change in mental status for which he underwent normal CT head and normal EEG. He was found to have ammonia of 271, was started on Lactulose and Rifaximin with improvement in his mental status. During course of hospitalization he exhibited inappropriate sexual behaviors with female staff requiring male only care. He intermittently refused medical treatments that was incongruent with his symptoms, for example refusing pain medications for reported pain. He was therefore seen by Psychiatry and evaluated as having capacity to leave [place].\"    ALEXANDER (acute kidney injury) (HCC)  Assessment & Plan  Creatinine 1.3 from baseline 0.7-0.9  Likely hypovolemic given no oral intake and homelessness during heat wave. R/O hepatorenal  Provide gentle IV hydration and hold diuretics.  Recent admission with hepatic hydrothorax  Avoid NSAIDs, nephrotoxins and hypotension  Monitor lytes and serum creatinine    Lactic acidosis  Assessment & Plan  Multifactorial given hypovolemia, ALEXANDER and decreased clearance in liver cirrhosis. Cleared with 1L IVF    Anemia of chronic disease  Assessment & Plan  In setting of liver cirrhosis    Seizure disorder (HCC)  Assessment & Plan  Continue Keppra    Adrenal insufficiency (HCC)  Assessment & Plan  Continue Cortef 10 mg TID    Type 2 diabetes mellitus with hyperglycemia, without " "long-term current use of insulin (HCC)  Assessment & Plan  Lab Results   Component Value Date    HGBA1C 6.5 (H) 05/05/2024     Hold oral hypoglycemics inpatient.  Add accu-Cheks and sliding scale coverage  ADA diet when diet advanced    No results for input(s): \"POCGLU\" in the last 72 hours.    Blood Sugar Average: Last 72 hrs:        Restless leg syndrome  Assessment & Plan  Continue Requip    Thrombocytopenia (HCC)  Assessment & Plan  Chronic thrombocytopenia in setting of liver cirrhosis         VTE Prophylaxis:  Low risk score   / ambulatory   Code Status: fc by default  POLST: POLST is not applicable to this patient  Discussion with family:     Anticipated Length of Stay:  Patient will be admitted on an Inpatient basis with an anticipated length of stay of  > 2 midnights.   Justification for Hospital Stay: Decompensated liver cirrhosis, encephalopathy, hyperbilirubinemia    Total Time for Visit, including Counseling / Coordination of Care: 60 minutes.  Greater than 50% of this total time spent on direct patient counseling and coordination of care.    Chief Complaint:       History of Present Illness:    Foreign Armando is a 63 y.o. male who was transferred from [place] due to hyperbilirubinemia in setting of decompensated liver cirrhosis.  He was seen in ED yesterday with complaint of lower extremity pain and signed out AMA.  He returned to [place] complaining of lower extremity pain stating he walked around all night and did not eat.  He was confused, restless and agitated, was given Ativan and Dilaudid.  Labs revealed bilirubin increased from baseline of 2 to 11.9.  ER provider discussed with GI who recommended admission to [place] or [place].  Reportedly there is no GI coverage at  so transferred here.    Per nursing staff at [place], patient urinated in the room wall and floor, he was handed a urinal although placed it on the counter and walked across the room and urinated on the " floor then defecated.  Will assess for hepatic encephalopathy.  Unable to obtain history from patient, arrived somnolent, providing very little information, complains he has pain and is hungry.    Review of Systems:    Review of Systems   Unable to perform ROS: Other       Past Medical and Surgical History:     Past Medical History:   Diagnosis Date    CHF (congestive heart failure) (HCC)     Cirrhosis (HCC)     CHRISTIE    Colon cancer (HCC)     Diabetes mellitus (HCC)     Neuropathy     Restless leg syndrome        Past Surgical History:   Procedure Laterality Date    EGD AND SIGMOIDOSCOPY FLEXIBLE      IR PARACENTESIS  11/22/2022    IR PARACENTESIS  2/2/2023    IR THORACENTESIS  6/4/2024    LEFT COLON RESECTION      LITHOTRIPSY         Meds/Allergies:    Prior to Admission medications    Medication Sig Start Date End Date Taking? Authorizing Provider   Alcohol Swabs 70 % PADS May substitute brand based on insurance coverage. Check glucose TID. 6/18/24   Rosario Whitfield MD   Blood Glucose Monitoring Suppl (OneTouch Verio Reflect) w/Device KIT May substitute brand based on insurance coverage. Check glucose TID. 6/18/24   Rosario Whitfield MD   dicyclomine (BENTYL) 20 mg tablet Take 1 tablet (20 mg total) by mouth 2 (two) times a day before breakfast and lunch 12/1/22 12/31/22  Robb Lazaro MD   ferrous sulfate 325 (65 Fe) mg tablet  3/28/23   Historical Provider, MD   furosemide (LASIX) 20 mg tablet Take 1 tablet (20 mg total) by mouth daily 5/4/23 6/3/23  Margaret Shell PA-C   gabapentin (NEURONTIN) 300 mg capsule Take 300 mg by mouth Three times a day 3/15/23   Historical Provider, MD   glucose blood (OneTouch Verio) test strip May substitute brand based on insurance coverage. Check glucose TID. 6/18/24   Rosario Whitfield MD   hydrocortisone (CORTEF) 10 mg tablet Take 1 tablet (10 mg total) by mouth 3 (three) times a day 6/18/24 7/18/24  Rosario hWitfield MD   hydrOXYzine HCL (ATARAX) 25 mg tablet TAKE 1 TABLET BY MOUTH 3 TIMES A DAY  AS NEEDED FOR ITCHING 2/20/23   Historical Provider, MD   lactulose 20 g/30 mL Take 30 mL (20 g total) by mouth daily Do not start before February 28, 2023. 2/28/23   Telly Barajas MD   levETIRAcetam (KEPPRA) 500 mg tablet Take 1 tablet (500 mg total) by mouth every 12 (twelve) hours 6/18/24 7/18/24  Rosario Whitfield MD   metFORMIN (GLUCOPHAGE) 1000 MG tablet Take 1 tablet (1,000 mg total) by mouth 2 (two) times a day with meals 6/18/24 7/18/24  Rosario Whitfield MD   midodrine (PROAMATINE) 5 mg tablet Take 2 tablets (10 mg total) by mouth 3 (three) times a day before meals 6/18/24 7/18/24  Rosario Whitfield MD   OneTouch Delica Lancets 33G MISC May substitute brand based on insurance coverage. Check glucose TID. 6/18/24   Rosario Whitfield MD   pancrelipase, Lip-Prot-Amyl, (CREON) 6,000 units delayed release capsule Take 4 capsules (24,000 Units total) by mouth 3 (three) times a day with meals 6/18/24 7/18/24  Rosario Whitfield MD   pantoprazole (PROTONIX) 40 mg tablet Take 1 tablet (40 mg total) by mouth daily in the early morning Do not start before May 5, 2023. 5/5/23 6/4/23  Margaret Shell PA-C   propranolol (INDERAL) 10 mg tablet Take 1 tablet (10 mg total) by mouth every 12 (twelve) hours 5/4/23 6/3/23  Margaret Shell PA-C   repaglinide (PRANDIN) 2 mg tablet Take 1 tablet (2 mg total) by mouth 3 (three) times a day before meals 6/18/24 7/18/24  Rosario Whitfield MD   rOPINIRole (REQUIP) 4 mg tablet Take 1 tablet (4 mg total) by mouth 2 (two) times a day 5/4/23 6/3/23  Margaret Shell PA-C   senna-docusate sodium (SENOKOT S) 8.6-50 mg per tablet Take 1 tablet by mouth every evening 2/18/23   Historical Provider, MD   spironolactone (ALDACTONE) 100 mg tablet  3/28/23   Historical Provider, MD   tamsulosin (FLOMAX) 0.4 mg Take 1 capsule (0.4 mg total) by mouth daily with dinner 5/4/23 6/3/23  Margaret Shell PA-C     I have reviewed home medications using allscripts.    Allergies:   Allergies   Allergen Reactions    Morphine Anaphylaxis    Morpholine  "Salicylate Other (See Comments)     \"I go unconscious\"    Clarithromycin Other (See Comments)     Dizzy, nausea, ulcers in stomach    Oxycodone Hives and Facial Swelling     \"Scratchy throat\"      Sulfamethoxazole-Trimethoprim Hives    Nsaids Rash       Social History:     Marital Status: Single   Occupation:   Patient Pre-hospital Living Situation: homeless  Patient Pre-hospital Level of Mobility: Ambulatory  Patient Pre-hospital Diet Restrictions:   Substance Use History:   Social History     Substance and Sexual Activity   Alcohol Use Not Currently     Social History     Tobacco Use   Smoking Status Former    Current packs/day: 0.25    Types: Cigarettes   Smokeless Tobacco Never     Social History     Substance and Sexual Activity   Drug Use Never       Family History:    Family History   Problem Relation Age of Onset    Diabetes Mother     Liver disease Mother     Diabetes Father     Bone cancer Father        Physical Exam:     Vitals:   Blood Pressure: 125/95 (06/21/24 2225)  Pulse: 65 (06/21/24 2225)  Temperature: 97.7 °F (36.5 °C) (06/21/24 2225)  Temp Source: Oral (06/21/24 2225)  Respirations: 18 (06/21/24 2225)  SpO2: 98 % (06/21/24 2225)    Physical Exam  Constitutional:       General: He is not in acute distress.     Appearance: He is not ill-appearing, toxic-appearing or diaphoretic.   HENT:      Head: Normocephalic and atraumatic.      Mouth/Throat:      Mouth: Mucous membranes are dry.   Eyes:      General: Scleral icterus present.   Cardiovascular:      Rate and Rhythm: Normal rate and regular rhythm.   Pulmonary:      Effort: Pulmonary effort is normal.      Breath sounds: Normal breath sounds.   Abdominal:      General: There is no distension.      Palpations: Abdomen is soft.      Tenderness: There is abdominal tenderness.      Comments: Generalized tenderness on palpation of all quadrants   Musculoskeletal:      Right lower leg: Edema present.      Left lower leg: Edema present.      Comments: " 1+B/LLE edema   Skin:     General: Skin is warm and dry.      Comments: Scattered ecchymosis B/L upper extremities   Neurological:      Mental Status: He is alert. He is disoriented.   Psychiatric:      Comments: Somnolent       Additional Data:     Lab Results: I have personally reviewed pertinent reports.      Results from last 7 days   Lab Units 06/21/24  1034   WBC Thousand/uL 8.76   HEMOGLOBIN g/dL 10.6*   HEMATOCRIT % 30.1*   PLATELETS Thousands/uL 61*   SEGS PCT % 74   LYMPHO PCT % 12*   MONO PCT % 10   EOS PCT % 2     Results from last 7 days   Lab Units 06/21/24  1515 06/21/24  1034   SODIUM mmol/L  --  133*   POTASSIUM mmol/L  --  4.6   CHLORIDE mmol/L  --  98   CO2 mmol/L  --  21   BUN mg/dL  --  43*   CREATININE mg/dL  --  1.34*   ANION GAP mmol/L  --  14*   CALCIUM mg/dL  --  8.9   ALBUMIN g/dL  --  3.3*   TOTAL BILIRUBIN mg/dL 10.07* 11.96*   ALK PHOS U/L  --  102   ALT U/L  --  50   AST U/L  --  39   GLUCOSE RANDOM mg/dL  --  158*     Results from last 7 days   Lab Units 06/20/24  1156   INR  1.47*     Results from last 7 days   Lab Units 06/21/24  2318 06/18/24  1112 06/18/24  0628 06/17/24  2012 06/17/24  1529 06/17/24  1100 06/17/24  0519 06/16/24  2115 06/16/24  1506 06/16/24  1039 06/16/24  0531 06/15/24  2145   POC GLUCOSE mg/dl 92 246* 219* 159* 244* 244* 195* 242* 182* 125 282* 344*         Results from last 7 days   Lab Units 06/21/24  1319 06/21/24  1034 06/20/24  1420   LACTIC ACID mmol/L 1.3 2.9* 2.9*       Imaging: I have personally reviewed pertinent reports.      US right upper quadrant    (Results Pending)       EKG, Pathology, and Other Studies Reviewed on Admission:   EF 60% CT    Allscripts / Epic Records Reviewed: Yes     ** Please Note: This note has been constructed using a voice recognition system. **